# Patient Record
Sex: MALE | Race: BLACK OR AFRICAN AMERICAN | NOT HISPANIC OR LATINO | Employment: FULL TIME | URBAN - METROPOLITAN AREA
[De-identification: names, ages, dates, MRNs, and addresses within clinical notes are randomized per-mention and may not be internally consistent; named-entity substitution may affect disease eponyms.]

---

## 2017-02-08 ENCOUNTER — HOSPITAL ENCOUNTER (INPATIENT)
Facility: HOSPITAL | Age: 52
LOS: 4 days | Discharge: HOME | DRG: 871 | End: 2017-02-12
Attending: EMERGENCY MEDICINE | Admitting: EMERGENCY MEDICINE

## 2017-02-08 DIAGNOSIS — N17.9 ACUTE RENAL FAILURE, UNSPECIFIED ACUTE RENAL FAILURE TYPE: ICD-10-CM

## 2017-02-08 DIAGNOSIS — A41.9 SEPSIS, DUE TO UNSPECIFIED ORGANISM: ICD-10-CM

## 2017-02-08 DIAGNOSIS — D69.1 PLATELET DYSFUNCTION: ICD-10-CM

## 2017-02-08 DIAGNOSIS — R79.89 ELEVATED TROPONIN: ICD-10-CM

## 2017-02-08 DIAGNOSIS — R09.02 HYPOXIA: ICD-10-CM

## 2017-02-08 DIAGNOSIS — E16.2 HYPOGLYCEMIA: ICD-10-CM

## 2017-02-08 DIAGNOSIS — N18.2 CKD (CHRONIC KIDNEY DISEASE), STAGE II: ICD-10-CM

## 2017-02-08 DIAGNOSIS — J18.9 HCAP (HEALTHCARE-ASSOCIATED PNEUMONIA): ICD-10-CM

## 2017-02-08 DIAGNOSIS — G93.6 CEREBRAL EDEMA: ICD-10-CM

## 2017-02-08 DIAGNOSIS — J18.1 LUNG CONSOLIDATION: ICD-10-CM

## 2017-02-08 DIAGNOSIS — N17.0 ATN (ACUTE TUBULAR NECROSIS): ICD-10-CM

## 2017-02-08 DIAGNOSIS — I16.1 HYPERTENSIVE EMERGENCY: ICD-10-CM

## 2017-02-08 DIAGNOSIS — J96.01 ACUTE RESPIRATORY FAILURE WITH HYPOXEMIA: ICD-10-CM

## 2017-02-08 DIAGNOSIS — I62.9 INTRACRANIAL HEMORRHAGE: ICD-10-CM

## 2017-02-08 DIAGNOSIS — E86.1 HYPOVOLEMIA: ICD-10-CM

## 2017-02-08 DIAGNOSIS — I10 MALIGNANT HYPERTENSION: ICD-10-CM

## 2017-02-08 DIAGNOSIS — A41.9 SEVERE SEPSIS: ICD-10-CM

## 2017-02-08 DIAGNOSIS — J18.9 PNEUMONIA OF RIGHT MIDDLE LOBE DUE TO INFECTIOUS ORGANISM: Primary | ICD-10-CM

## 2017-02-08 DIAGNOSIS — R51.9 INTRACTABLE EPISODIC HEADACHE, UNSPECIFIED HEADACHE TYPE: ICD-10-CM

## 2017-02-08 DIAGNOSIS — N17.9 ACUTE KIDNEY INJURY: ICD-10-CM

## 2017-02-08 DIAGNOSIS — R91.8 PULMONARY INFILTRATES ON CXR: ICD-10-CM

## 2017-02-08 DIAGNOSIS — D50.9 MICROCYTIC ANEMIA: ICD-10-CM

## 2017-02-08 DIAGNOSIS — R79.89 ABNORMAL THYROID BLOOD TEST: ICD-10-CM

## 2017-02-08 DIAGNOSIS — I16.9 HYPERTENSIVE CRISIS: ICD-10-CM

## 2017-02-08 DIAGNOSIS — R65.20 SEVERE SEPSIS: ICD-10-CM

## 2017-02-08 LAB
ALBUMIN SERPL BCP-MCNC: 3.9 G/DL
ALP SERPL-CCNC: 84 U/L
ALT SERPL W/O P-5'-P-CCNC: 27 U/L
ANION GAP SERPL CALC-SCNC: 11 MMOL/L
APTT BLDCRRT: 25.5 SEC
AST SERPL-CCNC: 23 U/L
BACTERIA #/AREA URNS HPF: ABNORMAL /HPF
BASOPHILS # BLD AUTO: 0.02 K/UL
BASOPHILS NFR BLD: 0.2 %
BILIRUB SERPL-MCNC: 0.4 MG/DL
BILIRUB UR QL STRIP: NEGATIVE
BUN SERPL-MCNC: 29 MG/DL
CALCIUM SERPL-MCNC: 9.6 MG/DL
CHLORIDE SERPL-SCNC: 104 MMOL/L
CLARITY UR: CLEAR
CO2 SERPL-SCNC: 24 MMOL/L
COLOR UR: YELLOW
CREAT SERPL-MCNC: 1.9 MG/DL
DIFFERENTIAL METHOD: ABNORMAL
EOSINOPHIL # BLD AUTO: 0.1 K/UL
EOSINOPHIL NFR BLD: 0.6 %
ERYTHROCYTE [DISTWIDTH] IN BLOOD BY AUTOMATED COUNT: 13.6 %
EST. GFR  (AFRICAN AMERICAN): 46 ML/MIN/1.73 M^2
EST. GFR  (NON AFRICAN AMERICAN): 39 ML/MIN/1.73 M^2
FLUAV AG SPEC QL IA: NEGATIVE
FLUBV AG SPEC QL IA: NEGATIVE
GLUCOSE SERPL-MCNC: 147 MG/DL
GLUCOSE UR QL STRIP: NEGATIVE
HCT VFR BLD AUTO: 39.9 %
HGB BLD-MCNC: 13.2 G/DL
HGB UR QL STRIP: ABNORMAL
HYALINE CASTS #/AREA URNS LPF: 1 /LPF
INR PPP: 1
KETONES UR QL STRIP: NEGATIVE
LACTATE SERPL-SCNC: 1.2 MMOL/L
LEUKOCYTE ESTERASE UR QL STRIP: NEGATIVE
LYMPHOCYTES # BLD AUTO: 0.5 K/UL
LYMPHOCYTES NFR BLD: 5.3 %
MAGNESIUM SERPL-MCNC: 2.1 MG/DL
MCH RBC QN AUTO: 26.4 PG
MCHC RBC AUTO-ENTMCNC: 33.1 %
MCV RBC AUTO: 80 FL
MICROSCOPIC COMMENT: ABNORMAL
MONOCYTES # BLD AUTO: 0.7 K/UL
MONOCYTES NFR BLD: 7.7 %
NEUTROPHILS # BLD AUTO: 7.7 K/UL
NEUTROPHILS NFR BLD: 86.2 %
NITRITE UR QL STRIP: NEGATIVE
OTHER ELEMENTS URNS MICRO: ABNORMAL
PH UR STRIP: 5 [PH] (ref 5–8)
PHOSPHATE SERPL-MCNC: 2.5 MG/DL
PLATELET # BLD AUTO: 206 K/UL
PMV BLD AUTO: 10.7 FL
POTASSIUM SERPL-SCNC: 3.5 MMOL/L
PROT SERPL-MCNC: 8.5 G/DL
PROT UR QL STRIP: ABNORMAL
PROTHROMBIN TIME: 10.7 SEC
RBC # BLD AUTO: 5 M/UL
RBC #/AREA URNS HPF: 4 /HPF (ref 0–4)
SODIUM SERPL-SCNC: 139 MMOL/L
SP GR UR STRIP: 1.02 (ref 1–1.03)
SPECIMEN SOURCE: NORMAL
T4 FREE SERPL-MCNC: 0.94 NG/DL
TROPONIN I SERPL DL<=0.01 NG/ML-MCNC: 0.07 NG/ML
TSH SERPL DL<=0.005 MIU/L-ACNC: 0.36 UIU/ML
URN SPEC COLLECT METH UR: ABNORMAL
UROBILINOGEN UR STRIP-ACNC: NEGATIVE EU/DL
WBC # BLD AUTO: 8.88 K/UL
WBC #/AREA URNS HPF: 0 /HPF (ref 0–5)

## 2017-02-08 PROCEDURE — 80053 COMPREHEN METABOLIC PANEL: CPT

## 2017-02-08 PROCEDURE — 96375 TX/PRO/DX INJ NEW DRUG ADDON: CPT

## 2017-02-08 PROCEDURE — 87040 BLOOD CULTURE FOR BACTERIA: CPT

## 2017-02-08 PROCEDURE — 84484 ASSAY OF TROPONIN QUANT: CPT

## 2017-02-08 PROCEDURE — 85610 PROTHROMBIN TIME: CPT

## 2017-02-08 PROCEDURE — 96367 TX/PROPH/DG ADDL SEQ IV INF: CPT

## 2017-02-08 PROCEDURE — 84443 ASSAY THYROID STIM HORMONE: CPT

## 2017-02-08 PROCEDURE — 83735 ASSAY OF MAGNESIUM: CPT

## 2017-02-08 PROCEDURE — 96361 HYDRATE IV INFUSION ADD-ON: CPT

## 2017-02-08 PROCEDURE — 85730 THROMBOPLASTIN TIME PARTIAL: CPT

## 2017-02-08 PROCEDURE — 87400 INFLUENZA A/B EACH AG IA: CPT

## 2017-02-08 PROCEDURE — 12000002 HC ACUTE/MED SURGE SEMI-PRIVATE ROOM

## 2017-02-08 PROCEDURE — 96365 THER/PROPH/DIAG IV INF INIT: CPT

## 2017-02-08 PROCEDURE — 85025 COMPLETE CBC W/AUTO DIFF WBC: CPT

## 2017-02-08 PROCEDURE — 25000003 PHARM REV CODE 250: Performed by: EMERGENCY MEDICINE

## 2017-02-08 PROCEDURE — 81000 URINALYSIS NONAUTO W/SCOPE: CPT

## 2017-02-08 PROCEDURE — 84100 ASSAY OF PHOSPHORUS: CPT

## 2017-02-08 PROCEDURE — 83605 ASSAY OF LACTIC ACID: CPT

## 2017-02-08 PROCEDURE — 63600175 PHARM REV CODE 636 W HCPCS: Performed by: EMERGENCY MEDICINE

## 2017-02-08 PROCEDURE — 99291 CRITICAL CARE FIRST HOUR: CPT | Mod: 25

## 2017-02-08 PROCEDURE — 93005 ELECTROCARDIOGRAM TRACING: CPT

## 2017-02-08 PROCEDURE — 84439 ASSAY OF FREE THYROXINE: CPT

## 2017-02-08 PROCEDURE — 94761 N-INVAS EAR/PLS OXIMETRY MLT: CPT

## 2017-02-08 PROCEDURE — 87086 URINE CULTURE/COLONY COUNT: CPT

## 2017-02-08 RX ORDER — LISINOPRIL 10 MG/1
10 TABLET ORAL DAILY
Status: ON HOLD | COMMUNITY
End: 2017-02-12

## 2017-02-08 RX ORDER — ACETAMINOPHEN 500 MG
1000 TABLET ORAL
Status: COMPLETED | OUTPATIENT
Start: 2017-02-08 | End: 2017-02-08

## 2017-02-08 RX ORDER — HYDRALAZINE HYDROCHLORIDE 20 MG/ML
10 INJECTION INTRAMUSCULAR; INTRAVENOUS
Status: DISCONTINUED | OUTPATIENT
Start: 2017-02-08 | End: 2017-02-09

## 2017-02-08 RX ORDER — CEFEPIME HYDROCHLORIDE 1 G/50ML
1 INJECTION, SOLUTION INTRAVENOUS
Status: COMPLETED | OUTPATIENT
Start: 2017-02-08 | End: 2017-02-08

## 2017-02-08 RX ORDER — CEFEPIME HYDROCHLORIDE 1 G/50ML
1 INJECTION, SOLUTION INTRAVENOUS
Status: DISCONTINUED | OUTPATIENT
Start: 2017-02-08 | End: 2017-02-08

## 2017-02-08 RX ORDER — AMLODIPINE BESYLATE 5 MG/1
10 TABLET ORAL
Status: COMPLETED | OUTPATIENT
Start: 2017-02-08 | End: 2017-02-08

## 2017-02-08 RX ORDER — LISINOPRIL 5 MG/1
10 TABLET ORAL
Status: COMPLETED | OUTPATIENT
Start: 2017-02-08 | End: 2017-02-08

## 2017-02-08 RX ORDER — IBUPROFEN 400 MG/1
800 TABLET ORAL
Status: COMPLETED | OUTPATIENT
Start: 2017-02-08 | End: 2017-02-08

## 2017-02-08 RX ADMIN — ACETAMINOPHEN 1000 MG: 500 TABLET ORAL at 07:02

## 2017-02-08 RX ADMIN — IBUPROFEN 800 MG: 400 TABLET, FILM COATED ORAL at 08:02

## 2017-02-08 RX ADMIN — VANCOMYCIN HYDROCHLORIDE 1000 MG: 1 INJECTION, POWDER, LYOPHILIZED, FOR SOLUTION INTRAVENOUS at 10:02

## 2017-02-08 RX ADMIN — AMLODIPINE BESYLATE 10 MG: 5 TABLET ORAL at 07:02

## 2017-02-08 RX ADMIN — LISINOPRIL 10 MG: 5 TABLET ORAL at 07:02

## 2017-02-08 RX ADMIN — AZITHROMYCIN MONOHYDRATE 500 MG: 500 INJECTION, POWDER, LYOPHILIZED, FOR SOLUTION INTRAVENOUS at 09:02

## 2017-02-08 RX ADMIN — SODIUM CHLORIDE 2517 ML: 0.9 INJECTION, SOLUTION INTRAVENOUS at 07:02

## 2017-02-08 RX ADMIN — CEFEPIME HYDROCHLORIDE 1 G: 1 INJECTION, POWDER, FOR SOLUTION INTRAMUSCULAR; INTRAVENOUS at 08:02

## 2017-02-08 NOTE — IP AVS SNAPSHOT
[place] [address] [place] [address]  Phone: [phone]           Patient Discharge Instructions     Our goal is to set you up for success. This packet includes information on your condition, medications, and your home care. It will help you to care for yourself so you don't get sicker and need to go back to the hospital.     Please ask your nurse if you have any questions.        There are many details to remember when preparing to leave the hospital. Here is what you will need to do:    1. Take your medicine. If you are prescribed medications, review your Medication List in the following pages. You may have new medications to  at the pharmacy and others that you'll need to stop taking. Review the instructions for how and when to take your medications. Talk with your doctor or nurses if you are unsure of what to do.     2. Go to your follow-up appointments. Specific follow-up information is listed in the following pages. Your may be contacted by a transition nurse or clinical provider about future appointments. Be sure we have all of the phone numbers to reach you, if needed. Please contact your provider's office if you are unable to make an appointment.     3. Watch for warning signs. Your doctor or nurse will give you detailed warning signs to watch for and when to call for assistance. These instructions may also include educational information about your condition. If you experience any of warning signs to your health, call your doctor.               [facility]  Unless otherwise directed by your provider, please contact [facility], our nurse care line that is available for 24/7 assistance.     [phone] (toll-free)    Registered nurses in the [facility] provide clinical advisement, health education, appointment booking, and other advisory services.                    ** Verify the list of medication(s) below is accurate and up to date.  Carry this with you in case of emergency. If your medications have changed, please notify your healthcare provider.             Medication List      START taking these medications        Additional Info                      amoxicillin-clavulanate 875-125mg 875-125 mg per tablet   Commonly known as:  AUGMENTIN   Quantity:  20 tablet   Refills:  0   Dose:  1 tablet    Instructions:  Take 1 tablet by mouth 2 (two) times daily.     Begin Date    AM    Noon    PM    Bedtime       aspirin 81 MG EC tablet   Commonly known as:  ECOTRIN   Refills:  0   Dose:  81 mg    Instructions:  Take 1 tablet (81 mg total) by mouth once daily.     Begin Date    AM    Noon    PM    Bedtime       hydrALAZINE 100 MG tablet   Commonly known as:  APRESOLINE   Quantity:  90 tablet   Refills:  0   Dose:  100 mg    Last time this was given:  100 mg on 2/12/2017  5:43 AM   Instructions:  Take 1 tablet (100 mg total) by mouth every 8 (eight) hours.     Begin Date    AM    Noon    PM    Bedtime       pravastatin 40 MG tablet   Commonly known as:  PRAVACHOL   Quantity:  30 tablet   Refills:  0   Dose:  40 mg    Instructions:  Take 1 tablet (40 mg total) by mouth once daily.     Begin Date    AM    Noon    PM    Bedtime         CHANGE how you take these medications        Additional Info                      metformin 500 MG tablet   Commonly known as:  GLUCOPHAGE   Quantity:  60 tablet   Refills:  0   Dose:  500 mg   What changed:  when to take this    Instructions:  Take 1 tablet (500 mg total) by mouth 2 (two) times daily with meals.     Begin Date    AM    Noon    PM    Bedtime         CONTINUE taking these medications        Additional Info                      amlodipine 10 MG tablet   Commonly known as:  NORVASC   Quantity:  30 tablet   Refills:  0   Dose:  10 mg    Last time this was given:  10 mg on 2/12/2017  9:11 AM   Instructions:  Take 1 tablet (10 mg total) by mouth once daily.     Begin Date    AM    Noon    PM    Bedtime        lisinopril 10 MG tablet   Quantity:  30 tablet   Refills:  0   Dose:  10 mg    Last time this was given:  10 mg on 2/8/2017  7:26 PM   Instructions:  Take 1 tablet (10 mg total) by mouth once daily.     Begin Date    AM    Noon    PM    Bedtime            Where to Get Your Medications      These medications were sent to [facility] [address] [place], [place] [address] [address], [place] [address]     Phone:  [phone]     amlodipine 10 MG tablet    amoxicillin-clavulanate 875-125mg 875-125 mg per tablet    hydrALAZINE 100 MG tablet    lisinopril 10 MG tablet    metformin 500 MG tablet    pravastatin 40 MG tablet         Information about where to get these medications is not yet available     ! Ask your nurse or doctor about these medications     aspirin 81 MG EC tablet                  Please bring to all follow up appointments:    1. A copy of your discharge instructions.  2. All medicines you are currently taking in their original bottles.  3. Identification and insurance card.    Please arrive 15 minutes ahead of scheduled appointment time.    Please call 24 hours in advance if you must reschedule your appointment and/or time.        Follow-up Information     Follow up with [facility]. Schedule an appointment as soon as possible for a visit in 3 days.    Why:  Outpatient Services, PCP Follow-up Appointment    Contact information:    [address] [place] [address] [phone]          [facility]  In 3 days.        Discharge Instructions     Future Orders    Activity as tolerated     Diet general     Questions:    Total calories:      Fat restriction, if any:      Protein restriction, if any:      Na restriction, if any:      Fluid restriction:      Additional restrictions:          Primary Diagnosis     Your primary diagnosis was:  Healthcare-Associated Pneumonia      Admission Information     Date & Time Provider Department CSN    2/8/2017  6:31 PM  "Suzie Ron MD [facility] [id number]      Care Providers     Provider Role Specialty Primary office phone    Suzie Ron MD Attending Provider Hospitalist [phone]      Your Vitals Were     BP Pulse Temp Resp Height Weight    155/90 101 98.1 °F (36.7 °C) 18 5' 7" (1.702 m) 83.9 kg (185 lb)    SpO2 BMI             96% 28.98 kg/m2         Recent Lab Values        1/14/2016                          12:50 PM           A1C 6.5 (H)                       Pending Labs     Order Current Status    Blood culture x two cultures. Draw prior to antibiotics. Preliminary result    Blood culture x two cultures. Draw prior to antibiotics. Preliminary result      Allergies as of 2/12/2017     No Known Allergies      Advance Directives     An advance directive is a document which, in the event you are no longer able to make decisions for yourself, tells your healthcare team what kind of treatment you do or do not want to receive, or who you would like to make those decisions for you.  If you do not currently have an advance directive, [facility] encourages you to create one.  For more information call:  [phone]), [phone] ([phone]),  or log on to [email or web address].        Language Assistance Services     ATTENTION: Language assistance services are available, free of charge. Please call [phone].      ATENCIÓN: Si habla español, tiene a stone disposición servicios gratuitos de asistencia lingüística. Llame al [phone].     [facility] Ý: N?u b?n nói Ti?ng Vi?t, có các d?ch v? h? tr? ngôn ng? mi?n phí dành cho b?n. G?i s? [phone].        Stroke Education              Pneumonmia Discharge Instructions                Chronic Kindey Disease Education             Diabetes Discharge Instructions                                   [facility] Sign-Up     Activating your [facility] account is as easy as 1-2-3!     1) Visit [email or web address], select Sign Up Now, enter " this activation code and your date of birth, then select Next.  [email or web address]  Expires: 3/29/2017 10:31 AM      2) Create a username and password to use when you visit [facility] in the future and select a security question in case you lose your password and select Next.    3) Enter your e-mail address and click Sign Up!    Additional Information  If you have questions, please e-mail [email or web address] or call [phone] to talk to our [facility] staff. Remember, [facility] is NOT to be used for urgent needs. For medical emergencies, dial 911.          [facility] complies with applicable Federal civil rights laws and does not discriminate on the basis of race, color, national origin, age, disability, or sex.

## 2017-02-09 PROBLEM — A41.9 SEVERE SEPSIS: Status: ACTIVE | Noted: 2017-02-09

## 2017-02-09 PROBLEM — N17.9 ACUTE RENAL FAILURE: Status: ACTIVE | Noted: 2017-02-09

## 2017-02-09 PROBLEM — R79.89 ELEVATED TROPONIN: Status: ACTIVE | Noted: 2017-02-09

## 2017-02-09 PROBLEM — R65.20 SEVERE SEPSIS: Status: ACTIVE | Noted: 2017-02-09

## 2017-02-09 LAB
ALBUMIN SERPL BCP-MCNC: 3.3 G/DL
ALP SERPL-CCNC: 75 U/L
ALT SERPL W/O P-5'-P-CCNC: 46 U/L
ANION GAP SERPL CALC-SCNC: 9 MMOL/L
AST SERPL-CCNC: 58 U/L
BASOPHILS # BLD AUTO: 0.02 K/UL
BASOPHILS NFR BLD: 0.4 %
BILIRUB SERPL-MCNC: 0.4 MG/DL
BUN SERPL-MCNC: 22 MG/DL
CALCIUM SERPL-MCNC: 8.6 MG/DL
CHLORIDE SERPL-SCNC: 107 MMOL/L
CO2 SERPL-SCNC: 23 MMOL/L
CREAT SERPL-MCNC: 1.7 MG/DL
DIFFERENTIAL METHOD: ABNORMAL
EOSINOPHIL # BLD AUTO: 0.1 K/UL
EOSINOPHIL NFR BLD: 1.4 %
ERYTHROCYTE [DISTWIDTH] IN BLOOD BY AUTOMATED COUNT: 13.7 %
EST. GFR  (AFRICAN AMERICAN): 52 ML/MIN/1.73 M^2
EST. GFR  (NON AFRICAN AMERICAN): 45 ML/MIN/1.73 M^2
GLUCOSE SERPL-MCNC: 153 MG/DL
HCT VFR BLD AUTO: 37.1 %
HGB BLD-MCNC: 12.1 G/DL
LYMPHOCYTES # BLD AUTO: 0.5 K/UL
LYMPHOCYTES NFR BLD: 9.1 %
MCH RBC QN AUTO: 26.4 PG
MCHC RBC AUTO-ENTMCNC: 32.6 %
MCV RBC AUTO: 81 FL
MONOCYTES # BLD AUTO: 0.6 K/UL
MONOCYTES NFR BLD: 10.2 %
NEUTROPHILS # BLD AUTO: 4.4 K/UL
NEUTROPHILS NFR BLD: 78.9 %
PLATELET # BLD AUTO: 170 K/UL
PMV BLD AUTO: 10.8 FL
POCT GLUCOSE: 121 MG/DL (ref 70–110)
POCT GLUCOSE: 128 MG/DL (ref 70–110)
POCT GLUCOSE: 135 MG/DL (ref 70–110)
POCT GLUCOSE: 167 MG/DL (ref 70–110)
POCT GLUCOSE: 184 MG/DL (ref 70–110)
POTASSIUM SERPL-SCNC: 3.4 MMOL/L
PROT SERPL-MCNC: 7.2 G/DL
RBC # BLD AUTO: 4.58 M/UL
SODIUM SERPL-SCNC: 139 MMOL/L
TROPONIN I SERPL DL<=0.01 NG/ML-MCNC: 0.08 NG/ML
TROPONIN I SERPL DL<=0.01 NG/ML-MCNC: 0.1 NG/ML
WBC # BLD AUTO: 5.59 K/UL

## 2017-02-09 PROCEDURE — 12000002 HC ACUTE/MED SURGE SEMI-PRIVATE ROOM

## 2017-02-09 PROCEDURE — 94640 AIRWAY INHALATION TREATMENT: CPT

## 2017-02-09 PROCEDURE — 36415 COLL VENOUS BLD VENIPUNCTURE: CPT

## 2017-02-09 PROCEDURE — 94761 N-INVAS EAR/PLS OXIMETRY MLT: CPT

## 2017-02-09 PROCEDURE — 84484 ASSAY OF TROPONIN QUANT: CPT

## 2017-02-09 PROCEDURE — 25000003 PHARM REV CODE 250: Performed by: HOSPITALIST

## 2017-02-09 PROCEDURE — 85025 COMPLETE CBC W/AUTO DIFF WBC: CPT

## 2017-02-09 PROCEDURE — 80053 COMPREHEN METABOLIC PANEL: CPT

## 2017-02-09 PROCEDURE — 25000003 PHARM REV CODE 250: Performed by: EMERGENCY MEDICINE

## 2017-02-09 PROCEDURE — 25000242 PHARM REV CODE 250 ALT 637 W/ HCPCS: Performed by: HOSPITALIST

## 2017-02-09 PROCEDURE — 63600175 PHARM REV CODE 636 W HCPCS: Performed by: EMERGENCY MEDICINE

## 2017-02-09 RX ORDER — ACETAMINOPHEN 325 MG/1
650 TABLET ORAL EVERY 6 HOURS PRN
Status: DISCONTINUED | OUTPATIENT
Start: 2017-02-09 | End: 2017-02-12 | Stop reason: HOSPADM

## 2017-02-09 RX ORDER — ONDANSETRON 2 MG/ML
4 INJECTION INTRAMUSCULAR; INTRAVENOUS EVERY 12 HOURS PRN
Status: DISCONTINUED | OUTPATIENT
Start: 2017-02-09 | End: 2017-02-12 | Stop reason: HOSPADM

## 2017-02-09 RX ORDER — GUAIFENESIN/DEXTROMETHORPHAN 100-10MG/5
10 SYRUP ORAL EVERY 6 HOURS
Status: DISCONTINUED | OUTPATIENT
Start: 2017-02-09 | End: 2017-02-12 | Stop reason: HOSPADM

## 2017-02-09 RX ORDER — CEFEPIME HYDROCHLORIDE 1 G/50ML
1 INJECTION, SOLUTION INTRAVENOUS
Status: DISCONTINUED | OUTPATIENT
Start: 2017-02-09 | End: 2017-02-12 | Stop reason: HOSPADM

## 2017-02-09 RX ORDER — ENOXAPARIN SODIUM 100 MG/ML
40 INJECTION SUBCUTANEOUS EVERY 24 HOURS
Status: DISCONTINUED | OUTPATIENT
Start: 2017-02-09 | End: 2017-02-09

## 2017-02-09 RX ORDER — LABETALOL HYDROCHLORIDE 5 MG/ML
20 INJECTION, SOLUTION INTRAVENOUS ONCE
Status: DISCONTINUED | OUTPATIENT
Start: 2017-02-09 | End: 2017-02-12 | Stop reason: HOSPADM

## 2017-02-09 RX ORDER — HYDRALAZINE HYDROCHLORIDE 20 MG/ML
10 INJECTION INTRAMUSCULAR; INTRAVENOUS EVERY 8 HOURS PRN
Status: DISCONTINUED | OUTPATIENT
Start: 2017-02-09 | End: 2017-02-10

## 2017-02-09 RX ORDER — AMLODIPINE BESYLATE 5 MG/1
10 TABLET ORAL DAILY
Status: DISCONTINUED | OUTPATIENT
Start: 2017-02-09 | End: 2017-02-12 | Stop reason: HOSPADM

## 2017-02-09 RX ORDER — IPRATROPIUM BROMIDE AND ALBUTEROL SULFATE 2.5; .5 MG/3ML; MG/3ML
3 SOLUTION RESPIRATORY (INHALATION) EVERY 6 HOURS
Status: DISCONTINUED | OUTPATIENT
Start: 2017-02-09 | End: 2017-02-12 | Stop reason: HOSPADM

## 2017-02-09 RX ORDER — CLONIDINE HYDROCHLORIDE 0.1 MG/1
0.1 TABLET ORAL EVERY 4 HOURS PRN
Status: DISCONTINUED | OUTPATIENT
Start: 2017-02-09 | End: 2017-02-12 | Stop reason: HOSPADM

## 2017-02-09 RX ORDER — IPRATROPIUM BROMIDE AND ALBUTEROL SULFATE 2.5; .5 MG/3ML; MG/3ML
3 SOLUTION RESPIRATORY (INHALATION) EVERY 6 HOURS
Status: DISCONTINUED | OUTPATIENT
Start: 2017-02-09 | End: 2017-02-09

## 2017-02-09 RX ORDER — INSULIN ASPART 100 [IU]/ML
1-12 INJECTION, SOLUTION INTRAVENOUS; SUBCUTANEOUS
Status: DISCONTINUED | OUTPATIENT
Start: 2017-02-09 | End: 2017-02-12 | Stop reason: HOSPADM

## 2017-02-09 RX ORDER — ATORVASTATIN CALCIUM 10 MG/1
10 TABLET, FILM COATED ORAL DAILY
Status: DISCONTINUED | OUTPATIENT
Start: 2017-02-09 | End: 2017-02-12 | Stop reason: HOSPADM

## 2017-02-09 RX ORDER — BENZONATATE 100 MG/1
100 CAPSULE ORAL 3 TIMES DAILY PRN
Status: DISCONTINUED | OUTPATIENT
Start: 2017-02-09 | End: 2017-02-12 | Stop reason: HOSPADM

## 2017-02-09 RX ORDER — LISINOPRIL 5 MG/1
10 TABLET ORAL DAILY
Status: DISCONTINUED | OUTPATIENT
Start: 2017-02-09 | End: 2017-02-09

## 2017-02-09 RX ORDER — HYDRALAZINE HYDROCHLORIDE 25 MG/1
25 TABLET, FILM COATED ORAL EVERY 8 HOURS
Status: DISCONTINUED | OUTPATIENT
Start: 2017-02-09 | End: 2017-02-10

## 2017-02-09 RX ORDER — PANTOPRAZOLE SODIUM 40 MG/1
40 TABLET, DELAYED RELEASE ORAL DAILY
Status: DISCONTINUED | OUTPATIENT
Start: 2017-02-09 | End: 2017-02-12 | Stop reason: HOSPADM

## 2017-02-09 RX ORDER — GLUCAGON 1 MG
1 KIT INJECTION
Status: DISCONTINUED | OUTPATIENT
Start: 2017-02-09 | End: 2017-02-12 | Stop reason: HOSPADM

## 2017-02-09 RX ORDER — SODIUM CHLORIDE 9 MG/ML
INJECTION, SOLUTION INTRAVENOUS CONTINUOUS
Status: DISCONTINUED | OUTPATIENT
Start: 2017-02-09 | End: 2017-02-12 | Stop reason: HOSPADM

## 2017-02-09 RX ADMIN — ACETAMINOPHEN 650 MG: 325 TABLET ORAL at 04:02

## 2017-02-09 RX ADMIN — CEFEPIME 1 G: 1 INJECTION, POWDER, FOR SOLUTION INTRAMUSCULAR; INTRAVENOUS at 08:02

## 2017-02-09 RX ADMIN — BENZONATATE 100 MG: 100 CAPSULE ORAL at 02:02

## 2017-02-09 RX ADMIN — ATORVASTATIN CALCIUM 10 MG: 10 TABLET, FILM COATED ORAL at 07:02

## 2017-02-09 RX ADMIN — INSULIN ASPART 2 UNITS: 100 INJECTION, SOLUTION INTRAVENOUS; SUBCUTANEOUS at 07:02

## 2017-02-09 RX ADMIN — HYDRALAZINE HYDROCHLORIDE 25 MG: 25 TABLET, FILM COATED ORAL at 07:02

## 2017-02-09 RX ADMIN — AZITHROMYCIN MONOHYDRATE 500 MG: 500 INJECTION, POWDER, LYOPHILIZED, FOR SOLUTION INTRAVENOUS at 10:02

## 2017-02-09 RX ADMIN — GUAIFENESIN AND DEXTROMETHORPHAN 10 ML: 100; 10 SYRUP ORAL at 12:02

## 2017-02-09 RX ADMIN — HYDRALAZINE HYDROCHLORIDE 25 MG: 25 TABLET, FILM COATED ORAL at 02:02

## 2017-02-09 RX ADMIN — GUAIFENESIN AND DEXTROMETHORPHAN 10 ML: 100; 10 SYRUP ORAL at 02:02

## 2017-02-09 RX ADMIN — CEFEPIME 1 G: 1 INJECTION, POWDER, FOR SOLUTION INTRAMUSCULAR; INTRAVENOUS at 07:02

## 2017-02-09 RX ADMIN — SODIUM CHLORIDE: 0.9 INJECTION, SOLUTION INTRAVENOUS at 08:02

## 2017-02-09 RX ADMIN — ACETAMINOPHEN 650 MG: 325 TABLET ORAL at 08:02

## 2017-02-09 RX ADMIN — GUAIFENESIN AND DEXTROMETHORPHAN 10 ML: 100; 10 SYRUP ORAL at 11:02

## 2017-02-09 RX ADMIN — POTASSIUM BICARBONATE 50 MEQ: 25 TABLET, EFFERVESCENT ORAL at 07:02

## 2017-02-09 RX ADMIN — HYDRALAZINE HYDROCHLORIDE 10 MG: 20 INJECTION INTRAMUSCULAR; INTRAVENOUS at 09:02

## 2017-02-09 RX ADMIN — IPRATROPIUM BROMIDE AND ALBUTEROL SULFATE 3 ML: .5; 3 SOLUTION RESPIRATORY (INHALATION) at 07:02

## 2017-02-09 RX ADMIN — SODIUM CHLORIDE: 0.9 INJECTION, SOLUTION INTRAVENOUS at 01:02

## 2017-02-09 RX ADMIN — VANCOMYCIN HYDROCHLORIDE 1000 MG: 1 INJECTION, POWDER, LYOPHILIZED, FOR SOLUTION INTRAVENOUS at 10:02

## 2017-02-09 RX ADMIN — CLONIDINE HYDROCHLORIDE 0.1 MG: 0.1 TABLET ORAL at 12:02

## 2017-02-09 RX ADMIN — HYDRALAZINE HYDROCHLORIDE 25 MG: 25 TABLET, FILM COATED ORAL at 10:02

## 2017-02-09 RX ADMIN — ACETAMINOPHEN 650 MG: 325 TABLET ORAL at 09:02

## 2017-02-09 RX ADMIN — IPRATROPIUM BROMIDE AND ALBUTEROL SULFATE 3 ML: .5; 3 SOLUTION RESPIRATORY (INHALATION) at 12:02

## 2017-02-09 RX ADMIN — AMLODIPINE BESYLATE 10 MG: 5 TABLET ORAL at 07:02

## 2017-02-09 RX ADMIN — GUAIFENESIN AND DEXTROMETHORPHAN 10 ML: 100; 10 SYRUP ORAL at 05:02

## 2017-02-09 RX ADMIN — HYDRALAZINE HYDROCHLORIDE 10 MG: 20 INJECTION INTRAMUSCULAR; INTRAVENOUS at 05:02

## 2017-02-09 RX ADMIN — IPRATROPIUM BROMIDE AND ALBUTEROL SULFATE 3 ML: .5; 3 SOLUTION RESPIRATORY (INHALATION) at 08:02

## 2017-02-09 RX ADMIN — VANCOMYCIN HYDROCHLORIDE 1000 MG: 1 INJECTION, POWDER, LYOPHILIZED, FOR SOLUTION INTRAVENOUS at 09:02

## 2017-02-09 RX ADMIN — PANTOPRAZOLE SODIUM 40 MG: 40 TABLET, DELAYED RELEASE ORAL at 07:02

## 2017-02-09 RX ADMIN — IPRATROPIUM BROMIDE AND ALBUTEROL SULFATE 3 ML: .5; 3 SOLUTION RESPIRATORY (INHALATION) at 02:02

## 2017-02-09 RX ADMIN — CLONIDINE HYDROCHLORIDE 0.1 MG: 0.1 TABLET ORAL at 05:02

## 2017-02-09 NOTE — PLAN OF CARE
02/09/17 1612   Discharge Assessment   Assessment Type Discharge Planning Assessment   Confirmed/corrected address and phone number on facesheet? Yes   Assessment information obtained from? Patient   Type of Healthcare Directive Received (None)   Prior to hospitilization cognitive status: Alert/Oriented;No Deficits   Prior to hospitalization functional status: Independent   Current cognitive status: Alert/Oriented;No Deficits   Current Functional Status: Independent   Arrived From home or self-care   Lives With child(macarena), adult;grandchild(macarena)   Able to Return to Prior Arrangements yes   Is patient able to care for self after discharge? Yes   How many people do you have in your home that can help with your care after discharge? other (see comments)   Who are your caregiver(s) and their phone number(s)? Several family members also daughter Rima [phone]   Patient's perception of discharge disposition home or selfcare   Readmission Within The Last 30 Days no previous admission in last 30 days   Patient currently being followed by outpatient case management? No   Patient currently receives home health services? No   Does the patient currently use HME? No   Patient currently receives private duty nursing? No   Patient currently receives any other outside agency services? No   Equipment Currently Used at Home none   Do you have any problems affording any of your prescribed medications? TBD   Is the patient taking medications as prescribed? no   If no, which medications is patient not taking? Patient reports not taking any medications on a regular basis   Do you have any financial concerns preventing you from receiving the healthcare you need? Yes  (Patient uninsured)   Does the patient have transportation to healthcare appointments? Yes   Transportation Available car;family or friend will provide   On Dialysis? No   Does the patient receive services at the Coumadin Clinic? No   Are there any open cases? No  "  Discharge Plan A Home with family   Discharge Plan B Home with family   Patient/Family In Agreement With Plan yes     KARLY met with patient to complete discharge needs assessment. SW provided and reviewed with patient "[facility] Packet", "Discharge Planning Begins on Admission" brochure and "Help At Home" hand out. KARLY adult children also at bedside. SW discussed with patient the things he will be responsible for to maintain his healthcare at home. SW provided a list of community clinics to refer patient all parties present chose [facility]. Patient drives taxis for a living therefore he did not have a preference for doctor appointment times.       [facility] [address] [place], [place] [address] [place] [address]  Phone: [phone] Fax: [phone]      "

## 2017-02-09 NOTE — H&P
"[facility] Hospital Medicine  History & Physical    Patient Name: Mahesh Cespedes  MRN: [id number]  Admission Date: 2/8/2017  Attending Physician: Suzie Ron MD   Primary Care Provider: Primary Doctor No         Patient information was obtained from patient and ER records.     Subjective:     Principal Problem:HCAP (healthcare-associated pneumonia)    Chief Complaint:   Chief Complaint   Patient presents with    Cough     " He started coughing with wheezing last night." Denies chest pain +SOB        HPI: This 51 y.o. male with history of DM,HTN,CVA  presents to the ED c/o an acute-onset productive cough (yellow sputum) with associated wheezing that began 3 days ago,  and has not improved since onset. No prior attempted tx. No recent sick contacts. Pt states he did not receive his flu shot this season. He denies a PMHx of asthma, COPD, and bronchitis. Pt states that he does not smoke tobacco.he had fever with nausea and vomiting,and was feeling SOB,chest X ray is consistent with RML consolidation,duo to recent hospitalization in [facility],patient has been started on broad spectrum IV Abx,he has sever sepsis with fever 102,tachycardia and end organ damage with ARF,patient is on IVF and cultures are pending at this time,his lactic acid is WNL,he has good perfusion.    Past Medical History   Diagnosis Date    Hypertension        No past surgical history on file.    Review of patient's allergies indicates:  No Known Allergies    No current facility-administered medications on file prior to encounter.      Current Outpatient Prescriptions on File Prior to Encounter   Medication Sig    amlodipine (NORVASC) 10 MG tablet Take 1 tablet (10 mg total) by mouth once daily.    metformin (GLUCOPHAGE) 500 MG tablet Take 1 tablet (500 mg total) by mouth daily with breakfast.     Family History     None        Social History Main Topics    Smoking status: Never Smoker    Smokeless tobacco: Not on file    " "Alcohol use 0.0 oz/week     0 Standard drinks or equivalent per week      Comment: "Holidays", unable to specify an amount    Drug use: No    Sexual activity: Not Currently     Review of Systems   Constitutional: Positive for chills and fever.   HENT: Positive for congestion.    Eyes: Negative for discharge.   Respiratory: Positive for cough, shortness of breath and wheezing.    Cardiovascular: Negative for chest pain and leg swelling.   Gastrointestinal: Positive for nausea and vomiting.   Endocrine: Negative for polydipsia.   Genitourinary: Negative for dysuria.   Neurological: Negative for facial asymmetry.   Hematological: Negative for adenopathy.     Objective:     Vital Signs (Most Recent):  Temp: 99.1 °F (37.3 °C) (02/09/17 0449)  Pulse: 95 (02/09/17 0449)  Resp: 19 (02/09/17 0449)  BP: (!) 159/92 (02/09/17 0449)  SpO2: 95 % (02/09/17 0449) Vital Signs (24h Range):  Temp:  [98.9 °F (37.2 °C)-103.1 °F (39.5 °C)] 99.1 °F (37.3 °C)  Pulse:  [] 95  Resp:  [17-22] 19  SpO2:  [93 %-98 %] 95 %  BP: (137-221)/() 159/92     Weight: 83.9 kg (185 lb)  Body mass index is 28.98 kg/(m^2).    Physical Exam   Constitutional: He is oriented to person, place, and time. No distress.   HENT:   Head: Atraumatic.   Eyes: EOM are normal. Pupils are equal, round, and reactive to light.   Neck: Neck supple.   Cardiovascular: Normal rate and regular rhythm.    Pulmonary/Chest: He has wheezes. He has rales.   Abdominal: Soft. He exhibits no distension.   Musculoskeletal: He exhibits no deformity.   Neurological: He is oriented to person, place, and time. No cranial nerve deficit.   Skin: Skin is warm and dry. He is not diaphoretic.   Psychiatric: He has a normal mood and affect. His behavior is normal.        Significant Labs:   BMP:   Recent Labs  Lab 02/08/17  1921 02/09/17  0402   * 153*    139   K 3.5 3.4*    107   CO2 24 23   BUN 29* 22*   CREATININE 1.9* 1.7*   CALCIUM 9.6 8.6*   MG 2.1  --  "     CBC:   Recent Labs  Lab 02/08/17 1921 02/09/17  0402   WBC 8.88 5.59   HGB 13.2* 12.1*   HCT 39.9* 37.1*    170     Lactic Acid:   Recent Labs  Lab 02/08/17 1921   LACTATE 1.2       Significant Imaging: CXR: I have reviewed all pertinent results/findings within the past 24 hours and my personal findings are:  RML consolidation     Assessment/Plan:     * HCAP (healthcare-associated pneumonia)  Patient is on broad spectrum IV Abx,will follow cultures,still has productive cough with wheezing,stared on nebulzier.      Severe sepsis  With fever,tachycardia and ARF,HCAP meet criteria for sever sepsis,perfusion are intact,fever is improved at this time.      Acute renal failure  Continue with IVF,follow daily labs.      DM (diabetes mellitus), secondary uncontrolled  Continue with SSI.      Hypertensive emergency  Patient had systolic of over 220 at arrival,improved with Antihypertensive agents,D/C lisinopril duo ARF,started on Hydralazin and also has prn clonidine.        Elevated troponin  Elevated Troponin likely duo to ARF and severe sepsis,he denies chest pain,EKG show only sinus tachycardia.      VTE Risk Mitigation         Ordered     Medium Risk of VTE  Once      02/09/17 0015     Place sequential compression device  Until discontinued      02/09/17 0015        Suzie Ron MD  Department of Hospital Medicine   [facility]

## 2017-02-09 NOTE — PROGRESS NOTES
The pt was received on RA with a sat of 98%. No distress was noted at this time. His breath sounds are coarse with exp wheezes. He tolerated his treatment with ADRIANA.

## 2017-02-09 NOTE — ASSESSMENT & PLAN NOTE
Elevated Troponin likely duo to ARF and severe sepsis,he denies chest pain,EKG show only sinus tachycardia.

## 2017-02-09 NOTE — ED TRIAGE NOTES
Pt arrived to Ed due to cough that started yesterday. Sates cough is productive. Denies CP, but states having SOB.  States having chills. Denies generalized body aches.

## 2017-02-09 NOTE — SUBJECTIVE & OBJECTIVE
"Past Medical History   Diagnosis Date    Hypertension        No past surgical history on file.    Review of patient's allergies indicates:  No Known Allergies    No current facility-administered medications on file prior to encounter.      Current Outpatient Prescriptions on File Prior to Encounter   Medication Sig    amlodipine (NORVASC) 10 MG tablet Take 1 tablet (10 mg total) by mouth once daily.    metformin (GLUCOPHAGE) 500 MG tablet Take 1 tablet (500 mg total) by mouth daily with breakfast.     Family History     None        Social History Main Topics    Smoking status: Never Smoker    Smokeless tobacco: Not on file    Alcohol use 0.0 oz/week     0 Standard drinks or equivalent per week      Comment: "Holidays", unable to specify an amount    Drug use: No    Sexual activity: Not Currently     Review of Systems   Constitutional: Positive for chills and fever.   HENT: Positive for congestion.    Eyes: Negative for discharge.   Respiratory: Positive for cough, shortness of breath and wheezing.    Cardiovascular: Negative for chest pain and leg swelling.   Gastrointestinal: Positive for nausea and vomiting.   Endocrine: Negative for polydipsia.   Genitourinary: Negative for dysuria.   Neurological: Negative for facial asymmetry.   Hematological: Negative for adenopathy.     Objective:     Vital Signs (Most Recent):  Temp: 99.1 °F (37.3 °C) (02/09/17 0449)  Pulse: 95 (02/09/17 0449)  Resp: 19 (02/09/17 0449)  BP: (!) 159/92 (02/09/17 0449)  SpO2: 95 % (02/09/17 0449) Vital Signs (24h Range):  Temp:  [98.9 °F (37.2 °C)-103.1 °F (39.5 °C)] 99.1 °F (37.3 °C)  Pulse:  [] 95  Resp:  [17-22] 19  SpO2:  [93 %-98 %] 95 %  BP: (137-221)/() 159/92     Weight: 83.9 kg (185 lb)  Body mass index is 28.98 kg/(m^2).    Physical Exam   Constitutional: He is oriented to person, place, and time. No distress.   HENT:   Head: Atraumatic.   Eyes: EOM are normal. Pupils are equal, round, and reactive to light. "   Neck: Neck supple.   Cardiovascular: Normal rate and regular rhythm.    Pulmonary/Chest: He has wheezes. He has rales.   Abdominal: Soft. He exhibits no distension.   Musculoskeletal: He exhibits no deformity.   Neurological: He is oriented to person, place, and time. No cranial nerve deficit.   Skin: Skin is warm and dry. He is not diaphoretic.   Psychiatric: He has a normal mood and affect. His behavior is normal.        Significant Labs:   BMP:   Recent Labs  Lab 02/08/17 1921 02/09/17  0402   * 153*    139   K 3.5 3.4*    107   CO2 24 23   BUN 29* 22*   CREATININE 1.9* 1.7*   CALCIUM 9.6 8.6*   MG 2.1  --      CBC:   Recent Labs  Lab 02/08/17 1921 02/09/17  0402   WBC 8.88 5.59   HGB 13.2* 12.1*   HCT 39.9* 37.1*    170     Lactic Acid:   Recent Labs  Lab 02/08/17 1921   LACTATE 1.2       Significant Imaging: CXR: I have reviewed all pertinent results/findings within the past 24 hours and my personal findings are:  RML consolidation

## 2017-02-09 NOTE — ASSESSMENT & PLAN NOTE
Patient is on broad spectrum IV Abx,will follow cultures,still has productive cough with wheezing,stared on nebulzier.

## 2017-02-09 NOTE — ASSESSMENT & PLAN NOTE
With fever,tachycardia and ARF,HCAP meet criteria for sever sepsis,perfusion are intact,fever is improved at this time.

## 2017-02-09 NOTE — ED TRIAGE NOTES
Pt c/o cough and wheezing that started last night. Pt reports yellow sputum. Denies history of asthma. Upon arrival patient has a fever and also c/o weakness.

## 2017-02-09 NOTE — ASSESSMENT & PLAN NOTE
Patient had systolic of over 220 at arrival,improved with Antihypertensive agents,D/C lisinopril duo ARF,started on Hydralazin and also has prn clonidine.

## 2017-02-09 NOTE — PROGRESS NOTES
/112      Pt denies pain, but reports headache. PRN BP IV hydralazine and clonidine given together, and tylenol also given.  BP reported to Dr Barry. VS reported to Dr Barry, new order for labetalol x1 dose received. After recheck but before labetalol:  /64 HR 89. Labetalol not given at this time. Will continue to re assess and monitor for need for labetalol dose.

## 2017-02-09 NOTE — ED PROVIDER NOTES
"Encounter Date: 2/8/2017    SCRIBE #1 NOTE: I, YeimiDannielle Charity , am scribing for, and in the presence of,  Karyn Beard MD. I have scribed the following portions of the note - Other sections scribed: HPI/ROS/PE .       History     Chief Complaint   Patient presents with    Cough     " He started coughing with wheezing last night." Denies chest pain +SOB     Review of patient's allergies indicates:  No Known Allergies  HPI Comments: CC: Cough     HPI: This 51 y.o. male with HTN presents to the ED c/o an acute-onset productive cough (yellow sputum) with associated wheezing that began yesterday and has not improved since onset. No prior attempted tx. No recent sick contacts.  Pt states he did not receive his flu shot this season. He denies a PMHx of asthma, COPD, and bronchitis. Pt states that he does not smoke tobacco. He otherwise denies fever, chills, congestion,  abdominal pain, N/V/D, HA, SOB, CP, back pain, or any other associated symptoms.       The history is provided by the patient. No  was used.     Past Medical History   Diagnosis Date    Hypertension      No past medical history pertinent negatives.  No past surgical history on file.  History reviewed. No pertinent family history.  Social History   Substance Use Topics    Smoking status: Never Smoker    Smokeless tobacco: None    Alcohol use 0.0 oz/week     0 Standard drinks or equivalent per week      Comment: "Holidays", unable to specify an amount     Review of Systems   Constitutional: Negative for chills and fever.   HENT: Negative for ear pain and sore throat.    Eyes: Negative for pain and visual disturbance.   Respiratory: Positive for cough (productive with yellow sputum ) and wheezing. Negative for shortness of breath.    Cardiovascular: Negative for chest pain and palpitations.   Gastrointestinal: Negative for abdominal pain, diarrhea, nausea and vomiting.   Genitourinary: Negative for difficulty urinating and dysuria. "   Musculoskeletal: Negative for back pain and neck pain.   Skin: Negative for rash.   Neurological: Negative for headaches.       Physical Exam   Initial Vitals   BP Pulse Resp Temp SpO2   02/08/17 1805 02/08/17 1805 02/08/17 1805 02/08/17 1805 02/08/17 1805   221/133 132 22 103.1 °F (39.5 °C) 93 %     Physical Exam    Nursing note and vitals reviewed.  Constitutional: He appears well-developed and well-nourished. He is active.  Non-toxic appearance. He appears ill. No distress.   HENT:   Head: Normocephalic and atraumatic.   Eyes: EOM are normal.   Neck: Trachea normal. Neck supple.   Cardiovascular: Regular rhythm. Tachycardia present.    Pulmonary/Chest: No respiratory distress. He has wheezes.   Scattered expiratory wheezes bilaterally.    Abdominal: Soft. Normal appearance and bowel sounds are normal. He exhibits no distension. There is no tenderness.   Musculoskeletal: Normal range of motion. He exhibits no edema.   Neurological: He is alert.   Skin: Skin is warm, dry and intact.   Psychiatric: He has a normal mood and affect.         ED Course   Critical Care  Date/Time: 2/8/2017 10:51 PM  Performed by: MIRNA CAAL  Authorized by: MIRNA CAAL   Direct patient critical care time: 20 minutes  Additional history critical care time: 15 minutes  Ordering / reviewing critical care time: 5 minutes  Documentation critical care time: 7 minutes  Consulting other physicians critical care time: 5 minutes  Total critical care time (exclusive of procedural time) : 52 minutes  Critical care time was exclusive of separately billable procedures and treating other patients and teaching time.  Critical care was necessary to treat or prevent imminent or life-threatening deterioration of the following conditions: sepsis.  Critical care was time spent personally by me on the following activities: blood draw for specimens, development of treatment plan with patient or surrogate, discussions with consultants, evaluation of  patient's response to treatment, examination of patient, obtaining history from patient or surrogate, ordering and performing treatments and interventions, ordering and review of laboratory studies, ordering and review of radiographic studies, pulse oximetry, re-evaluation of patient's condition and review of old charts.        Labs Reviewed   CBC W/ AUTO DIFFERENTIAL - Abnormal; Notable for the following:        Result Value    Hemoglobin 13.2 (*)     Hematocrit 39.9 (*)     MCV 80 (*)     MCH 26.4 (*)     Lymph # 0.5 (*)     Gran% 86.2 (*)     Lymph% 5.3 (*)     All other components within normal limits   COMPREHENSIVE METABOLIC PANEL - Abnormal; Notable for the following:     Glucose 147 (*)     BUN, Bld 29 (*)     Creatinine 1.9 (*)     Total Protein 8.5 (*)     eGFR if  46 (*)     eGFR if non  39 (*)     All other components within normal limits   PHOSPHORUS - Abnormal; Notable for the following:     Phosphorus 2.5 (*)     All other components within normal limits   TROPONIN I - Abnormal; Notable for the following:     Troponin I 0.065 (*)     All other components within normal limits   TSH - Abnormal; Notable for the following:     TSH 0.360 (*)     All other components within normal limits   URINALYSIS - Abnormal; Notable for the following:     Protein, UA 2+ (*)     Occult Blood UA 2+ (*)     All other components within normal limits   URINALYSIS MICROSCOPIC - Abnormal; Notable for the following:     Other (U/A) Few (*)     All other components within normal limits   CULTURE, BLOOD    Narrative:     Aerobic and anaerobic   CULTURE, BLOOD    Narrative:     Aerobic and anaerobic   CULTURE, URINE   APTT   LACTIC ACID, PLASMA   MAGNESIUM   PROTIME-INR   INFLUENZA A AND B ANTIGEN   T4, FREE     EKG Readings: (Independently Interpreted)   Initial Reading: No STEMI. Rhythm: Sinus Tachycardia. Heart Rate: 128. Ectopy: No Ectopy.       X-Rays:   Independently Interpreted Readings:    Chest X-Ray: There is an infiltrate in the RML.     Medical Decision Making:   History:   Old Medical Records: I decided to obtain old medical records.  Initial Assessment:   This is an emergent evaluation a 51-year-old man who presented emergency department today secondary to cough, wheezing, fever.  Differential Diagnosis:   Differential diagnoses include pneumonia, influenza, bronchitis, amongst others.  Independently Interpreted Test(s):   I have ordered and independently interpreted X-rays - see prior notes.  I have ordered and independently interpreted EKG Reading(s) - see prior notes  Clinical Tests:   Lab Tests: Ordered and Reviewed  The following lab test(s) were unremarkable: CBC, CMP, Lactate, Urinalysis, Troponin and BNP  Radiological Study: Ordered and Reviewed  Medical Tests: Ordered and Reviewed  ED Management:  On physical examination, patient was in no acute distress.  He was tachycardic.  He had scattered expiratory wheezes bilaterally and decreased lung sounds in the right middle lobe.  Peripheral pulses were 2+.  Skin was well-perfused and warm.  Capillary refill was less than 2 seconds.  He was febrile.    Given patient's vital signs and lung findings, patient met criteria for sirs versus sepsis. Labs, EKG, chest x-ray were all obtained.  Patient was given a 30 ml/kg bolus. CMP was concerning for worsening of renal function with a BUN/creatinine of 29 and 1.9.  CBC was without leukocytosis however granulocyte percentage was 86.2.  Urinalysis was relatively unremarkable.  Influenza was negative.  Troponin was positive at 0.065 however EKG showed sinus tachycardia only.  Review of records shows patient was admitted to [facility] in 2016 secondary to malignant hypertension and basal ganglia hemorrhage. His troponin was highly elevated at that time. He denies CP today. I will trend his troponin on admission, however, and place on med-CQuotient. CXR is concerning for a RML infiltrate. I will start  broad spectrum antibiotics and continue IV hydration. I will order hydralazine PRN given hx of malignant hypertension and basal ganglia hemorrhage.     I have discussed his care with Dr. Garcia, who agrees to admit the patient at this time.  Other:   I have discussed this case with another health care provider.       <> Summary of the Discussion: Dr. Garcia as above            Scribe Attestation:   Scribe #1: I performed the above scribed service and the documentation accurately describes the services I performed. I attest to the accuracy of the note.    Attending Attestation:           Physician Attestation for Scribe:  Physician Attestation Statement for Scribe #1: I, Karyn Beard MD, reviewed documentation, as scribed by Gregg Nash  in my presence, and it is both accurate and complete.                 ED Course     Clinical Impression:   The primary encounter diagnosis was Pneumonia of right middle lobe due to infectious organism. Diagnoses of Sepsis, due to unspecified organism and Elevated troponin were also pertinent to this visit.          Karyn Beard MD  02/08/17 [id number]       Karyn Beard MD  02/09/17 0030

## 2017-02-10 PROBLEM — E11.29 DM (DIABETES MELLITUS) TYPE II CONTROLLED WITH RENAL MANIFESTATION: Status: ACTIVE | Noted: 2017-02-09

## 2017-02-10 PROBLEM — N18.2 CKD (CHRONIC KIDNEY DISEASE), STAGE II: Status: ACTIVE | Noted: 2017-02-10

## 2017-02-10 LAB
ALBUMIN SERPL BCP-MCNC: 3.4 G/DL
ALP SERPL-CCNC: 72 U/L
ALT SERPL W/O P-5'-P-CCNC: 46 U/L
ANION GAP SERPL CALC-SCNC: 12 MMOL/L
AST SERPL-CCNC: 50 U/L
BACTERIA UR CULT: NORMAL
BASOPHILS # BLD AUTO: 0.03 K/UL
BASOPHILS NFR BLD: 0.5 %
BILIRUB SERPL-MCNC: 0.5 MG/DL
BUN SERPL-MCNC: 16 MG/DL
CALCIUM SERPL-MCNC: 9 MG/DL
CHLORIDE SERPL-SCNC: 102 MMOL/L
CO2 SERPL-SCNC: 22 MMOL/L
CREAT SERPL-MCNC: 1.7 MG/DL
DIFFERENTIAL METHOD: ABNORMAL
EOSINOPHIL # BLD AUTO: 0 K/UL
EOSINOPHIL NFR BLD: 0.5 %
ERYTHROCYTE [DISTWIDTH] IN BLOOD BY AUTOMATED COUNT: 14.4 %
EST. GFR  (AFRICAN AMERICAN): 52 ML/MIN/1.73 M^2
EST. GFR  (NON AFRICAN AMERICAN): 45 ML/MIN/1.73 M^2
GLUCOSE SERPL-MCNC: 104 MG/DL
HCT VFR BLD AUTO: 40.6 %
HGB BLD-MCNC: 13.3 G/DL
LYMPHOCYTES # BLD AUTO: 0.8 K/UL
LYMPHOCYTES NFR BLD: 13.2 %
MCH RBC QN AUTO: 25.8 PG
MCHC RBC AUTO-ENTMCNC: 32.8 %
MCV RBC AUTO: 79 FL
MONOCYTES # BLD AUTO: 0.8 K/UL
MONOCYTES NFR BLD: 12.7 %
NEUTROPHILS # BLD AUTO: 4.3 K/UL
NEUTROPHILS NFR BLD: 72.9 %
PLATELET # BLD AUTO: 175 K/UL
PMV BLD AUTO: 11.1 FL
POCT GLUCOSE: 144 MG/DL (ref 70–110)
POCT GLUCOSE: 150 MG/DL (ref 70–110)
POCT GLUCOSE: 152 MG/DL (ref 70–110)
POCT GLUCOSE: 172 MG/DL (ref 70–110)
POTASSIUM SERPL-SCNC: 3.5 MMOL/L
PROT SERPL-MCNC: 7.6 G/DL
RBC # BLD AUTO: 5.16 M/UL
SODIUM SERPL-SCNC: 136 MMOL/L
VANCOMYCIN TROUGH SERPL-MCNC: 13.4 UG/ML
WBC # BLD AUTO: 5.89 K/UL

## 2017-02-10 PROCEDURE — 12000002 HC ACUTE/MED SURGE SEMI-PRIVATE ROOM

## 2017-02-10 PROCEDURE — 36415 COLL VENOUS BLD VENIPUNCTURE: CPT

## 2017-02-10 PROCEDURE — 80202 ASSAY OF VANCOMYCIN: CPT

## 2017-02-10 PROCEDURE — 63600175 PHARM REV CODE 636 W HCPCS: Performed by: EMERGENCY MEDICINE

## 2017-02-10 PROCEDURE — 25000003 PHARM REV CODE 250: Performed by: HOSPITALIST

## 2017-02-10 PROCEDURE — 25000003 PHARM REV CODE 250: Performed by: EMERGENCY MEDICINE

## 2017-02-10 PROCEDURE — 94761 N-INVAS EAR/PLS OXIMETRY MLT: CPT

## 2017-02-10 PROCEDURE — 94640 AIRWAY INHALATION TREATMENT: CPT

## 2017-02-10 PROCEDURE — 80053 COMPREHEN METABOLIC PANEL: CPT

## 2017-02-10 PROCEDURE — 85025 COMPLETE CBC W/AUTO DIFF WBC: CPT

## 2017-02-10 PROCEDURE — 25000242 PHARM REV CODE 250 ALT 637 W/ HCPCS: Performed by: HOSPITALIST

## 2017-02-10 RX ORDER — HYDRALAZINE HYDROCHLORIDE 25 MG/1
100 TABLET, FILM COATED ORAL EVERY 8 HOURS
Status: DISCONTINUED | OUTPATIENT
Start: 2017-02-10 | End: 2017-02-12 | Stop reason: HOSPADM

## 2017-02-10 RX ADMIN — ACETAMINOPHEN 650 MG: 325 TABLET ORAL at 11:02

## 2017-02-10 RX ADMIN — HYDRALAZINE HYDROCHLORIDE 100 MG: 25 TABLET ORAL at 01:02

## 2017-02-10 RX ADMIN — GUAIFENESIN AND DEXTROMETHORPHAN 10 ML: 100; 10 SYRUP ORAL at 11:02

## 2017-02-10 RX ADMIN — AMLODIPINE BESYLATE 10 MG: 5 TABLET ORAL at 08:02

## 2017-02-10 RX ADMIN — IPRATROPIUM BROMIDE AND ALBUTEROL SULFATE 3 ML: .5; 3 SOLUTION RESPIRATORY (INHALATION) at 02:02

## 2017-02-10 RX ADMIN — GUAIFENESIN AND DEXTROMETHORPHAN 10 ML: 100; 10 SYRUP ORAL at 12:02

## 2017-02-10 RX ADMIN — CEFEPIME 1 G: 1 INJECTION, POWDER, FOR SOLUTION INTRAMUSCULAR; INTRAVENOUS at 08:02

## 2017-02-10 RX ADMIN — HYDRALAZINE HYDROCHLORIDE 25 MG: 25 TABLET, FILM COATED ORAL at 05:02

## 2017-02-10 RX ADMIN — HYDRALAZINE HYDROCHLORIDE 100 MG: 25 TABLET ORAL at 09:02

## 2017-02-10 RX ADMIN — GUAIFENESIN AND DEXTROMETHORPHAN 10 ML: 100; 10 SYRUP ORAL at 05:02

## 2017-02-10 RX ADMIN — IPRATROPIUM BROMIDE AND ALBUTEROL SULFATE 3 ML: .5; 3 SOLUTION RESPIRATORY (INHALATION) at 07:02

## 2017-02-10 RX ADMIN — IPRATROPIUM BROMIDE AND ALBUTEROL SULFATE 3 ML: .5; 3 SOLUTION RESPIRATORY (INHALATION) at 12:02

## 2017-02-10 RX ADMIN — VANCOMYCIN HYDROCHLORIDE 1000 MG: 1 INJECTION, POWDER, LYOPHILIZED, FOR SOLUTION INTRAVENOUS at 12:02

## 2017-02-10 RX ADMIN — VANCOMYCIN HYDROCHLORIDE 1000 MG: 1 INJECTION, POWDER, LYOPHILIZED, FOR SOLUTION INTRAVENOUS at 09:02

## 2017-02-10 RX ADMIN — SODIUM CHLORIDE 1000 ML: 0.9 INJECTION, SOLUTION INTRAVENOUS at 10:02

## 2017-02-10 RX ADMIN — HYDRALAZINE HYDROCHLORIDE 100 MG: 25 TABLET ORAL at 08:02

## 2017-02-10 RX ADMIN — INSULIN ASPART 2 UNITS: 100 INJECTION, SOLUTION INTRAVENOUS; SUBCUTANEOUS at 12:02

## 2017-02-10 RX ADMIN — SODIUM CHLORIDE: 0.9 INJECTION, SOLUTION INTRAVENOUS at 01:02

## 2017-02-10 RX ADMIN — CLONIDINE HYDROCHLORIDE 0.1 MG: 0.1 TABLET ORAL at 05:02

## 2017-02-10 RX ADMIN — ATORVASTATIN CALCIUM 10 MG: 10 TABLET, FILM COATED ORAL at 08:02

## 2017-02-10 RX ADMIN — AZITHROMYCIN MONOHYDRATE 500 MG: 500 INJECTION, POWDER, LYOPHILIZED, FOR SOLUTION INTRAVENOUS at 10:02

## 2017-02-10 RX ADMIN — PANTOPRAZOLE SODIUM 40 MG: 40 TABLET, DELAYED RELEASE ORAL at 08:02

## 2017-02-10 NOTE — NURSING
Verified telemonitor box on patient MRN: [id number]  as well on telemonitor with Nick Underwood box [id number].

## 2017-02-10 NOTE — ASSESSMENT & PLAN NOTE
Patient is on broad spectrum IV Abx,will follow cultures,still has productive cough with wheezing,stared on nebulzier.wheezing and cough much improved today.

## 2017-02-10 NOTE — PLAN OF CARE
Problem: Fall Risk (Adult)  Intervention: Review Medications/Identify Contributors to Fall Risk    02/10/17 1550   Safety Interventions   Medication Review/Management medications reviewed       Intervention: Patient Rounds    02/10/17 1400   Safety Interventions   Patient Rounds bed in low position;bed wheels locked;call light in reach;clutter free environment maintained;ID band on;placement of personal items at bedside;toileting offered;visualized patient           Problem: Patient Care Overview  Goal: Plan of Care Review    02/10/17 0800   Coping/Psychosocial   Plan Of Care Reviewed With patient       Goal: Interdisciplinary Rounds/Family Conf    02/10/17 1550   Interdisciplinary Rounds/Family Conf   Participants patient

## 2017-02-10 NOTE — PLAN OF CARE
Problem: Fall Risk (Adult)  Goal: Identify Related Risk Factors and Signs and Symptoms  Related risk factors and signs and symptoms are identified upon initiation of Human Response Clinical Practice Guideline (CPG)   Outcome: Ongoing (interventions implemented as appropriate)  Patient remained fall free during shift. Hourly rounding conducted to ensure safety and assist with personal care needs. . Bed in lowest position; bedside table within reach. Bed in lowest position. Will continue to monitor.        Problem: Patient Care Overview  Goal: Plan of Care Review  Outcome: Ongoing (interventions implemented as appropriate)  Patient AAOX4.  Patient denies pain.  Patient spiked a temp during the night given prn tylenol and temp was within normal limits.  Patient continues to have higher than normal b/p readings.  Patient given scheduled b/p meds and tolerated well.  Hourly rounding conducted to ensure safety and assist with personal care needs.  IV site cdi infusing normal saline at 100cc/hr.  IV antibiotics administered and tolerated well.  Vanc trough due at 930 am.

## 2017-02-10 NOTE — PROGRESS NOTES
[facility] Hospital Medicine  Progress Note    Patient Name: Mahesh Cespedse  MRN: [id number]  Patient Class: IP- Inpatient   Admission Date: 2/8/2017  Length of Stay: 2 days  Attending Physician: Suzie Ron MD  Primary Care Provider: Primary Doctor No        Subjective:     Principal Problem:HCAP (healthcare-associated pneumonia)    HPI:  This 51 y.o. male with history of DM,HTN,CVA  presents to the ED c/o an acute-onset productive cough (yellow sputum) with associated wheezing that began 3 days ago,  and has not improved since onset. No prior attempted tx. No recent sick contacts. Pt states he did not receive his flu shot this season. He denies a PMHx of asthma, COPD, and bronchitis. Pt states that he does not smoke tobacco.he had fever with nausea and vomiting,and was feeling SOB,chest X ray is consistent with RML consolidation,[place]o [place] [facility]patient has been started on broad spectrum IV Abx,he has sever sepsis with fever 102,tachycardia and end organ damage with ARF,patient is on IVF and cultures are pending at this time,his lactic acid is WNL,he has good perfusion.    Hospital Course:  This 51 y.o. male with history of DM,HTN,CVA  presents to the ED c/o an acute-onset productive cough (yellow sputum) with associated wheezing that began few  days ago,  and has not improved since onset. No prior attempted tx. No recent sick contacts. Pt states he did not receive his flu shot this season. He denies a PMHx of asthma, COPD, and bronchitis. Pt states that he does not smoke tobacco.he had fever with nausea and vomiting,and was feeling SOB,chest X ray is consistent with RML consolidation,duo to [place] [facility] [place]patient has been started on broad spectrum IV Abx,he has severe sepsis with fever 102,tachycardia and end organ damage with ARF on CKD 2,,patient is on IVF and cultures are pending at this time,his lactic acid is WNL,he has good perfusion.he has  "still occasional fever,cough improved,he feel better today.    Past Medical History   Diagnosis Date    Hypertension        No past surgical history on file.    Review of patient's allergies indicates:  No Known Allergies    No current facility-administered medications on file prior to encounter.      Current Outpatient Prescriptions on File Prior to Encounter   Medication Sig    amlodipine (NORVASC) 10 MG tablet Take 1 tablet (10 mg total) by mouth once daily.    metformin (GLUCOPHAGE) 500 MG tablet Take 1 tablet (500 mg total) by mouth daily with breakfast.     Family History     None        Social History Main Topics    Smoking status: Never Smoker    Smokeless tobacco: Not on file    Alcohol use 0.0 oz/week     0 Standard drinks or equivalent per week      Comment: "Holidays", unable to specify an amount    Drug use: No    Sexual activity: Not Currently     Review of Systems   Constitutional: Positive for chills and fever.   HENT: Positive for congestion.    Eyes: Negative for discharge.   Respiratory: Positive for cough, shortness of breath and wheezing.    Cardiovascular: Negative for chest pain and leg swelling.   Gastrointestinal: Negative for nausea and vomiting.   Endocrine: Negative for polydipsia.   Genitourinary: Negative for dysuria.   Neurological: Negative for facial asymmetry.   Hematological: Negative for adenopathy.     Objective:     Vital Signs (Most Recent):  Temp: 99.5 °F (37.5 °C) (02/10/17 0410)  Pulse: 103 (02/10/17 0410)  Resp: 16 (02/10/17 0410)  BP: (!) 168/108 (pt given scheduled 0600 dose of hydralazine/ will recheck) (02/10/17 0410)  SpO2: 98 % (02/10/17 0410) Vital Signs (24h Range):  Temp:  [99.1 °F (37.3 °C)-102.7 °F (39.3 °C)] 99.5 °F (37.5 °C)  Pulse:  [] 103  Resp:  [15-24] 16  SpO2:  [92 %-98 %] 98 %  BP: (120-208)/() 168/108     Weight: 83.9 kg (185 lb)  Body mass index is 28.98 kg/(m^2).    Physical Exam   Constitutional: He is oriented to person, place, " and time. No distress.   HENT:   Head: Atraumatic.   Eyes: EOM are normal. Pupils are equal, round, and reactive to light.   Neck: Neck supple.   Cardiovascular: Normal rate and regular rhythm.    Pulmonary/Chest: He has wheezes. He has rales.   Abdominal: Soft. He exhibits no distension.   Musculoskeletal: He exhibits no deformity.   Neurological: He is oriented to person, place, and time. No cranial nerve deficit.   Skin: Skin is warm and dry. He is not diaphoretic.   Psychiatric: He has a normal mood and affect. His behavior is normal.        Significant Labs:   BMP:   Recent Labs  Lab 02/08/17  1921  02/10/17  0432   *  < > 104     < > 136   K 3.5  < > 3.5     < > 102   CO2 24  < > 22*   BUN 29*  < > 16   CREATININE 1.9*  < > 1.7*   CALCIUM 9.6  < > 9.0   MG 2.1  --   --    < > = values in this interval not displayed.  CBC:     Recent Labs  Lab 02/08/17 1921 02/09/17  0402 02/10/17  0432   WBC 8.88 5.59 5.89   HGB 13.2* 12.1* 13.3*   HCT 39.9* 37.1* 40.6    170 175     Lactic Acid:     Recent Labs  Lab 02/08/17 1921   LACTATE 1.2       Significant Imaging: CXR: I have reviewed all pertinent results/findings within the past 24 hours and my personal findings are:  RML consolidation     Assessment/Plan:      * HCAP (healthcare-associated pneumonia)  Patient is on broad spectrum IV Abx,will follow cultures,still has productive cough with wheezing,stared on nebulzier.wheezing and cough much improved today.      Severe sepsis  With fever,tachycardia and ARF,HCAP meet criteria for sever sepsis,perfusion are intact,fever is improved at this time.      Acute renal failure  Continue with IVF,follow daily labs.baseline CRT 1.5,may is new baseline for patient,since his blood pressure is for long time not well controlled.      DM (diabetes mellitus) type II controlled with renal manifestation  Continue with SSI.      Hypertensive emergency  Patient had systolic of over 220 at arrival,improved with  Antihypertensive agents,D/C lisinopril duo ARF,started on Hydralazin and also has prn clonidine.recieved one time IV labetalol yesterday,increased Hydralazine dosage.        Elevated troponin  Elevated Troponin likely duo to ARF and severe sepsis,he denies chest pain,EKG show only sinus tachycardia.      CKD (chronic kidney disease), stage II  continue monitor baseline CRT is 1.5.      VTE Risk Mitigation         Ordered     Medium Risk of VTE  Once      02/09/17 0015     Place sequential compression device  Until discontinued      02/09/17 0015          Suzie Ron MD  Department of Hospital Medicine   [facility]

## 2017-02-10 NOTE — SUBJECTIVE & OBJECTIVE
"Past Medical History   Diagnosis Date    Hypertension        No past surgical history on file.    Review of patient's allergies indicates:  No Known Allergies    No current facility-administered medications on file prior to encounter.      Current Outpatient Prescriptions on File Prior to Encounter   Medication Sig    amlodipine (NORVASC) 10 MG tablet Take 1 tablet (10 mg total) by mouth once daily.    metformin (GLUCOPHAGE) 500 MG tablet Take 1 tablet (500 mg total) by mouth daily with breakfast.     Family History     None        Social History Main Topics    Smoking status: Never Smoker    Smokeless tobacco: Not on file    Alcohol use 0.0 oz/week     0 Standard drinks or equivalent per week      Comment: "Holidays", unable to specify an amount    Drug use: No    Sexual activity: Not Currently     Review of Systems   Constitutional: Positive for chills and fever.   HENT: Positive for congestion.    Eyes: Negative for discharge.   Respiratory: Positive for cough, shortness of breath and wheezing.    Cardiovascular: Negative for chest pain and leg swelling.   Gastrointestinal: Negative for nausea and vomiting.   Endocrine: Negative for polydipsia.   Genitourinary: Negative for dysuria.   Neurological: Negative for facial asymmetry.   Hematological: Negative for adenopathy.     Objective:     Vital Signs (Most Recent):  Temp: 99.5 °F (37.5 °C) (02/10/17 0410)  Pulse: 103 (02/10/17 0410)  Resp: 16 (02/10/17 0410)  BP: (!) 168/108 (pt given scheduled 0600 dose of hydralazine/ will recheck) (02/10/17 0410)  SpO2: 98 % (02/10/17 0410) Vital Signs (24h Range):  Temp:  [99.1 °F (37.3 °C)-102.7 °F (39.3 °C)] 99.5 °F (37.5 °C)  Pulse:  [] 103  Resp:  [15-24] 16  SpO2:  [92 %-98 %] 98 %  BP: (120-208)/() 168/108     Weight: 83.9 kg (185 lb)  Body mass index is 28.98 kg/(m^2).    Physical Exam   Constitutional: He is oriented to person, place, and time. No distress.   HENT:   Head: Atraumatic.   Eyes: EOM " are normal. Pupils are equal, round, and reactive to light.   Neck: Neck supple.   Cardiovascular: Normal rate and regular rhythm.    Pulmonary/Chest: He has wheezes. He has rales.   Abdominal: Soft. He exhibits no distension.   Musculoskeletal: He exhibits no deformity.   Neurological: He is oriented to person, place, and time. No cranial nerve deficit.   Skin: Skin is warm and dry. He is not diaphoretic.   Psychiatric: He has a normal mood and affect. His behavior is normal.        Significant Labs:   BMP:   Recent Labs  Lab 02/08/17  1921  02/10/17  0432   *  < > 104     < > 136   K 3.5  < > 3.5     < > 102   CO2 24  < > 22*   BUN 29*  < > 16   CREATININE 1.9*  < > 1.7*   CALCIUM 9.6  < > 9.0   MG 2.1  --   --    < > = values in this interval not displayed.  CBC:     Recent Labs  Lab 02/08/17 1921 02/09/17  0402 02/10/17  0432   WBC 8.88 5.59 5.89   HGB 13.2* 12.1* 13.3*   HCT 39.9* 37.1* 40.6    170 175     Lactic Acid:     Recent Labs  Lab 02/08/17 1921   LACTATE 1.2       Significant Imaging: CXR: I have reviewed all pertinent results/findings within the past 24 hours and my personal findings are:  RML consolidation

## 2017-02-10 NOTE — ASSESSMENT & PLAN NOTE
Patient had systolic of over 220 at arrival,improved with Antihypertensive agents,D/C lisinopril duo ARF,started on Hydralazin and also has prn clonidine.recieved one time IV labetalol yesterday,increased Hydralazine dosage.

## 2017-02-10 NOTE — ASSESSMENT & PLAN NOTE
Continue with IVF,follow daily labs.baseline CRT 1.5,may is new baseline for patient,since his blood pressure is for long time not well controlled.

## 2017-02-10 NOTE — PROGRESS NOTES
SW contacted [facility] @ [phone] to schedule PCP follow-up, clinic was closed. SW provided contact information for [facility] so patient can call and follow-up when discharged.

## 2017-02-10 NOTE — PROGRESS NOTES
Patient had a 15 beat run of v tach; patient sleeping and asymptomatic Dr. Montague notified; no further orders given.

## 2017-02-11 LAB
ALBUMIN SERPL BCP-MCNC: 3 G/DL
ALP SERPL-CCNC: 54 U/L
ALT SERPL W/O P-5'-P-CCNC: 42 U/L
ANION GAP SERPL CALC-SCNC: 13 MMOL/L
AST SERPL-CCNC: 40 U/L
BASOPHILS # BLD AUTO: 0.04 K/UL
BASOPHILS NFR BLD: 1.2 %
BILIRUB SERPL-MCNC: 0.4 MG/DL
BUN SERPL-MCNC: 17 MG/DL
CALCIUM SERPL-MCNC: 8.5 MG/DL
CHLORIDE SERPL-SCNC: 103 MMOL/L
CO2 SERPL-SCNC: 22 MMOL/L
CREAT SERPL-MCNC: 1.8 MG/DL
DIFFERENTIAL METHOD: ABNORMAL
EOSINOPHIL # BLD AUTO: 0 K/UL
EOSINOPHIL NFR BLD: 0.6 %
ERYTHROCYTE [DISTWIDTH] IN BLOOD BY AUTOMATED COUNT: 14.2 %
EST. GFR  (AFRICAN AMERICAN): 49 ML/MIN/1.73 M^2
EST. GFR  (NON AFRICAN AMERICAN): 42 ML/MIN/1.73 M^2
GLUCOSE SERPL-MCNC: 105 MG/DL
HCT VFR BLD AUTO: 39.1 %
HGB BLD-MCNC: 12.8 G/DL
LYMPHOCYTES # BLD AUTO: 1 K/UL
LYMPHOCYTES NFR BLD: 31.3 %
MCH RBC QN AUTO: 25.8 PG
MCHC RBC AUTO-ENTMCNC: 32.7 %
MCV RBC AUTO: 79 FL
MONOCYTES # BLD AUTO: 0.7 K/UL
MONOCYTES NFR BLD: 20.4 %
NEUTROPHILS # BLD AUTO: 1.5 K/UL
NEUTROPHILS NFR BLD: 46.5 %
PLATELET # BLD AUTO: 174 K/UL
PMV BLD AUTO: 11.4 FL
POCT GLUCOSE: 110 MG/DL (ref 70–110)
POCT GLUCOSE: 114 MG/DL (ref 70–110)
POCT GLUCOSE: 127 MG/DL (ref 70–110)
POCT GLUCOSE: 158 MG/DL (ref 70–110)
POTASSIUM SERPL-SCNC: 3.4 MMOL/L
PROT SERPL-MCNC: 6.8 G/DL
RBC # BLD AUTO: 4.97 M/UL
SODIUM SERPL-SCNC: 138 MMOL/L
WBC # BLD AUTO: 3.29 K/UL

## 2017-02-11 PROCEDURE — 36415 COLL VENOUS BLD VENIPUNCTURE: CPT

## 2017-02-11 PROCEDURE — 80053 COMPREHEN METABOLIC PANEL: CPT

## 2017-02-11 PROCEDURE — 25000003 PHARM REV CODE 250: Performed by: HOSPITALIST

## 2017-02-11 PROCEDURE — 85025 COMPLETE CBC W/AUTO DIFF WBC: CPT

## 2017-02-11 PROCEDURE — 25000003 PHARM REV CODE 250: Performed by: EMERGENCY MEDICINE

## 2017-02-11 PROCEDURE — 63600175 PHARM REV CODE 636 W HCPCS: Performed by: EMERGENCY MEDICINE

## 2017-02-11 PROCEDURE — 12000002 HC ACUTE/MED SURGE SEMI-PRIVATE ROOM

## 2017-02-11 PROCEDURE — 94761 N-INVAS EAR/PLS OXIMETRY MLT: CPT

## 2017-02-11 PROCEDURE — 94640 AIRWAY INHALATION TREATMENT: CPT

## 2017-02-11 PROCEDURE — 25000242 PHARM REV CODE 250 ALT 637 W/ HCPCS: Performed by: HOSPITALIST

## 2017-02-11 RX ADMIN — GUAIFENESIN AND DEXTROMETHORPHAN 10 ML: 100; 10 SYRUP ORAL at 05:02

## 2017-02-11 RX ADMIN — IPRATROPIUM BROMIDE AND ALBUTEROL SULFATE 3 ML: .5; 3 SOLUTION RESPIRATORY (INHALATION) at 12:02

## 2017-02-11 RX ADMIN — AMLODIPINE BESYLATE 10 MG: 5 TABLET ORAL at 09:02

## 2017-02-11 RX ADMIN — INSULIN ASPART 2 UNITS: 100 INJECTION, SOLUTION INTRAVENOUS; SUBCUTANEOUS at 12:02

## 2017-02-11 RX ADMIN — CEFEPIME 1 G: 1 INJECTION, POWDER, FOR SOLUTION INTRAMUSCULAR; INTRAVENOUS at 08:02

## 2017-02-11 RX ADMIN — HYDRALAZINE HYDROCHLORIDE 100 MG: 25 TABLET ORAL at 01:02

## 2017-02-11 RX ADMIN — GUAIFENESIN AND DEXTROMETHORPHAN 10 ML: 100; 10 SYRUP ORAL at 12:02

## 2017-02-11 RX ADMIN — SODIUM CHLORIDE 1000 ML: 0.9 INJECTION, SOLUTION INTRAVENOUS at 05:02

## 2017-02-11 RX ADMIN — IPRATROPIUM BROMIDE AND ALBUTEROL SULFATE 3 ML: .5; 3 SOLUTION RESPIRATORY (INHALATION) at 07:02

## 2017-02-11 RX ADMIN — HYDRALAZINE HYDROCHLORIDE 100 MG: 25 TABLET ORAL at 09:02

## 2017-02-11 RX ADMIN — AZITHROMYCIN MONOHYDRATE 500 MG: 500 INJECTION, POWDER, LYOPHILIZED, FOR SOLUTION INTRAVENOUS at 09:02

## 2017-02-11 RX ADMIN — BENZONATATE 100 MG: 100 CAPSULE ORAL at 09:02

## 2017-02-11 RX ADMIN — SODIUM CHLORIDE: 0.9 INJECTION, SOLUTION INTRAVENOUS at 08:02

## 2017-02-11 RX ADMIN — HYDRALAZINE HYDROCHLORIDE 100 MG: 25 TABLET ORAL at 05:02

## 2017-02-11 RX ADMIN — SODIUM CHLORIDE: 0.9 INJECTION, SOLUTION INTRAVENOUS at 12:02

## 2017-02-11 NOTE — ASSESSMENT & PLAN NOTE
Patient had systolic of over 220 at arrival,improved with Antihypertensive agents,D/C lisinopril duo ARF,started on Hydralazin and also has prn clonidine.recieved one time IV labetalol ,increased Hydralazine dosage.better controlled.

## 2017-02-11 NOTE — SUBJECTIVE & OBJECTIVE
"Past Medical History   Diagnosis Date    Hypertension        No past surgical history on file.    Review of patient's allergies indicates:  No Known Allergies    No current facility-administered medications on file prior to encounter.      Current Outpatient Prescriptions on File Prior to Encounter   Medication Sig    amlodipine (NORVASC) 10 MG tablet Take 1 tablet (10 mg total) by mouth once daily.    metformin (GLUCOPHAGE) 500 MG tablet Take 1 tablet (500 mg total) by mouth daily with breakfast.     Family History     None        Social History Main Topics    Smoking status: Never Smoker    Smokeless tobacco: Not on file    Alcohol use 0.0 oz/week     0 Standard drinks or equivalent per week      Comment: "Holidays", unable to specify an amount    Drug use: No    Sexual activity: Not Currently     Review of Systems   Constitutional: Positive for fever. Negative for chills.   HENT: Negative for congestion.    Eyes: Negative for discharge.   Respiratory: Positive for cough and shortness of breath. Negative for wheezing.    Cardiovascular: Negative for chest pain and leg swelling.   Gastrointestinal: Negative for nausea and vomiting.   Endocrine: Negative for polydipsia.   Genitourinary: Negative for dysuria.   Neurological: Negative for facial asymmetry.   Hematological: Negative for adenopathy.     Objective:     Vital Signs (Most Recent):  Temp: 99.8 °F (37.7 °C) (02/10/17 2325)  Pulse: 94 (02/11/17 0033)  Resp: 18 (02/11/17 0033)  BP: 138/88 (02/10/17 2325)  SpO2: 98 % (02/11/17 0033) Vital Signs (24h Range):  Temp:  [98.6 °F (37 °C)-100.1 °F (37.8 °C)] 99.8 °F (37.7 °C)  Pulse:  [] 94  Resp:  [18] 18  SpO2:  [94 %-98 %] 98 %  BP: (132-167)/() 138/88     Weight: 83.9 kg (185 lb)  Body mass index is 28.98 kg/(m^2).    Physical Exam   Constitutional: He is oriented to person, place, and time. No distress.   HENT:   Head: Atraumatic.   Eyes: EOM are normal. Pupils are equal, round, and reactive " to light.   Neck: Neck supple.   Cardiovascular: Normal rate and regular rhythm.    Pulmonary/Chest: He has wheezes. He has rales.   Abdominal: Soft. He exhibits no distension.   Musculoskeletal: He exhibits no deformity.   Neurological: He is oriented to person, place, and time. No cranial nerve deficit.   Skin: Skin is warm and dry. He is not diaphoretic.   Psychiatric: He has a normal mood and affect. His behavior is normal.        Significant Labs:   BMP:     Recent Labs  Lab 02/11/17  0503         K 3.4*      CO2 22*   BUN 17   CREATININE 1.8*   CALCIUM 8.5*     CBC:     Recent Labs  Lab 02/10/17  0432 02/11/17  0503   WBC 5.89 3.29*   HGB 13.3* 12.8*   HCT 40.6 39.1*    174     Lactic Acid:   No results for input(s): LACTATE in the last 48 hours.    Significant Imaging: CXR: I have reviewed all pertinent results/findings within the past 24 hours and my personal findings are:  RML consolidation

## 2017-02-11 NOTE — ASSESSMENT & PLAN NOTE
Patient is on broad spectrum IV Abx,will follow cultures,still has productive cough with wheezing,stared on nebulzier.wheezing and cough much improved today.but still has mild grad fever.

## 2017-02-11 NOTE — ASSESSMENT & PLAN NOTE
Continue with IVF,follow daily labs.baseline CRT 1.5,may is new baseline for patient,since his blood pressure is for long time not well controlled.has good urine out put,increased IVF.

## 2017-02-11 NOTE — PROGRESS NOTES
[facility] Hospital Medicine  Progress Note    Patient Name: Mahesh Cespedes  MRN: [id number]  Patient Class: IP- Inpatient   Admission Date: 2/8/2017  Length of Stay: 3 days  Attending Physician: Suzie Ron MD  Primary Care Provider: Primary Doctor No        Subjective:     Principal Problem:HCAP (healthcare-associated pneumonia)    HPI:  This 51 y.o. male with history of DM,HTN,CVA  presents to the ED c/o an acute-onset productive cough (yellow sputum) with associated wheezing that began 3 days ago,  and has not improved since onset. No prior attempted tx. No recent sick contacts. Pt states he did not receive his flu shot this season. He denies a PMHx of asthma, COPD, and bronchitis. Pt states that he does not smoke tobacco.he had fever with nausea and vomiting,and was feeling SOB,chest X ray is consistent with RML consolidation,[place] [facility] [place] has been started on broad spectrum IV Abx,he has sever sepsis with fever 102,tachycardia and end organ damage with ARF,patient is on IVF and cultures are pending at this time,his lactic acid is WNL,he has good perfusion.    Hospital Course:  This 51 y.o. male with history of DM,HTN,CVA  presents to the ED c/o an acute-onset productive cough (yellow sputum) with associated wheezing that began few  days ago,  and has not improved since onset. No prior attempted tx. No recent sick contacts. Pt states he did not receive his flu shot this season. He denies a PMHx of asthma, COPD, and bronchitis. Pt states that he does not smoke tobacco.he had fever with nausea and vomiting,and was feeling SOB,chest X ray is consistent with RML consolidation,[place] [facility] [place] has been started on broad spectrum IV Abx,he has severe sepsis with fever 102,tachycardia and end organ damage with ARF on CKD 2,,patient is on IVF and cultures are pending at this time,his lactic acid is WNL,he has good perfusion.he has  "still occasional fever,cough improved,he feel better today.    Past Medical History   Diagnosis Date    Hypertension        No past surgical history on file.    Review of patient's allergies indicates:  No Known Allergies    No current facility-administered medications on file prior to encounter.      Current Outpatient Prescriptions on File Prior to Encounter   Medication Sig    amlodipine (NORVASC) 10 MG tablet Take 1 tablet (10 mg total) by mouth once daily.    metformin (GLUCOPHAGE) 500 MG tablet Take 1 tablet (500 mg total) by mouth daily with breakfast.     Family History     None        Social History Main Topics    Smoking status: Never Smoker    Smokeless tobacco: Not on file    Alcohol use 0.0 oz/week     0 Standard drinks or equivalent per week      Comment: "Holidays", unable to specify an amount    Drug use: No    Sexual activity: Not Currently     Review of Systems   Constitutional: Positive for fever. Negative for chills.   HENT: Negative for congestion.    Eyes: Negative for discharge.   Respiratory: Positive for cough and shortness of breath. Negative for wheezing.    Cardiovascular: Negative for chest pain and leg swelling.   Gastrointestinal: Negative for nausea and vomiting.   Endocrine: Negative for polydipsia.   Genitourinary: Negative for dysuria.   Neurological: Negative for facial asymmetry.   Hematological: Negative for adenopathy.     Objective:     Vital Signs (Most Recent):  Temp: 99.8 °F (37.7 °C) (02/10/17 2325)  Pulse: 94 (02/11/17 0033)  Resp: 18 (02/11/17 0033)  BP: 138/88 (02/10/17 2325)  SpO2: 98 % (02/11/17 0033) Vital Signs (24h Range):  Temp:  [98.6 °F (37 °C)-100.1 °F (37.8 °C)] 99.8 °F (37.7 °C)  Pulse:  [] 94  Resp:  [18] 18  SpO2:  [94 %-98 %] 98 %  BP: (132-167)/() 138/88     Weight: 83.9 kg (185 lb)  Body mass index is 28.98 kg/(m^2).    Physical Exam   Constitutional: He is oriented to person, place, and time. No distress.   HENT:   Head: Atraumatic. "   Eyes: EOM are normal. Pupils are equal, round, and reactive to light.   Neck: Neck supple.   Cardiovascular: Normal rate and regular rhythm.    Pulmonary/Chest: He has wheezes. He has rales.   Abdominal: Soft. He exhibits no distension.   Musculoskeletal: He exhibits no deformity.   Neurological: He is oriented to person, place, and time. No cranial nerve deficit.   Skin: Skin is warm and dry. He is not diaphoretic.   Psychiatric: He has a normal mood and affect. His behavior is normal.        Significant Labs:   BMP:     Recent Labs  Lab 02/11/17  0503         K 3.4*      CO2 22*   BUN 17   CREATININE 1.8*   CALCIUM 8.5*     CBC:     Recent Labs  Lab 02/10/17  0432 02/11/17  0503   WBC 5.89 3.29*   HGB 13.3* 12.8*   HCT 40.6 39.1*    174     Lactic Acid:   No results for input(s): LACTATE in the last 48 hours.    Significant Imaging: CXR: I have reviewed all pertinent results/findings within the past 24 hours and my personal findings are:  RML consolidation     Assessment/Plan:      * HCAP (healthcare-associated pneumonia)  Patient is on broad spectrum IV Abx,will follow cultures,still has productive cough with wheezing,stared on nebulzier.wheezing and cough much improved today.but still has mild grad fever.      Severe sepsis  With fever,tachycardia and ARF,HCAP meet criteria for sever sepsis,perfusion are intact,fever is improved at this time.      Acute renal failure  Continue with IVF,follow daily labs.baseline CRT 1.5,may is new baseline for patient,since his blood pressure is for long time not well controlled.has good urine out put,increased IVF.      DM (diabetes mellitus) type II controlled with renal manifestation  Continue with SSI.      Hypertensive emergency  Patient had systolic of over 220 at arrival,improved with Antihypertensive agents,D/C lisinopril duo ARF,started on Hydralazin and also has prn clonidine.recieved one time IV labetalol ,increased Hydralazine  dosage.better controlled.        Elevated troponin  Elevated Troponin likely duo to ARF and severe sepsis,he denies chest pain,EKG show only sinus tachycardia.denies chest pain.      CKD (chronic kidney disease), stage II  continue monitor baseline CRT is 1.5.      VTE Risk Mitigation         Ordered     Medium Risk of VTE  Once      02/09/17 0015     Place sequential compression device  Until discontinued      02/09/17 0015          Suzie Ron MD  Department of Hospital Medicine   [facility]

## 2017-02-11 NOTE — ASSESSMENT & PLAN NOTE
Elevated Troponin likely duo to ARF and severe sepsis,he denies chest pain,EKG show only sinus tachycardia.denies chest pain.

## 2017-02-11 NOTE — PLAN OF CARE
Problem: Patient Care Overview  Goal: Plan of Care Review  Outcome: Ongoing (interventions implemented as appropriate)  Pt oriented, respirations even and unlabored.  Denies pain.  Meds tolerated, IV hydration, & antibiotic therapy in progress.  Safety maintained.  Will continue to monitor.

## 2017-02-12 VITALS
BODY MASS INDEX: 29.03 KG/M2 | HEART RATE: 101 BPM | DIASTOLIC BLOOD PRESSURE: 90 MMHG | SYSTOLIC BLOOD PRESSURE: 155 MMHG | HEIGHT: 67 IN | RESPIRATION RATE: 18 BRPM | WEIGHT: 185 LBS | TEMPERATURE: 98 F | OXYGEN SATURATION: 96 %

## 2017-02-12 LAB
ALBUMIN SERPL BCP-MCNC: 3.2 G/DL
ALP SERPL-CCNC: 55 U/L
ALT SERPL W/O P-5'-P-CCNC: 36 U/L
ANION GAP SERPL CALC-SCNC: 11 MMOL/L
AST SERPL-CCNC: 32 U/L
BASOPHILS # BLD AUTO: 0.02 K/UL
BASOPHILS NFR BLD: 0.4 %
BILIRUB SERPL-MCNC: 0.4 MG/DL
BUN SERPL-MCNC: 13 MG/DL
CALCIUM SERPL-MCNC: 8.5 MG/DL
CHLORIDE SERPL-SCNC: 104 MMOL/L
CO2 SERPL-SCNC: 23 MMOL/L
CREAT SERPL-MCNC: 1.5 MG/DL
DIFFERENTIAL METHOD: ABNORMAL
EOSINOPHIL # BLD AUTO: 0.1 K/UL
EOSINOPHIL NFR BLD: 3.1 %
ERYTHROCYTE [DISTWIDTH] IN BLOOD BY AUTOMATED COUNT: 14.1 %
EST. GFR  (AFRICAN AMERICAN): >60 ML/MIN/1.73 M^2
EST. GFR  (NON AFRICAN AMERICAN): 52 ML/MIN/1.73 M^2
GLUCOSE SERPL-MCNC: 112 MG/DL
HCT VFR BLD AUTO: 40.5 %
HGB BLD-MCNC: 13 G/DL
LYMPHOCYTES # BLD AUTO: 1.1 K/UL
LYMPHOCYTES NFR BLD: 24.8 %
MCH RBC QN AUTO: 26 PG
MCHC RBC AUTO-ENTMCNC: 32.1 %
MCV RBC AUTO: 81 FL
MONOCYTES # BLD AUTO: 0.6 K/UL
MONOCYTES NFR BLD: 14 %
NEUTROPHILS # BLD AUTO: 2.6 K/UL
NEUTROPHILS NFR BLD: 57.7 %
PLATELET # BLD AUTO: 174 K/UL
PMV BLD AUTO: 10.7 FL
POCT GLUCOSE: 118 MG/DL (ref 70–110)
POTASSIUM SERPL-SCNC: 3.3 MMOL/L
PROT SERPL-MCNC: 6.9 G/DL
RBC # BLD AUTO: 5 M/UL
SODIUM SERPL-SCNC: 138 MMOL/L
WBC # BLD AUTO: 4.56 K/UL

## 2017-02-12 PROCEDURE — 25000003 PHARM REV CODE 250: Performed by: HOSPITALIST

## 2017-02-12 PROCEDURE — 36415 COLL VENOUS BLD VENIPUNCTURE: CPT

## 2017-02-12 PROCEDURE — 25000242 PHARM REV CODE 250 ALT 637 W/ HCPCS: Performed by: HOSPITALIST

## 2017-02-12 PROCEDURE — 80053 COMPREHEN METABOLIC PANEL: CPT

## 2017-02-12 PROCEDURE — 94761 N-INVAS EAR/PLS OXIMETRY MLT: CPT

## 2017-02-12 PROCEDURE — 94640 AIRWAY INHALATION TREATMENT: CPT

## 2017-02-12 PROCEDURE — 85025 COMPLETE CBC W/AUTO DIFF WBC: CPT

## 2017-02-12 PROCEDURE — 63600175 PHARM REV CODE 636 W HCPCS: Performed by: EMERGENCY MEDICINE

## 2017-02-12 PROCEDURE — 25000003 PHARM REV CODE 250: Performed by: EMERGENCY MEDICINE

## 2017-02-12 RX ORDER — AMOXICILLIN AND CLAVULANATE POTASSIUM 875; 125 MG/1; MG/1
1 TABLET, FILM COATED ORAL 2 TIMES DAILY
Qty: 20 TABLET | Refills: 0 | Status: SHIPPED | OUTPATIENT
Start: 2017-02-12 | End: 2017-02-22

## 2017-02-12 RX ORDER — LISINOPRIL 10 MG/1
10 TABLET ORAL DAILY
Qty: 30 TABLET | Refills: 0 | Status: SHIPPED | OUTPATIENT
Start: 2017-02-12 | End: 2019-05-20

## 2017-02-12 RX ORDER — HYDRALAZINE HYDROCHLORIDE 100 MG/1
100 TABLET, FILM COATED ORAL EVERY 8 HOURS
Qty: 90 TABLET | Refills: 0 | Status: SHIPPED | OUTPATIENT
Start: 2017-02-12 | End: 2019-05-20 | Stop reason: SDUPTHER

## 2017-02-12 RX ORDER — PRAVASTATIN SODIUM 40 MG/1
40 TABLET ORAL DAILY
Qty: 30 TABLET | Refills: 0 | Status: SHIPPED | OUTPATIENT
Start: 2017-02-12 | End: 2019-05-20

## 2017-02-12 RX ORDER — METFORMIN HYDROCHLORIDE 500 MG/1
500 TABLET ORAL 2 TIMES DAILY WITH MEALS
Qty: 60 TABLET | Refills: 0 | Status: SHIPPED | OUTPATIENT
Start: 2017-02-12 | End: 2019-05-20

## 2017-02-12 RX ORDER — ASPIRIN 81 MG/1
81 TABLET ORAL DAILY
Refills: 0
Start: 2017-02-12 | End: 2022-03-15 | Stop reason: ALTCHOICE

## 2017-02-12 RX ORDER — AMLODIPINE BESYLATE 10 MG/1
10 TABLET ORAL DAILY
Qty: 30 TABLET | Refills: 0 | Status: SHIPPED | OUTPATIENT
Start: 2017-02-12 | End: 2019-08-13 | Stop reason: SDUPTHER

## 2017-02-12 RX ADMIN — IPRATROPIUM BROMIDE AND ALBUTEROL SULFATE 3 ML: .5; 3 SOLUTION RESPIRATORY (INHALATION) at 08:02

## 2017-02-12 RX ADMIN — POTASSIUM BICARBONATE 50 MEQ: 25 TABLET, EFFERVESCENT ORAL at 09:02

## 2017-02-12 RX ADMIN — PANTOPRAZOLE SODIUM 40 MG: 40 TABLET, DELAYED RELEASE ORAL at 09:02

## 2017-02-12 RX ADMIN — CEFEPIME 1 G: 1 INJECTION, POWDER, FOR SOLUTION INTRAMUSCULAR; INTRAVENOUS at 08:02

## 2017-02-12 RX ADMIN — SODIUM CHLORIDE: 0.9 INJECTION, SOLUTION INTRAVENOUS at 05:02

## 2017-02-12 RX ADMIN — GUAIFENESIN AND DEXTROMETHORPHAN 10 ML: 100; 10 SYRUP ORAL at 12:02

## 2017-02-12 RX ADMIN — IPRATROPIUM BROMIDE AND ALBUTEROL SULFATE 3 ML: .5; 3 SOLUTION RESPIRATORY (INHALATION) at 12:02

## 2017-02-12 RX ADMIN — GUAIFENESIN AND DEXTROMETHORPHAN 10 ML: 100; 10 SYRUP ORAL at 05:02

## 2017-02-12 RX ADMIN — AMLODIPINE BESYLATE 10 MG: 5 TABLET ORAL at 09:02

## 2017-02-12 RX ADMIN — HYDRALAZINE HYDROCHLORIDE 100 MG: 25 TABLET ORAL at 05:02

## 2017-02-12 NOTE — PROGRESS NOTES
Monitored freq.throughout the shift. Pt.resting at present with no complaints and no acute distress noted. Telemetry cont.in use. IV fluids cont.in use. Cont.with iv antibiotics as ordered.

## 2017-02-12 NOTE — DISCHARGE SUMMARY
[facility] Hospital Medicine  Discharge Summary      Patient Name: Mahesh Cespedes  MRN: [id number]  Admission Date: 2/8/2017  Hospital Length of Stay: 4 days  Discharge Date and Time:  02/12/2017 8:04 AM  Attending Physician: Suzie Ron MD   Discharging Provider: Suzie Ron MD  Primary Care Provider: Primary Doctor No      HPI:   This 51 y.o. male with history of DM,HTN,CVA  presents to the ED c/o an acute-onset productive cough (yellow sputum) with associated wheezing that began 3 days ago,  and has not improved since onset. No prior attempted tx. No recent sick contacts. Pt states he did not receive his flu shot this season. He denies a PMHx of asthma, COPD, and bronchitis. Pt states that he does not smoke tobacco.he had fever with nausea and vomiting,and was feeling SOB,chest X ray is consistent with RML consolidation,[place]o [place] [facility] [place]patient has been started on broad spectrum IV Abx,he has sever sepsis with fever 102,tachycardia and end organ damage with ARF,patient is on IVF and cultures are pending at this time,his lactic acid is WNL,he has good perfusion.    * No surgery found *      Indwelling Lines/Drains at time of discharge:   Lines/Drains/Airways          No matching active lines, drains, or airways        Hospital Course:   This 51 y.o. male with history of DM,HTN,CVA  presents to the ED c/o an acute-onset productive cough (yellow sputum) with associated wheezing that began few  days ago,  and has not improved since onset. No prior attempted tx. No recent sick contacts. Pt states he did not receive his flu shot this season. He denies a PMHx of asthma, COPD, and bronchitis. Pt states that he does not smoke tobacco.he had fever with nausea and vomiting,and was feeling SOB,chest X ray is consistent with RML consolidation,duo to recent [place] [facility] [place],patient has been started on broad spectrum IV Abx for HCAP.,he has severe sepsis with  fever 102,tachycardia and end organ damage with ARF on CKD 2,,patient was on IVF and blood culture was negative,,his lactic acid was WNL,he had good perfusion.his hypertensive emergency with PO and IV Antihypertensive agents has been improved,ARFon CKD 2 with IVF resolved,CRT back to baseline.his severe sepsis,fever,cough resolved,new blood pressure medication Hydralazine has been added.po Abx for pneumonia has been prescribed,social service arrange follow up with PCP in community care in next few days.     Consults:   Consults         Status Ordering Provider     Inpatient consult to Social Work  Once     Provider:  (Not yet assigned)    Ordered ANGELA SALAZAR          Significant Diagnostic Studies: Labs:   BMP:   Recent Labs  Lab 02/11/17  0503 02/12/17  0418    112*    138   K 3.4* 3.3*    104   CO2 22* 23   BUN 17 13   CREATININE 1.8* 1.5*   CALCIUM 8.5* 8.5*    and CBC   Recent Labs  Lab 02/11/17  0503 02/12/17  0418   WBC 3.29* 4.56   HGB 12.8* 13.0*   HCT 39.1* 40.5    174     Microbiology:   Blood Culture   Lab Results   Component Value Date    LABBLOO No growth to date 02/08/2017    LABBLOO No Growth to date 02/08/2017    LABBLOO No Growth to date 02/08/2017    LABBLOO No Growth to date 02/08/2017    LABBLOO No growth to date 02/08/2017    LABBLOO No Growth to date 02/08/2017    LABBLOO No Growth to date 02/08/2017    LABBLOO No Growth to date 02/08/2017     Radiology: X-Ray: CXR: X-Ray Chest 1 View (CXR): No results found for this visit on 02/08/17.    Pending Diagnostic Studies:     Procedure Component Value Units Date/Time    X-Ray Chest AP Portable [[id number]] Resulted:  02/08/17 1909    Order Status:  Sent Lab Status:  In process Updated:  02/08/17 1911        Final Active Diagnoses:    Diagnosis Date Noted POA    PRINCIPAL PROBLEM:  HCAP (healthcare-associated pneumonia) [J18.9] 02/08/2017 Yes    Severe sepsis [A41.9, R65.20] 02/09/2017 Yes    Acute renal failure  [N17.9] 02/09/2017 Yes    DM (diabetes mellitus) type II controlled with renal manifestation [E11.29] 02/09/2017 Yes    Hypertensive emergency [I16.1]  Yes    CKD (chronic kidney disease), stage II [N18.2] 02/10/2017 Yes    Elevated troponin [R79.89] 02/09/2017 Yes      Problems Resolved During this Admission:    Diagnosis Date Noted Date Resolved POA      No new Assessment & Plan notes have been filed under this hospital service since the last note was generated.  Service: Hospital Medicine      Discharged Condition: stable    Disposition: Home or Self Care    Follow Up:  Follow-up Information     Follow up with [facility]. Schedule an appointment as soon as possible for a visit in 3 days.    Why:  Outpatient Services, PCP Follow-up Appointment    Contact information:    [address] [place] [id number] [phone]          Follow up with commiity PCP  In 3 days.        Patient Instructions:     Diet general     Activity as tolerated       Medications:  Reconciled Home Medications:   Current Discharge Medication List      START taking these medications    Details   amoxicillin-clavulanate 875-125mg (AUGMENTIN) 875-125 mg per tablet Take 1 tablet by mouth 2 (two) times daily.  Qty: 20 tablet, Refills: 0      hydrALAZINE (APRESOLINE) 100 MG tablet Take 1 tablet (100 mg total) by mouth every 8 (eight) hours.  Qty: 90 tablet, Refills: 0         CONTINUE these medications which have CHANGED    Details   amlodipine (NORVASC) 10 MG tablet Take 1 tablet (10 mg total) by mouth once daily.  Qty: 30 tablet, Refills: 0      lisinopril 10 MG tablet Take 1 tablet (10 mg total) by mouth once daily.  Qty: 30 tablet, Refills: 0      metformin (GLUCOPHAGE) 500 MG tablet Take 1 tablet (500 mg total) by mouth 2 (two) times daily with meals.  Qty: 60 tablet, Refills: 0           Time spent on the discharge of patient: 30  minutes    Suzie Ron MD  Department of Hospital Medicine  [facility] [place]

## 2017-02-12 NOTE — PLAN OF CARE
02/12/17 0828   Final Note   Assessment Type Final Discharge Note   Discharge Disposition Home   Discharge planning education complete? Yes   What phone number can be called within the next 1-3 days to see how you are doing after discharge? ([phone] )   Hospital Follow Up  Appt(s) scheduled? Yes   Discharge plans and expectations educations in teach back method with documentation complete? Yes   Offered [facility] -- Bedside Delivery? n/a   Discharge/Hospital Encounter Summary to ([facility]) PCP n/a   Referral to Outpatient Case Management complete? n/a   Referral to / orders for Home Health Complete? n/a   30 day supply of medicines given at discharge, if documented non-compliance / non-adherence? n/a   Any social issues identified prior to discharge? No   Did you assess the readiness or willingness of the family or caregiver to support self management of care? Yes     Provided patient with PCP resource information for [facility]; instructed patient about process for scheduling; patient will call on Monday

## 2017-02-12 NOTE — NURSING
Coy Aragon, medical record number [id number] has been verified by GRANT Eugene and YO Cabrera to be wearing box number [id number]. Rhythm and correct information is visible on the monitor

## 2017-02-12 NOTE — PLAN OF CARE
Problem: Patient Care Overview  Goal: Plan of Care Review  Outcome: Ongoing (interventions implemented as appropriate)    02/12/17 8051   Coping/Psychosocial   Plan Of Care Reviewed With patient   Awake, alert and oriented X 4, urinal at bedside, patient remains free from falls, injury and trauma, medications given, IV fluids infusing, aseptic techniques maintained, patient still has a product cough, respirator treatments maintained, no complaints of pain, telemetry monitoring, no acute distress noted at this time, will continue to monitor

## 2017-02-12 NOTE — PROGRESS NOTES
Coy Aragon, medical record number [id number] has been verified by GRANT Eugene and YO Cabrera to be wearing box number [id number]. Rhythm and correct information is visible on the monitor

## 2017-02-12 NOTE — PROGRESS NOTES
visited earlier with new orders noted. Pt.for discharge. Iv fluids & saline lock d/c. Telemetry monitor d/c. Follow up arrangements per case management. Pt. ready for discharge. Pt.waitnng for son prior to giving d/c instr.-pt.prefers son to be present.

## 2017-02-12 NOTE — NURSING
Coy Aragon, medical record number [id number] has been verified by GRANT Eugene and YO Cabrera to be wearing box number [id number]. Rhythm and correct information is visible on the monitor

## 2017-02-13 LAB
BACTERIA BLD CULT: NORMAL
BACTERIA BLD CULT: NORMAL

## 2019-05-19 ENCOUNTER — HOSPITAL ENCOUNTER (INPATIENT)
Facility: HOSPITAL | Age: 54
LOS: 4 days | Discharge: HOME | DRG: 305 | End: 2019-05-23
Attending: EMERGENCY MEDICINE | Admitting: EMERGENCY MEDICINE
Payer: MEDICAID

## 2019-05-19 DIAGNOSIS — I16.1 HYPERTENSIVE EMERGENCY: Primary | ICD-10-CM

## 2019-05-19 DIAGNOSIS — R04.0 EPISTAXIS: ICD-10-CM

## 2019-05-19 DIAGNOSIS — R79.89 ELEVATED TROPONIN: ICD-10-CM

## 2019-05-19 DIAGNOSIS — I10 HYPERTENSION: ICD-10-CM

## 2019-05-19 DIAGNOSIS — N17.9 AKI (ACUTE KIDNEY INJURY): ICD-10-CM

## 2019-05-19 PROCEDURE — 99285 EMERGENCY DEPT VISIT HI MDM: CPT | Mod: 25

## 2019-05-19 PROCEDURE — 96360 HYDRATION IV INFUSION INIT: CPT

## 2019-05-19 PROCEDURE — 12000002 HC ACUTE/MED SURGE SEMI-PRIVATE ROOM

## 2019-05-20 PROBLEM — N17.9 ACUTE RENAL FAILURE SUPERIMPOSED ON STAGE 3 CHRONIC KIDNEY DISEASE: Status: ACTIVE | Noted: 2019-05-20

## 2019-05-20 PROBLEM — N18.30 ACUTE RENAL FAILURE SUPERIMPOSED ON STAGE 3 CHRONIC KIDNEY DISEASE: Status: ACTIVE | Noted: 2019-05-20

## 2019-05-20 PROBLEM — N17.9 ACUTE RENAL FAILURE: Status: RESOLVED | Noted: 2017-02-09 | Resolved: 2019-05-20

## 2019-05-20 PROBLEM — E11.29 TYPE 2 DIABETES MELLITUS, CONTROLLED, WITH RENAL COMPLICATIONS: Chronic | Status: ACTIVE | Noted: 2017-02-09

## 2019-05-20 PROBLEM — A41.9 SEVERE SEPSIS: Status: RESOLVED | Noted: 2017-02-09 | Resolved: 2019-05-20

## 2019-05-20 PROBLEM — Z86.73 HISTORY OF CVA (CEREBROVASCULAR ACCIDENT): Chronic | Status: ACTIVE | Noted: 2019-05-20

## 2019-05-20 PROBLEM — D63.8 ANEMIA OF CHRONIC DISEASE: Chronic | Status: ACTIVE | Noted: 2019-05-20

## 2019-05-20 PROBLEM — D64.9 SYMPTOMATIC ANEMIA: Status: RESOLVED | Noted: 2019-05-20 | Resolved: 2019-05-20

## 2019-05-20 PROBLEM — R65.20 SEVERE SEPSIS: Status: RESOLVED | Noted: 2017-02-09 | Resolved: 2019-05-20

## 2019-05-20 PROBLEM — N18.30 CKD (CHRONIC KIDNEY DISEASE) STAGE 3, GFR 30-59 ML/MIN: Chronic | Status: ACTIVE | Noted: 2017-02-10

## 2019-05-20 PROBLEM — J18.9 HCAP (HEALTHCARE-ASSOCIATED PNEUMONIA): Status: RESOLVED | Noted: 2017-02-08 | Resolved: 2019-05-20

## 2019-05-20 PROBLEM — Z91.148 NONCOMPLIANCE WITH MEDICATION REGIMEN: Chronic | Status: ACTIVE | Noted: 2019-05-20

## 2019-05-20 PROBLEM — I10 ESSENTIAL HYPERTENSION: Chronic | Status: ACTIVE | Noted: 2019-05-20

## 2019-05-20 PROBLEM — D64.9 SYMPTOMATIC ANEMIA: Status: ACTIVE | Noted: 2019-05-20

## 2019-05-20 LAB
ALBUMIN SERPL BCP-MCNC: 3.1 G/DL (ref 3.5–5.2)
ALP SERPL-CCNC: 74 U/L (ref 55–135)
ALT SERPL W/O P-5'-P-CCNC: 33 U/L (ref 10–44)
ANION GAP SERPL CALC-SCNC: 11 MMOL/L (ref 8–16)
AORTIC ROOT ANNULUS: 2.99 CM
AORTIC VALVE CUSP SEPERATION: 2 CM
APTT BLDCRRT: 30.5 SEC (ref 21–32)
AST SERPL-CCNC: 22 U/L (ref 10–40)
AV INDEX (PROSTH): 0.52
AV MEAN GRADIENT: 6.88 MMHG
AV PEAK GRADIENT: 15.21 MMHG
AV VALVE AREA: 1.72 CM2
AV VELOCITY RATIO: 0.51
BACTERIA #/AREA URNS HPF: NORMAL /HPF
BASOPHILS # BLD AUTO: 0.05 K/UL (ref 0–0.2)
BASOPHILS NFR BLD: 0.6 % (ref 0–1.9)
BILIRUB SERPL-MCNC: 0.1 MG/DL (ref 0.1–1)
BILIRUB UR QL STRIP: NEGATIVE
BNP SERPL-MCNC: 244 PG/ML (ref 0–99)
BSA FOR ECHO PROCEDURE: 1.96 M2
BUN SERPL-MCNC: 37 MG/DL (ref 6–20)
CALCIUM SERPL-MCNC: 9.1 MG/DL (ref 8.7–10.5)
CHLORIDE SERPL-SCNC: 102 MMOL/L (ref 95–110)
CLARITY UR: CLEAR
CO2 SERPL-SCNC: 23 MMOL/L (ref 23–29)
COLOR UR: COLORLESS
CREAT SERPL-MCNC: 2.7 MG/DL (ref 0.5–1.4)
CV ECHO LV RWT: 0.52 CM
DIFFERENTIAL METHOD: ABNORMAL
DOP CALC AO PEAK VEL: 1.95 M/S
DOP CALC AO VTI: 30.62 CM
DOP CALC LVOT AREA: 3.3 CM2
DOP CALC LVOT DIAMETER: 2.05 CM
DOP CALC LVOT PEAK VEL: 1 M/S
DOP CALC LVOT STROKE VOLUME: 52.68 CM3
DOP CALCLVOT PEAK VEL VTI: 15.97 CM
E WAVE DECELERATION TIME: 131.89 MSEC
E/A RATIO: 2.57
ECHO LV POSTERIOR WALL: 1.4 CM (ref 0.6–1.1)
EOSINOPHIL # BLD AUTO: 0.3 K/UL (ref 0–0.5)
EOSINOPHIL NFR BLD: 3.1 % (ref 0–8)
ERYTHROCYTE [DISTWIDTH] IN BLOOD BY AUTOMATED COUNT: 13.9 % (ref 11.5–14.5)
EST. GFR  (AFRICAN AMERICAN): 29 ML/MIN/1.73 M^2
EST. GFR  (NON AFRICAN AMERICAN): 25 ML/MIN/1.73 M^2
ESTIMATED AVG GLUCOSE: 146 MG/DL (ref 68–131)
FRACTIONAL SHORTENING: 36 % (ref 28–44)
GLUCOSE SERPL-MCNC: 324 MG/DL (ref 70–110)
GLUCOSE UR QL STRIP: ABNORMAL
HBA1C MFR BLD HPLC: 6.7 % (ref 4–5.6)
HCT VFR BLD AUTO: 37.8 % (ref 40–54)
HGB BLD-MCNC: 12 G/DL (ref 14–18)
HGB UR QL STRIP: NEGATIVE
HYALINE CASTS #/AREA URNS LPF: 0 /LPF
INR PPP: 1 (ref 0.8–1.2)
INTERVENTRICULAR SEPTUM: 1.7 CM (ref 0.6–1.1)
IVRT: 0.08 MSEC
KETONES UR QL STRIP: NEGATIVE
LA MAJOR: 5.9 CM
LA MINOR: 5.62 CM
LA WIDTH: 4.59 CM
LEFT ATRIUM SIZE: 4.95 CM
LEFT ATRIUM VOLUME INDEX: 56.6 ML/M2
LEFT ATRIUM VOLUME: 111.17 CM3
LEFT INTERNAL DIMENSION IN SYSTOLE: 3.43 CM (ref 2.1–4)
LEFT VENTRICLE DIASTOLIC VOLUME INDEX: 71.2 ML/M2
LEFT VENTRICLE DIASTOLIC VOLUME: 139.83 ML
LEFT VENTRICLE MASS INDEX: 192.7 G/M2
LEFT VENTRICLE SYSTOLIC VOLUME INDEX: 24.7 ML/M2
LEFT VENTRICLE SYSTOLIC VOLUME: 48.55 ML
LEFT VENTRICULAR INTERNAL DIMENSION IN DIASTOLE: 5.38 CM (ref 3.5–6)
LEFT VENTRICULAR MASS: 378.39 G
LEUKOCYTE ESTERASE UR QL STRIP: NEGATIVE
LYMPHOCYTES # BLD AUTO: 1.2 K/UL (ref 1–4.8)
LYMPHOCYTES NFR BLD: 14.9 % (ref 18–48)
MCH RBC QN AUTO: 25.4 PG (ref 27–31)
MCHC RBC AUTO-ENTMCNC: 31.7 G/DL (ref 32–36)
MCV RBC AUTO: 80 FL (ref 82–98)
MICROSCOPIC COMMENT: NORMAL
MONOCYTES # BLD AUTO: 0.5 K/UL (ref 0.3–1)
MONOCYTES NFR BLD: 6 % (ref 4–15)
MV PEAK A VEL: 0.54 M/S
MV PEAK E VEL: 1.39 M/S
NEUTROPHILS # BLD AUTO: 6 K/UL (ref 1.8–7.7)
NEUTROPHILS NFR BLD: 75.4 % (ref 38–73)
NITRITE UR QL STRIP: NEGATIVE
PH UR STRIP: 7 [PH] (ref 5–8)
PISA TR MAX VEL: 3.52 M/S
PLATELET # BLD AUTO: 333 K/UL (ref 150–350)
PMV BLD AUTO: 10.1 FL (ref 9.2–12.9)
POCT GLUCOSE: 121 MG/DL (ref 70–110)
POCT GLUCOSE: 124 MG/DL (ref 70–110)
POCT GLUCOSE: 159 MG/DL (ref 70–110)
POTASSIUM SERPL-SCNC: 3.8 MMOL/L (ref 3.5–5.1)
PROT SERPL-MCNC: 8.2 G/DL (ref 6–8.4)
PROT UR QL STRIP: ABNORMAL
PROTHROMBIN TIME: 10.2 SEC (ref 9–12.5)
PULM VEIN S/D RATIO: 0.61
PV PEAK D VEL: 0.87 M/S
PV PEAK S VEL: 0.53 M/S
PV PEAK VELOCITY: 0.85 CM/S
RA MAJOR: 4.61 CM
RA PRESSURE: 3 MMHG
RA WIDTH: 3.6 CM
RBC # BLD AUTO: 4.73 M/UL (ref 4.6–6.2)
RBC #/AREA URNS HPF: 1 /HPF (ref 0–4)
RIGHT VENTRICULAR END-DIASTOLIC DIMENSION: 3.6 CM
RV TISSUE DOPPLER FREE WALL SYSTOLIC VELOCITY 1 (APICAL 4 CHAMBER VIEW): 13.29 M/S
SINUS: 2.5 CM
SODIUM SERPL-SCNC: 136 MMOL/L (ref 136–145)
SP GR UR STRIP: 1.01 (ref 1–1.03)
STJ: 2.36 CM
TR MAX PG: 49.56 MMHG
TRICUSPID ANNULAR PLANE SYSTOLIC EXCURSION: 1.69 CM
TROPONIN I SERPL DL<=0.01 NG/ML-MCNC: 0.06 NG/ML (ref 0–0.03)
TROPONIN I SERPL DL<=0.01 NG/ML-MCNC: 0.16 NG/ML (ref 0–0.03)
TROPONIN I SERPL DL<=0.01 NG/ML-MCNC: 0.16 NG/ML (ref 0–0.03)
TV REST PULMONARY ARTERY PRESSURE: 53 MMHG
URN SPEC COLLECT METH UR: ABNORMAL
UROBILINOGEN UR STRIP-ACNC: NEGATIVE EU/DL
WBC # BLD AUTO: 7.98 K/UL (ref 3.9–12.7)
WBC #/AREA URNS HPF: 0 /HPF (ref 0–5)
YEAST URNS QL MICRO: NORMAL

## 2019-05-20 PROCEDURE — 93010 ELECTROCARDIOGRAM REPORT: CPT | Mod: ,,, | Performed by: INTERNAL MEDICINE

## 2019-05-20 PROCEDURE — 84484 ASSAY OF TROPONIN QUANT: CPT | Mod: 91

## 2019-05-20 PROCEDURE — 85025 COMPLETE CBC W/AUTO DIFF WBC: CPT

## 2019-05-20 PROCEDURE — 36415 COLL VENOUS BLD VENIPUNCTURE: CPT

## 2019-05-20 PROCEDURE — 84484 ASSAY OF TROPONIN QUANT: CPT

## 2019-05-20 PROCEDURE — 85610 PROTHROMBIN TIME: CPT

## 2019-05-20 PROCEDURE — 21400001 HC TELEMETRY ROOM

## 2019-05-20 PROCEDURE — 83036 HEMOGLOBIN GLYCOSYLATED A1C: CPT

## 2019-05-20 PROCEDURE — 25000003 PHARM REV CODE 250: Performed by: INTERNAL MEDICINE

## 2019-05-20 PROCEDURE — 93005 ELECTROCARDIOGRAM TRACING: CPT

## 2019-05-20 PROCEDURE — 63600175 PHARM REV CODE 636 W HCPCS: Performed by: INTERNAL MEDICINE

## 2019-05-20 PROCEDURE — 85730 THROMBOPLASTIN TIME PARTIAL: CPT

## 2019-05-20 PROCEDURE — 80053 COMPREHEN METABOLIC PANEL: CPT

## 2019-05-20 PROCEDURE — 93010 EKG 12-LEAD: ICD-10-PCS | Mod: ,,, | Performed by: INTERNAL MEDICINE

## 2019-05-20 PROCEDURE — 81000 URINALYSIS NONAUTO W/SCOPE: CPT

## 2019-05-20 PROCEDURE — 25000003 PHARM REV CODE 250: Performed by: EMERGENCY MEDICINE

## 2019-05-20 PROCEDURE — 83880 ASSAY OF NATRIURETIC PEPTIDE: CPT

## 2019-05-20 RX ORDER — CARVEDILOL 6.25 MG/1
6.25 TABLET ORAL 2 TIMES DAILY WITH MEALS
COMMUNITY
End: 2019-08-13

## 2019-05-20 RX ORDER — CLONIDINE HYDROCHLORIDE 0.1 MG/1
0.1 TABLET ORAL EVERY 6 HOURS PRN
Status: DISCONTINUED | OUTPATIENT
Start: 2019-05-20 | End: 2019-05-23 | Stop reason: HOSPADM

## 2019-05-20 RX ORDER — HYDRALAZINE HYDROCHLORIDE 25 MG/1
25 TABLET, FILM COATED ORAL 3 TIMES DAILY
Status: DISCONTINUED | OUTPATIENT
Start: 2019-05-20 | End: 2019-05-21

## 2019-05-20 RX ORDER — AMOXICILLIN 250 MG
1 CAPSULE ORAL 2 TIMES DAILY PRN
Status: DISCONTINUED | OUTPATIENT
Start: 2019-05-20 | End: 2019-05-23 | Stop reason: HOSPADM

## 2019-05-20 RX ORDER — GLUCAGON 1 MG
1 KIT INJECTION
Status: DISCONTINUED | OUTPATIENT
Start: 2019-05-20 | End: 2019-05-23 | Stop reason: HOSPADM

## 2019-05-20 RX ORDER — CLONIDINE HYDROCHLORIDE 0.1 MG/1
0.1 TABLET ORAL
Status: DISCONTINUED | OUTPATIENT
Start: 2019-05-20 | End: 2019-05-20

## 2019-05-20 RX ORDER — ONDANSETRON 2 MG/ML
8 INJECTION INTRAMUSCULAR; INTRAVENOUS EVERY 8 HOURS PRN
Status: DISCONTINUED | OUTPATIENT
Start: 2019-05-20 | End: 2019-05-23 | Stop reason: HOSPADM

## 2019-05-20 RX ORDER — FUROSEMIDE 20 MG/1
20 TABLET ORAL ONCE
COMMUNITY
End: 2021-12-29

## 2019-05-20 RX ORDER — AMLODIPINE BESYLATE 5 MG/1
10 TABLET ORAL
Status: COMPLETED | OUTPATIENT
Start: 2019-05-20 | End: 2019-05-20

## 2019-05-20 RX ORDER — INSULIN ASPART 100 [IU]/ML
0-5 INJECTION, SOLUTION INTRAVENOUS; SUBCUTANEOUS
Status: DISCONTINUED | OUTPATIENT
Start: 2019-05-20 | End: 2019-05-23 | Stop reason: HOSPADM

## 2019-05-20 RX ORDER — RAMELTEON 8 MG/1
8 TABLET ORAL NIGHTLY PRN
Status: DISCONTINUED | OUTPATIENT
Start: 2019-05-20 | End: 2019-05-23 | Stop reason: HOSPADM

## 2019-05-20 RX ORDER — ASPIRIN 325 MG
325 TABLET ORAL
Status: COMPLETED | OUTPATIENT
Start: 2019-05-20 | End: 2019-05-20

## 2019-05-20 RX ORDER — SODIUM CHLORIDE 9 MG/ML
INJECTION, SOLUTION INTRAVENOUS CONTINUOUS
Status: DISCONTINUED | OUTPATIENT
Start: 2019-05-20 | End: 2019-05-20

## 2019-05-20 RX ORDER — HYDRALAZINE HYDROCHLORIDE 25 MG/1
25 TABLET, FILM COATED ORAL
Status: COMPLETED | OUTPATIENT
Start: 2019-05-20 | End: 2019-05-20

## 2019-05-20 RX ORDER — AMLODIPINE BESYLATE 5 MG/1
10 TABLET ORAL DAILY
Status: DISCONTINUED | OUTPATIENT
Start: 2019-05-20 | End: 2019-05-23 | Stop reason: HOSPADM

## 2019-05-20 RX ORDER — SODIUM CHLORIDE 0.9 % (FLUSH) 0.9 %
10 SYRINGE (ML) INJECTION
Status: DISCONTINUED | OUTPATIENT
Start: 2019-05-20 | End: 2019-05-20

## 2019-05-20 RX ORDER — CARVEDILOL 6.25 MG/1
6.25 TABLET ORAL 2 TIMES DAILY WITH MEALS
Status: DISCONTINUED | OUTPATIENT
Start: 2019-05-20 | End: 2019-05-23 | Stop reason: HOSPADM

## 2019-05-20 RX ORDER — CARVEDILOL 6.25 MG/1
6.25 TABLET ORAL
Status: COMPLETED | OUTPATIENT
Start: 2019-05-20 | End: 2019-05-20

## 2019-05-20 RX ORDER — IBUPROFEN 200 MG
24 TABLET ORAL
Status: DISCONTINUED | OUTPATIENT
Start: 2019-05-20 | End: 2019-05-23 | Stop reason: HOSPADM

## 2019-05-20 RX ORDER — IBUPROFEN 200 MG
16 TABLET ORAL
Status: DISCONTINUED | OUTPATIENT
Start: 2019-05-20 | End: 2019-05-23 | Stop reason: HOSPADM

## 2019-05-20 RX ORDER — METFORMIN HYDROCHLORIDE 500 MG/1
500 TABLET ORAL 2 TIMES DAILY WITH MEALS
Status: ON HOLD | COMMUNITY
End: 2019-05-23 | Stop reason: HOSPADM

## 2019-05-20 RX ORDER — ACETAMINOPHEN 500 MG
500 TABLET ORAL EVERY 6 HOURS PRN
Status: DISCONTINUED | OUTPATIENT
Start: 2019-05-20 | End: 2019-05-23 | Stop reason: HOSPADM

## 2019-05-20 RX ORDER — HEPARIN SODIUM 5000 [USP'U]/ML
5000 INJECTION, SOLUTION INTRAVENOUS; SUBCUTANEOUS EVERY 12 HOURS
Status: DISCONTINUED | OUTPATIENT
Start: 2019-05-20 | End: 2019-05-23 | Stop reason: HOSPADM

## 2019-05-20 RX ORDER — ASPIRIN 81 MG/1
81 TABLET ORAL DAILY
Status: DISCONTINUED | OUTPATIENT
Start: 2019-05-21 | End: 2019-05-23 | Stop reason: HOSPADM

## 2019-05-20 RX ORDER — ONDANSETRON 2 MG/ML
4 INJECTION INTRAMUSCULAR; INTRAVENOUS EVERY 8 HOURS PRN
Status: DISCONTINUED | OUTPATIENT
Start: 2019-05-20 | End: 2019-05-20

## 2019-05-20 RX ORDER — ACETAMINOPHEN 325 MG/1
650 TABLET ORAL EVERY 8 HOURS PRN
Status: DISCONTINUED | OUTPATIENT
Start: 2019-05-20 | End: 2019-05-20

## 2019-05-20 RX ORDER — HYDRALAZINE HYDROCHLORIDE 25 MG/1
25 TABLET, FILM COATED ORAL 3 TIMES DAILY
Status: ON HOLD | COMMUNITY
End: 2019-05-23 | Stop reason: HOSPADM

## 2019-05-20 RX ORDER — FUROSEMIDE 20 MG/1
20 TABLET ORAL ONCE
Status: DISCONTINUED | OUTPATIENT
Start: 2019-05-20 | End: 2019-05-20

## 2019-05-20 RX ADMIN — HYDRALAZINE HYDROCHLORIDE 25 MG: 25 TABLET ORAL at 08:05

## 2019-05-20 RX ADMIN — HYDRALAZINE HYDROCHLORIDE 25 MG: 25 TABLET ORAL at 12:05

## 2019-05-20 RX ADMIN — CARVEDILOL 6.25 MG: 6.25 TABLET, FILM COATED ORAL at 06:05

## 2019-05-20 RX ADMIN — HEPARIN SODIUM 5000 UNITS: 5000 INJECTION, SOLUTION INTRAVENOUS; SUBCUTANEOUS at 08:05

## 2019-05-20 RX ADMIN — CARVEDILOL 6.25 MG: 6.25 TABLET, FILM COATED ORAL at 12:05

## 2019-05-20 RX ADMIN — HYDRALAZINE HYDROCHLORIDE 25 MG: 25 TABLET ORAL at 09:05

## 2019-05-20 RX ADMIN — HYDRALAZINE HYDROCHLORIDE 25 MG: 25 TABLET ORAL at 04:05

## 2019-05-20 RX ADMIN — CARVEDILOL 6.25 MG: 6.25 TABLET, FILM COATED ORAL at 09:05

## 2019-05-20 RX ADMIN — AMLODIPINE BESYLATE 10 MG: 5 TABLET ORAL at 12:05

## 2019-05-20 RX ADMIN — HEPARIN SODIUM 5000 UNITS: 5000 INJECTION, SOLUTION INTRAVENOUS; SUBCUTANEOUS at 09:05

## 2019-05-20 RX ADMIN — AMLODIPINE BESYLATE 10 MG: 5 TABLET ORAL at 09:05

## 2019-05-20 RX ADMIN — SODIUM CHLORIDE: 0.9 INJECTION, SOLUTION INTRAVENOUS at 04:05

## 2019-05-20 RX ADMIN — ASPIRIN 325 MG ORAL TABLET 325 MG: 325 PILL ORAL at 03:05

## 2019-05-20 RX ADMIN — SODIUM CHLORIDE 500 ML: 0.9 INJECTION, SOLUTION INTRAVENOUS at 03:05

## 2019-05-20 NOTE — ASSESSMENT & PLAN NOTE
There is a two year period during which we do not have lab work to which to compare his renal function.  Creatinine today is 2.7 compared to 1.5 two years ago.  It is not totally clear whether his worsening renal function is acute or chronic.  Patient's urinalysis is significant for a specific gravity of 1.010, 2+ protein, and 3+ glucose.  Urine output has been fair.  Will obtain additional urine studies; provide aggressive IV fluid hydration; monitor the urine output; recheck the renal function in the morning; and avoid nephrotoxins.
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
## 2019-05-20 NOTE — NURSING
Received report from BEOT Yun. Patient lying in bed, Alert and Oriented x 4. NAD noted. Safety precautions maintained, will monitor.

## 2019-05-20 NOTE — ASSESSMENT & PLAN NOTE
Well controlled on home regimen of metformin; will provide basal prandial insulin therapy along with insulin sliding scale.  The patient's CKD stage 3, the patient should not be on metformin.

## 2019-05-20 NOTE — ASSESSMENT & PLAN NOTE
Patient was noted to have a blood pressure of 250/150 at home.  He has been noncompliant with his medication regimen.  His presenting blood pressure was 251/155 ().  He does have evidence of end-organ dysfunction including worsening renal failure and elevated troponin.  He was restarted on his home antihypertensive regimen with good response.  We will target a MAP of 140 initially.  Will recheck his renal function and troponin level.  He has been counseled on medication adherence.

## 2019-05-20 NOTE — ED TRIAGE NOTES
Pt reports to ED via personal transportation with family with c/o RIGHT sided nose bleed starting about 30mins PTA; pt reports hx of HTN, checking BP at home tonight, & it was 250s/150s; pt reports usual compliance with his medications but that he did not take his HTN medicine today; pt reports last taking it yesterday; pt denies headache or any other complaints at this time; pt denies hx of nose bleeds; pt's nose no longer actively bleeding at this time; pt AAOx4

## 2019-05-20 NOTE — CARE UPDATE
I agree with my colleague's assessment and plan at time of admission. Mr Cespedes presented with hypertensive emergency. Has elevated troponin and ERIC. States never had chest pain or any other symptoms other than some epistaxis that resolved spontaneously. Is on hydralazine and amlodipine at home but not sure of doses. States compliance with BP meds but does eat outside food often which is typically high in sodium. Son states BP was as high as 255/100s mmHg PTA. BP currently 180s/100s mmHg which is at goal for the first 24-48 hours. Is currently on hydralazine, amlodipine and carvedilol. Will increase doses when outside 48 hour window. Echo results pending. Patient is clinically stable and has no concerning physical signs other than an S3 gallop. Will f/u Echo results

## 2019-05-20 NOTE — NURSING
Patient arrived to [facility] from ED via wheelchair. Settled into bed, oriented to room and equipment and applied telemetry. Patient resting comfortably with no acute distress noted for remainder of shift. Gave report to BETO Alvarado.

## 2019-05-20 NOTE — PLAN OF CARE
05/20/19 1620   Discharge Assessment   Assessment Type Discharge Planning Assessment   Confirmed/corrected address and phone number on facesheet? Yes   Assessment information obtained from? Patient   Prior to hospitilization cognitive status: Alert/Oriented   Prior to hospitalization functional status: Independent   Current cognitive status: Alert/Oriented   Current Functional Status: Independent   Facility Arrived From: home   Lives With child(macarena), adult   Able to Return to Prior Arrangements yes   Is patient able to care for self after discharge? Yes   Who are your caregiver(s) and their phone number(s)? david Waterman    Patient's perception of discharge disposition home or selfcare   Readmission Within the Last 30 Days no previous admission in last 30 days   Patient currently being followed by outpatient case management? No   Patient currently receives any other outside agency services? No   Equipment Currently Used at Home none   Do you have any problems affording any of your prescribed medications? No   Is the patient taking medications as prescribed? yes   Does the patient have transportation home? Yes   Transportation Anticipated car, drives self;family or friend will provide   Dialysis Name and Scheduled days N/A   Does the patient receive services at the [facility]? No   Discharge Plan A Home with family   Discharge Plan B Home with family   DME Needed Upon Discharge  none   Patient/Family in Agreement with Plan yes     KARLY met with pt and pt's family at bedside. SW explained her role in Care Management. Pt voiced understanding. SW inquired about HELP AT HOME. Pt stated that he will have Gue[employer]a at home to help for support. SW voiced understanding. SW inquired about responsibilities when it comes to  MANAGING HER/HIS HEALTH at home and what it entails. Pt inquired about details. SW informed pt of RESPONSIBILITIES of:    1. Follow up appointments  2. Getting Prescriptions filled  3. Taking medications as  "prescribed.     Pt voiced understanding.  SW explained "My Health Packet" blue folder and the pink and green tabs that are on the folder as well. Discharge Brochure given to pt with Care Team information. Pt voiced understanding.     Pt's pharmacy:   [id number] - [place] - [address]  [address] [place] [address]  Phone: [phone] Fax: [phone]    [facility] #[id number] - [place] - [address]  [address] [place] [address]  Phone: [phone] Fax: [phone]        Pt's preference for appointments:      "
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
## 2019-05-20 NOTE — HPI
Mr. Mahesh Cespedes is a 54 y.o. male with essential hypertension, type 2 diabetes mellitus (HbA1c 6.5% Jan 2016), CKD stage 3, anemia of chronic disease, noncompliance with medication regimen, history of intracranial hemorrhage, and history of CVA who presents to [facility] ED with complaints of hypertension today.  Thirty minutes prior to arrival to the ED he noted some epistaxis which had resolved by the time he arrived here.  His family measured his blood pressure at home and found that it was 250/150 and prompted him to seek ED evaluation.  He has not been taking his medications for the past two days but reports that he has only intermittently compliant with his antihypertensive regimen due to side effects.  He is a  and medications make him sleepy.  He denies any headaches, nausea, vomiting, fevers, chills, chest pain, shortness of breath, palpitations, diaphoresis, hemoptysis, nor any lower extremity pain or swelling. He had otherwise been in his usual state of health.

## 2019-05-21 LAB
ALBUMIN SERPL BCP-MCNC: 3.2 G/DL (ref 3.5–5.2)
ALP SERPL-CCNC: 67 U/L (ref 55–135)
ALT SERPL W/O P-5'-P-CCNC: 30 U/L (ref 10–44)
ANION GAP SERPL CALC-SCNC: 11 MMOL/L (ref 8–16)
AST SERPL-CCNC: 19 U/L (ref 10–40)
BILIRUB SERPL-MCNC: 0.3 MG/DL (ref 0.1–1)
BUN SERPL-MCNC: 26 MG/DL (ref 6–20)
CALCIUM SERPL-MCNC: 9.6 MG/DL (ref 8.7–10.5)
CHLORIDE SERPL-SCNC: 104 MMOL/L (ref 95–110)
CO2 SERPL-SCNC: 24 MMOL/L (ref 23–29)
CREAT SERPL-MCNC: 2.4 MG/DL (ref 0.5–1.4)
EST. GFR  (AFRICAN AMERICAN): 34 ML/MIN/1.73 M^2
EST. GFR  (NON AFRICAN AMERICAN): 29 ML/MIN/1.73 M^2
GLUCOSE SERPL-MCNC: 144 MG/DL (ref 70–110)
MAGNESIUM SERPL-MCNC: 2 MG/DL (ref 1.6–2.6)
PHOSPHATE SERPL-MCNC: 3.3 MG/DL (ref 2.7–4.5)
POCT GLUCOSE: 114 MG/DL (ref 70–110)
POCT GLUCOSE: 127 MG/DL (ref 70–110)
POCT GLUCOSE: 128 MG/DL (ref 70–110)
POCT GLUCOSE: 136 MG/DL (ref 70–110)
POTASSIUM SERPL-SCNC: 3.8 MMOL/L (ref 3.5–5.1)
PROT SERPL-MCNC: 8.4 G/DL (ref 6–8.4)
SODIUM SERPL-SCNC: 139 MMOL/L (ref 136–145)
TROPONIN I SERPL DL<=0.01 NG/ML-MCNC: 0.11 NG/ML (ref 0–0.03)

## 2019-05-21 PROCEDURE — 80053 COMPREHEN METABOLIC PANEL: CPT

## 2019-05-21 PROCEDURE — 84100 ASSAY OF PHOSPHORUS: CPT

## 2019-05-21 PROCEDURE — 63600175 PHARM REV CODE 636 W HCPCS: Performed by: INTERNAL MEDICINE

## 2019-05-21 PROCEDURE — 25000003 PHARM REV CODE 250: Performed by: INTERNAL MEDICINE

## 2019-05-21 PROCEDURE — 21400001 HC TELEMETRY ROOM

## 2019-05-21 PROCEDURE — 83735 ASSAY OF MAGNESIUM: CPT

## 2019-05-21 PROCEDURE — 36415 COLL VENOUS BLD VENIPUNCTURE: CPT

## 2019-05-21 PROCEDURE — 84484 ASSAY OF TROPONIN QUANT: CPT

## 2019-05-21 RX ORDER — GLIPIZIDE 2.5 MG/1
2.5 TABLET, EXTENDED RELEASE ORAL
Status: DISCONTINUED | OUTPATIENT
Start: 2019-05-21 | End: 2019-05-23 | Stop reason: HOSPADM

## 2019-05-21 RX ORDER — HYDRALAZINE HYDROCHLORIDE 25 MG/1
50 TABLET, FILM COATED ORAL 3 TIMES DAILY
Status: DISCONTINUED | OUTPATIENT
Start: 2019-05-21 | End: 2019-05-22

## 2019-05-21 RX ADMIN — AMLODIPINE BESYLATE 10 MG: 5 TABLET ORAL at 08:05

## 2019-05-21 RX ADMIN — ASPIRIN 81 MG: 81 TABLET, COATED ORAL at 08:05

## 2019-05-21 RX ADMIN — HYDRALAZINE HYDROCHLORIDE 50 MG: 25 TABLET ORAL at 08:05

## 2019-05-21 RX ADMIN — CARVEDILOL 6.25 MG: 6.25 TABLET, FILM COATED ORAL at 08:05

## 2019-05-21 RX ADMIN — CARVEDILOL 6.25 MG: 6.25 TABLET, FILM COATED ORAL at 04:05

## 2019-05-21 RX ADMIN — HYDRALAZINE HYDROCHLORIDE 50 MG: 25 TABLET ORAL at 02:05

## 2019-05-21 RX ADMIN — HYDRALAZINE HYDROCHLORIDE 25 MG: 25 TABLET ORAL at 08:05

## 2019-05-21 RX ADMIN — HEPARIN SODIUM 5000 UNITS: 5000 INJECTION, SOLUTION INTRAVENOUS; SUBCUTANEOUS at 08:05

## 2019-05-21 NOTE — SUBJECTIVE & OBJECTIVE
Interval History: No new issues.     Review of Systems   Constitutional: Negative for activity change.   HENT: Negative for congestion.    Respiratory: Negative for chest tightness and shortness of breath.    Cardiovascular: Negative for chest pain.   Gastrointestinal: Negative for abdominal pain.   Genitourinary: Negative for difficulty urinating.   Neurological: Negative for dizziness.     Objective:     Vital Signs (Most Recent):  Temp: 98.2 °F (36.8 °C) (05/21/19 0745)  Pulse: 71 (05/21/19 0745)  Resp: 19 (05/21/19 0745)  BP: (!) 183/109 (05/21/19 0745)  SpO2: 97 % (05/21/19 0745) Vital Signs (24h Range):  Temp:  [98 °F (36.7 °C)-98.6 °F (37 °C)] 98.2 °F (36.8 °C)  Pulse:  [71-85] 71  Resp:  [18-19] 19  SpO2:  [95 %-99 %] 97 %  BP: (165-183)/(100-110) 183/109     Weight: 87.8 kg (193 lb 9 oz)  Body mass index is 30.32 kg/m².    Intake/Output Summary (Last 24 hours) at 5/21/2019 1041  Last data filed at 5/21/2019 0800  Gross per 24 hour   Intake 590 ml   Output 900 ml   Net -310 ml      Physical Exam   Constitutional: He is oriented to person, place, and time. He appears well-developed and well-nourished. No distress.   HENT:   Head: Normocephalic and atraumatic.   Neurological: He is alert and oriented to person, place, and time.   Skin: Skin is warm and dry.   Psychiatric: He has a normal mood and affect. His behavior is normal.   Nursing note and vitals reviewed.      Significant Labs:   BMP:   Recent Labs   Lab 05/21/19  0526   *      K 3.8      CO2 24   BUN 26*   CREATININE 2.4*   CALCIUM 9.6   MG 2.0     CBC:   Recent Labs   Lab 05/20/19  0112   WBC 7.98   HGB 12.0*   HCT 37.8*          Significant Imaging:

## 2019-05-21 NOTE — PLAN OF CARE
Problem: Fall Injury Risk  Goal: Absence of Fall and Fall-Related Injury    Intervention: Identify and Manage Contributors to Fall Injury Risk     05/20/19 1927   Manage Acute Allergic Reaction   Medication Review/Management medications reviewed   Identify and Manage Contributors to Fall Injury Risk   Self-Care Promotion BADL personal objects within reach;BADL personal routines maintained     Intervention: Promote Injury-Free Environment     05/20/19 1927   Optimize [place] and Functional Mobility   Environmental Safety Modification assistive device/personal items within reach   Optimize Balance and Safe Activity   Safety Promotion/Fall Prevention bed alarm set;assistive device/personal item within reach;medications reviewed;side rails raised x 2;instructed to call staff for mobility

## 2019-05-21 NOTE — NURSING
Received report from BETO Alvarado. Alert and oriented. No pain. No sign of distress. IV line intact over left hand - saline locked. SCD intact and working. Call bell given on her reach, instructed to call for assistance all the time. Bed alarm on. Bed on lowest position. Safety maintained.

## 2019-05-21 NOTE — PLAN OF CARE
Problem: Fall Injury Risk  Goal: Absence of Fall and Fall-Related Injury  Outcome: Ongoing (interventions implemented as appropriate)  Intervention: Identify and Manage Contributors to Fall Injury Risk     05/21/19 0129   Manage Acute Allergic Reaction   Medication Review/Management medications reviewed   Identify and Manage Contributors to Fall Injury Risk   Self-Care Promotion independence encouraged;BADL personal objects within reach;BADL personal routines maintained     Intervention: Promote Injury-Free Environment     05/21/19 0129   Optimize [place] and Functional Mobility   Environmental Safety Modification assistive device/personal items within reach;clutter free environment maintained;lighting adjusted;mobility aid in reach;mattress on floor;room near unit station;room organization consistent   Optimize Balance and Safe Activity   Safety Promotion/Fall Prevention assistive device/personal item within reach;bed alarm set;upper side rails raised x 2, lower siderails raised x 1 (Peds only);instructed to call staff for mobility;nonskid shoes/socks when out of bed;commode/urinal/bedpan at bedside

## 2019-05-21 NOTE — PROGRESS NOTES
[facility] Hospital Medicine  Progress Note    Patient Name: Mahesh Cespedes  MRN: [id number]  Patient Class: IP- Inpatient   Admission Date: 5/19/2019  Length of Stay: 1 days  Attending Physician: Tha Choi MD  Primary Care Provider: Primary Doctor No        Subjective:     Principal Problem:Hypertensive emergency    HPI:  Mr. Mahesh Cespedes is a 54 y.o. male with essential hypertension, type 2 diabetes mellitus (HbA1c 6.5% Jan 2016), CKD stage 3, anemia of chronic disease, noncompliance with medication regimen, history of intracranial hemorrhage, and history of CVA who presents to [facility] ED with complaints of hypertension today.  Thirty minutes prior to arrival to the ED he noted some epistaxis which had resolved by the time he arrived here.  His family measured his blood pressure at home and found that it was 250/150 and prompted him to seek ED evaluation.  He has not been taking his medications for the past two days but reports that he has only intermittently compliant with his antihypertensive regimen due to side effects.  He is a  and medications make him sleepy.  He denies any headaches, nausea, vomiting, fevers, chills, chest pain, shortness of breath, palpitations, diaphoresis, hemoptysis, nor any lower extremity pain or swelling. He had otherwise been in his usual state of health.    Hospital Course:  Mr. Mahesh Cespedes is a 54 y.o. male with essential hypertension, type 2 diabetes mellitus (HbA1c 6.5% Jan 2016), CKD stage 3, anemia of chronic disease, noncompliance with medication regimen, history of intracranial hemorrhage, and history of CVA who presents to [facility] ED with complaints of hypertension today.  Thirty minutes prior to arrival to the ED he noted some epistaxis which had resolved by the time he arrived here.  His family measured his blood pressure at home and found that it was 250/150 and prompted him to seek ED evaluation.  He has not been taking his  medications for the past two days. Patient was started on BP meds.     Interval History: No new issues.     Review of Systems   Constitutional: Negative for activity change.   HENT: Negative for congestion.    Respiratory: Negative for chest tightness and shortness of breath.    Cardiovascular: Negative for chest pain.   Gastrointestinal: Negative for abdominal pain.   Genitourinary: Negative for difficulty urinating.   Neurological: Negative for dizziness.     Objective:     Vital Signs (Most Recent):  Temp: 98.2 °F (36.8 °C) (05/21/19 0745)  Pulse: 71 (05/21/19 0745)  Resp: 19 (05/21/19 0745)  BP: (!) 183/109 (05/21/19 0745)  SpO2: 97 % (05/21/19 0745) Vital Signs (24h Range):  Temp:  [98 °F (36.7 °C)-98.6 °F (37 °C)] 98.2 °F (36.8 °C)  Pulse:  [71-85] 71  Resp:  [18-19] 19  SpO2:  [95 %-99 %] 97 %  BP: (165-183)/(100-110) 183/109     Weight: 87.8 kg (193 lb 9 oz)  Body mass index is 30.32 kg/m².    Intake/Output Summary (Last 24 hours) at 5/21/2019 1041  Last data filed at 5/21/2019 0800  Gross per 24 hour   Intake 590 ml   Output 900 ml   Net -310 ml      Physical Exam   Constitutional: He is oriented to person, place, and time. He appears well-developed and well-nourished. No distress.   HENT:   Head: Normocephalic and atraumatic.   Neurological: He is alert and oriented to person, place, and time.   Skin: Skin is warm and dry.   Psychiatric: He has a normal mood and affect. His behavior is normal.   Nursing note and vitals reviewed.      Significant Labs:   BMP:   Recent Labs   Lab 05/21/19  0526   *      K 3.8      CO2 24   BUN 26*   CREATININE 2.4*   CALCIUM 9.6   MG 2.0     CBC:   Recent Labs   Lab 05/20/19  0112   WBC 7.98   HGB 12.0*   HCT 37.8*          Significant Imaging:     Assessment/Plan:      * Hypertensive emergency  Patient was noted to have a blood pressure of 250/150 at home.  He has been noncompliant with his medication regimen.  His presenting blood pressure was  251/155 ().  He does have evidence of end-organ dysfunction including worsening renal failure and elevated troponin.  He was restarted on his home antihypertensive regimen with good response.  We will target a MAP of 140 initially.  Will recheck his renal function and troponin level.  He has been counseled on medication adherence.      Increasing hydralazine to 50 TID today. May require 75 tid on discharge.      Noncompliance with medication regimen  Patient has been counseled and encouraged to adhere to his medication regimen.    History of CVA (cerebrovascular accident)  Stable; will continue his home regimen of aspirin.    Anemia of chronic disease  The patient's H/H is stable and consistent with previous laboratory measurements, and the patient exhibits no signs or symptoms of acute bleeding; there is no indication for transfusion.  Will continue to monitor.    Essential hypertension  As addressed above.    Acute on CKD stage 3  There is a two year period during which we do not have lab work to which to compare his renal function.  Creatinine today is 2.7 compared to 1.5 two years ago.  It is not totally clear whether his worsening renal function is acute or chronic.  Patient's urinalysis is significant for a specific gravity of 1.010, 2+ protein, and 3+ glucose.  Urine output has been fair.  Will obtain additional urine studies; provide aggressive IV fluid hydration; monitor the urine output; recheck the renal function in the morning; and avoid nephrotoxins.    May be patients new baseline     CKD stage 3  As addressed above.    Not improved with fluids.     Elevated troponin  As addressed above.  Likely from strain. Will repeat now. May be elevated as well from CKD III.      Type 2 diabetes mellitus, controlled, with renal complications  Well controlled on home regimen of metformin; will provide basal prandial insulin therapy along with insulin sliding scale.  The patient's CKD stage 3, the patient  should not be on metformin.    History of intracranial hemorrhage  Stable; there are no acute issues.      VTE Risk Mitigation (From admission, onward)        Ordered     heparin (porcine) injection 5,000 Units  Every 12 hours      05/20/19 0429     IP VTE HIGH RISK PATIENT  Once      05/20/19 0429        Increase hydralazine to 50 TID. Possibly home tomorrow.  Will add low dose Glucotrol. Metformin contra-indicated.       Tha Kendall MD  Department of Hospital Medicine   [facility]

## 2019-05-21 NOTE — NURSING
Reported off to oncoming nurse, patient resting in bed, [id number]. Patient can make needs known to staff, minimal assist required for adls and transfers, no acute distress noted, safety precautions maintained.    Chart check completed.

## 2019-05-21 NOTE — PLAN OF CARE
Problem: Diabetes Comorbidity  Goal: Blood Glucose Level Within Desired Range  Outcome: Ongoing (interventions implemented as appropriate)  Intervention: Maintain Glycemic Control     05/21/19 1530   Monitor and Manage Ketoacidosis   Glycemic Management blood glucose monitoring;insulin dose matched to carbohydrate intake

## 2019-05-21 NOTE — ASSESSMENT & PLAN NOTE
Patient was noted to have a blood pressure of 250/150 at home.  He has been noncompliant with his medication regimen.  His presenting blood pressure was 251/155 ().  He does have evidence of end-organ dysfunction including worsening renal failure and elevated troponin.  He was restarted on his home antihypertensive regimen with good response.  We will target a MAP of 140 initially.  Will recheck his renal function and troponin level.  He has been counseled on medication adherence.      Increasing hydralazine to 50 TID today. May require 75 tid on discharge.

## 2019-05-21 NOTE — HOSPITAL COURSE
Mr. Mahesh Cespedes is a 54 y.o. male with essential hypertension, type 2 diabetes mellitus (HbA1c 6.5% Jan 2016), CKD stage 3, anemia of chronic disease, noncompliance with medication regimen, history of intracranial hemorrhage, and history of CVA who presents to [facility] ED with complaints of hypertension today.  Thirty minutes prior to arrival to the ED he noted some epistaxis which had resolved by the time he arrived here.  His family measured his blood pressure at home and found that it was 250/150 and prompted him to seek ED evaluation.  He has not been taking his medications for the past two days. Patient was started on BP meds. With improvement.  The patient clinically improved. The patient's Metformin was stopped due to CKD III and started on Glipizide. His BP was well controlled and the patient will be discharged to home today. Activity as tolerated. Diet- low NA, ADA 2000 Jelani diet. Follow up with PCP in one week

## 2019-05-21 NOTE — ASSESSMENT & PLAN NOTE
There is a two year period during which we do not have lab work to which to compare his renal function.  Creatinine today is 2.7 compared to 1.5 two years ago.  It is not totally clear whether his worsening renal function is acute or chronic.  Patient's urinalysis is significant for a specific gravity of 1.010, 2+ protein, and 3+ glucose.  Urine output has been fair.  Will obtain additional urine studies; provide aggressive IV fluid hydration; monitor the urine output; recheck the renal function in the morning; and avoid nephrotoxins.    May be patients new baseline

## 2019-05-22 LAB
ALBUMIN SERPL BCP-MCNC: 3 G/DL (ref 3.5–5.2)
ALP SERPL-CCNC: 65 U/L (ref 55–135)
ALT SERPL W/O P-5'-P-CCNC: 26 U/L (ref 10–44)
ANION GAP SERPL CALC-SCNC: 9 MMOL/L (ref 8–16)
AST SERPL-CCNC: 15 U/L (ref 10–40)
BILIRUB SERPL-MCNC: 0.3 MG/DL (ref 0.1–1)
BUN SERPL-MCNC: 29 MG/DL (ref 6–20)
CALCIUM SERPL-MCNC: 9.3 MG/DL (ref 8.7–10.5)
CHLORIDE SERPL-SCNC: 104 MMOL/L (ref 95–110)
CO2 SERPL-SCNC: 24 MMOL/L (ref 23–29)
CREAT SERPL-MCNC: 2.5 MG/DL (ref 0.5–1.4)
EST. GFR  (AFRICAN AMERICAN): 32 ML/MIN/1.73 M^2
EST. GFR  (NON AFRICAN AMERICAN): 28 ML/MIN/1.73 M^2
GLUCOSE SERPL-MCNC: 112 MG/DL (ref 70–110)
POCT GLUCOSE: 106 MG/DL (ref 70–110)
POCT GLUCOSE: 113 MG/DL (ref 70–110)
POCT GLUCOSE: 244 MG/DL (ref 70–110)
POCT GLUCOSE: 71 MG/DL (ref 70–110)
POTASSIUM SERPL-SCNC: 3.7 MMOL/L (ref 3.5–5.1)
PROT SERPL-MCNC: 7.7 G/DL (ref 6–8.4)
SODIUM SERPL-SCNC: 137 MMOL/L (ref 136–145)

## 2019-05-22 PROCEDURE — 25000003 PHARM REV CODE 250: Performed by: INTERNAL MEDICINE

## 2019-05-22 PROCEDURE — 21400001 HC TELEMETRY ROOM

## 2019-05-22 PROCEDURE — 63600175 PHARM REV CODE 636 W HCPCS: Performed by: INTERNAL MEDICINE

## 2019-05-22 PROCEDURE — 80053 COMPREHEN METABOLIC PANEL: CPT

## 2019-05-22 PROCEDURE — 36415 COLL VENOUS BLD VENIPUNCTURE: CPT

## 2019-05-22 RX ORDER — HYDRALAZINE HYDROCHLORIDE 25 MG/1
75 TABLET, FILM COATED ORAL 3 TIMES DAILY
Status: DISCONTINUED | OUTPATIENT
Start: 2019-05-22 | End: 2019-05-23 | Stop reason: HOSPADM

## 2019-05-22 RX ADMIN — HYDRALAZINE HYDROCHLORIDE 75 MG: 25 TABLET ORAL at 03:05

## 2019-05-22 RX ADMIN — HYDRALAZINE HYDROCHLORIDE 75 MG: 25 TABLET ORAL at 08:05

## 2019-05-22 RX ADMIN — HEPARIN SODIUM 5000 UNITS: 5000 INJECTION, SOLUTION INTRAVENOUS; SUBCUTANEOUS at 08:05

## 2019-05-22 RX ADMIN — HEPARIN SODIUM 5000 UNITS: 5000 INJECTION, SOLUTION INTRAVENOUS; SUBCUTANEOUS at 09:05

## 2019-05-22 RX ADMIN — GLIPIZIDE 2.5 MG: 2.5 TABLET, FILM COATED, EXTENDED RELEASE ORAL at 08:05

## 2019-05-22 RX ADMIN — AMLODIPINE BESYLATE 10 MG: 5 TABLET ORAL at 08:05

## 2019-05-22 RX ADMIN — CARVEDILOL 6.25 MG: 6.25 TABLET, FILM COATED ORAL at 08:05

## 2019-05-22 RX ADMIN — CARVEDILOL 6.25 MG: 6.25 TABLET, FILM COATED ORAL at 04:05

## 2019-05-22 RX ADMIN — HYDRALAZINE HYDROCHLORIDE 75 MG: 25 TABLET ORAL at 09:05

## 2019-05-22 RX ADMIN — ASPIRIN 81 MG: 81 TABLET, COATED ORAL at 08:05

## 2019-05-22 NOTE — PLAN OF CARE
Problem: Fall Injury Risk  Goal: Absence of Fall and Fall-Related Injury    Intervention: Identify and Manage Contributors to Fall Injury Risk     05/21/19 1906   Manage Acute Allergic Reaction   Medication Review/Management medications reviewed   Identify and Manage Contributors to Fall Injury Risk   Self-Care Promotion independence encouraged     Intervention: Promote Injury-Free Environment     05/21/19 1906 05/22/19 0600   Optimize [place] and Functional Mobility   Environmental Safety Modification assistive device/personal items within reach  --    Optimize Balance and Safe Activity   Safety Promotion/Fall Prevention  --  assistive device/personal item within reach;bed alarm set;diversional activities provided;lighting adjusted;medications reviewed;nonskid shoes/socks when out of bed;room near unit station;side rails raised x 3;instructed to call staff for mobility

## 2019-05-22 NOTE — NURSING
Bedside rounding report received from lynda oleary lpn on patients progress and updated handoff report sheet received too. Assessment completed plan of care updated on board . Call light in reach alarm on rails up x 3 urinal in reach . No acute distress . Tele box is on .

## 2019-05-22 NOTE — NURSING
1530- patient taking a nap in bed eyes closed . No acute changes continue to monitor. Call light in reach . Side rail up x 3 bed alarm on . Tele box on no changes on telemetry .     1713- eating supper meal . Denies pain when asked . Call light in reach head of bed up side rail up x 3 bed alarm on .

## 2019-05-22 NOTE — SUBJECTIVE & OBJECTIVE
Interval History: No new issues.      Review of Systems   Constitutional: Negative for activity change.   HENT: Negative for congestion.    Respiratory: Negative for chest tightness and shortness of breath.    Cardiovascular: Negative for chest pain.   Gastrointestinal: Negative for abdominal pain.   Genitourinary: Negative for difficulty urinating.   Neurological: Negative for dizziness.     Objective:     Vital Signs (Most Recent):  Temp: 99 °F (37.2 °C) (05/22/19 0438)  Pulse: 87 (05/22/19 0438)  Resp: 18 (05/22/19 0438)  BP: (!) 163/96 (05/22/19 0438)  SpO2: 97 % (05/22/19 0438) Vital Signs (24h Range):  Temp:  [98.1 °F (36.7 °C)-99 °F (37.2 °C)] 99 °F (37.2 °C)  Pulse:  [63-87] 87  Resp:  [18-19] 18  SpO2:  [95 %-97 %] 97 %  BP: (148-183)/() 163/96     Weight: 85 kg (187 lb 6.3 oz)  Body mass index is 29.35 kg/m².    Intake/Output Summary (Last 24 hours) at 5/22/2019 0732  Last data filed at 5/21/2019 1800  Gross per 24 hour   Intake 600 ml   Output 750 ml   Net -150 ml      Physical Exam   Constitutional: He is oriented to person, place, and time. He appears well-developed and well-nourished. No distress.   HENT:   Head: Normocephalic and atraumatic.   Neurological: He is alert and oriented to person, place, and time.   Skin: Skin is warm and dry.   Psychiatric: He has a normal mood and affect. His behavior is normal.   Nursing note and vitals reviewed.      Significant Labs:   BMP:   Recent Labs   Lab 05/21/19  0526 05/22/19  0531   * 112*    137   K 3.8 3.7    104   CO2 24 24   BUN 26* 29*   CREATININE 2.4* 2.5*   CALCIUM 9.6 9.3   MG 2.0  --      CBC: No results for input(s): WBC, HGB, HCT, PLT in the last 48 hours.    Significant Imaging:

## 2019-05-22 NOTE — PROGRESS NOTES
[facility] Hospital Medicine  Progress Note    Patient Name: Mahesh Cespedes  MRN: [id number]  Patient Class: IP- Inpatient   Admission Date: 5/19/2019  Length of Stay: 2 days  Attending Physician: Tha Choi MD  Primary Care Provider: Primary Doctor No        Subjective:     Principal Problem:Hypertensive emergency    HPI:  Mr. Mahesh Cespedes is a 54 y.o. male with essential hypertension, type 2 diabetes mellitus (HbA1c 6.5% Jan 2016), CKD stage 3, anemia of chronic disease, noncompliance with medication regimen, history of intracranial hemorrhage, and history of CVA who presents to [facility] ED with complaints of hypertension today.  Thirty minutes prior to arrival to the ED he noted some epistaxis which had resolved by the time he arrived here.  His family measured his blood pressure at home and found that it was 250/150 and prompted him to seek ED evaluation.  He has not been taking his medications for the past two days but reports that he has only intermittently compliant with his antihypertensive regimen due to side effects.  He is a  and medications make him sleepy.  He denies any headaches, nausea, vomiting, fevers, chills, chest pain, shortness of breath, palpitations, diaphoresis, hemoptysis, nor any lower extremity pain or swelling. He had otherwise been in his usual state of health.    Hospital Course:  Mr. Mahesh Cespedes is a 54 y.o. male with essential hypertension, type 2 diabetes mellitus (HbA1c 6.5% Jan 2016), CKD stage 3, anemia of chronic disease, noncompliance with medication regimen, history of intracranial hemorrhage, and history of CVA who presents to [facility] ED with complaints of hypertension today.  Thirty minutes prior to arrival to the ED he noted some epistaxis which had resolved by the time he arrived here.  His family measured his blood pressure at home and found that it was 250/150 and prompted him to seek ED evaluation.  He has not been taking his  medications for the past two days. Patient was started on BP meds. With improvement.    Interval History: No new issues.      Review of Systems   Constitutional: Negative for activity change.   HENT: Negative for congestion.    Respiratory: Negative for chest tightness and shortness of breath.    Cardiovascular: Negative for chest pain.   Gastrointestinal: Negative for abdominal pain.   Genitourinary: Negative for difficulty urinating.   Neurological: Negative for dizziness.     Objective:     Vital Signs (Most Recent):  Temp: 99 °F (37.2 °C) (05/22/19 0438)  Pulse: 87 (05/22/19 0438)  Resp: 18 (05/22/19 0438)  BP: (!) 163/96 (05/22/19 0438)  SpO2: 97 % (05/22/19 0438) Vital Signs (24h Range):  Temp:  [98.1 °F (36.7 °C)-99 °F (37.2 °C)] 99 °F (37.2 °C)  Pulse:  [63-87] 87  Resp:  [18-19] 18  SpO2:  [95 %-97 %] 97 %  BP: (148-183)/() 163/96     Weight: 85 kg (187 lb 6.3 oz)  Body mass index is 29.35 kg/m².    Intake/Output Summary (Last 24 hours) at 5/22/2019 0732  Last data filed at 5/21/2019 1800  Gross per 24 hour   Intake 600 ml   Output 750 ml   Net -150 ml      Physical Exam   Constitutional: He is oriented to person, place, and time. He appears well-developed and well-nourished. No distress.   HENT:   Head: Normocephalic and atraumatic.   Neurological: He is alert and oriented to person, place, and time.   Skin: Skin is warm and dry.   Psychiatric: He has a normal mood and affect. His behavior is normal.   Nursing note and vitals reviewed.      Significant Labs:   BMP:   Recent Labs   Lab 05/21/19  0526 05/22/19  0531   * 112*    137   K 3.8 3.7    104   CO2 24 24   BUN 26* 29*   CREATININE 2.4* 2.5*   CALCIUM 9.6 9.3   MG 2.0  --      CBC: No results for input(s): WBC, HGB, HCT, PLT in the last 48 hours.    Significant Imaging:    Assessment/Plan:      * Hypertensive emergency  Patient was noted to have a blood pressure of 250/150 at home.  He has been noncompliant with his medication  regimen.  His presenting blood pressure was 251/155 ().  He does have evidence of end-organ dysfunction including worsening renal failure and elevated troponin.  He was restarted on his home antihypertensive regimen with good response.  We will target a MAP of 140 initially.  Will recheck his renal function and troponin level.  He has been counseled on medication adherence.      Increasing hydralazine to 50 TID today. May require 75 tid on discharge.     Now increasing to 75 tid.  Home likely in am.        Noncompliance with medication regimen  Patient has been counseled and encouraged to adhere to his medication regimen.    History of CVA (cerebrovascular accident)  Stable; will continue his home regimen of aspirin.    Anemia of chronic disease  The patient's H/H is stable and consistent with previous laboratory measurements, and the patient exhibits no signs or symptoms of acute bleeding; there is no indication for transfusion.  Will continue to monitor.    Essential hypertension  As addressed above.    Acute on CKD stage 3  There is a two year period during which we do not have lab work to which to compare his renal function.  Creatinine today is 2.7 compared to 1.5 two years ago.  It is not totally clear whether his worsening renal function is acute or chronic.  Patient's urinalysis is significant for a specific gravity of 1.010, 2+ protein, and 3+ glucose.  Urine output has been fair.  Will obtain additional urine studies; provide aggressive IV fluid hydration; monitor the urine output; recheck the renal function in the morning; and avoid nephrotoxins.    May be patients new baseline     CKD stage 3  As addressed above.    Not improved with fluids.     Elevated troponin  As addressed above.    Type 2 diabetes mellitus, controlled, with renal complications  Well controlled on home regimen of metformin; will provide basal prandial insulin therapy along with insulin sliding scale.  The patient's CKD stage  3, the patient should not be on metformin.  A1c is 6.7 low dose glucotrol started     History of intracranial hemorrhage  Stable; there are no acute issues.      VTE Risk Mitigation (From admission, onward)        Ordered     heparin (porcine) injection 5,000 Units  Every 12 hours      05/20/19 0429     IP VTE HIGH RISK PATIENT  Once      05/20/19 0429        Increasing hydralazine to 75 tid. Home likely on 5/23.   Monitor renal function.       Tha Kendall MD  Department of Hospital Medicine   [facility]

## 2019-05-22 NOTE — ASSESSMENT & PLAN NOTE
Patient was noted to have a blood pressure of 250/150 at home.  He has been noncompliant with his medication regimen.  His presenting blood pressure was 251/155 ().  He does have evidence of end-organ dysfunction including worsening renal failure and elevated troponin.  He was restarted on his home antihypertensive regimen with good response.  We will target a MAP of 140 initially.  Will recheck his renal function and troponin level.  He has been counseled on medication adherence.      Increasing hydralazine to 50 TID today. May require 75 tid on discharge.     Now increasing to 75 tid.  Home likely in am.

## 2019-05-22 NOTE — NURSING
0915- patient ate all of breakfast meal tolerated well. Denies pain or dizziness. Vitals monitored and heart rate. Call light in reach . Patient complaint with diet . No telemetry changes.       1115- no acute distress or changers on telemetry continue to monitor.     1253- no acute changes continue to monitor.

## 2019-05-23 VITALS
TEMPERATURE: 98 F | HEART RATE: 79 BPM | BODY MASS INDEX: 30.27 KG/M2 | SYSTOLIC BLOOD PRESSURE: 150 MMHG | HEIGHT: 67 IN | OXYGEN SATURATION: 98 % | DIASTOLIC BLOOD PRESSURE: 87 MMHG | RESPIRATION RATE: 18 BRPM | WEIGHT: 192.88 LBS

## 2019-05-23 PROBLEM — N18.30 ACUTE RENAL FAILURE SUPERIMPOSED ON STAGE 3 CHRONIC KIDNEY DISEASE: Status: RESOLVED | Noted: 2019-05-20 | Resolved: 2019-05-23

## 2019-05-23 PROBLEM — N17.9 ACUTE RENAL FAILURE SUPERIMPOSED ON STAGE 3 CHRONIC KIDNEY DISEASE: Status: RESOLVED | Noted: 2019-05-20 | Resolved: 2019-05-23

## 2019-05-23 PROBLEM — R79.89 ELEVATED TROPONIN: Status: RESOLVED | Noted: 2017-02-09 | Resolved: 2019-05-23

## 2019-05-23 LAB
ALBUMIN SERPL BCP-MCNC: 3.2 G/DL (ref 3.5–5.2)
ALP SERPL-CCNC: 68 U/L (ref 55–135)
ALT SERPL W/O P-5'-P-CCNC: 33 U/L (ref 10–44)
ANION GAP SERPL CALC-SCNC: 8 MMOL/L (ref 8–16)
AST SERPL-CCNC: 20 U/L (ref 10–40)
BILIRUB SERPL-MCNC: 0.3 MG/DL (ref 0.1–1)
BUN SERPL-MCNC: 28 MG/DL (ref 6–20)
CALCIUM SERPL-MCNC: 9.7 MG/DL (ref 8.7–10.5)
CHLORIDE SERPL-SCNC: 103 MMOL/L (ref 95–110)
CO2 SERPL-SCNC: 26 MMOL/L (ref 23–29)
CREAT SERPL-MCNC: 2.4 MG/DL (ref 0.5–1.4)
EST. GFR  (AFRICAN AMERICAN): 34 ML/MIN/1.73 M^2
EST. GFR  (NON AFRICAN AMERICAN): 29 ML/MIN/1.73 M^2
GLUCOSE SERPL-MCNC: 108 MG/DL (ref 70–110)
POCT GLUCOSE: 114 MG/DL (ref 70–110)
POTASSIUM SERPL-SCNC: 3.6 MMOL/L (ref 3.5–5.1)
PROT SERPL-MCNC: 8.1 G/DL (ref 6–8.4)
SODIUM SERPL-SCNC: 137 MMOL/L (ref 136–145)

## 2019-05-23 PROCEDURE — 80053 COMPREHEN METABOLIC PANEL: CPT

## 2019-05-23 PROCEDURE — 25000003 PHARM REV CODE 250: Performed by: INTERNAL MEDICINE

## 2019-05-23 PROCEDURE — 63600175 PHARM REV CODE 636 W HCPCS: Performed by: INTERNAL MEDICINE

## 2019-05-23 PROCEDURE — 36415 COLL VENOUS BLD VENIPUNCTURE: CPT

## 2019-05-23 RX ORDER — GLIPIZIDE 2.5 MG/1
2.5 TABLET, EXTENDED RELEASE ORAL
Qty: 30 TABLET | Refills: 5 | Status: SHIPPED | OUTPATIENT
Start: 2019-05-23 | End: 2022-03-15 | Stop reason: ALTCHOICE

## 2019-05-23 RX ORDER — HYDRALAZINE HYDROCHLORIDE 25 MG/1
100 TABLET, FILM COATED ORAL 3 TIMES DAILY
Qty: 90 TABLET | Refills: 5 | Status: SHIPPED | OUTPATIENT
Start: 2019-05-23 | End: 2019-08-13 | Stop reason: SDUPTHER

## 2019-05-23 RX ADMIN — CARVEDILOL 6.25 MG: 6.25 TABLET, FILM COATED ORAL at 10:05

## 2019-05-23 RX ADMIN — HYDRALAZINE HYDROCHLORIDE 75 MG: 25 TABLET ORAL at 10:05

## 2019-05-23 RX ADMIN — ASPIRIN 81 MG: 81 TABLET, COATED ORAL at 10:05

## 2019-05-23 RX ADMIN — GLIPIZIDE 2.5 MG: 2.5 TABLET, FILM COATED, EXTENDED RELEASE ORAL at 10:05

## 2019-05-23 RX ADMIN — AMLODIPINE BESYLATE 10 MG: 5 TABLET ORAL at 10:05

## 2019-05-23 RX ADMIN — HEPARIN SODIUM 5000 UNITS: 5000 INJECTION, SOLUTION INTRAVENOUS; SUBCUTANEOUS at 10:05

## 2019-05-23 NOTE — ASSESSMENT & PLAN NOTE
Well controlled on home regimen of metformin; will provide basal prandial insulin therapy along with insulin sliding scale.  The patient's CKD stage 3, the patient should not be on metformin.    D/C metformin. Started on Glipizide.   A1c was 6.7.  Will send home on Glipizide 5

## 2019-05-23 NOTE — NURSING
1015- no acute distress patient ate all of breakfast meal. No change on telemetry . Denies pain . Call light in reach head of bed up rails up x 3 bed alarm on .       1102- telemetry off and iv out no signs of inflammation . Denies pain . Patient calling his brother to let him know he is being discharged . avs booklet reviewed with patient and prescriptions given for hudrALAZINE 25 mg three times a day and glucotrol 2.5 mg daily with breakfast meal. No questions or concerns regarding these medications and purspose of each one . Instructed the patient to take these medications as ordered do not stop taking unless you follow up with the advice of your primary doctor first . Keep follow up appointments . Patient voiced he will go see his doctor .

## 2019-05-23 NOTE — PLAN OF CARE
"   05/23/19 1018   Post-Acute Status   Post-Acute Authorization Other   Other Status No Post-Acute Service Needs   EDUCATION:  Mr Cespedes provided with educational information on Hypertension.  Information reviewed and placed in :My Healthcare Packet" to be brought home for him to use as resource after discharge.  Information included:  signs and symptoms to look for and call the doctor if experiencing, and symptoms that may indicate a medical emergency: CALL 911.      All questions answered.  Teach back method used.    Patient stated, "SOB and CP call 911".    1023:  NurseDorothy notified that all CM needs are met.            "

## 2019-05-23 NOTE — DISCHARGE SUMMARY
[facility] Hospital Medicine  Discharge Summary      Patient Name: Mahesh Cespedes  MRN: [id number]  Admission Date: 5/19/2019  Hospital Length of Stay: 3 days  Discharge Date and Time:  05/23/2019 9:26 AM  Attending Physician: Tha Choi MD   Discharging Provider: Tha Choi MD  Primary Care Provider: Primary Doctor No      HPI:   Mr. Mahesh Cespedes is a 54 y.o. male with essential hypertension, type 2 diabetes mellitus (HbA1c 6.5% Jan 2016), CKD stage 3, anemia of chronic disease, noncompliance with medication regimen, history of intracranial hemorrhage, and history of CVA who presents to [facility] ED with complaints of hypertension today.  Thirty minutes prior to arrival to the ED he noted some epistaxis which had resolved by the time he arrived here.  His family measured his blood pressure at home and found that it was 250/150 and prompted him to seek ED evaluation.  He has not been taking his medications for the past two days but reports that he has only intermittently compliant with his antihypertensive regimen due to side effects.  He is a  and medications make him sleepy.  He denies any headaches, nausea, vomiting, fevers, chills, chest pain, shortness of breath, palpitations, diaphoresis, hemoptysis, nor any lower extremity pain or swelling. He had otherwise been in his usual state of health.    * No surgery found *      Hospital Course:   Mr. Mahesh Cespedes is a 54 y.o. male with essential hypertension, type 2 diabetes mellitus (HbA1c 6.5% Jan 2016), CKD stage 3, anemia of chronic disease, noncompliance with medication regimen, history of intracranial hemorrhage, and history of CVA who presents to [facility] ED with complaints of hypertension today.  Thirty minutes prior to arrival to the ED he noted some epistaxis which had resolved by the time he arrived here.  His family measured his blood pressure at home and found that it was 250/150 and prompted him to seek ED  evaluation.  He has not been taking his medications for the past two days. Patient was started on BP meds. With improvement.  The patient clinically improved. The patient's Metformin was stopped due to CKD III and started on Glipizide. His BP was well controlled and the patient will be discharged to home today. Activity as tolerated. Diet- low NA, ADA 2000 Jelani diet. Follow up with PCP in one week        Consults:     No new Assessment & Plan notes have been filed under this hospital service since the last note was generated.  Service: Hospital Medicine    Final Active Diagnoses:    Diagnosis Date Noted POA    Essential hypertension [I10] 05/20/2019 Yes     Chronic    Anemia of chronic disease [D63.8] 05/20/2019 Yes     Chronic    History of CVA (cerebrovascular accident) [Z86.73] 05/20/2019 Not Applicable     Chronic    Noncompliance with medication regimen [Z91.14] 05/20/2019 Not Applicable     Chronic    CKD stage 3 [N18.3] 02/10/2017 Yes     Chronic    Type 2 diabetes mellitus, controlled, with renal complications [E11.29] 02/09/2017 Yes     Chronic    History of intracranial hemorrhage [Z86.79] 01/14/2016 Not Applicable     Chronic      Problems Resolved During this Admission:    Diagnosis Date Noted Date Resolved POA    PRINCIPAL PROBLEM:  Hypertensive emergency [I16.1]  05/23/2019 Yes    Symptomatic anemia [D64.9] 05/20/2019 05/20/2019 Yes    Acute on CKD stage 3 [N17.9, N18.3] 05/20/2019 05/23/2019 Yes    Elevated troponin [R74.8] 02/09/2017 05/23/2019 Yes       Discharged Condition: good    Disposition: Home or Self Care    Follow Up:  Follow-up Information     [facility]. Schedule an appointment as soon as possible for a visit in 1 week.    Why:  for Hospital Follow up  Contact information:  [address] [place] [address]  [phone]             Primary Doctor No In 1 week.               Patient Instructions:   No discharge procedures on file.    Significant Diagnostic  Studies:     Pending Diagnostic Studies:     None         Medications:  Reconciled Home Medications:      Medication List      START taking these medications    glipiZIDE 2.5 MG Tr24  Commonly known as:  GLUCOTROL  Take 1 tablet (2.5 mg total) by mouth daily with breakfast.        CHANGE how you take these medications    hydrALAZINE 25 MG tablet  Commonly known as:  APRESOLINE  Take 4 tablets (100 mg total) by mouth 3 (three) times daily.  What changed:  how much to take        CONTINUE taking these medications    amLODIPine 10 MG tablet  Commonly known as:  NORVASC  Take 1 tablet (10 mg total) by mouth once daily.     aspirin 81 MG EC tablet  Commonly known as:  ECOTRIN  Take 1 tablet (81 mg total) by mouth once daily.     carvedilol 6.25 MG tablet  Commonly known as:  COREG  Take 6.25 mg by mouth 2 (two) times daily with meals.     furosemide 20 MG tablet  Commonly known as:  LASIX  Take 20 mg by mouth once.        STOP taking these medications    metFORMIN 500 MG tablet  Commonly known as:  GLUCOPHAGE            Indwelling Lines/Drains at time of discharge:   Lines/Drains/Airways          None          Time spent on the discharge of patient:  < 30  minutes  Patient was seen and examined on the date of discharge and determined to be suitable for discharge.         Tha Kendall MD  Department of Hospital Medicine  [facility]

## 2019-05-23 NOTE — PLAN OF CARE
Problem: Diabetes Comorbidity  Goal: Blood Glucose Level Within Desired Range    Intervention: Maintain Glycemic Control     05/23/19 0508   Monitor and Manage Ketoacidosis   Glycemic Management blood glucose monitoring         Problem: Hypertension Acute  Goal: Blood Pressure Within Desired Range    Intervention: Normalize Blood Pressure     05/23/19 0508   Manage Acute Allergic Reaction   Medication Review/Management medications reviewed   Prevent or Manage Pain   Sensory Stimulation Regulation care clustered;lighting decreased;music/television provided for relaxation;quiet environment promoted

## 2019-05-23 NOTE — PLAN OF CARE
05/23/19 1021   Final Note   Assessment Type Final Discharge Note   Anticipated Discharge Disposition Home   What phone number can be called within the next 1-3 days to see how you are doing after discharge?   ([phone])   Hospital Follow Up  Appt(s) scheduled? No  (Patient will self schedule at [facility])   Discharge plans and expectations educations in teach back method with documentation complete? Yes   Right Care Referral Info   Post Acute Recommendation No Care

## 2019-05-23 NOTE — NURSING
Bedside rounding report given to lynda oleary lpn on patients progress and updated handoff report sheet given to her no acute events blood pressure stable for patient . No changes on telemetry . Call light in reach . Rails up x 3.

## 2019-05-23 NOTE — NURSING
0730- bedside rounding report received from lynda oleary lpn on patients progress and updated handoff report sheet received too. Assessment completed and board updated and reviewed with patient . Denies any problems . Call light and urinal in reach . Rails up x 3.       0833- ate all of his breakfast meal . No acute changes. Vitals within patients ranges.

## 2019-05-23 NOTE — SUBJECTIVE & OBJECTIVE
Interval History: No new issues. Would like to go home     Review of Systems   Constitutional: Negative for activity change.   HENT: Negative for congestion.    Respiratory: Negative for chest tightness and shortness of breath.    Cardiovascular: Negative for chest pain.   Gastrointestinal: Negative for abdominal pain.   Genitourinary: Negative for difficulty urinating.   Neurological: Negative for dizziness.     Objective:     Vital Signs (Most Recent):  Temp: 98.6 °F (37 °C) (05/23/19 0738)  Pulse: 80 (05/23/19 0738)  Resp: 16 (05/23/19 0738)  BP: (!) 163/93 (05/23/19 0738)  SpO2: 96 % (05/23/19 0738) Vital Signs (24h Range):  Temp:  [98.3 °F (36.8 °C)-99.2 °F (37.3 °C)] 98.6 °F (37 °C)  Pulse:  [80-93] 80  Resp:  [16-18] 16  SpO2:  [96 %-99 %] 96 %  BP: (140-170)/() 163/93     Weight: 87.5 kg (192 lb 14.4 oz)  Body mass index is 30.21 kg/m².    Intake/Output Summary (Last 24 hours) at 5/23/2019 0922  Last data filed at 5/22/2019 1700  Gross per 24 hour   Intake 1320 ml   Output --   Net 1320 ml      Physical Exam   Constitutional: He is oriented to person, place, and time. He appears well-developed and well-nourished. No distress.   HENT:   Head: Normocephalic and atraumatic.   Neurological: He is alert and oriented to person, place, and time.   Skin: Skin is warm and dry.   Psychiatric: He has a normal mood and affect. His behavior is normal.   Nursing note and vitals reviewed.      Significant Labs:   BMP:   Recent Labs   Lab 05/23/19  0650         K 3.6      CO2 26   BUN 28*   CREATININE 2.4*   CALCIUM 9.7     CBC: No results for input(s): WBC, HGB, HCT, PLT in the last 48 hours.    Significant Imaging:

## 2019-05-23 NOTE — PROGRESS NOTES
[facility] Hospital Medicine  Progress Note    Patient Name: Mahesh Cespedes  MRN: [id number]  Patient Class: IP- Inpatient   Admission Date: 5/19/2019  Length of Stay: 3 days  Attending Physician: Tha Choi MD  Primary Care Provider: Primary Doctor No        Subjective:     Principal Problem:Hypertensive emergency    HPI:  Mr. Mahesh Cespedes is a 54 y.o. male with essential hypertension, type 2 diabetes mellitus (HbA1c 6.5% Jan 2016), CKD stage 3, anemia of chronic disease, noncompliance with medication regimen, history of intracranial hemorrhage, and history of CVA who presents to [facility] ED with complaints of hypertension today.  Thirty minutes prior to arrival to the ED he noted some epistaxis which had resolved by the time he arrived here.  His family measured his blood pressure at home and found that it was 250/150 and prompted him to seek ED evaluation.  He has not been taking his medications for the past two days but reports that he has only intermittently compliant with his antihypertensive regimen due to side effects.  He is a  and medications make him sleepy.  He denies any headaches, nausea, vomiting, fevers, chills, chest pain, shortness of breath, palpitations, diaphoresis, hemoptysis, nor any lower extremity pain or swelling. He had otherwise been in his usual state of health.    Hospital Course:  Mr. Mahesh Cespedes is a 54 y.o. male with essential hypertension, type 2 diabetes mellitus (HbA1c 6.5% Jan 2016), CKD stage 3, anemia of chronic disease, noncompliance with medication regimen, history of intracranial hemorrhage, and history of CVA who presents to [facility] ED with complaints of hypertension today.  Thirty minutes prior to arrival to the ED he noted some epistaxis which had resolved by the time he arrived here.  His family measured his blood pressure at home and found that it was 250/150 and prompted him to seek ED evaluation.  He has not been taking his  medications for the past two days. Patient was started on BP meds. With improvement.  The patient clinically improved. The patient's Metformin was stopped due to CKD III and started on Glipizide. His BP was well controlled and the patient will be discharged to home today. Activity as tolerated. Diet- low NA, ADA 2000 Jelani diet. Follow up with PCP in one week       Interval History: No new issues. Would like to go home     Review of Systems   Constitutional: Negative for activity change.   HENT: Negative for congestion.    Respiratory: Negative for chest tightness and shortness of breath.    Cardiovascular: Negative for chest pain.   Gastrointestinal: Negative for abdominal pain.   Genitourinary: Negative for difficulty urinating.   Neurological: Negative for dizziness.     Objective:     Vital Signs (Most Recent):  Temp: 98.6 °F (37 °C) (05/23/19 0738)  Pulse: 80 (05/23/19 0738)  Resp: 16 (05/23/19 0738)  BP: (!) 163/93 (05/23/19 0738)  SpO2: 96 % (05/23/19 0738) Vital Signs (24h Range):  Temp:  [98.3 °F (36.8 °C)-99.2 °F (37.3 °C)] 98.6 °F (37 °C)  Pulse:  [80-93] 80  Resp:  [16-18] 16  SpO2:  [96 %-99 %] 96 %  BP: (140-170)/() 163/93     Weight: 87.5 kg (192 lb 14.4 oz)  Body mass index is 30.21 kg/m².    Intake/Output Summary (Last 24 hours) at 5/23/2019 0922  Last data filed at 5/22/2019 1700  Gross per 24 hour   Intake 1320 ml   Output --   Net 1320 ml      Physical Exam   Constitutional: He is oriented to person, place, and time. He appears well-developed and well-nourished. No distress.   HENT:   Head: Normocephalic and atraumatic.   Neurological: He is alert and oriented to person, place, and time.   Skin: Skin is warm and dry.   Psychiatric: He has a normal mood and affect. His behavior is normal.   Nursing note and vitals reviewed.      Significant Labs:   BMP:   Recent Labs   Lab 05/23/19  0650         K 3.6      CO2 26   BUN 28*   CREATININE 2.4*   CALCIUM 9.7     CBC: No results  for input(s): WBC, HGB, HCT, PLT in the last 48 hours.    Significant Imaging:     Assessment/Plan:      * Hypertensive emergency  Patient was noted to have a blood pressure of 250/150 at home.  He has been noncompliant with his medication regimen.  His presenting blood pressure was 251/155 ().  He does have evidence of end-organ dysfunction including worsening renal failure and elevated troponin.  He was restarted on his home antihypertensive regimen with good response.  We will target a MAP of 140 initially.  Will recheck his renal function and troponin level.  He has been counseled on medication adherence.      Increasing hydralazine to 50 TID today. May require 75 tid on discharge.     Now increasing to 75 tid.  Home likely in am.     Resolved. Will d/c to home          Noncompliance with medication regimen  Patient has been counseled and encouraged to adhere to his medication regimen.    History of CVA (cerebrovascular accident)  Stable; will continue his home regimen of aspirin.    Anemia of chronic disease  The patient's H/H is stable and consistent with previous laboratory measurements, and the patient exhibits no signs or symptoms of acute bleeding; there is no indication for transfusion.  Will continue to monitor.    Essential hypertension  As addressed above.    Acute on CKD stage 3  There is a two year period during which we do not have lab work to which to compare his renal function.  Creatinine today is 2.7 compared to 1.5 two years ago.  It is not totally clear whether his worsening renal function is acute or chronic.  Patient's urinalysis is significant for a specific gravity of 1.010, 2+ protein, and 3+ glucose.  Urine output has been fair.  Will obtain additional urine studies; provide aggressive IV fluid hydration; monitor the urine output; recheck the renal function in the morning; and avoid nephrotoxins.    May be patients new baseline     CKD stage 3  As addressed above.    Not improved  with fluids.     Elevated troponin  As addressed above.    Type 2 diabetes mellitus, controlled, with renal complications  Well controlled on home regimen of metformin; will provide basal prandial insulin therapy along with insulin sliding scale.  The patient's CKD stage 3, the patient should not be on metformin.    D/C metformin. Started on Glipizide.   A1c was 6.7.  Will send home on Glipizide 5       History of intracranial hemorrhage  Stable; there are no acute issues.      VTE Risk Mitigation (From admission, onward)        Ordered     heparin (porcine) injection 5,000 Units  Every 12 hours      05/20/19 0429     IP VTE HIGH RISK PATIENT  Once      05/20/19 0429        Will d/c to home         Tha Kendall MD  Department of Hospital Medicine   [facility]

## 2019-05-23 NOTE — PROGRESS NOTES
[facility] CASE MANAGEMENT                  WRITTEN DISCHARGE INFORMATION      APPOINTMENTS AND RESOURCES TO HELP YOU MANAGE YOUR CARE AT HOME BASED ON YOUR PREFERENCES:  (If an appointment is not scheduled for you when you leave the hospital, call your doctor to schedule a follow up visit within a week)    Follow-up Information     [facility]. Schedule an appointment as soon as possible for a visit in 1 week.    Why:  for Hospital Follow up  Contact information:  [address]  [place] [address]  [phone]                 Healthy Living Instructions to HELP MANAGE YOUR CARE AT HOME:  Things You are responsible for:  1.    Getting your prescriptions filled   2.    Taking your medications as directed, DO NOT MISS ANY DOSES!  3.    Following the diet and exercise recommended by your doctor  4.    Going to your follow-up doctor appointment. This is important because it allows the doctor to monitor your progress and determine if any changes need to made to your treatment plan.  5. If you have any questions about MANAGING YOUR CARE AT HOME Call the Nurse Care Line for 24/7 Assistance [phone]       Please answer any calls you may receive from [facility]. We want to continue to support you as you manage your healthcare needs. [facility] is happy to have the opportunity to serve you.      Thank you for choosing [facility] for your healthcare needs!  Your [facility] Case Management Team,

## 2019-05-23 NOTE — NURSING
"1202- patients daughter rebeca vee at bedside I reviewed avs booklet with her as well. And I reviewed both prescriptions hydrALAZINE 25 mg three times a day and glucotrol 2.5 mg daily with breakfast meal . Explained purpose of each one . Instructed the daughter the patient needs to cut back on his salt intake at home and take his blood pressures at home daily at least once or twice a day or discuss with whoever is going to follow his care at [facility] . I asked the patient and daughter if the patient has a blood pressure cuff the check blood pressure at home ? Both stated ,"yes." at the same time. I also provided a list of each medication the patient is taking for his bloodpressure . The patient speaks creole Frisian so I was not certain how much english he understands. I asked what pharmacy the patient uses here. Daughter states,[facility] on [address] . All discharge instructions are now completed paperwork placed in large blue folder. Vitals stable and within patients ranges . No acute events. Patient discharged.   "

## 2019-05-23 NOTE — ASSESSMENT & PLAN NOTE
Patient was noted to have a blood pressure of 250/150 at home.  He has been noncompliant with his medication regimen.  His presenting blood pressure was 251/155 ().  He does have evidence of end-organ dysfunction including worsening renal failure and elevated troponin.  He was restarted on his home antihypertensive regimen with good response.  We will target a MAP of 140 initially.  Will recheck his renal function and troponin level.  He has been counseled on medication adherence.      Increasing hydralazine to 50 TID today. May require 75 tid on discharge.     Now increasing to 75 tid.  Home likely in am.     Resolved. Will d/c to home

## 2019-05-27 ENCOUNTER — PATIENT OUTREACH (OUTPATIENT)
Dept: ADMINISTRATIVE | Facility: CLINIC | Age: 54
End: 2019-05-27

## 2019-05-27 DIAGNOSIS — E11.22 CONTROLLED TYPE 2 DIABETES MELLITUS WITH CHRONIC KIDNEY DISEASE, WITHOUT LONG-TERM CURRENT USE OF INSULIN, UNSPECIFIED CKD STAGE: Chronic | ICD-10-CM

## 2019-05-27 DIAGNOSIS — N18.30 CKD STAGE 3 DUE TO TYPE 2 DIABETES MELLITUS: ICD-10-CM

## 2019-05-27 DIAGNOSIS — E11.22 CKD STAGE 3 DUE TO TYPE 2 DIABETES MELLITUS: ICD-10-CM

## 2019-05-27 DIAGNOSIS — I10 ESSENTIAL HYPERTENSION: Primary | Chronic | ICD-10-CM

## 2019-05-27 NOTE — PATIENT INSTRUCTIONS
Discharge Instructions for High Blood Pressure (Hypertension)    You have been diagnosed with high blood pressure (also called hypertension). This means the force of blood against your artery walls is too strong. It also means your heart is working hard to move blood. High blood pressure usually has no symptoms, but over time, it can damage your heart, blood vessels, eyes, kidneys, and other organs. With help from your doctor, you can manage your blood pressure and protect your health.    Taking Medications    Learn to take your own blood pressure. Keep a record of your results. Ask your doctor which readings mean that you need medical attention.  Take your blood pressure medication exactly as directed. Dont skip doses. Missing doses can cause your blood pressure to get out of control.  Avoid medications that contain heart stimulants, including over-the-counter drugs. Check for warnings about high blood pressure on the label.  Check with your doctor before taking a decongestant. Some decongestants can worsen high blood pressure.    Lifestyle Changes    Maintain a healthy weight. Get help to lose any extra pounds.  Cut back on salt.  Limit canned, dried, packaged, and fast foods.  Dont add salt to your food at the table.  Season foods with herbs instead of salt when you cook.  Follow the DASH (Dietary Approaches to Stop Hypertension) eating plan. This plan recommends vegetables, fruits, whole gains, and other heart healthy foods.  Begin an exercise program. Ask your doctor how to get started. You can benefit from simple activities like walking or gardening.  Break the smoking habit. Enroll in a stop-smoking program to improve your chances of success. Ask your health care provider about programs and medications to help you stop smoking.  Limit drinks that contain caffeine (coffee, black or green tea, cola) to 2 per day.  Never take stimulants such as amphetamines or cocaine; these drugs can be deadly for someone  with high blood pressure.  Control your stress. Learn stress-management techniques.  Limit alcohol to no more than 1 drink a day for women and 2 drinks a day for men.    Follow-Up  Make a follow-up appointment as directed by our staff.    When to Call Your Doctor  Call your doctor immediately if you have any of the following:  Chest pain or shortness of breath (call 911)  Moderate to severe headache  Weakness in the muscles of your face, arms, or legs  Trouble speaking  Extreme drowsiness  Confusion  Fainting or dizziness  Pulsating or rushing sound in your ears  Unexplained nosebleed  Weakness, tingling, or numbness of your face, arms, or legs  Change in vision  Blood pressure measured at home that is greater than 180/110    © 7082-0442 [facility], [address] [place] [address], [place] [address]. All rights reserved. This information is not intended as a substitute for professional medical care. Always follow your healthcare professional's instructions.

## 2019-05-29 ENCOUNTER — TELEPHONE (OUTPATIENT)
Dept: PHARMACY | Facility: CLINIC | Age: 54
End: 2019-05-29

## 2019-05-31 ENCOUNTER — SSC ENCOUNTER (OUTPATIENT)
Dept: ADMINISTRATIVE | Facility: OTHER | Age: 54
End: 2019-05-31

## 2019-05-31 NOTE — PROGRESS NOTES
Please note the following patient's information was forwarded to the Medicaid ([place] Medicaid,  [employer], [employer], [facility], [facility] Community [facility]) Case Management office on 05/31/2019.     Please see the media section for scanned image of documents sent to Medicaid.    Please contact Outpatient Complex Care Management at ext [phone] with any questions.    Thank you,    Filomena Mayen, [facility] [place] [place]  Outpatient Case Mgmnt  [phone]

## 2019-08-13 ENCOUNTER — HOSPITAL ENCOUNTER (EMERGENCY)
Facility: HOSPITAL | Age: 54
Discharge: HOME | End: 2019-08-13
Attending: EMERGENCY MEDICINE
Payer: MEDICAID

## 2019-08-13 VITALS
HEIGHT: 67 IN | TEMPERATURE: 98 F | BODY MASS INDEX: 29.82 KG/M2 | HEART RATE: 67 BPM | OXYGEN SATURATION: 99 % | WEIGHT: 190 LBS | DIASTOLIC BLOOD PRESSURE: 75 MMHG | RESPIRATION RATE: 16 BRPM | SYSTOLIC BLOOD PRESSURE: 140 MMHG

## 2019-08-13 DIAGNOSIS — I10 HYPERTENSION, UNSPECIFIED TYPE: ICD-10-CM

## 2019-08-13 DIAGNOSIS — R04.0 EPISTAXIS: Primary | ICD-10-CM

## 2019-08-13 PROCEDURE — 99284 EMERGENCY DEPT VISIT MOD MDM: CPT

## 2019-08-13 PROCEDURE — 25000003 PHARM REV CODE 250: Performed by: EMERGENCY MEDICINE

## 2019-08-13 RX ORDER — CARVEDILOL 6.25 MG/1
6.25 TABLET ORAL 2 TIMES DAILY WITH MEALS
Qty: 60 TABLET | Refills: 0 | Status: SHIPPED | OUTPATIENT
Start: 2019-08-13 | End: 2021-12-29

## 2019-08-13 RX ORDER — CARVEDILOL 6.25 MG/1
6.25 TABLET ORAL 2 TIMES DAILY WITH MEALS
Qty: 60 TABLET | Refills: 0 | Status: SHIPPED | OUTPATIENT
Start: 2019-08-13 | End: 2019-08-13 | Stop reason: SDUPTHER

## 2019-08-13 RX ORDER — CARVEDILOL 6.25 MG/1
6.25 TABLET ORAL 2 TIMES DAILY
Status: DISCONTINUED | OUTPATIENT
Start: 2019-08-13 | End: 2019-08-13 | Stop reason: HOSPADM

## 2019-08-13 RX ORDER — HYDRALAZINE HYDROCHLORIDE 100 MG/1
100 TABLET, FILM COATED ORAL 3 TIMES DAILY
Qty: 30 TABLET | Refills: 0 | Status: ON HOLD | OUTPATIENT
Start: 2019-08-13 | End: 2024-02-22 | Stop reason: HOSPADM

## 2019-08-13 RX ORDER — AMLODIPINE BESYLATE 10 MG/1
10 TABLET ORAL DAILY
Qty: 30 TABLET | Refills: 0 | Status: SHIPPED | OUTPATIENT
Start: 2019-08-13 | End: 2021-12-29

## 2019-08-13 RX ORDER — AMLODIPINE BESYLATE 5 MG/1
10 TABLET ORAL
Status: COMPLETED | OUTPATIENT
Start: 2019-08-13 | End: 2019-08-13

## 2019-08-13 RX ORDER — HYDRALAZINE HYDROCHLORIDE 25 MG/1
100 TABLET, FILM COATED ORAL
Status: COMPLETED | OUTPATIENT
Start: 2019-08-13 | End: 2019-08-13

## 2019-08-13 RX ADMIN — CARVEDILOL 6.25 MG: 6.25 TABLET, FILM COATED ORAL at 07:08

## 2019-08-13 RX ADMIN — HYDRALAZINE HYDROCHLORIDE 100 MG: 25 TABLET ORAL at 06:08

## 2019-08-13 RX ADMIN — AMLODIPINE BESYLATE 10 MG: 5 TABLET ORAL at 06:08
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
## 2021-11-22 ENCOUNTER — HOSPITAL ENCOUNTER (OUTPATIENT)
Dept: RADIOLOGY | Facility: HOSPITAL | Age: 56
Discharge: HOME | End: 2021-11-22
Attending: NURSE PRACTITIONER
Payer: MEDICAID

## 2021-11-22 DIAGNOSIS — R76.11 POSITIVE PPD: ICD-10-CM

## 2021-11-22 PROCEDURE — 71046 XR CHEST PA AND LATERAL: ICD-10-PCS | Mod: 26,,, | Performed by: RADIOLOGY

## 2021-11-22 PROCEDURE — 71046 X-RAY EXAM CHEST 2 VIEWS: CPT | Mod: 26,,, | Performed by: RADIOLOGY

## 2021-11-22 PROCEDURE — 71046 X-RAY EXAM CHEST 2 VIEWS: CPT | Mod: TC,FY

## 2022-01-10 ENCOUNTER — TELEPHONE (OUTPATIENT)
Dept: TRANSPLANT | Facility: CLINIC | Age: 57
End: 2022-01-10
Payer: MEDICAID

## 2022-01-12 DIAGNOSIS — Z76.82 ORGAN TRANSPLANT CANDIDATE: Primary | ICD-10-CM

## 2022-02-10 ENCOUNTER — TELEPHONE (OUTPATIENT)
Dept: TRANSPLANT | Facility: CLINIC | Age: 57
End: 2022-02-10
Payer: MEDICAID

## 2022-02-21 ENCOUNTER — TELEPHONE (OUTPATIENT)
Dept: TRANSPLANT | Facility: CLINIC | Age: 57
End: 2022-02-21
Payer: MEDICAID

## 2022-02-21 NOTE — TELEPHONE ENCOUNTER
Attempted to reach patient's daughter. No answer, left voicemail for 'Mahesh'. Spoke with patient's daughter on alternate number, she stated he does not need a , and that English is his first language. She states he did not understand because he was at dialysis. Explained to patient's daughter that the appointment will last an entire day, that a caregiver will be required to attend with him, and the appointments are on Tues/Wed/Thurs.    ----- Message from Yolis Zhong sent at 2/21/2022  3:40 PM CST -----  Regarding: Return call  Contact: Rima  Patient's daughter (Rima) returning call. Daughter states that patient didn't understand what was told to him.    Call: [phone] (Mobile)

## 2022-03-10 NOTE — PROGRESS NOTES
Spoke to patient to complete his history before his upcoming RR appointment his daughter will come with him to his appointment he is aware that he have to bring a small breakfast/snack to eat after blood is drawn he will not need a

## 2022-03-14 NOTE — PROGRESS NOTES
Spoke to patient daughter to complete his history due to the fact that the patient need a  his daughter and son will come with him to his appointment he is aware that he have to bring a small breakfast/snack to eat after blood is drawn.

## 2022-03-15 ENCOUNTER — HOSPITAL ENCOUNTER (OUTPATIENT)
Dept: RADIOLOGY | Facility: HOSPITAL | Age: 57
Discharge: HOME | End: 2022-03-15
Attending: NURSE PRACTITIONER
Payer: MEDICAID

## 2022-03-15 ENCOUNTER — TELEPHONE (OUTPATIENT)
Dept: TRANSPLANT | Facility: CLINIC | Age: 57
End: 2022-03-15
Payer: MEDICAID

## 2022-03-15 ENCOUNTER — OFFICE VISIT (OUTPATIENT)
Dept: TRANSPLANT | Facility: CLINIC | Age: 57
End: 2022-03-15
Payer: MEDICAID

## 2022-03-15 ENCOUNTER — HOSPITAL ENCOUNTER (OUTPATIENT)
Dept: RADIOLOGY | Facility: HOSPITAL | Age: 57
Discharge: HOME | End: 2022-03-15
Attending: NURSE PRACTITIONER
Payer: MEDICARE

## 2022-03-15 VITALS
OXYGEN SATURATION: 99 % | HEART RATE: 97 BPM | HEIGHT: 68 IN | BODY MASS INDEX: 26.56 KG/M2 | RESPIRATION RATE: 16 BRPM | TEMPERATURE: 98 F | DIASTOLIC BLOOD PRESSURE: 86 MMHG | SYSTOLIC BLOOD PRESSURE: 145 MMHG | WEIGHT: 175.25 LBS

## 2022-03-15 DIAGNOSIS — Z76.82 ORGAN TRANSPLANT CANDIDATE: ICD-10-CM

## 2022-03-15 DIAGNOSIS — N18.6 CONTROLLED TYPE 2 DIABETES MELLITUS WITH CHRONIC KIDNEY DISEASE ON CHRONIC DIALYSIS, WITHOUT LONG-TERM CURRENT USE OF INSULIN: ICD-10-CM

## 2022-03-15 DIAGNOSIS — Z99.2 ESRD ON DIALYSIS: ICD-10-CM

## 2022-03-15 DIAGNOSIS — Z86.79 HISTORY OF INTRACRANIAL HEMORRHAGE: Chronic | ICD-10-CM

## 2022-03-15 DIAGNOSIS — E11.22 CONTROLLED TYPE 2 DIABETES MELLITUS WITH CHRONIC KIDNEY DISEASE ON CHRONIC DIALYSIS, WITHOUT LONG-TERM CURRENT USE OF INSULIN: ICD-10-CM

## 2022-03-15 DIAGNOSIS — Z01.818 PRE-TRANSPLANT EVALUATION FOR KIDNEY TRANSPLANT: Primary | ICD-10-CM

## 2022-03-15 DIAGNOSIS — Z86.73 HISTORY OF CVA (CEREBROVASCULAR ACCIDENT): Chronic | ICD-10-CM

## 2022-03-15 DIAGNOSIS — N18.6 ESRD ON DIALYSIS: ICD-10-CM

## 2022-03-15 DIAGNOSIS — I10 ESSENTIAL HYPERTENSION: Chronic | ICD-10-CM

## 2022-03-15 DIAGNOSIS — D63.8 ANEMIA OF CHRONIC DISEASE: Chronic | ICD-10-CM

## 2022-03-15 DIAGNOSIS — Z99.2 CONTROLLED TYPE 2 DIABETES MELLITUS WITH CHRONIC KIDNEY DISEASE ON CHRONIC DIALYSIS, WITHOUT LONG-TERM CURRENT USE OF INSULIN: ICD-10-CM

## 2022-03-15 PROCEDURE — 71046 X-RAY EXAM CHEST 2 VIEWS: CPT | Mod: 26,TXP,, | Performed by: RADIOLOGY

## 2022-03-15 PROCEDURE — 3008F PR BODY MASS INDEX (BMI) DOCUMENTED: ICD-10-PCS | Mod: CPTII,TXP,, | Performed by: NURSE PRACTITIONER

## 2022-03-15 PROCEDURE — 1160F RVW MEDS BY RX/DR IN RCRD: CPT | Mod: CPTII,TXP,, | Performed by: NURSE PRACTITIONER

## 2022-03-15 PROCEDURE — 72170 X-RAY EXAM OF PELVIS: CPT | Mod: TC,TXP

## 2022-03-15 PROCEDURE — 3079F DIAST BP 80-89 MM HG: CPT | Mod: CPTII,TXP,, | Performed by: NURSE PRACTITIONER

## 2022-03-15 PROCEDURE — 3079F PR MOST RECENT DIASTOLIC BLOOD PRESSURE 80-89 MM HG: ICD-10-PCS | Mod: CPTII,TXP,, | Performed by: NURSE PRACTITIONER

## 2022-03-15 PROCEDURE — 71046 X-RAY EXAM CHEST 2 VIEWS: CPT | Mod: TC,TXP

## 2022-03-15 PROCEDURE — 99244 PR OFFICE CONSULTATION,LEVEL IV: ICD-10-PCS | Mod: S$PBB,TXP,, | Performed by: SURGERY

## 2022-03-15 PROCEDURE — 99999 PR PBB SHADOW E&M-EST. PATIENT-LVL IV: CPT | Mod: PBBFAC,TXP,, | Performed by: NURSE PRACTITIONER

## 2022-03-15 PROCEDURE — 3044F PR MOST RECENT HEMOGLOBIN A1C LEVEL <7.0%: ICD-10-PCS | Mod: CPTII,TXP,, | Performed by: NURSE PRACTITIONER

## 2022-03-15 PROCEDURE — 1159F PR MEDICATION LIST DOCUMENTED IN MEDICAL RECORD: ICD-10-PCS | Mod: CPTII,TXP,, | Performed by: NURSE PRACTITIONER

## 2022-03-15 PROCEDURE — 76770 US EXAM ABDO BACK WALL COMP: CPT | Mod: TC,TXP

## 2022-03-15 PROCEDURE — 72170 X-RAY EXAM OF PELVIS: CPT | Mod: 26,TXP,, | Performed by: RADIOLOGY

## 2022-03-15 PROCEDURE — 93978 US DOPP ILIACS BILATERAL: ICD-10-PCS | Mod: 26,TXP,, | Performed by: RADIOLOGY

## 2022-03-15 PROCEDURE — 99214 OFFICE O/P EST MOD 30 MIN: CPT | Mod: PBBFAC,25,TXP | Performed by: NURSE PRACTITIONER

## 2022-03-15 PROCEDURE — 3077F SYST BP >= 140 MM HG: CPT | Mod: CPTII,TXP,, | Performed by: NURSE PRACTITIONER

## 2022-03-15 PROCEDURE — 3044F HG A1C LEVEL LT 7.0%: CPT | Mod: CPTII,TXP,, | Performed by: NURSE PRACTITIONER

## 2022-03-15 PROCEDURE — 72170 XR PELVIS ROUTINE AP: ICD-10-PCS | Mod: 26,TXP,, | Performed by: RADIOLOGY

## 2022-03-15 PROCEDURE — 99205 PR OFFICE/OUTPT VISIT, NEW, LEVL V, 60-74 MIN: ICD-10-PCS | Mod: S$PBB,TXP,, | Performed by: NURSE PRACTITIONER

## 2022-03-15 PROCEDURE — 99205 OFFICE O/P NEW HI 60 MIN: CPT | Mod: S$PBB,TXP,, | Performed by: NURSE PRACTITIONER

## 2022-03-15 PROCEDURE — 1159F MED LIST DOCD IN RCRD: CPT | Mod: CPTII,TXP,, | Performed by: NURSE PRACTITIONER

## 2022-03-15 PROCEDURE — 1160F PR REVIEW ALL MEDS BY PRESCRIBER/CLIN PHARMACIST DOCUMENTED: ICD-10-PCS | Mod: CPTII,TXP,, | Performed by: NURSE PRACTITIONER

## 2022-03-15 PROCEDURE — 93978 VASCULAR STUDY: CPT | Mod: 26,TXP,, | Performed by: RADIOLOGY

## 2022-03-15 PROCEDURE — 76770 US RETROPERITONEAL COMPLETE: ICD-10-PCS | Mod: 26,TXP,, | Performed by: RADIOLOGY

## 2022-03-15 PROCEDURE — 3077F PR MOST RECENT SYSTOLIC BLOOD PRESSURE >= 140 MM HG: ICD-10-PCS | Mod: CPTII,TXP,, | Performed by: NURSE PRACTITIONER

## 2022-03-15 PROCEDURE — 3008F BODY MASS INDEX DOCD: CPT | Mod: CPTII,TXP,, | Performed by: NURSE PRACTITIONER

## 2022-03-15 PROCEDURE — 93978 VASCULAR STUDY: CPT | Mod: TC,TXP

## 2022-03-15 PROCEDURE — 76770 US EXAM ABDO BACK WALL COMP: CPT | Mod: 26,TXP,, | Performed by: RADIOLOGY

## 2022-03-15 PROCEDURE — 99244 OFF/OP CNSLTJ NEW/EST MOD 40: CPT | Mod: S$PBB,TXP,, | Performed by: SURGERY

## 2022-03-15 PROCEDURE — 71046 XR CHEST PA AND LATERAL: ICD-10-PCS | Mod: 26,TXP,, | Performed by: RADIOLOGY

## 2022-03-15 PROCEDURE — 99999 PR PBB SHADOW E&M-EST. PATIENT-LVL IV: ICD-10-PCS | Mod: PBBFAC,TXP,, | Performed by: NURSE PRACTITIONER

## 2022-03-15 RX ORDER — FUROSEMIDE 20 MG/1
20 TABLET ORAL 2 TIMES DAILY
Status: ON HOLD | COMMUNITY
Start: 2022-03-14 | End: 2024-02-22 | Stop reason: HOSPADM

## 2022-03-15 RX ORDER — AMANTADINE HYDROCHLORIDE 100 MG/1
1 CAPSULE, GELATIN COATED ORAL EVERY OTHER DAY
COMMUNITY
Start: 2022-03-09

## 2022-03-15 RX ORDER — PANTOPRAZOLE SODIUM 40 MG/1
40 TABLET, DELAYED RELEASE ORAL DAILY
COMMUNITY
Start: 2022-03-09

## 2022-03-15 RX ORDER — METOPROLOL SUCCINATE 100 MG/1
100 TABLET, EXTENDED RELEASE ORAL DAILY
Status: ON HOLD | COMMUNITY
Start: 2022-01-13 | End: 2024-02-22

## 2022-03-15 RX ORDER — FERROUS SULFATE 325(65) MG
325 TABLET ORAL DAILY
Status: ON HOLD | COMMUNITY
Start: 2022-03-09 | End: 2024-02-22 | Stop reason: HOSPADM

## 2022-03-15 RX ORDER — ALLOPURINOL 100 MG/1
100 TABLET ORAL DAILY
COMMUNITY
Start: 2022-03-09

## 2022-03-15 RX ORDER — ASCORBIC ACID, THIAMINE MONONITRATE,RIBOFLAVIN, NIACINAMIDE, PYRIDOXINE HYDROCHLORIDE, FOLIC ACID, CYANOCOBALAMIN, BIOTIN, CALCIUM PANTOTHENATE, 100; 1.5; 1.7; 20; 10; 1; 6000; 150000; 5 MG/1; MG/1; MG/1; MG/1; MG/1; MG/1; UG/1; UG/1; MG/1
1 CAPSULE, LIQUID FILLED ORAL DAILY
COMMUNITY
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
## 2022-03-15 NOTE — PROGRESS NOTES
PHARM.D. PRE-TRANSPLANT NOTE:    This patient's medication therapy was evaluated as part of his pre-transplant evaluation.      The following general pharmacologic concerns were noted: none    The following concerns for post-operative pain management were noted: none    The following pharmacologic concerns related to HCV therapy were noted: none      This patient's medication profile was reviewed for considerations for DAA Hepatitis C therapy:    [x]  No current inducers of CYP 3A4 or PGP  [x]  No amiodarone on this patient's EMR profile in the last 24 months  [x]  No past or current atrial fibrillation on this patient's EMR profile       Current Outpatient Medications   Medication Sig Dispense Refill    allopurinoL (ZYLOPRIM) 100 MG tablet Take 100 mg by mouth once daily.      amantadine HCL (SYMMETREL) 100 mg capsule Take 1 capsule by mouth every other day.      ferrous sulfate (FEOSOL) 325 mg (65 mg iron) Tab tablet Take 325 mg by mouth once daily at 6am.      furosemide (LASIX) 20 MG tablet Take 20 mg by mouth 2 (two) times daily.      hydrALAZINE (APRESOLINE) 100 MG tablet Take 1 tablet (100 mg total) by mouth 3 (three) times daily. 30 tablet 0    metoprolol succinate (TOPROL-XL) 100 MG 24 hr tablet Take 100 mg by mouth once daily.      NIFEdipine (PROCARDIA-XL) 60 MG (OSM) 24 hr tablet Take 1 tablet (60 mg total) by mouth once daily. 30 tablet 11    pantoprazole (PROTONIX) 40 MG tablet Take 40 mg by mouth once daily.      vitamin renal formula, B-complex-vitamin c-folic acid, (RENAL CAPS) 1 mg Cap Take 1 capsule by mouth once daily at 6am.       No current facility-administered medications for this visit.           I am available for consultation and can be contacted, as needed by the other members of the Transplant team.

## 2022-03-15 NOTE — PROGRESS NOTES
INITIAL PATIENT EDUCATION NOTE    Mr. Mahesh Cespedes was seen in pre-kidney transplant clinic for evaluation for kidney, kidney/pancreas or pancreas only transplant.  The patient attended a an individual video education session that discussed/reviewed the following aspects of transplantation: evaluation and selection committee process, UNOS waitlist management/multiple listings, types of organs offered (KDPI < 85%, KDPI > 85%, PHS risk, DCD, HCV+, HIV+ for HIV+ recipients and enbloc/dual), financial aspects, surgical procedures, dietary instruction pre- and post-transplant, health maintenance pre- and post-transplant, post-transplant hospitalization and outpatient follow-up, potential to participate in a research protocol, and medication management and side effects.  A question and answer session was provided after the presentation.    The patient was seen by all members of the multi-disciplinary team to include: Nephrologist/PA, Surgeon, , Transplant Coordinator, , Pharmacist and Dietician (if applicable).    The patient reviewed and signed all consents for evaluation which were witnessed and sent to scanning into the EPIC chart.    The patient was given an education book and plan for further evaluation based on his individual assessment.      Reviewed program requirement for complete COVID vaccination with documentation prior to listing.  COVID education information reviewed with patient.     The patient was informed that the transplant team would manage immediate post op pain. If the patient requires long term pain management, they will need to have that pain management addressed by their PCP or previous provider who wrote for long term pain medicines.    The patient was encouraged to call with any questions or concerns.

## 2022-03-15 NOTE — PROGRESS NOTES
Transplant Surgery  Kidney Transplant Recipient Evaluation    Referring Physician: Ira Mayen  Current Nephrologist: Ira Mayen    Subjective:     Reason for Visit: evaluate transplant candidacy    History of Present Illness: Mahesh Cespedes is a 57 y.o. year old male undergoing transplant evaluation.    Dialysis History: Mahesh is on hemodialysis.      Transplant History: N/A    Etiology of Renal Disease: Diabetes Mellitus - Type II (based on medical records from referral).    External provider notes reviewed: No    Review of Systems   Constitutional: Positive for fatigue.   HENT: Negative for drooling, postnasal drip and sore throat.    Eyes: Negative for discharge and itching.   Respiratory: Negative for choking and stridor.    Gastrointestinal: Negative for rectal pain.   Endocrine: Negative for polydipsia.   Genitourinary: Negative for enuresis and genital sores.   Musculoskeletal: Negative for back pain, neck pain and neck stiffness.   Allergic/Immunologic: Negative for immunocompromised state.   Neurological: Negative for facial asymmetry and numbness.   Hematological: Negative for adenopathy.   Psychiatric/Behavioral: Negative for behavioral problems, self-injury and suicidal ideas.     Objective:     Physical Exam:  Constitutional:   Vitals reviewed: yes   Well-nourished and well-groomed: yes  Eyes:   Sclerae icteric: no   Extraocular movements intact: yes  GI:    Bowel sounds normal: yes   Tenderness: no    If yes, quadrant/location: not applicable   Palpable masses: no    If yes, quadrant/location: not applicable   Hepatosplenomegaly: no   Ascites: no   Hernia: yes. Location: umbilicus    If yes, type/location: umbilical hernia, reducible, nontender   Surgical scars: no    If yes, type/location: none  not applicable  Resp:   Effort normal: yes   Breath sounds normal: yes    CV:   Regular rate and rhythm: yes   Heart sounds normal: yes   Femoral pulses normal: yes   Extremities edematous:  no  Skin:   Rashes or lesions present: no    If yes, describe:not applicable   Jaundice:: no    Musculoskeletal:   Gait normal: yes   Strength normal: yes  Psych:   Oriented to person, place, and time: yes   Affect and mood normal: yes    Additional comments: not applicable    Diagnostics:  The following labs have been reviewed: CBC  CMP      Counseling: We provided Mahesh Cespedes with a group education session today.  We discussed kidney transplantation at length with him, including risks, potential complications, and alternatives in the management of his renal failure.  The discussion included complications related to anesthesia, bleeding, infection, primary nonfunction, and ATN.  I discussed the typical postoperative course, length of hospitalization, the need for long-term immunosuppression, and the need for long-term routine follow-up.  I discussed living-donor and -donor transplantation and the relative advantages and disadvantages of each.  I also discussed average waiting times for both living donation and  donation.  I discussed national and center-specific survival rates.  I also mentioned the potential benefit of multicenter listing to candidates listed with centers within more than one organ procurement organization.  All questions were answered.    Patient advised that it is recommended that all transplant candidates, and their close contacts and household members receive Covid vaccination.    Final determination of transplant candidacy will be made once evaluation is complete and reviewed by the Kidney & Kidney/Pancreas Selection Committee.    Coronavirus disease (COVID-19) caused by severe acute respiratory virus coronavirus 2 (SARS-C0V 2) is associated with increased mortality in solid organ transplant recipients (SOT) compared to non-transplant patients. Vaccine responses to vaccination are depressed against SARS-CoV2 compared to normal individuals but improve with third vaccination  doses. Vaccination prior to SOT provides both the best opportunity for transplant candidates to develop protective immunity and to reduce the risk of serious COVID19 infections post transplantation. Organ transplant candidates at [facility] will be required to receive SARS-CoV-2 vaccination prior to being listed with a an active status, whenever possible. Exceptions will be made for disability related reasons or for sincerely held Christian beliefs.          Transplant Surgery - Candidacy   Assessment/Plan:   Mahesh Cespedes has end stage renal disease (ESRD) on dialysis. I see no surgical contraindication to placing a kidney transplant. Based on available information, Mahesh Cespedes is a suitable kidney transplant candidate.     Additional testing to be completed according to the Written Order Guidelines for Adult Pre-kidney and Pancreas Transplant Evaluation (KI-02).  Interpretation of tests and discussion of patient management involves all members of the multidisciplinary transplant team.    Brian Pop MD
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
## 2022-03-15 NOTE — LETTER
March 17, 2022        Ira Mayen  [address]  [address]  [facility]  [facility] [address]  Phone: [phone]  Fax: [phone]             [facility]- Transplant 1st Fl  [address]  [facility] [address]  Phone: [phone]   Patient: Mahesh Cespedes   MR Number: [id number]   YOB: 1965   Date of Visit: 3/15/2022       Dear Dr. Ira Mayen    Thank you for referring Mahesh Cespedes to me for evaluation. Attached you will find relevant portions of my assessment and plan of care.    If you have questions, please do not hesitate to call me. I look forward to following Mahesh Cespedes along with you.    Sincerely,    Rosalind Durant NP    Enclosure    If you would like to receive this communication electronically, please contact [email or web address] or [phone] to request [employer] access.    [employer] is a tool which provides read-only access to select patient information with whom you have a relationship. Its easy to use and provides real time access to review your patients record including encounter summaries, notes, results, and demographic information.    If you feel you have received this communication in error or would no longer like to receive these types of communications, please e-mail [email or web address]

## 2022-03-15 NOTE — TELEPHONE ENCOUNTER
Reviewed pt transplant labs.  Notified dialysis unit dietitian of the following abnormal labs via fax and requested their most recent nutrition note on this pt.  Once this note is received it will be scanned into pt's chart.    Chol 241  K 3.2  Glu 111  Phos 5.0

## 2022-03-18 ENCOUNTER — DOCUMENTATION ONLY (OUTPATIENT)
Dept: TRANSPLANT | Facility: CLINIC | Age: 57
End: 2022-03-18
Payer: MEDICAID

## 2022-03-22 ENCOUNTER — TELEPHONE (OUTPATIENT)
Dept: TRANSPLANT | Facility: CLINIC | Age: 57
End: 2022-03-22
Payer: MEDICAID

## 2022-03-22 DIAGNOSIS — Z76.82 ORGAN TRANSPLANT CANDIDATE: Primary | ICD-10-CM

## 2022-04-19 ENCOUNTER — OFFICE VISIT (OUTPATIENT)
Dept: UROLOGY | Facility: CLINIC | Age: 57
End: 2022-04-19
Payer: MEDICARE

## 2022-04-19 VITALS — HEIGHT: 68 IN | WEIGHT: 176.38 LBS | BODY MASS INDEX: 26.73 KG/M2

## 2022-04-19 DIAGNOSIS — R35.1 NOCTURIA: ICD-10-CM

## 2022-04-19 DIAGNOSIS — R97.20 ELEVATED PSA: Primary | ICD-10-CM

## 2022-04-19 PROCEDURE — 1160F RVW MEDS BY RX/DR IN RCRD: CPT | Mod: CPTII,TXP,, | Performed by: NURSE PRACTITIONER

## 2022-04-19 PROCEDURE — 99213 OFFICE O/P EST LOW 20 MIN: CPT | Mod: PBBFAC,TXP | Performed by: NURSE PRACTITIONER

## 2022-04-19 PROCEDURE — 1159F PR MEDICATION LIST DOCUMENTED IN MEDICAL RECORD: ICD-10-PCS | Mod: CPTII,TXP,, | Performed by: NURSE PRACTITIONER

## 2022-04-19 PROCEDURE — 3044F HG A1C LEVEL LT 7.0%: CPT | Mod: CPTII,TXP,, | Performed by: NURSE PRACTITIONER

## 2022-04-19 PROCEDURE — 1159F MED LIST DOCD IN RCRD: CPT | Mod: CPTII,TXP,, | Performed by: NURSE PRACTITIONER

## 2022-04-19 PROCEDURE — 3008F BODY MASS INDEX DOCD: CPT | Mod: CPTII,TXP,, | Performed by: NURSE PRACTITIONER

## 2022-04-19 PROCEDURE — 99999 PR PBB SHADOW E&M-EST. PATIENT-LVL III: ICD-10-PCS | Mod: PBBFAC,TXP,, | Performed by: NURSE PRACTITIONER

## 2022-04-19 PROCEDURE — 99203 OFFICE O/P NEW LOW 30 MIN: CPT | Mod: S$PBB,TXP,, | Performed by: NURSE PRACTITIONER

## 2022-04-19 PROCEDURE — 99999 PR PBB SHADOW E&M-EST. PATIENT-LVL III: CPT | Mod: PBBFAC,TXP,, | Performed by: NURSE PRACTITIONER

## 2022-04-19 PROCEDURE — 3008F PR BODY MASS INDEX (BMI) DOCUMENTED: ICD-10-PCS | Mod: CPTII,TXP,, | Performed by: NURSE PRACTITIONER

## 2022-04-19 PROCEDURE — 1160F PR REVIEW ALL MEDS BY PRESCRIBER/CLIN PHARMACIST DOCUMENTED: ICD-10-PCS | Mod: CPTII,TXP,, | Performed by: NURSE PRACTITIONER

## 2022-04-19 PROCEDURE — 3044F PR MOST RECENT HEMOGLOBIN A1C LEVEL <7.0%: ICD-10-PCS | Mod: CPTII,TXP,, | Performed by: NURSE PRACTITIONER

## 2022-04-19 PROCEDURE — 99203 PR OFFICE/OUTPT VISIT, NEW, LEVL III, 30-44 MIN: ICD-10-PCS | Mod: S$PBB,TXP,, | Performed by: NURSE PRACTITIONER
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
## 2022-04-21 ENCOUNTER — TELEPHONE (OUTPATIENT)
Dept: UROLOGY | Facility: CLINIC | Age: 57
End: 2022-04-21
Payer: MEDICAID

## 2022-04-21 RX ORDER — AMOXICILLIN AND CLAVULANATE POTASSIUM 875; 125 MG/1; MG/1
1 TABLET, FILM COATED ORAL EVERY 12 HOURS
Qty: 14 TABLET | Refills: 0 | Status: SHIPPED | OUTPATIENT
Start: 2022-04-21 | End: 2022-04-28

## 2022-04-21 NOTE — TELEPHONE ENCOUNTER
Please inform patient a very small amount of bacteria was found in his urine. Will treat with antibiotics (Augmentin)     Also please have patient f/u with me in 6 weeks instead of 4 weeks as initially discussed. He will need to have his lab appointment for PSA rescheduled as well. Thanks

## 2022-04-21 NOTE — TELEPHONE ENCOUNTER
I spoke with the patient to let him know that a small amount of bacteria was found in his urine and that [facility] had sent an antibiotic over to the pharmacy. I also rescheduled his lab appointment for his PSA.

## 2022-04-25 ENCOUNTER — TELEPHONE (OUTPATIENT)
Dept: INFECTIOUS DISEASES | Facility: CLINIC | Age: 57
End: 2022-04-25
Payer: MEDICAID

## 2022-04-25 NOTE — TELEPHONE ENCOUNTER
----- Message from Rosemarie Nam sent at 4/25/2022 12:38 PM CDT -----  Regarding: Appointment  Contact: [phone]  Calling to reschedule appointment to the following week. Please call and schedule.

## 2022-05-10 ENCOUNTER — OFFICE VISIT (OUTPATIENT)
Dept: INFECTIOUS DISEASES | Facility: CLINIC | Age: 57
End: 2022-05-10
Payer: MEDICARE

## 2022-05-10 VITALS
HEIGHT: 68 IN | HEART RATE: 81 BPM | WEIGHT: 182.13 LBS | TEMPERATURE: 98 F | SYSTOLIC BLOOD PRESSURE: 175 MMHG | BODY MASS INDEX: 27.6 KG/M2 | DIASTOLIC BLOOD PRESSURE: 89 MMHG

## 2022-05-10 DIAGNOSIS — Z22.7 LATENT TUBERCULOSIS: ICD-10-CM

## 2022-05-10 DIAGNOSIS — Z76.82 KIDNEY TRANSPLANT CANDIDATE: Primary | ICD-10-CM

## 2022-05-10 PROCEDURE — 99213 OFFICE O/P EST LOW 20 MIN: CPT | Mod: PBBFAC,TXP | Performed by: PHYSICIAN ASSISTANT

## 2022-05-10 PROCEDURE — 99205 OFFICE O/P NEW HI 60 MIN: CPT | Mod: S$PBB,TXP,, | Performed by: PHYSICIAN ASSISTANT

## 2022-05-10 PROCEDURE — 99999 PR PBB SHADOW E&M-EST. PATIENT-LVL III: CPT | Mod: PBBFAC,TXP,, | Performed by: PHYSICIAN ASSISTANT

## 2022-05-10 PROCEDURE — 99999 PR PBB SHADOW E&M-EST. PATIENT-LVL III: ICD-10-PCS | Mod: PBBFAC,TXP,, | Performed by: PHYSICIAN ASSISTANT

## 2022-05-10 PROCEDURE — 99205 PR OFFICE/OUTPT VISIT, NEW, LEVL V, 60-74 MIN: ICD-10-PCS | Mod: S$PBB,TXP,, | Performed by: PHYSICIAN ASSISTANT

## 2022-05-10 NOTE — PROGRESS NOTES
Pre Transplant Infectious Diseases Consult  Kidney Transplant Recipient Evaluation    Requesting Physician:     Reason for Visit:  Pre Transplant Evaluation    Organ:  Kidney    Etiology of Kidney Disease:  HTN. On HD LUE AVF x 3 weeks ago. Has right trialysis line at this time. Still makes urine. HD M W F.     History of Prior Transplant:  No    Currently taking immunosuppressants/steroids:  No    History of Splenectomy:  No    Infectious History:  Current/recent infections or currently taking antibiotics?  No  History of recurrent infections (sinuses, throat, bladder/kidneys, intestines, skin, dental, lung, catheter (HD/PD) related, or peritonitis/SBP)?  No  Any major hospitalizations due to infection?  No  If diabetic, history of diabetic foot infection/osteomyelitis?  No  History of shingles?  No  History of STDs (syphilis, viral hepatitis, HIV)?  No  Exposure to TB or ever had a positive TB skin test?  No     1/2022 at [facility] skin test that ws negative on [address] [employer]. Results negative  Denies incarceration or volunteer/occupation at incarceration center. Denies homeless shelter. Denies nursing homes, hospitals or clinics.        Denies hemoptysis. Denies fever chills or night sweats. No unintentional weight loss.     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  Born in [facility], lives in [place]   Any foreign travel?  Yes - [place]   Any associated illness?  No    Social/Environmental:  Occupational:  None  Animal exposures (dogs, cats, farm animals, bird cages, fish tanks):  No  Hobbies (gardening, hike, fish/hunting, etc): none  Consumption of raw/undercooked meat or seafood?  No  Any injectable or smoked recreational drug use?  No      Immunization History   Administered Date(s) Administered    [facility] Test 01/15/2016       Immunization History:  Childhood vaccines:    Last Flu shot:   Tetanus/TDAP:  Hepatitis A:  Hepatitis  B:  Prevnar-13:  Pneumovax-23:  Shingles (Zostavax/Shingrix):  Meningococcal:  Other:     Serologies:  CMV IgG Interpretation   Date Value Ref Range Status   03/15/2022 Reactive (A) Non-Reactive Final     Hepatitis B Surface Ag   Date Value Ref Range Status   01/19/2016 Negative  Final     HIV 1/2 Ag/Ab   Date Value Ref Range Status   03/15/2022 Negative Negative Final     TB Gold Plus   Date Value Ref Range Status   03/15/2022 Positive (A) Negative Final     Comment:     M. tuberculosis infection likely.     RPR   Date Value Ref Range Status   03/15/2022 Non-reactive Non-reactive Final     Strongyloides Ab IgG   Date Value Ref Range Status   03/15/2022 Negative Negative Final     Comment:     No detectable levels of IgG antibodies to Strongyloides.  Repeat testing in 1-2 weeks if clinically indicated.    Test Performed by:  [facility]  [address] [place] [address]  : Pawel Patel M.D. Ph.D.; CLIA# [id number]       Varicella Interpretation   Date Value Ref Range Status   03/15/2022 Positive (A) Negative Final     Comment:     <or=0.8    Negative        No detectable IgG antibody to Varicella zoster  by the JANIS test. Such individuals are presumed to be   uninfected with Varicella zoster and to be susceptible to   primary infection.    0.9-1.0    Equivocal    >or=1.1    Positive        Indicates presence of detectable IgG antibody to Varicella   zoster by the JANIS test. Indicative of previous or current   infection.          ROS  Physical Exam         Counseling:   I discussed with the patient the risk for increased susceptibility to infections following transplantation including increased risk for infection right after transplant and if rejection should occur.  The patient has been counseled on the importance of vaccinations including but not limited to a yearly flu vaccine. Patient was also instructed to encourage that family/caretakers  receive their flu vaccine yearly. The patient was encouraged to contact us about any problems that may develop after immunizations and possible side effects were reviewed.     Specific guidance has been provided to the patient regarding the patient's occupation, hobbies and activities to avoid future infectious complications. These include but are not limited to: avoiding raw/undercooked meats and seafood, avoiding unpasteurized milk/cheeses, proper (hand) hygiene, contact with animals and appropriate vaccination of animals, use of mosquito/tick precautions, avoiding walking barefoot, avoiding sick contacts, and seeking medical advice prior to foreign travel (specifically developing countries).     Transplant Candidacy: Based on available information, there are no identified significant barriers to transplantation from an infectious disease standpoint pending acceptable serologies and subject to recommendations below.     Final determination of transplant candidacy will be made once evaluation is complete and reviewed by the Transplant Selection Committee.      ID recommendations:      HIV, Strongyloides, and RPR pending. If positive, please refer to ID clinic.    Vaccines recommended  1. Influenza yearly  2. tdap   3. heplisav B vaccine series due day 0, 1 month  4. prevnar vaccine  5. Pneumovax vaccine due 8 weeks after prevnar  6. shingrix series due day 0, 2-6 months     Rifampin today for latent TB  F/u with me in 4 months  Monthly cmp while on rifampin

## 2022-05-11 RX ORDER — RIFAMPIN 300 MG/1
300 CAPSULE ORAL EVERY 12 HOURS
Qty: 60 CAPSULE | Refills: 4 | Status: SHIPPED | OUTPATIENT
Start: 2022-05-11 | End: 2022-09-11

## 2022-05-17 ENCOUNTER — LAB VISIT (OUTPATIENT)
Dept: LAB | Facility: HOSPITAL | Age: 57
End: 2022-05-17
Attending: PHYSICIAN ASSISTANT
Payer: MEDICARE

## 2022-05-17 DIAGNOSIS — Z22.7 LATENT TUBERCULOSIS: ICD-10-CM

## 2022-05-17 DIAGNOSIS — Z76.82 KIDNEY TRANSPLANT CANDIDATE: ICD-10-CM

## 2022-05-17 DIAGNOSIS — Z76.82 ORGAN TRANSPLANT CANDIDATE: ICD-10-CM

## 2022-05-17 LAB
ABO + RH BLD: NORMAL
ALBUMIN SERPL BCP-MCNC: 3.8 G/DL (ref 3.5–5.2)
ALP SERPL-CCNC: 113 U/L (ref 55–135)
ALT SERPL W/O P-5'-P-CCNC: 15 U/L (ref 10–44)
ANION GAP SERPL CALC-SCNC: 14 MMOL/L (ref 8–16)
AST SERPL-CCNC: 18 U/L (ref 10–40)
BILIRUB SERPL-MCNC: 0.3 MG/DL (ref 0.1–1)
BUN SERPL-MCNC: 47 MG/DL (ref 6–20)
CALCIUM SERPL-MCNC: 9 MG/DL (ref 8.7–10.5)
CHLORIDE SERPL-SCNC: 96 MMOL/L (ref 95–110)
CO2 SERPL-SCNC: 23 MMOL/L (ref 23–29)
CREAT SERPL-MCNC: 6.5 MG/DL (ref 0.5–1.4)
EST. GFR  (AFRICAN AMERICAN): 10 ML/MIN/1.73 M^2
EST. GFR  (NON AFRICAN AMERICAN): 9 ML/MIN/1.73 M^2
GLUCOSE SERPL-MCNC: 189 MG/DL (ref 70–110)
POTASSIUM SERPL-SCNC: 4.5 MMOL/L (ref 3.5–5.1)
PROT SERPL-MCNC: 8 G/DL (ref 6–8.4)
SODIUM SERPL-SCNC: 133 MMOL/L (ref 136–145)

## 2022-05-17 PROCEDURE — 86803 HEPATITIS C AB TEST: CPT | Mod: TXP | Performed by: PHYSICIAN ASSISTANT

## 2022-05-17 PROCEDURE — 36415 COLL VENOUS BLD VENIPUNCTURE: CPT | Mod: TXP | Performed by: PHYSICIAN ASSISTANT

## 2022-05-17 PROCEDURE — 87340 HEPATITIS B SURFACE AG IA: CPT | Mod: TXP | Performed by: PHYSICIAN ASSISTANT

## 2022-05-17 PROCEDURE — 80053 COMPREHEN METABOLIC PANEL: CPT | Mod: TXP | Performed by: PHYSICIAN ASSISTANT

## 2022-05-17 PROCEDURE — 86704 HEP B CORE ANTIBODY TOTAL: CPT | Mod: TXP | Performed by: PHYSICIAN ASSISTANT

## 2022-05-17 PROCEDURE — 86790 VIRUS ANTIBODY NOS: CPT | Mod: TXP | Performed by: PHYSICIAN ASSISTANT

## 2022-05-17 PROCEDURE — 86901 BLOOD TYPING SEROLOGIC RH(D): CPT | Mod: TXP | Performed by: NURSE PRACTITIONER

## 2022-05-18 ENCOUNTER — TELEPHONE (OUTPATIENT)
Dept: INFECTIOUS DISEASES | Facility: CLINIC | Age: 57
End: 2022-05-18
Payer: MEDICARE

## 2022-05-18 NOTE — TELEPHONE ENCOUNTER
5/18/22     Recall placed for appt reminder in September    ----- Message from Enrrique Hernandez PA-C sent at 5/18/2022  3:59 PM CDT -----  Hi, can you set a reminder for pt to see me in 4 months of end of therapy please? Sometime in September would be great. Thank you!

## 2022-05-23 LAB
HAV IGG SER QL IA: POSITIVE
HBV CORE AB SERPL QL IA: POSITIVE
HBV SURFACE AG SERPL QL IA: NEGATIVE
HCV AB SERPL QL IA: NEGATIVE

## 2022-06-17 DIAGNOSIS — Z76.82 KIDNEY TRANSPLANT CANDIDATE: Primary | ICD-10-CM

## 2022-06-20 ENCOUNTER — TELEPHONE (OUTPATIENT)
Dept: INFECTIOUS DISEASES | Facility: CLINIC | Age: 57
End: 2022-06-20
Payer: MEDICARE

## 2022-06-20 NOTE — TELEPHONE ENCOUNTER
----- Message from Enrrique Hernandez PA-C sent at 6/17/2022  4:17 PM CDT -----  Ordered a referral to hepatology. Can you help schedule this please?  Thank you!

## 2022-06-23 DIAGNOSIS — R97.20 ELEVATED PSA: Primary | ICD-10-CM

## 2022-06-27 ENCOUNTER — SOCIAL WORK (OUTPATIENT)
Dept: TRANSPLANT | Facility: CLINIC | Age: 57
End: 2022-06-27
Payer: MEDICARE

## 2022-06-27 NOTE — PROGRESS NOTES
"DIALYSIS COMPLIANCE:  6/23/22:  Received dialysis compliance check form this date.  Report states patient is "Patient's last dialysis treatment was 6/6/22.  He has been non-adherent with all efforts to facilitate his return to [dialysis] treatment.  He continues to refuse to come to treatment.  7 No shows, Last treatment 6/6/22.  Patient has been strongly urged to access the nearest ER.  I [DUSW] have spoken with both adult children regarding patient's non-adherence."    6/27/22  SW contacted dialysis unit and spoke with Kareen PALM RN who stated pt has not returned to dialysis.  She stated they have called the family multiple times and have contacted local EDs to determine if patient is in hospital.  They have no current information on his status.  Kareen states pt and family are aware of consequences of nonadherence to dialysis.  Additionally, pt has cancelled or no showed for all of his [facility] June appointments.  RN Coordinator notified via Epic Inbox messenging.  "

## 2022-06-28 ENCOUNTER — TELEPHONE (OUTPATIENT)
Dept: TRANSPLANT | Facility: CLINIC | Age: 57
End: 2022-06-28
Payer: MEDICARE

## 2022-06-28 NOTE — PROGRESS NOTES
"Dialysis compliance found under "Media Tab"> personal history or pt questionnaire > dialysis compliance    "

## 2022-07-01 ENCOUNTER — COMMITTEE REVIEW (OUTPATIENT)
Dept: TRANSPLANT | Facility: CLINIC | Age: 57
End: 2022-07-01
Payer: MEDICARE

## 2022-07-01 NOTE — LETTER
July 1, 2022    Mahesh Coy  [address]  [place] [address]    Dear Mahesh Cespedes:  MRN: [id number]    It is the duty of the [facility] Selection Committee to determine which patients are candidates for a transplant. For this reason, our committee has the difficult task of evaluating patients to determine which ones have the greatest chance of having a successful transplant. We are aware of the magnitude of this responsibility, and we approach it with reverence and humility.    It is with regret I inform you that you are not approved as a transplant candidate due to non compliance with dialysis, medical appointments (no showed/canceled Urology appointments for elevated PSA), and medical recommendations.  Based on this review, we have determined that at this time, you are not a candidate for a transplant at [facility].  If re-referred you will be required to prove one year of compliance with all medical recommendations.     The selection committee carefully considers each patient's transplant candidacy and determines whether it is safe to proceed with transplantation on a case-by-case basis using established selection criteria.  At present, the risk of proceeding with an elective transplant surgery has become too high.                                                                               Although the selection committee believes you are not a suitable transplant candidate, you have the option to be evaluated at other transplant centers who may have different selection criteria.  You may request your [facility] records be sent to any center of your choice by contacting our Medical Records Department at [phone].                                                                               Attached is a letter from the United Network for Organ Sharing (UNOS).  It describes the services and information offered to patients by UNOS and the Organ Procurement and Transplant Network.    The  [facility] Selection Committee sincerely wishes you the best and remains available to answer any questions.  Please do not hesitate to contact our pre-transplant office if we can assist you in any other way.                                                                               Sincerely,      Vy Sarkar MD  Medical Director, Kidney & Kidney/Pancreas Transplantation    Tj/Enclosed    Cc: [facility] [place]    Encl: UNOS Letter             The Organ Procurement and Transplantation Network   Toll-free patient services line: Your resource for organ transplant information     If you have a question regarding your own medical care, you always should call your transplant hospital first. However, for general organ transplant-related information, you can call the Organ Procurement and Transplantation Network (OPTN) toll-free patient services line at 1-962.267.5224.     Anyone, including potential transplant candidates, candidates, recipients, family members, friends, living donors, and donor family members, can call this number to:     · Talk about organ donation, living donation, the transplant process, the donation process, and transplant policies.   · Get a free patient information kit with helpful booklets, waiting list and transplant information, and a list of all transplant hospitals.   · Ask questions about the OPTN website (https://optn.transplant.hrsa.gov/), the United Network for Organ Sharings (UNOS) website (https://unos.org/), or the UNOS website for living donors and transplant recipients. (https://www.transplantliving.org/).   · Learn how the OPTN can help you.   · Talk about any concerns that you may have with a transplant hospital.     The nations transplant system, the OPTN, is managed under federal contract by the United Network for Organ Sharing (UNOS), which is a non-profit charitable organization. The OPTN helps create and define organ sharing policies that make the  best use of donated organs. This process continuously evaluating new advances and discoveries so policies can be adapted to best serve patients waiting for transplants. To do so, the OPTN works closely with transplant professionals, transplant patients, transplant candidates, donor families, living donors, and the public. All transplant programs and organ procurement organizations throughout the country are OPTN members and are obligated to follow the policies the OPTN creates for allocating organs.     The OPTN also is responsible for:   · Providing educational material for patients, the public, and professionals.   · Raising awareness of the need for donated organs and tissue.   · Coordinating organ procurement, matching, and placement.   · Collecting information about every organ transplant and donation that occurs in the [place].     Remember, you should contact your transplant hospital directly if you have questions or concerns about your own medical care including medical records, work-up progress, and test results.     We are not your transplant hospital, and our staff will not be able to answer questions about your case, so please keep your transplant hospitals phone number handy.   However, while you research your transplant needs and learn as much as you can about transplantation and donation, we welcome your call to our toll-free patient services line at [phone].

## 2022-07-01 NOTE — COMMITTEE REVIEW
Native Organ Dx: Diabetes Mellitus - Type II      Not approved for LRD/CAD transplant due to non compliance with dialysis, medical appointments (no showed Urology for elevated PSA), and medical recommendations.    Must show 1 year of compliance with all medical recommendations to be re-referred.    Note written by KENDY Reeder, RN    ===============================================    I was present at the meeting and attest to the decision of the committee.

## 2022-07-02 ENCOUNTER — TELEPHONE (OUTPATIENT)
Dept: HEPATOLOGY | Facility: CLINIC | Age: 57
End: 2022-07-02
Payer: MEDICARE

## 2022-09-12 ENCOUNTER — TELEPHONE (OUTPATIENT)
Dept: INFECTIOUS DISEASES | Facility: CLINIC | Age: 57
End: 2022-09-12
Payer: MEDICARE

## 2022-09-12 NOTE — TELEPHONE ENCOUNTER
----- Message from Enrrique Hernandez PA-C sent at 9/12/2022 10:02 AM CDT -----  Regarding: FW: end of care rifampin  Hii if I have any openings next week can you scheudle this pateint to see me for end of care/follow up?  Thank you!    ----- Message -----  From: Enrrique Hernandez PA-C  Sent: 9/12/2022  12:00 AM CDT  To: Enrrique Hernandez PA-C  Subject: end of care rifampin

## 2022-11-21 ENCOUNTER — OFFICE VISIT (OUTPATIENT)
Dept: SURGERY | Facility: CLINIC | Age: 57
End: 2022-11-21
Payer: MEDICARE

## 2022-11-21 ENCOUNTER — TELEPHONE (OUTPATIENT)
Dept: SURGERY | Facility: CLINIC | Age: 57
End: 2022-11-21
Payer: MEDICARE

## 2022-11-21 ENCOUNTER — HOSPITAL ENCOUNTER (OUTPATIENT)
Dept: PREADMISSION TESTING | Facility: HOSPITAL | Age: 57
Discharge: HOME | End: 2022-11-21
Attending: SURGERY
Payer: MEDICARE

## 2022-11-21 VITALS
SYSTOLIC BLOOD PRESSURE: 199 MMHG | HEART RATE: 92 BPM | RESPIRATION RATE: 18 BRPM | BODY MASS INDEX: 29.32 KG/M2 | DIASTOLIC BLOOD PRESSURE: 103 MMHG | HEIGHT: 66 IN | OXYGEN SATURATION: 98 % | WEIGHT: 182.44 LBS | TEMPERATURE: 96 F

## 2022-11-21 VITALS
DIASTOLIC BLOOD PRESSURE: 108 MMHG | HEIGHT: 66 IN | SYSTOLIC BLOOD PRESSURE: 189 MMHG | HEART RATE: 98 BPM | BODY MASS INDEX: 29.32 KG/M2 | WEIGHT: 182.44 LBS | OXYGEN SATURATION: 96 %

## 2022-11-21 DIAGNOSIS — N18.6 ESRD ON DIALYSIS: ICD-10-CM

## 2022-11-21 DIAGNOSIS — Z99.2 ESRD ON DIALYSIS: Primary | ICD-10-CM

## 2022-11-21 DIAGNOSIS — Z99.2 ESRD ON DIALYSIS: ICD-10-CM

## 2022-11-21 DIAGNOSIS — Z01.818 PREOPERATIVE TESTING: Primary | ICD-10-CM

## 2022-11-21 DIAGNOSIS — N18.9 CKD (CHRONIC KIDNEY DISEASE): ICD-10-CM

## 2022-11-21 DIAGNOSIS — N18.6 ESRD ON DIALYSIS: Primary | ICD-10-CM

## 2022-11-21 LAB
ANION GAP SERPL CALC-SCNC: 13 MMOL/L (ref 8–16)
BASOPHILS # BLD AUTO: 0.04 K/UL (ref 0–0.2)
BASOPHILS NFR BLD: 0.6 % (ref 0–1.9)
BUN SERPL-MCNC: 48 MG/DL (ref 6–20)
CALCIUM SERPL-MCNC: 8.9 MG/DL (ref 8.7–10.5)
CHLORIDE SERPL-SCNC: 109 MMOL/L (ref 95–110)
CO2 SERPL-SCNC: 19 MMOL/L (ref 23–29)
CREAT SERPL-MCNC: 4.8 MG/DL (ref 0.5–1.4)
DIFFERENTIAL METHOD: ABNORMAL
EOSINOPHIL # BLD AUTO: 0.2 K/UL (ref 0–0.5)
EOSINOPHIL NFR BLD: 3.5 % (ref 0–8)
ERYTHROCYTE [DISTWIDTH] IN BLOOD BY AUTOMATED COUNT: 14.2 % (ref 11.5–14.5)
EST. GFR  (NO RACE VARIABLE): 13 ML/MIN/1.73 M^2
GLUCOSE SERPL-MCNC: 86 MG/DL (ref 70–110)
HCT VFR BLD AUTO: 36.6 % (ref 40–54)
HGB BLD-MCNC: 11.5 G/DL (ref 14–18)
IMM GRANULOCYTES # BLD AUTO: 0.01 K/UL (ref 0–0.04)
IMM GRANULOCYTES NFR BLD AUTO: 0.2 % (ref 0–0.5)
LYMPHOCYTES # BLD AUTO: 1.3 K/UL (ref 1–4.8)
LYMPHOCYTES NFR BLD: 19.1 % (ref 18–48)
MCH RBC QN AUTO: 27.4 PG (ref 27–31)
MCHC RBC AUTO-ENTMCNC: 31.4 G/DL (ref 32–36)
MCV RBC AUTO: 87 FL (ref 82–98)
MONOCYTES # BLD AUTO: 0.6 K/UL (ref 0.3–1)
MONOCYTES NFR BLD: 8.8 % (ref 4–15)
NEUTROPHILS # BLD AUTO: 4.5 K/UL (ref 1.8–7.7)
NEUTROPHILS NFR BLD: 67.8 % (ref 38–73)
NRBC BLD-RTO: 0 /100 WBC
PLATELET # BLD AUTO: 159 K/UL (ref 150–450)
PMV BLD AUTO: 11.6 FL (ref 9.2–12.9)
POTASSIUM SERPL-SCNC: 3.8 MMOL/L (ref 3.5–5.1)
RBC # BLD AUTO: 4.19 M/UL (ref 4.6–6.2)
SODIUM SERPL-SCNC: 141 MMOL/L (ref 136–145)
WBC # BLD AUTO: 6.58 K/UL (ref 3.9–12.7)

## 2022-11-21 PROCEDURE — 99212 PR OFFICE/OUTPT VISIT, EST, LEVL II, 10-19 MIN: ICD-10-PCS | Mod: S$PBB,,, | Performed by: SURGERY

## 2022-11-21 PROCEDURE — 99999 PR PBB SHADOW E&M-EST. PATIENT-LVL IV: CPT | Mod: PBBFAC,,, | Performed by: SURGERY

## 2022-11-21 PROCEDURE — 80048 BASIC METABOLIC PNL TOTAL CA: CPT | Performed by: SURGERY

## 2022-11-21 PROCEDURE — 85025 COMPLETE CBC W/AUTO DIFF WBC: CPT | Performed by: SURGERY

## 2022-11-21 PROCEDURE — 93010 ELECTROCARDIOGRAM REPORT: CPT | Mod: ,,, | Performed by: INTERNAL MEDICINE

## 2022-11-21 PROCEDURE — 99999 PR PBB SHADOW E&M-EST. PATIENT-LVL IV: ICD-10-PCS | Mod: PBBFAC,,, | Performed by: SURGERY

## 2022-11-21 PROCEDURE — 93005 ELECTROCARDIOGRAM TRACING: CPT

## 2022-11-21 PROCEDURE — 36415 COLL VENOUS BLD VENIPUNCTURE: CPT | Performed by: SURGERY

## 2022-11-21 PROCEDURE — 99212 OFFICE O/P EST SF 10 MIN: CPT | Mod: S$PBB,,, | Performed by: SURGERY

## 2022-11-21 PROCEDURE — 99214 OFFICE O/P EST MOD 30 MIN: CPT | Mod: PBBFAC | Performed by: SURGERY

## 2022-11-21 PROCEDURE — 93010 EKG 12-LEAD: ICD-10-PCS | Mod: ,,, | Performed by: INTERNAL MEDICINE

## 2022-11-21 RX ORDER — SODIUM CHLORIDE 9 MG/ML
INJECTION, SOLUTION INTRAVENOUS CONTINUOUS
Status: CANCELLED | OUTPATIENT
Start: 2022-11-21

## 2022-11-21 NOTE — DISCHARGE INSTRUCTIONS
Before 7 AM, enter through the Emergency Entrance..   After 7 AM enter through the Main Entrance.      Your procedure  is scheduled for __11/23/2022________.    Call [phone] between 2pm and 5pm on __11/22/2022_____to find out your arrival time for the day of surgery.    You may use the main entrance to the hospital on the [facility] side, or the entrance that is next to the [facility].    You may have one visitor.  No children allowed.     You will be going to the Same Day Surgery Unit on the 2nd floor of the hospital.    Important instructions:  Do not eat anything after midnight.  You may have plain water, non carbonated.  You may also have Gatorade or Powerade after midnight.    Stop all fluids 2 hours before your surgery.    It is okay to brush your teeth.  Do not have gum, candy or mints.    SEE MEDICATION SHEET.   TAKE MEDICATIONS AS DIRECTED WITH SIPS OF WATER.      STOP taking Aspirin, Ibuprofen,  Advil, Motrin, Mobic(meloxicam), Aleve (naproxen), Fish oil, and Vitamin E for at least 7 days before your surgery.     You may take Tylenol if needed which is not a blood thinner.    Please shower the night before and the morning of your surgery.      Use Chlorhexidine soap as instructed by your pre op nurse.   Please place clean linens on your bed the night before surgery. Please wear fresh clean clothing after each shower.    No shaving of procedural area at least 4-5 days before surgery due to increased risk of skin irritation and/or possible infection.    Contact lenses and removable denture work may not be worn during your procedure.    You may wear deodorant only. If you are having breast surgery, do not wear deodorant on the operative side.    Do not wear powder, body lotion, perfume/cologne or make-up.    Do not wear any jewelry or have any metal on your body.    You will be asked to remove any dentures or partials for the procedure.    If you are going home on the same day of surgery, you  must arrange for a family member or a friend to drive you home.  Public transportation is prohibited.  You will not be able to drive home if you were given anesthesia or sedation.    Patients who want to have their Post-op prescriptions filled from our in-house [facility], bring a Credit/Debit Card or cash with you. A co-pay may be required.  The pharmacy closes at 5:30 pm.    Wear loose fitting clothes allowing for bandages.    Please leave money and valuables home.      You may bring your cell phone.    Call the doctor if fever or illness should occur before your surgery.    Call [phone] to contact us here if needed.                            CLOTHES ON DAY OF SURGERY    SHOULDER surgery:  you must have a very oversized shirt.  Very, Very large.  You will probably have a large sling on with your arm strapped to your chest.  You will not be able to put the arm of the operated shoulder into a sleeve.  You can put the arm of the un-operated shoulder into the sleeve, but the shirt will need to be draped over the operated shoulder.       ARM or HAND surgery:  make sure that your sleeves are large and loose enough to pass over large dressings or cast.      BREAST or UNDERARM surgery:  wear a loose, button down shirt so that you can dress without raising your arms over your head.    ABDOMINAL surgery:  wear loose, comfortable clothing.  Nothing tight around the abdomen.  NO JEANS    PENIS or SCROTAL surgery:  loose comfortable clothing.  Large sweat pants, pajama pants or a robe.  ABSOLUTELY NO JEANS      LEG or FOOT surgery:  wear large loose pants that are able to pass over any large dressings or casts.  You could also wear loose shorts or a skirt.
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
## 2023-04-10 ENCOUNTER — TELEPHONE (OUTPATIENT)
Dept: SURGERY | Facility: CLINIC | Age: 58
End: 2023-04-10
Payer: MEDICARE

## 2023-04-10 NOTE — TELEPHONE ENCOUNTER
Spoke with  regarding referral, appt scheduled with  for 4/18 at 9am. Pt.confirmed date and time.

## 2024-01-06 ENCOUNTER — HOSPITAL ENCOUNTER (INPATIENT)
Facility: HOSPITAL | Age: 59
LOS: 16 days | Discharge: HOME | DRG: 640 | End: 2024-01-22
Attending: EMERGENCY MEDICINE | Admitting: STUDENT IN AN ORGANIZED HEALTH CARE EDUCATION/TRAINING PROGRAM
Payer: MEDICARE

## 2024-01-06 DIAGNOSIS — J96.90 RESPIRATORY FAILURE: ICD-10-CM

## 2024-01-06 DIAGNOSIS — J96.01 ACUTE HYPOXEMIC RESPIRATORY FAILURE: ICD-10-CM

## 2024-01-06 DIAGNOSIS — D63.8 ANEMIA OF CHRONIC DISEASE: Chronic | ICD-10-CM

## 2024-01-06 DIAGNOSIS — R14.0 ABDOMINAL DISTENSION: ICD-10-CM

## 2024-01-06 DIAGNOSIS — L89.96 DECUBITUS ULCER WITH SUSPECTED DEEP TISSUE INJURY, UNSPECIFIED ULCER STAGE: ICD-10-CM

## 2024-01-06 DIAGNOSIS — R07.9 CHEST PAIN: ICD-10-CM

## 2024-01-06 DIAGNOSIS — N18.6 ESRD ON DIALYSIS: ICD-10-CM

## 2024-01-06 DIAGNOSIS — J81.0 ACUTE PULMONARY EDEMA: ICD-10-CM

## 2024-01-06 DIAGNOSIS — R06.02 SHORTNESS OF BREATH: ICD-10-CM

## 2024-01-06 DIAGNOSIS — E87.5 HYPERKALEMIA: Primary | ICD-10-CM

## 2024-01-06 DIAGNOSIS — Z99.2 ESRD ON DIALYSIS: ICD-10-CM

## 2024-01-06 PROBLEM — I48.91 A-FIB: Status: ACTIVE | Noted: 2024-01-06

## 2024-01-06 LAB
ALBUMIN SERPL BCP-MCNC: 3.2 G/DL (ref 3.5–5.2)
ALP SERPL-CCNC: 69 U/L (ref 55–135)
ALT SERPL W/O P-5'-P-CCNC: 17 U/L (ref 10–44)
ANION GAP SERPL CALC-SCNC: 22 MMOL/L (ref 8–16)
AST SERPL-CCNC: 17 U/L (ref 10–40)
BASOPHILS # BLD AUTO: 0 K/UL (ref 0–0.2)
BASOPHILS NFR BLD: 0 % (ref 0–1.9)
BILIRUB SERPL-MCNC: 0.6 MG/DL (ref 0.1–1)
BNP SERPL-MCNC: 1295 PG/ML (ref 0–99)
BUN SERPL-MCNC: 159 MG/DL (ref 6–20)
CALCIUM SERPL-MCNC: 7.9 MG/DL (ref 8.7–10.5)
CHLORIDE SERPL-SCNC: 102 MMOL/L (ref 95–110)
CO2 SERPL-SCNC: 12 MMOL/L (ref 23–29)
CREAT SERPL-MCNC: 19.8 MG/DL (ref 0.5–1.4)
CTP QC/QA: YES
CTP QC/QA: YES
DIFFERENTIAL METHOD BLD: ABNORMAL
EOSINOPHIL # BLD AUTO: 0 K/UL (ref 0–0.5)
EOSINOPHIL NFR BLD: 0 % (ref 0–8)
ERYTHROCYTE [DISTWIDTH] IN BLOOD BY AUTOMATED COUNT: 15.9 % (ref 11.5–14.5)
EST. GFR  (NO RACE VARIABLE): 2 ML/MIN/1.73 M^2
GLUCOSE SERPL-MCNC: 132 MG/DL (ref 70–110)
HCT VFR BLD AUTO: 23.1 % (ref 40–54)
HGB BLD-MCNC: 7.8 G/DL (ref 14–18)
IMM GRANULOCYTES # BLD AUTO: 0.07 K/UL (ref 0–0.04)
IMM GRANULOCYTES NFR BLD AUTO: 0.6 % (ref 0–0.5)
LYMPHOCYTES # BLD AUTO: 0.3 K/UL (ref 1–4.8)
LYMPHOCYTES NFR BLD: 2.3 % (ref 18–48)
MCH RBC QN AUTO: 26.9 PG (ref 27–31)
MCHC RBC AUTO-ENTMCNC: 33.8 G/DL (ref 32–36)
MCV RBC AUTO: 80 FL (ref 82–98)
MONOCYTES # BLD AUTO: 0.7 K/UL (ref 0.3–1)
MONOCYTES NFR BLD: 5.8 % (ref 4–15)
NEUTROPHILS # BLD AUTO: 10.6 K/UL (ref 1.8–7.7)
NEUTROPHILS NFR BLD: 91.3 % (ref 38–73)
NRBC BLD-RTO: 0 /100 WBC
PLATELET # BLD AUTO: 177 K/UL (ref 150–450)
PMV BLD AUTO: 10.7 FL (ref 9.2–12.9)
POC MOLECULAR INFLUENZA A AGN: NEGATIVE
POC MOLECULAR INFLUENZA B AGN: NEGATIVE
POCT GLUCOSE: 137 MG/DL (ref 70–110)
POCT GLUCOSE: 224 MG/DL (ref 70–110)
POTASSIUM SERPL-SCNC: 6.2 MMOL/L (ref 3.5–5.1)
PROT SERPL-MCNC: 9 G/DL (ref 6–8.4)
RBC # BLD AUTO: 2.9 M/UL (ref 4.6–6.2)
SARS-COV-2 RDRP RESP QL NAA+PROBE: NEGATIVE
SODIUM SERPL-SCNC: 136 MMOL/L (ref 136–145)
TROPONIN I SERPL DL<=0.01 NG/ML-MCNC: 0.03 NG/ML (ref 0–0.03)
WBC # BLD AUTO: 11.63 K/UL (ref 3.9–12.7)

## 2024-01-06 PROCEDURE — 87635 SARS-COV-2 COVID-19 AMP PRB: CPT | Performed by: EMERGENCY MEDICINE

## 2024-01-06 PROCEDURE — 63600175 PHARM REV CODE 636 W HCPCS: Performed by: EMERGENCY MEDICINE

## 2024-01-06 PROCEDURE — 94660 CPAP INITIATION&MGMT: CPT

## 2024-01-06 PROCEDURE — 96375 TX/PRO/DX INJ NEW DRUG ADDON: CPT

## 2024-01-06 PROCEDURE — 93010 ELECTROCARDIOGRAM REPORT: CPT | Mod: ,,, | Performed by: INTERNAL MEDICINE

## 2024-01-06 PROCEDURE — 99900035 HC TECH TIME PER 15 MIN (STAT)

## 2024-01-06 PROCEDURE — 5A09357 ASSISTANCE WITH RESPIRATORY VENTILATION, LESS THAN 24 CONSECUTIVE HOURS, CONTINUOUS POSITIVE AIRWAY PRESSURE: ICD-10-PCS | Performed by: STUDENT IN AN ORGANIZED HEALTH CARE EDUCATION/TRAINING PROGRAM

## 2024-01-06 PROCEDURE — 27000221 HC OXYGEN, UP TO 24 HOURS

## 2024-01-06 PROCEDURE — 99291 CRITICAL CARE FIRST HOUR: CPT

## 2024-01-06 PROCEDURE — 87502 INFLUENZA DNA AMP PROBE: CPT

## 2024-01-06 PROCEDURE — 25000003 PHARM REV CODE 250: Performed by: INTERNAL MEDICINE

## 2024-01-06 PROCEDURE — 96374 THER/PROPH/DIAG INJ IV PUSH: CPT

## 2024-01-06 PROCEDURE — 82962 GLUCOSE BLOOD TEST: CPT

## 2024-01-06 PROCEDURE — 20000000 HC ICU ROOM

## 2024-01-06 PROCEDURE — 84484 ASSAY OF TROPONIN QUANT: CPT | Performed by: EMERGENCY MEDICINE

## 2024-01-06 PROCEDURE — 94761 N-INVAS EAR/PLS OXIMETRY MLT: CPT

## 2024-01-06 PROCEDURE — 94640 AIRWAY INHALATION TREATMENT: CPT

## 2024-01-06 PROCEDURE — 25000003 PHARM REV CODE 250: Mod: JZ,JG | Performed by: EMERGENCY MEDICINE

## 2024-01-06 PROCEDURE — 85025 COMPLETE CBC W/AUTO DIFF WBC: CPT | Performed by: EMERGENCY MEDICINE

## 2024-01-06 PROCEDURE — 25000242 PHARM REV CODE 250 ALT 637 W/ HCPCS: Performed by: EMERGENCY MEDICINE

## 2024-01-06 PROCEDURE — 80053 COMPREHEN METABOLIC PANEL: CPT | Performed by: EMERGENCY MEDICINE

## 2024-01-06 PROCEDURE — 83880 ASSAY OF NATRIURETIC PEPTIDE: CPT | Performed by: EMERGENCY MEDICINE

## 2024-01-06 PROCEDURE — 27000190 HC CPAP FULL FACE MASK W/VALVE

## 2024-01-06 PROCEDURE — 93005 ELECTROCARDIOGRAM TRACING: CPT

## 2024-01-06 RX ORDER — ALLOPURINOL 100 MG/1
100 TABLET ORAL DAILY
Status: DISCONTINUED | OUTPATIENT
Start: 2024-01-07 | End: 2024-01-22 | Stop reason: HOSPADM

## 2024-01-06 RX ORDER — SODIUM CHLORIDE 9 MG/ML
INJECTION, SOLUTION INTRAVENOUS
Status: CANCELLED | OUTPATIENT
Start: 2024-01-06

## 2024-01-06 RX ORDER — AMIODARONE HYDROCHLORIDE 200 MG/1
200 TABLET ORAL DAILY
COMMUNITY
Start: 2023-10-21

## 2024-01-06 RX ORDER — ATORVASTATIN CALCIUM 80 MG/1
80 TABLET, FILM COATED ORAL DAILY
COMMUNITY

## 2024-01-06 RX ORDER — ALBUTEROL SULFATE 2.5 MG/.5ML
10 SOLUTION RESPIRATORY (INHALATION)
Status: COMPLETED | OUTPATIENT
Start: 2024-01-06 | End: 2024-01-06

## 2024-01-06 RX ORDER — TAMSULOSIN HYDROCHLORIDE 0.4 MG/1
0.4 CAPSULE ORAL DAILY
COMMUNITY
Start: 2023-08-24 | End: 2024-08-23

## 2024-01-06 RX ORDER — SODIUM CHLORIDE 9 MG/ML
INJECTION, SOLUTION INTRAVENOUS ONCE
Status: CANCELLED | OUTPATIENT
Start: 2024-01-06 | End: 2024-01-06

## 2024-01-06 RX ORDER — BENZTROPINE MESYLATE 0.5 MG/1
0.5 TABLET ORAL EVERY 12 HOURS
Status: ON HOLD | COMMUNITY
Start: 2023-10-21 | End: 2024-02-22 | Stop reason: HOSPADM

## 2024-01-06 RX ORDER — MUPIROCIN 20 MG/G
OINTMENT TOPICAL 2 TIMES DAILY
Status: DISPENSED | OUTPATIENT
Start: 2024-01-06 | End: 2024-01-11

## 2024-01-06 RX ORDER — APIXABAN 5 MG/1
5 TABLET, FILM COATED ORAL EVERY 12 HOURS
COMMUNITY
Start: 2023-10-21 | End: 2024-05-20 | Stop reason: DRUGHIGH

## 2024-01-06 RX ORDER — FUROSEMIDE 10 MG/ML
80 INJECTION INTRAMUSCULAR; INTRAVENOUS
Status: COMPLETED | OUTPATIENT
Start: 2024-01-06 | End: 2024-01-06

## 2024-01-06 RX ORDER — CALCIUM GLUCONATE 20 MG/ML
1 INJECTION, SOLUTION INTRAVENOUS
Status: COMPLETED | OUTPATIENT
Start: 2024-01-06 | End: 2024-01-06

## 2024-01-06 RX ORDER — TAMSULOSIN HYDROCHLORIDE 0.4 MG/1
0.4 CAPSULE ORAL DAILY
Status: DISCONTINUED | OUTPATIENT
Start: 2024-01-07 | End: 2024-01-22 | Stop reason: HOSPADM

## 2024-01-06 RX ORDER — PANTOPRAZOLE SODIUM 40 MG/1
40 TABLET, DELAYED RELEASE ORAL DAILY
Status: DISCONTINUED | OUTPATIENT
Start: 2024-01-07 | End: 2024-01-22 | Stop reason: HOSPADM

## 2024-01-06 RX ORDER — ATORVASTATIN CALCIUM 40 MG/1
80 TABLET, FILM COATED ORAL DAILY
Status: DISCONTINUED | OUTPATIENT
Start: 2024-01-07 | End: 2024-01-22 | Stop reason: HOSPADM

## 2024-01-06 RX ORDER — METOPROLOL SUCCINATE 25 MG/1
25 TABLET, EXTENDED RELEASE ORAL DAILY
Status: DISCONTINUED | OUTPATIENT
Start: 2024-01-07 | End: 2024-01-22 | Stop reason: HOSPADM

## 2024-01-06 RX ORDER — CALCIUM GLUCONATE 20 MG/ML
1 INJECTION, SOLUTION INTRAVENOUS EVERY 10 MIN PRN
Status: DISCONTINUED | OUTPATIENT
Start: 2024-01-06 | End: 2024-01-22 | Stop reason: HOSPADM

## 2024-01-06 RX ORDER — INDOMETHACIN 25 MG/1
50 CAPSULE ORAL
Status: COMPLETED | OUTPATIENT
Start: 2024-01-06 | End: 2024-01-06

## 2024-01-06 RX ORDER — AMLODIPINE BESYLATE 10 MG/1
10 TABLET ORAL DAILY
COMMUNITY
Start: 2023-08-24 | End: 2024-08-23

## 2024-01-06 RX ORDER — AMIODARONE HYDROCHLORIDE 200 MG/1
200 TABLET ORAL DAILY
Status: DISCONTINUED | OUTPATIENT
Start: 2024-01-07 | End: 2024-01-22 | Stop reason: HOSPADM

## 2024-01-06 RX ADMIN — ALBUTEROL SULFATE 10 MG: 2.5 SOLUTION RESPIRATORY (INHALATION) at 08:01

## 2024-01-06 RX ADMIN — DEXTROSE MONOHYDRATE 500 ML: 100 INJECTION, SOLUTION INTRAVENOUS at 08:01

## 2024-01-06 RX ADMIN — INSULIN HUMAN 9.07 UNITS: 100 INJECTION, SOLUTION PARENTERAL at 08:01

## 2024-01-06 RX ADMIN — SODIUM BICARBONATE 50 MEQ: 84 INJECTION, SOLUTION INTRAVENOUS at 08:01

## 2024-01-06 RX ADMIN — FUROSEMIDE 80 MG: 10 INJECTION, SOLUTION INTRAVENOUS at 07:01

## 2024-01-06 RX ADMIN — CALCIUM GLUCONATE 1 G: 20 INJECTION, SOLUTION INTRAVENOUS at 08:01

## 2024-01-06 RX ADMIN — MUPIROCIN: 20 OINTMENT TOPICAL at 11:01

## 2024-01-06 RX ADMIN — SODIUM ZIRCONIUM CYCLOSILICATE 10 G: 10 POWDER, FOR SUSPENSION ORAL at 08:01

## 2024-01-06 NOTE — Clinical Note
Left: Abdomen.   Scrubbed with Chlorhexidine/Alcohol.    Hair: N/A.  Skin prep dry before draping.  Prepped by: Rachael Valencia NP 1/9/2024 3:55 AM.

## 2024-01-07 PROBLEM — G93.41 ENCEPHALOPATHY, METABOLIC: Status: ACTIVE | Noted: 2024-01-07

## 2024-01-07 LAB
ALBUMIN SERPL BCP-MCNC: 2.8 G/DL (ref 3.5–5.2)
ALP SERPL-CCNC: 63 U/L (ref 55–135)
ALT SERPL W/O P-5'-P-CCNC: 11 U/L (ref 10–44)
ANION GAP SERPL CALC-SCNC: 17 MMOL/L (ref 8–16)
AST SERPL-CCNC: 10 U/L (ref 10–40)
BASOPHILS # BLD AUTO: 0.01 K/UL (ref 0–0.2)
BASOPHILS NFR BLD: 0.1 % (ref 0–1.9)
BILIRUB SERPL-MCNC: 0.7 MG/DL (ref 0.1–1)
BUN SERPL-MCNC: 98 MG/DL (ref 6–20)
CALCIUM SERPL-MCNC: 8.2 MG/DL (ref 8.7–10.5)
CHLORIDE SERPL-SCNC: 98 MMOL/L (ref 95–110)
CO2 SERPL-SCNC: 23 MMOL/L (ref 23–29)
CREAT SERPL-MCNC: 14.7 MG/DL (ref 0.5–1.4)
DIFFERENTIAL METHOD BLD: ABNORMAL
EOSINOPHIL # BLD AUTO: 0 K/UL (ref 0–0.5)
EOSINOPHIL NFR BLD: 0 % (ref 0–8)
ERYTHROCYTE [DISTWIDTH] IN BLOOD BY AUTOMATED COUNT: 15.5 % (ref 11.5–14.5)
EST. GFR  (NO RACE VARIABLE): 3 ML/MIN/1.73 M^2
GLUCOSE SERPL-MCNC: 80 MG/DL (ref 70–110)
HCT VFR BLD AUTO: 22.9 % (ref 40–54)
HGB BLD-MCNC: 7.8 G/DL (ref 14–18)
IMM GRANULOCYTES # BLD AUTO: 0.11 K/UL (ref 0–0.04)
IMM GRANULOCYTES NFR BLD AUTO: 0.9 % (ref 0–0.5)
LYMPHOCYTES # BLD AUTO: 0.3 K/UL (ref 1–4.8)
LYMPHOCYTES NFR BLD: 2.6 % (ref 18–48)
MAGNESIUM SERPL-MCNC: 1.9 MG/DL (ref 1.6–2.6)
MCH RBC QN AUTO: 26.5 PG (ref 27–31)
MCHC RBC AUTO-ENTMCNC: 34.1 G/DL (ref 32–36)
MCV RBC AUTO: 78 FL (ref 82–98)
MONOCYTES # BLD AUTO: 0.5 K/UL (ref 0.3–1)
MONOCYTES NFR BLD: 3.8 % (ref 4–15)
NEUTROPHILS # BLD AUTO: 12 K/UL (ref 1.8–7.7)
NEUTROPHILS NFR BLD: 92.6 % (ref 38–73)
NRBC BLD-RTO: 0 /100 WBC
PHOSPHATE SERPL-MCNC: 6.7 MG/DL (ref 2.7–4.5)
PLATELET # BLD AUTO: 171 K/UL (ref 150–450)
PMV BLD AUTO: 9.7 FL (ref 9.2–12.9)
POCT GLUCOSE: 102 MG/DL (ref 70–110)
POCT GLUCOSE: 76 MG/DL (ref 70–110)
POTASSIUM SERPL-SCNC: 4.2 MMOL/L (ref 3.5–5.1)
PROT SERPL-MCNC: 8 G/DL (ref 6–8.4)
RBC # BLD AUTO: 2.94 M/UL (ref 4.6–6.2)
SODIUM SERPL-SCNC: 138 MMOL/L (ref 136–145)
WBC # BLD AUTO: 12.91 K/UL (ref 3.9–12.7)

## 2024-01-07 PROCEDURE — 5A1D70Z PERFORMANCE OF URINARY FILTRATION, INTERMITTENT, LESS THAN 6 HOURS PER DAY: ICD-10-PCS | Performed by: INTERNAL MEDICINE

## 2024-01-07 PROCEDURE — 84100 ASSAY OF PHOSPHORUS: CPT | Performed by: STUDENT IN AN ORGANIZED HEALTH CARE EDUCATION/TRAINING PROGRAM

## 2024-01-07 PROCEDURE — 94761 N-INVAS EAR/PLS OXIMETRY MLT: CPT

## 2024-01-07 PROCEDURE — 83735 ASSAY OF MAGNESIUM: CPT | Performed by: STUDENT IN AN ORGANIZED HEALTH CARE EDUCATION/TRAINING PROGRAM

## 2024-01-07 PROCEDURE — 94660 CPAP INITIATION&MGMT: CPT

## 2024-01-07 PROCEDURE — 20000000 HC ICU ROOM

## 2024-01-07 PROCEDURE — 99900035 HC TECH TIME PER 15 MIN (STAT)

## 2024-01-07 PROCEDURE — 25000003 PHARM REV CODE 250: Performed by: INTERNAL MEDICINE

## 2024-01-07 PROCEDURE — 90935 HEMODIALYSIS ONE EVALUATION: CPT

## 2024-01-07 PROCEDURE — 85025 COMPLETE CBC W/AUTO DIFF WBC: CPT | Performed by: STUDENT IN AN ORGANIZED HEALTH CARE EDUCATION/TRAINING PROGRAM

## 2024-01-07 PROCEDURE — 25000003 PHARM REV CODE 250: Performed by: STUDENT IN AN ORGANIZED HEALTH CARE EDUCATION/TRAINING PROGRAM

## 2024-01-07 PROCEDURE — 80100014 HC HEMODIALYSIS 1:1

## 2024-01-07 PROCEDURE — 80053 COMPREHEN METABOLIC PANEL: CPT | Performed by: STUDENT IN AN ORGANIZED HEALTH CARE EDUCATION/TRAINING PROGRAM

## 2024-01-07 PROCEDURE — 27000221 HC OXYGEN, UP TO 24 HOURS

## 2024-01-07 PROCEDURE — 36415 COLL VENOUS BLD VENIPUNCTURE: CPT | Performed by: STUDENT IN AN ORGANIZED HEALTH CARE EDUCATION/TRAINING PROGRAM

## 2024-01-07 RX ADMIN — METOPROLOL SUCCINATE 25 MG: 25 TABLET, EXTENDED RELEASE ORAL at 09:01

## 2024-01-07 RX ADMIN — ALLOPURINOL 100 MG: 100 TABLET ORAL at 09:01

## 2024-01-07 RX ADMIN — APIXABAN 5 MG: 5 TABLET, FILM COATED ORAL at 09:01

## 2024-01-07 RX ADMIN — ATORVASTATIN CALCIUM 80 MG: 40 TABLET, FILM COATED ORAL at 09:01

## 2024-01-07 RX ADMIN — PANTOPRAZOLE SODIUM 40 MG: 40 TABLET, DELAYED RELEASE ORAL at 09:01

## 2024-01-07 RX ADMIN — MUPIROCIN: 20 OINTMENT TOPICAL at 09:01

## 2024-01-07 RX ADMIN — DEXTROSE MONOHYDRATE 125 ML: 100 INJECTION, SOLUTION INTRAVENOUS at 10:01

## 2024-01-07 RX ADMIN — AMIODARONE HYDROCHLORIDE 200 MG: 200 TABLET ORAL at 09:01

## 2024-01-07 RX ADMIN — TAMSULOSIN HYDROCHLORIDE 0.4 MG: 0.4 CAPSULE ORAL at 09:01

## 2024-01-07 RX ADMIN — SODIUM ZIRCONIUM CYCLOSILICATE 10 G: 10 POWDER, FOR SUSPENSION ORAL at 12:01

## 2024-01-07 NOTE — SUBJECTIVE & OBJECTIVE
Interval History: Pt seen off bipap. Able to tell me his name and states feeling fine. No pain reported. Son and daughter at bedside state pt has not had dialysis since before [place]    Review of Systems  Objective:     Vital Signs (Most Recent):  Temp: 98.4 °F (36.9 °C) (01/07/24 1200)  Pulse: 73 (01/07/24 1200)  Resp: 17 (01/07/24 1200)  BP: (!) 122/56 (01/07/24 1200)  SpO2: 97 % (01/07/24 1200) Vital Signs (24h Range):  Temp:  [96.3 °F (35.7 °C)-98.5 °F (36.9 °C)] 98.4 °F (36.9 °C)  Pulse:  [59-94] 73  Resp:  [12-35] 17  SpO2:  [82 %-100 %] 97 %  BP: (100-168)/() 122/56     Weight: 93.3 kg (205 lb 11 oz)  Body mass index is 32.22 kg/m².    Intake/Output Summary (Last 24 hours) at 1/7/2024 1301  Last data filed at 1/7/2024 0700  Gross per 24 hour   Intake 893.5 ml   Output 4000 ml   Net -3106.5 ml         Physical Exam  Vitals reviewed.   Constitutional:       General: He is not in acute distress.     Appearance: He is ill-appearing (chronic).   HENT:      Head: Normocephalic and atraumatic.      Mouth/Throat:      Mouth: Mucous membranes are dry.      Pharynx: Oropharynx is clear.   Eyes:      General: No scleral icterus.     Extraocular Movements: Extraocular movements intact.      Pupils: Pupils are equal, round, and reactive to light.   Cardiovascular:      Rate and Rhythm: Normal rate. Rhythm irregular.   Pulmonary:      Effort: Pulmonary effort is normal. No respiratory distress (on NC).   Abdominal:      General: There is distension.      Tenderness: There is no abdominal tenderness. There is no guarding or rebound.   Musculoskeletal:         General: Swelling present. No tenderness.   Skin:     General: Skin is warm and dry.   Neurological:      General: No focal deficit present.      Mental Status: He is alert.             Significant Labs: All pertinent labs within the past 24 hours have been reviewed.    Significant Imaging: I have reviewed all pertinent imaging results/findings within the past  24 hours.
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
## 2024-01-07 NOTE — ED TRIAGE NOTES
Pt to the ED via EMS from home with complaints of shortness of breath x2 days, worsening today. Per EMS report, pt was 80% SpO2 on room air, placed on cpap and came up to 100%. Pt placed on bipap on arrival to the ED, pt appears short of breath and has generalized edema, mostly in bilateral upper and lower extremities and his abdomen. Pt reports being a dialysis pt but hasn't received dialysis in two weeks due to moving here from [place]. Pt denies chest pain, abdominal pain, fever/chills.
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
## 2024-01-07 NOTE — PLAN OF CARE
Pt free from fall/injury, remains on bedrest, turned Q2. HD at bedside at 0315. No s/s of infection noted, cleaned and applied new sterile dressing to HD cath upon arrival to floor. Condom cath in place, son states that pt does still urinate. Labs rescheduled until after HD is completed

## 2024-01-07 NOTE — NURSING
[facility]  Nurses Note -- 4 Eyes      1/7/2024       Skin assessed on: Q Shift      [x] No Pressure Injuries Present    [x]Prevention Measures Documented    [] Yes LDA  for Pressure Injury Previously documented     [] Yes New Pressure Injury Discovered   [] LDA for New Pressure Injury Added      Attending RN:  Yvette Elena RN     Second RN:  BETO Judge

## 2024-01-07 NOTE — ED NOTES
Introduced self to the patient and daughter. Pt is on BIPAP, pt has generalized pitting edema, dialysis access port on left arm and right subclavian. Pt's daughter states he missed out on dialysis for 2 weeks. Pt has a 22G Iv cannula on right posterior forearm. Vitals stable. Patient has A Fib , daughter states pt has a history of A fib but not sure if he is on blood thinners. Pt states feeling better at this time     Daughter at bedside
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
## 2024-01-07 NOTE — ADMISSIONCARE
AdmissionCare    Guideline: Respiratory Failure, Inpatient    Based on the indications selected for the patient, the bed status of Admit to Inpatient was determined to be MET    The following indications were selected as present at the time of evaluation of the patient:      - New (acute) need for mechanical invasive or noninvasive (eg, bilevel positive airway pressure (BPAP), volume-assured pressure support (VAPS), or volume control) ventilation    AdmissionCare documentation entered by: Nuria Leos    [facility], 27th edition, Copyright © 2023 [facility] [employer], [facility] All Rights Reserved.  [id number]

## 2024-01-07 NOTE — EICU
EICU BRIEF ADMIT NOTE:    HISTORY:  Respiratory failure, ESRD Please refer to H/P and ER notes for detail    CAMERA ASSESSMENT: Two way audiovisual assessment was done: Yes    Telemetry was reviewed. Medical records including notes, labs and imaging were reviewed.Yes    DISCUSSED with bedside nurse.Yes    ASSESSMENT AND PLAN:    # ESRD with electrolyte derangement: Due to missed HD sessions for 2 weeks. Plan Urgent HD, [facility]  # Acute respiratory failure: BiPAP and oxygen  # Atrial Fibrillation: Apixaban and Metoprolol      BEST PRACTICES REVIEW:    INTUBATED: NO  GLYCEMIN CONTROL:  Diabetes: Yes ; Started on SSI  STRESS ULCER PROPHYLAXIS:  PPI   DVT PROPHYLAXIS:  Already anticoagulated    Thank You for allowing EICU to participate in the care of the patient. Please call as needed      Albert Rolle MD  EICU  Critical Care Medicine
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
## 2024-01-08 LAB
ALBUMIN SERPL BCP-MCNC: 2.6 G/DL (ref 3.5–5.2)
ALP SERPL-CCNC: 64 U/L (ref 55–135)
ALT SERPL W/O P-5'-P-CCNC: 13 U/L (ref 10–44)
ANION GAP SERPL CALC-SCNC: 20 MMOL/L (ref 8–16)
AST SERPL-CCNC: 10 U/L (ref 10–40)
BASOPHILS # BLD AUTO: 0.01 K/UL (ref 0–0.2)
BASOPHILS NFR BLD: 0.1 % (ref 0–1.9)
BILIRUB SERPL-MCNC: 0.7 MG/DL (ref 0.1–1)
BUN SERPL-MCNC: 116 MG/DL (ref 6–20)
CALCIUM SERPL-MCNC: 8.1 MG/DL (ref 8.7–10.5)
CHLORIDE SERPL-SCNC: 98 MMOL/L (ref 95–110)
CO2 SERPL-SCNC: 19 MMOL/L (ref 23–29)
CREAT SERPL-MCNC: 15.6 MG/DL (ref 0.5–1.4)
DIFFERENTIAL METHOD BLD: ABNORMAL
EOSINOPHIL # BLD AUTO: 0.1 K/UL (ref 0–0.5)
EOSINOPHIL NFR BLD: 0.6 % (ref 0–8)
ERYTHROCYTE [DISTWIDTH] IN BLOOD BY AUTOMATED COUNT: 15.6 % (ref 11.5–14.5)
EST. GFR  (NO RACE VARIABLE): 3 ML/MIN/1.73 M^2
GLUCOSE SERPL-MCNC: 79 MG/DL (ref 70–110)
HBV SURFACE AG SERPL QL IA: NORMAL
HCT VFR BLD AUTO: 23.4 % (ref 40–54)
HGB BLD-MCNC: 8 G/DL (ref 14–18)
IMM GRANULOCYTES # BLD AUTO: 0.13 K/UL (ref 0–0.04)
IMM GRANULOCYTES NFR BLD AUTO: 1.3 % (ref 0–0.5)
LYMPHOCYTES # BLD AUTO: 1.1 K/UL (ref 1–4.8)
LYMPHOCYTES NFR BLD: 10.7 % (ref 18–48)
MAGNESIUM SERPL-MCNC: 1.9 MG/DL (ref 1.6–2.6)
MCH RBC QN AUTO: 27 PG (ref 27–31)
MCHC RBC AUTO-ENTMCNC: 34.2 G/DL (ref 32–36)
MCV RBC AUTO: 79 FL (ref 82–98)
MONOCYTES # BLD AUTO: 0.9 K/UL (ref 0.3–1)
MONOCYTES NFR BLD: 9 % (ref 4–15)
NEUTROPHILS # BLD AUTO: 7.9 K/UL (ref 1.8–7.7)
NEUTROPHILS NFR BLD: 78.3 % (ref 38–73)
NRBC BLD-RTO: 0 /100 WBC
PHOSPHATE SERPL-MCNC: 8.5 MG/DL (ref 2.7–4.5)
PLATELET # BLD AUTO: 177 K/UL (ref 150–450)
PMV BLD AUTO: 10.3 FL (ref 9.2–12.9)
POCT GLUCOSE: 113 MG/DL (ref 70–110)
POCT GLUCOSE: 159 MG/DL (ref 70–110)
POCT GLUCOSE: 80 MG/DL (ref 70–110)
POCT GLUCOSE: 82 MG/DL (ref 70–110)
POTASSIUM SERPL-SCNC: 4.8 MMOL/L (ref 3.5–5.1)
PROT SERPL-MCNC: 8 G/DL (ref 6–8.4)
RBC # BLD AUTO: 2.96 M/UL (ref 4.6–6.2)
SODIUM SERPL-SCNC: 137 MMOL/L (ref 136–145)
WBC # BLD AUTO: 10.03 K/UL (ref 3.9–12.7)

## 2024-01-08 PROCEDURE — 84100 ASSAY OF PHOSPHORUS: CPT | Performed by: STUDENT IN AN ORGANIZED HEALTH CARE EDUCATION/TRAINING PROGRAM

## 2024-01-08 PROCEDURE — 85025 COMPLETE CBC W/AUTO DIFF WBC: CPT | Performed by: STUDENT IN AN ORGANIZED HEALTH CARE EDUCATION/TRAINING PROGRAM

## 2024-01-08 PROCEDURE — 25000003 PHARM REV CODE 250: Performed by: STUDENT IN AN ORGANIZED HEALTH CARE EDUCATION/TRAINING PROGRAM

## 2024-01-08 PROCEDURE — 36415 COLL VENOUS BLD VENIPUNCTURE: CPT | Performed by: STUDENT IN AN ORGANIZED HEALTH CARE EDUCATION/TRAINING PROGRAM

## 2024-01-08 PROCEDURE — 87340 HEPATITIS B SURFACE AG IA: CPT | Performed by: INTERNAL MEDICINE

## 2024-01-08 PROCEDURE — 99900035 HC TECH TIME PER 15 MIN (STAT)

## 2024-01-08 PROCEDURE — 80053 COMPREHEN METABOLIC PANEL: CPT | Performed by: STUDENT IN AN ORGANIZED HEALTH CARE EDUCATION/TRAINING PROGRAM

## 2024-01-08 PROCEDURE — 11000001 HC ACUTE MED/SURG PRIVATE ROOM

## 2024-01-08 PROCEDURE — 86706 HEP B SURFACE ANTIBODY: CPT | Performed by: INTERNAL MEDICINE

## 2024-01-08 PROCEDURE — 83735 ASSAY OF MAGNESIUM: CPT | Performed by: STUDENT IN AN ORGANIZED HEALTH CARE EDUCATION/TRAINING PROGRAM

## 2024-01-08 PROCEDURE — 80100014 HC HEMODIALYSIS 1:1

## 2024-01-08 RX ADMIN — TAMSULOSIN HYDROCHLORIDE 0.4 MG: 0.4 CAPSULE ORAL at 08:01

## 2024-01-08 RX ADMIN — PANTOPRAZOLE SODIUM 40 MG: 40 TABLET, DELAYED RELEASE ORAL at 08:01

## 2024-01-08 RX ADMIN — MUPIROCIN: 20 OINTMENT TOPICAL at 08:01

## 2024-01-08 RX ADMIN — APIXABAN 5 MG: 5 TABLET, FILM COATED ORAL at 08:01

## 2024-01-08 RX ADMIN — MUPIROCIN: 20 OINTMENT TOPICAL at 09:01

## 2024-01-08 RX ADMIN — AMIODARONE HYDROCHLORIDE 200 MG: 200 TABLET ORAL at 08:01

## 2024-01-08 RX ADMIN — ATORVASTATIN CALCIUM 80 MG: 40 TABLET, FILM COATED ORAL at 08:01

## 2024-01-08 RX ADMIN — APIXABAN 5 MG: 5 TABLET, FILM COATED ORAL at 09:01

## 2024-01-08 RX ADMIN — ALLOPURINOL 100 MG: 100 TABLET ORAL at 08:01

## 2024-01-08 RX ADMIN — METOPROLOL SUCCINATE 25 MG: 25 TABLET, EXTENDED RELEASE ORAL at 08:01

## 2024-01-08 NOTE — NURSING
[facility]  ICU Shift Summary  Date: 1/8/2024      Prehospitalization: Home  Admit Date / LOS : 1/6/2024/ 2 days    Diagnosis: Acute hypoxemic respiratory failure    Consults:        Active: Nephro and Pulm CC       Needed: N/A     Code Status: Full Code   Advanced Directive: <no information>    LDA:  Lines/Drains/Airways       Central Venous Catheter Line  Duration                  Hemodialysis Catheter  right subclavian -- days              Drain  Duration             Male External Urinary Catheter 01/06/24 2212 Large 1 day              Peripheral Intravenous Line  Duration                  Hemodialysis AV Fistula Left upper arm -- days         Peripheral IV - Single Lumen 01/06/24 2138 20 G Distal;Posterior;Right Forearm 1 day                  Central Lines/Site/Justification:Patient Does Not Have Central Line  Urinary Cath/Order/Justification:Patient Does Not Have Urinary Catheter    Vasopressors/Infusions:        GOALS: Volume/ Hemodynamic: N/A                     RASS: 0  alert and calm    Pain Management: none       Pain Controlled: yes     Rhythm: A-Fib    Respiratory Device: Nasal Cannula                      Most Recent SBT/ SAT: N/A       MOVE Screen: FAIL  ICU Liberation: yes    VTE Prophylaxis: Pharm and Mechanical  Mobility: Bedrest  Stress Ulcer Prophylaxis: No    Isolation: No active isolations    Dietary:   Current Diet Order   Procedures    Diet renal      Tolerance: yes  Advancement: @ goal    I & O (24h):    Intake/Output Summary (Last 24 hours) at 1/8/2024 1731  Last data filed at 1/8/2024 1415  Gross per 24 hour   Intake 700 ml   Output 5565 ml   Net -4865 ml        Restraints: No    Significant Dates:  Post Op Date: N/A  Rescue Date: N/A  Imaging/ Diagnostics: N/A    Noteworthy Labs:  none    COVID Test: (--)  CBC/Anemia Labs: Coags:    Recent Labs   Lab 01/07/24  0433 01/08/24  0450   WBC 12.91* 10.03   HGB 7.8* 8.0*   HCT 22.9* 23.4*    177   MCV 78* 79*   RDW 15.5*  "15.6*    No results for input(s): "PT", "INR", "APTT" in the last 168 hours.     Chemistries:   Recent Labs   Lab 01/07/24  1230 01/08/24  0450    137   K 4.2 4.8   CL 98 98   CO2 23 19*   BUN 98* 116*   CREATININE 14.7* 15.6*   CALCIUM 8.2* 8.1*   PROT 8.0 8.0   BILITOT 0.7 0.7   ALKPHOS 63 64   ALT 11 13   AST 10 10   MG 1.9 1.9   PHOS 6.7* 8.5*        Cardiac Enzymes: Ejection Fractions:    Recent Labs     01/06/24  1839   TROPONINI 0.025    No results found for: "EF"     POCT Glucose: HbA1c:    Recent Labs   Lab 01/07/24  2354 01/08/24  1126 01/08/24  1725   POCTGLUCOSE 102 82 159*    Hemoglobin A1C   Date Value Ref Range Status   03/15/2022 5.5 4.0 - 5.6 % Final     Comment:     ADA Screening Guidelines:  5.7-6.4%  Consistent with prediabetes  >or=6.5%  Consistent with diabetes    High levels of fetal hemoglobin interfere with the HbA1C  assay. Heterozygous hemoglobin variants (HbS, HgC, etc)do  not significantly interfere with this assay.   However, presence of multiple variants may affect accuracy.             ICU LOS 1d 19h  Level of Care: OK to Transfer    Chart Check: 12 HR Done  Shift Summary/Plan for the   "

## 2024-01-08 NOTE — PROGRESS NOTES
Date of Admission:1/6/2024    SUBJECTIVE:breathing ok    Current Facility-Administered Medications   Medication    allopurinoL tablet 100 mg    amiodarone tablet 200 mg    apixaban tablet 5 mg    atorvastatin tablet 80 mg    calcium gluconate 1 g in NS IVPB (premixed)    dextrose 10% bolus 125 mL 125 mL    dextrose 10% bolus 250 mL 250 mL    metoprolol succinate (TOPROL-XL) 24 hr tablet 25 mg    mupirocin 2 % ointment    pantoprazole EC tablet 40 mg    tamsulosin 24 hr capsule 0.4 mg       Wt Readings from Last 3 Encounters:   01/06/24 93.3 kg (205 lb 11 oz)   11/21/22 82.7 kg (182 lb 6.9 oz)   11/21/22 82.7 kg (182 lb 6.9 oz)     Temp Readings from Last 3 Encounters:   01/08/24 97 °F (36.1 °C)   11/21/22 96.4 °F (35.8 °C) (Oral)   09/13/22 97.7 °F (36.5 °C) (Oral)     BP Readings from Last 3 Encounters:   01/08/24 107/69   11/21/22 (!) 199/103   11/21/22 (!) 189/108     Pulse Readings from Last 3 Encounters:   01/08/24 88   11/21/22 92   11/21/22 98       Intake/Output Summary (Last 24 hours) at 1/8/2024 1547  Last data filed at 1/8/2024 1415  Gross per 24 hour   Intake 700 ml   Output 5565 ml   Net -4865 ml       PE:  GEN:wd male in nad  HEENT:ncat,eomi,mmm  CVS:s1s2 regular  PULM:rales  ABD:bs,soft  EXT:2+leg edema  NEURO:awake  Pc of chest    Recent Labs   Lab 01/08/24  0450   GLU 79      K 4.8   CL 98   CO2 19*   *   CREATININE 15.6*   CALCIUM 8.1*   MG 1.9       Lab Results   Component Value Date    .1 (H) 03/15/2022    CALCIUM 8.1 (L) 01/08/2024    PHOS 8.5 (H) 01/08/2024       Recent Labs   Lab 01/08/24  0450   WBC 10.03   RBC 2.96*   HGB 8.0*   HCT 23.4*      MCV 79*   MCH 27.0   MCHC 34.2           A/P:  1.esrd. hd today. Cont tx MWF.  2.htn. uf with hd again.  3.anemai 2nd to esrd. Epo.  4.2nd hyperpara. Cont binders.  Ana Rosa phos.  5.acute resp failure. Better breathing, uf more again today.  6.dm2. Following sugars.  7.hyperkalemia. resolved. Cont 2k bath.

## 2024-01-08 NOTE — HOSPITAL COURSE
58M with esrd who recently came back from [place] for [place] and decided to stay, but did not set up dialysis here. Presented due to encephalopathy and respiratory distress. Pt initially requiring bipap now weaned to NC with dialysis. Nephrology following for HD. Very volume overloaded. Underwent therapeutic paracentesis 1/9 with 5L removed. May need another prior to discharge. CM working on HD outpt set up. PT/OT consulted. S/p HD yesterday with 3500 ml fluids removed yesterday, spiking fever. Blood Cx x 2 ordered and CXR Lungs are little clearer than they were on the previous exam but there is still loss of volume at the lung bases particularly on the right. Very alert, verbal, oriented x 3.    Stop O2, stop Bipap nightly, remove condom cath. Appearing depressed, would like to initiate antidepressant. Temp increase to 99.7 and mild WBC# increase, could be skin/wound infection. Will start 7 days oral Augmentin, end 1/26. Fever curve improved.     Medically ready to go. Awaiting for HD chair. Patient getting restless, wanting to leave. Unfortunately, he has been turned down by HD centers thus far due to history of non-compliance in the past. Son and daughter came up yesterday 1/21, to discuss options, understanding that we may have to discharge without a chair secured.

## 2024-01-08 NOTE — PLAN OF CARE
Pt free from fall/injury, remains on bedrest, turned Q2. Accuchecks ordered Q6. HD to be done again today. VSS, afebrile, no s/s of infection noted

## 2024-01-08 NOTE — NURSING
[facility] [place]  Nurses Note -- 4 Eyes      1/8/2024       Skin assessed on: Q Shift      [x] No Pressure Injuries Present    [x]Prevention Measures Documented    [] Yes LDA  for Pressure Injury Previously documented     [] Yes New Pressure Injury Discovered   [] LDA for New Pressure Injury Added      Attending RN:  Dorcas OROZCO RN     Second RN:  BETO Crawford

## 2024-01-08 NOTE — PLAN OF CARE
Patient remain in Icu as boarder alert, conscious but confused, o2 via nasal cannula @ 2l/min, HD done today Accucheck QX6 hrly . Free of fall ,injury and skin breakdown in a shift. POC reviewed with patient and daughter expressed understanding.  Problem: Device-Related Complication Risk (Hemodialysis)  Goal: Safe, Effective Therapy Delivery  Outcome: Ongoing, Progressing     Problem: Hemodynamic Instability (Hemodialysis)  Goal: Effective Tissue Perfusion  Outcome: Ongoing, Progressing     Problem: Infection (Hemodialysis)  Goal: Absence of Infection Signs and Symptoms  Outcome: Ongoing, Progressing     Problem: Adult Inpatient Plan of Care  Goal: Plan of Care Review  Outcome: Ongoing, Progressing  Goal: Patient-Specific Goal (Individualized)  Outcome: Ongoing, Progressing  Goal: Absence of Hospital-Acquired Illness or Injury  Outcome: Ongoing, Progressing  Goal: Optimal Comfort and Wellbeing  Outcome: Ongoing, Progressing  Goal: Readiness for Transition of Care  Outcome: Ongoing, Progressing     Problem: Diabetes Comorbidity  Goal: Blood Glucose Level Within Targeted Range  Outcome: Ongoing, Progressing     Problem: Infection  Goal: Absence of Infection Signs and Symptoms  Outcome: Ongoing, Progressing     Problem: Fall Injury Risk  Goal: Absence of Fall and Fall-Related Injury  Outcome: Ongoing, Progressing     Problem: Skin Injury Risk Increased  Goal: Skin Health and Integrity  Outcome: Ongoing, Progressing

## 2024-01-08 NOTE — PLAN OF CARE
01/08/24 1120   Post-Acute Status   Post-Acute Authorization Dialysis   Diaylsis Status Referrals Sent   Coverage Payor: MEDICARE - MEDICARE PART A & B   Patient choice form signed by patient/caregiver List from System Post-Acute Care  (Dialysis F[facility])   Discharge Delays None known at this time   Discharge Plan   Discharge Plan A Home with family   Discharge Plan B Home     [facility] ([phone])

## 2024-01-08 NOTE — PLAN OF CARE
This patient has been screened for Case Management needs.  Based on (documentation in medical record/communication with attending physician), patient will need new dialysis placement.  Patient moved from [place], [place] around December and has not re-establish service wth a dialysis provider in this area.  CM to assist with arranging OP dialysis.    Case Management/Social Work remains available if a need arises, please enter consult for assistance.  For urgent needs contact Case Management Department/on-call at:  [phone].

## 2024-01-08 NOTE — EICU
Intervention Initiated From:  Bedside    Deysi intervened regarding:  Other    Nurse Notified:  Yes    Doctor Notified:  Yes    Comments: Bedside RN requests order for Accucheck Q6 hours, patient is diabetic, not eating and lethargic with GLU of 76.  TAMMY Thacker M.D. notified.

## 2024-01-08 NOTE — SUBJECTIVE & OBJECTIVE
Interval History:  Patient states plan is to stay in [place].  Patient denies shortness or breath at this time, but still feels like there is a lot of swelling.    Review of Systems  Objective:     Vital Signs (Most Recent):  Temp: 97.1 °F (36.2 °C) (01/08/24 0701)  Pulse: 64 (01/08/24 1000)  Resp: 11 (01/08/24 1000)  BP: 125/72 (01/08/24 1000)  SpO2: 97 % (01/08/24 1000) Vital Signs (24h Range):  Temp:  [97.1 °F (36.2 °C)-98.4 °F (36.9 °C)] 97.1 °F (36.2 °C)  Pulse:  [63-83] 64  Resp:  [7-45] 11  SpO2:  [92 %-100 %] 97 %  BP: (102-169)/(53-95) 125/72     Weight: 93.3 kg (205 lb 11 oz)  Body mass index is 32.22 kg/m².    Intake/Output Summary (Last 24 hours) at 1/8/2024 1025  Last data filed at 1/8/2024 0900  Gross per 24 hour   Intake 200 ml   Output 65 ml   Net 135 ml         Physical Exam  Vitals reviewed.   Constitutional:       General: He is not in acute distress.     Appearance: He is ill-appearing.   HENT:      Head: Normocephalic and atraumatic.      Mouth/Throat:      Mouth: Mucous membranes are dry.      Pharynx: Oropharynx is clear.   Cardiovascular:      Rate and Rhythm: Normal rate. Rhythm irregular.   Pulmonary:      Effort: Pulmonary effort is normal. No respiratory distress (on NA).   Abdominal:      Palpations: Abdomen is soft.      Tenderness: There is no abdominal tenderness.   Musculoskeletal:         General: Swelling (hardened chronic swelling of le) present. No tenderness.   Skin:     General: Skin is warm and dry.   Neurological:      General: No focal deficit present.      Mental Status: He is alert and oriented to person, place, and time.             Significant Labs: All pertinent labs within the past 24 hours have been reviewed.    Significant Imaging: I have reviewed all pertinent imaging results/findings within the past 24 hours.

## 2024-01-08 NOTE — PLAN OF CARE
SW attempted contact with patient's daughter, Rima, at [phone].  This number was not in service.  SW called [phone] and spoke with patient's son, Guevara, to discuss arranging OP dialysis.  Patient's son provided SW with a contact number ([phone]) for his sister, Rima. SW spoke with Xenia and advise her of the  dialysis facility closest to the home address. ([facility] [place] [place] [facility] [place], [place]- 1.8 miles from patient's home address.  Patient's daughter stated that she had spoken with Dr. Ellis who stated that she was not certain if she would accept the patient.  SW advised patient's daughter that patient's information could be submitted to [facility] and if patient is not accepted by Dr. Ellis, we might try another provider or location.  Daughter verbalized understanding. (10:53 a.m.).

## 2024-01-08 NOTE — NURSING
Notified eICU of need for accuchecks, pt ESRD on dialysis, confused, not eating. Spot check is 76 now. Awaiting orders, requested Q6

## 2024-01-08 NOTE — PLAN OF CARE
Case Management Assessment     PCP: Cruz Hernandez  Pharmacy: [facility] and [facility]                     [facility]    Patient Arrived From: home   Existing Help at Home: daughter    Barriers to Discharge: none    Discharge Plan:    A. Home with family   B. Home with family      SW completed brief assessment and discussed discharge planning with patient's daughter Rima on the phone. Patient lives with his adult children they are his support system. Patient recently move here from [place]. Daughter stated that he was on dialysis while he was in [place] but they have not get a place for him her. Patient's daughter will provide transportation for her to get home when discharge from the hospital.      01/07/24 3845   Discharge Assessment   Assessment Type Discharge Planning Assessment   Confirmed/corrected address, phone number and insurance Yes   Confirmed Demographics Correct on Facesheet   Source of Information family   Communicated JAIME with patient/caregiver Date not available/Unable to determine   Reason For Admission Acute hypoxemic respiratory failure   People in Home child(macarena), adult   Do you expect to return to your current living situation? Yes   Do you have help at home or someone to help you manage your care at home? Yes   Who are your caregiver(s) and their phone number(s)? Rima Cespedes (Daughter) [phone] (Mobile)   Prior to hospitilization cognitive status: Alert/Oriented   Current cognitive status: Alert/Oriented   Walking or Climbing Stairs Difficulty yes   Mobility Management Rolling walker   Dressing/Bathing Difficulty no   Equipment Currently Used at Home none   Readmission within 30 days? No   Patient currently being followed by outpatient case management? No   Do you currently have service(s) that help you manage your care at home? No   Do you take prescription medications? Yes   Do you have prescription coverage? Yes   Coverage Medicare   Do you have any  problems affording any of your prescribed medications? No   Is the patient taking medications as prescribed? yes   Who is going to help you get home at discharge? Rima Cespedes (Daughter) [phone] (Mobile)   How do you get to doctors appointments? family or friend will provide   Are you on dialysis? No   Do you take coumadin? No   Discharge Plan A Home with family   Discharge Plan B Home with family   DME Needed Upon Discharge  none   Discharge Plan discussed with: Adult children   Transition of Care Barriers None   OTHER   Name(s) of People in Home Rima Cespedes (Daughter) [phone] (Mobile)

## 2024-01-08 NOTE — NURSING
[facility] [place]  Nurses Note -- 4 Eyes      1/7/2024       Skin assessed on: Q Shift      [x] No Pressure Injuries Present    [x]Prevention Measures Documented    [] Yes LDA  for Pressure Injury Previously documented     [] Yes New Pressure Injury Discovered   [] LDA for New Pressure Injury Added      Attending RN:  Yvette Elena RN     Second RN:  BETO Cabrera

## 2024-01-08 NOTE — EICU
EICU Physician Brief Note - Overnight Events    Patient G 74.  Ordered hypoglycemia protocol with BG checks q6h.  If persistently hypoglycemic, consider maintenance fluids with D5.  Eicu is available should acute issues arise.
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
## 2024-01-09 PROBLEM — R14.0 ABDOMINAL DISTENSION: Status: ACTIVE | Noted: 2024-01-09

## 2024-01-09 PROBLEM — R06.02 SHORTNESS OF BREATH: Status: ACTIVE | Noted: 2024-01-09

## 2024-01-09 LAB
ALBUMIN SERPL BCP-MCNC: 2.4 G/DL (ref 3.5–5.2)
ALP SERPL-CCNC: 71 U/L (ref 55–135)
ALT SERPL W/O P-5'-P-CCNC: 11 U/L (ref 10–44)
ANION GAP SERPL CALC-SCNC: 16 MMOL/L (ref 8–16)
AST SERPL-CCNC: 11 U/L (ref 10–40)
BASOPHILS # BLD AUTO: 0.01 K/UL (ref 0–0.2)
BASOPHILS NFR BLD: 0.1 % (ref 0–1.9)
BILIRUB SERPL-MCNC: 0.5 MG/DL (ref 0.1–1)
BUN SERPL-MCNC: 69 MG/DL (ref 6–20)
CALCIUM SERPL-MCNC: 8 MG/DL (ref 8.7–10.5)
CHLORIDE SERPL-SCNC: 98 MMOL/L (ref 95–110)
CO2 SERPL-SCNC: 20 MMOL/L (ref 23–29)
CREAT SERPL-MCNC: 10.3 MG/DL (ref 0.5–1.4)
DIFFERENTIAL METHOD BLD: ABNORMAL
EOSINOPHIL # BLD AUTO: 0.1 K/UL (ref 0–0.5)
EOSINOPHIL NFR BLD: 1 % (ref 0–8)
ERYTHROCYTE [DISTWIDTH] IN BLOOD BY AUTOMATED COUNT: 16 % (ref 11.5–14.5)
EST. GFR  (NO RACE VARIABLE): 5 ML/MIN/1.73 M^2
GLUCOSE SERPL-MCNC: 90 MG/DL (ref 70–110)
HCT VFR BLD AUTO: 26.4 % (ref 40–54)
HGB BLD-MCNC: 8.7 G/DL (ref 14–18)
IMM GRANULOCYTES # BLD AUTO: 0.05 K/UL (ref 0–0.04)
IMM GRANULOCYTES NFR BLD AUTO: 0.5 % (ref 0–0.5)
LYMPHOCYTES # BLD AUTO: 0.5 K/UL (ref 1–4.8)
LYMPHOCYTES NFR BLD: 5.3 % (ref 18–48)
MAGNESIUM SERPL-MCNC: 1.8 MG/DL (ref 1.6–2.6)
MCH RBC QN AUTO: 26.9 PG (ref 27–31)
MCHC RBC AUTO-ENTMCNC: 33 G/DL (ref 32–36)
MCV RBC AUTO: 82 FL (ref 82–98)
MONOCYTES # BLD AUTO: 1 K/UL (ref 0.3–1)
MONOCYTES NFR BLD: 9.5 % (ref 4–15)
NEUTROPHILS # BLD AUTO: 8.6 K/UL (ref 1.8–7.7)
NEUTROPHILS NFR BLD: 83.6 % (ref 38–73)
NRBC BLD-RTO: 0 /100 WBC
PHOSPHATE SERPL-MCNC: 5.7 MG/DL (ref 2.7–4.5)
PLATELET # BLD AUTO: 190 K/UL (ref 150–450)
PMV BLD AUTO: 10.1 FL (ref 9.2–12.9)
POCT GLUCOSE: 155 MG/DL (ref 70–110)
POCT GLUCOSE: 92 MG/DL (ref 70–110)
POTASSIUM SERPL-SCNC: 4.3 MMOL/L (ref 3.5–5.1)
PROT SERPL-MCNC: 7.8 G/DL (ref 6–8.4)
RBC # BLD AUTO: 3.24 M/UL (ref 4.6–6.2)
SODIUM SERPL-SCNC: 134 MMOL/L (ref 136–145)
WBC # BLD AUTO: 10.25 K/UL (ref 3.9–12.7)

## 2024-01-09 PROCEDURE — 11000001 HC ACUTE MED/SURG PRIVATE ROOM

## 2024-01-09 PROCEDURE — 36415 COLL VENOUS BLD VENIPUNCTURE: CPT | Performed by: STUDENT IN AN ORGANIZED HEALTH CARE EDUCATION/TRAINING PROGRAM

## 2024-01-09 PROCEDURE — 27000221 HC OXYGEN, UP TO 24 HOURS

## 2024-01-09 PROCEDURE — 80053 COMPREHEN METABOLIC PANEL: CPT | Performed by: STUDENT IN AN ORGANIZED HEALTH CARE EDUCATION/TRAINING PROGRAM

## 2024-01-09 PROCEDURE — 25000003 PHARM REV CODE 250: Performed by: NURSE PRACTITIONER

## 2024-01-09 PROCEDURE — 85025 COMPLETE CBC W/AUTO DIFF WBC: CPT | Performed by: STUDENT IN AN ORGANIZED HEALTH CARE EDUCATION/TRAINING PROGRAM

## 2024-01-09 PROCEDURE — 99223 1ST HOSP IP/OBS HIGH 75: CPT | Mod: ,,, | Performed by: RADIOLOGY

## 2024-01-09 PROCEDURE — 84100 ASSAY OF PHOSPHORUS: CPT | Performed by: STUDENT IN AN ORGANIZED HEALTH CARE EDUCATION/TRAINING PROGRAM

## 2024-01-09 PROCEDURE — 99223 1ST HOSP IP/OBS HIGH 75: CPT | Mod: 25,,, | Performed by: NURSE PRACTITIONER

## 2024-01-09 PROCEDURE — 99900035 HC TECH TIME PER 15 MIN (STAT)

## 2024-01-09 PROCEDURE — 0W9G3ZZ DRAINAGE OF PERITONEAL CAVITY, PERCUTANEOUS APPROACH: ICD-10-PCS | Performed by: RADIOLOGY

## 2024-01-09 PROCEDURE — 83735 ASSAY OF MAGNESIUM: CPT | Performed by: STUDENT IN AN ORGANIZED HEALTH CARE EDUCATION/TRAINING PROGRAM

## 2024-01-09 PROCEDURE — 25000003 PHARM REV CODE 250: Performed by: STUDENT IN AN ORGANIZED HEALTH CARE EDUCATION/TRAINING PROGRAM

## 2024-01-09 RX ORDER — ACETAMINOPHEN 325 MG/1
650 TABLET ORAL EVERY 6 HOURS PRN
Status: DISCONTINUED | OUTPATIENT
Start: 2024-01-09 | End: 2024-01-11

## 2024-01-09 RX ORDER — LIDOCAINE HYDROCHLORIDE 10 MG/ML
INJECTION INFILTRATION; PERINEURAL
Status: COMPLETED | OUTPATIENT
Start: 2024-01-09 | End: 2024-01-09

## 2024-01-09 RX ADMIN — LIDOCAINE HYDROCHLORIDE 10 ML: 10 INJECTION, SOLUTION INFILTRATION; PERINEURAL at 03:01

## 2024-01-09 RX ADMIN — TAMSULOSIN HYDROCHLORIDE 0.4 MG: 0.4 CAPSULE ORAL at 08:01

## 2024-01-09 RX ADMIN — ATORVASTATIN CALCIUM 80 MG: 40 TABLET, FILM COATED ORAL at 08:01

## 2024-01-09 RX ADMIN — AMIODARONE HYDROCHLORIDE 200 MG: 200 TABLET ORAL at 08:01

## 2024-01-09 RX ADMIN — MUPIROCIN: 20 OINTMENT TOPICAL at 08:01

## 2024-01-09 RX ADMIN — ACETAMINOPHEN 650 MG: 325 TABLET ORAL at 09:01

## 2024-01-09 RX ADMIN — METOPROLOL SUCCINATE 25 MG: 25 TABLET, EXTENDED RELEASE ORAL at 08:01

## 2024-01-09 RX ADMIN — ALLOPURINOL 100 MG: 100 TABLET ORAL at 08:01

## 2024-01-09 RX ADMIN — APIXABAN 5 MG: 5 TABLET, FILM COATED ORAL at 08:01

## 2024-01-09 RX ADMIN — PANTOPRAZOLE SODIUM 40 MG: 40 TABLET, DELAYED RELEASE ORAL at 08:01

## 2024-01-09 NOTE — NURSING TRANSFER
Nursing Transfer Note      1/8/2024   8:16 PM    Nurse giving handoff: Yvette  Nurse receiving handoff:    Reason patient is being transferred: Step down    Transfer From: 270    Transfer via bed    Transfer with O2    Transported by BETO Crawford/BETO Sellers    Order for Tele Monitor? Yes      Patient belongings transferred with patient: Yes    Chart send with patient: Yes    Notified: son

## 2024-01-09 NOTE — NURSING
Report received and care assumed. Discussed plan of care and safety with patient . Reviewed call system. [facility] [place]  [facility] -- 4 Eyes      1/9/2024       Skin assessed on: Q Shift      [x] No Pressure Injuries Present    []Prevention Measures Documented    [] Yes LDA  for Pressure Injury Previously documented     [] Yes New Pressure Injury Discovered   [] LDA for New Pressure Injury Added      Attending RN:  Elizabeth Rutherford RN     Second RN:  Henrik Dougherty RN
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
## 2024-01-09 NOTE — PROCEDURES
Radiology Post-Procedure Note    Pre Op Diagnosis: Ascites  Post Op Diagnosis: Same    Procedure: Ultrasound Guided Paracentesis    Procedure performed by: Rachael Valencia NP    Written Informed Consent Obtained: Yes  Specimen Removed: YES serous  Estimated Blood Loss: Minimal    Findings:   Successful LLQ paracentesis.  Albumin administered PRN per protocol.    Patient tolerated procedure well.    Rachael Valencia NP  Interventional Radiology

## 2024-01-09 NOTE — PLAN OF CARE
Plan for patient discharge with out patient HD set up. Per [facility], two [facility] locations have denied pt. [facility] awaiting clinical clearance.     Additional referral to [facility] to be placed.     12:52pm [facility] referral placed and clinicals submitted in admissions portal. CM to continue to follow up.    01/09/24 1243   Post-Acute Status   Post-Acute Authorization Dialysis   Diaylsis Status Referrals Sent   Discharge Delays (!) Post-Acute Set-up   Discharge Plan   Discharge Plan A Home with family   Discharge Plan B Home

## 2024-01-09 NOTE — NURSING
[facility] [place]es [place] -- 4 Eyes      1/8/24         Skin assessed on: Transfer      [x] No Pressure Injuries Present    [x]Prevention Measures Documented    [] Yes LDA  for Pressure Injury Previously documented     [] Yes New Pressure Injury Discovered   [] LDA for New Pressure Injury Added      Attending RN:  Henrik House RN     Second RN:  Lorin Zepeda Pct
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
## 2024-01-09 NOTE — NURSING
Patient arrived to floor via bed. Patient transferred to bed. Patient oriented to floor and room at this time. Basic setup placed at bedside. Vital signs are stable.

## 2024-01-09 NOTE — SUBJECTIVE & OBJECTIVE
Interval History:  no new issues, having abdominal pain. Very distended.     Review of Systems  Objective:     Vital Signs (Most Recent):  Temp: 97.8 °F (36.6 °C) (01/09/24 0724)  Pulse: 75 (01/09/24 0724)  Resp: 16 (01/09/24 0724)  BP: (!) 158/99 (01/09/24 0724)  SpO2: (!) 92 % (01/09/24 0724) Vital Signs (24h Range):  Temp:  [97 °F (36.1 °C)-98 °F (36.7 °C)] 97.8 °F (36.6 °C)  Pulse:  [65-88] 75  Resp:  [10-19] 16  SpO2:  [92 %-100 %] 92 %  BP: ()/(60-99) 158/99     Weight: 93.3 kg (205 lb 11 oz)  Body mass index is 32.22 kg/m².    Intake/Output Summary (Last 24 hours) at 1/9/2024 1045  Last data filed at 1/9/2024 0830  Gross per 24 hour   Intake 720 ml   Output 5520 ml   Net -4800 ml           Physical Exam  Vitals and nursing note reviewed.   Constitutional:       General: He is not in acute distress.     Appearance: He is ill-appearing.   Cardiovascular:      Rate and Rhythm: Normal rate.      Pulses: Normal pulses.   Pulmonary:      Effort: Pulmonary effort is normal. No respiratory distress.   Abdominal:      General: There is distension.      Tenderness: There is abdominal tenderness.   Musculoskeletal:         General: Swelling present.      Right lower leg: Edema present.      Left lower leg: Edema present.   Neurological:      Mental Status: He is alert and oriented to person, place, and time.   Psychiatric:         Mood and Affect: Mood normal.         Thought Content: Thought content normal.             Significant Labs: All pertinent labs within the past 24 hours have been reviewed.    Significant Imaging: I have reviewed all pertinent imaging results/findings within the past 24 hours.

## 2024-01-09 NOTE — PT/OT/SLP PROGRESS
Physical Therapy      Patient Name:  Mahesh Cespedes   MRN:  [id number]    Patient not seen today secondary to Therapist assessment (IR consult for paracentesis.  PT to hold until /p procedure; nurse reports pt with difficulty moving.). Will follow-up tomorrow.

## 2024-01-09 NOTE — NURSING
Orders received after after notifying of gen weakness. Patient unable to sit on edge of bed. Max assisted with turning. Complains of abdomen pain. Abdomen distension noted

## 2024-01-09 NOTE — PROGRESS NOTES
Date of Admission:1/6/2024    SUBJECTIVE:not feeling well    Current Facility-Administered Medications   Medication    acetaminophen tablet 650 mg    allopurinoL tablet 100 mg    amiodarone tablet 200 mg    apixaban tablet 5 mg    atorvastatin tablet 80 mg    calcium gluconate 1 g in NS IVPB (premixed)    dextrose 10% bolus 125 mL 125 mL    dextrose 10% bolus 250 mL 250 mL    metoprolol succinate (TOPROL-XL) 24 hr tablet 25 mg    mupirocin 2 % ointment    pantoprazole EC tablet 40 mg    tamsulosin 24 hr capsule 0.4 mg       Wt Readings from Last 3 Encounters:   01/06/24 93.3 kg (205 lb 11 oz)   11/21/22 82.7 kg (182 lb 6.9 oz)   11/21/22 82.7 kg (182 lb 6.9 oz)     Temp Readings from Last 3 Encounters:   01/09/24 97.2 °F (36.2 °C) (Oral)   11/21/22 96.4 °F (35.8 °C) (Oral)   09/13/22 97.7 °F (36.5 °C) (Oral)     BP Readings from Last 3 Encounters:   01/09/24 106/64   11/21/22 (!) 199/103   11/21/22 (!) 189/108     Pulse Readings from Last 3 Encounters:   01/09/24 66   11/21/22 92   11/21/22 98       Intake/Output Summary (Last 24 hours) at 1/9/2024 1639  Last data filed at 1/9/2024 1619  Gross per 24 hour   Intake 240 ml   Output 5020 ml   Net -4780 ml         PE:  GEN:wd male in nad  HEENT:ncat,eomi,mmm  CVS:s1s2 regular  PULM:rales  ABD:bs,fullness  EXT:2+leg edema  NEURO:awake,alert  Pc of chest    Recent Labs   Lab 01/09/24  0347   GLU 90   *   K 4.3   CL 98   CO2 20*   BUN 69*   CREATININE 10.3*   CALCIUM 8.0*   MG 1.8         Lab Results   Component Value Date    .1 (H) 03/15/2022    CALCIUM 8.0 (L) 01/09/2024    PHOS 5.7 (H) 01/09/2024       Recent Labs   Lab 01/09/24  0347   WBC 10.25   RBC 3.24*   HGB 8.7*   HCT 26.4*      MCV 82   MCH 26.9*   MCHC 33.0             A/P:  1.esrd. hd tomorrow.   2.htn .try more uf in am.  3.anemai 2nd to esrd. Epo tomorrow.,  4.2nd hyperpara. Cont binders  Ana Rosa phos better.  5.acute resp failure. Better. Will need to follow diet.  6.dm2. Following  sugars.  7.hyperkalemia. resolved. Cont 2k bath.

## 2024-01-10 LAB
ALBUMIN SERPL BCP-MCNC: 2.3 G/DL (ref 3.5–5.2)
ALP SERPL-CCNC: 91 U/L (ref 55–135)
ALT SERPL W/O P-5'-P-CCNC: 46 U/L (ref 10–44)
ANION GAP SERPL CALC-SCNC: 13 MMOL/L (ref 8–16)
AST SERPL-CCNC: 49 U/L (ref 10–40)
BILIRUB SERPL-MCNC: 0.4 MG/DL (ref 0.1–1)
BUN SERPL-MCNC: 89 MG/DL (ref 6–20)
CALCIUM SERPL-MCNC: 7.8 MG/DL (ref 8.7–10.5)
CHLORIDE SERPL-SCNC: 100 MMOL/L (ref 95–110)
CO2 SERPL-SCNC: 20 MMOL/L (ref 23–29)
CREAT SERPL-MCNC: 11.9 MG/DL (ref 0.5–1.4)
EST. GFR  (NO RACE VARIABLE): 4 ML/MIN/1.73 M^2
GLUCOSE SERPL-MCNC: 126 MG/DL (ref 70–110)
HBV SURFACE AB SER QL IA: NEGATIVE
HBV SURFACE AB SERPL IA-ACNC: <3 MIU/ML
MAGNESIUM SERPL-MCNC: 1.7 MG/DL (ref 1.6–2.6)
PHOSPHATE SERPL-MCNC: 5.9 MG/DL (ref 2.7–4.5)
POCT GLUCOSE: 137 MG/DL (ref 70–110)
POTASSIUM SERPL-SCNC: 4.3 MMOL/L (ref 3.5–5.1)
PROT SERPL-MCNC: 7.2 G/DL (ref 6–8.4)
SODIUM SERPL-SCNC: 133 MMOL/L (ref 136–145)

## 2024-01-10 PROCEDURE — 80100016 HC MAINTENANCE HEMODIALYSIS

## 2024-01-10 PROCEDURE — 83735 ASSAY OF MAGNESIUM: CPT | Performed by: STUDENT IN AN ORGANIZED HEALTH CARE EDUCATION/TRAINING PROGRAM

## 2024-01-10 PROCEDURE — 36415 COLL VENOUS BLD VENIPUNCTURE: CPT | Performed by: STUDENT IN AN ORGANIZED HEALTH CARE EDUCATION/TRAINING PROGRAM

## 2024-01-10 PROCEDURE — 25000003 PHARM REV CODE 250: Performed by: STUDENT IN AN ORGANIZED HEALTH CARE EDUCATION/TRAINING PROGRAM

## 2024-01-10 PROCEDURE — 80053 COMPREHEN METABOLIC PANEL: CPT | Performed by: STUDENT IN AN ORGANIZED HEALTH CARE EDUCATION/TRAINING PROGRAM

## 2024-01-10 PROCEDURE — 11000001 HC ACUTE MED/SURG PRIVATE ROOM

## 2024-01-10 PROCEDURE — 63600175 PHARM REV CODE 636 W HCPCS: Mod: JZ,JG | Performed by: STUDENT IN AN ORGANIZED HEALTH CARE EDUCATION/TRAINING PROGRAM

## 2024-01-10 PROCEDURE — 99900035 HC TECH TIME PER 15 MIN (STAT)

## 2024-01-10 PROCEDURE — 84100 ASSAY OF PHOSPHORUS: CPT | Performed by: STUDENT IN AN ORGANIZED HEALTH CARE EDUCATION/TRAINING PROGRAM

## 2024-01-10 RX ADMIN — MUPIROCIN 1 G: 20 OINTMENT TOPICAL at 10:01

## 2024-01-10 RX ADMIN — AMIODARONE HYDROCHLORIDE 200 MG: 200 TABLET ORAL at 08:01

## 2024-01-10 RX ADMIN — METOPROLOL SUCCINATE 25 MG: 25 TABLET, EXTENDED RELEASE ORAL at 08:01

## 2024-01-10 RX ADMIN — EPOETIN ALFA-EPBX 10000 UNITS: 10000 INJECTION, SOLUTION INTRAVENOUS; SUBCUTANEOUS at 09:01

## 2024-01-10 RX ADMIN — TAMSULOSIN HYDROCHLORIDE 0.4 MG: 0.4 CAPSULE ORAL at 08:01

## 2024-01-10 RX ADMIN — ATORVASTATIN CALCIUM 80 MG: 40 TABLET, FILM COATED ORAL at 08:01

## 2024-01-10 RX ADMIN — APIXABAN 5 MG: 5 TABLET, FILM COATED ORAL at 10:01

## 2024-01-10 RX ADMIN — APIXABAN 5 MG: 5 TABLET, FILM COATED ORAL at 08:01

## 2024-01-10 RX ADMIN — ALLOPURINOL 100 MG: 100 TABLET ORAL at 08:01

## 2024-01-10 RX ADMIN — PANTOPRAZOLE SODIUM 40 MG: 40 TABLET, DELAYED RELEASE ORAL at 08:01

## 2024-01-10 NOTE — PLAN OF CARE
Problem: Electrolyte Imbalance (Chronic Kidney Disease)  Goal: Electrolyte Balance  Outcome: Ongoing, Not Progressing  Intervention: Monitor and Manage Electrolyte Imbalance  Flowsheets (Taken 1/9/2024 1952)  Fluid/Electrolyte Management: fluids restricted     Problem: Fluid Volume Excess (Chronic Kidney Disease)  Goal: Fluid Balance  Intervention: Monitor and Manage Hypervolemia  Flowsheets (Taken 1/9/2024 1952)  Skin Protection:   adhesive use limited   incontinence pads utilized   tubing/devices free from skin contact  Fluid/Electrolyte Management: fluids restricted     Problem: Functional Decline (Chronic Kidney Disease)  Goal: Optimal Functional Ability  Intervention: Optimize Functional Ability  Flowsheets (Taken 1/9/2024 1952)  Environment Familiarity/Consistency: daily routine followed  Self-Care Promotion: independence encouraged  Activity Management: Rolling - L1

## 2024-01-10 NOTE — SUBJECTIVE & OBJECTIVE
Interval History: no acute issues, feeling better. Abdomen feeling better, ate all of his breakfast.    Review of Systems  Objective:     Vital Signs (Most Recent):  Temp: 98.5 °F (36.9 °C) (01/10/24 0740)  Pulse: 83 (01/10/24 0740)  Resp: 19 (01/10/24 0740)  BP: (!) 137/90 (01/10/24 0740)  SpO2: 97 % (01/10/24 0740) Vital Signs (24h Range):  Temp:  [97.2 °F (36.2 °C)-98.5 °F (36.9 °C)] 98.5 °F (36.9 °C)  Pulse:  [62-83] 83  Resp:  [16-20] 19  SpO2:  [95 %-100 %] 97 %  BP: (102-139)/(56-90) 137/90     Weight: 93.3 kg (205 lb 11 oz)  Body mass index is 32.22 kg/m².    Intake/Output Summary (Last 24 hours) at 1/10/2024 1213  Last data filed at 1/10/2024 0811  Gross per 24 hour   Intake 600 ml   Output 5000 ml   Net -4400 ml           Physical Exam  Vitals and nursing note reviewed.   Constitutional:       General: He is not in acute distress.     Appearance: He is ill-appearing.   Cardiovascular:      Rate and Rhythm: Normal rate.      Pulses: Normal pulses.   Pulmonary:      Effort: Pulmonary effort is normal. No respiratory distress.   Abdominal:      General: There is distension.      Tenderness: There is abdominal tenderness.   Musculoskeletal:         General: Swelling present.      Right lower leg: Edema present.      Left lower leg: Edema present.      Comments: Lower extremity edema is improving   Neurological:      Mental Status: He is alert and oriented to person, place, and time.   Psychiatric:         Mood and Affect: Mood normal.         Thought Content: Thought content normal.             Significant Labs: All pertinent labs within the past 24 hours have been reviewed.    Significant Imaging: I have reviewed all pertinent imaging results/findings within the past 24 hours.

## 2024-01-10 NOTE — PT/OT/SLP PROGRESS
Physical Therapy      Patient Name:  Mahesh Cespedes   MRN:  [id number]    Patient not seen today secondary to Therapist assessment (Pt confused and perseverating; s/p HD). Will follow-up tomorrow.

## 2024-01-10 NOTE — NURSING
Upon arrival to floor from dialysis: patient oriented to room, call bell in reach and bed in lowest position. Denies pain or discomfort at this time. No apparent distress noted.

## 2024-01-10 NOTE — NURSING
Report received from dialysis nurse. Patient was on 4 hours of HD with removal 3500 cc of fluids

## 2024-01-10 NOTE — PT/OT/SLP PROGRESS
Physical Therapy      Patient Name:  Mahesh Cespedes   MRN:  [id number]    Patient not seen today secondary to Dialysis. Will follow-up later today as available.
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
## 2024-01-10 NOTE — NURSING
Return to unit via bed. Asleep easily aroused to tactile stimulus Dressing to left flank clean and dry. Abdomen not as firm states feels better to abdomen. Blister present to right ac near old PIV site

## 2024-01-10 NOTE — PROGRESS NOTES
01/10/24 1300        Hemodialysis Catheter  right subclavian   Placement Date: (c)    Present Prior to Hospital Arrival?: Yes  Hand Hygiene: Performed  Barrier Precautions: Performed  Skin Antisepsis: ChloraPrep  Hemodialysis Catheter Type: Tunneled catheter  Location: right subclavian   Line Necessity Review CRRT/HD   Site Assessment No drainage;No redness;No swelling;No warmth   Line Securement Device Secured with sutureless device   Dressing Type CHG impregnated dressing/sponge   Dressing Status Clean;Dry;Intact   Dressing Intervention Integrity maintained   Date on Dressing 01/08/24   Dressing Due to be Changed 01/15/24   Venous Patency/Care flushed w/o difficulty;blood return present;intermittent infusion cap changed;normal saline locked   Arterial Patency/Care flushed w/o difficulty;blood return present;intermittent infusion cap changed;normal saline locked   Waveform Not being transduced   Post-Hemodialysis Assessment   Blood Volume Processed (Liters) 105.5 L   Dialyzer Clearance Lightly streaked   Duration of Treatment 240 minutes   Additional Fluid Intake (mL) 500 mL   Total UF (mL) 4000 mL   Net Fluid Removal 3500   Patient Response to Treatment Tx completed, tolerated well, lines flushed and then packed with saline to filling volume with new end caps applied   Post-Treatment Weight 78.5 kg (173 lb 1 oz)   Treatment Weight Change -4.5   Post-Hemodialysis Comments no distress noted

## 2024-01-10 NOTE — PLAN OF CARE
KARLY followed up with [facility] in regard to dialysis chair. KARLY was informed that was denied by [facility], and [facility]. KARLY called [facility] to follow up on referral. KARLY was informed that facilities were still reviewing medical and financial records.        01/10/24 1139   Post-Acute Status   Post-Acute Authorization Dialysis   Diaylsis Status Referrals Sent   Discharge Delays (!) Post-Acute Set-up   Discharge Plan   Discharge Plan A Home with family   Discharge Plan B Home with family

## 2024-01-10 NOTE — PLAN OF CARE
Addendum:  KARLY attempted faxing Admission Packet to [facility] at [phone] 2x with each fax failing.  Packet  [facility] Admissions on 1/8/2024 by Chiquita HAHN, via Portal.    1/10/2024:  Labs faxed to .  [facility]

## 2024-01-10 NOTE — PLAN OF CARE
Problem: Adult Inpatient Plan of Care  Goal: Plan of Care Review  Outcome: Ongoing, Progressing  Flowsheets (Taken 1/10/2024 0310)  Plan of Care Reviewed With: patient    Patient remains free from injury and falls this shift. Patient assisted with turning in bed. Skin intact. Patient resting comfortably. Plan of care continued.

## 2024-01-11 PROBLEM — R50.9 FEVER: Status: ACTIVE | Noted: 2024-01-11

## 2024-01-11 LAB
ALBUMIN SERPL BCP-MCNC: 2.3 G/DL (ref 3.5–5.2)
ALLENS TEST: ABNORMAL
ALP SERPL-CCNC: 94 U/L (ref 55–135)
ALT SERPL W/O P-5'-P-CCNC: 30 U/L (ref 10–44)
ANION GAP SERPL CALC-SCNC: 14 MMOL/L (ref 8–16)
AST SERPL-CCNC: 21 U/L (ref 10–40)
BILIRUB SERPL-MCNC: 0.5 MG/DL (ref 0.1–1)
BUN SERPL-MCNC: 50 MG/DL (ref 6–20)
CALCIUM SERPL-MCNC: 8.1 MG/DL (ref 8.7–10.5)
CHLORIDE SERPL-SCNC: 96 MMOL/L (ref 95–110)
CO2 SERPL-SCNC: 24 MMOL/L (ref 23–29)
CREAT SERPL-MCNC: 7.5 MG/DL (ref 0.5–1.4)
DELSYS: ABNORMAL
EST. GFR  (NO RACE VARIABLE): 8 ML/MIN/1.73 M^2
FLOW: 3
GLUCOSE SERPL-MCNC: 99 MG/DL (ref 70–110)
HCO3 UR-SCNC: 25.5 MMOL/L (ref 24–28)
MAGNESIUM SERPL-MCNC: 1.6 MG/DL (ref 1.6–2.6)
MODE: ABNORMAL
PCO2 BLDA: 37.4 MMHG (ref 35–45)
PH SMN: 7.44 [PH] (ref 7.35–7.45)
PHOSPHATE SERPL-MCNC: 3.4 MG/DL (ref 2.7–4.5)
PO2 BLDA: 54 MMHG (ref 80–100)
POC BE: 1 MMOL/L
POC SATURATED O2: 89 % (ref 95–100)
POC TCO2: 27 MMOL/L (ref 23–27)
POCT GLUCOSE: 108 MG/DL (ref 70–110)
POCT GLUCOSE: 139 MG/DL (ref 70–110)
POCT GLUCOSE: 219 MG/DL (ref 70–110)
POCT GLUCOSE: 96 MG/DL (ref 70–110)
POTASSIUM SERPL-SCNC: 3.7 MMOL/L (ref 3.5–5.1)
PROT SERPL-MCNC: 7.7 G/DL (ref 6–8.4)
SAMPLE: ABNORMAL
SITE: ABNORMAL
SODIUM SERPL-SCNC: 134 MMOL/L (ref 136–145)
SP02: 94

## 2024-01-11 PROCEDURE — 94660 CPAP INITIATION&MGMT: CPT

## 2024-01-11 PROCEDURE — 94761 N-INVAS EAR/PLS OXIMETRY MLT: CPT | Mod: XB

## 2024-01-11 PROCEDURE — 87040 BLOOD CULTURE FOR BACTERIA: CPT | Mod: 59 | Performed by: FAMILY MEDICINE

## 2024-01-11 PROCEDURE — 82803 BLOOD GASES ANY COMBINATION: CPT

## 2024-01-11 PROCEDURE — 36415 COLL VENOUS BLD VENIPUNCTURE: CPT | Performed by: STUDENT IN AN ORGANIZED HEALTH CARE EDUCATION/TRAINING PROGRAM

## 2024-01-11 PROCEDURE — 11000001 HC ACUTE MED/SURG PRIVATE ROOM

## 2024-01-11 PROCEDURE — 25000003 PHARM REV CODE 250: Performed by: STUDENT IN AN ORGANIZED HEALTH CARE EDUCATION/TRAINING PROGRAM

## 2024-01-11 PROCEDURE — 25000003 PHARM REV CODE 250: Performed by: FAMILY MEDICINE

## 2024-01-11 PROCEDURE — 99900035 HC TECH TIME PER 15 MIN (STAT)

## 2024-01-11 PROCEDURE — 84100 ASSAY OF PHOSPHORUS: CPT | Performed by: STUDENT IN AN ORGANIZED HEALTH CARE EDUCATION/TRAINING PROGRAM

## 2024-01-11 PROCEDURE — 80053 COMPREHEN METABOLIC PANEL: CPT | Performed by: STUDENT IN AN ORGANIZED HEALTH CARE EDUCATION/TRAINING PROGRAM

## 2024-01-11 PROCEDURE — 93005 ELECTROCARDIOGRAM TRACING: CPT

## 2024-01-11 PROCEDURE — 83735 ASSAY OF MAGNESIUM: CPT | Performed by: STUDENT IN AN ORGANIZED HEALTH CARE EDUCATION/TRAINING PROGRAM

## 2024-01-11 PROCEDURE — 27000221 HC OXYGEN, UP TO 24 HOURS

## 2024-01-11 PROCEDURE — 36600 WITHDRAWAL OF ARTERIAL BLOOD: CPT

## 2024-01-11 PROCEDURE — 93010 ELECTROCARDIOGRAM REPORT: CPT | Mod: ,,, | Performed by: INTERNAL MEDICINE

## 2024-01-11 PROCEDURE — 97165 OT EVAL LOW COMPLEX 30 MIN: CPT

## 2024-01-11 PROCEDURE — 97161 PT EVAL LOW COMPLEX 20 MIN: CPT

## 2024-01-11 RX ORDER — METOPROLOL TARTRATE 1 MG/ML
INJECTION, SOLUTION INTRAVENOUS
Status: DISPENSED
Start: 2024-01-11 | End: 2024-01-11

## 2024-01-11 RX ORDER — METOPROLOL TARTRATE 1 MG/ML
5 INJECTION, SOLUTION INTRAVENOUS ONCE
Status: COMPLETED | OUTPATIENT
Start: 2024-01-11 | End: 2024-01-11

## 2024-01-11 RX ORDER — ACETAMINOPHEN 325 MG/1
650 TABLET ORAL EVERY 6 HOURS PRN
Status: DISCONTINUED | OUTPATIENT
Start: 2024-01-11 | End: 2024-01-22 | Stop reason: HOSPADM

## 2024-01-11 RX ADMIN — PANTOPRAZOLE SODIUM 40 MG: 40 TABLET, DELAYED RELEASE ORAL at 08:01

## 2024-01-11 RX ADMIN — ACETAMINOPHEN 650 MG: 325 TABLET ORAL at 05:01

## 2024-01-11 RX ADMIN — METOPROLOL SUCCINATE 25 MG: 25 TABLET, EXTENDED RELEASE ORAL at 08:01

## 2024-01-11 RX ADMIN — ALLOPURINOL 100 MG: 100 TABLET ORAL at 08:01

## 2024-01-11 RX ADMIN — METOROPROLOL TARTRATE 5 MG: 5 INJECTION, SOLUTION INTRAVENOUS at 05:01

## 2024-01-11 RX ADMIN — MUPIROCIN: 20 OINTMENT TOPICAL at 08:01

## 2024-01-11 RX ADMIN — ACETAMINOPHEN 650 MG: 325 TABLET ORAL at 08:01

## 2024-01-11 RX ADMIN — TAMSULOSIN HYDROCHLORIDE 0.4 MG: 0.4 CAPSULE ORAL at 08:01

## 2024-01-11 RX ADMIN — APIXABAN 5 MG: 5 TABLET, FILM COATED ORAL at 08:01

## 2024-01-11 RX ADMIN — AMIODARONE HYDROCHLORIDE 200 MG: 200 TABLET ORAL at 08:01

## 2024-01-11 RX ADMIN — ATORVASTATIN CALCIUM 80 MG: 40 TABLET, FILM COATED ORAL at 08:01

## 2024-01-11 RX ADMIN — APIXABAN 5 MG: 5 TABLET, FILM COATED ORAL at 09:01
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
## 2024-01-11 NOTE — AI DETERIORATION ALERT
Artificial Intelligence Notification  [facility]  [address] [place] [address]  Phone: [phone]    This documentation was triggered by an Artificial Intelligence Notification:    Admit Date: 2024   LOS: 5  Code Status: Full Code  : 1965  Age: 58 y.o.  Weight:   Wt Readings from Last 1 Encounters:   24 93.3 kg (205 lb 11 oz)        Sex: male  Bed: W404/W404 A  MRN: [id number]  Attending Physician: Nolberto Zhong MD     Date of Alert: 2024  Time AI Alert Received: 5:46 AM              Vitals:    24 0530   BP: (!) 160/86   Pulse: (!) 125   Resp: 20   Temp: 98.4 °F (36.9 °C)     SpO2: (!) 81 %      Artificial Intelligence alert discussed with Provider:     Name: Gregorio   Date/Time of Provider Notification: 5:46 AM        Patient Condition:     Alert generated due to hypoxic and tachycardia. Patient evaluated at bedside, he appears comfortable and has no complaints. Patient was placed on 3L O2 with improvement of his oxygenation. EKG was ordered and showed afib w/ RVR. Will administer a dose of Lopressor IV. CXR, ABG were ordered. Presentation could be due to volume overload versus developing pneumonia. Nephrology following for dialysis. Care ongoing.

## 2024-01-11 NOTE — SUBJECTIVE & OBJECTIVE
Interval History:   Seen and examined, alert, verbal with notion of generalized weakness, very sleepy, spiking fever, unknown source of infection. Denies worsening SOB, no CP    Review of Systems   Constitutional:  Positive for activity change, appetite change and fever.   Respiratory:  Positive for cough and shortness of breath. Negative for chest tightness and wheezing.    Cardiovascular:  Negative for chest pain, palpitations and leg swelling.   Gastrointestinal:  Positive for abdominal distention. Negative for abdominal pain, nausea and vomiting.   Musculoskeletal:  Positive for back pain and gait problem.   Neurological:  Positive for weakness. Negative for dizziness, speech difficulty and headaches.   Psychiatric/Behavioral:  Negative for agitation and hallucinations.      Objective:     Vital Signs (Most Recent):  Temp: (!) 101.9 °F (38.8 °C) (01/11/24 1104)  Pulse: 96 (01/11/24 1104)  Resp: 20 (01/11/24 1104)  BP: (!) 96/52 (01/11/24 1104)  SpO2: 98 % (01/11/24 1104) Vital Signs (24h Range):  Temp:  [98 °F (36.7 °C)-101.9 °F (38.8 °C)] 101.9 °F (38.8 °C)  Pulse:  [] 96  Resp:  [18-20] 20  SpO2:  [81 %-98 %] 98 %  BP: ()/(52-86) 96/52     Weight: 93.3 kg (205 lb 11 oz)  Body mass index is 32.22 kg/m².    Intake/Output Summary (Last 24 hours) at 1/11/2024 1145  Last data filed at 1/11/2024 0936  Gross per 24 hour   Intake 1130.26 ml   Output 4450 ml   Net -3319.74 ml         Physical Exam  Vitals and nursing note reviewed.   Constitutional:       General: He is not in acute distress.     Appearance: He is ill-appearing.   HENT:      Head: Normocephalic and atraumatic.      Mouth/Throat:      Mouth: Mucous membranes are moist.      Pharynx: No oropharyngeal exudate.   Eyes:      Extraocular Movements: Extraocular movements intact.      Pupils: Pupils are equal, round, and reactive to light.   Cardiovascular:      Rate and Rhythm: Normal rate.      Heart sounds: No murmur heard.     No friction rub.  "No gallop.   Pulmonary:      Effort: Pulmonary effort is normal. No respiratory distress.      Breath sounds: No wheezing or rales.   Chest:      Chest wall: No tenderness.   Abdominal:      General: Bowel sounds are normal.      Palpations: Abdomen is soft.      Tenderness: There is no abdominal tenderness. There is no guarding or rebound.   Musculoskeletal:         General: No swelling or tenderness.      Cervical back: No rigidity or tenderness.      Right lower leg: No edema.      Left lower leg: No edema.   Skin:     Findings: No erythema or rash.   Neurological:      Mental Status: He is alert.      Motor: Weakness present.      Coordination: Coordination normal.      Gait: Gait abnormal.   Psychiatric:         Thought Content: Thought content normal.             Significant Labs: All pertinent labs within the past 24 hours have been reviewed.  A1C: No results for input(s): "HGBA1C" in the last 4320 hours.  BMP:   Recent Labs   Lab 01/11/24  0500   GLU 99   *   K 3.7   CL 96   CO2 24   BUN 50*   CREATININE 7.5*   CALCIUM 8.1*   MG 1.6     CBC: No results for input(s): "WBC", "HGB", "HCT", "PLT" in the last 48 hours.  CMP:   Recent Labs   Lab 01/10/24  0329 01/11/24  0500   * 134*   K 4.3 3.7    96   CO2 20* 24   * 99   BUN 89* 50*   CREATININE 11.9* 7.5*   CALCIUM 7.8* 8.1*   PROT 7.2 7.7   ALBUMIN 2.3* 2.3*   BILITOT 0.4 0.5   ALKPHOS 91 94   AST 49* 21   ALT 46* 30   ANIONGAP 13 14     Cardiac Markers: No results for input(s): "CKMB", "MYOGLOBIN", "BNP", "TROPISTAT" in the last 48 hours.  Lactic Acid: No results for input(s): "LACTATE" in the last 48 hours.  Lipase: No results for input(s): "LIPASE" in the last 48 hours.  Lipid Panel: No results for input(s): "CHOL", "HDL", "LDLCALC", "TRIG", "CHOLHDL" in the last 48 hours.  POCT Glucose:   Recent Labs   Lab 01/10/24  0402 01/11/24  0002 01/11/24  0553   POCTGLUCOSE 137* 108 96     Respiratory Culture: No results for input(s): " ""GSRESP", "RESPIRATORYC" in the last 48 hours.  Troponin: No results for input(s): "TROPONINI", "TROPONINIHS" in the last 48 hours.  Recent Lab Results         01/11/24  0557   01/11/24  0553   01/11/24  0500   01/11/24  0002        Albumin     2.3         ALP     94         Allens Test N/A             ALT     30         Anion Gap     14         AST     21         BILIRUBIN TOTAL     0.5  Comment: For infants and newborns, interpretation of results should be based  on gestational age, weight and in agreement with clinical  observations.    Premature Infant recommended reference ranges:  Up to 24 hours.............<8.0 mg/dL  Up to 48 hours............<12.0 mg/dL  3-5 days..................<15.0 mg/dL  6-29 days.................<15.0 mg/dL           Site RB             BUN     50         Calcium     8.1         Chloride     96         CO2     24         Creatinine     7.5         DelSys Nasal Can             eGFR     8         Flow 3             Glucose     99         Magnesium      1.6         Mode SPONT             Phosphorus Level     3.4         POC BE 1             POC HCO3 25.5             POC PCO2 37.4             POC PH 7.442             POC PO2 54             POC SATURATED O2 89             POC TCO2 27             POCT Glucose   96     108       Potassium     3.7         PROTEIN TOTAL     7.7         Sample ARTERIAL             Sodium     134         Sp02 94                     Significant Imaging: I have reviewed all pertinent imaging results/findings within the past 24 hours.  "
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
## 2024-01-11 NOTE — PLAN OF CARE
Problem: Physical Therapy  Goal: Physical Therapy Goal  Description: Goals to be met by: 24     Patient will increase functional independence with mobility by performin. Pt to be min A with bed mobility.  2. Pt to transfer with min A.  3. Pt to ambulate 50' w/RW CGA.  4. Pt to be able to tolerate 1x10 reps BLE exs.    Outcome: Ongoing, Progressing   Initial eval completed, see in chart for details.

## 2024-01-11 NOTE — NURSING
[facility]  Nurses Note -- 4 Eyes      1/11/2024       Skin assessed on: Q Shift      [x] No Pressure Injuries Present    [x]Prevention Measures Documented    [] Yes LDA  for Pressure Injury Previously documented     [] Yes New Pressure Injury Discovered   [] LDA for New Pressure Injury Added      Attending RN:  Sindy Kowalski, RN     Second RN:  Ronit Degroot LPN

## 2024-01-11 NOTE — PLAN OF CARE
Problem: Occupational Therapy  Goal: Occupational Therapy Goal  Description: Goals to be met by: 1/25/2024     Patient will increase functional independence with ADLs by performing:    Feeding with Modified [place].  UE Dressing with Stand-by Assistance and Contact Guard Assistance.  LE Dressing with Stand-by Assistance and Contact Guard Assistance.  Grooming while seated with Modified [place] and Set-up Assistance.  Toileting from bedside commode with Stand-by Assistance and Assistive Devices as needed for hygiene and clothing management.   Sitting at edge of bed x30 minutes with Supervision.  Rolling to Bilateral with Modified [place] and use of bedrail as needed.   Supine to sit with Stand-by Assistance.  Step transfer with Contact Guard Assistance  Toilet transfer to bedside commode with Contact Guard Assistance.  Upper extremity exercise program x15 reps per handout, with assistance as needed.    Outcome: Ongoing, Progressing    He patient was lethargic and required VC to remain alert during OT eval. The patient required mod/max assist for functional mobility. The patient will benefit from Mod Intensity OT upon D/C.

## 2024-01-11 NOTE — PLAN OF CARE
Per therapy recommendations for moderate intensity therapy. Awaiting out patient HD set up. Per JOVANI Porras, patient has been medically denied for out pt HD by [facility] and [facility].     Additional referrals faxed to [facility] (phone: [phone]) and [facility] (phone: [phone]) for review.     11:59am Received call from Irasema with [facility], ([phone]) stated pending review. Requested clinicals from pt's previous [facility] center in [place]. Will also need to confirm pt transportation to and from clinic. Stated she will be out the office on tomorrow and can follow up with [facility].     1:50pm Spoke with patient's daughter, Rima regarding discharge planning. Reviewed recommendation for moderate intensity therapy. Explained SNF vs home health. Pt's dtr agreeable for SNF placement once HD is arranged. Pt's dtr stated plan is for pt to live with her and pt's son following SNF.  Pt's dtr stated she does not have transportation to assist with pt to get to HD clinic. CM to continue to follow.    01/11/24 1040   Discharge Reassessment   Assessment Type Discharge Planning Reassessment   Did the patient's condition or plan change since previous assessment? No   Communicated JAIME with patient/caregiver Date not available/Unable to determine   Discharge Plan A Home with family   DME Needed Upon Discharge  none   Transition of Care Barriers DIalysis placement issues   Post-Acute Status   Post-Acute Authorization Dialysis   Diaylsis Status Referrals Sent   Discharge Delays (!) Post-Acute Set-up

## 2024-01-11 NOTE — NURSING
[facility] MEWS TRIGGER FOLLOW UP       MEWS Monitoring, Score is: 3  Indication for review: HR    Bedside Nurse, Ronit, contacted. Stat EKG ordered showing a-fib/RVR. Dr. Perkins at bedside. Patient placed on 3LNC, 5 mg IV push metoprolol. Patient still in a-fib, now rate controlled between . CXR in process.
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
## 2024-01-12 LAB
ALBUMIN SERPL BCP-MCNC: 1.9 G/DL (ref 3.5–5.2)
ALP SERPL-CCNC: 129 U/L (ref 55–135)
ALT SERPL W/O P-5'-P-CCNC: 29 U/L (ref 10–44)
ANION GAP SERPL CALC-SCNC: 11 MMOL/L (ref 8–16)
AST SERPL-CCNC: 28 U/L (ref 10–40)
BILIRUB SERPL-MCNC: 0.5 MG/DL (ref 0.1–1)
BUN SERPL-MCNC: 64 MG/DL (ref 6–20)
CALCIUM SERPL-MCNC: 8.1 MG/DL (ref 8.7–10.5)
CHLORIDE SERPL-SCNC: 98 MMOL/L (ref 95–110)
CO2 SERPL-SCNC: 26 MMOL/L (ref 23–29)
CREAT SERPL-MCNC: 9.2 MG/DL (ref 0.5–1.4)
EST. GFR  (NO RACE VARIABLE): 6 ML/MIN/1.73 M^2
GLUCOSE SERPL-MCNC: 89 MG/DL (ref 70–110)
MAGNESIUM SERPL-MCNC: 1.6 MG/DL (ref 1.6–2.6)
PHOSPHATE SERPL-MCNC: 4 MG/DL (ref 2.7–4.5)
POCT GLUCOSE: 143 MG/DL (ref 70–110)
POCT GLUCOSE: 75 MG/DL (ref 70–110)
POCT GLUCOSE: 85 MG/DL (ref 70–110)
POCT GLUCOSE: 99 MG/DL (ref 70–110)
POTASSIUM SERPL-SCNC: 4 MMOL/L (ref 3.5–5.1)
PROT SERPL-MCNC: 6.5 G/DL (ref 6–8.4)
SODIUM SERPL-SCNC: 135 MMOL/L (ref 136–145)

## 2024-01-12 PROCEDURE — 36415 COLL VENOUS BLD VENIPUNCTURE: CPT | Performed by: STUDENT IN AN ORGANIZED HEALTH CARE EDUCATION/TRAINING PROGRAM

## 2024-01-12 PROCEDURE — 80100016 HC MAINTENANCE HEMODIALYSIS

## 2024-01-12 PROCEDURE — 63600175 PHARM REV CODE 636 W HCPCS: Mod: JZ,JG | Performed by: STUDENT IN AN ORGANIZED HEALTH CARE EDUCATION/TRAINING PROGRAM

## 2024-01-12 PROCEDURE — 99900035 HC TECH TIME PER 15 MIN (STAT)

## 2024-01-12 PROCEDURE — 27000221 HC OXYGEN, UP TO 24 HOURS

## 2024-01-12 PROCEDURE — 94761 N-INVAS EAR/PLS OXIMETRY MLT: CPT

## 2024-01-12 PROCEDURE — 84100 ASSAY OF PHOSPHORUS: CPT | Performed by: STUDENT IN AN ORGANIZED HEALTH CARE EDUCATION/TRAINING PROGRAM

## 2024-01-12 PROCEDURE — 11000001 HC ACUTE MED/SURG PRIVATE ROOM

## 2024-01-12 PROCEDURE — 25000003 PHARM REV CODE 250: Performed by: STUDENT IN AN ORGANIZED HEALTH CARE EDUCATION/TRAINING PROGRAM

## 2024-01-12 PROCEDURE — 83735 ASSAY OF MAGNESIUM: CPT | Performed by: STUDENT IN AN ORGANIZED HEALTH CARE EDUCATION/TRAINING PROGRAM

## 2024-01-12 PROCEDURE — 80053 COMPREHEN METABOLIC PANEL: CPT | Performed by: STUDENT IN AN ORGANIZED HEALTH CARE EDUCATION/TRAINING PROGRAM

## 2024-01-12 PROCEDURE — 25000003 PHARM REV CODE 250: Performed by: FAMILY MEDICINE

## 2024-01-12 RX ADMIN — ACETAMINOPHEN 650 MG: 325 TABLET ORAL at 05:01

## 2024-01-12 RX ADMIN — ALLOPURINOL 100 MG: 100 TABLET ORAL at 02:01

## 2024-01-12 RX ADMIN — AMIODARONE HYDROCHLORIDE 200 MG: 200 TABLET ORAL at 02:01

## 2024-01-12 RX ADMIN — EPOETIN ALFA-EPBX 10000 UNITS: 10000 INJECTION, SOLUTION INTRAVENOUS; SUBCUTANEOUS at 12:01

## 2024-01-12 RX ADMIN — APIXABAN 5 MG: 5 TABLET, FILM COATED ORAL at 08:01

## 2024-01-12 RX ADMIN — METOPROLOL SUCCINATE 25 MG: 25 TABLET, EXTENDED RELEASE ORAL at 02:01

## 2024-01-12 RX ADMIN — TAMSULOSIN HYDROCHLORIDE 0.4 MG: 0.4 CAPSULE ORAL at 02:01

## 2024-01-12 RX ADMIN — ATORVASTATIN CALCIUM 80 MG: 40 TABLET, FILM COATED ORAL at 02:01

## 2024-01-12 RX ADMIN — PANTOPRAZOLE SODIUM 40 MG: 40 TABLET, DELAYED RELEASE ORAL at 02:01

## 2024-01-12 NOTE — PLAN OF CARE
Received call from [facility], requested additional flowsheets, chest xray, EKG, photo ID and insurance card. Per JOVANI Porras, requested clinicals faxed. Clinicals pending review.    01/12/24 1318   Post-Acute Status   Post-Acute Authorization Dialysis   Diaylsis Status Referrals Sent   Discharge Delays (!) Post-Acute Set-up   Discharge Plan   Discharge Plan A Skilled Nursing Facility   Discharge Plan B Home with family

## 2024-01-12 NOTE — PLAN OF CARE
Problem: Device-Related Complication Risk (Hemodialysis)  Goal: Safe, Effective Therapy Delivery  Outcome: Ongoing, Progressing     Problem: Hemodynamic Instability (Hemodialysis)  Goal: Effective Tissue Perfusion  Outcome: Ongoing, Progressing     Problem: Infection (Hemodialysis)  Goal: Absence of Infection Signs and Symptoms  Outcome: Ongoing, Progressing     Problem: Adult Inpatient Plan of Care  Goal: Plan of Care Review  Outcome: Ongoing, Progressing  Goal: Patient-Specific Goal (Individualized)  Outcome: Ongoing, Progressing  Goal: Absence of Hospital-Acquired Illness or Injury  Outcome: Ongoing, Progressing  Goal: Optimal Comfort and Wellbeing  Outcome: Ongoing, Progressing  Goal: Readiness for Transition of Care  Outcome: Ongoing, Progressing     Problem: Diabetes Comorbidity  Goal: Blood Glucose Level Within Targeted Range  Outcome: Ongoing, Progressing     Problem: Infection  Goal: Absence of Infection Signs and Symptoms  Outcome: Ongoing, Progressing     Problem: Fall Injury Risk  Goal: Absence of Fall and Fall-Related Injury  Outcome: Ongoing, Progressing     Problem: Skin Injury Risk Increased  Goal: Skin Health and Integrity  Outcome: Ongoing, Progressing     Problem: Electrolyte Imbalance (Chronic Kidney Disease)  Goal: Electrolyte Balance  Outcome: Ongoing, Progressing     Problem: Fluid Volume Excess (Chronic Kidney Disease)  Goal: Fluid Balance  Outcome: Ongoing, Progressing     Problem: Functional Decline (Chronic Kidney Disease)  Goal: Optimal Functional Ability  Outcome: Ongoing, Progressing     Problem: Impaired Wound Healing  Goal: Optimal Wound Healing  Outcome: Ongoing, Progressing
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
## 2024-01-12 NOTE — NURSING
[facility] [place]- Rapid Response Follow-up Note     Followed up with patient for proactive rounding, saw patient at bedside.  Patient was in a-fib RVR this morning, remains in a-fib, currently rate controlled and resting comfortably.       Please call Rapid Response RN with any questions or concerns at  [phone]

## 2024-01-12 NOTE — PT/OT/SLP PROGRESS
Occupational Therapy      Patient Name:  Mahesh Cespedes   MRN:  [id number]    Patient not seen today secondary to Dialysis. Will follow-up as able.    1/12/2024

## 2024-01-12 NOTE — SUBJECTIVE & OBJECTIVE
Interval History:   S/p HD minimal alert, very sleepy, no new acute event overnight.    Review of Systems   Unable to perform ROS: Acuity of condition   Constitutional:  Negative for chills and fever.   Neurological:  Positive for weakness. Negative for speech difficulty.   Psychiatric/Behavioral:  Negative for agitation.    All other systems reviewed and are negative.    Objective:     Vital Signs (Most Recent):  Temp: 98 °F (36.7 °C) (01/12/24 1321)  Pulse: (!) 112 (01/12/24 1321)  Resp: 20 (01/12/24 1321)  BP: (!) 150/70 (01/12/24 1321)  SpO2: (!) 94 % (01/12/24 1500) Vital Signs (24h Range):  Temp:  [97.9 °F (36.6 °C)-102.2 °F (39 °C)] 98 °F (36.7 °C)  Pulse:  [] 112  Resp:  [16-20] 20  SpO2:  [92 %-100 %] 94 %  BP: (103-164)/(53-90) 150/70     Weight: 93.3 kg (205 lb 11 oz)  Body mass index is 32.22 kg/m².    Intake/Output Summary (Last 24 hours) at 1/12/2024 1656  Last data filed at 1/12/2024 1430  Gross per 24 hour   Intake 340 ml   Output --   Net 340 ml         Physical Exam  Vitals and nursing note reviewed.   Constitutional:       General: He is not in acute distress.     Appearance: He is not toxic-appearing.   HENT:      Head: Normocephalic and atraumatic.      Mouth/Throat:      Mouth: Mucous membranes are moist.      Pharynx: No oropharyngeal exudate.   Eyes:      Extraocular Movements: Extraocular movements intact.      Pupils: Pupils are equal, round, and reactive to light.   Cardiovascular:      Rate and Rhythm: Normal rate. Rhythm irregular.      Heart sounds: No murmur heard.     No friction rub. No gallop.   Pulmonary:      Effort: Pulmonary effort is normal. No respiratory distress.      Breath sounds: No wheezing or rales.   Chest:      Chest wall: No tenderness.   Abdominal:      General: Bowel sounds are normal. There is no distension.      Palpations: Abdomen is soft.      Tenderness: There is no abdominal tenderness. There is no guarding or rebound.   Musculoskeletal:          "General: Swelling present.      Cervical back: No rigidity.      Right lower leg: Edema present.      Left lower leg: Edema present.   Skin:     Findings: Erythema and rash present.   Neurological:      Mental Status: He is alert.      Motor: Weakness present.      Coordination: Coordination abnormal.      Gait: Gait abnormal.             Significant Labs: All pertinent labs within the past 24 hours have been reviewed.  BMP:   Recent Labs   Lab 01/12/24  0326   GLU 89   *   K 4.0   CL 98   CO2 26   BUN 64*   CREATININE 9.2*   CALCIUM 8.1*   MG 1.6     CBC: No results for input(s): "WBC", "HGB", "HCT", "PLT" in the last 48 hours.  CMP:   Recent Labs   Lab 01/11/24  0500 01/12/24  0326   * 135*   K 3.7 4.0   CL 96 98   CO2 24 26   GLU 99 89   BUN 50* 64*   CREATININE 7.5* 9.2*   CALCIUM 8.1* 8.1*   PROT 7.7 6.5   ALBUMIN 2.3* 1.9*   BILITOT 0.5 0.5   ALKPHOS 94 129   AST 21 28   ALT 30 29   ANIONGAP 14 11     Cardiac Markers: No results for input(s): "CKMB", "MYOGLOBIN", "BNP", "TROPISTAT" in the last 48 hours.  Lipid Panel: No results for input(s): "CHOL", "HDL", "LDLCALC", "TRIG", "CHOLHDL" in the last 48 hours.  Magnesium:   Recent Labs   Lab 01/11/24  0500 01/12/24  0326   MG 1.6 1.6     Recent Lab Results         01/12/24  1322   01/12/24  0750   01/12/24  0326   01/11/24  1941        Albumin     1.9         ALP     129         ALT     29         Anion Gap     11         AST     28         BILIRUBIN TOTAL     0.5  Comment: For infants and newborns, interpretation of results should be based  on gestational age, weight and in agreement with clinical  observations.    Premature Infant recommended reference ranges:  Up to 24 hours.............<8.0 mg/dL  Up to 48 hours............<12.0 mg/dL  3-5 days..................<15.0 mg/dL  6-29 days.................<15.0 mg/dL           BUN     64         Calcium     8.1         Chloride     98         CO2     26         Creatinine     9.2         eGFR     6      "    Glucose     89         Magnesium      1.6         Phosphorus Level     4.0         POCT Glucose 75   85     219       Potassium     4.0         PROTEIN TOTAL     6.5         Sodium     135                 Significant Imaging: I have reviewed all pertinent imaging results/findings within the past 24 hours.

## 2024-01-12 NOTE — PLAN OF CARE
Problem: Device-Related Complication Risk (Hemodialysis)  Goal: Safe, Effective Therapy Delivery  Outcome: Ongoing, Progressing     Problem: Infection (Hemodialysis)  Goal: Absence of Infection Signs and Symptoms  Outcome: Ongoing, Progressing     Problem: Adult Inpatient Plan of Care  Goal: Plan of Care Review  Outcome: Ongoing, Progressing  Flowsheets (Taken 1/12/2024 0636)  Plan of Care Reviewed With: patient  Goal: Optimal Comfort and Wellbeing  Outcome: Ongoing, Progressing  Intervention: Monitor Pain and Promote Comfort  Flowsheets (Taken 1/12/2024 0636)  Pain Management Interventions:   pillow support provided   relaxation techniques promoted   position adjusted   quiet environment facilitated     Problem: Diabetes Comorbidity  Goal: Blood Glucose Level Within Targeted Range  Outcome: Ongoing, Progressing  Intervention: Monitor and Manage Glycemia  Flowsheets (Taken 1/12/2024 0636)  Glycemic Management: blood glucose monitored     Problem: Fall Injury Risk  Goal: Absence of Fall and Fall-Related Injury  Outcome: Ongoing, Progressing     Problem: Skin Injury Risk Increased  Goal: Skin Health and Integrity  Outcome: Ongoing, Progressing     Problem: Impaired Wound Healing  Goal: Optimal Wound Healing  Outcome: Ongoing, Progressing

## 2024-01-12 NOTE — PT/OT/SLP PROGRESS
Physical Therapy      Patient Name:  Mahesh Cespedes   MRN:  [id number]  1005:  Patient not seen today secondary to Dialysis. Will follow-up as able.
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
## 2024-01-13 LAB
ALBUMIN SERPL BCP-MCNC: 1.8 G/DL (ref 3.5–5.2)
ALP SERPL-CCNC: 105 U/L (ref 55–135)
ALT SERPL W/O P-5'-P-CCNC: 24 U/L (ref 10–44)
ANION GAP SERPL CALC-SCNC: 12 MMOL/L (ref 8–16)
AST SERPL-CCNC: 28 U/L (ref 10–40)
BILIRUB SERPL-MCNC: 0.5 MG/DL (ref 0.1–1)
BUN SERPL-MCNC: 47 MG/DL (ref 6–20)
CALCIUM SERPL-MCNC: 8.5 MG/DL (ref 8.7–10.5)
CHLORIDE SERPL-SCNC: 102 MMOL/L (ref 95–110)
CO2 SERPL-SCNC: 23 MMOL/L (ref 23–29)
CREAT SERPL-MCNC: 6.8 MG/DL (ref 0.5–1.4)
EST. GFR  (NO RACE VARIABLE): 9 ML/MIN/1.73 M^2
GLUCOSE SERPL-MCNC: 104 MG/DL (ref 70–110)
MAGNESIUM SERPL-MCNC: 1.8 MG/DL (ref 1.6–2.6)
PHOSPHATE SERPL-MCNC: 4.8 MG/DL (ref 2.7–4.5)
POCT GLUCOSE: 110 MG/DL (ref 70–110)
POCT GLUCOSE: 127 MG/DL (ref 70–110)
POCT GLUCOSE: 140 MG/DL (ref 70–110)
POCT GLUCOSE: 145 MG/DL (ref 70–110)
POTASSIUM SERPL-SCNC: 4.4 MMOL/L (ref 3.5–5.1)
PROT SERPL-MCNC: 6.6 G/DL (ref 6–8.4)
SODIUM SERPL-SCNC: 137 MMOL/L (ref 136–145)

## 2024-01-13 PROCEDURE — 25000003 PHARM REV CODE 250: Performed by: STUDENT IN AN ORGANIZED HEALTH CARE EDUCATION/TRAINING PROGRAM

## 2024-01-13 PROCEDURE — 99900035 HC TECH TIME PER 15 MIN (STAT)

## 2024-01-13 PROCEDURE — 80053 COMPREHEN METABOLIC PANEL: CPT | Performed by: STUDENT IN AN ORGANIZED HEALTH CARE EDUCATION/TRAINING PROGRAM

## 2024-01-13 PROCEDURE — 36415 COLL VENOUS BLD VENIPUNCTURE: CPT | Performed by: STUDENT IN AN ORGANIZED HEALTH CARE EDUCATION/TRAINING PROGRAM

## 2024-01-13 PROCEDURE — 30200315 PPD INTRADERMAL TEST REV CODE 302: Performed by: FAMILY MEDICINE

## 2024-01-13 PROCEDURE — 11000001 HC ACUTE MED/SURG PRIVATE ROOM

## 2024-01-13 PROCEDURE — 94660 CPAP INITIATION&MGMT: CPT

## 2024-01-13 PROCEDURE — 94761 N-INVAS EAR/PLS OXIMETRY MLT: CPT

## 2024-01-13 PROCEDURE — 84100 ASSAY OF PHOSPHORUS: CPT | Performed by: STUDENT IN AN ORGANIZED HEALTH CARE EDUCATION/TRAINING PROGRAM

## 2024-01-13 PROCEDURE — 27000221 HC OXYGEN, UP TO 24 HOURS

## 2024-01-13 PROCEDURE — 86580 TB INTRADERMAL TEST: CPT | Performed by: FAMILY MEDICINE

## 2024-01-13 PROCEDURE — 83735 ASSAY OF MAGNESIUM: CPT | Performed by: STUDENT IN AN ORGANIZED HEALTH CARE EDUCATION/TRAINING PROGRAM

## 2024-01-13 RX ADMIN — AMIODARONE HYDROCHLORIDE 200 MG: 200 TABLET ORAL at 09:01

## 2024-01-13 RX ADMIN — METOPROLOL SUCCINATE 25 MG: 25 TABLET, EXTENDED RELEASE ORAL at 09:01

## 2024-01-13 RX ADMIN — PANTOPRAZOLE SODIUM 40 MG: 40 TABLET, DELAYED RELEASE ORAL at 09:01

## 2024-01-13 RX ADMIN — ATORVASTATIN CALCIUM 80 MG: 40 TABLET, FILM COATED ORAL at 09:01

## 2024-01-13 RX ADMIN — TAMSULOSIN HYDROCHLORIDE 0.4 MG: 0.4 CAPSULE ORAL at 09:01

## 2024-01-13 RX ADMIN — TUBERCULIN PURIFIED PROTEIN DERIVATIVE 5 UNITS: 5 INJECTION, SOLUTION INTRADERMAL at 10:01

## 2024-01-13 RX ADMIN — ALLOPURINOL 100 MG: 100 TABLET ORAL at 09:01

## 2024-01-13 RX ADMIN — APIXABAN 5 MG: 5 TABLET, FILM COATED ORAL at 09:01

## 2024-01-13 NOTE — PLAN OF CARE
Problem: Device-Related Complication Risk (Hemodialysis)  Goal: Safe, Effective Therapy Delivery  Outcome: Ongoing, Not Progressing     Problem: Hemodynamic Instability (Hemodialysis)  Goal: Effective Tissue Perfusion  Outcome: Ongoing, Not Progressing     Problem: Infection (Hemodialysis)  Goal: Absence of Infection Signs and Symptoms  Outcome: Ongoing, Not Progressing     Problem: Adult Inpatient Plan of Care  Goal: Plan of Care Review  Outcome: Ongoing, Not Progressing  Goal: Patient-Specific Goal (Individualized)  Outcome: Ongoing, Not Progressing  Goal: Absence of Hospital-Acquired Illness or Injury  Outcome: Ongoing, Not Progressing  Goal: Optimal Comfort and Wellbeing  Outcome: Ongoing, Not Progressing  Goal: Readiness for Transition of Care  Outcome: Ongoing, Not Progressing     Problem: Diabetes Comorbidity  Goal: Blood Glucose Level Within Targeted Range  Outcome: Ongoing, Not Progressing     Problem: Infection  Goal: Absence of Infection Signs and Symptoms  Outcome: Ongoing, Not Progressing     Problem: Fall Injury Risk  Goal: Absence of Fall and Fall-Related Injury  Outcome: Ongoing, Not Progressing     Problem: Skin Injury Risk Increased  Goal: Skin Health and Integrity  Outcome: Ongoing, Not Progressing     Problem: Electrolyte Imbalance (Chronic Kidney Disease)  Goal: Electrolyte Balance  Outcome: Ongoing, Not Progressing     Problem: Fluid Volume Excess (Chronic Kidney Disease)  Goal: Fluid Balance  Outcome: Ongoing, Not Progressing     Problem: Functional Decline (Chronic Kidney Disease)  Goal: Optimal Functional Ability  Outcome: Ongoing, Not Progressing     Problem: Impaired Wound Healing  Goal: Optimal Wound Healing  Outcome: Ongoing, Not Progressing
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
## 2024-01-13 NOTE — PLAN OF CARE
Problem: Device-Related Complication Risk (Hemodialysis)  Goal: Safe, Effective Therapy Delivery  Outcome: Ongoing, Progressing     Problem: Infection (Hemodialysis)  Goal: Absence of Infection Signs and Symptoms  Outcome: Ongoing, Progressing     Problem: Adult Inpatient Plan of Care  Goal: Plan of Care Review  Outcome: Ongoing, Progressing  Flowsheets (Taken 1/13/2024 0644)  Plan of Care Reviewed With: patient  Goal: Optimal Comfort and Wellbeing  Outcome: Ongoing, Progressing  Intervention: Monitor Pain and Promote Comfort  Flowsheets (Taken 1/13/2024 0644)  Pain Management Interventions:   pillow support provided   position adjusted   quiet environment facilitated     Problem: Diabetes Comorbidity  Goal: Blood Glucose Level Within Targeted Range  Outcome: Ongoing, Progressing  Intervention: Monitor and Manage Glycemia  Flowsheets (Taken 1/13/2024 0644)  Glycemic Management: blood glucose monitored     Problem: Fall Injury Risk  Goal: Absence of Fall and Fall-Related Injury  Outcome: Ongoing, Progressing     Problem: Electrolyte Imbalance (Chronic Kidney Disease)  Goal: Electrolyte Balance  Outcome: Ongoing, Progressing     Problem: Functional Decline (Chronic Kidney Disease)  Goal: Optimal Functional Ability  Outcome: Ongoing, Progressing

## 2024-01-14 LAB
ALBUMIN SERPL BCP-MCNC: 1.9 G/DL (ref 3.5–5.2)
ALP SERPL-CCNC: 140 U/L (ref 55–135)
ALT SERPL W/O P-5'-P-CCNC: 39 U/L (ref 10–44)
ANION GAP SERPL CALC-SCNC: 10 MMOL/L (ref 8–16)
AST SERPL-CCNC: 69 U/L (ref 10–40)
BILIRUB SERPL-MCNC: 0.5 MG/DL (ref 0.1–1)
BUN SERPL-MCNC: 73 MG/DL (ref 6–20)
CALCIUM SERPL-MCNC: 8.6 MG/DL (ref 8.7–10.5)
CHLORIDE SERPL-SCNC: 103 MMOL/L (ref 95–110)
CO2 SERPL-SCNC: 24 MMOL/L (ref 23–29)
CREAT SERPL-MCNC: 8.6 MG/DL (ref 0.5–1.4)
EST. GFR  (NO RACE VARIABLE): 7 ML/MIN/1.73 M^2
GLUCOSE SERPL-MCNC: 127 MG/DL (ref 70–110)
MAGNESIUM SERPL-MCNC: 2.3 MG/DL (ref 1.6–2.6)
PHOSPHATE SERPL-MCNC: 5.6 MG/DL (ref 2.7–4.5)
POCT GLUCOSE: 117 MG/DL (ref 70–110)
POCT GLUCOSE: 126 MG/DL (ref 70–110)
POCT GLUCOSE: 132 MG/DL (ref 70–110)
POCT GLUCOSE: 91 MG/DL (ref 70–110)
POTASSIUM SERPL-SCNC: 4.5 MMOL/L (ref 3.5–5.1)
PROT SERPL-MCNC: 6.9 G/DL (ref 6–8.4)
SODIUM SERPL-SCNC: 137 MMOL/L (ref 136–145)

## 2024-01-14 PROCEDURE — 83735 ASSAY OF MAGNESIUM: CPT | Performed by: STUDENT IN AN ORGANIZED HEALTH CARE EDUCATION/TRAINING PROGRAM

## 2024-01-14 PROCEDURE — 80053 COMPREHEN METABOLIC PANEL: CPT | Performed by: STUDENT IN AN ORGANIZED HEALTH CARE EDUCATION/TRAINING PROGRAM

## 2024-01-14 PROCEDURE — 94660 CPAP INITIATION&MGMT: CPT

## 2024-01-14 PROCEDURE — 11000001 HC ACUTE MED/SURG PRIVATE ROOM

## 2024-01-14 PROCEDURE — 25000003 PHARM REV CODE 250: Performed by: STUDENT IN AN ORGANIZED HEALTH CARE EDUCATION/TRAINING PROGRAM

## 2024-01-14 PROCEDURE — 84100 ASSAY OF PHOSPHORUS: CPT | Performed by: STUDENT IN AN ORGANIZED HEALTH CARE EDUCATION/TRAINING PROGRAM

## 2024-01-14 PROCEDURE — 36415 COLL VENOUS BLD VENIPUNCTURE: CPT | Performed by: STUDENT IN AN ORGANIZED HEALTH CARE EDUCATION/TRAINING PROGRAM

## 2024-01-14 PROCEDURE — 99900035 HC TECH TIME PER 15 MIN (STAT)

## 2024-01-14 RX ADMIN — TAMSULOSIN HYDROCHLORIDE 0.4 MG: 0.4 CAPSULE ORAL at 09:01

## 2024-01-14 RX ADMIN — PANTOPRAZOLE SODIUM 40 MG: 40 TABLET, DELAYED RELEASE ORAL at 09:01

## 2024-01-14 RX ADMIN — APIXABAN 5 MG: 5 TABLET, FILM COATED ORAL at 09:01

## 2024-01-14 RX ADMIN — AMIODARONE HYDROCHLORIDE 200 MG: 200 TABLET ORAL at 09:01

## 2024-01-14 RX ADMIN — ALLOPURINOL 100 MG: 100 TABLET ORAL at 09:01

## 2024-01-14 RX ADMIN — ATORVASTATIN CALCIUM 80 MG: 40 TABLET, FILM COATED ORAL at 09:01

## 2024-01-14 RX ADMIN — METOPROLOL SUCCINATE 25 MG: 25 TABLET, EXTENDED RELEASE ORAL at 09:01

## 2024-01-14 RX ADMIN — APIXABAN 5 MG: 5 TABLET, FILM COATED ORAL at 08:01

## 2024-01-14 NOTE — NURSING
Pt refused to take apixaban pill. Explained the importance of apixaban still refused. Showers PA-C informed.

## 2024-01-14 NOTE — PLAN OF CARE
Pt lying on bed comfortably. Alert and oriented. With oxygen support at 2L NC, not in distress. Turned pt every two hours to promote circulation. Bed bath completed. Assessed and applied new mepilex on sacrum part. HOB elevated. With telesitter on bedside. On tele monitoring. Bed alarm set. Pts need attended. Bed in low position. Call light within reach. Will continue to monitor.      Problem: Device-Related Complication Risk (Hemodialysis)  Goal: Safe, Effective Therapy Delivery  Outcome: Ongoing, Progressing     Problem: Infection (Hemodialysis)  Goal: Absence of Infection Signs and Symptoms  Outcome: Ongoing, Progressing     Problem: Adult Inpatient Plan of Care  Goal: Plan of Care Review  Outcome: Ongoing, Progressing  Flowsheets (Taken 1/14/2024 0502)  Plan of Care Reviewed With: patient  Goal: Optimal Comfort and Wellbeing  Outcome: Ongoing, Progressing  Intervention: Monitor Pain and Promote Comfort  Flowsheets (Taken 1/14/2024 0502)  Pain Management Interventions:   pillow support provided   position adjusted   quiet environment facilitated     Problem: Diabetes Comorbidity  Goal: Blood Glucose Level Within Targeted Range  Outcome: Ongoing, Progressing  Intervention: Monitor and Manage Glycemia  Flowsheets (Taken 1/14/2024 0502)  Glycemic Management: blood glucose monitored     Problem: Fall Injury Risk  Goal: Absence of Fall and Fall-Related Injury  Outcome: Ongoing, Progressing     Problem: Fluid Volume Excess (Chronic Kidney Disease)  Goal: Fluid Balance  Outcome: Ongoing, Progressing     Problem: Functional Decline (Chronic Kidney Disease)  Goal: Optimal Functional Ability  Outcome: Ongoing, Progressing
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
## 2024-01-14 NOTE — NURSING
Report received and care assumed. Discussed plan of care and safety with patient . Reviewed call system. [facility] [place]  [facility] -- 4 Eyes      1/14/2024       Skin assessed on: Q Shift      [x] No Pressure Injuries Present    [x]Prevention Measures Documented    [] Yes LDA  for Pressure Injury Previously documented     [] Yes New Pressure Injury Discovered   [] LDA for New Pressure Injury Added      Attending RN:  Elizabeth Rutherford RN     Second RN: Ana Tariq RN

## 2024-01-15 LAB
ALBUMIN SERPL BCP-MCNC: 1.8 G/DL (ref 3.5–5.2)
ALP SERPL-CCNC: 135 U/L (ref 55–135)
ALT SERPL W/O P-5'-P-CCNC: 46 U/L (ref 10–44)
ANION GAP SERPL CALC-SCNC: 16 MMOL/L (ref 8–16)
AST SERPL-CCNC: 69 U/L (ref 10–40)
BACTERIA BLD CULT: NORMAL
BACTERIA BLD CULT: NORMAL
BILIRUB SERPL-MCNC: 0.4 MG/DL (ref 0.1–1)
BUN SERPL-MCNC: 83 MG/DL (ref 6–20)
CALCIUM SERPL-MCNC: 7.8 MG/DL (ref 8.7–10.5)
CHLORIDE SERPL-SCNC: 104 MMOL/L (ref 95–110)
CO2 SERPL-SCNC: 16 MMOL/L (ref 23–29)
CREAT SERPL-MCNC: 9.6 MG/DL (ref 0.5–1.4)
ERYTHROCYTE [DISTWIDTH] IN BLOOD BY AUTOMATED COUNT: 16.5 % (ref 11.5–14.5)
EST. GFR  (NO RACE VARIABLE): 6 ML/MIN/1.73 M^2
GLUCOSE SERPL-MCNC: 83 MG/DL (ref 70–110)
HCT VFR BLD AUTO: 23.5 % (ref 40–54)
HGB BLD-MCNC: 7.4 G/DL (ref 14–18)
MAGNESIUM SERPL-MCNC: 2 MG/DL (ref 1.6–2.6)
MCH RBC QN AUTO: 26.7 PG (ref 27–31)
MCHC RBC AUTO-ENTMCNC: 31.5 G/DL (ref 32–36)
MCV RBC AUTO: 85 FL (ref 82–98)
PHOSPHATE SERPL-MCNC: 6 MG/DL (ref 2.7–4.5)
PLATELET # BLD AUTO: 149 K/UL (ref 150–450)
PMV BLD AUTO: 11.1 FL (ref 9.2–12.9)
POCT GLUCOSE: 193 MG/DL (ref 70–110)
POCT GLUCOSE: 82 MG/DL (ref 70–110)
POCT GLUCOSE: 88 MG/DL (ref 70–110)
POTASSIUM SERPL-SCNC: 5.5 MMOL/L (ref 3.5–5.1)
PROT SERPL-MCNC: 6.9 G/DL (ref 6–8.4)
RBC # BLD AUTO: 2.77 M/UL (ref 4.6–6.2)
SODIUM SERPL-SCNC: 136 MMOL/L (ref 136–145)
WBC # BLD AUTO: 12.3 K/UL (ref 3.9–12.7)

## 2024-01-15 PROCEDURE — 25000003 PHARM REV CODE 250: Performed by: STUDENT IN AN ORGANIZED HEALTH CARE EDUCATION/TRAINING PROGRAM

## 2024-01-15 PROCEDURE — 94660 CPAP INITIATION&MGMT: CPT

## 2024-01-15 PROCEDURE — 80053 COMPREHEN METABOLIC PANEL: CPT | Performed by: STUDENT IN AN ORGANIZED HEALTH CARE EDUCATION/TRAINING PROGRAM

## 2024-01-15 PROCEDURE — 84100 ASSAY OF PHOSPHORUS: CPT | Performed by: STUDENT IN AN ORGANIZED HEALTH CARE EDUCATION/TRAINING PROGRAM

## 2024-01-15 PROCEDURE — 99900035 HC TECH TIME PER 15 MIN (STAT)

## 2024-01-15 PROCEDURE — 90935 HEMODIALYSIS ONE EVALUATION: CPT

## 2024-01-15 PROCEDURE — 85027 COMPLETE CBC AUTOMATED: CPT | Performed by: FAMILY MEDICINE

## 2024-01-15 PROCEDURE — 63600175 PHARM REV CODE 636 W HCPCS: Mod: JZ,JG | Performed by: STUDENT IN AN ORGANIZED HEALTH CARE EDUCATION/TRAINING PROGRAM

## 2024-01-15 PROCEDURE — 36415 COLL VENOUS BLD VENIPUNCTURE: CPT | Performed by: STUDENT IN AN ORGANIZED HEALTH CARE EDUCATION/TRAINING PROGRAM

## 2024-01-15 PROCEDURE — 83735 ASSAY OF MAGNESIUM: CPT | Performed by: STUDENT IN AN ORGANIZED HEALTH CARE EDUCATION/TRAINING PROGRAM

## 2024-01-15 PROCEDURE — 11000001 HC ACUTE MED/SURG PRIVATE ROOM

## 2024-01-15 RX ADMIN — ALLOPURINOL 100 MG: 100 TABLET ORAL at 08:01

## 2024-01-15 RX ADMIN — PANTOPRAZOLE SODIUM 40 MG: 40 TABLET, DELAYED RELEASE ORAL at 08:01

## 2024-01-15 RX ADMIN — TAMSULOSIN HYDROCHLORIDE 0.4 MG: 0.4 CAPSULE ORAL at 08:01

## 2024-01-15 RX ADMIN — METOPROLOL SUCCINATE 25 MG: 25 TABLET, EXTENDED RELEASE ORAL at 08:01

## 2024-01-15 RX ADMIN — AMIODARONE HYDROCHLORIDE 200 MG: 200 TABLET ORAL at 08:01

## 2024-01-15 RX ADMIN — APIXABAN 5 MG: 5 TABLET, FILM COATED ORAL at 08:01

## 2024-01-15 RX ADMIN — ATORVASTATIN CALCIUM 80 MG: 40 TABLET, FILM COATED ORAL at 08:01

## 2024-01-15 RX ADMIN — EPOETIN ALFA-EPBX 10000 UNITS: 10000 INJECTION, SOLUTION INTRAVENOUS; SUBCUTANEOUS at 08:01

## 2024-01-15 NOTE — NURSING
Report received and care assumed. Discussed plan of care and safety with patient . Reviewed call system. No acute [facility]  [facility]es [facility] -- 4 Eyes      1/15/2024       Skin assessed on: Q Shift      [x] No Pressure Injuries Present    [x]Prevention Measures Documented    [] Yes LDA  for Pressure Injury Previously documented     [] Yes New Pressure Injury Discovered   [] LDA for New Pressure Injury Added      Attending RN:  Elizabeth Rutherford RN     Second RN:  Rupa Lomax RN

## 2024-01-15 NOTE — ASSESSMENT & PLAN NOTE
Chronic, controlled. Latest blood pressure and vitals reviewed-     Temp:  [96.3 °F (35.7 °C)-98.5 °F (36.9 °C)]   Pulse:  [59-94]   Resp:  [12-35]   BP: (100-168)/()   SpO2:  [82 %-100 %] .   Home meds for hypertension were reviewed and noted below.   Hypertension Medications               amLODIPine (NORVASC) 10 MG tablet Take 10 mg by mouth once daily.    furosemide (LASIX) 20 MG tablet Take 20 mg by mouth 2 (two) times daily.    metoprolol succinate (TOPROL-XL) 100 MG 24 hr tablet Take 100 mg by mouth once daily.    hydrALAZINE (APRESOLINE) 100 MG tablet Take 1 tablet (100 mg total) by mouth 3 (three) times daily.    NIFEdipine (PROCARDIA-XL) 60 MG (OSM) 24 hr tablet Take 1 tablet (60 mg total) by mouth once daily.            While in the hospital, will manage blood pressure as follows; Continue home antihypertensive regimen    Will utilize p.r.n. blood pressure medication only if patient's blood pressure greater than 160/100 and he develops symptoms such as worsening chest pain or shortness of breath.  
Chronic, controlled. Latest blood pressure and vitals reviewed-     Temp:  [97 °F (36.1 °C)-98 °F (36.7 °C)]   Pulse:  [65-88]   Resp:  [10-19]   BP: ()/(60-99)   SpO2:  [92 %-100 %] .   Home meds for hypertension were reviewed and noted below.   Hypertension Medications               amLODIPine (NORVASC) 10 MG tablet Take 10 mg by mouth once daily.    furosemide (LASIX) 20 MG tablet Take 20 mg by mouth 2 (two) times daily.    metoprolol succinate (TOPROL-XL) 100 MG 24 hr tablet Take 100 mg by mouth once daily.    hydrALAZINE (APRESOLINE) 100 MG tablet Take 1 tablet (100 mg total) by mouth 3 (three) times daily.    NIFEdipine (PROCARDIA-XL) 60 MG (OSM) 24 hr tablet Take 1 tablet (60 mg total) by mouth once daily.            While in the hospital, will manage blood pressure as follows; Continue home antihypertensive regimen    Will utilize p.r.n. blood pressure medication only if patient's blood pressure greater than 160/100 and he develops symptoms such as worsening chest pain or shortness of breath.  
Chronic, controlled. Latest blood pressure and vitals reviewed-     Temp:  [97.1 °F (36.2 °C)-98.4 °F (36.9 °C)]   Pulse:  [63-83]   Resp:  [7-45]   BP: (102-169)/(53-95)   SpO2:  [92 %-100 %] .   Home meds for hypertension were reviewed and noted below.   Hypertension Medications               amLODIPine (NORVASC) 10 MG tablet Take 10 mg by mouth once daily.    furosemide (LASIX) 20 MG tablet Take 20 mg by mouth 2 (two) times daily.    metoprolol succinate (TOPROL-XL) 100 MG 24 hr tablet Take 100 mg by mouth once daily.    hydrALAZINE (APRESOLINE) 100 MG tablet Take 1 tablet (100 mg total) by mouth 3 (three) times daily.    NIFEdipine (PROCARDIA-XL) 60 MG (OSM) 24 hr tablet Take 1 tablet (60 mg total) by mouth once daily.            While in the hospital, will manage blood pressure as follows; Continue home antihypertensive regimen    Will utilize p.r.n. blood pressure medication only if patient's blood pressure greater than 160/100 and he develops symptoms such as worsening chest pain or shortness of breath.  
Chronic, controlled. Latest blood pressure and vitals reviewed-     Temp:  [97.2 °F (36.2 °C)-98.5 °F (36.9 °C)]   Pulse:  [62-83]   Resp:  [16-20]   BP: (102-139)/(56-90)   SpO2:  [95 %-100 %] .   Home meds for hypertension were reviewed and noted below.   Hypertension Medications               amLODIPine (NORVASC) 10 MG tablet Take 10 mg by mouth once daily.    furosemide (LASIX) 20 MG tablet Take 20 mg by mouth 2 (two) times daily.    metoprolol succinate (TOPROL-XL) 100 MG 24 hr tablet Take 100 mg by mouth once daily.    hydrALAZINE (APRESOLINE) 100 MG tablet Take 1 tablet (100 mg total) by mouth 3 (three) times daily.    NIFEdipine (PROCARDIA-XL) 60 MG (OSM) 24 hr tablet Take 1 tablet (60 mg total) by mouth once daily.            While in the hospital, will manage blood pressure as follows; Continue home antihypertensive regimen    Will utilize p.r.n. blood pressure medication only if patient's blood pressure greater than 160/100 and he develops symptoms such as worsening chest pain or shortness of breath.  
Chronic, controlled. Latest blood pressure and vitals reviewed-     Temp:  [97.4 °F (36.3 °C)]   Pulse:  [72-81]   Resp:  [17-22]   BP: (122-135)/(79-88)   SpO2:  [97 %-100 %] .   Home meds for hypertension were reviewed and noted below.   Hypertension Medications               amLODIPine (NORVASC) 10 MG tablet Take 10 mg by mouth once daily.    furosemide (LASIX) 20 MG tablet Take 20 mg by mouth 2 (two) times daily.    metoprolol succinate (TOPROL-XL) 100 MG 24 hr tablet Take 100 mg by mouth once daily.    hydrALAZINE (APRESOLINE) 100 MG tablet Take 1 tablet (100 mg total) by mouth 3 (three) times daily.    NIFEdipine (PROCARDIA-XL) 60 MG (OSM) 24 hr tablet Take 1 tablet (60 mg total) by mouth once daily.            While in the hospital, will manage blood pressure as follows; Continue home antihypertensive regimen    Will utilize p.r.n. blood pressure medication only if patient's blood pressure greater than 160/100 and he develops symptoms such as worsening chest pain or shortness of breath.  
Creatine stable for now. BMP reviewed  Estimated Creatinine Clearance: 11.4 mL/min (A) (based on SCr of 7.5 mg/dL (H)).   Based on current GFR, CKD stage is end stage.    Monitor UOP and serial BMP and adjust therapy as needed. Renally dose meds. Avoid nephrotoxic medications and procedures.  Nephrology consulted   Has DC in place for access  
Creatine stable for now. BMP reviewed  Estimated Creatinine Clearance: 4.3 mL/min (A) (based on SCr of 19.8 mg/dL (H)).   Based on current GFR, CKD stage is end stage.    Monitor UOP and serial BMP and adjust therapy as needed. Renally dose meds. Avoid nephrotoxic medications and procedures.  Nephrology consulted   
Creatine stable for now. BMP reviewed  Estimated Creatinine Clearance: 4.5 mL/min (A) (based on SCr of 19.8 mg/dL (H)).   Based on current GFR, CKD stage is end stage.    Monitor UOP and serial BMP and adjust therapy as needed. Renally dose meds. Avoid nephrotoxic medications and procedures.  Nephrology consulted   
Creatine stable for now. BMP reviewed  Estimated Creatinine Clearance: 5.5 mL/min (A) (based on SCr of 15.6 mg/dL (H)).   Based on current GFR, CKD stage is end stage.    Monitor UOP and serial BMP and adjust therapy as needed. Renally dose meds. Avoid nephrotoxic medications and procedures.  Nephrology consulted   
Creatine stable for now. BMP reviewed  Estimated Creatinine Clearance: 7.2 mL/min (A) (based on SCr of 11.9 mg/dL (H)).   Based on current GFR, CKD stage is end stage.    Monitor UOP and serial BMP and adjust therapy as needed. Renally dose meds. Avoid nephrotoxic medications and procedures.  Nephrology consulted   Has DC in place for access  
Creatine stable for now. BMP reviewed  Estimated Creatinine Clearance: 8.3 mL/min (A) (based on SCr of 10.3 mg/dL (H)).   Based on current GFR, CKD stage is end stage.    Monitor UOP and serial BMP and adjust therapy as needed. Renally dose meds. Avoid nephrotoxic medications and procedures.  Nephrology consulted   Has DC in place for access  
Creatine stable for now. BMP reviewed  Estimated Creatinine Clearance: 8.9 mL/min (A) (based on SCr of 9.6 mg/dL (H)).   Based on current GFR, CKD stage is end stage.    Monitor UOP and serial BMP and adjust therapy as needed. Renally dose meds. Avoid nephrotoxic medications and procedures.  Nephrology consulted   Has DC in place for access  Better understanding of the HD needs and promise to be complaint  Stable for DC home as soon as get HD chair  
DM diet when able  SSI  
Likely 2/2 uremia in light of 2 weeks without his chronic dialysis. Does state his name and denies pain when asked.  -continue to monitor dialysis per nephrology    
Likely 2/2 uremia in light of 2 weeks without his chronic dialysis. Does state his name and denies pain when asked.  -continue to monitor dialysis per nephrology    
Likely 2/2 uremia in light of 2 weeks without his chronic dialysis. Does state his name and denies pain when asked.  -continue to monitor dialysis per nephrology  - appears to be resolving    
Noted hemorrhage  Is on AC for Afib, per son    
Noted hemorrhage  Is on AC for Afib, per son  No CT head done this admit, if any neurologic changes plan for CT head    
Patient with Hypoxic Respiratory failure which is Acute.  he is not on home oxygen.   Supplemental oxygen was provided and noted-    Signs/symptoms of respiratory failure include- tachypnea, increased work of breathing, respiratory distress, and lethargy.   Contributing diagnoses includes -  ESRD not going to HD    Labs and images were reviewed.   Patient Has not had a recent ABG.   Will treat underlying causes and adjust management of respiratory failure as follows-   BiPAP. Now transitioned to NC. Will wean as tolerated  Volume removal via HD  
Patient with Hypoxic Respiratory failure which is Acute.  he is not on home oxygen.   Supplemental oxygen was provided and noted- Oxygen Concentration (%):  [35] 35  Signs/symptoms of respiratory failure include- tachypnea, increased work of breathing, respiratory distress, and lethargy.   Contributing diagnoses includes -  ESRD not going to HD    Labs and images were reviewed.   Patient Has not had a recent ABG.   Will treat underlying causes and adjust management of respiratory failure as follows-   BiPAP  Volume removal via HD  
Patient with Hypoxic Respiratory failure which is Acute.  he is not on home oxygen.   Supplemental oxygen was provided and noted- Oxygen Concentration (%):  [35] 35  Signs/symptoms of respiratory failure include- tachypnea, increased work of breathing, respiratory distress, and lethargy.   Contributing diagnoses includes -  ESRD not going to HD    Labs and images were reviewed.   Patient Has not had a recent ABG.   Will treat underlying causes and adjust management of respiratory failure as follows-   BiPAP  Volume removal via HD  
Patient with Permanent atrial fibrillation which is controlled currently with Beta Blocker. Patient is currently in atrial fibrillation.  On Apixaban, per david HARPER  
Pt left [place] before [place] and decided to stay in [place], but didn't set up dialysis here per family. He had not had dialysis since leaving [place]    
Pt left [place] before [place] and decided to stay in [place], but didn't set up dialysis here per family. He had not had dialysis since leaving [place]    
Pt left [place] before [place] and decided to stay in [place], but didn't set up dialysis here per family. He had not had dialysis since leaving [place]    
Resolving     
S/p therapeutic paracentesis  - may benefit from repeat prior to discharge    
This patient has hyperkalemia which is uncontrolled.   We will monitor for arrhythmias with EKG or continuous telemetry.   We will treat the hyperkalemia with Potassium Binders. The likely etiology of the hyperkalemia is ESRD.  The patients latest potassium has been reviewed and the results are listed below  Recent Labs   Lab 01/06/24  1839   K 6.2*       [facility] ordered  HD for removal  Repeat cmp pending    
This patient has hyperkalemia which is uncontrolled.   We will monitor for arrhythmias with EKG or continuous telemetry.   We will treat the hyperkalemia with Potassium Binders. The likely etiology of the hyperkalemia is ESRD.  The patients latest potassium has been reviewed and the results are listed below  Recent Labs   Lab 01/06/24  1839   K 6.2*     Lokel[facility] ordered  HD for removal    
This patient has hyperkalemia which is uncontrolled.   We will monitor for arrhythmias with EKG or continuous telemetry.   We will treat the hyperkalemia with Potassium Binders. The likely etiology of the hyperkalemia is ESRD.  The patients latest potassium has been reviewed and the results are listed below  Recent Labs   Lab 01/08/24  0450   K 4.8       [facility]  Repeat cmp pending    
This patient has hyperkalemia which is uncontrolled.   We will monitor for arrhythmias with EKG or continuous telemetry.   We will treat the hyperkalemia with Potassium Binders. The likely etiology of the hyperkalemia is ESRD.  The patients latest potassium has been reviewed and the results are listed below  Recent Labs   Lab 01/09/24  0347   K 4.3       [facility]  [facility]  Repeat cmp pending    
Unknown source of infection for now  Blood Cx x 2 sent  Cont tylenol and monitor  Will consider empiric IV Abx if persist    
improving  Likely 2/2 uremia in light of 2 weeks without his chronic dialysis.   -continue to monitor dialysis per nephrology      
(2) very limited
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
## 2024-01-15 NOTE — PLAN OF CARE
Problem: Diabetes Comorbidity  Goal: Blood Glucose Level Within Targeted Range  Intervention: Monitor and Manage Glycemia  Flowsheets (Taken 1/14/2024 1832)  Glycemic Management: blood glucose monitored     Problem: Electrolyte Imbalance (Chronic Kidney Disease)  Goal: Electrolyte Balance  Intervention: Monitor and Manage Electrolyte Imbalance  Flowsheets (Taken 1/14/2024 1832)  Fluid/Electrolyte Management: fluids restricted     Problem: Fluid Volume Excess (Chronic Kidney Disease)  Goal: Fluid Balance  Intervention: Monitor and Manage Hypervolemia  Flowsheets (Taken 1/14/2024 1832)  Skin Protection:   adhesive use limited   incontinence pads utilized   tubing/devices free from skin contact  Fluid/Electrolyte Management: fluids restricted

## 2024-01-15 NOTE — SUBJECTIVE & OBJECTIVE
Interval History:   Stated feeling much better, very alert, talkative, no acute concern or new acute event. Denies CP or SOB, no diarrhea or constipation    Review of Systems   Constitutional:  Positive for activity change. Negative for appetite change, chills and fever.   HENT:  Negative for congestion.    Respiratory:  Negative for chest tightness, shortness of breath and wheezing.    Cardiovascular:  Positive for leg swelling. Negative for chest pain and palpitations.   Gastrointestinal:  Negative for abdominal pain, constipation, diarrhea, nausea and vomiting.   Musculoskeletal:  Positive for gait problem. Negative for back pain.   Skin:  Positive for rash.   Neurological:  Negative for dizziness, speech difficulty, weakness and headaches.   Psychiatric/Behavioral:  Negative for agitation, confusion and dysphoric mood.    All other systems reviewed and are negative.    Objective:     Vital Signs (Most Recent):  Temp: 98.5 °F (36.9 °C) (01/15/24 0739)  Pulse: 66 (01/15/24 0739)  Resp: 18 (01/15/24 0739)  BP: 123/64 (01/15/24 0739)  SpO2: 97 % (01/15/24 0739) Vital Signs (24h Range):  Temp:  [97.6 °F (36.4 °C)-98.9 °F (37.2 °C)] 98.5 °F (36.9 °C)  Pulse:  [66-79] 66  Resp:  [16-18] 18  SpO2:  [95 %-99 %] 97 %  BP: (106-125)/(55-69) 123/64     Weight: 93.3 kg (205 lb 11 oz)  Body mass index is 32.22 kg/m².    Intake/Output Summary (Last 24 hours) at 1/15/2024 0830  Last data filed at 1/14/2024 1913  Gross per 24 hour   Intake 480 ml   Output 0 ml   Net 480 ml         Physical Exam  Vitals and nursing note reviewed.   Constitutional:       General: He is not in acute distress.     Appearance: He is not toxic-appearing.   HENT:      Head: Normocephalic and atraumatic.      Mouth/Throat:      Mouth: Mucous membranes are moist.      Pharynx: No oropharyngeal exudate.   Eyes:      Extraocular Movements: Extraocular movements intact.      Pupils: Pupils are equal, round, and reactive to light.   Cardiovascular:      Rate  "and Rhythm: Normal rate.      Heart sounds: No murmur heard.     No friction rub. No gallop.   Pulmonary:      Effort: Pulmonary effort is normal. No respiratory distress.      Breath sounds: No wheezing or rales.   Chest:      Chest wall: No tenderness.   Abdominal:      General: Bowel sounds are normal. There is no distension.      Palpations: Abdomen is soft.      Tenderness: There is no abdominal tenderness. There is no guarding or rebound.   Musculoskeletal:         General: Swelling present. No tenderness.      Cervical back: No rigidity or tenderness.      Comments: Trace pedal edema   Skin:     Findings: Rash present. No erythema.   Neurological:      General: No focal deficit present.      Mental Status: He is alert and oriented to person, place, and time.      Motor: Weakness present.      Gait: Gait abnormal.   Psychiatric:         Thought Content: Thought content normal.             Significant Labs: All pertinent labs within the past 24 hours have been reviewed.  A1C: No results for input(s): "HGBA1C" in the last 4320 hours.  BMP:   Recent Labs   Lab 01/15/24  0522   GLU 83      K 5.5*      CO2 16*   BUN 83*   CREATININE 9.6*   CALCIUM 7.8*   MG 2.0     CBC:   Recent Labs   Lab 01/15/24  0522   WBC 12.30   HGB 7.4*   HCT 23.5*   *     CMP:   Recent Labs   Lab 01/14/24  0935 01/15/24  0522    136   K 4.5 5.5*    104   CO2 24 16*   * 83   BUN 73* 83*   CREATININE 8.6* 9.6*   CALCIUM 8.6* 7.8*   PROT 6.9 6.9   ALBUMIN 1.9* 1.8*   BILITOT 0.5 0.4   ALKPHOS 140* 135   AST 69* 69*   ALT 39 46*   ANIONGAP 10 16     Cardiac Markers: No results for input(s): "CKMB", "MYOGLOBIN", "BNP", "TROPISTAT" in the last 48 hours.  Lactic Acid: No results for input(s): "LACTATE" in the last 48 hours.  Lipid Panel: No results for input(s): "CHOL", "HDL", "LDLCALC", "TRIG", "CHOLHDL" in the last 48 hours.  Magnesium:   Recent Labs   Lab 01/14/24  0935 01/15/24  0522   MG 2.3 2.0 " "    Prealbumin: No results for input(s): "PREALBUMIN" in the last 48 hours.  Troponin: No results for input(s): "TROPONINI", "TROPONINIHS" in the last 48 hours.  TSH: No results for input(s): "TSH" in the last 4320 hours.  Urine Culture: No results for input(s): "LABURIN" in the last 48 hours.  Recent Lab Results  (Last 5 results in the past 24 hours)        01/15/24  0522   01/14/24  2031   01/14/24  1645   01/14/24  1152   01/14/24  0935        Albumin 1.8         1.9                140       ALT 46         39       Anion Gap 16         10       AST 69         69       BILIRUBIN TOTAL 0.4  Comment: For infants and newborns, interpretation of results should be based  on gestational age, weight and in agreement with clinical  observations.    Premature Infant recommended reference ranges:  Up to 24 hours.............<8.0 mg/dL  Up to 48 hours............<12.0 mg/dL  3-5 days..................<15.0 mg/dL  6-29 days.................<15.0 mg/dL           0.5  Comment: For infants and newborns, interpretation of results should be based  on gestational age, weight and in agreement with clinical  observations.    Premature Infant recommended reference ranges:  Up to 24 hours.............<8.0 mg/dL  Up to 48 hours............<12.0 mg/dL  3-5 days..................<15.0 mg/dL  6-29 days.................<15.0 mg/dL         BUN 83         73       Calcium 7.8         8.6       Chloride 104         103       CO2 16         24       Creatinine 9.6         8.6       eGFR 6         7       Glucose 83         127       Hematocrit 23.5               Hemoglobin 7.4               Magnesium  2.0         2.3       MCH 26.7               MCHC 31.5               MCV 85               MPV 11.1               Phosphorus Level 6.0         5.6       Platelet Count 149               POCT Glucose   117   126   132         Potassium 5.5  Comment: Specimen moderately hemolyzed         4.5       PROTEIN TOTAL 6.9         6.9       RBC 2.77   "             RDW 16.5               Sodium 136         137       WBC 12.30                                      Significant Imaging: I have reviewed all pertinent imaging results/findings within the past 24 hours.

## 2024-01-15 NOTE — PROGRESS NOTES
Mahesh Cespedes is a 58 y.o. male patient.    Follow for ESRD, dialysis    Patient seen while on dialysis  Comfortable, no new c/o    Scheduled Meds:   allopurinoL  100 mg Oral Daily    amiodarone  200 mg Oral Daily    apixaban  5 mg Oral Q12H    atorvastatin  80 mg Oral Daily    epoetin luli-epbx  10,000 Units Subcutaneous Every Mon, Wed, Fri    metoprolol succinate  25 mg Oral Daily    pantoprazole  40 mg Oral Daily    tamsulosin  0.4 mg Oral Daily       Review of patient's allergies indicates:  No Known Allergies      Vital Signs Range (Last 24H):  Temp:  [97.6 °F (36.4 °C)-98.9 °F (37.2 °C)]   Pulse:  [61-86]   Resp:  [16-18]   BP: (106-128)/(55-78)   SpO2:  [95 %-99 %]     I & O (Last 24H):  Intake/Output Summary (Last 24 hours) at 1/15/2024 1238  Last data filed at 1/15/2024 0830  Gross per 24 hour   Intake 340 ml   Output 0 ml   Net 340 ml           Physical Exam:  General appearance: well developed, no distress  Lungs:  clear to auscultation bilaterally and normal respiratory effort  Heart: regular rate and rhythm  Abdomen:  soft, normal bowel sounds  Extremities: no cyanosis or edema, or clubbing    Laboratory:  I have reviewed all pertinent lab results within the past 24 hours.  CBC:   Recent Labs   Lab 01/15/24  0522   WBC 12.30   RBC 2.77*   HGB 7.4*   HCT 23.5*   *   MCV 85   MCH 26.7*   MCHC 31.5*     CMP:   Recent Labs   Lab 01/15/24  0522   GLU 83   CALCIUM 7.8*   ALBUMIN 1.8*   PROT 6.9      K 5.5*   CO2 16*      BUN 83*   CREATININE 9.6*   ALKPHOS 135   ALT 46*   AST 69*   BILITOT 0.4       Assessment & Plan    ESRD - on dialysis, stable, tolerated well  HTN  Anemia of CKD    Continue present Rx  HD q MWF  We'll follow for dialysis      Trajayce Scott  1/15/2024

## 2024-01-15 NOTE — NURSING
Report received and care resumed after return from dialysis via bed. Awake alert. No acute distress noted

## 2024-01-16 LAB
ALBUMIN SERPL BCP-MCNC: 1.9 G/DL (ref 3.5–5.2)
ALP SERPL-CCNC: 135 U/L (ref 55–135)
ALT SERPL W/O P-5'-P-CCNC: 42 U/L (ref 10–44)
ANION GAP SERPL CALC-SCNC: 11 MMOL/L (ref 8–16)
AST SERPL-CCNC: 44 U/L (ref 10–40)
BILIRUB SERPL-MCNC: 0.5 MG/DL (ref 0.1–1)
BUN SERPL-MCNC: 53 MG/DL (ref 6–20)
CALCIUM SERPL-MCNC: 7.9 MG/DL (ref 8.7–10.5)
CHLORIDE SERPL-SCNC: 102 MMOL/L (ref 95–110)
CO2 SERPL-SCNC: 21 MMOL/L (ref 23–29)
CREAT SERPL-MCNC: 6.8 MG/DL (ref 0.5–1.4)
EST. GFR  (NO RACE VARIABLE): 9 ML/MIN/1.73 M^2
GLUCOSE SERPL-MCNC: 76 MG/DL (ref 70–110)
MAGNESIUM SERPL-MCNC: 1.8 MG/DL (ref 1.6–2.6)
PHOSPHATE SERPL-MCNC: 4.3 MG/DL (ref 2.7–4.5)
POCT GLUCOSE: 110 MG/DL (ref 70–110)
POCT GLUCOSE: 124 MG/DL (ref 70–110)
POCT GLUCOSE: 153 MG/DL (ref 70–110)
POCT GLUCOSE: 74 MG/DL (ref 70–110)
POTASSIUM SERPL-SCNC: 4.8 MMOL/L (ref 3.5–5.1)
PROT SERPL-MCNC: 6.9 G/DL (ref 6–8.4)
SODIUM SERPL-SCNC: 134 MMOL/L (ref 136–145)

## 2024-01-16 PROCEDURE — 11000001 HC ACUTE MED/SURG PRIVATE ROOM

## 2024-01-16 PROCEDURE — 84100 ASSAY OF PHOSPHORUS: CPT | Performed by: STUDENT IN AN ORGANIZED HEALTH CARE EDUCATION/TRAINING PROGRAM

## 2024-01-16 PROCEDURE — 27000221 HC OXYGEN, UP TO 24 HOURS

## 2024-01-16 PROCEDURE — 25000003 PHARM REV CODE 250: Performed by: STUDENT IN AN ORGANIZED HEALTH CARE EDUCATION/TRAINING PROGRAM

## 2024-01-16 PROCEDURE — 25000003 PHARM REV CODE 250: Performed by: FAMILY MEDICINE

## 2024-01-16 PROCEDURE — 99900035 HC TECH TIME PER 15 MIN (STAT)

## 2024-01-16 PROCEDURE — 97535 SELF CARE MNGMENT TRAINING: CPT

## 2024-01-16 PROCEDURE — 80053 COMPREHEN METABOLIC PANEL: CPT | Performed by: STUDENT IN AN ORGANIZED HEALTH CARE EDUCATION/TRAINING PROGRAM

## 2024-01-16 PROCEDURE — 97530 THERAPEUTIC ACTIVITIES: CPT

## 2024-01-16 PROCEDURE — 94761 N-INVAS EAR/PLS OXIMETRY MLT: CPT

## 2024-01-16 PROCEDURE — 83735 ASSAY OF MAGNESIUM: CPT | Performed by: STUDENT IN AN ORGANIZED HEALTH CARE EDUCATION/TRAINING PROGRAM

## 2024-01-16 PROCEDURE — 97530 THERAPEUTIC ACTIVITIES: CPT | Mod: CQ

## 2024-01-16 PROCEDURE — 36415 COLL VENOUS BLD VENIPUNCTURE: CPT | Performed by: STUDENT IN AN ORGANIZED HEALTH CARE EDUCATION/TRAINING PROGRAM

## 2024-01-16 RX ADMIN — TAMSULOSIN HYDROCHLORIDE 0.4 MG: 0.4 CAPSULE ORAL at 08:01

## 2024-01-16 RX ADMIN — AMIODARONE HYDROCHLORIDE 200 MG: 200 TABLET ORAL at 08:01

## 2024-01-16 RX ADMIN — PANTOPRAZOLE SODIUM 40 MG: 40 TABLET, DELAYED RELEASE ORAL at 08:01

## 2024-01-16 RX ADMIN — ALLOPURINOL 100 MG: 100 TABLET ORAL at 08:01

## 2024-01-16 RX ADMIN — APIXABAN 5 MG: 5 TABLET, FILM COATED ORAL at 08:01

## 2024-01-16 RX ADMIN — ATORVASTATIN CALCIUM 80 MG: 40 TABLET, FILM COATED ORAL at 08:01

## 2024-01-16 RX ADMIN — ACETAMINOPHEN 650 MG: 325 TABLET ORAL at 03:01

## 2024-01-16 RX ADMIN — METOPROLOL SUCCINATE 25 MG: 25 TABLET, EXTENDED RELEASE ORAL at 08:01

## 2024-01-16 RX ADMIN — APIXABAN 5 MG: 5 TABLET, FILM COATED ORAL at 09:01

## 2024-01-16 NOTE — PLAN OF CARE
Pt lying on bed comfortably. With oxygen support at 1L NC not in distress. Repositioned every two hours to promote circulation. With right sided weakness noted. With condom cath. On tele.  With limb precaution on his left arm. With R IJ noted. HOB elevated. Bed in low position. With telesitter. Bed alarm set. Will continue to monitor.      Problem: Device-Related Complication Risk (Hemodialysis)  Goal: Safe, Effective Therapy Delivery  Outcome: Ongoing, Progressing     Problem: Adult Inpatient Plan of Care  Goal: Plan of Care Review  Outcome: Ongoing, Progressing  Flowsheets (Taken 1/16/2024 0347)  Plan of Care Reviewed With: patient  Goal: Optimal Comfort and Wellbeing  Outcome: Ongoing, Progressing  Intervention: Monitor Pain and Promote Comfort  Flowsheets (Taken 1/16/2024 0347)  Pain Management Interventions:   pillow support provided   position adjusted   quiet environment facilitated     Problem: Diabetes Comorbidity  Goal: Blood Glucose Level Within Targeted Range  Outcome: Ongoing, Progressing  Intervention: Monitor and Manage Glycemia  Flowsheets (Taken 1/16/2024 0347)  Glycemic Management: blood glucose monitored     Problem: Fall Injury Risk  Goal: Absence of Fall and Fall-Related Injury  Outcome: Ongoing, Progressing  Intervention: Promote Injury-Free Environment  Flowsheets (Taken 1/16/2024 0347)  Safety Promotion/Fall Prevention:   /camera at bedside   bed alarm set     Problem: Fluid Volume Excess (Chronic Kidney Disease)  Goal: Fluid Balance  Outcome: Ongoing, Progressing

## 2024-01-16 NOTE — PT/OT/SLP PROGRESS
Physical Therapy Treatment    Patient Name:  Mahesh Cespedes   MRN:  [id number]   Used: Natal[facility] #[id number]  Recommendations:     Discharge Recommendations: Moderate Intensity Therapy  Discharge Equipment Recommendations: none  Barriers to discharge:  Decreased functional mobility, decreased ambulation, increased fall risk    Assessment:     Mahesh Cespedes is a 58 y.o. male admitted with a medical diagnosis of Acute hypoxemic respiratory failure.  He presents with the following impairments/functional limitations: weakness, impaired endurance, impaired functional mobility, gait instability, impaired cognition.    Pt appeared lethargic this date, per , pt stated his whole body hurts and he does not feel well, he has a cold.    Rehab Prognosis: Fair; patient would benefit from acute skilled PT services to address these deficits and reach maximum level of function.    Recent Surgery: * No surgery found *      Plan:     During this hospitalization, patient to be seen 3 x/week to address the identified rehab impairments via gait training, therapeutic activities, therapeutic exercises and progress toward the following goals:    Plan of Care Expires:  01/18/24    Subjective     Chief Complaint: doesn't feel well  Patient/Family Comments/goals: Pt agreed to therapy  Pain/Comfort:  Pain Rating 1:  (pt did not rate)  Pain Addressed 1: Pre-medicate for activity, Nurse notified, Distraction, Reposition      Objective:     Communicated with Selma prior to session.  Patient found HOB elevated with bed alarm, PICC line, Condom Catheter, oxygen, pressure relief boots upon PT entry to room.     General Precautions: Standard,    Orthopedic Precautions: N/A  Braces: N/A  Respiratory Status: Room air upon arrival, per nursing pt placed on 1.5L O2 then 2L O2, pt left on 2L O2     Functional Mobility: Upon arrival in room, pt on RA. Pt spO2 in ~80s on RA, nursing placed pt on 1.5L O2 NC, unable to get good pulse ox reading,  nursing put pt on 2L O2 NC, pt recovered to ~90s on 2L O2 NC.  Bed Mobility:     Rolling Left:  moderate assistance  Scooting: contact guard assistance to scoot EOB and posteriorly in bed  Supine to Sit: moderate assistance and of 2 persons for trunk control and LE assistance  Sit to Supine: minimum assistance and of 2 persons for LE assistance  Transfers: Gait belt donned     Sit to Stand:  moderate assistance and of 2 persons with rolling walker  Gait: Pt took ~2-3 sidesteps using RW and Max A. Pt able to shuffle feet towards HOB, however unable to completely  BLE, pt stated he has a RLE injury. Pt required max verbal/tactile cueing for LE initiation and RW management.  Balance: Pt with Poor sitting balance, pt required Max A to maintain sitting balance, pt with R lateral lean while sitting EOB.      AM-PAC 6 CLICK MOBILITY  Turning over in bed (including adjusting bedclothes, sheets and blankets)?: 3  Sitting down on and standing up from a chair with arms (e.g., wheelchair, bedside commode, etc.): 3  Moving from lying on back to sitting on the side of the bed?: 3  Moving to and from a bed to a chair (including a wheelchair)?: 2  Need to walk in hospital room?: 2  Climbing 3-5 steps with a railing?: 1  Basic Mobility Total Score: 14       Treatment & Education:  Pt performed x2 trials STS from EOB using RW and Mod A x2 persons. Pt took ~2-3 sidesteps using RW.    Patient left HOB elevated right sidelying with all lines intact, call button in reach, bed alarm on, and nursing notified..    GOALS:   Multidisciplinary Problems       Physical Therapy Goals          Problem: Physical Therapy    Goal Priority Disciplines Outcome Goal Variances Interventions   Physical Therapy Goal     PT, PT/OT Ongoing, Progressing     Description: Goals to be met by: 24     Patient will increase functional independence with mobility by performin. Pt to be min A with bed mobility.  2. Pt to transfer with min A.  3. Pt  to ambulate 50' w/RW CGA.  4. Pt to be able to tolerate 1x10 reps BLE exs.                         Time Tracking:     PT Received On: 01/16/24  PT Start Time: 1554     PT Stop Time: 1620  PT Total Time (min): 26 min     Billable Minutes: Therapeutic Activity 26    Treatment Type: Treatment  PT/PTA: PTA     Number of PTA visits since last PT visit: 1 01/16/2024

## 2024-01-16 NOTE — NURSING
Resting quietly in bed No acute distress noted.Gen weakness However right  extremities weaker than left r/o hx of CVA

## 2024-01-16 NOTE — PLAN OF CARE
Problem: Adult Inpatient Plan of Care  Goal: Absence of Hospital-Acquired Illness or Injury  Outcome: Ongoing, Progressing  Intervention: Identify and Manage Fall Risk  Flowsheets (Taken 1/15/2024 1951)  Safety Promotion/Fall Prevention:   assistive device/personal item within reach   bed alarm set   Fall Risk reviewed with patient/family   Fall Risk signage in place   high risk medications identified   lighting adjusted   medications reviewed   side rails raised x 2   /camera at bedside   toileting scheduled   instructed to call staff for mobility  Intervention: Prevent Skin Injury  Flowsheets (Taken 1/15/2024 1951)  Body Position:   legs elevated   heels elevated  Skin Protection:   adhesive use limited   incontinence pads utilized   tubing/devices free from skin contact  Intervention: Prevent and Manage VTE (Venous Thromboembolism) Risk  Flowsheets (Taken 1/15/2024 1951)  Activity Management: Rolling - L1
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
## 2024-01-16 NOTE — PLAN OF CARE
Problem: Physical Therapy  Goal: Physical Therapy Goal  Description: Goals to be met by: 24     Patient will increase functional independence with mobility by performin. Pt to be min A with bed mobility.  2. Pt to transfer with min A.  3. Pt to ambulate 50' w/RW CGA.  4. Pt to be able to tolerate 1x10 reps BLE exs.    Outcome: Ongoing, Progressing       Pt appeared lethargic this date, per , pt stated his whole body hurts and he does not feel well, he has a cold.

## 2024-01-16 NOTE — PLAN OF CARE
Problem: Device-Related Complication Risk (Hemodialysis)  Goal: Safe, Effective Therapy Delivery  Outcome: Ongoing, Progressing     Problem: Infection (Hemodialysis)  Goal: Absence of Infection Signs and Symptoms  Outcome: Ongoing, Progressing     Problem: Adult Inpatient Plan of Care  Goal: Plan of Care Review  Outcome: Ongoing, Progressing  Goal: Patient-Specific Goal (Individualized)  Outcome: Ongoing, Progressing  Goal: Absence of Hospital-Acquired Illness or Injury  Outcome: Ongoing, Progressing  Goal: Optimal Comfort and Wellbeing  Outcome: Ongoing, Progressing  Goal: Readiness for Transition of Care  Outcome: Ongoing, Progressing     Problem: Diabetes Comorbidity  Goal: Blood Glucose Level Within Targeted Range  Outcome: Ongoing, Progressing     Problem: Infection  Goal: Absence of Infection Signs and Symptoms  Outcome: Ongoing, Progressing     Problem: Fall Injury Risk  Goal: Absence of Fall and Fall-Related Injury  Outcome: Ongoing, Progressing

## 2024-01-16 NOTE — NURSING
[facility]  Nurses Note -- 4 Eyes      1/16/2024       Skin assessed on: Q Shift      [x] No Pressure Injuries Present    [x]Prevention Measures Documented    [] Yes LDA  for Pressure Injury Previously documented     [] Yes New Pressure Injury Discovered   [] LDA for New Pressure Injury Added      Attending RN:  Selma Saab, BETO     Second RN:  Dixie JEFFERY PCT

## 2024-01-16 NOTE — PT/OT/SLP PROGRESS
"Occupational Therapy   Treatment    Name: Mahesh Cespedes  MRN: [id number]  Admitting Diagnosis:  Acute hypoxemic respiratory failure       Recommendations:     Discharge Recommendations: Moderate Intensity Therapy  Discharge Equipment Recommendations:  to be determined by next level of care  Barriers to discharge:   (The patient is lethargic and not at his functional baseline)    Assessment:     Mahesh Cespedes is a 58 y.o. male with a medical diagnosis of Acute hypoxemic respiratory failure.   Performance deficits affecting function are weakness, impaired endurance, impaired self care skills, impaired functional mobility, gait instability, impaired balance, impaired cognition, decreased coordination, decreased upper extremity function, decreased lower extremity function, decreased safety awareness, pain, impaired cardiopulmonary response to activity.   The patient was limited by lethargy. The patient req mod assist x2 for bed supine to sit and to stand with a RW. The patient required CGA/min assist for sitting balance. The patient with delayed response to commands and questions.    Rehab Prognosis:  Fair; patient would benefit from acute skilled OT services to address these deficits and reach maximum level of function.       Plan:     Patient to be seen  (3-5x/week) to address the above listed problems via self-care/home management, therapeutic activities, therapeutic exercises  Plan of Care Expires: 01/25/24  Plan of Care Reviewed with: patient    Subjective     Chief Complaint: feels achy all over  Patient/Family Comments/goals: did not state  Pain/Comfort:  Pain Rating 1:  (yes-felt "achy all over")  Pain Addressed 1: Cessation of Activity, Nurse notified (pain meds received during tx)  Pain Rating Post-Intervention 1: 0/10    Objective:     Communicated with: Selma stanford prior to session.  Patient found right sidelying with bed alarm, pressure relief boots, Condom Catheter (Avasys, right chest port) upon OT entry " to room.    General Precautions: Standard, fall, respiratory    Orthopedic Precautions:N/A  Braces: N/A  Respiratory Status: Room air     Occupational Performance:     Bed Mobility:    Patient completed Rolling/Turning to Right with minimum assistance  Patient completed Scooting/Bridging with maximal assistance, dependent, and 2 persons  Patient completed Supine to Sit with moderate assistance and 2 persons  Patient completed Sit to Supine with minimum assistance and with leg lift     Functional Mobility/Transfers:  Patient completed Sit <> Stand Transfer with moderate assistance and of 2 persons  with  rolling walker   Functional Mobility: The patient stood x2 trials using a RW with 2 person, mod assist. The patient took a few steps to the right    Activities of Daily Living:  Upper Body Dressing: moderate assistance    Lower Body Dressing: dependence        AMPAC 6 Click ADL: 13    Treatment & Education:  Performed self care and functional mobility as noted above.  Monitored vitals O2 with the patient c/o dizziness while seated on the EOB. The patient's Spo2 was in the 80's while on RA and did not improve with VC to perform PLB. Nurse was notified and 1.5 L O2 was applied. O2 sats returned to the 90's after O2 was increased to 2L per nurseSelma. O2 sats were difficulty to obtain with the pulse ox not reading  so uncertain of the accuracy.     Patient left right sidelying with all lines intact, call button in reach, bed alarm on, nurse notified    GOALS:   Multidisciplinary Problems       Occupational Therapy Goals          Problem: Occupational Therapy    Goal Priority Disciplines Outcome Interventions   Occupational Therapy Goal     OT, PT/OT Ongoing, Progressing    Description: Goals to be met by: 1/25/2024     Patient will increase functional independence with ADLs by performing:    Feeding with Modified [place].  UE Dressing with Stand-by Assistance and Contact Guard Assistance.  LE Dressing with  Stand-by Assistance and Contact Guard Assistance.  Grooming while seated with Modified [place] and Set-up Assistance.  Toileting from bedside commode with Stand-by Assistance and Assistive Devices as needed for hygiene and clothing management.   Sitting at edge of bed x30 minutes with Supervision.  Rolling to Bilateral with Modified [place] and use of bedrail as needed.   Supine to sit with Stand-by Assistance.  Step transfer with Contact Guard Assistance  Toilet transfer to bedside commode with Contact Guard Assistance.  Upper extremity exercise program x15 reps per handout, with assistance as needed.                         Time Tracking:     OT Date of Treatment: 01/16/24  OT Start Time: 1554  OT Stop Time: 1620  OT Total Time (min): 26 min    Billable Minutes:Self Care/Home Management 10  Therapeutic Activity 16  Total Time 26 (with PT)    OT/FERNANDO: OT          1/16/2024

## 2024-01-16 NOTE — PLAN OF CARE
Problem: Occupational Therapy  Goal: Occupational Therapy Goal  Description: Goals to be met by: 1/25/2024     Patient will increase functional independence with ADLs by performing:    Feeding with Modified [place].  UE Dressing with Stand-by Assistance and Contact Guard Assistance.  LE Dressing with Stand-by Assistance and Contact Guard Assistance.  Grooming while seated with Modified [place] and Set-up Assistance.  Toileting from bedside commode with Stand-by Assistance and Assistive Devices as needed for hygiene and clothing management.   Sitting at edge of bed x30 minutes with Supervision.  Rolling to Bilateral with Modified [place] and use of bedrail as needed.   Supine to sit with Stand-by Assistance.  Step transfer with Contact Guard Assistance  Toilet transfer to bedside commode with Contact Guard Assistance.  Upper extremity exercise program x15 reps per handout, with assistance as needed.    Outcome: Ongoing, Progressing   The patient was limited by lethargy. The patient req mod assist x2 for bed supine to sit and to stand with a RW. The patient required CGA/min assist for sitting balance. The patient will benefit from Moderate Intensity OT.

## 2024-01-17 LAB
ALBUMIN SERPL BCP-MCNC: 1.8 G/DL (ref 3.5–5.2)
ALP SERPL-CCNC: 118 U/L (ref 55–135)
ALT SERPL W/O P-5'-P-CCNC: 37 U/L (ref 10–44)
ANION GAP SERPL CALC-SCNC: 13 MMOL/L (ref 8–16)
AST SERPL-CCNC: 33 U/L (ref 10–40)
BILIRUB SERPL-MCNC: 0.4 MG/DL (ref 0.1–1)
BUN SERPL-MCNC: 66 MG/DL (ref 6–20)
CALCIUM SERPL-MCNC: 7.7 MG/DL (ref 8.7–10.5)
CHLORIDE SERPL-SCNC: 102 MMOL/L (ref 95–110)
CO2 SERPL-SCNC: 18 MMOL/L (ref 23–29)
CREAT SERPL-MCNC: 8.2 MG/DL (ref 0.5–1.4)
EST. GFR  (NO RACE VARIABLE): 7 ML/MIN/1.73 M^2
GLUCOSE SERPL-MCNC: 102 MG/DL (ref 70–110)
MAGNESIUM SERPL-MCNC: 1.9 MG/DL (ref 1.6–2.6)
PHOSPHATE SERPL-MCNC: 4.7 MG/DL (ref 2.7–4.5)
POCT GLUCOSE: 106 MG/DL (ref 70–110)
POCT GLUCOSE: 154 MG/DL (ref 70–110)
POCT GLUCOSE: 79 MG/DL (ref 70–110)
POCT GLUCOSE: 85 MG/DL (ref 70–110)
POTASSIUM SERPL-SCNC: 4.6 MMOL/L (ref 3.5–5.1)
PROT SERPL-MCNC: 6.7 G/DL (ref 6–8.4)
SODIUM SERPL-SCNC: 133 MMOL/L (ref 136–145)

## 2024-01-17 PROCEDURE — 80053 COMPREHEN METABOLIC PANEL: CPT | Performed by: STUDENT IN AN ORGANIZED HEALTH CARE EDUCATION/TRAINING PROGRAM

## 2024-01-17 PROCEDURE — 80100016 HC MAINTENANCE HEMODIALYSIS

## 2024-01-17 PROCEDURE — 63600175 PHARM REV CODE 636 W HCPCS: Mod: JZ,JG | Performed by: STUDENT IN AN ORGANIZED HEALTH CARE EDUCATION/TRAINING PROGRAM

## 2024-01-17 PROCEDURE — 36415 COLL VENOUS BLD VENIPUNCTURE: CPT | Performed by: STUDENT IN AN ORGANIZED HEALTH CARE EDUCATION/TRAINING PROGRAM

## 2024-01-17 PROCEDURE — 11000001 HC ACUTE MED/SURG PRIVATE ROOM

## 2024-01-17 PROCEDURE — 94761 N-INVAS EAR/PLS OXIMETRY MLT: CPT

## 2024-01-17 PROCEDURE — 84100 ASSAY OF PHOSPHORUS: CPT | Performed by: STUDENT IN AN ORGANIZED HEALTH CARE EDUCATION/TRAINING PROGRAM

## 2024-01-17 PROCEDURE — 97535 SELF CARE MNGMENT TRAINING: CPT

## 2024-01-17 PROCEDURE — 97530 THERAPEUTIC ACTIVITIES: CPT

## 2024-01-17 PROCEDURE — 25000003 PHARM REV CODE 250: Performed by: STUDENT IN AN ORGANIZED HEALTH CARE EDUCATION/TRAINING PROGRAM

## 2024-01-17 PROCEDURE — 27000221 HC OXYGEN, UP TO 24 HOURS

## 2024-01-17 PROCEDURE — 97530 THERAPEUTIC ACTIVITIES: CPT | Mod: CQ

## 2024-01-17 PROCEDURE — 97110 THERAPEUTIC EXERCISES: CPT | Mod: CQ

## 2024-01-17 PROCEDURE — 83735 ASSAY OF MAGNESIUM: CPT | Performed by: STUDENT IN AN ORGANIZED HEALTH CARE EDUCATION/TRAINING PROGRAM

## 2024-01-17 RX ADMIN — ATORVASTATIN CALCIUM 80 MG: 40 TABLET, FILM COATED ORAL at 09:01

## 2024-01-17 RX ADMIN — PANTOPRAZOLE SODIUM 40 MG: 40 TABLET, DELAYED RELEASE ORAL at 09:01

## 2024-01-17 RX ADMIN — TAMSULOSIN HYDROCHLORIDE 0.4 MG: 0.4 CAPSULE ORAL at 09:01

## 2024-01-17 RX ADMIN — ALLOPURINOL 100 MG: 100 TABLET ORAL at 09:01

## 2024-01-17 RX ADMIN — APIXABAN 5 MG: 5 TABLET, FILM COATED ORAL at 09:01

## 2024-01-17 RX ADMIN — EPOETIN ALFA-EPBX 10000 UNITS: 10000 INJECTION, SOLUTION INTRAVENOUS; SUBCUTANEOUS at 09:01

## 2024-01-17 RX ADMIN — AMIODARONE HYDROCHLORIDE 200 MG: 200 TABLET ORAL at 09:01

## 2024-01-17 NOTE — PROGRESS NOTES
Mahesh Cespedes is a 58 y.o. male patient.    Follow for ESRD, dialysis    No new c/o, comfortable  Reportedly feeling OK, scheduled for dialysis later today    Scheduled Meds:   allopurinoL  100 mg Oral Daily    amiodarone  200 mg Oral Daily    apixaban  5 mg Oral Q12H    atorvastatin  80 mg Oral Daily    epoetin luli-epbx  10,000 Units Subcutaneous Every Mon, Wed, Fri    metoprolol succinate  25 mg Oral Daily    pantoprazole  40 mg Oral Daily    tamsulosin  0.4 mg Oral Daily       Review of patient's allergies indicates:  No Known Allergies      Vital Signs Range (Last 24H):  Temp:  [97.4 °F (36.3 °C)-98.5 °F (36.9 °C)]   Pulse:  [62-69]   Resp:  [16-20]   BP: (107-133)/(56-64)   SpO2:  [94 %-100 %]     I & O (Last 24H):  Intake/Output Summary (Last 24 hours) at 1/17/2024 1040  Last data filed at 1/17/2024 0806  Gross per 24 hour   Intake 480 ml   Output --   Net 480 ml           Physical Exam:  General appearance: well developed, well nourished, no distress  Lungs:  clear to auscultation bilaterally and normal respiratory effort  Heart: regular rate and rhythm  Abdomen:  soft, normal bowel sounds  Extremities: no cyanosis or edema, or clubbing    Laboratory:  I have reviewed all pertinent lab results within the past 24 hours.  CBC:   Recent Labs   Lab 01/15/24  0522   WBC 12.30   RBC 2.77*   HGB 7.4*   HCT 23.5*   *   MCV 85   MCH 26.7*   MCHC 31.5*     CMP:   Recent Labs   Lab 01/17/24  0500      CALCIUM 7.7*   ALBUMIN 1.8*   PROT 6.7   *   K 4.6   CO2 18*      BUN 66*   CREATININE 8.2*   ALKPHOS 118   ALT 37   AST 33   BILITOT 0.4       Assessment & Plan    ESRD - stable, scheduled for dialysis later today  HTN  Anemia of CKD    Continue present RX  We'll follow q MWF for dialysis        David Scott  1/17/2024

## 2024-01-17 NOTE — PLAN OF CARE
Problem: Device-Related Complication Risk (Hemodialysis)  Goal: Safe, Effective Therapy Delivery  Outcome: Ongoing, Progressing     Problem: Hemodynamic Instability (Hemodialysis)  Goal: Effective Tissue Perfusion  Outcome: Ongoing, Progressing     Problem: Adult Inpatient Plan of Care  Goal: Plan of Care Review  Outcome: Ongoing, Progressing  Goal: Patient-Specific Goal (Individualized)  Outcome: Ongoing, Progressing  Goal: Absence of Hospital-Acquired Illness or Injury  Outcome: Ongoing, Progressing  Goal: Optimal Comfort and Wellbeing  Outcome: Ongoing, Progressing  Goal: Readiness for Transition of Care  Outcome: Ongoing, Progressing     Problem: Diabetes Comorbidity  Goal: Blood Glucose Level Within Targeted Range  Outcome: Ongoing, Progressing     Problem: Infection  Goal: Absence of Infection Signs and Symptoms  Outcome: Ongoing, Progressing     Problem: Fall Injury Risk  Goal: Absence of Fall and Fall-Related Injury  Outcome: Ongoing, Progressing     Problem: Skin Injury Risk Increased  Goal: Skin Health and Integrity  Outcome: Ongoing, Progressing     Problem: Functional Decline (Chronic Kidney Disease)  Goal: Optimal Functional Ability  Outcome: Ongoing, Progressing     Problem: Impaired Wound Healing  Goal: Optimal Wound Healing  Outcome: Ongoing, Progressing

## 2024-01-17 NOTE — PLAN OF CARE
Recommendations  Recommendation:   1. Continue renal diet w/ 1200mL fluid restriction   2. Add Novasource Renal BID   3. Monitor weight and labs    Goals: Pt will consume 50-75% of meals by RD follow up    Nutrition Goal Status: new  Communication of RD Recs: other (comment) (POC)

## 2024-01-17 NOTE — NURSING
[facility], [facility]  Nurses Note -- 4 Eyes      1/17/2024       Skin assessed on: Q Shift      [x] No Pressure Injuries Present    [x]Prevention Measures Documented    [] Yes LDA  for Pressure Injury Previously documented     [] Yes New Pressure Injury Discovered   [] LDA for New Pressure Injury Added      Attending RN:  Selma Saab, BETO     Second RN:  Dixie JEFFERY PCT

## 2024-01-17 NOTE — NURSING
[facility], [facility] [place]  Nurses Note -- 4 Eyes      1/16/2024       Skin assessed on: Q Shift      [x] No Pressure Injuries Present    []Prevention Measures Documented    [] Yes LDA  for Pressure Injury Previously documented     [] Yes New Pressure Injury Discovered   [] LDA for New Pressure Injury Added      Attending RN:  Jb Tariq RN     Second RN:  BETO Hahn

## 2024-01-17 NOTE — PLAN OF CARE
Pt resting on bed comfortably. With oxygen support at 1.5L NC, not in distress. Vital signs taken and recorded. Due pill given, pt tolerated well. Repositioned pt done for circulation. Limb precautioned maintained on left hand. On tele. On condom cath. With telesitter at bedside. Bed alarm set. Pts need attended. HOB elevated. Bed in low positioned. Instructed to call for assistance. Call light within reach. Will continue to monitor.      Problem: Adult Inpatient Plan of Care  Goal: Optimal Comfort and Wellbeing  Outcome: Ongoing, Progressing  Intervention: Monitor Pain and Promote Comfort  Flowsheets (Taken 1/17/2024 0422)  Pain Management Interventions:   position adjusted   pillow support provided   quiet environment facilitated     Problem: Diabetes Comorbidity  Goal: Blood Glucose Level Within Targeted Range  Outcome: Ongoing, Progressing  Intervention: Monitor and Manage Glycemia  Flowsheets (Taken 1/17/2024 0422)  Glycemic Management: blood glucose monitored     Problem: Fall Injury Risk  Goal: Absence of Fall and Fall-Related Injury  Outcome: Ongoing, Progressing  Intervention: Promote Injury-Free Environment  Flowsheets (Taken 1/17/2024 0422)  Safety Promotion/Fall Prevention:   bed alarm set   /camera at bedside     Problem: Skin Injury Risk Increased  Goal: Skin Health and Integrity  Outcome: Ongoing, Progressing  Intervention: Optimize Skin Protection  Flowsheets (Taken 1/17/2024 0422)  Pressure Reduction Devices: positioning supports utilized  Head of Bed (HOB) Positioning: HOB elevated     Problem: Functional Decline (Chronic Kidney Disease)  Goal: Optimal Functional Ability  Outcome: Ongoing, Progressing
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
## 2024-01-17 NOTE — PLAN OF CARE
Problem: Physical Therapy  Goal: Physical Therapy Goal  Description: Goals to be met by: 24     Patient will increase functional independence with mobility by performin. Pt to be min A with bed mobility.  2. Pt to transfer with min A.  3. Pt to ambulate 50' w/RW CGA.  4. Pt to be able to tolerate 1x10 reps BLE exs.    Outcome: Ongoing, Progressing       Pt in bed upon arrival laying on R SL. Pt required Min A x2 persons for STS from EOB. Pt unable to  BLE to sidestep towards HOB.

## 2024-01-17 NOTE — PLAN OF CARE
Pt requiring out patient HD set up. Patient has been denied to [facility], [facility], and [facility]. Pt's dtr stated plan is for pt to live with her and pt's son. Agreeable for SNF once out patient HD chair is arranged. No accepting SNF at this time. Pt will need assistance with  transportation to HD clinic.     Per Chiquita HAHN, [facility] requested referral to be re-faxed for review. CM to continue to follow.     01/17/24 1030   Discharge Reassessment   Assessment Type Discharge Planning Reassessment   Did the patient's condition or plan change since previous assessment? No   Discharge Plan discussed with: Patient   Communicated JAIME with patient/caregiver Date not available/Unable to determine   Discharge Plan A Skilled Nursing Facility   Discharge Plan B Home with family   DME Needed Upon Discharge  none   Transition of Care Barriers DIalysis placement issues   Why the patient remains in the hospital Requires continued medical care;Other (see comment)   Post-Acute Status   Post-Acute Authorization Dialysis   Diaylsis Status Pending medical clearance/testing   Discharge Delays (!) Post-Acute Set-up
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
## 2024-01-17 NOTE — PT/OT/SLP PROGRESS
Physical Therapy Treatment    Patient Name:  Mahesh Cespedes   MRN:  [id number]    Recommendations:     Discharge Recommendations: Moderate Intensity Therapy  Discharge Equipment Recommendations: none  Barriers to discharge: Decreased functional mobility, decreased ambulation, increased fall risk     Assessment:     Mahesh Cespedes is a 58 y.o. male admitted with a medical diagnosis of Acute hypoxemic respiratory failure.  He presents with the following impairments/functional limitations: weakness, impaired endurance, impaired functional mobility, gait instability, impaired cognition.    Pt in bed upon arrival laying on R SL. Pt required Min A x2 persons for STS from EOB. Pt unable to  BLE to sidestep towards HOB.    Rehab Prognosis: Fair; patient would benefit from acute skilled PT services to address these deficits and reach maximum level of function.    Recent Surgery: * No surgery found *      Plan:     During this hospitalization, patient to be seen 3 x/week to address the identified rehab impairments via gait training, therapeutic activities, therapeutic exercises and progress toward the following goals:    Plan of Care Expires:  01/18/24    Subjective     Chief Complaint: None at the time  Patient/Family Comments/goals: Pt agreed to therapy  Pain/Comfort:  Pain Rating 1: 0/10      Objective:   Patient found right sidelying with bed alarm, Condom Catheter, peripheral IV, oxygen, telemetry upon PT entry to room.     General Precautions: Standard,    Orthopedic Precautions: N/A  Braces: N/A  Respiratory Status: Nasal cannula, flow 1.5 L/min     Functional Mobility:  Bed Mobility:     Scooting: contact guard assistance  Supine to Sit: moderate assistance and of 2 persons for LE assistance and trunk control  Sit to Supine: contact guard assistance  Transfers: Gait belt donned     Sit to Stand:  minimum assistance and of 2 persons with rolling walker  Gait: Pt attempted to take ~2-3 steps towards HOB, however  unable to  BLE. Pt required max verbal/tactile cueing to shuffle feet towards the R side. Therapist consistently had to give tactile cueing to the RLE to initiate movement.  Balance: Pt with Fair- seated balance. Pt had one LOB while seated EOB, able to recover back to CGA while working with OT. Pt able to stand ~1 minute using RW and CGA.      AM-PAC 6 CLICK MOBILITY  Turning over in bed (including adjusting bedclothes, sheets and blankets)?: 3  Sitting down on and standing up from a chair with arms (e.g., wheelchair, bedside commode, etc.): 3  Moving from lying on back to sitting on the side of the bed?: 3  Moving to and from a bed to a chair (including a wheelchair)?: 3  Need to walk in hospital room?: 2  Climbing 3-5 steps with a railing?: 1  Basic Mobility Total Score: 15       Treatment & Education:   AAROM performed to BLE LAQ, hip flexion, hip abd/add 2x10. Pt performed seated HR, 2x10 each leg, encouraged to perform throughout the day as well while in bed.    Patient left right sidelying with all lines intact, call button in reach, bed alarm on, and nursing notified.    GOALS:   Multidisciplinary Problems       Physical Therapy Goals          Problem: Physical Therapy    Goal Priority Disciplines Outcome Goal Variances Interventions   Physical Therapy Goal     PT, PT/OT Ongoing, Progressing     Description: Goals to be met by: 24     Patient will increase functional independence with mobility by performin. Pt to be min A with bed mobility.  2. Pt to transfer with min A.  3. Pt to ambulate 50' w/RW CGA.  4. Pt to be able to tolerate 1x10 reps BLE exs.                         Time Tracking:     PT Received On: 24  PT Start Time: 957     PT Stop Time:   PT Total Time (min): 32 min     Billable Minutes: Therapeutic Activity 20 and Therapeutic Exercise 12    Treatment Type: Treatment  PT/PTA: PTA     Number of PTA visits since last PT visit: 2024

## 2024-01-17 NOTE — PT/OT/SLP PROGRESS
Occupational Therapy   Treatment    Name: Mahesh Cespedes  MRN: [id number]  Admitting Diagnosis:  Acute hypoxemic respiratory failure       Recommendations:     Discharge Recommendations: Moderate Intensity Therapy  Discharge Equipment Recommendations:  to be determined by next level of care  Barriers to discharge:   (The patient is lethargic and not at his functional baseline)    Assessment:     Mahesh Cespedes is a 58 y.o. male with a medical diagnosis of Acute hypoxemic respiratory failure.  Performance deficits affecting function are weakness, impaired endurance, impaired self care skills, impaired functional mobility, gait instability, impaired balance, decreased coordination, decreased upper extremity function, decreased lower extremity function, decreased safety awareness, impaired fine motor, impaired cardiopulmonary response to activity.     Rehab Prognosis:  Good and Fair; patient would benefit from acute skilled OT services to address these deficits and reach maximum level of function.       Plan:     Patient to be seen  (3-5x/week) to address the above listed problems via self-care/home management, therapeutic activities, therapeutic exercises  Plan of Care Expires: 01/25/24  Plan of Care Reviewed with: patient    Subjective     Chief Complaint: feels tired  Patient/Family Comments/goals: agreeable to OT  Pain/Comfort:  Pain Rating 1: 0/10    Objective:     Communicated with: Selma stanford prior to session.  Patient found right sidelying with bed alarm, Condom Catheter, oxygen, telemetry upon OT entry to room.    General Precautions: Standard, fall, respiratory    Orthopedic Precautions:N/A  Braces: N/A  Respiratory Status: Nasal cannula, flow 1.5 L/min     Occupational Performance:     Bed Mobility:    Patient completed Rolling/Turning to Right with contact guard assistance  Patient completed Scooting/Bridging with contact guard assistance and 2 persons  Patient completed Supine to Sit with moderate  assistance and 2 persons  Patient completed Sit to Supine with contact guard assistance     Functional Mobility/Transfers:  Patient completed Sit <> Stand Transfer with minimum assistance and of 2 persons  with  rolling walker   Functional Mobility: Pt attempted to take ~2-3 side steps but was unable  to  his feet. Pt required max verbal/tactile cues and increased to take a few shuffle steps.    Activities of Daily Living:  Grooming: minimum assistance to brush his teeth while seated on the EOB. The patient required assist to open toothpaste and mouthwash, CGA to manage the basin and verbal cues  Upper Body Dressing: moderate assistance    Lower Body Dressing: dependence        [facility] 6 Click ADL: 13    Treatment & Education:  The patient participated in OT with use of language line, Eugenia, #[id number]  The patient tolerated sitting on the EOB ~20 min with 1 posterior LOB with CGA to correct  Allowed the patient to ask questions with the assist of the language line but the patient stated he does not have questions    Patient left right sidelying with all lines intact, call button in reach, bed alarm on, and nurseFroilan notified that the patient had a BM and needs care.    GOALS:   Multidisciplinary Problems       Occupational Therapy Goals          Problem: Occupational Therapy    Goal Priority Disciplines Outcome Interventions   Occupational Therapy Goal     OT, PT/OT Ongoing, Progressing    Description: Goals to be met by: 1/25/2024     Patient will increase functional independence with ADLs by performing:    Feeding with Modified [place].  UE Dressing with Stand-by Assistance and Contact Guard Assistance.  LE Dressing with Stand-by Assistance and Contact Guard Assistance.  Grooming while seated with Modified [place] and Set-up Assistance.  Toileting from bedside commode with Stand-by Assistance and Assistive Devices as needed for hygiene and clothing management.   Sitting at edge of bed x30  minutes with Supervision.  Rolling to Bilateral with Modified [place] and use of bedrail as needed.   Supine to sit with Stand-by Assistance.  Step transfer with Contact Guard Assistance  Toilet transfer to bedside commode with Contact Guard Assistance.  Upper extremity exercise program x15 reps per handout, with assistance as needed.                         Time Tracking:     OT Date of Treatment: 01/17/24  OT Start Time: 0957  OT Stop Time: 1029  OT Total Time (min): 32 min    Billable Minutes:Self Care/Home Management 17  Therapeutic Activity 15  Total Time 32 (with PT)    OT/FERNANDO: OT          1/17/2024

## 2024-01-18 PROBLEM — L89.96: Status: ACTIVE | Noted: 2024-01-18

## 2024-01-18 LAB
ALBUMIN SERPL BCP-MCNC: 1.9 G/DL (ref 3.5–5.2)
ALP SERPL-CCNC: 113 U/L (ref 55–135)
ALT SERPL W/O P-5'-P-CCNC: 54 U/L (ref 10–44)
ANION GAP SERPL CALC-SCNC: 9 MMOL/L (ref 8–16)
AST SERPL-CCNC: 55 U/L (ref 10–40)
BILIRUB SERPL-MCNC: 0.4 MG/DL (ref 0.1–1)
BUN SERPL-MCNC: 46 MG/DL (ref 6–20)
CALCIUM SERPL-MCNC: 8.1 MG/DL (ref 8.7–10.5)
CHLORIDE SERPL-SCNC: 100 MMOL/L (ref 95–110)
CO2 SERPL-SCNC: 27 MMOL/L (ref 23–29)
CREAT SERPL-MCNC: 6.5 MG/DL (ref 0.5–1.4)
EST. GFR  (NO RACE VARIABLE): 9 ML/MIN/1.73 M^2
GLUCOSE SERPL-MCNC: 120 MG/DL (ref 70–110)
MAGNESIUM SERPL-MCNC: 1.6 MG/DL (ref 1.6–2.6)
PHOSPHATE SERPL-MCNC: 3.4 MG/DL (ref 2.7–4.5)
POCT GLUCOSE: 130 MG/DL (ref 70–110)
POCT GLUCOSE: 131 MG/DL (ref 70–110)
POCT GLUCOSE: 153 MG/DL (ref 70–110)
POCT GLUCOSE: 163 MG/DL (ref 70–110)
POTASSIUM SERPL-SCNC: 4 MMOL/L (ref 3.5–5.1)
PROT SERPL-MCNC: 6.8 G/DL (ref 6–8.4)
SODIUM SERPL-SCNC: 136 MMOL/L (ref 136–145)

## 2024-01-18 PROCEDURE — 94761 N-INVAS EAR/PLS OXIMETRY MLT: CPT

## 2024-01-18 PROCEDURE — 84100 ASSAY OF PHOSPHORUS: CPT | Performed by: STUDENT IN AN ORGANIZED HEALTH CARE EDUCATION/TRAINING PROGRAM

## 2024-01-18 PROCEDURE — 83735 ASSAY OF MAGNESIUM: CPT | Performed by: STUDENT IN AN ORGANIZED HEALTH CARE EDUCATION/TRAINING PROGRAM

## 2024-01-18 PROCEDURE — 25000003 PHARM REV CODE 250: Performed by: FAMILY MEDICINE

## 2024-01-18 PROCEDURE — 25000003 PHARM REV CODE 250: Performed by: STUDENT IN AN ORGANIZED HEALTH CARE EDUCATION/TRAINING PROGRAM

## 2024-01-18 PROCEDURE — 80053 COMPREHEN METABOLIC PANEL: CPT | Performed by: STUDENT IN AN ORGANIZED HEALTH CARE EDUCATION/TRAINING PROGRAM

## 2024-01-18 PROCEDURE — 11000001 HC ACUTE MED/SURG PRIVATE ROOM

## 2024-01-18 PROCEDURE — 97530 THERAPEUTIC ACTIVITIES: CPT

## 2024-01-18 PROCEDURE — 99223 1ST HOSP IP/OBS HIGH 75: CPT | Mod: ,,,

## 2024-01-18 PROCEDURE — 36415 COLL VENOUS BLD VENIPUNCTURE: CPT | Performed by: STUDENT IN AN ORGANIZED HEALTH CARE EDUCATION/TRAINING PROGRAM

## 2024-01-18 PROCEDURE — 97535 SELF CARE MNGMENT TRAINING: CPT

## 2024-01-18 RX ORDER — TRAMADOL HYDROCHLORIDE 50 MG/1
50 TABLET ORAL EVERY 12 HOURS PRN
Status: DISCONTINUED | OUTPATIENT
Start: 2024-01-18 | End: 2024-01-22

## 2024-01-18 RX ORDER — SERTRALINE HYDROCHLORIDE 25 MG/1
25 TABLET, FILM COATED ORAL DAILY
Status: DISCONTINUED | OUTPATIENT
Start: 2024-01-18 | End: 2024-01-22 | Stop reason: HOSPADM

## 2024-01-18 RX ADMIN — APIXABAN 5 MG: 5 TABLET, FILM COATED ORAL at 09:01

## 2024-01-18 RX ADMIN — TRAMADOL HYDROCHLORIDE 50 MG: 50 TABLET, COATED ORAL at 05:01

## 2024-01-18 RX ADMIN — AMIODARONE HYDROCHLORIDE 200 MG: 200 TABLET ORAL at 09:01

## 2024-01-18 RX ADMIN — TAMSULOSIN HYDROCHLORIDE 0.4 MG: 0.4 CAPSULE ORAL at 09:01

## 2024-01-18 RX ADMIN — APIXABAN 5 MG: 5 TABLET, FILM COATED ORAL at 10:01

## 2024-01-18 RX ADMIN — SERTRALINE HYDROCHLORIDE 25 MG: 25 TABLET ORAL at 09:01

## 2024-01-18 RX ADMIN — PANTOPRAZOLE SODIUM 40 MG: 40 TABLET, DELAYED RELEASE ORAL at 09:01

## 2024-01-18 RX ADMIN — ACETAMINOPHEN 650 MG: 325 TABLET ORAL at 11:01

## 2024-01-18 RX ADMIN — METOPROLOL SUCCINATE 25 MG: 25 TABLET, EXTENDED RELEASE ORAL at 09:01

## 2024-01-18 RX ADMIN — ALLOPURINOL 100 MG: 100 TABLET ORAL at 09:01

## 2024-01-18 RX ADMIN — ATORVASTATIN CALCIUM 80 MG: 40 TABLET, FILM COATED ORAL at 09:01

## 2024-01-18 NOTE — PT/OT/SLP PROGRESS
Occupational Therapy   Treatment    Name: Mahesh Cespedes  MRN: [id number]  Admitting Diagnosis:  Acute hypoxemic respiratory failure       Recommendations:     Discharge Recommendations: Moderate Intensity Therapy  Discharge Equipment Recommendations:  to be determined by next level of care  Barriers to discharge:   (requires assist for self care and transfers, not at his PLOF, fall risk)    Assessment:     Mahesh Cespedes is a 58 y.o. male with a medical diagnosis of Acute hypoxemic respiratory failure.  Performance deficits affecting function are weakness, impaired endurance, impaired self care skills, impaired functional mobility, gait instability, impaired balance, impaired cognition, decreased coordination, decreased upper extremity function, decreased lower extremity function, decreased safety awareness, pain, decreased ROM, impaired fine motor, impaired cardiopulmonary response to activity.     Rehab Prognosis:  Good and Fair; patient would benefit from acute skilled OT services to address these deficits and reach maximum level of function.       Plan:     Patient to be seen  (3-5x/week) to address the above listed problems via self-care/home management, therapeutic activities, therapeutic exercises  Plan of Care Expires: 01/25/24  Plan of Care Reviewed with: patient    Subjective     Chief Complaint: back pain  Patient/Family Comments/goals: agreeable to transfer to the W/C  Pain/Comfort:  Pain Rating 1:  (yes-unable to rate)  Location 1: back  Pain Addressed 1: Nurse notified (pain meds requested)    Objective:     Communicated with: nurse prior to session.  Patient found left sidelying with bed alarm, Condom Catheter, oxygen, telemetry, pressure relief boots (positioning wedge) upon OT entry to room.    General Precautions: Standard, fall, respiratory    Orthopedic Precautions:N/A  Braces: N/A  Respiratory Status: Nasal cannula, flow 1.5 L/min `     Occupational Performance:     Bed Mobility:    Patient  completed Rolling/Turning to Left with  minimum assistance  Patient completed Scooting/Bridging with contact guard assistance to scoot anteriorly to the EOB and min assist x2 to scoot to the top of the bed  Patient completed Supine to Sit with moderate assistance  Patient completed Sit to Supine with moderate assistance     Functional Mobility/Transfers:  Patient completed Sit <> Stand Transfer with minimum assistance and of 2 persons  with  rolling walker   Patient completed Bed <> Chair Transfer using Squat Pivot technique with maximal assistance and of 2 persons with hand-held assist  Functional Mobility: The patient was able to open W/C brakes with VC but was unable to propel the W/C with his UE or feet    Activities of Daily Living:  Feeding:  stand by assistance to drink water from s cup. The patient was noted to cough after drinking water, nurse was notified.   Upper Body Dressing: maximal assistance    Lower Body Dressing: dependence        [facility] 6 Click ADL: 14    Treatment & Education:  Performed self care and functional mobility as noted above  Allowed the patient an opportunity to ask questions via the Babs #[id number]. The patient denied having questions.  The patient sat on the EOB ~20 min with right side lean and CGA/min assist for balance.   The patient c/o dizziness while seated on the EOB. /65, HR 73. SpO2 97-98% on RA    Patient left right sidelying with all lines intact, call button in reach, bed alarm on, and nurse notified    GOALS:   Multidisciplinary Problems       Occupational Therapy Goals          Problem: Occupational Therapy    Goal Priority Disciplines Outcome Interventions   Occupational Therapy Goal     OT, PT/OT Ongoing, Progressing    Description: Goals to be met by: 1/25/2024     Patient will increase functional independence with ADLs by performing:    Feeding with Modified [place].  UE Dressing with Stand-by Assistance and Contact Guard Assistance.  LE  Dressing with Stand-by Assistance and Contact Guard Assistance.  Grooming while seated with Modified [place] and Set-up Assistance.  Toileting from bedside commode with Stand-by Assistance and Assistive Devices as needed for hygiene and clothing management.   Sitting at edge of bed x30 minutes with Supervision.  Rolling to Bilateral with Modified [place] and use of bedrail as needed.   Supine to sit with Stand-by Assistance.  Step transfer with Contact Guard Assistance  Toilet transfer to bedside commode with Contact Guard Assistance.  Upper extremity exercise program x15 reps per handout, with assistance as needed.                         Time Tracking:     OT Date of Treatment: 01/18/24  OT Start Time: 1034  OT Stop Time: 1112  OT Total Time (min): 38 min    Billable Minutes:Self Care/Home Management 15  Therapeutic Activity 23  Total Time 38    OT/FERNANDO: OT          1/18/2024

## 2024-01-18 NOTE — PLAN OF CARE
Problem: Device-Related Complication Risk (Hemodialysis)  Goal: Safe, Effective Therapy Delivery  Outcome: Ongoing, Progressing     Problem: Infection (Hemodialysis)  Goal: Absence of Infection Signs and Symptoms  Outcome: Ongoing, Progressing     Problem: Adult Inpatient Plan of Care  Goal: Plan of Care Review  Outcome: Ongoing, Progressing  Goal: Patient-Specific Goal (Individualized)  Outcome: Ongoing, Progressing  Goal: Optimal Comfort and Wellbeing  Outcome: Ongoing, Progressing  Goal: Readiness for Transition of Care  Outcome: Ongoing, Progressing     Problem: Fall Injury Risk  Goal: Absence of Fall and Fall-Related Injury  Outcome: Ongoing, Progressing     Problem: Pain Acute  Goal: Acceptable Pain Control and Functional Ability  Outcome: Ongoing, Progressing

## 2024-01-18 NOTE — PT/OT/SLP PROGRESS
Physical Therapy Treatment    Patient Name:  Mahesh Cesepdes   MRN:  [id number]    Recommendations:     Discharge Recommendations: Moderate Intensity Therapy  Discharge Equipment Recommendations: none  Barriers to discharge: None    Assessment:     Mahesh Cespedes is a 58 y.o. male admitted with a medical diagnosis of Acute hypoxemic respiratory failure.  He presents with the following impairments/functional limitations: weakness, impaired functional mobility, impaired endurance, impaired self care skills, impaired balance, impaired cognition, gait instability, decreased coordination, decreased upper extremity function, decreased lower extremity function, decreased safety awareness, pain, decreased ROM, edema .  :  Babs # [id number]     Rehab Prognosis: Good; patient would benefit from acute skilled PT services to address these deficits and reach maximum level of function.    Recent Surgery: * No surgery found *      Plan:     During this hospitalization, patient to be seen 3 x/week to address the identified rehab impairments via gait training, therapeutic activities, therapeutic exercises and progress toward the following goals:    Plan of Care Expires:  01/18/24    Subjective     Chief Complaint: weakness , pain   Patient/Family Comments/goals: pt is agreeable to therapy   Pain/Comfort:  Pain Rating 1: 0/10  Pain Rating Post-Intervention 1: 0/10      Objective:     Communicated with nurse prior to session.  Patient found HOB elevated with telemetry, peripheral IV, bed alarm, oxygen, Condom Catheter upon PT entry to room.     General Precautions: Standard, fall  Orthopedic Precautions: N/A  Braces: N/A  Respiratory Status: Nasal cannula, flow 0.5  L/min     Functional Mobility:  Bed Mobility:     Rolling Right: minimum assistance  Scooting: contact guard assistance  Supine to Sit: moderate assistance ,HOB elevated    Sit to Supine: moderate assistance  Transfers:     Sit to Stand:  minimum assistance and  of 2 persons with rolling walker  Bed <> wheelchair: maximal assistance and of 2 persons with  rolling walker  using  Squat Pivot  Balance: fair+ in sitting (pt with R lateral leaning, required V/T cues to improve upright posture ) poor in standing.       AM-PAC 6 CLICK MOBILITY  Turning over in bed (including adjusting bedclothes, sheets and blankets)?: 4  Sitting down on and standing up from a chair with arms (e.g., wheelchair, bedside commode, etc.): 2  Moving from lying on back to sitting on the side of the bed?: 3  Moving to and from a bed to a chair (including a wheelchair)?: 2  Need to walk in hospital room?: 2  Climbing 3-5 steps with a railing?: 1  Basic Mobility Total Score: 14       Treatment & Education:  Lower Extremity Exercises.    Patient educated on: Purpose and Benefits of Therapeutic Exercise(s).    Patient verbalized acceptance/understanding of instruction(s), expectation(s), and limitation(s) (for safety).  Patient performed: 10 reps (each) of B LE Ther Ex: AP, LAQ, Hip abd/add, Hip flexion while sitting up on EOB.       Patient required verbal cues/tactile cues to ensure correct sequence, to maintain proper form, and to allow for self-correction.  Pt reported no complaints or problems with exercise activity.   Attempted to work on wheelchair propulsion however, pt unable to perform c/o weakness , fatigue and dizziness , /65 , HR 83 , SpO2 : 96%-99% on RA     Patient left right sidelying with pillow to offload L side, pressure relief boots to BLE  all lines intact, call button in reach, bed alarm on, nurse notified, and [employer] present..    GOALS:   Multidisciplinary Problems       Physical Therapy Goals          Problem: Physical Therapy    Goal Priority Disciplines Outcome Goal Variances Interventions   Physical Therapy Goal     PT, PT/OT Ongoing, Progressing     Description: Goals to be met by: 24     Patient will increase functional independence with mobility by performin.  Pt to be min A with bed mobility.  2. Pt to transfer with min A.  3. Pt to ambulate 50' w/RW CGA.  4. Pt to be able to tolerate 1x10 reps BLE exs.                         Time Tracking:     PT Received On: 01/18/24  PT Start Time: 1034     PT Stop Time: 1112  PT Total Time (min): 38 min     Billable Minutes: Therapeutic Activity 23, Therapeutic Exercise 15, and Total Time 38 min with OT     Treatment Type: Treatment  PT/PTA: PTA     Number of PTA visits since last PT visit: 2     01/18/2024
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
## 2024-01-18 NOTE — PLAN OF CARE
Problem: Device-Related Complication Risk (Hemodialysis)  Goal: Safe, Effective Therapy Delivery  Outcome: Ongoing, Progressing     Problem: Hemodynamic Instability (Hemodialysis)  Goal: Effective Tissue Perfusion  Outcome: Ongoing, Progressing     Problem: Infection (Hemodialysis)  Goal: Absence of Infection Signs and Symptoms  Outcome: Ongoing, Progressing     Problem: Adult Inpatient Plan of Care  Goal: Plan of Care Review  Outcome: Ongoing, Progressing  Goal: Optimal Comfort and Wellbeing  Outcome: Ongoing, Progressing  Intervention: Monitor Pain and Promote Comfort  Flowsheets (Taken 1/18/2024 1035)  Pain Management Interventions:   around-the-clock dosing utilized   care clustered   quiet environment facilitated   relaxation techniques promoted  Intervention: Provide Person-Centered Care  Flowsheets (Taken 1/18/2024 1035)  Trust Relationship/Rapport:   choices provided   emotional support provided   empathic listening provided   questions answered   questions encouraged   thoughts/feelings acknowledged   reassurance provided   care explained     Problem: Infection  Goal: Absence of Infection Signs and Symptoms  Outcome: Ongoing, Progressing  Intervention: Prevent or Manage Infection  Flowsheets (Taken 1/18/2024 1035)  Fever Reduction/Comfort Measures: fluid intake increased  Infection Management: aseptic technique maintained     Problem: Fall Injury Risk  Goal: Absence of Fall and Fall-Related Injury  Outcome: Ongoing, Progressing  Intervention: Identify and Manage Contributors  Flowsheets (Taken 1/18/2024 1035)  Self-Care Promotion:   independence encouraged   BADL personal objects within reach   BADL personal routines maintained   meal set-up provided   safe use of adaptive equipment encouraged  Medication Review/Management: medications reviewed  Intervention: Promote Injury-Free Environment  Flowsheets (Taken 1/18/2024 1035)  Safety Promotion/Fall Prevention:   assistive device/personal item within  reach   bed alarm set   Fall Risk reviewed with patient/family   Fall Risk signage in place   nonskid shoes/socks when out of bed   room near unit station   instructed to call staff for mobility   side rails raised x 3     Problem: Skin Injury Risk Increased  Goal: Skin Health and Integrity  Outcome: Ongoing, Progressing  Intervention: Optimize Skin Protection  Flowsheets (Taken 1/18/2024 1035)  Pressure Reduction Techniques:   frequent weight shift encouraged   heels elevated off bed   weight shift assistance provided  Pressure Reduction Devices: foam padding utilized  Skin Protection:   adhesive use limited   incontinence pads utilized   tubing/devices free from skin contact  Head of Bed (HOB) Positioning: HOB at 30-45 degrees  Intervention: Promote and Optimize Oral Intake  Flowsheets (Taken 1/18/2024 1035)  Oral Nutrition Promotion:   rest periods promoted   safe use of adaptive equipment encouraged     Problem: Impaired Wound Healing  Goal: Optimal Wound Healing  Outcome: Ongoing, Progressing  Intervention: Promote Wound Healing  Flowsheets (Taken 1/18/2024 1035)  Oral Nutrition Promotion:   rest periods promoted   safe use of adaptive equipment encouraged  Sleep/Rest Enhancement:   awakenings minimized   regular sleep/rest pattern promoted   relaxation techniques promoted  Activity Management: Arm raise - L1  Pain Management Interventions:   around-the-clock dosing utilized   care clustered   quiet environment facilitated   relaxation techniques promoted     Problem: Pain Acute  Goal: Acceptable Pain Control and Functional Ability  Outcome: Ongoing, Progressing  Intervention: Develop Pain Management Plan  Flowsheets (Taken 1/18/2024 1035)  Pain Management Interventions:   around-the-clock dosing utilized   care clustered   quiet environment facilitated   relaxation techniques promoted  Intervention: Prevent or Manage Pain  Flowsheets (Taken 1/18/2024 1035)  Sleep/Rest Enhancement:   awakenings minimized    regular sleep/rest pattern promoted   relaxation techniques promoted  Bowel Elimination Promotion: adequate fluid intake promoted  Medication Review/Management: medications reviewed  Intervention: Optimize Psychosocial Wellbeing  Flowsheets (Taken 1/18/2024 1035)  Supportive Measures:   active listening utilized   decision-making supported   verbalization of feelings encouraged   relaxation techniques promoted  Diversional Activities: television

## 2024-01-18 NOTE — PLAN OF CARE
Per Milly with [facility], pt screened for secondary medicaid and will submit the application today.     Patient may benefit from medicaid secondary for assistance with transportation for HD. CM to continue to follow up.     Per Chiquita HAHN, no updates for HD placement. Messages left for [facility] and [facility]. No accepting out patient clinic at this time.

## 2024-01-19 LAB
ALBUMIN SERPL BCP-MCNC: 1.9 G/DL (ref 3.5–5.2)
ALP SERPL-CCNC: 99 U/L (ref 55–135)
ALT SERPL W/O P-5'-P-CCNC: 42 U/L (ref 10–44)
ANION GAP SERPL CALC-SCNC: 16 MMOL/L (ref 8–16)
AST SERPL-CCNC: 38 U/L (ref 10–40)
BASOPHILS # BLD AUTO: 0.03 K/UL (ref 0–0.2)
BASOPHILS NFR BLD: 0.2 % (ref 0–1.9)
BILIRUB SERPL-MCNC: 0.5 MG/DL (ref 0.1–1)
BUN SERPL-MCNC: 64 MG/DL (ref 6–20)
CALCIUM SERPL-MCNC: 8 MG/DL (ref 8.7–10.5)
CHLORIDE SERPL-SCNC: 100 MMOL/L (ref 95–110)
CO2 SERPL-SCNC: 18 MMOL/L (ref 23–29)
CREAT SERPL-MCNC: 8.2 MG/DL (ref 0.5–1.4)
DIFFERENTIAL METHOD BLD: ABNORMAL
EOSINOPHIL # BLD AUTO: 0.1 K/UL (ref 0–0.5)
EOSINOPHIL NFR BLD: 0.6 % (ref 0–8)
ERYTHROCYTE [DISTWIDTH] IN BLOOD BY AUTOMATED COUNT: 15.9 % (ref 11.5–14.5)
EST. GFR  (NO RACE VARIABLE): 7 ML/MIN/1.73 M^2
GLUCOSE SERPL-MCNC: 76 MG/DL (ref 70–110)
HCT VFR BLD AUTO: 26.6 % (ref 40–54)
HGB BLD-MCNC: 8.5 G/DL (ref 14–18)
IMM GRANULOCYTES # BLD AUTO: 0.12 K/UL (ref 0–0.04)
IMM GRANULOCYTES NFR BLD AUTO: 0.9 % (ref 0–0.5)
LYMPHOCYTES # BLD AUTO: 1.4 K/UL (ref 1–4.8)
LYMPHOCYTES NFR BLD: 10.4 % (ref 18–48)
MAGNESIUM SERPL-MCNC: 1.8 MG/DL (ref 1.6–2.6)
MCH RBC QN AUTO: 26.6 PG (ref 27–31)
MCHC RBC AUTO-ENTMCNC: 32 G/DL (ref 32–36)
MCV RBC AUTO: 83 FL (ref 82–98)
MONOCYTES # BLD AUTO: 1.3 K/UL (ref 0.3–1)
MONOCYTES NFR BLD: 9.7 % (ref 4–15)
NEUTROPHILS # BLD AUTO: 10.3 K/UL (ref 1.8–7.7)
NEUTROPHILS NFR BLD: 78.2 % (ref 38–73)
NRBC BLD-RTO: 0 /100 WBC
PHOSPHATE SERPL-MCNC: 4.3 MG/DL (ref 2.7–4.5)
PLATELET # BLD AUTO: 309 K/UL (ref 150–450)
PMV BLD AUTO: 10.2 FL (ref 9.2–12.9)
POCT GLUCOSE: 104 MG/DL (ref 70–110)
POCT GLUCOSE: 187 MG/DL (ref 70–110)
POCT GLUCOSE: 85 MG/DL (ref 70–110)
POTASSIUM SERPL-SCNC: 6.1 MMOL/L (ref 3.5–5.1)
PROT SERPL-MCNC: 7.4 G/DL (ref 6–8.4)
RBC # BLD AUTO: 3.2 M/UL (ref 4.6–6.2)
SODIUM SERPL-SCNC: 134 MMOL/L (ref 136–145)
WBC # BLD AUTO: 13.21 K/UL (ref 3.9–12.7)

## 2024-01-19 PROCEDURE — 84100 ASSAY OF PHOSPHORUS: CPT | Performed by: STUDENT IN AN ORGANIZED HEALTH CARE EDUCATION/TRAINING PROGRAM

## 2024-01-19 PROCEDURE — 11000001 HC ACUTE MED/SURG PRIVATE ROOM

## 2024-01-19 PROCEDURE — 25000003 PHARM REV CODE 250: Performed by: STUDENT IN AN ORGANIZED HEALTH CARE EDUCATION/TRAINING PROGRAM

## 2024-01-19 PROCEDURE — 63600175 PHARM REV CODE 636 W HCPCS: Mod: JZ,JG | Performed by: STUDENT IN AN ORGANIZED HEALTH CARE EDUCATION/TRAINING PROGRAM

## 2024-01-19 PROCEDURE — 83735 ASSAY OF MAGNESIUM: CPT | Performed by: STUDENT IN AN ORGANIZED HEALTH CARE EDUCATION/TRAINING PROGRAM

## 2024-01-19 PROCEDURE — 36415 COLL VENOUS BLD VENIPUNCTURE: CPT | Performed by: STUDENT IN AN ORGANIZED HEALTH CARE EDUCATION/TRAINING PROGRAM

## 2024-01-19 PROCEDURE — 90935 HEMODIALYSIS ONE EVALUATION: CPT

## 2024-01-19 PROCEDURE — 97530 THERAPEUTIC ACTIVITIES: CPT

## 2024-01-19 PROCEDURE — 85025 COMPLETE CBC W/AUTO DIFF WBC: CPT | Performed by: STUDENT IN AN ORGANIZED HEALTH CARE EDUCATION/TRAINING PROGRAM

## 2024-01-19 PROCEDURE — 80053 COMPREHEN METABOLIC PANEL: CPT | Performed by: STUDENT IN AN ORGANIZED HEALTH CARE EDUCATION/TRAINING PROGRAM

## 2024-01-19 RX ORDER — AMOXICILLIN AND CLAVULANATE POTASSIUM 500; 125 MG/1; MG/1
1 TABLET, FILM COATED ORAL EVERY 24 HOURS
Status: DISCONTINUED | OUTPATIENT
Start: 2024-01-19 | End: 2024-01-22 | Stop reason: HOSPADM

## 2024-01-19 RX ADMIN — SODIUM ZIRCONIUM CYCLOSILICATE 5 G: 5 POWDER, FOR SUSPENSION ORAL at 09:01

## 2024-01-19 RX ADMIN — ALLOPURINOL 100 MG: 100 TABLET ORAL at 08:01

## 2024-01-19 RX ADMIN — SODIUM ZIRCONIUM CYCLOSILICATE 5 G: 5 POWDER, FOR SUSPENSION ORAL at 02:01

## 2024-01-19 RX ADMIN — AMIODARONE HYDROCHLORIDE 200 MG: 200 TABLET ORAL at 08:01

## 2024-01-19 RX ADMIN — TRAMADOL HYDROCHLORIDE 50 MG: 50 TABLET, COATED ORAL at 08:01

## 2024-01-19 RX ADMIN — PANTOPRAZOLE SODIUM 40 MG: 40 TABLET, DELAYED RELEASE ORAL at 08:01

## 2024-01-19 RX ADMIN — AMOXICILLIN AND CLAVULANATE POTASSIUM 500 MG: 500; 125 TABLET, FILM COATED ORAL at 04:01

## 2024-01-19 RX ADMIN — TAMSULOSIN HYDROCHLORIDE 0.4 MG: 0.4 CAPSULE ORAL at 08:01

## 2024-01-19 RX ADMIN — APIXABAN 5 MG: 5 TABLET, FILM COATED ORAL at 08:01

## 2024-01-19 RX ADMIN — SERTRALINE HYDROCHLORIDE 25 MG: 25 TABLET ORAL at 08:01

## 2024-01-19 RX ADMIN — ATORVASTATIN CALCIUM 80 MG: 40 TABLET, FILM COATED ORAL at 08:01

## 2024-01-19 RX ADMIN — EPOETIN ALFA-EPBX 10000 UNITS: 10000 INJECTION, SOLUTION INTRAVENOUS; SUBCUTANEOUS at 08:01

## 2024-01-19 NOTE — PLAN OF CARE
Problem: Adult Inpatient Plan of Care  Goal: Plan of Care Review  Outcome: Ongoing, Progressing  Goal: Patient-Specific Goal (Individualized)  Outcome: Ongoing, Progressing  Goal: Optimal Comfort and Wellbeing  Outcome: Ongoing, Progressing  Goal: Readiness for Transition of Care  Outcome: Ongoing, Progressing     Problem: Infection  Goal: Absence of Infection Signs and Symptoms  Outcome: Ongoing, Progressing     Problem: Fall Injury Risk  Goal: Absence of Fall and Fall-Related Injury  Outcome: Ongoing, Progressing     Problem: Skin Injury Risk Increased  Goal: Skin Health and Integrity  Outcome: Ongoing, Progressing
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
## 2024-01-19 NOTE — PT/OT/SLP PROGRESS
"Occupational Therapy   Treatment    Name: Mahesh Cespedes  MRN: [id number]  Admitting Diagnosis:  Acute hypoxemic respiratory failure     Pre-op Diagnosis: * No surgery found * s/p    The primary encounter diagnosis was Hyperkalemia. Diagnoses of Shortness of breath, Respiratory failure, Acute pulmonary edema, Chest pain, Acute hypoxemic respiratory failure, Abdominal distension, Anemia of chronic disease, ESRD on dialysis, and Decubitus ulcer with suspected deep tissue injury, unspecified ulcer stage [L89.96] were also pertinent to this visit.    Recommendations:     Discharge Recommendations: Moderate Intensity Therapy  Discharge Equipment Recommendations:  to be determined by next level of care  Barriers to discharge:   (increased level of assist with ADLS and mobility. fall risk, not back to PLOF)    Assessment:     Mahesh Cespedes is a 58 y.o. male with a medical diagnosis of Acute hypoxemic respiratory failure.  He presents with Performance deficits affecting function are weakness, impaired endurance, impaired self care skills, impaired functional mobility, gait instability, impaired balance, impaired cognition, decreased coordination, decreased upper extremity function, decreased lower extremity function, decreased safety awareness, pain, decreased ROM, impaired fine motor, impaired cardiopulmonary response to activity.     Pt seen after HD, moaning in pain- reported hurting "all over" and visibly weak and fatigued. 3 attempts made to get a BP reading and were unsuccessful while pt was lying in bed with HOB elevated. A BP reading of 104/65, MAP 79 was obtained after legs elevated and HOB lowered.  Pt was unable to participate in OT as a result of above symptoms. Nurse notified.   used during session ID# [id number].  Continue with OT as able to tolerate.      Rehab Prognosis:  Fair; patient would benefit from acute skilled OT services to address these deficits and reach maximum level of function.       Plan: " "    Patient to be seen  (3-5x/week) to address the above listed problems via self-care/home management, therapeutic activities, therapeutic exercises  Plan of Care Expires: 01/25/24  Plan of Care Reviewed with: patient    Subjective     Chief Complaint: "pain all over"  Patient/Family Comments/goals: agreeable but unable 2/2 fatigued after HD. BP ?? Low, unable to get reading seated upright in bed.   Pain/Comfort:  Pain Rating 1:  (" all over")  Location - Orientation 1: generalized  Location 1:  ("all over")  Pain Addressed 1: Nurse notified, Reposition (pain meds requested)  Pain Rating Post-Intervention 1:  (same)    Objective:     Communicated with: nurse prior to session.  Patient found HOB elevated with telemetry, peripheral IV, bed alarm ([employer]) upon OT entry to room.    General Precautions: Standard, fall, aspiration, diabetic    Orthopedic Precautions:N/A  Braces: N/A  Respiratory Status: Room air     Treatment & Education:  Purpose of OT and POC, pt agreeable however very fatigued, in pain and weak after HD. Unable to get BP reading x 3 attempts with HOB elevated, had to lower HOB and elevate legs to get reading. See vitals above.  Video  used throughout ID# [id number].  Tx session terminated 2/2 pt unable to participate.    Patient left  HOB ~45 degrees, legs elevated  with all lines intact, call button in reach, bed alarm on, and nurse notified    GOALS:   Multidisciplinary Problems       Occupational Therapy Goals          Problem: Occupational Therapy    Goal Priority Disciplines Outcome Interventions   Occupational Therapy Goal     OT, PT/OT Ongoing, Not Progressing    Description: Goals to be met by: 1/25/2024     Patient will increase functional independence with ADLs by performing:    Feeding with Modified [place].  UE Dressing with Stand-by Assistance and Contact Guard Assistance.  LE Dressing with Stand-by Assistance and Contact Guard Assistance.  Grooming while seated with Modified " [place] and Set-up Assistance.  Toileting from bedside commode with Stand-by Assistance and Assistive Devices as needed for hygiene and clothing management.   Sitting at edge of bed x30 minutes with Supervision.  Rolling to Bilateral with Modified [place] and use of bedrail as needed.   Supine to sit with Stand-by Assistance.  Step transfer with Contact Guard Assistance  Toilet transfer to bedside commode with Contact Guard Assistance.  Upper extremity exercise program x15 reps per handout, with assistance as needed.                         Time Tracking:     OT Date of Treatment: 01/19/24  OT Start Time: 1614  OT Stop Time: 1629  OT Total Time (min): 15 min    Billable Minutes:Therapeutic Activity 15  Total Time 15    OT/FERNANDO: OT          1/19/2024

## 2024-01-19 NOTE — PLAN OF CARE
"  Problem: Occupational Therapy  Goal: Occupational Therapy Goal  Description: Goals to be met by: 1/25/2024     Patient will increase functional independence with ADLs by performing:    Feeding with Modified [place].  UE Dressing with Stand-by Assistance and Contact Guard Assistance.  LE Dressing with Stand-by Assistance and Contact Guard Assistance.  Grooming while seated with Modified [place] and Set-up Assistance.  Toileting from bedside commode with Stand-by Assistance and Assistive Devices as needed for hygiene and clothing management.   Sitting at edge of bed x30 minutes with Supervision.  Rolling to Bilateral with Modified [place] and use of bedrail as needed.   Supine to sit with Stand-by Assistance.  Step transfer with Contact Guard Assistance  Toilet transfer to bedside commode with Contact Guard Assistance.  Upper extremity exercise program x15 reps per handout, with assistance as needed.    Outcome: Ongoing, Not Progressing   Pt seen after HD, moaning in pain- reported hurting "all over" and visibly weak and fatigued. 3 attempts made to get a BP reading and were unsuccessful while pt was lying in bed with HOB elevated. A BP reading of 104/65, MAP 79 was obtained after legs elevated and HOB lowered.  Pt was unable to participate in OT as a result of above symptoms. Nurse notified.   used during session ID# [id number].  Continue with OT as able to tolerate.  "

## 2024-01-19 NOTE — NURSING
[facility] MEWS TRIGGER FOLLOW UP       MEWS Monitoring, Score is: 3  Indication for review: BP    Bedside Nurse, Ashley contacted, no concerns verbalized at this time, instructed to call [phone] for further concerns or assistance..

## 2024-01-19 NOTE — NURSING
[facility] [place] 4 [place]      1-18-24      Skin assessed on: Q Shift      [] No Pressure Injuries Present    [x]Prevention Measures Documented    [x] Yes LDA  for Pressure Injury Previously documented     [] Yes New Pressure Injury Discovered   [] LDA for New Pressure Injury Added      Attending RN:  Ashley Bhatti RN     Second RN:  Ashlyn Gerardo RN

## 2024-01-19 NOTE — NURSING
[facility], [facility]  Nurses Note -- 4 Eyes      1/19/2024       Skin assessed on: Q Shift      [] No Pressure Injuries Present    []Prevention Measures Documented    [x] Yes LDA  for Pressure Injury Previously documented     [] Yes New Pressure Injury Discovered   [] LDA for New Pressure Injury Added      Attending RN:  Kelly Huertas RN     Second RN:  Ashley

## 2024-01-19 NOTE — PLAN OF CARE
Problem: Device-Related Complication Risk (Hemodialysis)  Goal: Safe, Effective Therapy Delivery  Outcome: Ongoing, Progressing     Problem: Hemodynamic Instability (Hemodialysis)  Goal: Effective Tissue Perfusion  Outcome: Ongoing, Progressing     Problem: Infection (Hemodialysis)  Goal: Absence of Infection Signs and Symptoms  Outcome: Ongoing, Progressing     Problem: Adult Inpatient Plan of Care  Goal: Plan of Care Review  Outcome: Ongoing, Progressing  Goal: Patient-Specific Goal (Individualized)  Outcome: Ongoing, Progressing  Goal: Absence of Hospital-Acquired Illness or Injury  Outcome: Ongoing, Progressing  Goal: Optimal Comfort and Wellbeing  Outcome: Ongoing, Progressing  Goal: Readiness for Transition of Care  Outcome: Ongoing, Progressing     Problem: Diabetes Comorbidity  Goal: Blood Glucose Level Within Targeted Range  Outcome: Ongoing, Progressing     Problem: Infection  Goal: Absence of Infection Signs and Symptoms  Outcome: Ongoing, Progressing     Problem: Fall Injury Risk  Goal: Absence of Fall and Fall-Related Injury  Outcome: Ongoing, Progressing     Problem: Skin Injury Risk Increased  Goal: Skin Health and Integrity  Outcome: Ongoing, Progressing     Problem: Electrolyte Imbalance (Chronic Kidney Disease)  Goal: Electrolyte Balance  Outcome: Ongoing, Progressing     Problem: Fluid Volume Excess (Chronic Kidney Disease)  Goal: Fluid Balance  Outcome: Ongoing, Progressing     Problem: Functional Decline (Chronic Kidney Disease)  Goal: Optimal Functional Ability  Outcome: Ongoing, Progressing     Problem: Impaired Wound Healing  Goal: Optimal Wound Healing  Outcome: Ongoing, Progressing     Problem: Pain Acute  Goal: Acceptable Pain Control and Functional Ability  Outcome: Ongoing, Progressing

## 2024-01-19 NOTE — PROGRESS NOTES
Mahesh Cespedes is a 58 y.o. male patient.    Follow for ESRD, dialysis    Patient seen while on dialysis  No new c/o, comfortable    Scheduled Meds:   allopurinoL  100 mg Oral Daily    amiodarone  200 mg Oral Daily    amoxicillin-clavulanate 500-125mg  1 tablet Oral Daily    apixaban  5 mg Oral Q12H    atorvastatin  80 mg Oral Daily    epoetin luli-epbx  10,000 Units Subcutaneous Every Mon, Wed, Fri    metoprolol succinate  25 mg Oral Daily    pantoprazole  40 mg Oral Daily    sertraline  25 mg Oral Daily    sodium zirconium cyclosilicate  5 g Oral TID    tamsulosin  0.4 mg Oral Daily       Review of patient's allergies indicates:  No Known Allergies      Vital Signs Range (Last 24H):  Temp:  [97.8 °F (36.6 °C)-99.7 °F (37.6 °C)]   Pulse:  [62-83]   Resp:  [12-19]   BP: ()/(46-62)   SpO2:  [92 %-96 %]     I & O (Last 24H):  Intake/Output Summary (Last 24 hours) at 1/19/2024 1047  Last data filed at 1/18/2024 2222  Gross per 24 hour   Intake 300 ml   Output 50 ml   Net 250 ml           Physical Exam:  General appearance: well developed, no distress  Lungs:  clear to auscultation bilaterally and normal respiratory effort  Heart: regular rate and rhythm  Abdomen:  soft, normal bowel sounds  Extremities: no cyanosis or edema, or clubbing    Laboratory:  I have reviewed all pertinent lab results within the past 24 hours.  CBC:   Recent Labs   Lab 01/19/24  0658   WBC 13.21*   RBC 3.20*   HGB 8.5*   HCT 26.6*      MCV 83   MCH 26.6*   MCHC 32.0     CMP:   Recent Labs   Lab 01/19/24  0545   GLU 76   CALCIUM 8.0*   ALBUMIN 1.9*   PROT 7.4   *   K 6.1*   CO2 18*      BUN 64*   CREATININE 8.2*   ALKPHOS 99   ALT 42   AST 38   BILITOT 0.5       Assessment & Plan    ESRD  HTN  Anemia of CKD    Continue present Rx  We'll follow for dialysis q MWF      Trac T Le  1/19/2024

## 2024-01-19 NOTE — PLAN OF CARE
Problem: Device-Related Complication Risk (Hemodialysis)  Goal: Safe, Effective Therapy Delivery  1/19/2024 1730 by Kelly Huertas RN  Outcome: Ongoing, Progressing  1/19/2024 1716 by Kelly Huertas RN  Outcome: Ongoing, Progressing     Problem: Hemodynamic Instability (Hemodialysis)  Goal: Effective Tissue Perfusion  1/19/2024 1730 by Kelly Huertas RN  Outcome: Ongoing, Progressing  1/19/2024 1716 by Kelly Huertas RN  Outcome: Ongoing, Progressing     Problem: Infection (Hemodialysis)  Goal: Absence of Infection Signs and Symptoms  1/19/2024 1730 by Kelly Huertas RN  Outcome: Ongoing, Progressing  1/19/2024 1716 by Kelly Huertas RN  Outcome: Ongoing, Progressing     Problem: Adult Inpatient Plan of Care  Goal: Plan of Care Review  1/19/2024 1730 by Kelly Huertas RN  Outcome: Ongoing, Progressing  1/19/2024 1716 by Kelly Huertas RN  Outcome: Ongoing, Progressing  Goal: Patient-Specific Goal (Individualized)  1/19/2024 1730 by Kelly Huertas RN  Outcome: Ongoing, Progressing  1/19/2024 1716 by Kelly Huertas RN  Outcome: Ongoing, Progressing  Goal: Absence of Hospital-Acquired Illness or Injury  1/19/2024 1730 by Kelly Huertas RN  Outcome: Ongoing, Progressing  1/19/2024 1716 by Kelly Huertas RN  Outcome: Ongoing, Progressing  Goal: Optimal Comfort and Wellbeing  1/19/2024 1730 by Kelly Huertas RN  Outcome: Ongoing, Progressing  1/19/2024 1716 by Kelly Huertas RN  Outcome: Ongoing, Progressing  Goal: Readiness for Transition of Care  1/19/2024 1730 by Kelly Huertas RN  Outcome: Ongoing, Progressing  1/19/2024 1716 by Kelly Huertas RN  Outcome: Ongoing, Progressing     Problem: Diabetes Comorbidity  Goal: Blood Glucose Level Within Targeted Range  1/19/2024 1730 by Kelly Huertas RN  Outcome: Ongoing, Progressing  1/19/2024 1716 by Kelly Huertas RN  Outcome: Ongoing, Progressing     Problem: Infection  Goal:  Absence of Infection Signs and Symptoms  1/19/2024 1730 by Kelly Huertas RN  Outcome: Ongoing, Progressing  1/19/2024 1716 by Kelly Huertas RN  Outcome: Ongoing, Progressing     Problem: Fall Injury Risk  Goal: Absence of Fall and Fall-Related Injury  1/19/2024 1730 by Kelly Huertas RN  Outcome: Ongoing, Progressing  1/19/2024 1716 by Kelly Huertas RN  Outcome: Ongoing, Progressing     Problem: Skin Injury Risk Increased  Goal: Skin Health and Integrity  1/19/2024 1730 by Kelly Huertas RN  Outcome: Ongoing, Progressing  1/19/2024 1716 by Kelly Huertas RN  Outcome: Ongoing, Progressing     Problem: Electrolyte Imbalance (Chronic Kidney Disease)  Goal: Electrolyte Balance  1/19/2024 1730 by Kelly Huertas RN  Outcome: Ongoing, Progressing  1/19/2024 1716 by Kelly Huertas RN  Outcome: Ongoing, Progressing     Problem: Fluid Volume Excess (Chronic Kidney Disease)  Goal: Fluid Balance  1/19/2024 1730 by Kelly Huertas RN  Outcome: Ongoing, Progressing  1/19/2024 1716 by Kelly Huertas RN  Outcome: Ongoing, Progressing     Problem: Functional Decline (Chronic Kidney Disease)  Goal: Optimal Functional Ability  1/19/2024 1730 by Kelly Huertas RN  Outcome: Ongoing, Progressing  1/19/2024 1716 by Kelly Huertas RN  Outcome: Ongoing, Progressing     Problem: Impaired Wound Healing  Goal: Optimal Wound Healing  1/19/2024 1730 by Kelly Huertas RN  Outcome: Ongoing, Progressing  1/19/2024 1716 by Kelly Huertas RN  Outcome: Ongoing, Progressing     Problem: Pain Acute  Goal: Acceptable Pain Control and Functional Ability  1/19/2024 1730 by Kelly Huertas RN  Outcome: Ongoing, Progressing  1/19/2024 1716 by Kelly Huertas RN  Outcome: Ongoing, Progressing

## 2024-01-20 LAB
ALBUMIN SERPL BCP-MCNC: 1.9 G/DL (ref 3.5–5.2)
ALP SERPL-CCNC: 97 U/L (ref 55–135)
ALT SERPL W/O P-5'-P-CCNC: 32 U/L (ref 10–44)
ANION GAP SERPL CALC-SCNC: 12 MMOL/L (ref 8–16)
AST SERPL-CCNC: 25 U/L (ref 10–40)
BILIRUB SERPL-MCNC: 0.5 MG/DL (ref 0.1–1)
BUN SERPL-MCNC: 37 MG/DL (ref 6–20)
CALCIUM SERPL-MCNC: 8.1 MG/DL (ref 8.7–10.5)
CHLORIDE SERPL-SCNC: 99 MMOL/L (ref 95–110)
CO2 SERPL-SCNC: 23 MMOL/L (ref 23–29)
CREAT SERPL-MCNC: 6.1 MG/DL (ref 0.5–1.4)
EST. GFR  (NO RACE VARIABLE): 10 ML/MIN/1.73 M^2
GLUCOSE SERPL-MCNC: 84 MG/DL (ref 70–110)
MAGNESIUM SERPL-MCNC: 1.7 MG/DL (ref 1.6–2.6)
PHOSPHATE SERPL-MCNC: 4.5 MG/DL (ref 2.7–4.5)
POCT GLUCOSE: 105 MG/DL (ref 70–110)
POCT GLUCOSE: 136 MG/DL (ref 70–110)
POCT GLUCOSE: 170 MG/DL (ref 70–110)
POCT GLUCOSE: 86 MG/DL (ref 70–110)
POTASSIUM SERPL-SCNC: 3.7 MMOL/L (ref 3.5–5.1)
PROT SERPL-MCNC: 7.1 G/DL (ref 6–8.4)
SODIUM SERPL-SCNC: 134 MMOL/L (ref 136–145)

## 2024-01-20 PROCEDURE — 80053 COMPREHEN METABOLIC PANEL: CPT | Performed by: STUDENT IN AN ORGANIZED HEALTH CARE EDUCATION/TRAINING PROGRAM

## 2024-01-20 PROCEDURE — 36415 COLL VENOUS BLD VENIPUNCTURE: CPT | Performed by: STUDENT IN AN ORGANIZED HEALTH CARE EDUCATION/TRAINING PROGRAM

## 2024-01-20 PROCEDURE — 25000003 PHARM REV CODE 250: Performed by: FAMILY MEDICINE

## 2024-01-20 PROCEDURE — 25000003 PHARM REV CODE 250: Performed by: STUDENT IN AN ORGANIZED HEALTH CARE EDUCATION/TRAINING PROGRAM

## 2024-01-20 PROCEDURE — 84100 ASSAY OF PHOSPHORUS: CPT | Performed by: STUDENT IN AN ORGANIZED HEALTH CARE EDUCATION/TRAINING PROGRAM

## 2024-01-20 PROCEDURE — 83735 ASSAY OF MAGNESIUM: CPT | Performed by: STUDENT IN AN ORGANIZED HEALTH CARE EDUCATION/TRAINING PROGRAM

## 2024-01-20 PROCEDURE — 11000001 HC ACUTE MED/SURG PRIVATE ROOM

## 2024-01-20 PROCEDURE — 94761 N-INVAS EAR/PLS OXIMETRY MLT: CPT

## 2024-01-20 RX ADMIN — ACETAMINOPHEN 650 MG: 325 TABLET ORAL at 02:01

## 2024-01-20 RX ADMIN — APIXABAN 5 MG: 5 TABLET, FILM COATED ORAL at 08:01

## 2024-01-20 RX ADMIN — PANTOPRAZOLE SODIUM 40 MG: 40 TABLET, DELAYED RELEASE ORAL at 08:01

## 2024-01-20 RX ADMIN — AMIODARONE HYDROCHLORIDE 200 MG: 200 TABLET ORAL at 08:01

## 2024-01-20 RX ADMIN — TAMSULOSIN HYDROCHLORIDE 0.4 MG: 0.4 CAPSULE ORAL at 08:01

## 2024-01-20 RX ADMIN — SERTRALINE HYDROCHLORIDE 25 MG: 25 TABLET ORAL at 08:01

## 2024-01-20 RX ADMIN — TRAMADOL HYDROCHLORIDE 50 MG: 50 TABLET, COATED ORAL at 09:01

## 2024-01-20 RX ADMIN — TRAMADOL HYDROCHLORIDE 50 MG: 50 TABLET, COATED ORAL at 08:01

## 2024-01-20 RX ADMIN — ATORVASTATIN CALCIUM 80 MG: 40 TABLET, FILM COATED ORAL at 08:01

## 2024-01-20 RX ADMIN — ALLOPURINOL 100 MG: 100 TABLET ORAL at 08:01

## 2024-01-20 RX ADMIN — APIXABAN 5 MG: 5 TABLET, FILM COATED ORAL at 09:01

## 2024-01-20 NOTE — NURSING
[facility]  Nurses Note -- 4 Eyes      1/20/2024       Skin assessed on: Q Shift      [] No Pressure Injuries Present    []Prevention Measures Documented    [x] Yes LDA  for Pressure Injury Previously documented     [] Yes New Pressure Injury Discovered   [] LDA for New Pressure Injury Added      Attending RN:  Sadia Barros RN     Second: JAVIER Cottrell
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
## 2024-01-20 NOTE — PLAN OF CARE
Problem: Adult Inpatient Plan of Care  Goal: Plan of Care Review  Outcome: Ongoing, Progressing  Flowsheets (Taken 1/20/2024 0815)  Plan of Care Reviewed With: patient  Goal: Patient-Specific Goal (Individualized)  Outcome: Ongoing, Progressing     Problem: Diabetes Comorbidity  Goal: Blood Glucose Level Within Targeted Range  Outcome: Ongoing, Progressing  Intervention: Monitor and Manage Glycemia  Flowsheets (Taken 1/20/2024 0815)  Glycemic Management:   blood glucose monitored   carbohydrate replacement provided   oral hydration promoted     Problem: Adult Inpatient Plan of Care  Goal: Plan of Care Review  Outcome: Ongoing, Progressing  Flowsheets (Taken 1/20/2024 0815)  Plan of Care Reviewed With: patient     Problem: Adult Inpatient Plan of Care  Goal: Plan of Care Review  Outcome: Ongoing, Progressing  Flowsheets (Taken 1/20/2024 0815)  Plan of Care Reviewed With: patient     Problem: Adult Inpatient Plan of Care  Goal: Patient-Specific Goal (Individualized)  Outcome: Ongoing, Progressing     Problem: Adult Inpatient Plan of Care  Goal: Patient-Specific Goal (Individualized)  Outcome: Ongoing, Progressing     Problem: Diabetes Comorbidity  Goal: Blood Glucose Level Within Targeted Range  Outcome: Ongoing, Progressing  Intervention: Monitor and Manage Glycemia  Flowsheets (Taken 1/20/2024 0815)  Glycemic Management:   blood glucose monitored   carbohydrate replacement provided   oral hydration promoted     Problem: Diabetes Comorbidity  Goal: Blood Glucose Level Within Targeted Range  Outcome: Ongoing, Progressing     Problem: Diabetes Comorbidity  Goal: Blood Glucose Level Within Targeted Range  Intervention: Monitor and Manage Glycemia  Flowsheets (Taken 1/20/2024 0815)  Glycemic Management:   blood glucose monitored   carbohydrate replacement provided   oral hydration promoted     Problem: Diabetes Comorbidity  Goal: Blood Glucose Level Within Targeted Range  Intervention: Monitor and Manage  Glycemia  Flowsheets (Taken 1/20/2024 0815)  Glycemic Management:   blood glucose monitored   carbohydrate replacement provided   oral hydration promoted

## 2024-01-20 NOTE — PROGRESS NOTES
"                        Renal Progress Note    Date of Admission:  1/6/2024  6:21 PM    Length of Stay: 14  Days    Subjective: n/a    Objective:    Current Facility-Administered Medications   Medication    acetaminophen tablet 650 mg    allopurinoL tablet 100 mg    amiodarone tablet 200 mg    amoxicillin-clavulanate 500-125mg per tablet 500 mg    apixaban tablet 5 mg    atorvastatin tablet 80 mg    calcium gluconate 1 g in NS IVPB (premixed)    dextrose 10% bolus 125 mL 125 mL    dextrose 10% bolus 250 mL 250 mL    epoetin luli-epbx injection 10,000 Units    metoprolol succinate (TOPROL-XL) 24 hr tablet 25 mg    pantoprazole EC tablet 40 mg    sertraline tablet 25 mg    tamsulosin 24 hr capsule 0.4 mg    traMADoL tablet 50 mg       Vitals:    01/19/24 2257 01/20/24 0409 01/20/24 0715 01/20/24 0852   BP: 104/64 113/69 109/67    BP Location: Right arm Right arm Right arm    Patient Position: Lying Lying Lying    Pulse: 70 71 69    Resp: 20 19 17 18   Temp: 97.6 °F (36.4 °C) 98.6 °F (37 °C) 99.1 °F (37.3 °C)    TempSrc: Oral Oral Oral    SpO2: 96% 96% 96%    Weight:       Height:           I/O last 3 completed shifts:  In: 60 [P.O.:60]  Out: 1050 [Urine:50; Other:1000]  No intake/output data recorded.      Physical Exam:     General: NAD  Neck: supple  Heart: RRR  Lungs: unlabored breathing  Abdomen: n/a  Limbs: n/a  Neurologic: Asleep      Laboratories:    No results for input(s): "WBC", "RBC", "HGB", "HCT", "PLT", "MCV", "MCH", "MCHC" in the last 24 hours.    Recent Labs   Lab 01/20/24  0811   CALCIUM 8.1*   ALBUMIN 1.9*   PROT 7.1   *   K 3.7   CO2 23   CL 99   BUN 37*   CREATININE 6.1*   ALKPHOS 97   ALT 32   AST 25   BILITOT 0.5       No results for input(s): "COLORU", "CLARITYU", "SPECGRAV", "PHUR", "PROTEINUA", "GLUCOSEU", "BILIRUBINCON", "BLOODU", "WBCU", "RBCU", "BACTERIA", "MUCUS" in the last 24 hours.    Microbiology Results (last 7 days)       Procedure Component Value Units Date/Time    Blood " culture [[id number]] Collected: 01/11/24 1256    Order Status: Completed Specimen: Blood from Antecubital, Right Arm Updated: 01/15/24 1503     Blood Culture, Routine No Growth after 4 days.    Narrative:      Collection has been rescheduled by CPD at 01/11/2024 12:32 Reason:   Eating lunch  Collection has been rescheduled by CPD at 01/11/2024 12:32 Reason:   Eating lunch    Blood culture [[id number]] Collected: 01/11/24 1250    Order Status: Completed Specimen: Blood from Antecubital, Right Hand Updated: 01/15/24 1503     Blood Culture, Routine No Growth after 4 days.    Narrative:      Collection has been rescheduled by CPD at 01/11/2024 12:32 Reason:   Eating lunch  Collection has been rescheduled by CPD at 01/11/2024 12:32 Reason:   Eating lunch              Diagnostic Tests: n/a        Assessment:    59 y/o male with ESRD (Moved from [facility]. no Dialysis unit to go to) admitted with:    - Dyspnea 2nd. Fluid overload (no HD for weeks)  - Metabolic (uremic) encephalopathy  - Ascites s/p Paracentesis  - Anemia of ckd  - HTN  - Hx. CVA  - Hypoalbuminemia        Plan:    - Dialysis Q MWF  - Epogen as needed  - Renal diet + protein supplements  - Arrange out-pte HD  - Other problems per admitting      Will f/u on HD days.

## 2024-01-21 LAB
ALBUMIN SERPL BCP-MCNC: 1.8 G/DL (ref 3.5–5.2)
ALP SERPL-CCNC: 98 U/L (ref 55–135)
ALT SERPL W/O P-5'-P-CCNC: 31 U/L (ref 10–44)
ANION GAP SERPL CALC-SCNC: 10 MMOL/L (ref 8–16)
AST SERPL-CCNC: 24 U/L (ref 10–40)
BILIRUB SERPL-MCNC: 0.4 MG/DL (ref 0.1–1)
BUN SERPL-MCNC: 53 MG/DL (ref 6–20)
CALCIUM SERPL-MCNC: 8.1 MG/DL (ref 8.7–10.5)
CHLORIDE SERPL-SCNC: 99 MMOL/L (ref 95–110)
CO2 SERPL-SCNC: 22 MMOL/L (ref 23–29)
CREAT SERPL-MCNC: 7.4 MG/DL (ref 0.5–1.4)
EST. GFR  (NO RACE VARIABLE): 8 ML/MIN/1.73 M^2
GLUCOSE SERPL-MCNC: 101 MG/DL (ref 70–110)
MAGNESIUM SERPL-MCNC: 1.8 MG/DL (ref 1.6–2.6)
PHOSPHATE SERPL-MCNC: 5.3 MG/DL (ref 2.7–4.5)
POCT GLUCOSE: 129 MG/DL (ref 70–110)
POCT GLUCOSE: 139 MG/DL (ref 70–110)
POCT GLUCOSE: 161 MG/DL (ref 70–110)
POCT GLUCOSE: 75 MG/DL (ref 70–110)
POTASSIUM SERPL-SCNC: 4.1 MMOL/L (ref 3.5–5.1)
PROT SERPL-MCNC: 7.1 G/DL (ref 6–8.4)
SODIUM SERPL-SCNC: 131 MMOL/L (ref 136–145)

## 2024-01-21 PROCEDURE — 36415 COLL VENOUS BLD VENIPUNCTURE: CPT | Performed by: STUDENT IN AN ORGANIZED HEALTH CARE EDUCATION/TRAINING PROGRAM

## 2024-01-21 PROCEDURE — 84100 ASSAY OF PHOSPHORUS: CPT | Performed by: STUDENT IN AN ORGANIZED HEALTH CARE EDUCATION/TRAINING PROGRAM

## 2024-01-21 PROCEDURE — 83735 ASSAY OF MAGNESIUM: CPT | Performed by: STUDENT IN AN ORGANIZED HEALTH CARE EDUCATION/TRAINING PROGRAM

## 2024-01-21 PROCEDURE — 80053 COMPREHEN METABOLIC PANEL: CPT | Performed by: STUDENT IN AN ORGANIZED HEALTH CARE EDUCATION/TRAINING PROGRAM

## 2024-01-21 PROCEDURE — 11000001 HC ACUTE MED/SURG PRIVATE ROOM

## 2024-01-21 PROCEDURE — 25000003 PHARM REV CODE 250: Performed by: STUDENT IN AN ORGANIZED HEALTH CARE EDUCATION/TRAINING PROGRAM

## 2024-01-21 RX ADMIN — APIXABAN 5 MG: 5 TABLET, FILM COATED ORAL at 09:01

## 2024-01-21 RX ADMIN — TRAMADOL HYDROCHLORIDE 50 MG: 50 TABLET, COATED ORAL at 09:01

## 2024-01-21 RX ADMIN — AMOXICILLIN AND CLAVULANATE POTASSIUM 500 MG: 500; 125 TABLET, FILM COATED ORAL at 09:01

## 2024-01-21 RX ADMIN — ATORVASTATIN CALCIUM 80 MG: 40 TABLET, FILM COATED ORAL at 09:01

## 2024-01-21 RX ADMIN — APIXABAN 5 MG: 5 TABLET, FILM COATED ORAL at 08:01

## 2024-01-21 RX ADMIN — SERTRALINE HYDROCHLORIDE 25 MG: 25 TABLET ORAL at 09:01

## 2024-01-21 RX ADMIN — TAMSULOSIN HYDROCHLORIDE 0.4 MG: 0.4 CAPSULE ORAL at 09:01

## 2024-01-21 RX ADMIN — PANTOPRAZOLE SODIUM 40 MG: 40 TABLET, DELAYED RELEASE ORAL at 09:01

## 2024-01-21 RX ADMIN — ALLOPURINOL 100 MG: 100 TABLET ORAL at 09:01

## 2024-01-21 RX ADMIN — AMIODARONE HYDROCHLORIDE 200 MG: 200 TABLET ORAL at 09:01

## 2024-01-21 NOTE — PLAN OF CARE
Problem: Device-Related Complication Risk (Hemodialysis)  Goal: Safe, Effective Therapy Delivery  Outcome: Ongoing, Progressing     Problem: Hemodynamic Instability (Hemodialysis)  Goal: Effective Tissue Perfusion  Outcome: Ongoing, Progressing     Problem: Adult Inpatient Plan of Care  Goal: Plan of Care Review  Outcome: Ongoing, Progressing  Flowsheets (Taken 1/21/2024 0618)  Plan of Care Reviewed With: patient  Goal: Optimal Comfort and Wellbeing  Outcome: Ongoing, Progressing  Intervention: Monitor Pain and Promote Comfort  Flowsheets (Taken 1/21/2024 0618)  Pain Management Interventions:   position adjusted   pillow support provided   medication offered   quiet environment facilitated     Problem: Diabetes Comorbidity  Goal: Blood Glucose Level Within Targeted Range  Outcome: Ongoing, Progressing  Intervention: Monitor and Manage Glycemia  Flowsheets (Taken 1/21/2024 0618)  Glycemic Management: blood glucose monitored     Problem: Fall Injury Risk  Goal: Absence of Fall and Fall-Related Injury  Outcome: Ongoing, Progressing  Intervention: Identify and Manage Contributors  Flowsheets (Taken 1/21/2024 0618)  Medication Review/Management: medications reviewed  Intervention: Promote Injury-Free Environment  Flowsheets (Taken 1/21/2024 0618)  Safety Promotion/Fall Prevention:   bed alarm set   /camera at bedside     Problem: Fluid Volume Excess (Chronic Kidney Disease)  Goal: Fluid Balance  Outcome: Ongoing, Progressing

## 2024-01-21 NOTE — PROGRESS NOTES
Attempted to call son a few times today, and no response to messages previously and went straight to voice mail thereafter.        Segun Dinero MD  Board-Certified Internal Medicine Attending  Section Head of [facility] Medicine
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
## 2024-01-21 NOTE — NURSING
Patient upset yelling out. Refused tylenol and ultram. Used  yue frias to explain that we will call family. Daughter called and situation explained. She said will call and notified her brother. Patient is requesting to go home.

## 2024-01-21 NOTE — PLAN OF CARE
Problem: Adult Inpatient Plan of Care  Goal: Plan of Care Review  Outcome: Ongoing, Progressing  Flowsheets (Taken 1/21/2024 0815)  Plan of Care Reviewed With: patient  Goal: Patient-Specific Goal (Individualized)  Outcome: Ongoing, Progressing     Problem: Adult Inpatient Plan of Care  Goal: Plan of Care Review  Outcome: Ongoing, Progressing  Flowsheets (Taken 1/21/2024 0815)  Plan of Care Reviewed With: patient     Problem: Adult Inpatient Plan of Care  Goal: Plan of Care Review  Outcome: Ongoing, Progressing  Flowsheets (Taken 1/21/2024 0815)  Plan of Care Reviewed With: patient     Problem: Adult Inpatient Plan of Care  Goal: Patient-Specific Goal (Individualized)  Outcome: Ongoing, Progressing     Problem: Adult Inpatient Plan of Care  Goal: Patient-Specific Goal (Individualized)  Outcome: Ongoing, Progressing

## 2024-01-21 NOTE — NURSING
[facility], [facility]  Nurses Note -- 4 Eyes      1/20/2024       Skin assessed on: Q Shift      [] No Pressure Injuries Present    []Prevention Measures Documented    [x] Yes LDA  for Pressure Injury Previously documented     [] Yes New Pressure Injury Discovered   [] LDA for New Pressure Injury Added      Attending RN:  Jb Tariq RN     Second RN:  BETO Mccullough

## 2024-01-21 NOTE — NURSING
[facility]  [place]urses [place] -- 4 Eyes      1/21/2024       Skin assessed on: Q Shift      [] No Pressure Injuries Present    []Prevention Measures Documented    [x] Yes LDA  for Pressure Injury Previously documented     [] Yes New Pressure Injury Discovered   [] LDA for New Pressure Injury Added      Attending RN:  Sadia Barros RN     Second RN:  Jb

## 2024-01-22 VITALS
BODY MASS INDEX: 32.28 KG/M2 | HEART RATE: 68 BPM | SYSTOLIC BLOOD PRESSURE: 156 MMHG | DIASTOLIC BLOOD PRESSURE: 92 MMHG | WEIGHT: 205.69 LBS | RESPIRATION RATE: 16 BRPM | OXYGEN SATURATION: 100 % | HEIGHT: 67 IN | TEMPERATURE: 97 F

## 2024-01-22 LAB
ALBUMIN SERPL BCP-MCNC: 2 G/DL (ref 3.5–5.2)
ALP SERPL-CCNC: 110 U/L (ref 55–135)
ALT SERPL W/O P-5'-P-CCNC: 27 U/L (ref 10–44)
ANION GAP SERPL CALC-SCNC: 16 MMOL/L (ref 8–16)
AST SERPL-CCNC: 23 U/L (ref 10–40)
BASOPHILS # BLD AUTO: 0.03 K/UL (ref 0–0.2)
BASOPHILS NFR BLD: 0.3 % (ref 0–1.9)
BILIRUB SERPL-MCNC: 0.5 MG/DL (ref 0.1–1)
BUN SERPL-MCNC: 65 MG/DL (ref 6–20)
CALCIUM SERPL-MCNC: 8.3 MG/DL (ref 8.7–10.5)
CHLORIDE SERPL-SCNC: 99 MMOL/L (ref 95–110)
CO2 SERPL-SCNC: 18 MMOL/L (ref 23–29)
CREAT SERPL-MCNC: 9.3 MG/DL (ref 0.5–1.4)
DIFFERENTIAL METHOD BLD: ABNORMAL
EOSINOPHIL # BLD AUTO: 0 K/UL (ref 0–0.5)
EOSINOPHIL NFR BLD: 0.4 % (ref 0–8)
ERYTHROCYTE [DISTWIDTH] IN BLOOD BY AUTOMATED COUNT: 15.8 % (ref 11.5–14.5)
EST. GFR  (NO RACE VARIABLE): 6 ML/MIN/1.73 M^2
GLUCOSE SERPL-MCNC: 170 MG/DL (ref 70–110)
HCT VFR BLD AUTO: 22.5 % (ref 40–54)
HGB BLD-MCNC: 7.3 G/DL (ref 14–18)
IMM GRANULOCYTES # BLD AUTO: 0.06 K/UL (ref 0–0.04)
IMM GRANULOCYTES NFR BLD AUTO: 0.6 % (ref 0–0.5)
LYMPHOCYTES # BLD AUTO: 0.6 K/UL (ref 1–4.8)
LYMPHOCYTES NFR BLD: 6.5 % (ref 18–48)
MAGNESIUM SERPL-MCNC: 1.8 MG/DL (ref 1.6–2.6)
MCH RBC QN AUTO: 26 PG (ref 27–31)
MCHC RBC AUTO-ENTMCNC: 32.4 G/DL (ref 32–36)
MCV RBC AUTO: 80 FL (ref 82–98)
MONOCYTES # BLD AUTO: 0.6 K/UL (ref 0.3–1)
MONOCYTES NFR BLD: 6.3 % (ref 4–15)
NEUTROPHILS # BLD AUTO: 8.2 K/UL (ref 1.8–7.7)
NEUTROPHILS NFR BLD: 85.9 % (ref 38–73)
NRBC BLD-RTO: 0 /100 WBC
PHOSPHATE SERPL-MCNC: 4.8 MG/DL (ref 2.7–4.5)
PLATELET # BLD AUTO: 457 K/UL (ref 150–450)
PMV BLD AUTO: 9.4 FL (ref 9.2–12.9)
POCT GLUCOSE: 175 MG/DL (ref 70–110)
POCT GLUCOSE: 48 MG/DL (ref 70–110)
POCT GLUCOSE: 98 MG/DL (ref 70–110)
POTASSIUM SERPL-SCNC: 3.8 MMOL/L (ref 3.5–5.1)
PROT SERPL-MCNC: 7.4 G/DL (ref 6–8.4)
RBC # BLD AUTO: 2.81 M/UL (ref 4.6–6.2)
SODIUM SERPL-SCNC: 133 MMOL/L (ref 136–145)
WBC # BLD AUTO: 9.58 K/UL (ref 3.9–12.7)

## 2024-01-22 PROCEDURE — 90935 HEMODIALYSIS ONE EVALUATION: CPT

## 2024-01-22 PROCEDURE — 80053 COMPREHEN METABOLIC PANEL: CPT | Performed by: STUDENT IN AN ORGANIZED HEALTH CARE EDUCATION/TRAINING PROGRAM

## 2024-01-22 PROCEDURE — 85025 COMPLETE CBC W/AUTO DIFF WBC: CPT | Performed by: STUDENT IN AN ORGANIZED HEALTH CARE EDUCATION/TRAINING PROGRAM

## 2024-01-22 PROCEDURE — 84100 ASSAY OF PHOSPHORUS: CPT | Performed by: STUDENT IN AN ORGANIZED HEALTH CARE EDUCATION/TRAINING PROGRAM

## 2024-01-22 PROCEDURE — 25000003 PHARM REV CODE 250: Performed by: STUDENT IN AN ORGANIZED HEALTH CARE EDUCATION/TRAINING PROGRAM

## 2024-01-22 PROCEDURE — 83735 ASSAY OF MAGNESIUM: CPT | Performed by: STUDENT IN AN ORGANIZED HEALTH CARE EDUCATION/TRAINING PROGRAM

## 2024-01-22 PROCEDURE — 63600175 PHARM REV CODE 636 W HCPCS: Mod: JZ,JG | Performed by: STUDENT IN AN ORGANIZED HEALTH CARE EDUCATION/TRAINING PROGRAM

## 2024-01-22 PROCEDURE — 36415 COLL VENOUS BLD VENIPUNCTURE: CPT | Performed by: STUDENT IN AN ORGANIZED HEALTH CARE EDUCATION/TRAINING PROGRAM

## 2024-01-22 PROCEDURE — 25000003 PHARM REV CODE 250: Performed by: INTERNAL MEDICINE

## 2024-01-22 RX ORDER — HYDROCODONE BITARTRATE AND ACETAMINOPHEN 5; 325 MG/1; MG/1
1 TABLET ORAL EVERY 8 HOURS PRN
Qty: 20 TABLET | Refills: 0 | Status: SHIPPED | OUTPATIENT
Start: 2024-01-22 | End: 2024-01-29

## 2024-01-22 RX ORDER — AMOXICILLIN AND CLAVULANATE POTASSIUM 500; 125 MG/1; MG/1
1 TABLET, FILM COATED ORAL DAILY
Qty: 4 TABLET | Refills: 0 | Status: ON HOLD | OUTPATIENT
Start: 2024-01-23 | End: 2024-02-22 | Stop reason: HOSPADM

## 2024-01-22 RX ORDER — HYDROCODONE BITARTRATE AND ACETAMINOPHEN 5; 325 MG/1; MG/1
1 TABLET ORAL EVERY 4 HOURS PRN
Status: DISCONTINUED | OUTPATIENT
Start: 2024-01-22 | End: 2024-01-22 | Stop reason: HOSPADM

## 2024-01-22 RX ADMIN — HYDROCODONE BITARTRATE AND ACETAMINOPHEN 1 TABLET: 5; 325 TABLET ORAL at 04:01

## 2024-01-22 RX ADMIN — HYDROCODONE BITARTRATE AND ACETAMINOPHEN 1 TABLET: 5; 325 TABLET ORAL at 10:01

## 2024-01-22 RX ADMIN — TAMSULOSIN HYDROCHLORIDE 0.4 MG: 0.4 CAPSULE ORAL at 09:01

## 2024-01-22 RX ADMIN — ATORVASTATIN CALCIUM 80 MG: 40 TABLET, FILM COATED ORAL at 09:01

## 2024-01-22 RX ADMIN — SERTRALINE HYDROCHLORIDE 25 MG: 25 TABLET ORAL at 09:01

## 2024-01-22 RX ADMIN — DEXTROSE MONOHYDRATE 250 ML: 100 INJECTION, SOLUTION INTRAVENOUS at 08:01

## 2024-01-22 RX ADMIN — METOPROLOL SUCCINATE 25 MG: 25 TABLET, EXTENDED RELEASE ORAL at 09:01

## 2024-01-22 RX ADMIN — PANTOPRAZOLE SODIUM 40 MG: 40 TABLET, DELAYED RELEASE ORAL at 09:01

## 2024-01-22 RX ADMIN — ALLOPURINOL 100 MG: 100 TABLET ORAL at 09:01

## 2024-01-22 RX ADMIN — AMIODARONE HYDROCHLORIDE 200 MG: 200 TABLET ORAL at 09:01

## 2024-01-22 RX ADMIN — APIXABAN 5 MG: 5 TABLET, FILM COATED ORAL at 09:01

## 2024-01-22 RX ADMIN — EPOETIN ALFA-EPBX 10000 UNITS: 10000 INJECTION, SOLUTION INTRAVENOUS; SUBCUTANEOUS at 09:01

## 2024-01-22 NOTE — PLAN OF CARE
No accepting out patient HD clinic at this time.  Pt has been denied by [facility], [facility], [facility] and [facility]. Pt has no accepting SNF at this time. CM spoke with pt's daughter, regarding dc planning. Explained pt plan to discharge home with home health. Pt's daughter stated she will provide transportation home upon dc. CM to continue to follow for dc needs.     01/22/24 1130   Discharge Reassessment   Assessment Type Discharge Planning Reassessment   Did the patient's condition or plan change since previous assessment? No   Discharge Plan discussed with: Adult children   Communicated JAIME with patient/caregiver Yes   Discharge Plan A Home Health   Discharge Plan B Home with family   Transition of Care Barriers Does not adhere to care plan;DIalysis placement issues;Transportation   Why the patient remains in the hospital Requires continued medical care   Post-Acute Status   Post-Acute Authorization Dialysis   Diaylsis Status Discharge Plan Changed   Discharge Delays (!) Post-Acute Set-up

## 2024-01-22 NOTE — PT/OT/SLP PROGRESS
Physical Therapy      Patient Name:  Mahesh Cespedes   MRN:  [id number]    Patient not seen today secondary to Dialysis. Will follow-up as able.

## 2024-01-22 NOTE — PLAN OF CARE
Problem: Device-Related Complication Risk (Hemodialysis)  Goal: Safe, Effective Therapy Delivery  Outcome: Ongoing, Progressing     Problem: Adult Inpatient Plan of Care  Goal: Plan of Care Review  Outcome: Ongoing, Progressing  Flowsheets (Taken 1/22/2024 0745)  Plan of Care Reviewed With: patient  Goal: Optimal Comfort and Wellbeing  Outcome: Ongoing, Progressing  Intervention: Monitor Pain and Promote Comfort  Flowsheets (Taken 1/22/2024 0745)  Pain Management Interventions:   pillow support provided   position adjusted   medication offered but refused     Problem: Diabetes Comorbidity  Goal: Blood Glucose Level Within Targeted Range  Outcome: Ongoing, Progressing  Intervention: Monitor and Manage Glycemia  Flowsheets (Taken 1/22/2024 0745)  Glycemic Management: blood glucose monitored     Problem: Fall Injury Risk  Goal: Absence of Fall and Fall-Related Injury  Outcome: Ongoing, Progressing

## 2024-01-22 NOTE — NURSING
Pt is complaining back pain right now, pain pill prn  offered but he said not yet. Repositioned done. Will continue to monitor.

## 2024-01-22 NOTE — NURSING
Pt screaming pain on his back. Tramadol pain pill prn offered but pt refused. Repositioned done. Foam dressing removed and applied new.

## 2024-01-22 NOTE — PLAN OF CARE
01/22/24 1130   Medicare Message   Important Message from Medicare regarding Discharge Appeal Rights Given to patient/caregiver;Explained to patient/caregiver;Other (comments)  (CM spoke with Rima Cespedes (Daughter) [phone] via phone, verbalized understanding. Copy mailed to address in chart. [phone])   Date IMM was signed 01/22/24   Time IMM was signed [id number]

## 2024-01-22 NOTE — NURSING
[facility], [facility]  Nurses Note -- 4 Eyes      1/21/2024       Skin assessed on: Q Shift      [] No Pressure Injuries Present    []Prevention Measures Documented    [x] Yes LDA  for Pressure Injury Previously documented     [] Yes New Pressure Injury Discovered   [] LDA for New Pressure Injury Added      Attending RN:  Jb Tariq RN     Second RN:  BETO Mccullough

## 2024-01-22 NOTE — PLAN OF CARE
Case Management Final Discharge Note      Discharge Disposition: Home with Daughter, per physician no home health orders at this time.     New DME ordered / company name: none    Relevant SDOH / Transition of Care Barriers:  No accepting out patient HD/SNF    Primary Caretaker and contact information: Rima Cespedes (Daughter)  [phone]     Scheduled followup appointment: Instructed patient to follow up with pcp within 1 week listed on AVS.     Referrals placed: Nephrology     Transportation: Stretcher requested.      Patient and family educated on discharge services and updated on DC plan. Bedside RN notified, patient clear to discharge from Case Management Perspective.      01/22/24 1621   Final Note   Assessment Type Final Discharge Note   Anticipated Discharge Disposition [facility]   Hospital Resources/Appts/Education Provided Provided patient/caregiver with written discharge plan information   Post-Acute Status   Post-Acute Authorization Dialysis   Diaylsis Status Discharge Plan Changed   Discharge Delays None known at this time

## 2024-01-22 NOTE — PT/OT/SLP PROGRESS
Occupational Therapy      Patient Name:  Mahesh Cespedes   MRN:  [id number]    Patient not seen today secondary to Dialysis. Will follow-up as able.    1/22/2024
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
## 2024-01-22 NOTE — NURSING
[facility] [place]es [place] -- 4 [place]      1/22/2024       Skin assessed on: Q Shift      [] No Pressure Injuries Present    []Prevention Measures Documented    [x] Yes LDA  for Pressure Injury Previously documented     [] Yes New Pressure Injury Discovered   [] LDA for New Pressure Injury Added      Attending RN:  Kelly Huertas RN     Second RN:  Jb
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
## 2024-01-22 NOTE — PROGRESS NOTES
"Pt seen while on HD    Awake alert oriented NAD    Denies Cns ent cp gi gu rheum or derm sx    CO of back pain     I/O last 3 completed shifts:  In: 360 [P.O.:360]  Out: 0   I/O this shift:  In: 120 [P.O.:120]  Out: -     Lab Results   Component Value Date    WBC 9.58 01/22/2024    HGB 7.3 (L) 01/22/2024    HCT 22.5 (L) 01/22/2024    MCV 80 (L) 01/22/2024     (H) 01/22/2024       CMP  No results for input(s): "GLU", "CALCIUM", "ALBUMIN", "PROT", "NA", "K", "CO2", "CL", "BUN", "CREATININE", "ALKPHOS", "ALT", "AST", "BILITOT" in the last 24 hours.    Lab Results   Component Value Date    CALCIUM 8.1 (L) 01/21/2024    PHOS 5.3 (H) 01/21/2024       Vitals:  Vitals:    01/21/24 2351 01/22/24 0512 01/22/24 0514 01/22/24 0733   BP: 108/60 120/66  (!) 160/72   Pulse: (!) 57 96  76   Resp: 18 18 18 18   Temp: 97.9 °F (36.6 °C) 98.4 °F (36.9 °C)  98.7 °F (37.1 °C)   TempSrc: Axillary Axillary  Oral   SpO2: 100% 98%  100%   Weight:       Height:         No JVd or thyromegaly  Lung clear to a/p  Cor RRR no gallops or rubs  Abd Soft post bowel sounds Non tender  Ext No E-C-C    A)  Esrd   Htn  Anemia of ckd  2nd hyperpth  Met acidosis    P)  Cont HD as is   Renal Diet  Home meds  Protect access  HD mwf  EPO  prn   Binders prn  Adjust all meds to the degree of renal fx  Close follow up I/O and weights  Maintain Hydration     "

## 2024-01-23 ENCOUNTER — PATIENT OUTREACH (OUTPATIENT)
Dept: ADMINISTRATIVE | Facility: CLINIC | Age: 59
End: 2024-01-23
Payer: MEDICARE

## 2024-01-23 ENCOUNTER — NURSE TRIAGE (OUTPATIENT)
Dept: ADMINISTRATIVE | Facility: CLINIC | Age: 59
End: 2024-01-23
Payer: MEDICARE

## 2024-01-23 NOTE — NURSING
AVS virtually reviewed over the telephone with patient's daughter Rima ([phone]) in its entirety with emphasis on medications, follow-up appointments and reasons to return to the ED or contact the [facility] Line.  Education complete and patient voiced understanding. All questions answered. Discharge teaching complete.

## 2024-01-23 NOTE — PLAN OF CARE
Problem: Device-Related Complication Risk (Hemodialysis)  Goal: Safe, Effective Therapy Delivery  Outcome: Adequate for Care Transition     Problem: Hemodynamic Instability (Hemodialysis)  Goal: Effective Tissue Perfusion  Outcome: Adequate for Care Transition     Problem: Infection (Hemodialysis)  Goal: Absence of Infection Signs and Symptoms  Outcome: Adequate for Care Transition     Problem: Adult Inpatient Plan of Care  Goal: Plan of Care Review  Outcome: Adequate for Care Transition  Goal: Patient-Specific Goal (Individualized)  Outcome: Adequate for Care Transition  Goal: Absence of Hospital-Acquired Illness or Injury  Outcome: Adequate for Care Transition  Goal: Optimal Comfort and Wellbeing  Outcome: Adequate for Care Transition  Goal: Readiness for Transition of Care  Outcome: Adequate for Care Transition     Problem: Diabetes Comorbidity  Goal: Blood Glucose Level Within Targeted Range  Outcome: Adequate for Care Transition     Problem: Infection  Goal: Absence of Infection Signs and Symptoms  Outcome: Adequate for Care Transition     Problem: Fall Injury Risk  Goal: Absence of Fall and Fall-Related Injury  Outcome: Adequate for Care Transition     Problem: Skin Injury Risk Increased  Goal: Skin Health and Integrity  Outcome: Adequate for Care Transition     Problem: Electrolyte Imbalance (Chronic Kidney Disease)  Goal: Electrolyte Balance  Outcome: Adequate for Care Transition     Problem: Fluid Volume Excess (Chronic Kidney Disease)  Goal: Fluid Balance  Outcome: Adequate for Care Transition     Problem: Functional Decline (Chronic Kidney Disease)  Goal: Optimal Functional Ability  Outcome: Adequate for Care Transition     Problem: Impaired Wound Healing  Goal: Optimal Wound Healing  Outcome: Adequate for Care Transition     Problem: Pain Acute  Goal: Acceptable Pain Control and Functional Ability  Outcome: Adequate for Care Transition

## 2024-01-23 NOTE — TELEPHONE ENCOUNTER
"[place]    PCP:  Dtr reports no PCP at this time    Transfer from [facility], Care Coordination for dc'd: 1/22 w/ dx: Acute hypoxemic respiratory failure.  Daughter noticed "his butt hole is blistered up, pus coming out of it, it is really bad".   Has odor.  Pt is on HD.  NT spoke with Dtr, Rima Cespedes, on pts behalf.  C/O severe rectal pain rated 10/10, blistering rash near rectum, purulent drainage/bleeding, and foul odor from wound.  Denies fever, vomiting, abdominal pain, SOB, and CP.  Per protocol, care advised is go to office now.  Since no PCP, unable to schedule appt therefore advised OD VV/UCC/ED.  Dtr VU and will talk to her Brother and they will make a decision on where they will get him seen.  Advised to call for worsening/questions/concerns.  VU.  Unable to route d/t no PCP.    Reason for Disposition   Severe rectal pain    Additional Information   Negative: Sounds like a life-threatening emergency to the triager   Negative: Injury to rectum   Negative: Patient sounds very sick or weak to the triager    Protocols used: Rectal Symptoms-A-OH    "

## 2024-01-23 NOTE — PROGRESS NOTES
C3 nurse spoke with Mahesh Cespedes, patient's daughter, Rima, for a TCC post hospital discharge follow up call. The patient has a scheduled HOSFU appointment with [facility], Dewey Young NP on 2/1/2024 at 0800.

## 2024-01-24 ENCOUNTER — HOSPITAL ENCOUNTER (EMERGENCY)
Facility: HOSPITAL | Age: 59
Discharge: HOME | End: 2024-01-24
Attending: EMERGENCY MEDICINE
Payer: MEDICARE

## 2024-01-24 VITALS
WEIGHT: 160 LBS | DIASTOLIC BLOOD PRESSURE: 64 MMHG | HEART RATE: 73 BPM | BODY MASS INDEX: 25.11 KG/M2 | RESPIRATION RATE: 21 BRPM | OXYGEN SATURATION: 99 % | SYSTOLIC BLOOD PRESSURE: 123 MMHG | HEIGHT: 67 IN | TEMPERATURE: 99 F

## 2024-01-24 DIAGNOSIS — D64.9 ANEMIA, UNSPECIFIED TYPE: ICD-10-CM

## 2024-01-24 DIAGNOSIS — N18.6 ESRD (END STAGE RENAL DISEASE): Primary | ICD-10-CM

## 2024-01-24 DIAGNOSIS — L98.429 SKIN ULCER OF SACRUM, UNSPECIFIED ULCER STAGE: ICD-10-CM

## 2024-01-24 DIAGNOSIS — Z99.2 DIALYSIS PATIENT: ICD-10-CM

## 2024-01-24 LAB
ALBUMIN SERPL BCP-MCNC: 2.3 G/DL (ref 3.5–5.2)
ALP SERPL-CCNC: 142 U/L (ref 55–135)
ALT SERPL W/O P-5'-P-CCNC: 39 U/L (ref 10–44)
ANION GAP SERPL CALC-SCNC: 14 MMOL/L (ref 8–16)
AST SERPL-CCNC: 50 U/L (ref 10–40)
BASOPHILS # BLD AUTO: 0.06 K/UL (ref 0–0.2)
BASOPHILS NFR BLD: 0.5 % (ref 0–1.9)
BILIRUB SERPL-MCNC: 0.5 MG/DL (ref 0.1–1)
BUN SERPL-MCNC: 55 MG/DL (ref 6–20)
CALCIUM SERPL-MCNC: 8.7 MG/DL (ref 8.7–10.5)
CHLORIDE SERPL-SCNC: 98 MMOL/L (ref 95–110)
CO2 SERPL-SCNC: 23 MMOL/L (ref 23–29)
CREAT SERPL-MCNC: 8.3 MG/DL (ref 0.5–1.4)
DIFFERENTIAL METHOD BLD: ABNORMAL
EOSINOPHIL # BLD AUTO: 0.1 K/UL (ref 0–0.5)
EOSINOPHIL NFR BLD: 0.4 % (ref 0–8)
ERYTHROCYTE [DISTWIDTH] IN BLOOD BY AUTOMATED COUNT: 15.8 % (ref 11.5–14.5)
EST. GFR  (NO RACE VARIABLE): 7 ML/MIN/1.73 M^2
GLUCOSE SERPL-MCNC: 113 MG/DL (ref 70–110)
HCT VFR BLD AUTO: 26.4 % (ref 40–54)
HGB BLD-MCNC: 8.4 G/DL (ref 14–18)
IMM GRANULOCYTES # BLD AUTO: 0.03 K/UL (ref 0–0.04)
IMM GRANULOCYTES NFR BLD AUTO: 0.3 % (ref 0–0.5)
LYMPHOCYTES # BLD AUTO: 1 K/UL (ref 1–4.8)
LYMPHOCYTES NFR BLD: 8.3 % (ref 18–48)
MAGNESIUM SERPL-MCNC: 2 MG/DL (ref 1.6–2.6)
MCH RBC QN AUTO: 25.8 PG (ref 27–31)
MCHC RBC AUTO-ENTMCNC: 31.8 G/DL (ref 32–36)
MCV RBC AUTO: 81 FL (ref 82–98)
MONOCYTES # BLD AUTO: 1.2 K/UL (ref 0.3–1)
MONOCYTES NFR BLD: 10.5 % (ref 4–15)
NEUTROPHILS # BLD AUTO: 9.2 K/UL (ref 1.8–7.7)
NEUTROPHILS NFR BLD: 80 % (ref 38–73)
NRBC BLD-RTO: 0 /100 WBC
PHOSPHATE SERPL-MCNC: 3.6 MG/DL (ref 2.7–4.5)
PLATELET # BLD AUTO: 601 K/UL (ref 150–450)
PMV BLD AUTO: 9.6 FL (ref 9.2–12.9)
POTASSIUM SERPL-SCNC: 4.2 MMOL/L (ref 3.5–5.1)
PROT SERPL-MCNC: 8.6 G/DL (ref 6–8.4)
RBC # BLD AUTO: 3.26 M/UL (ref 4.6–6.2)
SODIUM SERPL-SCNC: 135 MMOL/L (ref 136–145)
WBC # BLD AUTO: 11.48 K/UL (ref 3.9–12.7)

## 2024-01-24 PROCEDURE — 84100 ASSAY OF PHOSPHORUS: CPT | Performed by: EMERGENCY MEDICINE

## 2024-01-24 PROCEDURE — 83735 ASSAY OF MAGNESIUM: CPT | Performed by: EMERGENCY MEDICINE

## 2024-01-24 PROCEDURE — 85025 COMPLETE CBC W/AUTO DIFF WBC: CPT | Performed by: EMERGENCY MEDICINE

## 2024-01-24 PROCEDURE — 93005 ELECTROCARDIOGRAM TRACING: CPT

## 2024-01-24 PROCEDURE — 80053 COMPREHEN METABOLIC PANEL: CPT | Performed by: EMERGENCY MEDICINE

## 2024-01-24 PROCEDURE — 99285 EMERGENCY DEPT VISIT HI MDM: CPT | Mod: 25

## 2024-01-24 PROCEDURE — 93010 ELECTROCARDIOGRAM REPORT: CPT | Mod: ,,, | Performed by: INTERNAL MEDICINE

## 2024-01-24 NOTE — DISCHARGE INSTRUCTIONS
Please return for any chest pain, shortness of breath, confusion, fever, worsening pain, or any other concerns as he may need dialysis--I anticipate he will likely need dialysis in the next few days. He needs to be set up with a dialysis chair outpatient, I have referred you to  to help assist with this. I have also referred you to wound care to help with the pressure wound he has, please return if this appears to be infected. I have also referred you to nephrology to assist with dialysis.    Thank you for coming to our Emergency Department today. As we discussed, it is important to remember that some problems are difficult to diagnose and may not be found during your Emergency Department visit. Be sure to follow up with your primary care doctor and review all labs/imaging/tests that were performed during this visit with them. Some labs/tests may be outside of the normal range and require non-emergent follow-up and further investigation to help diagnose/exclude/prevent complications or other medical conditions.    If you do not have a primary care doctor, you may contact the one listed on your discharge paperwork or you may also call the [facility] Appointment Desk at [phone] to schedule an appointment and establish care with one. It is important to your health that you have a primary care doctor.    Please take all medications as directed. All medications may potentially have side-effects and it is impossible to predict which medications may give you side-effects or what side-effects (if any) they will give you.. If you feel that you are having a negative effect or side-effect of any medication you should immediately stop taking them and seek medical attention. If you feel that you are having a life-threatening reaction call 911.    Return to the ER with any questions/concerns, new/concerning symptoms, worsening or failure to improve.     Do not drive, swim, climb to height, take a bath or  make any important decisions for 24 hours if you have received any pain medications, sedatives or mood altering drugs during your ER visit.
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
## 2024-02-01 ENCOUNTER — OFFICE VISIT (OUTPATIENT)
Dept: HOME HEALTH SERVICES | Facility: CLINIC | Age: 59
End: 2024-02-01
Payer: MEDICARE

## 2024-02-01 DIAGNOSIS — N18.6 ESRD ON DIALYSIS: ICD-10-CM

## 2024-02-01 DIAGNOSIS — Z86.73 HISTORY OF CVA (CEREBROVASCULAR ACCIDENT): Primary | Chronic | ICD-10-CM

## 2024-02-01 DIAGNOSIS — Z99.2 ESRD ON DIALYSIS: ICD-10-CM

## 2024-02-01 PROCEDURE — 99495 TRANSJ CARE MGMT MOD F2F 14D: CPT | Mod: S$GLB,,, | Performed by: NURSE PRACTITIONER

## 2024-02-05 ENCOUNTER — HOSPITAL ENCOUNTER (INPATIENT)
Facility: HOSPITAL | Age: 59
LOS: 16 days | Discharge: NURSING, REHAB OR LONG-TERM CARE | DRG: 463 | End: 2024-02-22
Attending: EMERGENCY MEDICINE | Admitting: HOSPITALIST
Payer: MEDICARE

## 2024-02-05 VITALS
HEART RATE: 90 BPM | TEMPERATURE: 98 F | OXYGEN SATURATION: 90 % | RESPIRATION RATE: 16 BRPM | DIASTOLIC BLOOD PRESSURE: 72 MMHG | SYSTOLIC BLOOD PRESSURE: 120 MMHG

## 2024-02-05 DIAGNOSIS — D63.1 ANEMIA IN ESRD (END-STAGE RENAL DISEASE): ICD-10-CM

## 2024-02-05 DIAGNOSIS — L89.96 DECUBITUS ULCER WITH SUSPECTED DEEP TISSUE INJURY, UNSPECIFIED ULCER STAGE: ICD-10-CM

## 2024-02-05 DIAGNOSIS — N18.6 END-STAGE RENAL DISEASE ON HEMODIALYSIS: ICD-10-CM

## 2024-02-05 DIAGNOSIS — Z99.2 ESRD NEEDING DIALYSIS: ICD-10-CM

## 2024-02-05 DIAGNOSIS — R53.1 GENERALIZED WEAKNESS: ICD-10-CM

## 2024-02-05 DIAGNOSIS — N18.6 ESRD ON DIALYSIS: Chronic | ICD-10-CM

## 2024-02-05 DIAGNOSIS — Z99.2 ESRD ON DIALYSIS: Chronic | ICD-10-CM

## 2024-02-05 DIAGNOSIS — L89.150 PRESSURE INJURY OF SACRAL REGION, UNSTAGEABLE: ICD-10-CM

## 2024-02-05 DIAGNOSIS — R78.81 BACTEREMIA: ICD-10-CM

## 2024-02-05 DIAGNOSIS — Z99.2 END-STAGE RENAL DISEASE ON HEMODIALYSIS: ICD-10-CM

## 2024-02-05 DIAGNOSIS — G93.49 UREMIC ENCEPHALOPATHY: Primary | ICD-10-CM

## 2024-02-05 DIAGNOSIS — E87.5 HYPERKALEMIA: ICD-10-CM

## 2024-02-05 DIAGNOSIS — D63.8 ANEMIA OF CHRONIC DISEASE: Chronic | ICD-10-CM

## 2024-02-05 DIAGNOSIS — R07.9 CHEST PAIN: ICD-10-CM

## 2024-02-05 DIAGNOSIS — N18.6 ANEMIA IN ESRD (END-STAGE RENAL DISEASE): ICD-10-CM

## 2024-02-05 DIAGNOSIS — N19 UREMIC ENCEPHALOPATHY: Primary | ICD-10-CM

## 2024-02-05 DIAGNOSIS — N18.6 ESRD NEEDING DIALYSIS: ICD-10-CM

## 2024-02-05 PROBLEM — I48.91 A-FIB: Chronic | Status: ACTIVE | Noted: 2024-01-06

## 2024-02-05 PROBLEM — E83.39 HYPERPHOSPHATEMIA: Status: ACTIVE | Noted: 2024-02-05

## 2024-02-05 PROBLEM — R06.02 SHORTNESS OF BREATH: Status: RESOLVED | Noted: 2024-01-09 | Resolved: 2024-02-05

## 2024-02-05 LAB
ALBUMIN SERPL BCP-MCNC: 2.3 G/DL (ref 3.5–5.2)
ALP SERPL-CCNC: 125 U/L (ref 55–135)
ALT SERPL W/O P-5'-P-CCNC: 15 U/L (ref 10–44)
ANION GAP SERPL CALC-SCNC: 22 MMOL/L (ref 8–16)
AST SERPL-CCNC: 18 U/L (ref 10–40)
BASOPHILS # BLD AUTO: 0.04 K/UL (ref 0–0.2)
BASOPHILS NFR BLD: 0.3 % (ref 0–1.9)
BILIRUB SERPL-MCNC: 0.7 MG/DL (ref 0.1–1)
BUN SERPL-MCNC: 166 MG/DL (ref 6–20)
CALCIUM SERPL-MCNC: 8.4 MG/DL (ref 8.7–10.5)
CHLORIDE SERPL-SCNC: 100 MMOL/L (ref 95–110)
CO2 SERPL-SCNC: 14 MMOL/L (ref 23–29)
CREAT SERPL-MCNC: 21 MG/DL (ref 0.5–1.4)
DIFFERENTIAL METHOD BLD: ABNORMAL
EOSINOPHIL # BLD AUTO: 0 K/UL (ref 0–0.5)
EOSINOPHIL NFR BLD: 0.3 % (ref 0–8)
ERYTHROCYTE [DISTWIDTH] IN BLOOD BY AUTOMATED COUNT: 16.2 % (ref 11.5–14.5)
EST. GFR  (NO RACE VARIABLE): 2 ML/MIN/1.73 M^2
GLUCOSE SERPL-MCNC: 95 MG/DL (ref 70–110)
HCT VFR BLD AUTO: 21.5 % (ref 40–54)
HGB BLD-MCNC: 7.3 G/DL (ref 14–18)
IMM GRANULOCYTES # BLD AUTO: 0.12 K/UL (ref 0–0.04)
IMM GRANULOCYTES NFR BLD AUTO: 0.8 % (ref 0–0.5)
LYMPHOCYTES # BLD AUTO: 0.7 K/UL (ref 1–4.8)
LYMPHOCYTES NFR BLD: 4.6 % (ref 18–48)
MAGNESIUM SERPL-MCNC: 2.2 MG/DL (ref 1.6–2.6)
MCH RBC QN AUTO: 26.1 PG (ref 27–31)
MCHC RBC AUTO-ENTMCNC: 34 G/DL (ref 32–36)
MCV RBC AUTO: 77 FL (ref 82–98)
MONOCYTES # BLD AUTO: 0.5 K/UL (ref 0.3–1)
MONOCYTES NFR BLD: 3.3 % (ref 4–15)
NEUTROPHILS # BLD AUTO: 13.4 K/UL (ref 1.8–7.7)
NEUTROPHILS NFR BLD: 90.7 % (ref 38–73)
NRBC BLD-RTO: 0 /100 WBC
PHOSPHATE SERPL-MCNC: 10.7 MG/DL (ref 2.7–4.5)
PLATELET # BLD AUTO: 427 K/UL (ref 150–450)
PMV BLD AUTO: 10.8 FL (ref 9.2–12.9)
POCT GLUCOSE: 107 MG/DL (ref 70–110)
POCT GLUCOSE: 137 MG/DL (ref 70–110)
POCT GLUCOSE: 139 MG/DL (ref 70–110)
POTASSIUM SERPL-SCNC: 7.4 MMOL/L (ref 3.5–5.1)
PROT SERPL-MCNC: 8.8 G/DL (ref 6–8.4)
RBC # BLD AUTO: 2.8 M/UL (ref 4.6–6.2)
SODIUM SERPL-SCNC: 136 MMOL/L (ref 136–145)
WBC # BLD AUTO: 14.81 K/UL (ref 3.9–12.7)

## 2024-02-05 PROCEDURE — 84100 ASSAY OF PHOSPHORUS: CPT | Performed by: EMERGENCY MEDICINE

## 2024-02-05 PROCEDURE — 96365 THER/PROPH/DIAG IV INF INIT: CPT

## 2024-02-05 PROCEDURE — 93010 ELECTROCARDIOGRAM REPORT: CPT | Mod: ,,, | Performed by: INTERNAL MEDICINE

## 2024-02-05 PROCEDURE — 85025 COMPLETE CBC W/AUTO DIFF WBC: CPT | Performed by: EMERGENCY MEDICINE

## 2024-02-05 PROCEDURE — 25000003 PHARM REV CODE 250: Performed by: INTERNAL MEDICINE

## 2024-02-05 PROCEDURE — 80100014 HC HEMODIALYSIS 1:1

## 2024-02-05 PROCEDURE — 63600175 PHARM REV CODE 636 W HCPCS: Performed by: EMERGENCY MEDICINE

## 2024-02-05 PROCEDURE — 99291 CRITICAL CARE FIRST HOUR: CPT | Mod: 25

## 2024-02-05 PROCEDURE — 82962 GLUCOSE BLOOD TEST: CPT

## 2024-02-05 PROCEDURE — 25000003 PHARM REV CODE 250: Mod: JZ,JG | Performed by: EMERGENCY MEDICINE

## 2024-02-05 PROCEDURE — G0378 HOSPITAL OBSERVATION PER HR: HCPCS

## 2024-02-05 PROCEDURE — 80053 COMPREHEN METABOLIC PANEL: CPT | Performed by: EMERGENCY MEDICINE

## 2024-02-05 PROCEDURE — 83735 ASSAY OF MAGNESIUM: CPT | Performed by: EMERGENCY MEDICINE

## 2024-02-05 PROCEDURE — 96375 TX/PRO/DX INJ NEW DRUG ADDON: CPT

## 2024-02-05 PROCEDURE — 5A1D70Z PERFORMANCE OF URINARY FILTRATION, INTERMITTENT, LESS THAN 6 HOURS PER DAY: ICD-10-PCS | Performed by: INTERNAL MEDICINE

## 2024-02-05 PROCEDURE — 93005 ELECTROCARDIOGRAM TRACING: CPT

## 2024-02-05 RX ORDER — ACETAMINOPHEN 325 MG/1
650 TABLET ORAL EVERY 6 HOURS PRN
Status: DISCONTINUED | OUTPATIENT
Start: 2024-02-05 | End: 2024-02-22 | Stop reason: HOSPADM

## 2024-02-05 RX ORDER — ONDANSETRON HYDROCHLORIDE 2 MG/ML
4 INJECTION, SOLUTION INTRAVENOUS EVERY 8 HOURS PRN
Status: DISCONTINUED | OUTPATIENT
Start: 2024-02-05 | End: 2024-02-22 | Stop reason: HOSPADM

## 2024-02-05 RX ORDER — ATORVASTATIN CALCIUM 40 MG/1
80 TABLET, FILM COATED ORAL DAILY
Status: DISCONTINUED | OUTPATIENT
Start: 2024-02-06 | End: 2024-02-22 | Stop reason: HOSPADM

## 2024-02-05 RX ORDER — IPRATROPIUM BROMIDE AND ALBUTEROL SULFATE 2.5; .5 MG/3ML; MG/3ML
3 SOLUTION RESPIRATORY (INHALATION) EVERY 4 HOURS PRN
Status: DISCONTINUED | OUTPATIENT
Start: 2024-02-05 | End: 2024-02-21

## 2024-02-05 RX ORDER — BENZTROPINE MESYLATE 0.5 MG/1
0.5 TABLET ORAL EVERY 12 HOURS
Status: DISCONTINUED | OUTPATIENT
Start: 2024-02-05 | End: 2024-02-22 | Stop reason: HOSPADM

## 2024-02-05 RX ORDER — IBUPROFEN 200 MG
24 TABLET ORAL
Status: DISCONTINUED | OUTPATIENT
Start: 2024-02-05 | End: 2024-02-22 | Stop reason: HOSPADM

## 2024-02-05 RX ORDER — AMIODARONE HYDROCHLORIDE 200 MG/1
200 TABLET ORAL DAILY
Status: DISCONTINUED | OUTPATIENT
Start: 2024-02-06 | End: 2024-02-22 | Stop reason: HOSPADM

## 2024-02-05 RX ORDER — ALLOPURINOL 100 MG/1
100 TABLET ORAL DAILY
Status: DISCONTINUED | OUTPATIENT
Start: 2024-02-06 | End: 2024-02-22 | Stop reason: HOSPADM

## 2024-02-05 RX ORDER — AMLODIPINE BESYLATE 5 MG/1
10 TABLET ORAL DAILY
Status: DISCONTINUED | OUTPATIENT
Start: 2024-02-06 | End: 2024-02-07

## 2024-02-05 RX ORDER — ALUMINUM HYDROXIDE, MAGNESIUM HYDROXIDE, AND SIMETHICONE 1200; 120; 1200 MG/30ML; MG/30ML; MG/30ML
30 SUSPENSION ORAL 4 TIMES DAILY PRN
Status: DISCONTINUED | OUTPATIENT
Start: 2024-02-05 | End: 2024-02-22 | Stop reason: HOSPADM

## 2024-02-05 RX ORDER — SIMETHICONE 80 MG
1 TABLET,CHEWABLE ORAL 4 TIMES DAILY PRN
Status: DISCONTINUED | OUTPATIENT
Start: 2024-02-05 | End: 2024-02-22 | Stop reason: HOSPADM

## 2024-02-05 RX ORDER — CALCIUM GLUCONATE 20 MG/ML
1 INJECTION, SOLUTION INTRAVENOUS
Status: COMPLETED | OUTPATIENT
Start: 2024-02-05 | End: 2024-02-05

## 2024-02-05 RX ORDER — METOPROLOL SUCCINATE 50 MG/1
100 TABLET, EXTENDED RELEASE ORAL DAILY
Status: DISCONTINUED | OUTPATIENT
Start: 2024-02-06 | End: 2024-02-12

## 2024-02-05 RX ORDER — TAMSULOSIN HYDROCHLORIDE 0.4 MG/1
0.4 CAPSULE ORAL DAILY
Status: DISCONTINUED | OUTPATIENT
Start: 2024-02-06 | End: 2024-02-22 | Stop reason: HOSPADM

## 2024-02-05 RX ORDER — NALOXONE HCL 0.4 MG/ML
0.02 VIAL (ML) INJECTION
Status: DISCONTINUED | OUTPATIENT
Start: 2024-02-05 | End: 2024-02-22 | Stop reason: HOSPADM

## 2024-02-05 RX ORDER — INDOMETHACIN 25 MG/1
50 CAPSULE ORAL ONCE
Status: COMPLETED | OUTPATIENT
Start: 2024-02-05 | End: 2024-02-05

## 2024-02-05 RX ORDER — GLUCAGON 1 MG
1 KIT INJECTION
Status: DISCONTINUED | OUTPATIENT
Start: 2024-02-05 | End: 2024-02-22 | Stop reason: HOSPADM

## 2024-02-05 RX ORDER — IBUPROFEN 200 MG
16 TABLET ORAL
Status: DISCONTINUED | OUTPATIENT
Start: 2024-02-05 | End: 2024-02-22 | Stop reason: HOSPADM

## 2024-02-05 RX ORDER — SODIUM CHLORIDE 0.9 % (FLUSH) 0.9 %
10 SYRINGE (ML) INJECTION EVERY 8 HOURS PRN
Status: DISCONTINUED | OUTPATIENT
Start: 2024-02-05 | End: 2024-02-22 | Stop reason: HOSPADM

## 2024-02-05 RX ORDER — PROCHLORPERAZINE EDISYLATE 5 MG/ML
5 INJECTION INTRAMUSCULAR; INTRAVENOUS EVERY 6 HOURS PRN
Status: DISCONTINUED | OUTPATIENT
Start: 2024-02-05 | End: 2024-02-22 | Stop reason: HOSPADM

## 2024-02-05 RX ORDER — TALC
6 POWDER (GRAM) TOPICAL NIGHTLY PRN
Status: DISCONTINUED | OUTPATIENT
Start: 2024-02-05 | End: 2024-02-22 | Stop reason: HOSPADM

## 2024-02-05 RX ADMIN — SODIUM ZIRCONIUM CYCLOSILICATE 10 G: 10 POWDER, FOR SUSPENSION ORAL at 04:02

## 2024-02-05 RX ADMIN — SODIUM BICARBONATE 50 MEQ: 84 INJECTION, SOLUTION INTRAVENOUS at 05:02

## 2024-02-05 RX ADMIN — Medication 25 G: at 04:02

## 2024-02-05 RX ADMIN — CALCIUM GLUCONATE 1 G: 20 INJECTION, SOLUTION INTRAVENOUS at 04:02

## 2024-02-05 RX ADMIN — INSULIN HUMAN 5 UNITS: 100 INJECTION, SOLUTION PARENTERAL at 04:02

## 2024-02-05 NOTE — ED NOTES
per lab critical values are as followed potassium 7.4, , phos 10.7. Dr. Zhong notified via [employer] chat

## 2024-02-05 NOTE — HPI
58 y.o. male with AFib, DM2, ESRD on dialysis, hypertension presents from home with a complaint of altered mental status.  Family report he has been drowsy and fatigued with increased confusion for the past 2 days.  Has been progressively worsening.  Associated with significant peripheral edema.  Last dialysis was 2 weeks ago.  Denies fever, chills, cough, SOB, chest pain, palpitations, nausea, vomiting, diarrhea, or abdominal pain.  History is somewhat limited given the patient's drowsiness but he is awake and conversant.    In the ED, labs revealed hyperkalemia, K + 7.4.  He was given shifting medications and zirconium.  Nephrology was consulted and plan for urgent dialysis.  Labs also reveal uremia, leukocytosis, hyperphosphatemia, and metabolic acidosis.  Placed in observation to obtain dialysis.

## 2024-02-05 NOTE — ASSESSMENT & PLAN NOTE
--OPCM referral placed for transportation assistance immediately  --patient and caregiver indicates that he is missed multiple appointments due to lack of transportation; I advised the patient to reports the ED immediately and call EMS for emergent dialysis as he has missed his last 2 appointments

## 2024-02-05 NOTE — ASSESSMENT & PLAN NOTE
Patient's anemia is currently controlled. Has not received any PRBCs to date. Etiology likely d/t chronic disease due to Chronic Kidney Disease/ESRD  Current CBC reviewed-   Lab Results   Component Value Date    HGB 7.3 (L) 02/05/2024    HCT 21.5 (L) 02/05/2024     Monitor serial CBC and transfuse if patient becomes hemodynamically unstable, symptomatic or H/H drops below 7/21.
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
## 2024-02-05 NOTE — ASSESSMENT & PLAN NOTE
Patient with Paroxysmal (<7 days) atrial fibrillation which is controlled currently with Amiodarone. Patient is currently in sinus rhythm.[id number] Score: 1.  Anticoagulation indicated. Anticoagulation done with apixaban .

## 2024-02-05 NOTE — ASSESSMENT & PLAN NOTE
Chronic, controlled. Latest blood pressure and vitals reviewed-     Temp:  [98.1 °F (36.7 °C)]   Pulse:  [88-96]   Resp:  [14-22]   BP: (159-188)/(82-94)   SpO2:  [96 %-98 %] .   Home meds for hypertension were reviewed and noted below.   Hypertension Medications               amLODIPine (NORVASC) 10 MG tablet Take 10 mg by mouth once daily.    furosemide (LASIX) 20 MG tablet Take 20 mg by mouth 2 (two) times daily.    hydrALAZINE (APRESOLINE) 100 MG tablet Take 1 tablet (100 mg total) by mouth 3 (three) times daily.    metoprolol succinate (TOPROL-XL) 100 MG 24 hr tablet Take 100 mg by mouth once daily.    NIFEdipine (PROCARDIA-XL) 60 MG (OSM) 24 hr tablet Take 1 tablet (60 mg total) by mouth once daily.            While in the hospital, will manage blood pressure as follows; Continue home antihypertensive regimen    Will utilize p.r.n. blood pressure medication only if patient's blood pressure greater than 180/110 and he develops symptoms such as worsening chest pain or shortness of breath.

## 2024-02-05 NOTE — PROGRESS NOTES
[facility]  Transitional Care Management (TCM) Home Visit    Encounter Provider: Dewey Young   PCP: Cruz Hernandez MD  Consult Requested By: No ref. provider found  Admit Date: 1/24/24   IP Discharge Date: 1/24/24  Hospital Length of Stay: 1 day  Days since discharge (from IP or SNF): 8 days  [facility] Contact Note: 1/23/24  Hospital Diagnosis: No admission diagnoses are documented for this encounter.     HISTORY OF PRESENT ILLNESS      Patient ID: Mahesh Cespedes is a 58 y.o. male was recently admitted to the hospital, this is their TCM encounter.    Hospital Course Synopsis:    58M with esrd who recently came back from [place] for [place] and decided to stay, but did not set up dialysis here. Presented due to encephalopathy and respiratory distress. Pt initially requiring bipap now weaned to NC with dialysis. Nephrology following for HD. Very volume overloaded. Underwent therapeutic paracentesis 1/9 with 5L removed. May need another prior to discharge. CM working on HD outpt set up. PT/OT consulted. S/p HD yesterday with 3500 ml fluids removed yesterday, spiking fever. Blood Cx x 2 ordered and CXR Lungs are little clearer than they were on the previous exam but there is still loss of volume at the lung bases particularly on the right. Very alert, verbal, oriented x 3.     Stop O2, stop Bipap nightly, remove condom cath. Appearing depressed, would like to initiate antidepressant. Temp increase to 99.7 and mild WBC# increase, could be skin/wound infection. Will start 7 days oral Augmentin, end 1/26. Fever curve improved.      Medically ready to go. Awaiting for HD chair. Patient getting restless, wanting to leave. Unfortunately, he has been turned down by HD centers thus far due to history of non-compliance in the past. Son and daughter came up yesterday 1/21, to discuss options, understanding that we may have to discharge without a chair secured.       All facilities have denied him, per CM. Will  need to go back to company in [place] or do PRN at ED.        DECISION MAKING TODAY       Assessment & Plan:  1. History of CVA (cerebrovascular accident)    2. ESRD on dialysis  Assessment & Plan:  --OPCM referral placed for transportation assistance immediately  --patient and caregiver indicates that he is missed multiple appointments due to lack of transportation; I advised the patient to reports the ED immediately and call EMS for emergent dialysis as he has missed his last 2 appointments    Orders:  -     Ambulatory referral/consult to Outpatient Case Management  -     Ambulatory referral/consult to Nephrology; Future; Expected date: 02/12/2024         Medication List on Discharge:     Medication List            Accurate as of February 1, 2024 11:59 PM. If you have any questions, ask your nurse or doctor.                CONTINUE taking these medications      allopurinoL 100 MG tablet  Commonly known as: ZYLOPRIM  Take 100 mg by mouth once daily.     amantadine  mg capsule  Commonly known as: SYMMETREL  Take 1 capsule by mouth every other day.     amiodarone 200 MG Tab  Commonly known as: PACERONE  Take 200 mg by mouth once daily.     amLODIPine 10 MG tablet  Commonly known as: NORVASC  Take 10 mg by mouth once daily.     amoxicillin-clavulanate 500-125mg 500-125 mg Tab  Commonly known as: AUGMENTIN  Take 1 tablet (500 mg total) by mouth once daily. Take daily for 4 more days; on days you have dialysis, take this medication after you complete dialysis.     atorvastatin 80 MG tablet  Commonly known as: LIPITOR  Take 80 mg by mouth once daily.     benztropine 0.5 MG tablet  Commonly known as: COGENTIN  Take 0.5 mg by mouth every 12 (twelve) hours.     ELIQUIS 5 mg Tab  Generic drug: apixaban  Take 5 mg by mouth every 12 (twelve) hours.     ferrous sulfate 325 mg (65 mg iron) Tab tablet  Commonly known as: FEOSOL  Take 325 mg by mouth once daily at 6am.     furosemide 20 MG tablet  Commonly known as:  LASIX  Take 20 mg by mouth 2 (two) times daily.     hydrALAZINE 100 MG tablet  Commonly known as: APRESOLINE  Take 1 tablet (100 mg total) by mouth 3 (three) times daily.     metoprolol succinate 100 MG 24 hr tablet  Commonly known as: TOPROL-XL  Take 100 mg by mouth once daily.     NIFEdipine 60 MG (OSM) 24 hr tablet  Commonly known as: PROCARDIA-XL  Take 1 tablet (60 mg total) by mouth once daily.     pantoprazole 40 MG tablet  Commonly known as: PROTONIX  Take 40 mg by mouth once daily.     RENAL CAPS 1 mg Cap  Generic drug: vitamin renal formula (B-complex-vitamin c-folic acid)  Take 1 capsule by mouth once daily at 6am.     tamsulosin 0.4 mg Cap  Commonly known as: FLOMAX  Take 0.4 mg by mouth once daily.              Medication Reconciliation:  Were medications changed on discharge? Yes  Were medications in the home? Yes  Is the patient taking the medications as directed? Yes  Does the patient understand the medications and changes? Yes  Does updated med list accurately reflects meds patient is currently taking? Yes    ENVIRONMENT OF CARE      Family and/or Caregiver present at visit?  Yes  Name of Caregiver: Nancy salvador  History provided by: patient and caregiver    Advance Care Planning   Advanced Care Planning Status:  Patient does not have an ACP conversation on file  Living Will: No  Power of : No  [place]: No    Impression upon entering the home:  Physical Dwelling: apartment/condo   Appearance of home environment: cleaniness: clean  Functional Status: moderate assistance  Mobility: ambulatory with device  Nutritional access: adequate intake and access  Home Health: No, and does not need it at this time   DME/Supplies: cane     Diagnostic tests reviewed/disposition: No diagnosic tests pending after this hospitalization.  Disease/illness education:  HD and compliance  Establishment or re-establishment of referral orders for community resources: No other necessary community resources.  "  Discussion with other health care providers: No discussion with other health care providers necessary.   Does patient have a PCP at [place]? No  Repatriation plan with PCP? follow-up with PCP within 30d   Does patient have an ostomy (ileostomy, colostomy, suprapubic catheter, nephrostomy tube, tracheostomy, PEG tube, pleurex catheter, cholecystostomy, etc)? No  Were BPAs reviewed? Yes    Social History     Socioeconomic History    Marital status:    Tobacco Use    Smoking status: Never    Smokeless tobacco: Never   Substance and Sexual Activity    Alcohol use: Yes     Alcohol/week: 0.0 standard drinks of alcohol     Comment: "Holidays", unable to specify an amount    Drug use: No    Sexual activity: Not Currently   Social History Narrative    Caregiver Daughter (Rima) Son (Guevara)       OBJECTIVE:     Vital Signs:  Vitals:    02/01/24 1100   BP: 120/72   Pulse: 90   Resp: 16   Temp: 98 °F (36.7 °C)       Review of Systems   Constitutional:  Negative for activity change and appetite change.   HENT:  Negative for congestion and dental problem.    Eyes:  Negative for discharge and itching.   Respiratory:  Positive for shortness of breath (with exertion). Negative for choking and chest tightness.    Cardiovascular:  Negative for chest pain and palpitations.   Gastrointestinal:  Negative for rectal pain and vomiting.   Endocrine: Negative for cold intolerance and heat intolerance.   Genitourinary:  Negative for enuresis and flank pain.   Musculoskeletal:  Negative for myalgias and neck pain.   Skin:  Negative for color change and wound.   Allergic/Immunologic: Negative for environmental allergies and food allergies.   Neurological:  Negative for tremors and syncope.   Hematological:  Does not bruise/bleed easily.   Psychiatric/Behavioral:  Negative for decreased concentration and dysphoric mood.      Physical Exam:  Physical Exam  Vitals and nursing note reviewed.   HENT:      Head: Normocephalic.   Eyes:      " General: Lids are normal.   Cardiovascular:      Rate and Rhythm: Normal rate.   Pulmonary:      Effort: Pulmonary effort is normal.      Breath sounds: Normal breath sounds and air entry.   Abdominal:      General: There is no distension.      Palpations: Abdomen is soft.   Musculoskeletal:      Cervical back: Normal range of motion.      Right lower le+ Pitting Edema present.      Left lower le+ Pitting Edema present.   Skin:     General: Skin is warm and dry.      Comments: Vasc cath in place   Neurological:      Mental Status: He is alert and oriented to person, place, and time. Mental status is at baseline.      GCS: GCS eye subscore is 4. GCS verbal subscore is 5. GCS motor subscore is 6.   Psychiatric:         Attention and Perception: Attention normal.         Mood and Affect: Mood normal.         Speech: Speech normal.     INSTRUCTIONS FOR PATIENT:     Scheduled Follow-up, Appts Reviewed with Modifications if Needed: Yes  No future appointments.    Signature: Dewey Young NP    Transition of Care Visit:  I have reviewed and updated the history and problem list.  I have reconciled the medication list.  I have discussed the hospitalization and current medical issues, prognosis and plans with the patient/family.

## 2024-02-05 NOTE — ASSESSMENT & PLAN NOTE
Creatine stable for now. BMP reviewed- noted Estimated Creatinine Clearance: 3.5 mL/min (A) (based on SCr of 21 mg/dL (H)). according to latest data. Based on current GFR, CKD stage is end stage.  Monitor UOP and serial BMP and adjust therapy as needed. Renally dose meds. Avoid nephrotoxic medications and procedures.    Nephrology consult, plan is for urgent dialysis

## 2024-02-05 NOTE — Clinical Note
Right: Chest.   Scrubbed with Chlorhexidine/Alcohol.    Hair: N/A.  Skin prep dry before draping.  Prepped by: Teddy Bhatti, RT 2/14/2024 3:38 PM.
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
## 2024-02-05 NOTE — ADMISSIONCARE
AdmissionCare    Guideline: Renal Failure (Chronic) - OBS, Observation    Based on the indications selected for the patient, the bed status of Observation was determined to be MET    The following indications were selected as present at the time of evaluation of the patient:      - Significant metabolic or electrolyte abnormalities (eg, acidosis or hyperkalemia)    AdmissionCare documentation entered by: Nely Leos    [facility], 27th edition, Copyright © 2023 [facility], [facility] All Rights Reserved.  [id number]

## 2024-02-05 NOTE — Clinical Note
Left: Chest and Neck.   Scrubbed with Chlorhexidine/Alcohol.    Hair: N/A.  Skin prep dry before draping.  Prepped by: Dez Peck,  2/20/2024 4:13 PM.

## 2024-02-05 NOTE — ASSESSMENT & PLAN NOTE
This patient has hyperkalemia which is uncontrolled. We will monitor for arrhythmias with EKG or continuous telemetry. We will treat the hyperkalemia with Potassium Binders, Calcium gluconate, IV insulin and dextrose, and Sodium Bicarbonate. The likely etiology of the hyperkalemia is ESRD.  The patients latest potassium has been reviewed and the results are listed below  Recent Labs   Lab 02/05/24  1430   K 7.4*

## 2024-02-06 PROBLEM — L89.150 PRESSURE INJURY OF SACRAL REGION, UNSTAGEABLE: Status: ACTIVE | Noted: 2024-02-06

## 2024-02-06 PROBLEM — D63.1 ANEMIA IN ESRD (END-STAGE RENAL DISEASE): Status: ACTIVE | Noted: 2019-05-20

## 2024-02-06 PROBLEM — E87.5 HYPERKALEMIA: Status: RESOLVED | Noted: 2024-01-06 | Resolved: 2024-02-06

## 2024-02-06 PROBLEM — R50.9 FEVER: Status: RESOLVED | Noted: 2024-01-11 | Resolved: 2024-02-06

## 2024-02-06 PROBLEM — Z91.148 NONCOMPLIANCE WITH MEDICATION REGIMEN: Chronic | Status: RESOLVED | Noted: 2019-05-20 | Resolved: 2024-02-06

## 2024-02-06 PROBLEM — I48.0 PAROXYSMAL ATRIAL FIBRILLATION: Status: ACTIVE | Noted: 2024-01-06

## 2024-02-06 PROBLEM — N18.6 ANEMIA IN ESRD (END-STAGE RENAL DISEASE): Status: ACTIVE | Noted: 2019-05-20

## 2024-02-06 PROBLEM — D72.829 LEUKOCYTOSIS: Status: ACTIVE | Noted: 2024-02-06

## 2024-02-06 LAB
ALBUMIN SERPL BCP-MCNC: 2.2 G/DL (ref 3.5–5.2)
ALP SERPL-CCNC: 133 U/L (ref 55–135)
ALT SERPL W/O P-5'-P-CCNC: 14 U/L (ref 10–44)
ANION GAP SERPL CALC-SCNC: 22 MMOL/L (ref 8–16)
ASCENDING AORTA: 3.17 CM
AST SERPL-CCNC: 18 U/L (ref 10–40)
AV INDEX (PROSTH): 0.69
AV MEAN GRADIENT: 10 MMHG
AV PEAK GRADIENT: 17 MMHG
AV VALVE AREA BY VELOCITY RATIO: 2.41 CM²
AV VALVE AREA: 2.3 CM²
AV VELOCITY RATIO: 0.72
BASOPHILS # BLD AUTO: 0.05 K/UL (ref 0–0.2)
BASOPHILS NFR BLD: 0.3 % (ref 0–1.9)
BILIRUB SERPL-MCNC: 0.8 MG/DL (ref 0.1–1)
BSA FOR ECHO PROCEDURE: 1.89 M2
BUN SERPL-MCNC: 83 MG/DL (ref 6–20)
CALCIUM SERPL-MCNC: 9.1 MG/DL (ref 8.7–10.5)
CHLORIDE SERPL-SCNC: 96 MMOL/L (ref 95–110)
CO2 SERPL-SCNC: 22 MMOL/L (ref 23–29)
CREAT SERPL-MCNC: 12.7 MG/DL (ref 0.5–1.4)
CV ECHO LV RWT: 0.41 CM
DIFFERENTIAL METHOD BLD: ABNORMAL
DOP CALC AO PEAK VEL: 2.07 M/S
DOP CALC AO VTI: 45.1 CM
DOP CALC LVOT AREA: 3.3 CM2
DOP CALC LVOT DIAMETER: 2.06 CM
DOP CALC LVOT PEAK VEL: 1.5 M/S
DOP CALC LVOT STROKE VOLUME: 103.93 CM3
DOP CALCLVOT PEAK VEL VTI: 31.2 CM
E WAVE DECELERATION TIME: 179.48 MSEC
E/A RATIO: 1.46
E/E' RATIO: 14.43 M/S
ECHO LV POSTERIOR WALL: 1.1 CM (ref 0.6–1.1)
EOSINOPHIL # BLD AUTO: 0.1 K/UL (ref 0–0.5)
EOSINOPHIL NFR BLD: 0.5 % (ref 0–8)
ERYTHROCYTE [DISTWIDTH] IN BLOOD BY AUTOMATED COUNT: 16 % (ref 11.5–14.5)
EST. GFR  (NO RACE VARIABLE): 4 ML/MIN/1.73 M^2
FRACTIONAL SHORTENING: 37 % (ref 28–44)
GLUCOSE SERPL-MCNC: 73 MG/DL (ref 70–110)
HCT VFR BLD AUTO: 24.9 % (ref 40–54)
HGB BLD-MCNC: 8.2 G/DL (ref 14–18)
IMM GRANULOCYTES # BLD AUTO: 0.1 K/UL (ref 0–0.04)
IMM GRANULOCYTES NFR BLD AUTO: 0.6 % (ref 0–0.5)
INTERVENTRICULAR SEPTUM: 1.01 CM (ref 0.6–1.1)
IVC DIAMETER: 2.22 CM
IVRT: 64.7 MSEC
LA MAJOR: 7.3 CM
LA MINOR: 4.96 CM
LA WIDTH: 5 CM
LEFT ATRIUM SIZE: 5.54 CM
LEFT ATRIUM VOLUME INDEX: 74.4 ML/M2
LEFT ATRIUM VOLUME: 139.07 CM3
LEFT INTERNAL DIMENSION IN SYSTOLE: 3.39 CM (ref 2.1–4)
LEFT VENTRICLE DIASTOLIC VOLUME INDEX: 74.06 ML/M2
LEFT VENTRICLE DIASTOLIC VOLUME: 138.5 ML
LEFT VENTRICLE MASS INDEX: 117 G/M2
LEFT VENTRICLE SYSTOLIC VOLUME INDEX: 25.2 ML/M2
LEFT VENTRICLE SYSTOLIC VOLUME: 47.12 ML
LEFT VENTRICULAR INTERNAL DIMENSION IN DIASTOLE: 5.35 CM (ref 3.5–6)
LEFT VENTRICULAR MASS: 218.61 G
LV LATERAL E/E' RATIO: 14.43 M/S
LV SEPTAL E/E' RATIO: 14.43 M/S
LVOT MG: 4.72 MMHG
LVOT MV: 1.02 CM/S
LYMPHOCYTES # BLD AUTO: 0.5 K/UL (ref 1–4.8)
LYMPHOCYTES NFR BLD: 3.2 % (ref 18–48)
MAGNESIUM SERPL-MCNC: 2 MG/DL (ref 1.6–2.6)
MCH RBC QN AUTO: 26 PG (ref 27–31)
MCHC RBC AUTO-ENTMCNC: 32.9 G/DL (ref 32–36)
MCV RBC AUTO: 79 FL (ref 82–98)
MONOCYTES # BLD AUTO: 0.8 K/UL (ref 0.3–1)
MONOCYTES NFR BLD: 4.6 % (ref 4–15)
MV PEAK A VEL: 0.69 M/S
MV PEAK E VEL: 1.01 M/S
MV STENOSIS PRESSURE HALF TIME: 52.05 MS
MV VALVE AREA P 1/2 METHOD: 4.23 CM2
NEUTROPHILS # BLD AUTO: 15.3 K/UL (ref 1.8–7.7)
NEUTROPHILS NFR BLD: 90.8 % (ref 38–73)
NRBC BLD-RTO: 0 /100 WBC
PHOSPHATE SERPL-MCNC: 6.8 MG/DL (ref 2.7–4.5)
PISA TR MAX VEL: 4.37 M/S
PLATELET # BLD AUTO: 423 K/UL (ref 150–450)
PMV BLD AUTO: 9.2 FL (ref 9.2–12.9)
POTASSIUM SERPL-SCNC: 4.6 MMOL/L (ref 3.5–5.1)
PROT SERPL-MCNC: 8.7 G/DL (ref 6–8.4)
PULM VEIN S/D RATIO: 1.24
PV PEAK D VEL: 0.54 M/S
PV PEAK GRADIENT: 4 MMHG
PV PEAK S VEL: 0.67 M/S
PV PEAK VELOCITY: 1.05 M/S
RA MAJOR: 6.48 CM
RA PRESSURE ESTIMATED: 15 MMHG
RA WIDTH: 4.69 CM
RBC # BLD AUTO: 3.15 M/UL (ref 4.6–6.2)
RIGHT VENTRICULAR END-DIASTOLIC DIMENSION: 4.54 CM
RV TB RVSP: 19 MMHG
SINUS: 3.23 CM
SODIUM SERPL-SCNC: 140 MMOL/L (ref 136–145)
STJ: 2.16 CM
TDI LATERAL: 0.07 M/S
TDI SEPTAL: 0.07 M/S
TDI: 0.07 M/S
TR MAX PG: 76 MMHG
TRICUSPID ANNULAR PLANE SYSTOLIC EXCURSION: 2.13 CM
TV PEAK GRADIENT: 1 MMHG
TV REST PULMONARY ARTERY PRESSURE: 91 MMHG
WBC # BLD AUTO: 16.84 K/UL (ref 3.9–12.7)
Z-SCORE OF LEFT VENTRICULAR DIMENSION IN END DIASTOLE: 0.26
Z-SCORE OF LEFT VENTRICULAR DIMENSION IN END SYSTOLE: 0.42

## 2024-02-06 PROCEDURE — 85025 COMPLETE CBC W/AUTO DIFF WBC: CPT | Performed by: HOSPITALIST

## 2024-02-06 PROCEDURE — 20000000 HC ICU ROOM

## 2024-02-06 PROCEDURE — 25000003 PHARM REV CODE 250: Performed by: HOSPITALIST

## 2024-02-06 PROCEDURE — 36415 COLL VENOUS BLD VENIPUNCTURE: CPT | Mod: XB | Performed by: HOSPITALIST

## 2024-02-06 PROCEDURE — 87186 SC STD MICRODIL/AGAR DIL: CPT | Performed by: HOSPITALIST

## 2024-02-06 PROCEDURE — 25000003 PHARM REV CODE 250: Performed by: INTERNAL MEDICINE

## 2024-02-06 PROCEDURE — 63600175 PHARM REV CODE 636 W HCPCS: Performed by: HOSPITALIST

## 2024-02-06 PROCEDURE — 83735 ASSAY OF MAGNESIUM: CPT | Performed by: HOSPITALIST

## 2024-02-06 PROCEDURE — 87077 CULTURE AEROBIC IDENTIFY: CPT | Performed by: HOSPITALIST

## 2024-02-06 PROCEDURE — 87040 BLOOD CULTURE FOR BACTERIA: CPT | Performed by: HOSPITALIST

## 2024-02-06 PROCEDURE — 99223 1ST HOSP IP/OBS HIGH 75: CPT | Mod: ,,,

## 2024-02-06 PROCEDURE — 87154 CUL TYP ID BLD PTHGN 6+ TRGT: CPT | Performed by: HOSPITALIST

## 2024-02-06 PROCEDURE — 90935 HEMODIALYSIS ONE EVALUATION: CPT

## 2024-02-06 PROCEDURE — 80053 COMPREHEN METABOLIC PANEL: CPT | Performed by: HOSPITALIST

## 2024-02-06 PROCEDURE — 84100 ASSAY OF PHOSPHORUS: CPT | Performed by: HOSPITALIST

## 2024-02-06 PROCEDURE — 25500020 PHARM REV CODE 255: Performed by: HOSPITALIST

## 2024-02-06 PROCEDURE — 36415 COLL VENOUS BLD VENIPUNCTURE: CPT | Performed by: HOSPITALIST

## 2024-02-06 RX ORDER — SEVELAMER CARBONATE 800 MG/1
1600 TABLET, FILM COATED ORAL
Status: DISCONTINUED | OUTPATIENT
Start: 2024-02-06 | End: 2024-02-07

## 2024-02-06 RX ADMIN — SEVELAMER CARBONATE 1600 MG: 800 TABLET, FILM COATED ORAL at 04:02

## 2024-02-06 RX ADMIN — IOHEXOL 75 ML: 350 INJECTION, SOLUTION INTRAVENOUS at 06:02

## 2024-02-06 RX ADMIN — VANCOMYCIN HYDROCHLORIDE 1250 MG: 1.25 INJECTION, POWDER, LYOPHILIZED, FOR SOLUTION INTRAVENOUS at 04:02

## 2024-02-06 RX ADMIN — EPOETIN ALFA-EPBX 10000 UNITS: 10000 INJECTION, SOLUTION INTRAVENOUS; SUBCUTANEOUS at 11:02

## 2024-02-06 RX ADMIN — PIPERACILLIN AND TAZOBACTAM 4.5 G: 4; .5 INJECTION, POWDER, LYOPHILIZED, FOR SOLUTION INTRAVENOUS; PARENTERAL at 06:02
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
## 2024-02-06 NOTE — PROGRESS NOTES
[facility] Medicine  Progress Note    Patient Name: Mahesh Cespedes  MRN: [id number]  Patient Class: IP- Inpatient   Admission Date: 2/5/2024  Length of Stay: 0 days  Attending Physician: Sury Mondragon MD  Primary Care Provider: Cruz Hernandez MD        Subjective:     Principal Problem:Uremic encephalopathy        HPI:  58 y.o. male with AFib, DM2, ESRD on dialysis, hypertension presents from home with a complaint of altered mental status.  Family report he has been drowsy and fatigued with increased confusion for the past 2 days.  Has been progressively worsening.  Associated with significant peripheral edema.  Last dialysis was 2 weeks ago.  Denies fever, chills, cough, SOB, chest pain, palpitations, nausea, vomiting, diarrhea, or abdominal pain.  History is somewhat limited given the patient's drowsiness but he is awake and conversant.    In the ED, labs revealed hyperkalemia, K + 7.4.  He was given shifting medications and zirconium.  Nephrology was consulted and plan for urgent dialysis.  Labs also reveal uremia, leukocytosis, hyperphosphatemia, and metabolic acidosis.  Placed in observation to obtain dialysis.    Overview/Hospital Course:  Mr Mahesh Cespedes is a 58 y.o. man with ESRD but without outpatient dialysis set up who was admitted with uremic encephalopathy, hyperkalemia. Nephrology consulted and he received emergent dialysis. Mental status is improving.     Interval History: He is awake and alert this afternoon. He complains of pain at his sacral wound site. No other complaints. He speaks English and declined .     Review of Systems   Constitutional:  Negative for chills and fever.   Respiratory:  Negative for shortness of breath.    Cardiovascular:  Negative for chest pain.   Gastrointestinal:  Negative for abdominal pain.   Skin:  Positive for wound.     Objective:     Vital Signs (Most Recent):  Temp: 98 °F (36.7 °C) (02/06/24 1130)  Pulse: 85 (02/06/24  1330)  Resp: 17 (02/06/24 1330)  BP: (!) 157/80 (02/06/24 1330)  SpO2: 100 % (02/06/24 1330) Vital Signs (24h Range):  Temp:  [97.8 °F (36.6 °C)-98.6 °F (37 °C)] 98 °F (36.7 °C)  Pulse:  [74-89] 85  Resp:  [10-33] 17  SpO2:  [94 %-100 %] 100 %  BP: (113-188)/(64-95) 157/80     Weight: 75.3 kg (166 lb)  Body mass index is 26 kg/m².    Intake/Output Summary (Last 24 hours) at 2/6/2024 1608  Last data filed at 2/6/2024 1245  Gross per 24 hour   Intake 50 ml   Output 4509 ml   Net -4459 ml         Physical Exam  Vitals and nursing note reviewed.   Constitutional:       General: He is not in acute distress.     Appearance: He is ill-appearing. He is not toxic-appearing.   HENT:      Head: Normocephalic and atraumatic.   Neck:      Comments: R chest wall THDC in place  Cardiovascular:      Rate and Rhythm: Normal rate and regular rhythm.   Pulmonary:      Effort: Pulmonary effort is normal.      Breath sounds: Normal breath sounds.      Comments: Room air  Abdominal:      General: Bowel sounds are normal. There is distension.      Tenderness: There is no abdominal tenderness. There is no guarding or rebound.      Hernia: A hernia (umbilical, reducible) is present.   Musculoskeletal:      Right lower leg: No edema.      Left lower leg: No edema.   Skin:     General: Skin is warm and dry.      Comments: Sacral wound unstageable   Neurological:      Mental Status: He is alert.             Significant Labs: All pertinent labs within the past 24 hours have been reviewed.    Significant Imaging: I have reviewed all pertinent imaging results/findings within the past 24 hours.    Assessment/Plan:      * Uremic encephalopathy   with acute encephalopathy.  Nephrology consulted and received emergent HD. Mental status now improving.     Leukocytosis  WBC 14 on admit, worsening to 16.   - check blood cultures   - does have sacral wound- wound care consulted. Concern for purulent discharge. Check CT pelvis with contrast  - start  vanc/zosyn while awaiting results   - CBC in AM      Hyperphosphatemia  Nephrology consult, plan is for HD, resume binders with PO intake    Paroxysmal atrial fibrillation  Patient with Paroxysmal (<7 days) atrial fibrillation which is controlled currently with Amiodarone. Patient is currently in sinus rhythm.[id number] Score: 1.  Anticoagulation indicated. Anticoagulation done with apixaban .    Anemia in ESRD (end-stage renal disease)  Patient's anemia is currently controlled. Has not received any PRBCs to date. Etiology likely d/t chronic disease due to Chronic Kidney Disease/ESRD  Current CBC reviewed-   Lab Results   Component Value Date    HGB 8.2 (L) 02/06/2024    HCT 24.9 (L) 02/06/2024     Monitor serial CBC and transfuse if patient becomes hemodynamically unstable, symptomatic or H/H drops below 7/21.  - EPO started by Nephrology     Essential hypertension  Chronic, controlled. Latest blood pressure and vitals reviewed-     Temp:  [97.8 °F (36.6 °C)-98.6 °F (37 °C)]   Pulse:  [74-89]   Resp:  [10-33]   BP: (113-188)/(64-95)   SpO2:  [94 %-100 %] .   Home meds for hypertension were reviewed and noted below.   Hypertension Medications               amLODIPine (NORVASC) 10 MG tablet Take 10 mg by mouth once daily.    furosemide (LASIX) 20 MG tablet Take 20 mg by mouth 2 (two) times daily.    hydrALAZINE (APRESOLINE) 100 MG tablet Take 1 tablet (100 mg total) by mouth 3 (three) times daily.    metoprolol succinate (TOPROL-XL) 100 MG 24 hr tablet Take 100 mg by mouth once daily.    NIFEdipine (PROCARDIA-XL) 60 MG (OSM) 24 hr tablet Take 1 tablet (60 mg total) by mouth once daily.            While in the hospital, will manage blood pressure as follows; Continue home antihypertensive regimen    Will utilize p.r.n. blood pressure medication only if patient's blood pressure greater than 180/110 and he develops symptoms such as worsening chest pain or shortness of breath.    ESRD needing dialysis  Admitted with  uremic encephalopathy and hyperkalemia. Received emergent dialysis  - dialysis not able to be arranged as outpatient due to history of nonadherence.   - Nephrology consulted  - monitoring in ICU  - he is currently living in an area where he cannot have dialysis set up. Palliative consulted.     VTE Risk Mitigation (From admission, onward)           Ordered     apixaban tablet 5 mg  Every 12 hours         02/05/24 1733     IP VTE HIGH RISK PATIENT  Once         02/05/24 1733     Place sequential compression device  Until discontinued         02/05/24 1733     Reason for No Pharmacological VTE Prophylaxis  Once        Question:  Reasons:  Answer:  Already adequately anticoagulated on oral Anticoagulants    02/05/24 1733                    Discharge Planning   JAIME: 2/8/2024     Code Status: Full Code   Is the patient medically ready for discharge?:     Reason for patient still in hospital (select all that apply): Patient trending condition               Critical care time spent on the evaluation and treatment of severe organ dysfunction, review of pertinent labs and imaging studies, discussions with consulting providers and discussions with patient/family: 40 minutes.      Sury Mondragon MD  Department of Hospital Medicine   [facility] - Intensive Care

## 2024-02-06 NOTE — ASSESSMENT & PLAN NOTE
This patient has hyperkalemia which is uncontrolled. We will monitor for arrhythmias with EKG or continuous telemetry. We will treat the hyperkalemia with Potassium Binders, Calcium gluconate, IV insulin and dextrose, and Sodium Bicarbonate. The likely etiology of the hyperkalemia is ESRD.  The patients latest potassium has been reviewed and the results are listed below  Recent Labs   Lab 02/06/24  0424   K 4.6

## 2024-02-06 NOTE — ASSESSMENT & PLAN NOTE
Patient's anemia is currently controlled. Has not received any PRBCs to date. Etiology likely d/t chronic disease due to Chronic Kidney Disease/ESRD  Current CBC reviewed-   Lab Results   Component Value Date    HGB 8.2 (L) 02/06/2024    HCT 24.9 (L) 02/06/2024     Monitor serial CBC and transfuse if patient becomes hemodynamically unstable, symptomatic or H/H drops below 7/21.  - EPO started by Nephrology

## 2024-02-06 NOTE — PROGRESS NOTES
Renal Progress Note    Date of Admission:  2/5/2024 12:18 PM    Length of Stay: 0  Days    Subjective: n/a    Objective:    Current Facility-Administered Medications   Medication    acetaminophen tablet 650 mg    albuterol-ipratropium 2.5 mg-0.5 mg/3 mL nebulizer solution 3 mL    allopurinoL tablet 100 mg    aluminum-magnesium hydroxide-simethicone 200-200-20 mg/5 mL suspension 30 mL    amiodarone tablet 200 mg    amLODIPine tablet 10 mg    apixaban tablet 5 mg    atorvastatin tablet 80 mg    benztropine tablet 0.5 mg    dextrose 10% bolus 125 mL 125 mL    dextrose 10% bolus 250 mL 250 mL    glucagon (human recombinant) injection 1 mg    glucose chewable tablet 16 g    glucose chewable tablet 24 g    melatonin tablet 6 mg    metoprolol succinate (TOPROL-XL) 24 hr tablet 100 mg    naloxone 0.4 mg/mL injection 0.02 mg    ondansetron injection 4 mg    prochlorperazine injection Soln 5 mg    simethicone chewable tablet 80 mg    sodium chloride 0.9% bolus 250 mL 250 mL    sodium chloride 0.9% flush 10 mL    tamsulosin 24 hr capsule 0.4 mg    vitamin renal formula (B-complex-vitamin c-folic acid) 1 mg per capsule 1 capsule       Vitals:    02/06/24 0515 02/06/24 0530 02/06/24 0545 02/06/24 0600   BP:  (!) 161/76  (!) 150/76   Pulse: 83 84 87 83   Resp: 17 (!) 25 17 (!) 23   Temp:       TempSrc:       SpO2: 98% 100% 99% 98%   Weight:       Height:           I/O last 3 completed shifts:  In: 50 [IV Piggyback:50]  Out: 2000 [Other:2000]  No intake/output data recorded.      Physical Exam:     General: NAD  Neck: supple  Heart: RRR  Lungs: unlabored breathing  Abdomen: n/a  Limbs: n/a  Neurologic: Awake, confused      Laboratories:    Recent Labs   Lab 02/06/24  0423   WBC 16.84*   RBC 3.15*   HGB 8.2*   HCT 24.9*      MCV 79*   MCH 26.0*   MCHC 32.9       Recent Labs   Lab 02/06/24  0424   CALCIUM 9.1   ALBUMIN 2.2*   PROT 8.7*      K 4.6   CO2 22*   CL 96   BUN 83*   CREATININE 12.7*  "  ALKPHOS 133   ALT 14   AST 18   BILITOT 0.8       No results for input(s): "COLORU", "CLARITYU", "SPECGRAV", "PHUR", "PROTEINUA", "GLUCOSEU", "BILIRUBINCON", "BLOODU", "WBCU", "RBCU", "BACTERIA", "MUCUS" in the last 24 hours.    Microbiology Results (last 7 days)       ** No results found for the last 168 hours. **              Diagnostic Tests:     CXR:  Impression:       Cardiomegaly.  Increased interstitial lung markings.  Possible small pleural effusions.  Question on fluid overload and or CHF.  However, a pneumonic infectious process cannot be excluded in the appropriate clinical circumstances.      Electronically signed by: Sandie Martinez  Date: 02/05/2024           Assessment:    59 y/o male with known to me from prior encounter last month; Hx. ESRD (Moved from [facility]. no Dialysis unit to go to) admitted with:     - Hyperkalemia corrected with Dialysis last p.m.  - HAGMA  - Uncontrolled HTN  - Fluid overload  - Metabolic (uremic) encephalopathy, last HD 1/22/24 while in the Hospital (Pte. Was not accepted by any of the local Dialysis units due to Hx. Of non-compliance and was told either to try to go back to [place] or come to ED periodically as needed for HD)  - Hyperphosphatemia  - Hx. Ascites s/p Paracentesis in January  - Anemia of ckd  - HTN  - Hx. CVA  - Hypoalbuminemia           Plan:    - Dialysis today and daily (for gradual correction of severe azotemia and avoid complications like DDS)  - Epogen as needed  - Transfuse prn Hgb < 7  - Renal diet + protein supplements  - Oral PO4- binders   - Disposition and other problems per admitting                 "

## 2024-02-06 NOTE — PLAN OF CARE
[facility] - Intensive Care  Initial Discharge Assessment       Primary Care Provider: Cruz Hernandez MD  Case Management Assessment     PCP: See above  Pharm[facility]: [facility] [address]    Patient Arrived From: Home with children  Existing Help at Home: adult children    Barriers to Discharge: Unable to arrange dialysis unit    Discharge Plan:    A. Home Health   B. Home with family      Discharge planning assessment completed with assistance from patient's daughter, Rima, via telephone.  Patient lives in the home with his children and need assistance with ADLs. He has a RW to assist with mobility but has not been able to walk since his last visit to the ED.  Patient needs dialysis but has been denied by most units in the area.  Patients daughter feels patient would benefit from home health.  Patient was seen by an NP in the home on 2/1/2024 per daughter.  Daughter reported that patient has been complaining about a wound to his buttock.  SW placed a sticky note on chart to alert hospitalist of concern.  When medically cleared, patient will discharge to home with family.      Admission Diagnosis: Hyperkalemia [E87.5]  End-stage renal disease on hemodialysis [N18.6, Z99.2]  Generalized weakness [R53.1]  Chest pain [R07.9]    Admission Date: 2/5/2024  Expected Discharge Date: 2/8/2024    Transition of Care Barriers: DIalysis placement issues    Payor: MEDICARE / Plan: MEDICARE PART A & B / Product Type: Government /     Extended Emergency Contact Information  Primary Emergency Contact: Rima Cespedes  Address: [address] [place] [address] [place] [address] [facility]  Home Phone: [phone]  Mobile Phone: [phone]  Relation: Daughter    Discharge Plan A: Home Health  Discharge Plan B: Home with family      [facility]/pharmacy #[id number] - [place] - [address]  [address] [place] [address]  Phone: [phone] Fax: [phone]    [id number] - [place], [place] -  [address]  [facility] [address]  [facility] [address]  Phone: [phone] Fax: [phone]      Initial Assessment (most recent)       Adult Discharge Assessment - 02/06/24 1733          Discharge Assessment    Assessment Type Discharge Planning Assessment     Confirmed/corrected address, phone number and insurance Yes     Confirmed Demographics Correct on Facesheet     Source of Information family;health record   Daughter:  Rima via telephone    If unable to respond/provide information was family/caregiver contacted? Yes     Contact Name/Number Rima Cespedes (Daughter) [phone] (Mobile     When was your last doctors appointment? --   2/1/2024 NP visited the home.    Communicated JAIME with patient/caregiver Date not available/Unable to determine     Reason For Admission cephalopathy     People in Home child(macarena), adult     Facility Arrived From: home     Do you expect to return to your current living situation? Yes     Do you have help at home or someone to help you manage your care at home? Yes     Who are your caregiver(s) and their phone number(s)? Children:  Emergency contact:Rima Cespedes (Daughter) [phone] (Mobile)     Prior to hospitilization cognitive status: Unable to Assess     Current cognitive status: Unable to Assess     Walking or Climbing Stairs Difficulty yes     Walking or Climbing Stairs ambulation difficulty, requires equipment;ambulation difficulty, dependent   Daughter reported that patient had been able to use a walker but since last ED visit, he has been non-ambulatory.    Mobility Management RW     Dressing/Bathing Difficulty yes     Dressing/Bathing bathing difficulty, assistance 1 person     Equipment Currently Used at Home walker, rolling     Readmission within 30 days? Yes     Patient currently being followed by outpatient case management? No     Do you currently have service(s) that help you manage your care at home? No   Daughter would like home health    Do you take prescription  medications? Yes     Do you have prescription coverage? Yes     Coverage Medicare/Medicaid     Do you have any problems affording any of your prescribed medications? No     Who is going to help you get home at discharge? family     How do you get to doctors appointments? family or friend will provide     Are you on dialysis? Yes     Dialysis Name and Scheduled days Unable to locate a provider willing to accept patient.     Do you take coumadin? No     Discharge Plan A Home Health     Discharge Plan B Home with family     DME Needed Upon Discharge  other (see comments)   would benefit from PT/OT eval    Discharge Plan discussed with: Adult children     Transition of Care Barriers DIalysis placement issues        OTHER    Name(s) of People in Home Rima Cespedes (Daughter) [phone] (Mobile)

## 2024-02-06 NOTE — ASSESSMENT & PLAN NOTE
with acute encephalopathy.  Nephrology consulted and received emergent HD. Mental status now improving.

## 2024-02-06 NOTE — ED NOTES
Assumed care of pt here for complications w/ ESRD. Pt has generalized malaise and altered mental status. Pt is hyperkalemic and is awaiting bed assignment in ICU and hemodialysis in ICU. Pt awakes to verbal and seems very sleepy. Pt can follow commands. Pt has condom catheter for urine but does not urinate much anymore. Dialysis access to R chest. Pt has skin tear to sacrum towards R.

## 2024-02-06 NOTE — ASSESSMENT & PLAN NOTE
Chronic, controlled. Latest blood pressure and vitals reviewed-     Temp:  [97.8 °F (36.6 °C)-98.6 °F (37 °C)]   Pulse:  [74-89]   Resp:  [10-33]   BP: (113-188)/(64-95)   SpO2:  [94 %-100 %] .   Home meds for hypertension were reviewed and noted below.   Hypertension Medications               amLODIPine (NORVASC) 10 MG tablet Take 10 mg by mouth once daily.    furosemide (LASIX) 20 MG tablet Take 20 mg by mouth 2 (two) times daily.    hydrALAZINE (APRESOLINE) 100 MG tablet Take 1 tablet (100 mg total) by mouth 3 (three) times daily.    metoprolol succinate (TOPROL-XL) 100 MG 24 hr tablet Take 100 mg by mouth once daily.    NIFEdipine (PROCARDIA-XL) 60 MG (OSM) 24 hr tablet Take 1 tablet (60 mg total) by mouth once daily.            While in the hospital, will manage blood pressure as follows; Continue home antihypertensive regimen    Will utilize p.r.n. blood pressure medication only if patient's blood pressure greater than 180/110 and he develops symptoms such as worsening chest pain or shortness of breath.

## 2024-02-06 NOTE — NURSING
[facility] [place]  Nurses Note -- 4 Eyes      2/5/2024       Skin assessed on: Admit      [] No Pressure Injuries Present    []Prevention Measures Documented    [x] Yes LDA  for Pressure Injury Previously documented     [] Yes New Pressure Injury Discovered   [] LDA for New Pressure Injury Added      Attending RN: BETO Rhodes    Second RN:  BETO Sellers

## 2024-02-06 NOTE — ASSESSMENT & PLAN NOTE
WBC 14 on admit, worsening to 16.   - check blood cultures   - does have sacral wound- wound care consulted  - CBC in AM  - hold on antibiotics for now

## 2024-02-06 NOTE — PROGRESS NOTES
"Pharmacokinetic Initial Assessment: IV Vancomycin    Assessment/Plan:    Initiate intravenous vancomycin with loading dose of 1250 mg once with subsequent doses when random concentrations are less than 20 mcg/mL  Desired empiric serum trough concentration is 10 to 20 mcg/mL  Draw vancomycin level on 2/7/24 at 03:00  Pharmacy will continue to follow and monitor vancomycin.      Please contact pharmacy at extension [phone] with any questions regarding this assessment.     Thank you for the consult,   Alejandra Hernandez       Patient brief summary:  Mahesh Cespedes is a 58 y.o. male initiated on antimicrobial therapy with IV Vancomycin for treatment of suspected skin & soft tissue infection    Drug Allergies:   Review of patient's allergies indicates:  No Known Allergies    Actual Body Weight:   75 kg    Renal Function:   Estimated Creatinine Clearance: 5.8 mL/min (A) (based on SCr of 12.7 mg/dL (H)).,     Dialysis Method (if applicable):  intermittent HD    CBC (last 72 hours):  Recent Labs   Lab Result Units 02/05/24  1309 02/06/24  0423   WBC K/uL 14.81* 16.84*   Hemoglobin g/dL 7.3* 8.2*   Hematocrit % 21.5* 24.9*   Platelets K/uL 427 423   Gran % % 90.7* 90.8*   Lymph % % 4.6* 3.2*   Mono % % 3.3* 4.6   Eosinophil % % 0.3 0.5   Basophil % % 0.3 0.3   Differential Method  Automated Automated       Metabolic Panel (last 72 hours):  Recent Labs   Lab Result Units 02/05/24  1430 02/06/24  0424   Sodium mmol/L 136 140   Potassium mmol/L 7.4* 4.6   Chloride mmol/L 100 96   CO2 mmol/L 14* 22*   Glucose mg/dL 95 73   BUN mg/dL 166* 83*   Creatinine mg/dL 21.0* 12.7*   Albumin g/dL 2.3* 2.2*   Total Bilirubin mg/dL 0.7 0.8   Alkaline Phosphatase U/L 125 133   AST U/L 18 18   ALT U/L 15 14   Magnesium mg/dL 2.2 2.0   Phosphorus mg/dL 10.7* 6.8*       Drug levels (last 3 results):  No results for input(s): "VANCOMYCINRA", "VANCORANDOM", "VANCOMYCINPE", "VANCOPEAK", "VANCOMYCINTR", "VANCOTROUGH" in the last 72 hours.    Microbiologic " Results:  Microbiology Results (last 7 days)       Procedure Component Value Units Date/Time    Blood culture [[id number]] Collected: 02/06/24 0841    Order Status: Completed Specimen: Blood from Forearm, Left Updated: 02/06/24 1512     Blood Culture, Routine No Growth to date    Blood culture [[id number]] Collected: 02/06/24 0841    Order Status: Completed Specimen: Blood from Hand, Right Updated: 02/06/24 1512     Blood Culture, Routine No Growth to date

## 2024-02-06 NOTE — SUBJECTIVE & OBJECTIVE
Interval History: He is awake and alert this afternoon. He complains of pain at his sacral wound site. No other complaints. He speaks English and declined .     Review of Systems   Constitutional:  Negative for chills and fever.   Respiratory:  Negative for shortness of breath.    Cardiovascular:  Negative for chest pain.   Gastrointestinal:  Negative for abdominal pain.   Skin:  Positive for wound.     Objective:     Vital Signs (Most Recent):  Temp: 98 °F (36.7 °C) (02/06/24 1130)  Pulse: 85 (02/06/24 1330)  Resp: 17 (02/06/24 1330)  BP: (!) 157/80 (02/06/24 1330)  SpO2: 100 % (02/06/24 1330) Vital Signs (24h Range):  Temp:  [97.8 °F (36.6 °C)-98.6 °F (37 °C)] 98 °F (36.7 °C)  Pulse:  [74-89] 85  Resp:  [10-33] 17  SpO2:  [94 %-100 %] 100 %  BP: (113-188)/(64-95) 157/80     Weight: 75.3 kg (166 lb)  Body mass index is 26 kg/m².    Intake/Output Summary (Last 24 hours) at 2/6/2024 1608  Last data filed at 2/6/2024 1245  Gross per 24 hour   Intake 50 ml   Output 4509 ml   Net -4459 ml         Physical Exam  Vitals and nursing note reviewed.   Constitutional:       General: He is not in acute distress.     Appearance: He is ill-appearing. He is not toxic-appearing.   HENT:      Head: Normocephalic and atraumatic.   Neck:      Comments: R chest wall THDC in place  Cardiovascular:      Rate and Rhythm: Normal rate and regular rhythm.   Pulmonary:      Effort: Pulmonary effort is normal.      Breath sounds: Normal breath sounds.      Comments: Room air  Abdominal:      General: Bowel sounds are normal. There is distension.      Tenderness: There is no abdominal tenderness. There is no guarding or rebound.      Hernia: A hernia (umbilical, reducible) is present.   Musculoskeletal:      Right lower leg: No edema.      Left lower leg: No edema.   Skin:     General: Skin is warm and dry.      Comments: Sacral wound unstageable   Neurological:      Mental Status: He is alert.             Significant Labs: All  pertinent labs within the past 24 hours have been reviewed.    Significant Imaging: I have reviewed all pertinent imaging results/findings within the past 24 hours.

## 2024-02-06 NOTE — PLAN OF CARE
Pt initially drowsy this AM and aroused to voice. PT now awake and alert Oriented Xs 3 on RA. Pt had dialysis this shift. Pt taken to CT. Spoke with Dr. Melchor about orders for contrast with CT MD ok for contrast. Pt diet advanced tolerating with no issues. Wound care done per Wound care RN. NS on monitor BP stable and afebrile. No falls, injuries, or new skin breakdown. Pt turned Q 2 hours.     Problem: Skin Injury Risk Increased  Goal: Skin Health and Integrity  Outcome: Ongoing, Not Progressing     Problem: Device-Related Complication Risk (Hemodialysis)  Goal: Safe, Effective Therapy Delivery  Outcome: Ongoing, Not Progressing     Problem: Hemodynamic Instability (Hemodialysis)  Goal: Effective Tissue Perfusion  Outcome: Ongoing, Not Progressing     Problem: Infection (Hemodialysis)  Goal: Absence of Infection Signs and Symptoms  Outcome: Ongoing, Not Progressing     Problem: Infection  Goal: Absence of Infection Signs and Symptoms  Outcome: Ongoing, Not Progressing     Problem: Diabetes Comorbidity  Goal: Blood Glucose Level Within Targeted Range  Outcome: Ongoing, Not Progressing     Problem: Impaired Wound Healing  Goal: Optimal Wound Healing  Outcome: Ongoing, Not Progressing

## 2024-02-06 NOTE — ASSESSMENT & PLAN NOTE
Patient with Paroxysmal (<7 days) atrial fibrillation which is controlled currently with Amiodarone. Patient is currently in sinus rhythm.[id number] Score: 1.  Anticoagulation indicated. Anticoagulation done with apixaban .
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
## 2024-02-06 NOTE — HOSPITAL COURSE
58 y.o. man with ESRD but without outpatient dialysis set up who was admitted with uremic encephalopathy, hyperkalemia. Nephrology consulted and he received emergent dialysis. Mental status is improving. He has sacral wound with abscess. Started antibiotics. Blood cultures with gram variable rods. General surgery planning OR debridement on 2/8/24. S/P debridement with bone biopsy.  Morganella bacteremia.  Bone cultures also growing Morganella and Klebsiella.  ID consulted.  Recommending tunneled HD catheter removal done by vascular on 2/16 after failed attempt by IR. Plan for line holiday until 2/20.  IR consulted for replacement. .  Previous HD catheter tip culture revealed Staph epidermidis and Staph haemolyticus, oxacillin sensitive.  Repeat blood cultures negative.  Trialysis catheter removed.  ID recommended to continue cefepime for 5 more weeks to complete total 6 weeks off antibiotics with HD.  Continue further hemodialysis per renal.  Accepted at [facility] facility.  To follow up with Infectious Disease at [facility] for antibiotic management

## 2024-02-06 NOTE — ASSESSMENT & PLAN NOTE
Admitted with uremic encephalopathy and hyperkalemia. Received emergent dialysis  - dialysis not able to be arranged as outpatient due to history of nonadherence.   - Nephrology consulted  - monitoring in ICU
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
## 2024-02-07 ENCOUNTER — ANESTHESIA EVENT (OUTPATIENT)
Dept: SURGERY | Facility: HOSPITAL | Age: 59
DRG: 463 | End: 2024-02-07
Payer: MEDICARE

## 2024-02-07 PROBLEM — Z71.89 GOALS OF CARE, COUNSELING/DISCUSSION: Status: ACTIVE | Noted: 2022-03-15

## 2024-02-07 PROBLEM — L02.31 GLUTEAL ABSCESS: Status: ACTIVE | Noted: 2024-02-06

## 2024-02-07 PROBLEM — R78.81 POSITIVE BLOOD CULTURE: Status: ACTIVE | Noted: 2024-02-07

## 2024-02-07 LAB
ACINETOBACTER CALCOACETICUS/BAUMANNII COMPLEX: NOT DETECTED
ALBUMIN SERPL BCP-MCNC: 1.9 G/DL (ref 3.5–5.2)
ALP SERPL-CCNC: 118 U/L (ref 55–135)
ALT SERPL W/O P-5'-P-CCNC: 13 U/L (ref 10–44)
ANION GAP SERPL CALC-SCNC: 12 MMOL/L (ref 8–16)
AST SERPL-CCNC: 15 U/L (ref 10–40)
BACTEROIDES FRAGILIS: NOT DETECTED
BASOPHILS # BLD AUTO: 0.04 K/UL (ref 0–0.2)
BASOPHILS NFR BLD: 0.3 % (ref 0–1.9)
BILIRUB SERPL-MCNC: 0.6 MG/DL (ref 0.1–1)
BUN SERPL-MCNC: 55 MG/DL (ref 6–20)
CALCIUM SERPL-MCNC: 8.4 MG/DL (ref 8.7–10.5)
CANDIDA ALBICANS: NOT DETECTED
CANDIDA AURIS: NOT DETECTED
CANDIDA GLABRATA: NOT DETECTED
CANDIDA KRUSEI: NOT DETECTED
CANDIDA PARAPSILOSIS: NOT DETECTED
CANDIDA TROPICALIS: NOT DETECTED
CHLORIDE SERPL-SCNC: 100 MMOL/L (ref 95–110)
CO2 SERPL-SCNC: 24 MMOL/L (ref 23–29)
CREAT SERPL-MCNC: 9 MG/DL (ref 0.5–1.4)
CRYPTOCOCCUS NEOFORMANS/GATTII: NOT DETECTED
CTX-M GENE (ESBL PRODUCER): NOT DETECTED
DIFFERENTIAL METHOD BLD: ABNORMAL
ENTEROBACTER CLOACAE COMPLEX: NOT DETECTED
ENTEROBACTERALES: DETECTED
ENTEROCOCCUS FAECALIS: NOT DETECTED
ENTEROCOCCUS FAECIUM: NOT DETECTED
EOSINOPHIL # BLD AUTO: 0.3 K/UL (ref 0–0.5)
EOSINOPHIL NFR BLD: 1.6 % (ref 0–8)
ERYTHROCYTE [DISTWIDTH] IN BLOOD BY AUTOMATED COUNT: 16.2 % (ref 11.5–14.5)
ESCHERICHIA COLI: NOT DETECTED
EST. GFR  (NO RACE VARIABLE): 6 ML/MIN/1.73 M^2
FERRITIN SERPL-MCNC: 892 NG/ML (ref 20–300)
GLUCOSE SERPL-MCNC: 92 MG/DL (ref 70–110)
HAEMOPHILUS INFLUENZAE: NOT DETECTED
HBV SURFACE AG SERPL QL IA: NORMAL
HCT VFR BLD AUTO: 22 % (ref 40–54)
HGB BLD-MCNC: 7.3 G/DL (ref 14–18)
IMM GRANULOCYTES # BLD AUTO: 0.13 K/UL (ref 0–0.04)
IMM GRANULOCYTES NFR BLD AUTO: 0.8 % (ref 0–0.5)
IMP GENE (CARBAPENEM RESISTANT): NOT DETECTED
IRON SERPL-MCNC: 12 UG/DL (ref 45–160)
KLEBSIELLA AEROGENES: NOT DETECTED
KLEBSIELLA OXYTOCA: NOT DETECTED
KLEBSIELLA PNEUMONIAE GROUP: NOT DETECTED
KPC RESISTANCE GENE (CARBAPENEM): NOT DETECTED
LISTERIA MONOCYTOGENES: NOT DETECTED
LYMPHOCYTES # BLD AUTO: 0.6 K/UL (ref 1–4.8)
LYMPHOCYTES NFR BLD: 4 % (ref 18–48)
MAGNESIUM SERPL-MCNC: 1.8 MG/DL (ref 1.6–2.6)
MCH RBC QN AUTO: 25.8 PG (ref 27–31)
MCHC RBC AUTO-ENTMCNC: 33.2 G/DL (ref 32–36)
MCR-1: ABNORMAL
MCV RBC AUTO: 78 FL (ref 82–98)
MEC A/C AND MREJ (MRSA): ABNORMAL
MEC A/C: ABNORMAL
MONOCYTES # BLD AUTO: 1.1 K/UL (ref 0.3–1)
MONOCYTES NFR BLD: 6.8 % (ref 4–15)
NDM GENE (CARBAPENEM RESISTANT): NOT DETECTED
NEISSERIA MENINGITIDIS: NOT DETECTED
NEUTROPHILS # BLD AUTO: 13.8 K/UL (ref 1.8–7.7)
NEUTROPHILS NFR BLD: 86.5 % (ref 38–73)
NRBC BLD-RTO: 0 /100 WBC
OHS QRS DURATION: 86 MS
OHS QRS DURATION: 88 MS
OHS QTC CALCULATION: 416 MS
OHS QTC CALCULATION: 452 MS
OXA-48-LIKE (CARBAPENEM RESISTANT): NOT DETECTED
PHOSPHATE SERPL-MCNC: 4.9 MG/DL (ref 2.7–4.5)
PLATELET # BLD AUTO: 378 K/UL (ref 150–450)
PMV BLD AUTO: 9 FL (ref 9.2–12.9)
POTASSIUM SERPL-SCNC: 4.5 MMOL/L (ref 3.5–5.1)
PROT SERPL-MCNC: 7.5 G/DL (ref 6–8.4)
PROTEUS SPECIES: NOT DETECTED
PSEUDOMONAS AERUGINOSA: NOT DETECTED
PTH-INTACT SERPL-MCNC: 482.6 PG/ML (ref 9–77)
RBC # BLD AUTO: 2.83 M/UL (ref 4.6–6.2)
SALMONELLA SP: NOT DETECTED
SATURATED IRON: 8 % (ref 20–50)
SERRATIA MARCESCENS: NOT DETECTED
SODIUM SERPL-SCNC: 136 MMOL/L (ref 136–145)
STAPHYLOCOCCUS AUREUS: NOT DETECTED
STAPHYLOCOCCUS EPIDERMIDIS: NOT DETECTED
STAPHYLOCOCCUS LUGDUNESIS: NOT DETECTED
STAPHYLOCOCCUS SPECIES: NOT DETECTED
STENOTROPHOMONAS MALTOPHILIA: NOT DETECTED
STREPTOCOCCUS AGALACTIAE: NOT DETECTED
STREPTOCOCCUS PNEUMONIAE: NOT DETECTED
STREPTOCOCCUS PYOGENES: NOT DETECTED
STREPTOCOCCUS SPECIES: NOT DETECTED
TOTAL IRON BINDING CAPACITY: 160 UG/DL (ref 250–450)
TRANSFERRIN SERPL-MCNC: 108 MG/DL (ref 200–375)
VAN A/B (VRE GENE): ABNORMAL
VANCOMYCIN SERPL-MCNC: 17.8 UG/ML
VIM GENE (CARBAPENEM RESISTANT): NOT DETECTED
WBC # BLD AUTO: 15.92 K/UL (ref 3.9–12.7)

## 2024-02-07 PROCEDURE — 63600175 PHARM REV CODE 636 W HCPCS: Performed by: SURGERY

## 2024-02-07 PROCEDURE — 84100 ASSAY OF PHOSPHORUS: CPT | Performed by: HOSPITALIST

## 2024-02-07 PROCEDURE — 87340 HEPATITIS B SURFACE AG IA: CPT | Performed by: INTERNAL MEDICINE

## 2024-02-07 PROCEDURE — 83970 ASSAY OF PARATHORMONE: CPT | Performed by: INTERNAL MEDICINE

## 2024-02-07 PROCEDURE — 36415 COLL VENOUS BLD VENIPUNCTURE: CPT | Performed by: INTERNAL MEDICINE

## 2024-02-07 PROCEDURE — 83540 ASSAY OF IRON: CPT | Performed by: INTERNAL MEDICINE

## 2024-02-07 PROCEDURE — 85025 COMPLETE CBC W/AUTO DIFF WBC: CPT | Performed by: HOSPITALIST

## 2024-02-07 PROCEDURE — 25000003 PHARM REV CODE 250: Performed by: HOSPITALIST

## 2024-02-07 PROCEDURE — 80202 ASSAY OF VANCOMYCIN: CPT | Performed by: HOSPITALIST

## 2024-02-07 PROCEDURE — 87040 BLOOD CULTURE FOR BACTERIA: CPT | Mod: 59 | Performed by: HOSPITALIST

## 2024-02-07 PROCEDURE — 80053 COMPREHEN METABOLIC PANEL: CPT | Performed by: HOSPITALIST

## 2024-02-07 PROCEDURE — 83735 ASSAY OF MAGNESIUM: CPT | Performed by: HOSPITALIST

## 2024-02-07 PROCEDURE — 82728 ASSAY OF FERRITIN: CPT | Performed by: INTERNAL MEDICINE

## 2024-02-07 PROCEDURE — 25000003 PHARM REV CODE 250: Performed by: INTERNAL MEDICINE

## 2024-02-07 PROCEDURE — 63600175 PHARM REV CODE 636 W HCPCS: Performed by: HOSPITALIST

## 2024-02-07 PROCEDURE — 99223 1ST HOSP IP/OBS HIGH 75: CPT | Mod: ,,, | Performed by: REGISTERED NURSE

## 2024-02-07 PROCEDURE — 63600175 PHARM REV CODE 636 W HCPCS: Performed by: INTERNAL MEDICINE

## 2024-02-07 PROCEDURE — 99497 ADVNCD CARE PLAN 30 MIN: CPT | Mod: 25,,, | Performed by: REGISTERED NURSE

## 2024-02-07 PROCEDURE — 86706 HEP B SURFACE ANTIBODY: CPT | Performed by: INTERNAL MEDICINE

## 2024-02-07 PROCEDURE — 80100014 HC HEMODIALYSIS 1:1

## 2024-02-07 PROCEDURE — 36415 COLL VENOUS BLD VENIPUNCTURE: CPT | Mod: XB | Performed by: HOSPITALIST

## 2024-02-07 PROCEDURE — 94760 N-INVAS EAR/PLS OXIMETRY 1: CPT

## 2024-02-07 PROCEDURE — 11000001 HC ACUTE MED/SURG PRIVATE ROOM

## 2024-02-07 PROCEDURE — 99222 1ST HOSP IP/OBS MODERATE 55: CPT | Mod: ,,, | Performed by: SURGERY

## 2024-02-07 RX ORDER — MUPIROCIN 20 MG/G
OINTMENT TOPICAL 2 TIMES DAILY
Status: DISPENSED | OUTPATIENT
Start: 2024-02-07 | End: 2024-02-12

## 2024-02-07 RX ORDER — HYDROMORPHONE HYDROCHLORIDE 1 MG/ML
0.2 INJECTION, SOLUTION INTRAMUSCULAR; INTRAVENOUS; SUBCUTANEOUS EVERY 6 HOURS PRN
Status: DISCONTINUED | OUTPATIENT
Start: 2024-02-07 | End: 2024-02-07

## 2024-02-07 RX ORDER — SEVELAMER CARBONATE 800 MG/1
800 TABLET, FILM COATED ORAL
Status: DISCONTINUED | OUTPATIENT
Start: 2024-02-07 | End: 2024-02-12

## 2024-02-07 RX ORDER — AMLODIPINE BESYLATE 5 MG/1
10 TABLET ORAL NIGHTLY
Status: DISCONTINUED | OUTPATIENT
Start: 2024-02-07 | End: 2024-02-12

## 2024-02-07 RX ORDER — HYDROMORPHONE HYDROCHLORIDE 1 MG/ML
0.2 INJECTION, SOLUTION INTRAMUSCULAR; INTRAVENOUS; SUBCUTANEOUS EVERY 6 HOURS PRN
Status: DISCONTINUED | OUTPATIENT
Start: 2024-02-07 | End: 2024-02-08

## 2024-02-07 RX ADMIN — IRON SUCROSE 100 MG: 20 INJECTION, SOLUTION INTRAVENOUS at 10:02

## 2024-02-07 RX ADMIN — NEPHROCAP 1 CAPSULE: 1 CAP ORAL at 08:02

## 2024-02-07 RX ADMIN — ATORVASTATIN CALCIUM 80 MG: 40 TABLET, FILM COATED ORAL at 08:02

## 2024-02-07 RX ADMIN — BENZTROPINE MESYLATE 0.5 MG: 0.5 TABLET ORAL at 08:02

## 2024-02-07 RX ADMIN — TAMSULOSIN HYDROCHLORIDE 0.4 MG: 0.4 CAPSULE ORAL at 08:02

## 2024-02-07 RX ADMIN — APIXABAN 5 MG: 5 TABLET, FILM COATED ORAL at 09:02

## 2024-02-07 RX ADMIN — ALLOPURINOL 100 MG: 100 TABLET ORAL at 08:02

## 2024-02-07 RX ADMIN — HYDROMORPHONE HYDROCHLORIDE 0.2 MG: 1 INJECTION, SOLUTION INTRAMUSCULAR; INTRAVENOUS; SUBCUTANEOUS at 09:02

## 2024-02-07 RX ADMIN — BENZTROPINE MESYLATE 0.5 MG: 0.5 TABLET ORAL at 09:02

## 2024-02-07 RX ADMIN — AMIODARONE HYDROCHLORIDE 200 MG: 200 TABLET ORAL at 08:02

## 2024-02-07 RX ADMIN — PIPERACILLIN AND TAZOBACTAM 4.5 G: 4; .5 INJECTION, POWDER, LYOPHILIZED, FOR SOLUTION INTRAVENOUS; PARENTERAL at 05:02

## 2024-02-07 RX ADMIN — AMLODIPINE BESYLATE 10 MG: 5 TABLET ORAL at 10:02

## 2024-02-07 RX ADMIN — HYDROMORPHONE HYDROCHLORIDE 0.2 MG: 1 INJECTION, SOLUTION INTRAMUSCULAR; INTRAVENOUS; SUBCUTANEOUS at 05:02

## 2024-02-07 RX ADMIN — METOPROLOL SUCCINATE 100 MG: 50 TABLET, EXTENDED RELEASE ORAL at 08:02

## 2024-02-07 RX ADMIN — VANCOMYCIN HYDROCHLORIDE 500 MG: 500 INJECTION, POWDER, LYOPHILIZED, FOR SOLUTION INTRAVENOUS at 04:02

## 2024-02-07 RX ADMIN — SEVELAMER CARBONATE 800 MG: 800 TABLET, FILM COATED ORAL at 04:02

## 2024-02-07 RX ADMIN — APIXABAN 5 MG: 5 TABLET, FILM COATED ORAL at 08:02

## 2024-02-07 RX ADMIN — HYDROMORPHONE HYDROCHLORIDE 0.2 MG: 1 INJECTION, SOLUTION INTRAMUSCULAR; INTRAVENOUS; SUBCUTANEOUS at 03:02

## 2024-02-07 NOTE — PROVIDER TRANSFER
Mr Mahesh Cespedes is a 58 y.o. man with ESRD but without outpatient dialysis set up who was admitted with uremic encephalopathy, hyperkalemia. Nephrology consulted and he received emergent dialysis. Mental status is improving. He has sacral wound with abscess. Started antibiotics. Blood cultures with gram variable rods. General surgery planning OR debridement on 2/7/24.      To do  - plan OR tomorrow  - pending blood culture results, he may or may not need THDC exchanged   - social work will work again to try to find outpatient dialysis. He was not accepted to any facilities last admission due to nonadherence. Palliative consulted    Sury Mondragon MD  02/07/2024 2:17 PM

## 2024-02-07 NOTE — EICU
eICU Physician Virtual/Remote Brief Evaluation Note      Telephone call bedside RN   Positive blood culture in anaerobic bottle at approximately 12:00 p.m.  G variable rods  Chart reviewed   On vancomycin and Zosyn   Continue current antibiotics pending final culture results and sensitivities      YO Rhoades MD  eICU Attending  [phone]    This report has been created through the use of [employer] dictation software. Typographical and content errors may occur with this process. While efforts are made to detect and correct such errors, in some cases errors will persist. For this reason, wording in this document should be considered in the proper context and not strictly verbatim

## 2024-02-07 NOTE — ASSESSMENT & PLAN NOTE
Blood culture 2/6/24 with gram variable rods.   - will choose to treat given presence of gluteal abscess   - follow up cultures

## 2024-02-07 NOTE — HPI
Mr. Cespedes is a 58 yoM with PMH including Afib on Eliquis, DM2, ESRD on dialysis, hypertension, and CVA now bed bound secondary to diffuse weakness (sensation intact). He was admitted for altered mental status secondary to uremic encephalopathy. He recently moved back from [place] and apparently did not have outpatient HD scheduled. Last HD session was 2 weeks ago. Following HD, he became more responsive yesterday and began endorsing pain involving the sacral area. He has a known sacral pressure injury. CT scan was ordered yesterday which revealed a fluid collection deep to the sacral pressure injury with extension into the superior left gluteal region. General Surgery consulted for potential drainage. He remains only oriented to person when I saw him this morning.

## 2024-02-07 NOTE — ASSESSMENT & PLAN NOTE
58 yoM with infected sacral pressure injury with underlying fluid collection. Patient disoriented. I spoke with his daughter and discussed the indication for drainage of the abscess and debridement of devitalized tissue, as well as the risks and benefits, and consent was obtained over the phone. Given sensation is intact, we will proceed with debridement in the OR tomorrow.    - NPO at midnight  - Consent in the paper chart  - OR tomorrow, likely late morning or early afternoon  - Ok to continue Eliquis  - Continue IV abx  - Other care per primary

## 2024-02-07 NOTE — ASSESSMENT & PLAN NOTE
Patient's anemia is currently controlled. Has not received any PRBCs to date. Etiology likely d/t chronic disease due to Chronic Kidney Disease/ESRD  Current CBC reviewed-   Lab Results   Component Value Date    HGB 7.3 (L) 02/07/2024    HCT 22.0 (L) 02/07/2024     Monitor serial CBC and transfuse if patient becomes hemodynamically unstable, symptomatic or H/H drops below 7/21.  - EPO started by Nephrology

## 2024-02-07 NOTE — PLAN OF CARE
Pt in ICU, more alert but still confused. VSS throughout shift. Positive blood cultures reported to MD, pt already on antibiotics. Dilaudid ordered for pain to sacral wound.

## 2024-02-07 NOTE — HPI
"From H&P: " 58 y.o. male with AFib, DM2, ESRD on dialysis, hypertension presents from home with a complaint of altered mental status.  Family report he has been drowsy and fatigued with increased confusion for the past 2 days.  Has been progressively worsening.  Associated with significant peripheral edema.  Last dialysis was 2 weeks ago.  Denies fever, chills, cough, SOB, chest pain, palpitations, nausea, vomiting, diarrhea, or abdominal pain.  History is somewhat limited given the patient's drowsiness but he is awake and conversant.     In the ED, labs revealed hyperkalemia, K + 7.4.  He was given shifting medications and zirconium.  Nephrology was consulted and plan for urgent dialysis.  Labs also reveal uremia, leukocytosis, hyperphosphatemia, and metabolic acidosis.  Placed in observation to obtain dialysis."    Palliative medicine consulted for goals of care discussion and advance care planning; for details of visit, see advance care planning section of plan.   "

## 2024-02-07 NOTE — ASSESSMENT & PLAN NOTE
- oriented to self only, but easily aroused, pleasant, and cooperative during my assessment   - has been non-compliant with HD; now receiving HD inpatient   - continued management per hospital primary
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
## 2024-02-07 NOTE — ASSESSMENT & PLAN NOTE
Noted on CT. With leukocytosis  - started vanc, zosyn-- continue  - General surgery consulted- to OR tomorrow  - wound care consulted  - follow up blood cultures, OR cultures

## 2024-02-07 NOTE — SUBJECTIVE & OBJECTIVE
Interval History: He has no complaints today. Understands plan for OR tomorrow.     Review of Systems   Constitutional:  Negative for chills and fever.   Respiratory:  Negative for shortness of breath.    Cardiovascular:  Negative for chest pain.   Gastrointestinal:  Negative for abdominal pain.   Skin:  Positive for wound.     Objective:     Vital Signs (Most Recent):  Temp: 98.3 °F (36.8 °C) (02/07/24 1300)  Pulse: 74 (02/07/24 1300)  Resp: 11 (02/07/24 1300)  BP: 130/68 (02/07/24 1300)  SpO2: (!) 94 % (02/07/24 1300) Vital Signs (24h Range):  Temp:  [97.6 °F (36.4 °C)-99 °F (37.2 °C)] 98.3 °F (36.8 °C)  Pulse:  [73-92] 74  Resp:  [9-29] 11  SpO2:  [92 %-100 %] 94 %  BP: (101-149)/(51-71) 130/68     Weight: 75.3 kg (166 lb)  Body mass index is 26 kg/m².    Intake/Output Summary (Last 24 hours) at 2/7/2024 1352  Last data filed at 2/7/2024 1300  Gross per 24 hour   Intake 799.44 ml   Output 2000 ml   Net -1200.56 ml           Physical Exam  Vitals and nursing note reviewed.   Constitutional:       General: He is not in acute distress.     Appearance: He is ill-appearing. He is not toxic-appearing.   HENT:      Head: Normocephalic and atraumatic.   Neck:      Comments: R chest wall THDC in place  Cardiovascular:      Rate and Rhythm: Normal rate and regular rhythm.   Pulmonary:      Effort: Pulmonary effort is normal.      Breath sounds: Normal breath sounds.      Comments: Room air  Abdominal:      General: Bowel sounds are normal. There is no distension.      Tenderness: There is no abdominal tenderness. There is no guarding or rebound.      Hernia: A hernia (umbilical, reducible) is present.   Musculoskeletal:      Right lower leg: No edema.      Left lower leg: No edema.   Skin:     General: Skin is warm and dry.   Neurological:      Mental Status: He is alert.             Significant Labs: All pertinent labs within the past 24 hours have been reviewed.    Significant Imaging: I have reviewed all pertinent  imaging results/findings within the past 24 hours.

## 2024-02-07 NOTE — ASSESSMENT & PLAN NOTE
Patient with Paroxysmal (<7 days) atrial fibrillation which is controlled currently with Amiodarone. Patient is currently in sinus rhythm.[id number] Score: 1.  Anticoagulation indicated. Anticoagulation done with apixaban .
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
## 2024-02-07 NOTE — PROGRESS NOTES
[facility] Medicine  Progress Note    Patient Name: Mahesh Cespedes  MRN: [id number]  Patient Class: IP- Inpatient   Admission Date: 2/5/2024  Length of Stay: 1 days  Attending Physician: Sury Mondragon MD  Primary Care Provider: Cruz Hernandez MD        Subjective:     Principal Problem:Uremic encephalopathy        HPI:  58 y.o. male with AFib, DM2, ESRD on dialysis, hypertension presents from home with a complaint of altered mental status.  Family report he has been drowsy and fatigued with increased confusion for the past 2 days.  Has been progressively worsening.  Associated with significant peripheral edema.  Last dialysis was 2 weeks ago.  Denies fever, chills, cough, SOB, chest pain, palpitations, nausea, vomiting, diarrhea, or abdominal pain.  History is somewhat limited given the patient's drowsiness but he is awake and conversant.    In the ED, labs revealed hyperkalemia, K + 7.4.  He was given shifting medications and zirconium.  Nephrology was consulted and plan for urgent dialysis.  Labs also reveal uremia, leukocytosis, hyperphosphatemia, and metabolic acidosis.  Placed in observation to obtain dialysis.    Overview/Hospital Course:  Mr Mahesh Cespedes is a 58 y.o. man with ESRD but without outpatient dialysis set up who was admitted with uremic encephalopathy, hyperkalemia. Nephrology consulted and he received emergent dialysis. Mental status is improving. He has sacral wound with abscess. Started antibiotics. Blood cultures with gram variable rods. General surgery planning OR debridement on 2/7/24.     Interval History: He has no complaints today. Understands plan for OR tomorrow.     Review of Systems   Constitutional:  Negative for chills and fever.   Respiratory:  Negative for shortness of breath.    Cardiovascular:  Negative for chest pain.   Gastrointestinal:  Negative for abdominal pain.   Skin:  Positive for wound.     Objective:     Vital Signs (Most Recent):  Temp:  98.3 °F (36.8 °C) (02/07/24 1300)  Pulse: 74 (02/07/24 1300)  Resp: 11 (02/07/24 1300)  BP: 130/68 (02/07/24 1300)  SpO2: (!) 94 % (02/07/24 1300) Vital Signs (24h Range):  Temp:  [97.6 °F (36.4 °C)-99 °F (37.2 °C)] 98.3 °F (36.8 °C)  Pulse:  [73-92] 74  Resp:  [9-29] 11  SpO2:  [92 %-100 %] 94 %  BP: (101-149)/(51-71) 130/68     Weight: 75.3 kg (166 lb)  Body mass index is 26 kg/m².    Intake/Output Summary (Last 24 hours) at 2/7/2024 1352  Last data filed at 2/7/2024 1300  Gross per 24 hour   Intake 799.44 ml   Output 2000 ml   Net -1200.56 ml           Physical Exam  Vitals and nursing note reviewed.   Constitutional:       General: He is not in acute distress.     Appearance: He is ill-appearing. He is not toxic-appearing.   HENT:      Head: Normocephalic and atraumatic.   Neck:      Comments: R chest wall THDC in place  Cardiovascular:      Rate and Rhythm: Normal rate and regular rhythm.   Pulmonary:      Effort: Pulmonary effort is normal.      Breath sounds: Normal breath sounds.      Comments: Room air  Abdominal:      General: Bowel sounds are normal. There is no distension.      Tenderness: There is no abdominal tenderness. There is no guarding or rebound.      Hernia: A hernia (umbilical, reducible) is present.   Musculoskeletal:      Right lower leg: No edema.      Left lower leg: No edema.   Skin:     General: Skin is warm and dry.   Neurological:      Mental Status: He is alert.             Significant Labs: All pertinent labs within the past 24 hours have been reviewed.    Significant Imaging: I have reviewed all pertinent imaging results/findings within the past 24 hours.    Assessment/Plan:      * Uremic encephalopathy   with acute encephalopathy.  Nephrology consulted and received emergent HD. Mental status now normalized.     Positive blood culture  Blood culture 2/6/24 with gram variable rods.   - will choose to treat given presence of gluteal abscess   - follow up cultures       Pressure  injury of sacral region, unstageable  Wound care consulted       Gluteal abscess  Noted on CT. With leukocytosis  - started vanc, zosyn-- continue  - General surgery consulted- to OR tomorrow  - wound care consulted  - follow up blood cultures, OR cultures       Hyperphosphatemia  Nephrology consult, plan is for HD, resume binders with PO intake    Paroxysmal atrial fibrillation  Patient with Paroxysmal (<7 days) atrial fibrillation which is controlled currently with Amiodarone. Patient is currently in sinus rhythm.[id number] Score: 1.  Anticoagulation indicated. Anticoagulation done with apixaban .    Goals of care, counseling/discussion  Advance Care Planning  Palliative care consulted.          Anemia in ESRD (end-stage renal disease)  Patient's anemia is currently controlled. Has not received any PRBCs to date. Etiology likely d/t chronic disease due to Chronic Kidney Disease/ESRD  Current CBC reviewed-   Lab Results   Component Value Date    HGB 7.3 (L) 02/07/2024    HCT 22.0 (L) 02/07/2024     Monitor serial CBC and transfuse if patient becomes hemodynamically unstable, symptomatic or H/H drops below 7/21.  - EPO started by Nephrology     Essential hypertension  Chronic, controlled. Latest blood pressure and vitals reviewed-     Temp:  [97.6 °F (36.4 °C)-99 °F (37.2 °C)]   Pulse:  [73-92]   Resp:  [9-29]   BP: (101-149)/(51-71)   SpO2:  [92 %-100 %] .   Home meds for hypertension were reviewed and noted below.   Hypertension Medications               amLODIPine (NORVASC) 10 MG tablet Take 10 mg by mouth once daily.    furosemide (LASIX) 20 MG tablet Take 20 mg by mouth 2 (two) times daily.    hydrALAZINE (APRESOLINE) 100 MG tablet Take 1 tablet (100 mg total) by mouth 3 (three) times daily.    metoprolol succinate (TOPROL-XL) 100 MG 24 hr tablet Take 100 mg by mouth once daily.    NIFEdipine (PROCARDIA-XL) 60 MG (OSM) 24 hr tablet Take 1 tablet (60 mg total) by mouth once daily.            While in the  hospital, will manage blood pressure as follows; Continue home antihypertensive regimen    Will utilize p.r.n. blood pressure medication only if patient's blood pressure greater than 180/110 and he develops symptoms such as worsening chest pain or shortness of breath.    ESRD needing dialysis  Admitted with uremic encephalopathy and hyperkalemia. Received emergent dialysis  - dialysis not able to be arranged as outpatient due to history of nonadherence--> this is a major issue. Social work checking again to see if they can arrange. Palliative care consulted  - Nephrology consulted      VTE Risk Mitigation (From admission, onward)           Ordered     apixaban tablet 5 mg  Every 12 hours         02/05/24 1733     IP VTE HIGH RISK PATIENT  Once         02/05/24 1733     Place sequential compression device  Until discontinued         02/05/24 1733     Reason for No Pharmacological VTE Prophylaxis  Once        Question:  Reasons:  Answer:  Already adequately anticoagulated on oral Anticoagulants    02/05/24 1733                    Discharge Planning   JAIME: 2/8/2024     Code Status: Full Code   Is the patient medically ready for discharge?:     Reason for patient still in hospital (select all that apply): Patient trending condition  Discharge Plan A: Home Health   Discharge Delays: None known at this time        Critical care time spent on the evaluation and treatment of severe organ dysfunction, review of pertinent labs and imaging studies, discussions with consulting providers and discussions with patient/family: 40 minutes.      Sury Mondragon MD  Department of Hospital Medicine   [facility] [place] - Intensive Care

## 2024-02-07 NOTE — PLAN OF CARE
[facility] Intensive Care  Discharge Reassessment    Primary Care Provider: Cruz Hernandez MD    Expected Discharge Date: 2/8/2024    Reassessment (most recent)       Discharge Reassessment - 02/07/24 1336          Discharge Reassessment    Assessment Type Discharge Planning Reassessment     Did the patient's condition or plan change since previous assessment? Yes     Discharge Plan discussed with: Adult children   Daughter, Rima    Communicated JAIME with patient/caregiver Date not available/Unable to determine     Discharge Plan A Home Health     Discharge Plan B Home with family     DME Needed Upon Discharge  none   Would benefit from PT/OT eval for recommenations    Transition of Care Barriers DIalysis placement issues     Why the patient remains in the hospital Requires continued medical care        Post-Acute Status    Coverage Medicare/Medicaid     Discharge Delays None known at this time                 This patient has been screened for Case Management needs.  Based on (documentation in medical record/communication with attending physician/communication with nursing and rounding), possible osteo, +blood cultures. Patient is ready for stepdown.    Discharge plan:  Home Health;  OP HD?    Case Management/Social Work remains available if a need arises, please enter consult for assistance.  For urgent needs contact Case Management Department/on-call at:  [phone]

## 2024-02-07 NOTE — ASSESSMENT & PLAN NOTE
2/7/2024 - Consult   - consult received; interval chart reviewed in detail; discussed pt with MDT  - met with patient at bedside; introduction to palliative medicine team and role in current care and admission; pt remains disoriented and only oriented to self; does not have capacity for GOC/ACP discussion at this time   - call placed to pt's daughter, Rima, who shares that she is one of pt's 5 children (2 daughters, 3 sons); pt lives with Rima and her children   - Rima shares that at baseline pt is typically totally oriented and able to make his own medical decisions, but she does help with medical care when needed  - pts debility since prior 1/24 admission is a concern for her and family; he has been primarily bed bound since; discussed determining eligibility for SNF when pt is able to participate in PT/OT once mental status improves   - conversation was limited today as Rima was currently at the pediatricians office with her 3 children; made plan for further discussion in person or by phone tomorrow   - emotional support provided to daughter and answered all questions   - updated hospital primary, Dr. Mondragon   - HD coordination for outpatient will greatly affect discharge/GOC options; plan for transparent discussion with both daughter and pt (when able) regarding HD, including discussion of options for care if pt does not wish to continue with HD, but that optional compliance is not a suitable plan of care
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
## 2024-02-07 NOTE — PT/OT/SLP PROGRESS
Physical Therapy      Patient Name:  Mahesh Cespedes   MRN:  [id number]    Patient not seen today secondary to Dialysis. Will follow-up .

## 2024-02-07 NOTE — PROGRESS NOTES
Pharmacokinetic Assessment Follow Up: IV Vancomycin    Vancomycin serum concentration assessment(s):    The random level was drawn correctly and can be used to guide therapy at this time. The measurement is within the desired definitive target range of 10 to 20 mcg/mL.    Vancomycin Regimen Plan:    Vancomycin 500mg to be administered post HD if patient has dialysis today.    Re-dose when the random level is less than 20 mcg/mL, next level to be drawn at 0300 on 2/8/24    Drug levels (last 3 results):  Recent Labs   Lab Result Units 02/07/24  0447   Vancomycin, Random ug/mL 17.8       Pharmacy will continue to follow and monitor vancomycin.    Please contact pharmacy at extension [phone] for questions regarding this assessment.    Thank you for the consult,   Cristopher Gomez       Patient brief summary:  Mahesh Cespedes is a 58 y.o. male initiated on antimicrobial therapy with IV Vancomycin for treatment of skin & soft tissue infection    The patient's current regimen is random pulse dosing    Drug Allergies:   Review of patient's allergies indicates:  No Known Allergies    Actual Body Weight:   75.3 kg    Renal Function:   Estimated Creatinine Clearance: 8.2 mL/min (A) (based on SCr of 9 mg/dL (H)).,     Dialysis Method (if applicable):  intermittent HD    CBC (last 72 hours):  Recent Labs   Lab Result Units 02/05/24  1309 02/06/24  0423 02/07/24  0447   WBC K/uL 14.81* 16.84* 15.92*   Hemoglobin g/dL 7.3* 8.2* 7.3*   Hematocrit % 21.5* 24.9* 22.0*   Platelets K/uL 427 423 378   Gran % % 90.7* 90.8* 86.5*   Lymph % % 4.6* 3.2* 4.0*   Mono % % 3.3* 4.6 6.8   Eosinophil % % 0.3 0.5 1.6   Basophil % % 0.3 0.3 0.3   Differential Method  Automated Automated Automated       Metabolic Panel (last 72 hours):  Recent Labs   Lab Result Units 02/05/24  1430 02/06/24  0424 02/07/24  0447   Sodium mmol/L 136 140 136   Potassium mmol/L 7.4* 4.6 4.5   Chloride mmol/L 100 96 100   CO2 mmol/L 14* 22* 24   Glucose mg/dL 95 73 92    BUN mg/dL 166* 83* 55*   Creatinine mg/dL 21.0* 12.7* 9.0*   Albumin g/dL 2.3* 2.2* 1.9*   Total Bilirubin mg/dL 0.7 0.8 0.6   Alkaline Phosphatase U/L 125 133 118   AST U/L 18 18 15   ALT U/L 15 14 13   Magnesium mg/dL 2.2 2.0 1.8   Phosphorus mg/dL 10.7* 6.8* 4.9*       Vancomycin Administrations:  vancomycin given in the last 96 hours                     vancomycin 1,250 mg in dextrose 5 % (D5W) 250 mL IVPB (Vial-Mate) (mg) 1,250 mg New Bag 02/06/24 1652                    Microbiologic Results:  Microbiology Results (last 7 days)       Procedure Component Value Units Date/Time    Blood culture [[id number]] Collected: 02/06/24 0841    Order Status: Completed Specimen: Blood from Forearm, Left Updated: 02/07/24 0510     Blood Culture, Routine Gram stain marsha bottle: Gram variable rods      Results called to and read back by: Tee PÉREZ ICU 02/07/2024  05:08    Blood culture [[id number]] Collected: 02/06/24 0841    Order Status: Completed Specimen: Blood from Hand, Right Updated: 02/06/24 1512     Blood Culture, Routine No Growth to date

## 2024-02-07 NOTE — EICU
eICU Physician Virtual/Remote Brief Evaluation Note      Message from bedside RN   Patient asking for pain medication for sacral wound  Chart reviewed  /57, P 88, RR 12, O2 sat 100  ESRD on dialysis, creatinine 12.7  Patient was dialyzed yesterday   Per wound care nurse patient with unstageable sacral decubitus with foul-smelling drainage and necrosis  CT pelvis done yesterday evening and read by me- with subcutaneous gas right hip and small amount of gas in upper buttock soft tissue  Patient on vancomycin and Zosyn  Hydromorphone 0.2 mg IV q.6hp.r.n. pain  Would consider surgery evaluation for gas in soft tissue and unstageable sacral ulcer with necrosis and foul-smelling drainage      YO Rhoades MD  eICU Attending  [phone]    This report has been created through the use of [employer] dictation software. Typographical and content errors may occur with this process. While efforts are made to detect and correct such errors, in some cases errors will persist. For this reason, wording in this document should be considered in the proper context and not strictly verbatim

## 2024-02-07 NOTE — NURSING
[facility]  Nurses Note -- 4 Eyes      2/7/2024       Skin assessed on: Q Shift      [] No Pressure Injuries Present    []Prevention Measures Documented    [x] Yes LDA  for Pressure Injury Previously documented     [] Yes New Pressure Injury Discovered   [] LDA for New Pressure Injury Added      Attending RN:  Jose Robledo RN     Second RN:  BETO Rhodes
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
## 2024-02-07 NOTE — PROGRESS NOTES
Renal ICU Progress Note    Date of Admission:  2/5/2024 12:18 PM    Length of Stay: 1  Days    Subjective: n/a    Objective:    Current Facility-Administered Medications   Medication    acetaminophen tablet 650 mg    albuterol-ipratropium 2.5 mg-0.5 mg/3 mL nebulizer solution 3 mL    allopurinoL tablet 100 mg    aluminum-magnesium hydroxide-simethicone 200-200-20 mg/5 mL suspension 30 mL    amiodarone tablet 200 mg    amLODIPine tablet 10 mg    apixaban tablet 5 mg    atorvastatin tablet 80 mg    benztropine tablet 0.5 mg    dextrose 10% bolus 125 mL 125 mL    dextrose 10% bolus 250 mL 250 mL    epoetin luli-epbx injection 10,000 Units    glucagon (human recombinant) injection 1 mg    glucose chewable tablet 16 g    glucose chewable tablet 24 g    HYDROmorphone injection 0.2 mg    melatonin tablet 6 mg    metoprolol succinate (TOPROL-XL) 24 hr tablet 100 mg    naloxone 0.4 mg/mL injection 0.02 mg    ondansetron injection 4 mg    piperacillin-tazobactam (ZOSYN) 4.5 g in dextrose 5 % in water (D5W) 100 mL IVPB (MB+)    prochlorperazine injection Soln 5 mg    sevelamer carbonate tablet 1,600 mg    simethicone chewable tablet 80 mg    sodium chloride 0.9% bolus 250 mL 250 mL    sodium chloride 0.9% flush 10 mL    tamsulosin 24 hr capsule 0.4 mg    vancomycin - pharmacy to dose    vitamin renal formula (B-complex-vitamin c-folic acid) 1 mg per capsule 1 capsule       Vitals:    02/07/24 0545 02/07/24 0600 02/07/24 0615 02/07/24 0700   BP:  132/62  (!) 127/54   Pulse: 84 86 83 85   Resp: 12 12 12 13   Temp:       TempSrc:       SpO2: 98% (!) 94% 97% 96%   Weight:       Height:           I/O last 3 completed shifts:  In: 299.6 [P.O.:200; IV Piggyback:99.6]  Out: 4509 [Other:4509]  No intake/output data recorded.      Physical Exam:     General: NAD seen at the end of Dialysis  Neck: supple  Heart: RRR  Lungs: unlabored breathing  Abdomen: n/a  Limbs: n/a  Neurologic: mildly  "confused      Laboratories:    Recent Labs   Lab 02/07/24 0447   WBC 15.92*   RBC 2.83*   HGB 7.3*   HCT 22.0*      MCV 78*   MCH 25.8*   MCHC 33.2         Recent Labs   Lab 02/07/24 0447   CALCIUM 8.4*   ALBUMIN 1.9*   PROT 7.5      K 4.5   CO2 24      BUN 55*   CREATININE 9.0*   ALKPHOS 118   ALT 13   AST 15   BILITOT 0.6         No results for input(s): "COLORU", "CLARITYU", "SPECGRAV", "PHUR", "PROTEINUA", "GLUCOSEU", "BILIRUBINCON", "BLOODU", "WBCU", "RBCU", "BACTERIA", "MUCUS" in the last 24 hours.    Microbiology Results (last 7 days)       Procedure Component Value Units Date/Time    Rapid Organism ID by PCR (from Blood culture) [[id number]] Collected: 02/06/24 0841    Order Status: No result Updated: 02/07/24 0640    Blood culture [[id number]] Collected: 02/06/24 0841    Order Status: Completed Specimen: Blood from Forearm, Left Updated: 02/07/24 0510     Blood Culture, Routine Gram stain marsha bottle: Gram variable rods      Results called to and read back by: Tee PÉREZ ICU 02/07/2024  05:08    Blood culture [[id number]] Collected: 02/06/24 0841    Order Status: Completed Specimen: Blood from Hand, Right Updated: 02/06/24 1512     Blood Culture, Routine No Growth to date              Diagnostic Tests:     CXR:  Impression:       Cardiomegaly.  Increased interstitial lung markings.  Possible small pleural effusions.  Question on fluid overload and or CHF.  However, a pneumonic infectious process cannot be excluded in the appropriate clinical circumstances.      Electronically signed by: Sandie Martinez  Date: 02/05/2024           Assessment:    59 y/o male with known to me from prior encounter last month; Hx. ESRD (Moved from [facility]. no Dialysis unit to go to) admitted with:     - Hyperkalemia corrected with Dialysis last p.m.  - HAGMA  - Uncontrolled HTN  - One of the blood cultures reported as "gram variable rods" ID and sensitivity pending  - Fluid overload  - Improving Metabolic " (uremic) encephalopathy, last HD 1/22/24 while in the Hospital (Pte. Was not accepted by any of the local Dialysis units due to Hx. Of non-compliance and was told either to try to go back to [place] or come to ED periodically as needed for HD)  - Hyperphosphatemia improving  - Hx. Ascites s/p Paracentesis in January  - Fe++ def. + Anemia of ckd  - HTN  - Hx. CVA  - Hypoalbuminemia           Plan:    - Empiric antibiotics  - Dialysis today and Q TTS starting tomorrow  - IV Fe++ loading during Dialysis  - Epogen as needed  - Transfuse prn Hgb < 7  - Renal diet + protein supplements  - Oral PO4- binders   - Disposition and other problems per admitting

## 2024-02-07 NOTE — ASSESSMENT & PLAN NOTE
- has been non-compliant with HD in the past  - per MDT/notes, previously lived in [place] and has been in local areas since the holidays   - has required multiple hospital visits for HD as he hasn't been complaint with OP HD; this is causing difficulties with finding an accepting outpatient HD facility which will complicate discharge and potential GOC for SNF placement   - Nephrology following   - continued management per hospital primary and nephrology

## 2024-02-07 NOTE — ASSESSMENT & PLAN NOTE
Chronic, controlled. Latest blood pressure and vitals reviewed-     Temp:  [97.6 °F (36.4 °C)-99 °F (37.2 °C)]   Pulse:  [73-92]   Resp:  [9-29]   BP: (101-149)/(51-71)   SpO2:  [92 %-100 %] .   Home meds for hypertension were reviewed and noted below.   Hypertension Medications               amLODIPine (NORVASC) 10 MG tablet Take 10 mg by mouth once daily.    furosemide (LASIX) 20 MG tablet Take 20 mg by mouth 2 (two) times daily.    hydrALAZINE (APRESOLINE) 100 MG tablet Take 1 tablet (100 mg total) by mouth 3 (three) times daily.    metoprolol succinate (TOPROL-XL) 100 MG 24 hr tablet Take 100 mg by mouth once daily.    NIFEdipine (PROCARDIA-XL) 60 MG (OSM) 24 hr tablet Take 1 tablet (60 mg total) by mouth once daily.            While in the hospital, will manage blood pressure as follows; Continue home antihypertensive regimen    Will utilize p.r.n. blood pressure medication only if patient's blood pressure greater than 180/110 and he develops symptoms such as worsening chest pain or shortness of breath.

## 2024-02-07 NOTE — ASSESSMENT & PLAN NOTE
- pt's daughter reports being bed bound since 1/24 hospital admission  - wound care following   - debility contributes to appropriateness for hospice if/when aligns with GOC   - continued management per hospital primary

## 2024-02-07 NOTE — ASSESSMENT & PLAN NOTE
with acute encephalopathy.  Nephrology consulted and received emergent HD. Mental status now normalized.

## 2024-02-07 NOTE — PT/OT/SLP PROGRESS
Occupational Therapy      Patient Name:  Mahesh Cespedes   MRN:  [id number]    Patient not seen today secondary to Dialysis. Will follow-up as able.    2/7/2024

## 2024-02-07 NOTE — EICU
eICU Physician Virtual/Remote Brief Evaluation Note      Telephone call bedside RN   Platelets 30  Received 1 unit of platelets yesterday for platelet count of 19   Hemoglobin slightly decreased from 7-6.9  Chart reviewed, discussed with RN   /47 (67) P 86, RR 33, O2 sat 98 received 1 unit PRBCs on 2/5 for hemoglobin 6.4  No clinical bleeding   No indication for platelet or PRBC transfusion at present   Repeat CBC at 12:45 p.m.       YO Rhoades MD  eICU Attending  [phone]    This report has been created through the use of [employer] dictation software. Typographical and content errors may occur with this process. While efforts are made to detect and correct such errors, in some cases errors will persist. For this reason, wording in this document should be considered in the proper context and not strictly verbatim

## 2024-02-07 NOTE — ASSESSMENT & PLAN NOTE
Admitted with uremic encephalopathy and hyperkalemia. Received emergent dialysis  - dialysis not able to be arranged as outpatient due to history of nonadherence--> this is a major issue. Social work checking again to see if they can arrange. Palliative care consulted  - Nephrology consulted

## 2024-02-07 NOTE — PLAN OF CARE
Dialysis referrals sent via [facility] to:    [place]: [facility] [place]  [facility]:  [place], [place], [place], [facility]  [facility]: [facility]: [facility]

## 2024-02-08 ENCOUNTER — ANESTHESIA (OUTPATIENT)
Dept: SURGERY | Facility: HOSPITAL | Age: 59
DRG: 463 | End: 2024-02-08
Payer: MEDICARE

## 2024-02-08 LAB
ABO + RH BLD: NORMAL
ALBUMIN SERPL BCP-MCNC: 1.9 G/DL (ref 3.5–5.2)
ALP SERPL-CCNC: 109 U/L (ref 55–135)
ALT SERPL W/O P-5'-P-CCNC: 24 U/L (ref 10–44)
ANION GAP SERPL CALC-SCNC: 11 MMOL/L (ref 8–16)
AST SERPL-CCNC: 27 U/L (ref 10–40)
BASOPHILS # BLD AUTO: 0.04 K/UL (ref 0–0.2)
BASOPHILS NFR BLD: 0.2 % (ref 0–1.9)
BILIRUB SERPL-MCNC: 0.5 MG/DL (ref 0.1–1)
BLD GP AB SCN CELLS X3 SERPL QL: NORMAL
BLD PROD TYP BPU: NORMAL
BLOOD UNIT EXPIRATION DATE: NORMAL
BLOOD UNIT TYPE CODE: 5100
BLOOD UNIT TYPE: NORMAL
BUN SERPL-MCNC: 42 MG/DL (ref 6–20)
CALCIUM SERPL-MCNC: 8.4 MG/DL (ref 8.7–10.5)
CHLORIDE SERPL-SCNC: 101 MMOL/L (ref 95–110)
CO2 SERPL-SCNC: 25 MMOL/L (ref 23–29)
CODING SYSTEM: NORMAL
CREAT SERPL-MCNC: 7 MG/DL (ref 0.5–1.4)
CROSSMATCH INTERPRETATION: NORMAL
DIFFERENTIAL METHOD BLD: ABNORMAL
DISPENSE STATUS: NORMAL
EOSINOPHIL # BLD AUTO: 0.3 K/UL (ref 0–0.5)
EOSINOPHIL NFR BLD: 1.9 % (ref 0–8)
ERYTHROCYTE [DISTWIDTH] IN BLOOD BY AUTOMATED COUNT: 16.4 % (ref 11.5–14.5)
EST. GFR  (NO RACE VARIABLE): 8 ML/MIN/1.73 M^2
GLUCOSE SERPL-MCNC: 93 MG/DL (ref 70–110)
HCT VFR BLD AUTO: 21.2 % (ref 40–54)
HGB BLD-MCNC: 6.9 G/DL (ref 14–18)
IMM GRANULOCYTES # BLD AUTO: 0.16 K/UL (ref 0–0.04)
IMM GRANULOCYTES NFR BLD AUTO: 0.9 % (ref 0–0.5)
LYMPHOCYTES # BLD AUTO: 0.8 K/UL (ref 1–4.8)
LYMPHOCYTES NFR BLD: 4.7 % (ref 18–48)
MAGNESIUM SERPL-MCNC: 1.7 MG/DL (ref 1.6–2.6)
MCH RBC QN AUTO: 26 PG (ref 27–31)
MCHC RBC AUTO-ENTMCNC: 32.5 G/DL (ref 32–36)
MCV RBC AUTO: 80 FL (ref 82–98)
MONOCYTES # BLD AUTO: 1.2 K/UL (ref 0.3–1)
MONOCYTES NFR BLD: 7 % (ref 4–15)
NEUTROPHILS # BLD AUTO: 15 K/UL (ref 1.8–7.7)
NEUTROPHILS NFR BLD: 85.3 % (ref 38–73)
NRBC BLD-RTO: 0 /100 WBC
PHOSPHATE SERPL-MCNC: 3.3 MG/DL (ref 2.7–4.5)
PLATELET # BLD AUTO: 360 K/UL (ref 150–450)
PMV BLD AUTO: 9.6 FL (ref 9.2–12.9)
POTASSIUM SERPL-SCNC: 4.5 MMOL/L (ref 3.5–5.1)
PROT SERPL-MCNC: 7.2 G/DL (ref 6–8.4)
RBC # BLD AUTO: 2.65 M/UL (ref 4.6–6.2)
SODIUM SERPL-SCNC: 137 MMOL/L (ref 136–145)
SPECIMEN OUTDATE: NORMAL
TRANS ERYTHROCYTES VOL PATIENT: NORMAL ML
VANCOMYCIN SERPL-MCNC: 18.4 UG/ML
VANCOMYCIN SERPL-MCNC: 20 UG/ML
WBC # BLD AUTO: 17.58 K/UL (ref 3.9–12.7)

## 2024-02-08 PROCEDURE — 83735 ASSAY OF MAGNESIUM: CPT | Performed by: HOSPITALIST

## 2024-02-08 PROCEDURE — 25000003 PHARM REV CODE 250: Performed by: NURSE ANESTHETIST, CERTIFIED REGISTERED

## 2024-02-08 PROCEDURE — 85025 COMPLETE CBC W/AUTO DIFF WBC: CPT | Performed by: HOSPITALIST

## 2024-02-08 PROCEDURE — 37000009 HC ANESTHESIA EA ADD 15 MINS: Performed by: SURGERY

## 2024-02-08 PROCEDURE — 99232 SBSQ HOSP IP/OBS MODERATE 35: CPT | Mod: ,,, | Performed by: SURGERY

## 2024-02-08 PROCEDURE — D9220A PRA ANESTHESIA: Mod: CRNA,,, | Performed by: NURSE ANESTHETIST, CERTIFIED REGISTERED

## 2024-02-08 PROCEDURE — 80053 COMPREHEN METABOLIC PANEL: CPT | Performed by: HOSPITALIST

## 2024-02-08 PROCEDURE — 25000003 PHARM REV CODE 250: Performed by: INTERNAL MEDICINE

## 2024-02-08 PROCEDURE — 11000001 HC ACUTE MED/SURG PRIVATE ROOM

## 2024-02-08 PROCEDURE — 30233N1 TRANSFUSION OF NONAUTOLOGOUS RED BLOOD CELLS INTO PERIPHERAL VEIN, PERCUTANEOUS APPROACH: ICD-10-PCS | Performed by: SURGERY

## 2024-02-08 PROCEDURE — P9021 RED BLOOD CELLS UNIT: HCPCS

## 2024-02-08 PROCEDURE — 87070 CULTURE OTHR SPECIMN AEROBIC: CPT | Performed by: HOSPITALIST

## 2024-02-08 PROCEDURE — 80202 ASSAY OF VANCOMYCIN: CPT | Performed by: HOSPITALIST

## 2024-02-08 PROCEDURE — 87075 CULTR BACTERIA EXCEPT BLOOD: CPT | Performed by: HOSPITALIST

## 2024-02-08 PROCEDURE — 99900035 HC TECH TIME PER 15 MIN (STAT)

## 2024-02-08 PROCEDURE — 36000706: Performed by: SURGERY

## 2024-02-08 PROCEDURE — 99900031 HC PATIENT EDUCATION (STAT)

## 2024-02-08 PROCEDURE — 86920 COMPATIBILITY TEST SPIN: CPT

## 2024-02-08 PROCEDURE — 37000008 HC ANESTHESIA 1ST 15 MINUTES: Performed by: SURGERY

## 2024-02-08 PROCEDURE — 94761 N-INVAS EAR/PLS OXIMETRY MLT: CPT

## 2024-02-08 PROCEDURE — 27000221 HC OXYGEN, UP TO 24 HOURS

## 2024-02-08 PROCEDURE — 87205 SMEAR GRAM STAIN: CPT | Mod: 59 | Performed by: HOSPITALIST

## 2024-02-08 PROCEDURE — 25000003 PHARM REV CODE 250: Performed by: HOSPITALIST

## 2024-02-08 PROCEDURE — 63600175 PHARM REV CODE 636 W HCPCS

## 2024-02-08 PROCEDURE — 63600175 PHARM REV CODE 636 W HCPCS: Performed by: HOSPITALIST

## 2024-02-08 PROCEDURE — 80202 ASSAY OF VANCOMYCIN: CPT | Mod: 91 | Performed by: HOSPITALIST

## 2024-02-08 PROCEDURE — 87116 MYCOBACTERIA CULTURE: CPT | Mod: 59 | Performed by: HOSPITALIST

## 2024-02-08 PROCEDURE — 87186 SC STD MICRODIL/AGAR DIL: CPT | Performed by: HOSPITALIST

## 2024-02-08 PROCEDURE — D9220A PRA ANESTHESIA: Mod: ANES,,, | Performed by: ANESTHESIOLOGY

## 2024-02-08 PROCEDURE — 36000707: Performed by: SURGERY

## 2024-02-08 PROCEDURE — 0QBS0ZX EXCISION OF COCCYX, OPEN APPROACH, DIAGNOSTIC: ICD-10-PCS | Performed by: SURGERY

## 2024-02-08 PROCEDURE — 87206 SMEAR FLUORESCENT/ACID STAI: CPT | Performed by: HOSPITALIST

## 2024-02-08 PROCEDURE — 86850 RBC ANTIBODY SCREEN: CPT

## 2024-02-08 PROCEDURE — 36415 COLL VENOUS BLD VENIPUNCTURE: CPT | Performed by: HOSPITALIST

## 2024-02-08 PROCEDURE — 63600175 PHARM REV CODE 636 W HCPCS: Performed by: NURSE ANESTHETIST, CERTIFIED REGISTERED

## 2024-02-08 PROCEDURE — 87077 CULTURE AEROBIC IDENTIFY: CPT | Performed by: HOSPITALIST

## 2024-02-08 PROCEDURE — 84100 ASSAY OF PHOSPHORUS: CPT | Performed by: HOSPITALIST

## 2024-02-08 PROCEDURE — 0JB70ZZ EXCISION OF BACK SUBCUTANEOUS TISSUE AND FASCIA, OPEN APPROACH: ICD-10-PCS | Performed by: SURGERY

## 2024-02-08 PROCEDURE — 87102 FUNGUS ISOLATION CULTURE: CPT | Performed by: HOSPITALIST

## 2024-02-08 PROCEDURE — 11044 DBRDMT BONE 1ST 20 SQ CM/<: CPT | Mod: ,,, | Performed by: SURGERY

## 2024-02-08 PROCEDURE — 36415 COLL VENOUS BLD VENIPUNCTURE: CPT | Mod: XB

## 2024-02-08 PROCEDURE — 80100014 HC HEMODIALYSIS 1:1

## 2024-02-08 RX ORDER — ETOMIDATE 2 MG/ML
INJECTION INTRAVENOUS
Status: DISCONTINUED | OUTPATIENT
Start: 2024-02-08 | End: 2024-02-08

## 2024-02-08 RX ORDER — LIDOCAINE HYDROCHLORIDE 20 MG/ML
INJECTION INTRAVENOUS
Status: DISCONTINUED | OUTPATIENT
Start: 2024-02-08 | End: 2024-02-08

## 2024-02-08 RX ORDER — ROCURONIUM BROMIDE 10 MG/ML
INJECTION, SOLUTION INTRAVENOUS
Status: DISCONTINUED | OUTPATIENT
Start: 2024-02-08 | End: 2024-02-08

## 2024-02-08 RX ORDER — FENTANYL CITRATE 50 UG/ML
INJECTION, SOLUTION INTRAMUSCULAR; INTRAVENOUS
Status: DISCONTINUED | OUTPATIENT
Start: 2024-02-08 | End: 2024-02-08

## 2024-02-08 RX ORDER — CALCITRIOL 0.25 UG/1
0.25 CAPSULE ORAL DAILY
Status: DISCONTINUED | OUTPATIENT
Start: 2024-02-08 | End: 2024-02-22 | Stop reason: HOSPADM

## 2024-02-08 RX ORDER — OXYCODONE HYDROCHLORIDE 5 MG/1
10 TABLET ORAL EVERY 6 HOURS PRN
Status: DISCONTINUED | OUTPATIENT
Start: 2024-02-08 | End: 2024-02-22 | Stop reason: HOSPADM

## 2024-02-08 RX ORDER — HYDROMORPHONE HYDROCHLORIDE 1 MG/ML
0.5 INJECTION, SOLUTION INTRAMUSCULAR; INTRAVENOUS; SUBCUTANEOUS EVERY 4 HOURS PRN
Status: DISCONTINUED | OUTPATIENT
Start: 2024-02-08 | End: 2024-02-22 | Stop reason: HOSPADM

## 2024-02-08 RX ORDER — HYDROCODONE BITARTRATE AND ACETAMINOPHEN 500; 5 MG/1; MG/1
TABLET ORAL
Status: DISCONTINUED | OUTPATIENT
Start: 2024-02-08 | End: 2024-02-22

## 2024-02-08 RX ADMIN — BENZTROPINE MESYLATE 0.5 MG: 0.5 TABLET ORAL at 08:02

## 2024-02-08 RX ADMIN — LIDOCAINE HYDROCHLORIDE 60 MG: 20 INJECTION, SOLUTION INTRAVENOUS at 12:02

## 2024-02-08 RX ADMIN — VANCOMYCIN HYDROCHLORIDE 500 MG: 500 INJECTION, POWDER, LYOPHILIZED, FOR SOLUTION INTRAVENOUS at 08:02

## 2024-02-08 RX ADMIN — FENTANYL CITRATE 100 MCG: 0.05 INJECTION, SOLUTION INTRAMUSCULAR; INTRAVENOUS at 12:02

## 2024-02-08 RX ADMIN — SODIUM CHLORIDE: 0.9 INJECTION, SOLUTION INTRAVENOUS at 12:02

## 2024-02-08 RX ADMIN — SODIUM CHLORIDE: 0.9 INJECTION, SOLUTION INTRAVENOUS at 11:02

## 2024-02-08 RX ADMIN — PIPERACILLIN AND TAZOBACTAM 4.5 G: 4; .5 INJECTION, POWDER, LYOPHILIZED, FOR SOLUTION INTRAVENOUS; PARENTERAL at 05:02

## 2024-02-08 RX ADMIN — AMIODARONE HYDROCHLORIDE 200 MG: 200 TABLET ORAL at 09:02

## 2024-02-08 RX ADMIN — HYDROMORPHONE HYDROCHLORIDE 0.2 MG: 1 INJECTION, SOLUTION INTRAMUSCULAR; INTRAVENOUS; SUBCUTANEOUS at 03:02

## 2024-02-08 RX ADMIN — ACETAMINOPHEN 650 MG: 325 TABLET ORAL at 08:02

## 2024-02-08 RX ADMIN — IRON SUCROSE 200 MG: 20 INJECTION, SOLUTION INTRAVENOUS at 01:02

## 2024-02-08 RX ADMIN — ATORVASTATIN CALCIUM 80 MG: 40 TABLET, FILM COATED ORAL at 01:02

## 2024-02-08 RX ADMIN — APIXABAN 5 MG: 5 TABLET, FILM COATED ORAL at 01:02

## 2024-02-08 RX ADMIN — ROCURONIUM BROMIDE 30 MG: 10 INJECTION, SOLUTION INTRAVENOUS at 12:02

## 2024-02-08 RX ADMIN — HYDROMORPHONE HYDROCHLORIDE 0.5 MG: 1 INJECTION, SOLUTION INTRAMUSCULAR; INTRAVENOUS; SUBCUTANEOUS at 09:02

## 2024-02-08 RX ADMIN — TAMSULOSIN HYDROCHLORIDE 0.4 MG: 0.4 CAPSULE ORAL at 01:02

## 2024-02-08 RX ADMIN — AMLODIPINE BESYLATE 10 MG: 5 TABLET ORAL at 08:02

## 2024-02-08 RX ADMIN — BENZTROPINE MESYLATE 0.5 MG: 0.5 TABLET ORAL at 01:02

## 2024-02-08 RX ADMIN — PIPERACILLIN AND TAZOBACTAM 4.5 G: 4; .5 INJECTION, POWDER, LYOPHILIZED, FOR SOLUTION INTRAVENOUS; PARENTERAL at 06:02

## 2024-02-08 RX ADMIN — ALLOPURINOL 100 MG: 100 TABLET ORAL at 01:02

## 2024-02-08 RX ADMIN — SUGAMMADEX 100 MG: 100 INJECTION, SOLUTION INTRAVENOUS at 01:02

## 2024-02-08 RX ADMIN — HYDROMORPHONE HYDROCHLORIDE 0.5 MG: 1 INJECTION, SOLUTION INTRAMUSCULAR; INTRAVENOUS; SUBCUTANEOUS at 06:02

## 2024-02-08 RX ADMIN — CALCITRIOL CAPSULES 0.25 MCG 0.25 MCG: 0.25 CAPSULE ORAL at 01:02

## 2024-02-08 RX ADMIN — NEPHROCAP 1 CAPSULE: 1 CAP ORAL at 01:02

## 2024-02-08 RX ADMIN — ETOMIDATE 20 MG: 2 INJECTION, SOLUTION INTRAVENOUS at 12:02

## 2024-02-08 RX ADMIN — EPOETIN ALFA-EPBX 10000 UNITS: 10000 INJECTION, SOLUTION INTRAVENOUS; SUBCUTANEOUS at 01:02

## 2024-02-08 RX ADMIN — ROCURONIUM BROMIDE 10 MG: 10 INJECTION, SOLUTION INTRAVENOUS at 12:02

## 2024-02-08 RX ADMIN — APIXABAN 5 MG: 5 TABLET, FILM COATED ORAL at 08:02

## 2024-02-08 RX ADMIN — SUGAMMADEX 200 MG: 100 INJECTION, SOLUTION INTRAVENOUS at 12:02

## 2024-02-08 RX ADMIN — METOPROLOL SUCCINATE 100 MG: 50 TABLET, EXTENDED RELEASE ORAL at 09:02

## 2024-02-08 RX ADMIN — MUPIROCIN: 20 OINTMENT TOPICAL at 01:02

## 2024-02-08 NOTE — PROGRESS NOTES
Pharmacokinetic Assessment Follow Up: IV Vancomycin    Vancomycin serum concentration assessment(s):    The random level was drawn correctly and can be used to guide therapy at this time. The measurement is within the desired definitive target range of 10 to 20 mcg/mL.    Vancomycin Regimen Plan:    Give 500 mg after dialysis today.  Re-dose when the random level is less than 20 mcg/mL, next level to be drawn at 0300 on 2/10/2024    Drug levels (last 3 results):  Recent Labs   Lab Result Units 02/07/24 0447 02/08/24  0433   Vancomycin, Random ug/mL 17.8 20.0       Pharmacy will continue to follow and monitor vancomycin.    Please contact pharmacy at extension [phone] for questions regarding this assessment.    Thank you for the consult,   Matthew Marino Jr       Patient brief summary:  Mahesh Cespedes is a 58 y.o. male initiated on antimicrobial therapy with IV Vancomycin for treatment of skin & soft tissue infection    Drug Allergies:   Review of patient's allergies indicates:  No Known Allergies    Actual Body Weight:   75.3 kg    Renal Function:   Estimated Creatinine Clearance: 10.5 mL/min (A) (based on SCr of 7 mg/dL (H)).,     Dialysis Method (if applicable):  intermittent HD    CBC (last 72 hours):  Recent Labs   Lab Result Units 02/05/24  1309 02/06/24  0423 02/07/24 0447 02/08/24  0433   WBC K/uL 14.81* 16.84* 15.92* 17.58*   Hemoglobin g/dL 7.3* 8.2* 7.3* 6.9*   Hematocrit % 21.5* 24.9* 22.0* 21.2*   Platelets K/uL 427 423 378 360   Gran % % 90.7* 90.8* 86.5* 85.3*   Lymph % % 4.6* 3.2* 4.0* 4.7*   Mono % % 3.3* 4.6 6.8 7.0   Eosinophil % % 0.3 0.5 1.6 1.9   Basophil % % 0.3 0.3 0.3 0.2   Differential Method  Automated Automated Automated Automated       Metabolic Panel (last 72 hours):  Recent Labs   Lab Result Units 02/05/24  1430 02/06/24  0424 02/07/24 0447 02/08/24  0433   Sodium mmol/L 136 140 136 137   Potassium mmol/L 7.4* 4.6 4.5 4.5   Chloride mmol/L 100 96 100 101   CO2 mmol/L 14* 22* 24 25    Glucose mg/dL 95 73 92 93   BUN mg/dL 166* 83* 55* 42*   Creatinine mg/dL 21.0* 12.7* 9.0* 7.0*   Albumin g/dL 2.3* 2.2* 1.9* 1.9*   Total Bilirubin mg/dL 0.7 0.8 0.6 0.5   Alkaline Phosphatase U/L 125 133 118 109   AST U/L 18 18 15 27   ALT U/L 15 14 13 24   Magnesium mg/dL 2.2 2.0 1.8 1.7   Phosphorus mg/dL 10.7* 6.8* 4.9* 3.3       Vancomycin Administrations:  vancomycin given in the last 96 hours                     vancomycin (VANCOCIN) 500 mg in dextrose 5 % in water (D5W) 100 mL IVPB (MB+) (mg) 500 mg New Bag 02/07/24 1604    vancomycin 1,250 mg in dextrose 5 % (D5W) 250 mL IVPB (Vial-Mate) (mg) 1,250 mg New Bag 02/06/24 1652                    Microbiologic Results:  Microbiology Results (last 7 days)       Procedure Component Value Units Date/Time    Blood culture [[id number]] Collected: 02/07/24 1344    Order Status: Completed Specimen: Blood from Peripheral, Antecubital, Right Updated: 02/07/24 2112     Blood Culture, Routine No Growth to date    Narrative:      Collection has been rescheduled by CMO at 02/07/2024 09:28 Reason: Pt   in dialysis  Collection has been rescheduled by RBJ at 02/07/2024 11:23 Reason: At   13:30  Collection has been rescheduled by [email or web address] at 02/07/2024 12:05 Reason:   Pt is doing dialysis will be ready at130  Collection has been rescheduled by CMO at 02/07/2024 09:28 Reason: Pt   in dialysis  Collection has been rescheduled by RBJ at 02/07/2024 11:23 Reason: At   13:30  Collection has been rescheduled by [email or web address] at 02/07/2024 12:05 Reason:   Pt is doing dialysis will be ready at130    Blood culture [[id number]] Collected: 02/07/24 1332    Order Status: Completed Specimen: Blood from Peripheral, Hand, Left Updated: 02/07/24 2112     Blood Culture, Routine No Growth to date    Narrative:      Collection has been rescheduled by CMO at 02/07/2024 09:28 Reason: Pt   in dialysis  Collection has been rescheduled by RBJ at 02/07/2024 11:23 Reason: At   13:30  Collection has been rescheduled  by [email or web address] at 02/07/2024 12:05 Reason:   Pt is doing dialysis will be ready at130  Collection has been rescheduled by CMO at 02/07/2024 09:28 Reason: Pt   in dialysis  Collection has been rescheduled by RBJ at 02/07/2024 11:23 Reason: At   13:30  Collection has been rescheduled by [email or web address] at 02/07/2024 12:05 Reason:   Pt is doing dialysis will be ready at130    Rapid Organism ID by PCR (from Blood culture) [[id number]]  (Abnormal) Collected: 02/06/24 0841    Order Status: Completed Updated: 02/07/24 1622     Enterococcus faecalis Not Detected     Enterococcus faecium Not Detected     Listeria monocytogenes Not Detected     Staphylococcus spp. Not Detected     Staphylococcus aureus Not Detected     Staphylococcus epidermidis Not Detected     Staphylococcus lugdunensis Not Detected     Streptococcus species Not Detected     Streptococcus agalactiae Not Detected     Streptococcus pneumoniae Not Detected     Streptococcus pyogenes Not Detected     Acinetobacter calcoaceticus/baumannii complex Not Detected     Bacteroides fragilis Not Detected     Enterobacterales Detected     Enterobacter cloacae complex Not Detected     Escherichia coli Not Detected     Klebsiella aerogenes Not Detected     Klebsiella oxytoca Not Detected     Klebsiella pneumoniae group Not Detected     Proteus Not Detected     Salmonella sp Not Detected     Serratia marcescens Not Detected     Haemophilus influenzae Not Detected     Neisseria meningtidis Not Detected     Pseudomonas aeruginosa Not Detected     Stenotrophomonas maltophilia Not Detected     Candida albicans Not Detected     Candida auris Not Detected     Candida glabrata Not Detected     Candida krusei Not Detected     Candida parapsilosis Not Detected     Candida tropicalis Not Detected     Cryptococcus neoformans/gattii Not Detected     CTX-M (ESBL ) Not Detected     IMP (Carbapenem resistant) Not Detected     KPC resistance gene (Carbapenem resistant) Not Detected     mcr-1  Test  Not Applicable     mec A/C  Test Not Applicable     mec A/C and MREJ (MRSA) gene Test Not Applicable     NDM (Carbapenem resistant) Not Detected     OXA-48-like (Carbapenem resistant) Not Detected     van A/B (VRE gene) Test Not Applicable     VIM (Carbapenem resistant) Not Detected    Blood culture [[id number]] Collected: 02/06/24 0841    Order Status: Completed Specimen: Blood from Hand, Right Updated: 02/07/24 0903     Blood Culture, Routine No Growth to date      No Growth to date    Blood culture [[id number]] Collected: 02/06/24 0841    Order Status: Completed Specimen: Blood from Forearm, Left Updated: 02/07/24 0510     Blood Culture, Routine Gram stain marsha bottle: Gram variable rods      Results called to and read back by: Tee PÉREZ ICU 02/07/2024  05:08

## 2024-02-08 NOTE — SUBJECTIVE & OBJECTIVE
Interval History: no new issues overnight    Review of Systems   HENT:  Negative for ear discharge and ear pain.    Eyes:  Negative for discharge and itching.   Endocrine: Negative for cold intolerance and heat intolerance.   Neurological:  Negative for seizures and syncope.     Objective:     Vital Signs (Most Recent):  Temp: 97.6 °F (36.4 °C) (02/08/24 1327)  Pulse: 74 (02/08/24 1327)  Resp: (!) 23 (02/08/24 1327)  BP: (!) 140/53 (02/08/24 1327)  SpO2: 97 % (02/08/24 1327) Vital Signs (24h Range):  Temp:  [97.6 °F (36.4 °C)-98.9 °F (37.2 °C)] 97.6 °F (36.4 °C)  Pulse:  [63-79] 74  Resp:  [11-23] 23  SpO2:  [94 %-100 %] 97 %  BP: ()/(46-82) 140/53     Weight: 75.3 kg (166 lb)  Body mass index is 26 kg/m².    Intake/Output Summary (Last 24 hours) at 2/8/2024 1511  Last data filed at 2/8/2024 1326  Gross per 24 hour   Intake 827.64 ml   Output 0 ml   Net 827.64 ml         Physical Exam  Vitals and nursing note reviewed.   Constitutional:       General: He is not in acute distress.     Appearance: He is ill-appearing. He is not toxic-appearing.   HENT:      Head: Normocephalic and atraumatic.   Neck:      Comments: R chest wall THDC in place  Cardiovascular:      Rate and Rhythm: Normal rate and regular rhythm.   Pulmonary:      Effort: Pulmonary effort is normal.      Breath sounds: Normal breath sounds.      Comments: Room air  Abdominal:      General: Bowel sounds are normal. There is no distension.      Tenderness: There is no abdominal tenderness. There is no guarding or rebound.      Hernia: A hernia (umbilical, reducible) is present.   Musculoskeletal:      Right lower leg: No edema.      Left lower leg: No edema.   Skin:     General: Skin is warm and dry.   Neurological:      Mental Status: He is alert.             Significant Labs: All pertinent labs within the past 24 hours have been reviewed.  BMP:   Recent Labs   Lab 02/08/24  0433   GLU 93      K 4.5      CO2 25   BUN 42*   CREATININE  7.0*   CALCIUM 8.4*   MG 1.7     CBC:   Recent Labs   Lab 02/07/24  0447 02/08/24  0433   WBC 15.92* 17.58*   HGB 7.3* 6.9*   HCT 22.0* 21.2*    360       Significant Imaging: I have reviewed all pertinent imaging results/findings within the past 24 hours.

## 2024-02-08 NOTE — SUBJECTIVE & OBJECTIVE
Interval History: NAEON. Remains disoriented. AVSS. Hgb drifted down to 6.9.    Medications:  Continuous Infusions:  Scheduled Meds:   allopurinoL  100 mg Oral Daily    amiodarone  200 mg Oral Daily    amLODIPine  10 mg Oral QHS    apixaban  5 mg Oral Q12H    atorvastatin  80 mg Oral Daily    benztropine  0.5 mg Oral Q12H    calcitRIOL  0.25 mcg Oral Daily    epoetin luli-epbx  10,000 Units Subcutaneous Every Tues, Thurs, Sat    IRON SUCROSE IV ORDERABLE  200 mg Intravenous Every Tues, Thurs, Sat    metoprolol succinate  100 mg Oral Daily    mupirocin   Nasal BID    piperacillin-tazobactam (Zosyn) IV (PEDS and ADULTS) (extended infusion is not appropriate)  4.5 g Intravenous Q12H    sevelamer carbonate  800 mg Oral TID WM    tamsulosin  0.4 mg Oral Daily    vitamin renal formula (B-complex-vitamin c-folic acid)  1 capsule Oral Daily     PRN Meds:0.9%  NaCl infusion (for blood administration), acetaminophen, albuterol-ipratropium, aluminum-magnesium hydroxide-simethicone, dextrose 10%, dextrose 10%, glucagon (human recombinant), glucose, glucose, HYDROmorphone, melatonin, naloxone, ondansetron, prochlorperazine, simethicone, sodium chloride 0.9%, sodium chloride 0.9%, Pharmacy to dose Vancomycin consult **AND** vancomycin - pharmacy to dose     Review of patient's allergies indicates:  No Known Allergies  Objective:     Vital Signs (Most Recent):  Temp: 98.9 °F (37.2 °C) (02/08/24 0308)  Pulse: 76 (02/08/24 0600)  Resp: 14 (02/08/24 0600)  BP: 111/78 (02/08/24 0600)  SpO2: 100 % (02/08/24 0600) Vital Signs (24h Range):  Temp:  [98.3 °F (36.8 °C)-98.9 °F (37.2 °C)] 98.9 °F (37.2 °C)  Pulse:  [70-91] 76  Resp:  [9-20] 14  SpO2:  [94 %-100 %] 100 %  BP: ()/(46-82) 111/78     Weight: 75.3 kg (166 lb)  Body mass index is 26 kg/m².    Intake/Output - Last 3 Shifts         02/06 0700 02/07 0659 02/07 0700 02/08 0659 02/08 0700  02/09 0659    P.O. 200 180     Other  500     IV Piggyback 99.6 303     Total  Intake(mL/kg) 299.6 (4) 983 (13.1)     Urine (mL/kg/hr)  0 (0)     Emesis/NG output  0     Other 2509 2000     Stool  0     Total Output 2509 2000     Net -2209.4 -1017            Urine Occurrence  1 x              Physical Exam  Vitals reviewed.   Constitutional:       Appearance: Normal appearance.   Cardiovascular:      Rate and Rhythm: Normal rate.      Comments: R IJ tunneled HD line  Pulmonary:      Effort: Pulmonary effort is normal. No respiratory distress.   Abdominal:      Palpations: Abdomen is soft.      Tenderness: There is no abdominal tenderness.   Genitourinary:     Comments: Unstageable pressure injury overlying the coccyx  Skin:     General: Skin is warm.   Neurological:      General: No focal deficit present.      Mental Status: He is alert. He is disoriented.          Significant Labs:  I have reviewed all pertinent lab results within the past 24 hours.  CBC:   Recent Labs   Lab 02/08/24  0433   WBC 17.58*   RBC 2.65*   HGB 6.9*   HCT 21.2*      MCV 80*   MCH 26.0*   MCHC 32.5     CMP:   Recent Labs   Lab 02/08/24  0433   GLU 93   CALCIUM 8.4*   ALBUMIN 1.9*   PROT 7.2      K 4.5   CO2 25      BUN 42*   CREATININE 7.0*   ALKPHOS 109   ALT 24   AST 27   BILITOT 0.5       Significant Diagnostics:  I have reviewed all pertinent imaging results/findings within the past 24 hours.

## 2024-02-08 NOTE — PROGRESS NOTES
Renal ICU Progress Note    Date of Admission:  2/5/2024 12:18 PM    Length of Stay: 2  Days    Subjective: n/a    Objective:    Current Facility-Administered Medications   Medication    0.9%  NaCl infusion (for blood administration)    acetaminophen tablet 650 mg    albuterol-ipratropium 2.5 mg-0.5 mg/3 mL nebulizer solution 3 mL    allopurinoL tablet 100 mg    aluminum-magnesium hydroxide-simethicone 200-200-20 mg/5 mL suspension 30 mL    amiodarone tablet 200 mg    amLODIPine tablet 10 mg    apixaban tablet 5 mg    atorvastatin tablet 80 mg    benztropine tablet 0.5 mg    dextrose 10% bolus 125 mL 125 mL    dextrose 10% bolus 250 mL 250 mL    epoetin luli-epbx injection 10,000 Units    glucagon (human recombinant) injection 1 mg    glucose chewable tablet 16 g    glucose chewable tablet 24 g    HYDROmorphone injection 0.2 mg    iron sucrose injection 200 mg    melatonin tablet 6 mg    metoprolol succinate (TOPROL-XL) 24 hr tablet 100 mg    mupirocin 2 % ointment    naloxone 0.4 mg/mL injection 0.02 mg    ondansetron injection 4 mg    piperacillin-tazobactam (ZOSYN) 4.5 g in dextrose 5 % in water (D5W) 100 mL IVPB (MB+)    prochlorperazine injection Soln 5 mg    sevelamer carbonate tablet 800 mg    simethicone chewable tablet 80 mg    sodium chloride 0.9% bolus 250 mL 250 mL    sodium chloride 0.9% flush 10 mL    tamsulosin 24 hr capsule 0.4 mg    vancomycin - pharmacy to dose    vitamin renal formula (B-complex-vitamin c-folic acid) 1 mg per capsule 1 capsule       Vitals:    02/08/24 0308 02/08/24 0400 02/08/24 0500 02/08/24 0600   BP: 135/63 (!) 122/53 (!) 118/54 111/78   Pulse: 72 73 73 76   Resp: 13 14 15 14   Temp: 98.9 °F (37.2 °C)      TempSrc: Axillary      SpO2: 99% 100% 98% 100%   Weight:       Height:           I/O last 3 completed shifts:  In: 1000 [P.O.:200; Other:500; IV Piggyback:300]  Out: 4509 [Other:4509]  I/O this shift:  In: 277.5 [P.O.:180; IV Piggyback:97.5]  Out: 0  "      Physical Exam: Out of the unit for procedure      Laboratories:    Recent Labs   Lab 02/08/24  0433   WBC 17.58*   RBC 2.65*   HGB 6.9*   HCT 21.2*      MCV 80*   MCH 26.0*   MCHC 32.5         Recent Labs   Lab 02/08/24  0433   CALCIUM 8.4*   ALBUMIN 1.9*   PROT 7.2      K 4.5   CO2 25      BUN 42*   CREATININE 7.0*   ALKPHOS 109   ALT 24   AST 27   BILITOT 0.5         No results for input(s): "COLORU", "CLARITYU", "SPECGRAV", "PHUR", "PROTEINUA", "GLUCOSEU", "BILIRUBINCON", "BLOODU", "WBCU", "RBCU", "BACTERIA", "MUCUS" in the last 24 hours.    Microbiology Results (last 7 days)       Procedure Component Value Units Date/Time    Blood culture [[id number]] Collected: 02/07/24 1344    Order Status: Completed Specimen: Blood from Peripheral, Antecubital, Right Updated: 02/07/24 2112     Blood Culture, Routine No Growth to date    Narrative:      Collection has been rescheduled by CMO at 02/07/2024 09:28 Reason: Pt   in dialysis  Collection has been rescheduled by RBJ at 02/07/2024 11:23 Reason: At   13:30  Collection has been rescheduled by SK at 02/07/2024 12:05 Reason:   Pt is doing dialysis will be ready at130  Collection has been rescheduled by CMO at 02/07/2024 09:28 Reason: Pt   in dialysis  Collection has been rescheduled by RBJ at 02/07/2024 11:23 Reason: At   13:30  Collection has been rescheduled by [email or web address] at 02/07/2024 12:05 Reason:   Pt is doing dialysis will be ready at130    Blood culture [[id number]] Collected: 02/07/24 1332    Order Status: Completed Specimen: Blood from Peripheral, Hand, Left Updated: 02/07/24 2112     Blood Culture, Routine No Growth to date    Narrative:      Collection has been rescheduled by CMO at 02/07/2024 09:28 Reason: Pt   in dialysis  Collection has been rescheduled by RBJ at 02/07/2024 11:23 Reason: At   13:30  Collection has been rescheduled by [email or web address] at 02/07/2024 12:05 Reason:   Pt is doing dialysis will be ready at130  Collection has been " rescheduled by CMO at 02/07/2024 09:28 Reason: Pt   in dialysis  Collection has been rescheduled by RBJ at 02/07/2024 11:23 Reason: At   13:30  Collection has been rescheduled by [email or web address] at 02/07/2024 12:05 Reason:   Pt is doing dialysis will be ready at130    Rapid Organism ID by PCR (from Blood culture) [[id number]]  (Abnormal) Collected: 02/06/24 0841    Order Status: Completed Updated: 02/07/24 1622     Enterococcus faecalis Not Detected     Enterococcus faecium Not Detected     Listeria monocytogenes Not Detected     Staphylococcus spp. Not Detected     Staphylococcus aureus Not Detected     Staphylococcus epidermidis Not Detected     Staphylococcus lugdunensis Not Detected     Streptococcus species Not Detected     Streptococcus agalactiae Not Detected     Streptococcus pneumoniae Not Detected     Streptococcus pyogenes Not Detected     Acinetobacter calcoaceticus/baumannii complex Not Detected     Bacteroides fragilis Not Detected     Enterobacterales Detected     Enterobacter cloacae complex Not Detected     Escherichia coli Not Detected     Klebsiella aerogenes Not Detected     Klebsiella oxytoca Not Detected     Klebsiella pneumoniae group Not Detected     Proteus Not Detected     Salmonella sp Not Detected     Serratia marcescens Not Detected     Haemophilus influenzae Not Detected     Neisseria meningtidis Not Detected     Pseudomonas aeruginosa Not Detected     Stenotrophomonas maltophilia Not Detected     Candida albicans Not Detected     Candida auris Not Detected     Candida glabrata Not Detected     Candida krusei Not Detected     Candida parapsilosis Not Detected     Candida tropicalis Not Detected     Cryptococcus neoformans/gattii Not Detected     CTX-M (ESBL ) Not Detected     IMP (Carbapenem resistant) Not Detected     KPC resistance gene (Carbapenem resistant) Not Detected     mcr-1  Test Not Applicable     mec A/C  Test Not Applicable     mec A/C and MREJ (MRSA) gene Test Not Applicable  "    NDM (Carbapenem resistant) Not Detected     OXA-48-like (Carbapenem resistant) Not Detected     van A/B (VRE gene) Test Not Applicable     VIM (Carbapenem resistant) Not Detected    Blood culture [[id number]] Collected: 02/06/24 0841    Order Status: Completed Specimen: Blood from Hand, Right Updated: 02/07/24 0903     Blood Culture, Routine No Growth to date      No Growth to date    Blood culture [[id number]] Collected: 02/06/24 0841    Order Status: Completed Specimen: Blood from Forearm, Left Updated: 02/07/24 0510     Blood Culture, Routine Gram stain marsha bottle: Gram variable rods      Results called to and read back by: Tee PÉREZ ICU 02/07/2024  05:08              Diagnostic Tests:     CXR:  Impression:       Cardiomegaly.  Increased interstitial lung markings.  Possible small pleural effusions.  Question on fluid overload and or CHF.  However, a pneumonic infectious process cannot be excluded in the appropriate clinical circumstances.      Electronically signed by: Sandie Martinez  Date: 02/05/2024           Assessment:    57 y/o male with known to me from prior encounter last month; Hx. ESRD (Moved from [facility]. no Dialysis unit to go to) admitted with:     - Hyperkalemia corrected with Dialysis last p.m.  - HAGMA  - Uncontrolled HTN  - One of the blood cultures reported as "gram variable rods" ID and sensitivity pending  - Fluid overload  - Improving Metabolic (uremic) encephalopathy, last HD 1/22/24 while in the Hospital (Pte. Was not accepted by any of the local Dialysis units due to Hx. Of non-compliance and was told either to try to go back to [place] or come to ED periodically as needed for HD)  - Hyperphosphatemia improving  - 2nd. hyperparathyroidism  - Hx. Ascites s/p Paracentesis in January  - Fe++ def. + Anemia of ckd  - HTN  - Hx. CVA  - Hypoalbuminemia  - Sacral pressure ulcer           Plan:    - Empiric antibiotics  - Dialysis today and Q TTS   - IV Fe++ loading during Dialysis  - Epogen as " needed  - Transfuse prn Hgb < 7  - Renal diet + protein supplements  - Adjust oral PO4- binders   - Calcitriol  - Wound care per surgery  - Disposition and other problems per admitting

## 2024-02-08 NOTE — PT/OT/SLP PROGRESS
Physical Therapy      Patient Name:  Mahesh Cespedes   MRN:  [id number]    Patient not seen today secondary to  (Recieving blood prior to Wound debridment). Will follow-up .

## 2024-02-08 NOTE — NURSING
[facility]  Nurses Note -- 4 Eyes      2/7/2024       Skin assessed on: Q Shift      [] No Pressure Injuries Present    []Prevention Measures Documented    [x] Yes LDA  for Pressure Injury Previously documented     [] Yes New Pressure Injury Discovered   [] LDA for New Pressure Injury Added      Attending RN:  DAVE MUÑOZ RN     Second RN:  BETO Garcia

## 2024-02-08 NOTE — PT/OT/SLP PROGRESS
Occupational Therapy      Patient Name:  Mahesh Cespedes   MRN:  [id number]    Patient not seen today secondary to Other (Comment) (The patient is scheduled for wound debridement today and will receive blood prior to surgery). Will follow-up as appropriate.    2/8/2024

## 2024-02-08 NOTE — OP NOTE
[facility]  General Surgery  Operative Note    DATE: 2/8/2024    PREOPERATIVE DIAGNOSIS: Decubitus ulcer with suspected deep tissue injury, unspecified ulcer stage [L89.96]     POSTOPERATIVE DIAGNOSIS: Decubitus ulcer with suspected deep tissue injury, unspecified ulcer stage [L89.96]     Procedure(s):  DEBRIDEMENT     Surgeon(s) and Role:     * Froilan Garcia MD - Primary    ANESTHESIA: General endotracheal anesthesia    FINDINGS: Necrotic sacral pressure injury extending to the coccyx. Bone biopsy of the coccyx taken. Purulent abscess cavities drained.     INDICATION: Mr. Cespedes is a 58 y.o. male with an unstageable sacral pressure injury with concern for underlying abscess on imaging consented for debridement and drainage of the pressure injury.    PROCEDURE IN DETAIL: Patient was brought to the operating room where he was placed in supine position on the operating room table and underwent general anesthesia with endotracheal intubation without complication. He was placed in right lateral decubitus position. The field was sterilely prepped out and draped in standard fashion, and a timeout identifying the correct patient, placement, procedure, and preoperative antibiotics was called with everyone in agreement.    We excised the approximately 3 cm in diameter pressure injury sharply and immediately encountered purulent fluid. This fluid was sent for culture. We then carried the debridement down through the subcutaneous tissue with electrocautery. There was necrotic tissue that extended to the coccyx that was excised. As we debrided to the left we encountered a purulent collection of fluid, likely the pocket seen on imaging. Once all of the necrotic tissue was removed and all of the remaining tissue was viable, we took a bone biopsy of the coccyx. Next we irrigated out the wound and ensured hemostasis with electrocautery. Finally we packed the wound with betadine-soaked kerlix and dressed it  with 4x4s and an ABD.    This completed the proposed operation. All instruments, needles, and sponge counts were reported as correct x2 by the nursing staff. Patient was extubated and awakened from general anesthesia without complication. He was sent to the recovery unit in stable condition.     EBL: 50 mL.    COMPLICATIONS: none apparent.    SPECIMEN: Coccyx bone biopsy    DRAINS: none    DISPOSITION: returned to the ICU      David Turner MD  General Surgery Resident, PGY-3

## 2024-02-08 NOTE — ASSESSMENT & PLAN NOTE
58 yoM with infected sacral pressure injury with underlying fluid collection. Patient disoriented. I spoke with his daughter and discussed the indication for drainage of the abscess and debridement of devitalized tissue, as well as the risks and benefits, and consent was obtained over the phone. Given sensation is intact, we will proceed with debridement in the OR.    - 1 unit of pRBC this morning prior to surgery  - keep NPO  - Consent in the paper chart  - OR this morning  - Ok to continue Eliquis  - Continue IV abx  - Other care per primary

## 2024-02-08 NOTE — ASSESSMENT & PLAN NOTE
Patient with Paroxysmal (<7 days) atrial fibrillation which is controlled currently with Amiodarone. Patient is currently in sinus rhythm.[id number] Score: 1.  Anticoagulation indicated. Anticoagulation done with apixaban .

## 2024-02-08 NOTE — PLAN OF CARE
Neuro: AO X 1    Cardiac: SR    Respiratory:RA    GI: Renal/ NPO after MN    : HD    Lines: PIV     GTT: None    Events:PRN given for pain control. Hgb 6.9; type and screen done.     Bed locked, bed in lowest position, and call light in reach. Care explained.

## 2024-02-08 NOTE — PROGRESS NOTES
[facility] Medicine  Progress Note    Patient Name: Mahesh Cespedes  MRN: [id number]  Patient Class: IP- Inpatient   Admission Date: 2/5/2024  Length of Stay: 2 days  Attending Physician: Thor May MD  Primary Care Provider: Cruz Hernandez MD        Subjective:     Principal Problem:Uremic encephalopathy        HPI:  58 y.o. male with AFib, DM2, ESRD on dialysis, hypertension presents from home with a complaint of altered mental status.  Family report he has been drowsy and fatigued with increased confusion for the past 2 days.  Has been progressively worsening.  Associated with significant peripheral edema.  Last dialysis was 2 weeks ago.  Denies fever, chills, cough, SOB, chest pain, palpitations, nausea, vomiting, diarrhea, or abdominal pain.  History is somewhat limited given the patient's drowsiness but he is awake and conversant.    In the ED, labs revealed hyperkalemia, K + 7.4.  He was given shifting medications and zirconium.  Nephrology was consulted and plan for urgent dialysis.  Labs also reveal uremia, leukocytosis, hyperphosphatemia, and metabolic acidosis.  Placed in observation to obtain dialysis.    Overview/Hospital Course:  Mr Mahesh Cespedes is a 58 y.o. man with ESRD but without outpatient dialysis set up who was admitted with uremic encephalopathy, hyperkalemia. Nephrology consulted and he received emergent dialysis. Mental status is improving. He has sacral wound with abscess. Started antibiotics. Blood cultures with gram variable rods. General surgery planning OR debridement on 2/8/24.     Interval History: no new issues overnight    Review of Systems   HENT:  Negative for ear discharge and ear pain.    Eyes:  Negative for discharge and itching.   Endocrine: Negative for cold intolerance and heat intolerance.   Neurological:  Negative for seizures and syncope.     Objective:     Vital Signs (Most Recent):  Temp: 97.6 °F (36.4 °C) (02/08/24 1327)  Pulse: 74  (02/08/24 1327)  Resp: (!) 23 (02/08/24 1327)  BP: (!) 140/53 (02/08/24 1327)  SpO2: 97 % (02/08/24 1327) Vital Signs (24h Range):  Temp:  [97.6 °F (36.4 °C)-98.9 °F (37.2 °C)] 97.6 °F (36.4 °C)  Pulse:  [63-79] 74  Resp:  [11-23] 23  SpO2:  [94 %-100 %] 97 %  BP: ()/(46-82) 140/53     Weight: 75.3 kg (166 lb)  Body mass index is 26 kg/m².    Intake/Output Summary (Last 24 hours) at 2/8/2024 1511  Last data filed at 2/8/2024 1326  Gross per 24 hour   Intake 827.64 ml   Output 0 ml   Net 827.64 ml         Physical Exam  Vitals and nursing note reviewed.   Constitutional:       General: He is not in acute distress.     Appearance: He is ill-appearing. He is not toxic-appearing.   HENT:      Head: Normocephalic and atraumatic.   Neck:      Comments: R chest wall THDC in place  Cardiovascular:      Rate and Rhythm: Normal rate and regular rhythm.   Pulmonary:      Effort: Pulmonary effort is normal.      Breath sounds: Normal breath sounds.      Comments: Room air  Abdominal:      General: Bowel sounds are normal. There is no distension.      Tenderness: There is no abdominal tenderness. There is no guarding or rebound.      Hernia: A hernia (umbilical, reducible) is present.   Musculoskeletal:      Right lower leg: No edema.      Left lower leg: No edema.   Skin:     General: Skin is warm and dry.   Neurological:      Mental Status: He is alert.             Significant Labs: All pertinent labs within the past 24 hours have been reviewed.  BMP:   Recent Labs   Lab 02/08/24  0433   GLU 93      K 4.5      CO2 25   BUN 42*   CREATININE 7.0*   CALCIUM 8.4*   MG 1.7     CBC:   Recent Labs   Lab 02/07/24  0447 02/08/24  0433   WBC 15.92* 17.58*   HGB 7.3* 6.9*   HCT 22.0* 21.2*    360       Significant Imaging: I have reviewed all pertinent imaging results/findings within the past 24 hours.    Assessment/Plan:      * Uremic encephalopathy   with acute encephalopathy.  Nephrology consulted and  received emergent HD. Mental status now normalized.     Positive blood culture  Blood culture 2/6/24 with gram variable rods.   - will choose to treat given presence of gluteal abscess   - follow up cultures       Pressure injury of sacral region, unstageable  Wound care consulted       Gluteal abscess  Noted on CT. With leukocytosis  - started vanc, zosyn-- continue  - General surgery consulted- to OR today.  - wound care consulted      Hyperphosphatemia  Secondary to ESRD.    Paroxysmal atrial fibrillation  Patient with Paroxysmal (<7 days) atrial fibrillation which is controlled currently with Amiodarone. Patient is currently in sinus rhythm.[id number] Score: 1.  Anticoagulation indicated. Anticoagulation done with apixaban .    Goals of care, counseling/discussion  Advance Care Planning  Palliative care consulted.          Anemia in ESRD (end-stage renal disease)  Patient's anemia is currently controlled.  Etiology likely d/t chronic disease due to Chronic Kidney Disease/ESRD  Current CBC reviewed-   Lab Results   Component Value Date    HGB 6.9 (L) 02/08/2024    HCT 21.2 (L) 02/08/2024     Monitor serial CBC and transfuse if patient becomes hemodynamically unstable, symptomatic or H/H drops below 7/21.  - EPO started by Nephrology   Hgb of 6.9 today and pending surgical debridement.  Will transfuse one unit of blood.    Essential hypertension  Chronic, controlled. Latest blood pressure and vitals reviewed-     Temp:  [97.6 °F (36.4 °C)-98.9 °F (37.2 °C)]   Pulse:  [63-79]   Resp:  [11-23]   BP: ()/(46-82)   SpO2:  [94 %-100 %] .   Home meds for hypertension were reviewed and noted below.   Hypertension Medications               amLODIPine (NORVASC) 10 MG tablet Take 10 mg by mouth once daily.    furosemide (LASIX) 20 MG tablet Take 20 mg by mouth 2 (two) times daily.    hydrALAZINE (APRESOLINE) 100 MG tablet Take 1 tablet (100 mg total) by mouth 3 (three) times daily.    metoprolol succinate (TOPROL-XL)  100 MG 24 hr tablet Take 100 mg by mouth once daily.    NIFEdipine (PROCARDIA-XL) 60 MG (OSM) 24 hr tablet Take 1 tablet (60 mg total) by mouth once daily.            While in the hospital, will manage blood pressure as follows; Continue home antihypertensive regimen    Will utilize p.r.n. blood pressure medication only if patient's blood pressure greater than 180/110 and he develops symptoms such as worsening chest pain or shortness of breath.    ESRD needing dialysis  Admitted with uremic encephalopathy and hyperkalemia. Received emergent dialysis  - dialysis not able to be arranged as outpatient due to history of nonadherence--> this is a major issue. Social work checking again to see if they can arrange. Palliative care consulted  - Nephrology consulted      VTE Risk Mitigation (From admission, onward)           Ordered     apixaban tablet 5 mg  Every 12 hours         02/05/24 1733     IP VTE HIGH RISK PATIENT  Once         02/05/24 1733     Place sequential compression device  Until discontinued         02/05/24 1733     Reason for No Pharmacological VTE Prophylaxis  Once        Question:  Reasons:  Answer:  Already adequately anticoagulated on oral Anticoagulants    02/05/24 1733                    Discharge Planning   JAIME: 2/8/2024     Code Status: Full Code   Is the patient medically ready for discharge?:     Reason for patient still in hospital (select all that apply): Treatment  Discharge Plan A: Home Health   Discharge Delays: None known at this time          Thor May MD  Department of Hospital Medicine   [facility] - Intensive Care

## 2024-02-08 NOTE — ASSESSMENT & PLAN NOTE
Chronic, controlled. Latest blood pressure and vitals reviewed-     Temp:  [97.6 °F (36.4 °C)-98.9 °F (37.2 °C)]   Pulse:  [63-79]   Resp:  [11-23]   BP: ()/(46-82)   SpO2:  [94 %-100 %] .   Home meds for hypertension were reviewed and noted below.   Hypertension Medications               amLODIPine (NORVASC) 10 MG tablet Take 10 mg by mouth once daily.    furosemide (LASIX) 20 MG tablet Take 20 mg by mouth 2 (two) times daily.    hydrALAZINE (APRESOLINE) 100 MG tablet Take 1 tablet (100 mg total) by mouth 3 (three) times daily.    metoprolol succinate (TOPROL-XL) 100 MG 24 hr tablet Take 100 mg by mouth once daily.    NIFEdipine (PROCARDIA-XL) 60 MG (OSM) 24 hr tablet Take 1 tablet (60 mg total) by mouth once daily.            While in the hospital, will manage blood pressure as follows; Continue home antihypertensive regimen    Will utilize p.r.n. blood pressure medication only if patient's blood pressure greater than 180/110 and he develops symptoms such as worsening chest pain or shortness of breath.

## 2024-02-08 NOTE — PROGRESS NOTES
[facility] - Intensive Care  General Surgery  Progress Note    Subjective:     History of Present Illness:  Mr. Cespedes is a 58 yoM with PMH including Afib on Eliquis, DM2, ESRD on dialysis, hypertension, and CVA now bed bound secondary to diffuse weakness (sensation intact). He was admitted for altered mental status secondary to uremic encephalopathy. He recently moved back from [place] and apparently did not have outpatient HD scheduled. Last HD session was 2 weeks ago. Following HD, he became more responsive yesterday and began endorsing pain involving the sacral area. He has a known sacral pressure injury. CT scan was ordered yesterday which revealed a fluid collection deep to the sacral pressure injury with extension into the superior left gluteal region. General Surgery consulted for potential drainage. He remains only oriented to person when I saw him this morning.        Post-Op Info:  Procedure(s) (LRB):  DEBRIDEMENT (N/A)         Interval History: NAEON. Remains disoriented. AVSS. Hgb drifted down to 6.9.    Medications:  Continuous Infusions:  Scheduled Meds:   allopurinoL  100 mg Oral Daily    amiodarone  200 mg Oral Daily    amLODIPine  10 mg Oral QHS    apixaban  5 mg Oral Q12H    atorvastatin  80 mg Oral Daily    benztropine  0.5 mg Oral Q12H    calcitRIOL  0.25 mcg Oral Daily    epoetin luli-epbx  10,000 Units Subcutaneous Every Tues, Thurs, Sat    IRON SUCROSE IV ORDERABLE  200 mg Intravenous Every Tues, Thurs, Sat    metoprolol succinate  100 mg Oral Daily    mupirocin   Nasal BID    piperacillin-tazobactam (Zosyn) IV (PEDS and ADULTS) (extended infusion is not appropriate)  4.5 g Intravenous Q12H    sevelamer carbonate  800 mg Oral TID WM    tamsulosin  0.4 mg Oral Daily    vitamin renal formula (B-complex-vitamin c-folic acid)  1 capsule Oral Daily     PRN Meds:0.9%  NaCl infusion (for blood administration), acetaminophen, albuterol-ipratropium, aluminum-magnesium hydroxide-simethicone, dextrose  10%, dextrose 10%, glucagon (human recombinant), glucose, glucose, HYDROmorphone, melatonin, naloxone, ondansetron, prochlorperazine, simethicone, sodium chloride 0.9%, sodium chloride 0.9%, Pharmacy to dose Vancomycin consult **AND** vancomycin - pharmacy to dose     Review of patient's allergies indicates:  No Known Allergies  Objective:     Vital Signs (Most Recent):  Temp: 98.9 °F (37.2 °C) (02/08/24 0308)  Pulse: 76 (02/08/24 0600)  Resp: 14 (02/08/24 0600)  BP: 111/78 (02/08/24 0600)  SpO2: 100 % (02/08/24 0600) Vital Signs (24h Range):  Temp:  [98.3 °F (36.8 °C)-98.9 °F (37.2 °C)] 98.9 °F (37.2 °C)  Pulse:  [70-91] 76  Resp:  [9-20] 14  SpO2:  [94 %-100 %] 100 %  BP: ()/(46-82) 111/78     Weight: 75.3 kg (166 lb)  Body mass index is 26 kg/m².    Intake/Output - Last 3 Shifts         02/06 0700  02/07 0659 02/07 0700  02/08 0659 02/08 0700  02/09 0659    P.O. 200 180     Other  500     IV Piggyback 99.6 303     Total Intake(mL/kg) 299.6 (4) 983 (13.1)     Urine (mL/kg/hr)  0 (0)     Emesis/NG output  0     Other 2509 2000     Stool  0     Total Output 2509 2000     Net -2209.4 -1017            Urine Occurrence  1 x              Physical Exam  Vitals reviewed.   Constitutional:       Appearance: Normal appearance.   Cardiovascular:      Rate and Rhythm: Normal rate.      Comments: R IJ tunneled HD line  Pulmonary:      Effort: Pulmonary effort is normal. No respiratory distress.   Abdominal:      Palpations: Abdomen is soft.      Tenderness: There is no abdominal tenderness.   Genitourinary:     Comments: Unstageable pressure injury overlying the coccyx  Skin:     General: Skin is warm.   Neurological:      General: No focal deficit present.      Mental Status: He is alert. He is disoriented.          Significant Labs:  I have reviewed all pertinent lab results within the past 24 hours.  CBC:   Recent Labs   Lab 02/08/24  0433   WBC 17.58*   RBC 2.65*   HGB 6.9*   HCT 21.2*      MCV 80*   MCH 26.0*    MCHC 32.5     CMP:   Recent Labs   Lab 02/08/24  0433   GLU 93   CALCIUM 8.4*   ALBUMIN 1.9*   PROT 7.2      K 4.5   CO2 25      BUN 42*   CREATININE 7.0*   ALKPHOS 109   ALT 24   AST 27   BILITOT 0.5       Significant Diagnostics:  I have reviewed all pertinent imaging results/findings within the past 24 hours.  Assessment/Plan:     Pressure injury of sacral region, unstageable  58 yoM with infected sacral pressure injury with underlying fluid collection. Patient disoriented. I spoke with his daughter and discussed the indication for drainage of the abscess and debridement of devitalized tissue, as well as the risks and benefits, and consent was obtained over the phone. Given sensation is intact, we will proceed with debridement in the OR.    - 1 unit of pRBC this morning prior to surgery  - keep NPO  - Consent in the paper chart  - OR this morning  - Ok to continue Eliquis  - Continue IV abx  - Other care per primary        Gopal Turner MD  General Surgery  [facility] - Intensive Care

## 2024-02-08 NOTE — CLINICAL REVIEW
Sepsis Screen (most recent)       Sepsis Screen (IP) - 02/08/24 7681       Is the patient's history or complaint suggestive of a possible infection? Yes  -    Are there at least two of the following signs and symptoms present? Yes  -    Sepsis signs/symptoms - WBC WBC < 4,000 or WBC > 12,000  -    Sepsis signs/symptoms - Altered Mental Status Altered Mental Status  -    Are any of the following organ dysfunction criteria present and not considered to be due to a chronic condition? Yes   ESRD-HD -    Organ Dysfunction Criteria - O2 O2 Saturation < 95% on room air  -    Initiate Sepsis Protocol No  -    Reason sepsis not considered Pt. receiving appropriate management  -              User Key  (r) = Recorded By, (t) = Taken By, (c) = Cosigned By      Initials Name    Ashlyn Ascencio RN

## 2024-02-08 NOTE — ASSESSMENT & PLAN NOTE
Patient's anemia is currently controlled.  Etiology likely d/t chronic disease due to Chronic Kidney Disease/ESRD  Current CBC reviewed-   Lab Results   Component Value Date    HGB 6.9 (L) 02/08/2024    HCT 21.2 (L) 02/08/2024     Monitor serial CBC and transfuse if patient becomes hemodynamically unstable, symptomatic or H/H drops below 7/21.  - EPO started by Nephrology   Hgb of 6.9 today and pending surgical debridement.  Will transfuse one unit of blood.

## 2024-02-08 NOTE — ASSESSMENT & PLAN NOTE
Noted on CT. With leukocytosis  - started vanc, zosyn-- continue  - General surgery consulted- to OR today.  - wound care consulted

## 2024-02-08 NOTE — ANESTHESIA POSTPROCEDURE EVALUATION
Anesthesia Post Evaluation    Patient: Mahesh Cespedes    Procedure(s) Performed: Procedure(s) (LRB):  DEBRIDEMENT (N/A)    Final Anesthesia Type: general      Patient location during evaluation: ICU  Patient participation: Yes- Able to Participate  Level of consciousness: awake and alert  Post-procedure vital signs: reviewed and stable  Pain management: adequate  Airway patency: patent    PONV status at discharge: No PONV  Anesthetic complications: no      Cardiovascular status: hemodynamically stable  Respiratory status: unassisted and spontaneous ventilation  Hydration status: euvolemic  Follow-up not needed.              Vitals Value Taken Time   /68 02/08/24 1346   Temp 36.4 °C (97.6 °F) 02/08/24 1327   Pulse 67 02/08/24 1349   Resp 23 02/08/24 1349   SpO2 96 % 02/08/24 1349   Vitals shown include unvalidated device data.      No case tracking events are documented in the log.      Pain/Meet Score: Pain Rating Prior to Med Admin: 7 (2/8/2024  3:08 AM)  Pain Rating Post Med Admin: 5 (2/8/2024  3:38 AM)

## 2024-02-08 NOTE — PLAN OF CARE
Call received from Maryam with [facility] acknowledging receipt of referral submitted via [employer].  Contact number for Maryam is [phone] ext [phone]

## 2024-02-08 NOTE — ANESTHESIA PROCEDURE NOTES
Intubation    Date/Time: 2/8/2024 12:12 PM    Performed by: Willis Ahumada CRNA  Authorized by: Bruce Montague Chi, MD    Intubation:     Induction:  Intravenous    Intubated:  Postinduction    Mask Ventilation:  Easy mask    Attempts:  1    Attempted By:  BENI    Blade:  Avila 4    Laryngeal View Grade: Grade I - full view of cords      Difficult Airway Encountered?: No      Complications:  None    Airway Device:  Oral endotracheal tube    Airway Device Size:  7.5    Style/Cuff Inflation:  Cuffed    Inflation Amount (mL):  5    Tube secured:  22    Secured at:  The teeth    Placement Verified By:  Capnometry    Complicating Factors:  None    Findings Post-Intubation:  BS equal bilateral

## 2024-02-08 NOTE — TRANSFER OF CARE
"Anesthesia Transfer of Care Note    Patient: Mahesh Cespedes    Procedure(s) Performed: Procedure(s) (LRB):  DEBRIDEMENT (N/A)    Patient location: ICU    Anesthesia Type: general    Transport from OR: Transported from OR on 100% O2 by closed face mask with adequate spontaneous ventilation    Post assessment: no apparent anesthetic complications and tolerated procedure well    Post vital signs: stable    Level of consciousness: awake    Nausea/Vomiting: no nausea/vomiting    Complications: none    Transfer of care protocol was followed      Last vitals: Visit Vitals  BP (!) 140/53   Pulse 74   Temp 36.4 °C (97.6 °F)   Resp (!) 23   Ht 5' 7" (1.702 m)   Wt 75.3 kg (166 lb)   SpO2 97%   BMI 26.00 kg/m²     "

## 2024-02-08 NOTE — NURSING
[facility]  Nurses Note -- 4 Eyes      2/8/2024       Skin assessed on: Q Shift      [] No Pressure Injuries Present    []Prevention Measures Documented    [x] Yes LDA  for Pressure Injury Previously documented     [] Yes New Pressure Injury Discovered   [] LDA for New Pressure Injury Added      Attending RN:  Jose Robledo RN     Second RN:  BETO Moffett

## 2024-02-09 LAB
ANION GAP SERPL CALC-SCNC: 8 MMOL/L (ref 8–16)
BACTERIA BLD CULT: ABNORMAL
BASOPHILS # BLD AUTO: 0.05 K/UL (ref 0–0.2)
BASOPHILS NFR BLD: 0.3 % (ref 0–1.9)
BUN SERPL-MCNC: 33 MG/DL (ref 6–20)
CALCIUM SERPL-MCNC: 8.8 MG/DL (ref 8.7–10.5)
CHLORIDE SERPL-SCNC: 102 MMOL/L (ref 95–110)
CO2 SERPL-SCNC: 27 MMOL/L (ref 23–29)
CREAT SERPL-MCNC: 5.5 MG/DL (ref 0.5–1.4)
DIFFERENTIAL METHOD BLD: ABNORMAL
EOSINOPHIL # BLD AUTO: 0.4 K/UL (ref 0–0.5)
EOSINOPHIL NFR BLD: 2.7 % (ref 0–8)
ERYTHROCYTE [DISTWIDTH] IN BLOOD BY AUTOMATED COUNT: 17 % (ref 11.5–14.5)
EST. GFR  (NO RACE VARIABLE): 11 ML/MIN/1.73 M^2
GLUCOSE SERPL-MCNC: 128 MG/DL (ref 70–110)
GRAM STN SPEC: NORMAL
HCT VFR BLD AUTO: 24 % (ref 40–54)
HGB BLD-MCNC: 7.3 G/DL (ref 14–18)
IMM GRANULOCYTES # BLD AUTO: 0.09 K/UL (ref 0–0.04)
IMM GRANULOCYTES NFR BLD AUTO: 0.6 % (ref 0–0.5)
LYMPHOCYTES # BLD AUTO: 0.8 K/UL (ref 1–4.8)
LYMPHOCYTES NFR BLD: 5 % (ref 18–48)
MCH RBC QN AUTO: 25.1 PG (ref 27–31)
MCHC RBC AUTO-ENTMCNC: 30.4 G/DL (ref 32–36)
MCV RBC AUTO: 83 FL (ref 82–98)
MONOCYTES # BLD AUTO: 1 K/UL (ref 0.3–1)
MONOCYTES NFR BLD: 6.2 % (ref 4–15)
NEUTROPHILS # BLD AUTO: 13.2 K/UL (ref 1.8–7.7)
NEUTROPHILS NFR BLD: 85.2 % (ref 38–73)
NRBC BLD-RTO: 0 /100 WBC
PHOSPHATE SERPL-MCNC: 3.1 MG/DL (ref 2.7–4.5)
PLATELET # BLD AUTO: 313 K/UL (ref 150–450)
PMV BLD AUTO: 9.5 FL (ref 9.2–12.9)
POTASSIUM SERPL-SCNC: 4.5 MMOL/L (ref 3.5–5.1)
RBC # BLD AUTO: 2.91 M/UL (ref 4.6–6.2)
SODIUM SERPL-SCNC: 137 MMOL/L (ref 136–145)
WBC # BLD AUTO: 15.47 K/UL (ref 3.9–12.7)

## 2024-02-09 PROCEDURE — 25000003 PHARM REV CODE 250: Performed by: INTERNAL MEDICINE

## 2024-02-09 PROCEDURE — 36415 COLL VENOUS BLD VENIPUNCTURE: CPT | Performed by: HOSPITALIST

## 2024-02-09 PROCEDURE — 84100 ASSAY OF PHOSPHORUS: CPT | Performed by: HOSPITALIST

## 2024-02-09 PROCEDURE — 99900031 HC PATIENT EDUCATION (STAT)

## 2024-02-09 PROCEDURE — 97161 PT EVAL LOW COMPLEX 20 MIN: CPT

## 2024-02-09 PROCEDURE — 97167 OT EVAL HIGH COMPLEX 60 MIN: CPT

## 2024-02-09 PROCEDURE — 63600175 PHARM REV CODE 636 W HCPCS

## 2024-02-09 PROCEDURE — 25000003 PHARM REV CODE 250: Performed by: HOSPITALIST

## 2024-02-09 PROCEDURE — 99497 ADVNCD CARE PLAN 30 MIN: CPT | Mod: ,,, | Performed by: REGISTERED NURSE

## 2024-02-09 PROCEDURE — 99233 SBSQ HOSP IP/OBS HIGH 50: CPT | Mod: ,,,

## 2024-02-09 PROCEDURE — 97530 THERAPEUTIC ACTIVITIES: CPT

## 2024-02-09 PROCEDURE — 99233 SBSQ HOSP IP/OBS HIGH 50: CPT | Mod: ,,, | Performed by: REGISTERED NURSE

## 2024-02-09 PROCEDURE — 80048 BASIC METABOLIC PNL TOTAL CA: CPT | Performed by: HOSPITALIST

## 2024-02-09 PROCEDURE — 11000001 HC ACUTE MED/SURG PRIVATE ROOM

## 2024-02-09 PROCEDURE — 97110 THERAPEUTIC EXERCISES: CPT

## 2024-02-09 PROCEDURE — 63600175 PHARM REV CODE 636 W HCPCS: Performed by: HOSPITALIST

## 2024-02-09 PROCEDURE — 85025 COMPLETE CBC W/AUTO DIFF WBC: CPT | Performed by: HOSPITALIST

## 2024-02-09 PROCEDURE — 99900035 HC TECH TIME PER 15 MIN (STAT)

## 2024-02-09 RX ADMIN — PIPERACILLIN AND TAZOBACTAM 4.5 G: 4; .5 INJECTION, POWDER, LYOPHILIZED, FOR SOLUTION INTRAVENOUS; PARENTERAL at 06:02

## 2024-02-09 RX ADMIN — CALCITRIOL CAPSULES 0.25 MCG 0.25 MCG: 0.25 CAPSULE ORAL at 08:02

## 2024-02-09 RX ADMIN — METOPROLOL SUCCINATE 100 MG: 50 TABLET, EXTENDED RELEASE ORAL at 08:02

## 2024-02-09 RX ADMIN — ATORVASTATIN CALCIUM 80 MG: 40 TABLET, FILM COATED ORAL at 08:02

## 2024-02-09 RX ADMIN — AMLODIPINE BESYLATE 10 MG: 5 TABLET ORAL at 09:02

## 2024-02-09 RX ADMIN — PIPERACILLIN AND TAZOBACTAM 4.5 G: 4; .5 INJECTION, POWDER, LYOPHILIZED, FOR SOLUTION INTRAVENOUS; PARENTERAL at 05:02

## 2024-02-09 RX ADMIN — SEVELAMER CARBONATE 800 MG: 800 TABLET, FILM COATED ORAL at 08:02

## 2024-02-09 RX ADMIN — Medication 6 MG: at 09:02

## 2024-02-09 RX ADMIN — MUPIROCIN: 20 OINTMENT TOPICAL at 08:02

## 2024-02-09 RX ADMIN — HYDROMORPHONE HYDROCHLORIDE 0.5 MG: 1 INJECTION, SOLUTION INTRAMUSCULAR; INTRAVENOUS; SUBCUTANEOUS at 10:02

## 2024-02-09 RX ADMIN — NEPHROCAP 1 CAPSULE: 1 CAP ORAL at 08:02

## 2024-02-09 RX ADMIN — APIXABAN 5 MG: 5 TABLET, FILM COATED ORAL at 08:02

## 2024-02-09 RX ADMIN — BENZTROPINE MESYLATE 0.5 MG: 0.5 TABLET ORAL at 08:02

## 2024-02-09 RX ADMIN — TAMSULOSIN HYDROCHLORIDE 0.4 MG: 0.4 CAPSULE ORAL at 08:02

## 2024-02-09 RX ADMIN — APIXABAN 5 MG: 5 TABLET, FILM COATED ORAL at 09:02

## 2024-02-09 RX ADMIN — ALLOPURINOL 100 MG: 100 TABLET ORAL at 08:02

## 2024-02-09 RX ADMIN — MUPIROCIN 1 G: 20 OINTMENT TOPICAL at 09:02

## 2024-02-09 RX ADMIN — BENZTROPINE MESYLATE 0.5 MG: 0.5 TABLET ORAL at 09:02

## 2024-02-09 RX ADMIN — AMIODARONE HYDROCHLORIDE 200 MG: 200 TABLET ORAL at 08:02

## 2024-02-09 RX ADMIN — HYDROMORPHONE HYDROCHLORIDE 0.5 MG: 1 INJECTION, SOLUTION INTRAMUSCULAR; INTRAVENOUS; SUBCUTANEOUS at 06:02

## 2024-02-09 RX ADMIN — HYDROMORPHONE HYDROCHLORIDE 0.5 MG: 1 INJECTION, SOLUTION INTRAMUSCULAR; INTRAVENOUS; SUBCUTANEOUS at 05:02

## 2024-02-09 RX ADMIN — SEVELAMER CARBONATE 800 MG: 800 TABLET, FILM COATED ORAL at 05:02

## 2024-02-09 RX ADMIN — SEVELAMER CARBONATE 800 MG: 800 TABLET, FILM COATED ORAL at 12:02

## 2024-02-09 NOTE — PLAN OF CARE
Neuro:AAO X 1    Cardiac: SR     Respiratory: NC @ 1LPM    GI:RENAL     :HD     Lines: PIV X 2    GTT: ABX     Events:PRN for pain control.   Bed locked, bed in lowest position, and call light in place. Care explained.

## 2024-02-09 NOTE — PROGRESS NOTES
Renal Progress Note    Date of Admission:  2/5/2024 12:18 PM    Length of Stay: 3  Days    Subjective: n/a    Objective:    Current Facility-Administered Medications   Medication    0.9%  NaCl infusion (for blood administration)    acetaminophen tablet 650 mg    albuterol-ipratropium 2.5 mg-0.5 mg/3 mL nebulizer solution 3 mL    allopurinoL tablet 100 mg    aluminum-magnesium hydroxide-simethicone 200-200-20 mg/5 mL suspension 30 mL    amiodarone tablet 200 mg    amLODIPine tablet 10 mg    apixaban tablet 5 mg    atorvastatin tablet 80 mg    benztropine tablet 0.5 mg    calcitRIOL capsule 0.25 mcg    dextrose 10% bolus 125 mL 125 mL    dextrose 10% bolus 250 mL 250 mL    epoetin luli-epbx injection 10,000 Units    glucagon (human recombinant) injection 1 mg    glucose chewable tablet 16 g    glucose chewable tablet 24 g    HYDROmorphone injection 0.5 mg    iron sucrose injection 200 mg    melatonin tablet 6 mg    metoprolol succinate (TOPROL-XL) 24 hr tablet 100 mg    mupirocin 2 % ointment    naloxone 0.4 mg/mL injection 0.02 mg    ondansetron injection 4 mg    oxyCODONE immediate release tablet 10 mg    piperacillin-tazobactam (ZOSYN) 4.5 g in dextrose 5 % in water (D5W) 100 mL IVPB (MB+)    prochlorperazine injection Soln 5 mg    sevelamer carbonate tablet 800 mg    simethicone chewable tablet 80 mg    sodium chloride 0.9% bolus 250 mL 250 mL    sodium chloride 0.9% flush 10 mL    tamsulosin 24 hr capsule 0.4 mg    vancomycin - pharmacy to dose    vitamin renal formula (B-complex-vitamin c-folic acid) 1 mg per capsule 1 capsule       Vitals:    02/08/24 2139 02/08/24 2347 02/09/24 0501 02/09/24 0607   BP: (!) 119/56 (!) 116/55 (!) 111/56    Patient Position:  Lying Lying    Pulse: 69 68 62    Resp: 19 18 18 18   Temp: 98.3 °F (36.8 °C) 97.7 °F (36.5 °C) 98.2 °F (36.8 °C)    TempSrc:  Oral Oral    SpO2: 99% 100% 100%    Weight:       Height:           I/O last 3 completed shifts:  In:  "2202.5 [P.O.:180; Blood:275; Other:1000; IV Piggyback:747.5]  Out: 4000 [Other:4000]  I/O this shift:  In: 120 [P.O.:120]  Out: 0       Physical Exam:    General: NAD  Neck: supple  Heart: RRR  Lungs: unlabored breathing  Abdomen: n/a  Limbs: n/a  Neurologic: Awake, confused      Laboratories:    No results for input(s): "WBC", "RBC", "HGB", "HCT", "PLT", "MCV", "MCH", "MCHC" in the last 24 hours.      No results for input(s): "GLUCOSE", "CALCIUM", "ALBUMIN", "PROT", "NA", "K", "CO2", "CL", "BUN", "CREATININE", "ALKPHOS", "ALT", "AST", "BILITOT" in the last 24 hours.      No results for input(s): "COLORU", "CLARITYU", "SPECGRAV", "PHUR", "PROTEINUA", "GLUCOSEU", "BILIRUBINCON", "BLOODU", "WBCU", "RBCU", "BACTERIA", "MUCUS" in the last 24 hours.    Microbiology Results (last 7 days)       Procedure Component Value Units Date/Time    Blood culture [[id number]] Collected: 02/07/24 1344    Order Status: Completed Specimen: Blood from Peripheral, Antecubital, Right Updated: 02/08/24 1503     Blood Culture, Routine No Growth to date      No Growth to date    Narrative:      Collection has been rescheduled by CMO at 02/07/2024 09:28 Reason: Pt   in dialysis  Collection has been rescheduled by RBJ at 02/07/2024 11:23 Reason: At   13:30  Collection has been rescheduled by [email or web address] at 02/07/2024 12:05 Reason:   Pt is doing dialysis will be ready at130  Collection has been rescheduled by CMO at 02/07/2024 09:28 Reason: Pt   in dialysis  Collection has been rescheduled by RBJ at 02/07/2024 11:23 Reason: At   13:30  Collection has been rescheduled by [email or web address] at 02/07/2024 12:05 Reason:   Pt is doing dialysis will be ready at130    Blood culture [[id number]] Collected: 02/07/24 1332    Order Status: Completed Specimen: Blood from Peripheral, Hand, Left Updated: 02/08/24 1503     Blood Culture, Routine No Growth to date      No Growth to date    Narrative:      Collection has been rescheduled by CMO at 02/07/2024 09:28 Reason: Pt   in " dialysis  Collection has been rescheduled by RBJ at 02/07/2024 11:23 Reason: At   13:30  Collection has been rescheduled by [email or web address] at 02/07/2024 12:05 Reason:   Pt is doing dialysis will be ready at130  Collection has been rescheduled by CMO at 02/07/2024 09:28 Reason: Pt   in dialysis  Collection has been rescheduled by RBJ at 02/07/2024 11:23 Reason: At   13:30  Collection has been rescheduled by [email or web address] at 02/07/2024 12:05 Reason:   Pt is doing dialysis will be ready at130    AFB Culture & Smear [[id number]] Collected: 02/08/24 1250    Order Status: Sent Specimen: Bone from Sacrum Updated: 02/08/24 1424    AFB Culture & Smear [[id number]] Collected: 02/08/24 1250    Order Status: Sent Specimen: Bone from Sacrum Updated: 02/08/24 1424    Fungus culture [[id number]] Collected: 02/08/24 1250    Order Status: Sent Specimen: Bone from Sacrum Updated: 02/08/24 1422    Culture, Anaerobe [[id number]] Collected: 02/08/24 1250    Order Status: Sent Specimen: Bone from Sacrum Updated: 02/08/24 1422    Gram stain [[id number]] Collected: 02/08/24 1250    Order Status: Sent Specimen: Bone from Sacrum Updated: 02/08/24 1422    Aerobic culture [[id number]] Collected: 02/08/24 1250    Order Status: Sent Specimen: Bone from Sacrum Updated: 02/08/24 1421    Fungus culture [[id number]] Collected: 02/08/24 1250    Order Status: Sent Specimen: Bone from Sacrum Updated: 02/08/24 1419    Culture, Anaerobe [[id number]] Collected: 02/08/24 1250    Order Status: Sent Specimen: Bone from Sacrum Updated: 02/08/24 1419    Gram stain [[id number]] Collected: 02/08/24 1250    Order Status: Sent Specimen: Bone from Sacrum Updated: 02/08/24 1418    Aerobic culture [[id number]] Collected: 02/08/24 1250    Order Status: Sent Specimen: Bone from Sacrum Updated: 02/08/24 1418    Blood culture [[id number]] Collected: 02/06/24 0841    Order Status: Completed Specimen: Blood from Hand, Right Updated: 02/08/24 0903     Blood Culture, Routine No Growth to date       No Growth to date      No Growth to date    Blood culture [[id number]]  (Abnormal) Collected: 02/06/24 0841    Order Status: Completed Specimen: Blood from Forearm, Left Updated: 02/08/24 0803     Blood Culture, Routine Gram stain marsha bottle: Gram variable rods      Results called to and read back by: Tee PÉREZ ICU 02/07/2024  05:08      GRAM NEGATIVE RIGO, NON-LACTOSE   Identification and susceptibility pending      Rapid Organism ID by PCR (from Blood culture) [[id number]]  (Abnormal) Collected: 02/06/24 0841    Order Status: Completed Updated: 02/07/24 1622     Enterococcus faecalis Not Detected     Enterococcus faecium Not Detected     Listeria monocytogenes Not Detected     Staphylococcus spp. Not Detected     Staphylococcus aureus Not Detected     Staphylococcus epidermidis Not Detected     Staphylococcus lugdunensis Not Detected     Streptococcus species Not Detected     Streptococcus agalactiae Not Detected     Streptococcus pneumoniae Not Detected     Streptococcus pyogenes Not Detected     Acinetobacter calcoaceticus/baumannii complex Not Detected     Bacteroides fragilis Not Detected     Enterobacterales Detected     Enterobacter cloacae complex Not Detected     Escherichia coli Not Detected     Klebsiella aerogenes Not Detected     Klebsiella oxytoca Not Detected     Klebsiella pneumoniae group Not Detected     Proteus Not Detected     Salmonella sp Not Detected     Serratia marcescens Not Detected     Haemophilus influenzae Not Detected     Neisseria meningtidis Not Detected     Pseudomonas aeruginosa Not Detected     Stenotrophomonas maltophilia Not Detected     Candida albicans Not Detected     Candida auris Not Detected     Candida glabrata Not Detected     Candida krusei Not Detected     Candida parapsilosis Not Detected     Candida tropicalis Not Detected     Cryptococcus neoformans/gattii Not Detected     CTX-M (ESBL ) Not Detected     IMP (Carbapenem resistant) Not Detected  "    KPC resistance gene (Carbapenem resistant) Not Detected     mcr-1  Test Not Applicable     mec A/C  Test Not Applicable     mec A/C and MREJ (MRSA) gene Test Not Applicable     NDM (Carbapenem resistant) Not Detected     OXA-48-like (Carbapenem resistant) Not Detected     van A/B (VRE gene) Test Not Applicable     VIM (Carbapenem resistant) Not Detected              Diagnostic Tests:     CXR:  Impression:       Cardiomegaly.  Increased interstitial lung markings.  Possible small pleural effusions.  Question on fluid overload and or CHF.  However, a pneumonic infectious process cannot be excluded in the appropriate clinical circumstances.      Electronically signed by: Sandie Martinez  Date: 02/05/2024           Assessment:    59 y/o male with known to me from prior encounter last month; Hx. ESRD (Moved from [facility]. no Dialysis unit to go to) admitted with:     - Hyperkalemia corrected with Dialysis last p.m.  - HAGMA  - Uncontrolled HTN  - One of the blood cultures reported as "gram variable rods" ID and sensitivity pending  - Fluid overload  - Improving Metabolic (uremic) encephalopathy, last HD 1/22/24 while in the Hospital (Pte. Was not accepted by any of the local Dialysis units due to Hx. Of non-compliance and was told either to try to go back to [place] or come to ED periodically as needed for HD)  - Hyperphosphatemia improving  - 2nd. hyperparathyroidism  - Hx. Ascites s/p Paracentesis in January  - Fe++ def. + Anemia of ckd  - HTN  - Hx. CVA  - Hypoalbuminemia  - Sacral pressure ulcer           Plan:    - Empiric antibiotics  - Dialysis tomorrow and Q MWF next week  - IV Fe++ loading during Dialysis  - Epogen 3 x week  - Transfuse prn Hgb < 7  - Renal diet + protein supplements  - Adjust oral PO4- binders   - Calcitriol  - Wound care per surgery  - Consider Nursing Home placement, Pte. Clearly unable to take care of self  - Disposition and other problems per admitting                 "

## 2024-02-09 NOTE — ASSESSMENT & PLAN NOTE
Blood culture 2/6/24 with gram variable rods.   - will choose to treat given presence of gluteal abscess   - follow up cultures   Morganella bacteremia.

## 2024-02-09 NOTE — SUBJECTIVE & OBJECTIVE
Interval History: s/p operative debridement of sacral wound 2/8.     Medications:  Continuous Infusions:  Scheduled Meds:   allopurinoL  100 mg Oral Daily    amiodarone  200 mg Oral Daily    amLODIPine  10 mg Oral QHS    apixaban  5 mg Oral Q12H    atorvastatin  80 mg Oral Daily    benztropine  0.5 mg Oral Q12H    calcitRIOL  0.25 mcg Oral Daily    epoetin luli-epbx  10,000 Units Subcutaneous Every Tues, Thurs, Sat    IRON SUCROSE IV ORDERABLE  200 mg Intravenous Every Tues, Thurs, Sat    metoprolol succinate  100 mg Oral Daily    mupirocin   Nasal BID    piperacillin-tazobactam (Zosyn) IV (PEDS and ADULTS) (extended infusion is not appropriate)  4.5 g Intravenous Q12H    sevelamer carbonate  800 mg Oral TID WM    tamsulosin  0.4 mg Oral Daily    vitamin renal formula (B-complex-vitamin c-folic acid)  1 capsule Oral Daily     PRN Meds:0.9%  NaCl infusion (for blood administration), acetaminophen, albuterol-ipratropium, aluminum-magnesium hydroxide-simethicone, dextrose 10%, dextrose 10%, glucagon (human recombinant), glucose, glucose, HYDROmorphone, melatonin, naloxone, ondansetron, oxyCODONE, prochlorperazine, simethicone, sodium chloride 0.9%, sodium chloride 0.9%, Pharmacy to dose Vancomycin consult **AND** vancomycin - pharmacy to dose     Review of patient's allergies indicates:  No Known Allergies  Objective:     Vital Signs (Most Recent):  Temp: 98.8 °F (37.1 °C) (02/09/24 0730)  Pulse: 71 (02/09/24 0730)  Resp: 19 (02/09/24 0730)  BP: (!) 146/70 (02/09/24 0730)  SpO2: (!) 91 % (02/09/24 0730) Vital Signs (24h Range):  Temp:  [97.6 °F (36.4 °C)-98.9 °F (37.2 °C)] 98.8 °F (37.1 °C)  Pulse:  [62-75] 71  Resp:  [10-23] 19  SpO2:  [90 %-100 %] 91 %  BP: (108-160)/(51-70) 146/70     Weight: 75.3 kg (166 lb)  Body mass index is 26 kg/m².    Intake/Output - Last 3 Shifts         02/07 0700  02/08 0659 02/08 0700 02/09 0659 02/09 0700  02/10 0659    P.O. 180 120     Blood  275     Other 500 500     IV Piggyback 303  444.5     Total Intake(mL/kg) 983 (13.1) 1339.5 (17.8)     Urine (mL/kg/hr) 0 (0) 0 (0)     Emesis/NG output 0 0     Other 2000 2000     Stool 0 0     Total Output 2000 2000     Net -1017 -660.5            Urine Occurrence 1 x               Physical Exam  Vitals reviewed.   Constitutional:       Appearance: Normal appearance.   Cardiovascular:      Rate and Rhythm: Normal rate.      Comments: R IJ tunneled HD line  Pulmonary:      Effort: Pulmonary effort is normal. No respiratory distress.   Abdominal:      Palpations: Abdomen is soft.      Tenderness: There is no abdominal tenderness.   Genitourinary:     Comments: Sacral wound with packing in place, ss drainage, some fibrinous exudate, packing exchanged   Skin:     General: Skin is warm.   Neurological:      General: No focal deficit present.      Mental Status: He is alert. He is disoriented.          Significant Labs:  I have reviewed all pertinent lab results within the past 24 hours.  CBC:   Recent Labs   Lab 02/09/24  0843   WBC 15.47*   RBC 2.91*   HGB 7.3*   HCT 24.0*      MCV 83   MCH 25.1*   MCHC 30.4*       CMP:   Recent Labs   Lab 02/08/24  0433   GLU 93   CALCIUM 8.4*   ALBUMIN 1.9*   PROT 7.2      K 4.5   CO2 25      BUN 42*   CREATININE 7.0*   ALKPHOS 109   ALT 24   AST 27   BILITOT 0.5         Significant Diagnostics:  I have reviewed all pertinent imaging results/findings within the past 24 hours.
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
## 2024-02-09 NOTE — ASSESSMENT & PLAN NOTE
Chronic, controlled. Latest blood pressure and vitals reviewed-     Temp:  [97.6 °F (36.4 °C)-98.9 °F (37.2 °C)]   Pulse:  [53-75]   Resp:  [10-23]   BP: (111-160)/(53-71)   SpO2:  [90 %-100 %] .   Home meds for hypertension were reviewed and noted below.   Hypertension Medications               amLODIPine (NORVASC) 10 MG tablet Take 10 mg by mouth once daily.    furosemide (LASIX) 20 MG tablet Take 20 mg by mouth 2 (two) times daily.    hydrALAZINE (APRESOLINE) 100 MG tablet Take 1 tablet (100 mg total) by mouth 3 (three) times daily.    metoprolol succinate (TOPROL-XL) 100 MG 24 hr tablet Take 100 mg by mouth once daily.    NIFEdipine (PROCARDIA-XL) 60 MG (OSM) 24 hr tablet Take 1 tablet (60 mg total) by mouth once daily.            While in the hospital, will manage blood pressure as follows; Continue home antihypertensive regimen    Will utilize p.r.n. blood pressure medication only if patient's blood pressure greater than 180/110 and he develops symptoms such as worsening chest pain or shortness of breath.

## 2024-02-09 NOTE — PROGRESS NOTES
[facility] Medicine  Progress Note    Patient Name: Mahesh Cespedes  MRN: [id number]  Patient Class: IP- Inpatient   Admission Date: 2/5/2024  Length of Stay: 3 days  Attending Physician: Thor May MD  Primary Care Provider: Cruz Hernandez MD        Subjective:     Principal Problem:Uremic encephalopathy        HPI:  58 y.o. male with AFib, DM2, ESRD on dialysis, hypertension presents from home with a complaint of altered mental status.  Family report he has been drowsy and fatigued with increased confusion for the past 2 days.  Has been progressively worsening.  Associated with significant peripheral edema.  Last dialysis was 2 weeks ago.  Denies fever, chills, cough, SOB, chest pain, palpitations, nausea, vomiting, diarrhea, or abdominal pain.  History is somewhat limited given the patient's drowsiness but he is awake and conversant.    In the ED, labs revealed hyperkalemia, K + 7.4.  He was given shifting medications and zirconium.  Nephrology was consulted and plan for urgent dialysis.  Labs also reveal uremia, leukocytosis, hyperphosphatemia, and metabolic acidosis.  Placed in observation to obtain dialysis.    Overview/Hospital Course:  Mr Mahesh Cespedes is a 58 y.o. man with ESRD but without outpatient dialysis set up who was admitted with uremic encephalopathy, hyperkalemia. Nephrology consulted and he received emergent dialysis. Mental status is improving. He has sacral wound with abscess. Started antibiotics. Blood cultures with gram variable rods. General surgery planning OR debridement on 2/8/24. S/P debridement with bone biopsy.  Morganella bacteremia.    Interval History: more awake and conversant today.    Review of Systems   HENT:  Negative for ear discharge and ear pain.    Eyes:  Negative for discharge and itching.   Endocrine: Negative for cold intolerance and heat intolerance.   Neurological:  Negative for seizures and syncope.     Objective:     Vital Signs (Most  Recent):  Temp: 98 °F (36.7 °C) (02/09/24 1053)  Pulse: (!) 53 (02/09/24 1053)  Resp: 18 (02/09/24 1053)  BP: 137/71 (02/09/24 1053)  SpO2: 100 % (02/09/24 1053) Vital Signs (24h Range):  Temp:  [97.6 °F (36.4 °C)-98.9 °F (37.2 °C)] 98 °F (36.7 °C)  Pulse:  [53-75] 53  Resp:  [10-23] 18  SpO2:  [90 %-100 %] 100 %  BP: (111-160)/(53-71) 137/71     Weight: 75.3 kg (166 lb)  Body mass index is 26 kg/m².    Intake/Output Summary (Last 24 hours) at 2/9/2024 1238  Last data filed at 2/8/2024 2100  Gross per 24 hour   Intake 995 ml   Output 2000 ml   Net -1005 ml           Physical Exam  Vitals and nursing note reviewed.   Constitutional:       General: He is not in acute distress.     Appearance: He is ill-appearing. He is not toxic-appearing.   HENT:      Head: Normocephalic and atraumatic.   Neck:      Comments: R chest wall THDC in place  Cardiovascular:      Rate and Rhythm: Normal rate and regular rhythm.   Pulmonary:      Effort: Pulmonary effort is normal.      Breath sounds: Normal breath sounds.      Comments: Room air  Abdominal:      General: Bowel sounds are normal. There is no distension.      Tenderness: There is no abdominal tenderness. There is no guarding or rebound.      Hernia: A hernia (umbilical, reducible) is present.   Musculoskeletal:      Right lower leg: No edema.      Left lower leg: No edema.   Skin:     General: Skin is warm and dry.   Neurological:      Mental Status: He is alert.             Significant Labs: All pertinent labs within the past 24 hours have been reviewed.  BMP:   Recent Labs   Lab 02/08/24  0433 02/09/24  0843   GLU 93 128*    137   K 4.5 4.5    102   CO2 25 27   BUN 42* 33*   CREATININE 7.0* 5.5*   CALCIUM 8.4* 8.8   MG 1.7  --        CBC:   Recent Labs   Lab 02/08/24  0433 02/09/24  0843   WBC 17.58* 15.47*   HGB 6.9* 7.3*   HCT 21.2* 24.0*    313         Significant Imaging: I have reviewed all pertinent imaging results/findings within the past 24  hours.    Assessment/Plan:      * Uremic encephalopathy   with acute encephalopathy.  Nephrology consulted and received emergent HD. Mental status now improving.    Positive blood culture  Blood culture 2/6/24 with gram variable rods.   - will choose to treat given presence of gluteal abscess   - follow up cultures   Morganella bacteremia.      Pressure injury of sacral region, unstageable  Wound care consulted       Gluteal abscess  Noted on CT. With leukocytosis  - started vanc, zosyn-- continue  - General surgery consulted- to OR.  - wound care consulted  S/P operative debridement 2/8 with bone biopsy obtained given exposed coccyx        Hyperphosphatemia  Secondary to ESRD.    Paroxysmal atrial fibrillation  Patient with Paroxysmal (<7 days) atrial fibrillation which is controlled currently with Amiodarone. Patient is currently in sinus rhythm.[id number] Score: 1.  Anticoagulation indicated. Anticoagulation done with apixaban .    Goals of care, counseling/discussion  Advance Care Planning  Palliative care consulted.          Anemia in ESRD (end-stage renal disease)  Patient's anemia is currently controlled.  Etiology likely d/t chronic disease due to Chronic Kidney Disease/ESRD  Current CBC reviewed-   Lab Results   Component Value Date    HGB 7.3 (L) 02/09/2024    HCT 24.0 (L) 02/09/2024     Monitor serial CBC and transfuse if patient becomes hemodynamically unstable, symptomatic or H/H drops below 7/21.  - EPO started by Nephrology   Hgb of 6.9 and pending surgical debridement.  Transfused one unit of blood with some improvement of H/H.  Closely monitor.    Essential hypertension  Chronic, controlled. Latest blood pressure and vitals reviewed-     Temp:  [97.6 °F (36.4 °C)-98.9 °F (37.2 °C)]   Pulse:  [53-75]   Resp:  [10-23]   BP: (111-160)/(53-71)   SpO2:  [90 %-100 %] .   Home meds for hypertension were reviewed and noted below.   Hypertension Medications               amLODIPine (NORVASC) 10 MG  tablet Take 10 mg by mouth once daily.    furosemide (LASIX) 20 MG tablet Take 20 mg by mouth 2 (two) times daily.    hydrALAZINE (APRESOLINE) 100 MG tablet Take 1 tablet (100 mg total) by mouth 3 (three) times daily.    metoprolol succinate (TOPROL-XL) 100 MG 24 hr tablet Take 100 mg by mouth once daily.    NIFEdipine (PROCARDIA-XL) 60 MG (OSM) 24 hr tablet Take 1 tablet (60 mg total) by mouth once daily.            While in the hospital, will manage blood pressure as follows; Continue home antihypertensive regimen    Will utilize p.r.n. blood pressure medication only if patient's blood pressure greater than 180/110 and he develops symptoms such as worsening chest pain or shortness of breath.    ESRD needing dialysis  Admitted with uremic encephalopathy and hyperkalemia. Received emergent dialysis  - dialysis not able to be arranged as outpatient due to history of nonadherence--> this is a major issue. Social work checking again to see if they can arrange. Palliative care consulted  - Nephrology consulted      VTE Risk Mitigation (From admission, onward)           Ordered     apixaban tablet 5 mg  Every 12 hours         02/05/24 1733     IP VTE HIGH RISK PATIENT  Once         02/05/24 1733     Place sequential compression device  Until discontinued         02/05/24 1733     Reason for No Pharmacological VTE Prophylaxis  Once        Question:  Reasons:  Answer:  Already adequately anticoagulated on oral Anticoagulants    02/05/24 1733                    Discharge Planning   JAIME: 2/8/2024     Code Status: Full Code   Is the patient medically ready for discharge?:     Reason for patient still in hospital (select all that apply): Treatment  Discharge Plan A: Home Health   Discharge Delays: None known at this time              Thor May MD  Department of Hospital Medicine   [facility] - Med Surg

## 2024-02-09 NOTE — ASSESSMENT & PLAN NOTE
Patient with Paroxysmal (<7 days) atrial fibrillation which is controlled currently with Amiodarone. Patient is currently in sinus rhythm.[id number] Score: 1.  Anticoagulation indicated. Anticoagulation done with apixaban .

## 2024-02-09 NOTE — ASSESSMENT & PLAN NOTE
- oriented to self and place only, but pleasant, and cooperative during my assessment   - has been non-compliant with HD; now receiving HD inpatient   - continued management per hospital primary

## 2024-02-09 NOTE — SUBJECTIVE & OBJECTIVE
Medications:  Continuous Infusions:  Scheduled Meds:   allopurinoL  100 mg Oral Daily    amiodarone  200 mg Oral Daily    amLODIPine  10 mg Oral QHS    apixaban  5 mg Oral Q12H    atorvastatin  80 mg Oral Daily    benztropine  0.5 mg Oral Q12H    calcitRIOL  0.25 mcg Oral Daily    epoetin luli-epbx  10,000 Units Subcutaneous Every Tues, Thurs, Sat    IRON SUCROSE IV ORDERABLE  200 mg Intravenous Every Tues, Thurs, Sat    metoprolol succinate  100 mg Oral Daily    mupirocin   Nasal BID    piperacillin-tazobactam (Zosyn) IV (PEDS and ADULTS) (extended infusion is not appropriate)  4.5 g Intravenous Q12H    sevelamer carbonate  800 mg Oral TID WM    tamsulosin  0.4 mg Oral Daily    vitamin renal formula (B-complex-vitamin c-folic acid)  1 capsule Oral Daily     PRN Meds:0.9%  NaCl infusion (for blood administration), acetaminophen, albuterol-ipratropium, aluminum-magnesium hydroxide-simethicone, dextrose 10%, dextrose 10%, glucagon (human recombinant), glucose, glucose, HYDROmorphone, melatonin, naloxone, ondansetron, oxyCODONE, prochlorperazine, simethicone, sodium chloride 0.9%, sodium chloride 0.9%, Pharmacy to dose Vancomycin consult **AND** vancomycin - pharmacy to dose    Objective:     Vital Signs (Most Recent):  Temp: 98 °F (36.7 °C) (02/09/24 1053)  Pulse: (!) 53 (02/09/24 1053)  Resp: 18 (02/09/24 1053)  BP: 137/71 (02/09/24 1053)  SpO2: 100 % (02/09/24 1053) Vital Signs (24h Range):  Temp:  [97.6 °F (36.4 °C)-98.9 °F (37.2 °C)] 98 °F (36.7 °C)  Pulse:  [53-75] 53  Resp:  [10-23] 18  SpO2:  [91 %-100 %] 100 %  BP: (111-148)/(55-71) 137/71     Weight: 75.3 kg (166 lb)  Body mass index is 26 kg/m².     Physical Exam  Vitals and nursing note reviewed.   Constitutional:       Appearance: He is ill-appearing (acute and chronic).      Comments: Lying in bed, sleeping but easily aroused; pleasant and cooperative    HENT:      Head: Normocephalic and atraumatic.   Pulmonary:      Effort: Pulmonary effort is normal.  No respiratory distress.   Skin:     Comments: Known sacral wound; not directly assessed during visit    Neurological:      Comments: Oriented to self and place         Advance Care Planning   Advance Directives:   Living Will: No    LaPOST: No    Do Not Resuscitate Status: No    Medical Power of : No (5 adult children are collectively legal surrogate decision makers; lives with Daughter Rima)      Decision Making:  Family answered questions and Patient unable to communicate due to disease severity/cognitive impairment  Goals of Care: What is most important right now is to focus on remaining as independent as possible, symptom/pain control, and improvement in current condition. Accordingly, we have decided that the best plan to meet the patient's goals includes continuing with treatment.     Significant Labs: All pertinent labs within the past 24 hours have been reviewed.  CBC:   Recent Labs   Lab 02/09/24  0843   WBC 15.47*   HGB 7.3*   HCT 24.0*   MCV 83        BMP:  Recent Labs   Lab 02/09/24  0843   *      K 4.5      CO2 27   BUN 33*   CREATININE 5.5*   CALCIUM 8.8     LFT:  Lab Results   Component Value Date    AST 27 02/08/2024    ALKPHOS 109 02/08/2024    BILITOT 0.5 02/08/2024     Albumin:   Albumin   Date Value Ref Range Status   02/08/2024 1.9 (L) 3.5 - 5.2 g/dL Final     Protein:   Total Protein   Date Value Ref Range Status   02/08/2024 7.2 6.0 - 8.4 g/dL Final     Lactic acid:   Lab Results   Component Value Date    LACTATE 1.2 02/08/2017    LACTATE 0.7 01/28/2016       Significant Imaging: I have reviewed all pertinent imaging results/findings within the past 24 hours.

## 2024-02-09 NOTE — PLAN OF CARE
Problem: Physical Therapy  Goal: Physical Therapy Goal  Description: Goals to be met by: 24     Patient will increase functional independence with mobility by performin. Supine to sit with Stand-by Assistance  2. Sit to stand transfer with Contact Guard Assistance  3. Bed to chair transfer with Contact Guard Assistance    4. Gait  x 10-15 feet with Minimal Assistance using Rolling Walker.   5. Wheelchair propulsion x50 feet with Stand-by Assistance    6. Lower extremity exercise program x10 reps per handout, with supervision    Outcome: Ongoing, Progressing   Initial PT evaluation performed today.  Pt could benefit from skilled PT services 5-6x/wk in order to maximize function prior to D/C.   Moderate Intensity Therapy recommended as pt is at increased risk for falls, readmissions, further skin breakdown, and Morbidity if he returns home a present time.
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
## 2024-02-09 NOTE — NURSING
[facility], [facility]  Nurses Note -- 4 Eyes    Pt transferred from ICU, oriented to self. With oxygen support at 2L NC, not in distress. With R IJ noted, intact. Vital signs taken and recorded. S/P debridement today, with wound dressing noted on sacral part intact. Turned pt every 2 hours. HOB elevated. With telesitter. Bed in low pOSItion. With SCD. BeD alarm set. Call light within reach.  2/9/2024       Skin assessed on: Transfer      [] No Pressure Injuries Present    []Prevention Measures Documented    [x] Yes LDA  for Pressure Injury Previously documented     [] Yes New Pressure Injury Discovered   [] LDA for New Pressure Injury Added      Attending RN:  Jb Tariq RN     Second RN:  BETO Griggs

## 2024-02-09 NOTE — PROGRESS NOTES
Vancomycin consult follow-up:    Patient reviewed, dialysis scheduled every Tues, Thurs, Sat, no new levels, no dose today; Next levels due: random due 2/10/2024 at 0400

## 2024-02-09 NOTE — ASSESSMENT & PLAN NOTE
Patient's anemia is currently controlled.  Etiology likely d/t chronic disease due to Chronic Kidney Disease/ESRD  Current CBC reviewed-   Lab Results   Component Value Date    HGB 7.3 (L) 02/09/2024    HCT 24.0 (L) 02/09/2024     Monitor serial CBC and transfuse if patient becomes hemodynamically unstable, symptomatic or H/H drops below 7/21.  - EPO started by Nephrology   Hgb of 6.9 and pending surgical debridement.  Transfused one unit of blood with some improvement of H/H.  Closely monitor.

## 2024-02-09 NOTE — PLAN OF CARE
Problem: Skin Injury Risk Increased  Goal: Skin Health and Integrity  Outcome: Ongoing, Progressing     Problem: Infection (Hemodialysis)  Goal: Absence of Infection Signs and Symptoms  Outcome: Ongoing, Progressing     Problem: Adult Inpatient Plan of Care  Goal: Plan of Care Review  Outcome: Ongoing, Progressing  Goal: Patient-Specific Goal (Individualized)  Outcome: Ongoing, Progressing

## 2024-02-09 NOTE — ASSESSMENT & PLAN NOTE
58 yoM with infected sacral pressure injury with underlying fluid collection. Patient disoriented. I spoke with his daughter and discussed the indication for drainage of the abscess and debridement of devitalized tissue, as well as the risks and benefits, and consent was obtained over the phone. Now s/p operative debridement 2/8 with good source control, bone biopsy obtained given exposed coccyx     - Recommend routine sacral ulcer treatment and prevention strategies - nutritional optimization, off loading  - Wound care consult   - Daily wet to dry dressing changes, remainder can be completed by nursing staff, wound care team  - Follow up operative cultures, bone bx pending   - Ok to continue Eliquis  - Other care per primary

## 2024-02-09 NOTE — PLAN OF CARE
Referral sent to [facility]. SW received a call from [facility] to inform her that patient needs 2 nights in telemetry in other to qualify. KARLY notified Dr. May     02/09/24 3136   Discharge Reassessment   Assessment Type Discharge Planning Reassessment   Did the patient's condition or plan change since previous assessment? No   Discharge Plan discussed with: Adult children   Communicated JAIME with patient/caregiver Date not available/Unable to determine   Discharge Plan A Home with family   Discharge Plan B Home with family   DME Needed Upon Discharge  none   Transition of Care Barriers DIalysis placement issues   Why the patient remains in the hospital Requires continued medical care   Post-Acute Status   Coverage Medicare   Discharge Delays (!) Dialysis Set-up        - pt found on floor unresponsive, hypothermic, hypotensive, now resolved   - patient appears AAOx3 with slowed speech   - will cont to monitor

## 2024-02-09 NOTE — PLAN OF CARE
Problem: Skin Injury Risk Increased  Goal: Skin Health and Integrity  Outcome: Ongoing, Progressing     Problem: Device-Related Complication Risk (Hemodialysis)  Goal: Safe, Effective Therapy Delivery  Outcome: Ongoing, Progressing     Problem: Hemodynamic Instability (Hemodialysis)  Goal: Effective Tissue Perfusion  Outcome: Ongoing, Progressing     Problem: Infection (Hemodialysis)  Goal: Absence of Infection Signs and Symptoms  Outcome: Ongoing, Progressing     Problem: Adult Inpatient Plan of Care  Goal: Plan of Care Review  Outcome: Ongoing, Progressing  Goal: Patient-Specific Goal (Individualized)  Outcome: Ongoing, Progressing  Goal: Absence of Hospital-Acquired Illness or Injury  Outcome: Ongoing, Progressing  Goal: Optimal Comfort and Wellbeing  Outcome: Ongoing, Progressing  Goal: Readiness for Transition of Care  Outcome: Ongoing, Progressing     Problem: Infection  Goal: Absence of Infection Signs and Symptoms  Outcome: Ongoing, Progressing     Problem: Diabetes Comorbidity  Goal: Blood Glucose Level Within Targeted Range  Outcome: Ongoing, Progressing     Problem: Impaired Wound Healing  Goal: Optimal Wound Healing  Outcome: Ongoing, Progressing     Problem: Coping Ineffective  Goal: Effective Coping  Outcome: Ongoing, Progressing

## 2024-02-09 NOTE — SUBJECTIVE & OBJECTIVE
Interval History: more awake and conversant today.    Review of Systems   HENT:  Negative for ear discharge and ear pain.    Eyes:  Negative for discharge and itching.   Endocrine: Negative for cold intolerance and heat intolerance.   Neurological:  Negative for seizures and syncope.     Objective:     Vital Signs (Most Recent):  Temp: 98 °F (36.7 °C) (02/09/24 1053)  Pulse: (!) 53 (02/09/24 1053)  Resp: 18 (02/09/24 1053)  BP: 137/71 (02/09/24 1053)  SpO2: 100 % (02/09/24 1053) Vital Signs (24h Range):  Temp:  [97.6 °F (36.4 °C)-98.9 °F (37.2 °C)] 98 °F (36.7 °C)  Pulse:  [53-75] 53  Resp:  [10-23] 18  SpO2:  [90 %-100 %] 100 %  BP: (111-160)/(53-71) 137/71     Weight: 75.3 kg (166 lb)  Body mass index is 26 kg/m².    Intake/Output Summary (Last 24 hours) at 2/9/2024 1238  Last data filed at 2/8/2024 2100  Gross per 24 hour   Intake 995 ml   Output 2000 ml   Net -1005 ml           Physical Exam  Vitals and nursing note reviewed.   Constitutional:       General: He is not in acute distress.     Appearance: He is ill-appearing. He is not toxic-appearing.   HENT:      Head: Normocephalic and atraumatic.   Neck:      Comments: R chest wall THDC in place  Cardiovascular:      Rate and Rhythm: Normal rate and regular rhythm.   Pulmonary:      Effort: Pulmonary effort is normal.      Breath sounds: Normal breath sounds.      Comments: Room air  Abdominal:      General: Bowel sounds are normal. There is no distension.      Tenderness: There is no abdominal tenderness. There is no guarding or rebound.      Hernia: A hernia (umbilical, reducible) is present.   Musculoskeletal:      Right lower leg: No edema.      Left lower leg: No edema.   Skin:     General: Skin is warm and dry.   Neurological:      Mental Status: He is alert.             Significant Labs: All pertinent labs within the past 24 hours have been reviewed.  BMP:   Recent Labs   Lab 02/08/24  0433 02/09/24  0843   GLU 93 128*    137   K 4.5 4.5     102   CO2 25 27   BUN 42* 33*   CREATININE 7.0* 5.5*   CALCIUM 8.4* 8.8   MG 1.7  --        CBC:   Recent Labs   Lab 02/08/24  0433 02/09/24  0843   WBC 17.58* 15.47*   HGB 6.9* 7.3*   HCT 21.2* 24.0*    313         Significant Imaging: I have reviewed all pertinent imaging results/findings within the past 24 hours.

## 2024-02-09 NOTE — ASSESSMENT & PLAN NOTE
- has been non-compliant with HD in the past  - per MDT/notes, previously lived in [place] and has been in local areas since the holidays   - has required multiple hospital visits for HD as he hasn't been complaint with OP HD; this is causing difficulties with finding an accepting outpatient HD facility which will complicate discharge and potential GOC for SNF placement   - daughter shares pt's initial lack of compliance with HD was due to him not wanting to go bc of how poor he felt after HD; but now realizes it is HD or accept end of his life and he wishes to continue with HD and would be complaint   - Nephrology following   - continued management per hospital primary and nephrology
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
## 2024-02-09 NOTE — PLAN OF CARE
Problem: Occupational Therapy  Goal: Occupational Therapy Goal  Description: Goals to be met by: 03/08/2024     Patient will increase functional independence with ADLs by performing:    Feeding with set up High Jain's positioning for gravity assisted digestion.   UE Dressing with Min  Assistance.  LE Dressing with Moderate Assistance.  Grooming while seated with Modified [place] and Set-up Assistance.  Toileting from bedside commode with Max Assistance OOB.  Toilet transfer to bedside commode with Min Assistance.  Upper extremity exercise program x10 reps per handout, with assistance as needed.    Outcome: Ongoing.

## 2024-02-09 NOTE — PROGRESS NOTES
[facility] Surg  General Surgery  Progress Note    Subjective:     History of Present Illness:  Mr. Cespedes is a 58 yoM with PMH including Afib on Eliquis, DM2, ESRD on dialysis, hypertension, and CVA now bed bound secondary to diffuse weakness (sensation intact). He was admitted for altered mental status secondary to uremic encephalopathy. He recently moved back from [place] and apparently did not have outpatient HD scheduled. Last HD session was 2 weeks ago. Following HD, he became more responsive yesterday and began endorsing pain involving the sacral area. He has a known sacral pressure injury. CT scan was ordered yesterday which revealed a fluid collection deep to the sacral pressure injury with extension into the superior left gluteal region. General Surgery consulted for potential drainage. He remains only oriented to person when I saw him this morning.        Post-Op Info:  Procedure(s) (LRB):  DEBRIDEMENT, PRESSURE ULCER (N/A)   1 Day Post-Op     Interval History: s/p operative debridement of sacral wound 2/8.     Medications:  Continuous Infusions:  Scheduled Meds:   allopurinoL  100 mg Oral Daily    amiodarone  200 mg Oral Daily    amLODIPine  10 mg Oral QHS    apixaban  5 mg Oral Q12H    atorvastatin  80 mg Oral Daily    benztropine  0.5 mg Oral Q12H    calcitRIOL  0.25 mcg Oral Daily    epoetin luli-epbx  10,000 Units Subcutaneous Every Tues, Thurs, Sat    IRON SUCROSE IV ORDERABLE  200 mg Intravenous Every Tues, Thurs, Sat    metoprolol succinate  100 mg Oral Daily    mupirocin   Nasal BID    piperacillin-tazobactam (Zosyn) IV (PEDS and ADULTS) (extended infusion is not appropriate)  4.5 g Intravenous Q12H    sevelamer carbonate  800 mg Oral TID WM    tamsulosin  0.4 mg Oral Daily    vitamin renal formula (B-complex-vitamin c-folic acid)  1 capsule Oral Daily     PRN Meds:0.9%  NaCl infusion (for blood administration), acetaminophen, albuterol-ipratropium, aluminum-magnesium hydroxide-simethicone,  dextrose 10%, dextrose 10%, glucagon (human recombinant), glucose, glucose, HYDROmorphone, melatonin, naloxone, ondansetron, oxyCODONE, prochlorperazine, simethicone, sodium chloride 0.9%, sodium chloride 0.9%, Pharmacy to dose Vancomycin consult **AND** vancomycin - pharmacy to dose     Review of patient's allergies indicates:  No Known Allergies  Objective:     Vital Signs (Most Recent):  Temp: 98.8 °F (37.1 °C) (02/09/24 0730)  Pulse: 71 (02/09/24 0730)  Resp: 19 (02/09/24 0730)  BP: (!) 146/70 (02/09/24 0730)  SpO2: (!) 91 % (02/09/24 0730) Vital Signs (24h Range):  Temp:  [97.6 °F (36.4 °C)-98.9 °F (37.2 °C)] 98.8 °F (37.1 °C)  Pulse:  [62-75] 71  Resp:  [10-23] 19  SpO2:  [90 %-100 %] 91 %  BP: (108-160)/(51-70) 146/70     Weight: 75.3 kg (166 lb)  Body mass index is 26 kg/m².    Intake/Output - Last 3 Shifts         02/07 0700  02/08 0659 02/08 0700  02/09 0659 02/09 0700  02/10 0659    P.O. 180 120     Blood  275     Other 500 500     IV Piggyback 303 444.5     Total Intake(mL/kg) 983 (13.1) 1339.5 (17.8)     Urine (mL/kg/hr) 0 (0) 0 (0)     Emesis/NG output 0 0     Other 2000 2000     Stool 0 0     Total Output 2000 2000     Net -1017 -660.5            Urine Occurrence 1 x              Physical Exam  Vitals reviewed.   Constitutional:       Appearance: Normal appearance.   Cardiovascular:      Rate and Rhythm: Normal rate.      Comments: R IJ tunneled HD line  Pulmonary:      Effort: Pulmonary effort is normal. No respiratory distress.   Abdominal:      Palpations: Abdomen is soft.      Tenderness: There is no abdominal tenderness.   Genitourinary:     Comments: Sacral wound with packing in place, ss drainage, some fibrinous exudate, packing exchanged   Skin:     General: Skin is warm.   Neurological:      General: No focal deficit present.      Mental Status: He is alert. He is disoriented.          Significant Labs:  I have reviewed all pertinent lab results within the past 24 hours.  CBC:   Recent Labs    Lab 02/09/24  0843   WBC 15.47*   RBC 2.91*   HGB 7.3*   HCT 24.0*      MCV 83   MCH 25.1*   MCHC 30.4*       CMP:   Recent Labs   Lab 02/08/24  0433   GLU 93   CALCIUM 8.4*   ALBUMIN 1.9*   PROT 7.2      K 4.5   CO2 25      BUN 42*   CREATININE 7.0*   ALKPHOS 109   ALT 24   AST 27   BILITOT 0.5         Significant Diagnostics:  I have reviewed all pertinent imaging results/findings within the past 24 hours.  Assessment/Plan:     Pressure injury of sacral region, unstageable  58 yoM with infected sacral pressure injury with underlying fluid collection. Patient disoriented. I spoke with his daughter and discussed the indication for drainage of the abscess and debridement of devitalized tissue, as well as the risks and benefits, and consent was obtained over the phone. Now s/p operative debridement 2/8 with good source control, bone biopsy obtained given exposed coccyx     - Recommend routine sacral ulcer treatment and prevention strategies - nutritional optimization, off loading  - Wound care consult   - Daily wet to dry dressing changes, remainder can be completed by nursing staff, wound care team  - Follow up operative cultures, bone bx pending   - Ok to continue Eliquis  - Other care per primary    We will sign off. Please call with new questions or concerns.     Joseline Esteves MD  General Surgery  [facility] - Med Surg

## 2024-02-09 NOTE — ASSESSMENT & PLAN NOTE
2/9/2023  - interval cahrt reviewed; pt discussed with hospital primary   - pt working with PT at time of visit but is now oriented to person and place; able to answer simple questions appropriately  - called daughter to continue GOC/ACP discussion; discussed HD compliance see ESRD  - also discussed MDTs recommendation for LTACH to allow for both continued therapy and HD; daughter is in agreement with this plan as his debility and ability to have HD were her primary concerns   - emotional support provided; allowed time for questions/concerns   - plan for further discussions directly with pt when able to participate   - updated MDT     2/7/2024 - Consult   - consult received; interval chart reviewed in detail; discussed pt with MDT  - met with patient at bedside; introduction to palliative medicine team and role in current care and admission; pt remains disoriented and only oriented to self; does not have capacity for GOC/ACP discussion at this time   - call placed to pt's daughter, Rima, who shares that she is one of pt's 5 children (2 daughters, 3 sons); pt lives with Rima and her children   - Rima shares that at baseline pt is typically totally oriented and able to make his own medical decisions, but she does help with medical care when needed  - pts debility since prior 1/24 admission is a concern for her and family; he has been primarily bed bound since; discussed determining eligibility for SNF when pt is able to participate in PT/OT once mental status improves   - conversation was limited today as Rima was currently at the pediatricians office with her 3 children; made plan for further discussion in person or by phone tomorrow   - emotional support provided to daughter and answered all questions   - updated hospital primary, Dr. Mondragon   - HD coordination for outpatient will greatly affect discharge/GOC options; plan for transparent discussion with both daughter and pt (when able) regarding HD,  including discussion of options for care if pt does not wish to continue with HD, but that optional compliance is not a suitable plan of care

## 2024-02-09 NOTE — ASSESSMENT & PLAN NOTE
Noted on CT. With leukocytosis  - started vanc, zosyn-- continue  - General surgery consulted- to OR.  - wound care consulted  S/P operative debridement 2/8 with bone biopsy obtained given exposed coccyx

## 2024-02-09 NOTE — PROGRESS NOTES
[facility]  Palliative Medicine  Progress Note    Patient Name: Mahesh Cespedes  MRN: [id number]  Admission Date: 2/5/2024  Hospital Length of Stay: 3 days  Code Status: Full Code   Attending Provider: Thor May MD  Consulting Provider: Leatha Ramsey NP  Primary Care Physician: Cruz Hernandez MD  Principal Problem:Uremic encephalopathy    Patient information was obtained from patient, relative(s), and primary team.      Assessment/Plan:   Advance Care Planning     Palliative Care  Goals of care, counseling/discussion  2/9/2023  - interval cahrt reviewed; pt discussed with hospital primary   - pt working with PT at time of visit but is now oriented to person and place; able to answer simple questions appropriately  - called daughter to continue GOC/ACP discussion; discussed HD compliance see ESRD  - also discussed MDTs recommendation for LTACH to allow for both continued therapy and HD; daughter is in agreement with this plan as his debility and ability to have HD were her primary concerns   - emotional support provided; allowed time for questions/concerns   - plan for further discussions directly with pt when able to participate   - updated MDT     2/7/2024 - Consult   - consult received; interval chart reviewed in detail; discussed pt with MDT  - met with patient at bedside; introduction to palliative medicine team and role in current care and admission; pt remains disoriented and only oriented to self; does not have capacity for GOC/ACP discussion at this time   - call placed to pt's daughter, Rima, who shares that she is one of pt's 5 children (2 daughters, 3 sons); pt lives with Rima and her children   - Rima shares that at baseline pt is typically totally oriented and able to make his own medical decisions, but she does help with medical care when needed  - pts debility since prior 1/24 admission is a concern for her and family; he has been primarily bed bound since; discussed  determining eligibility for SNF when pt is able to participate in PT/OT once mental status improves   - conversation was limited today as Rima was currently at the pediatricians office with her 3 children; made plan for further discussion in person or by phone tomorrow   - emotional support provided to daughter and answered all questions   - updated hospital primary, Dr. Mondragon   - HD coordination for outpatient will greatly affect discharge/GOC options; plan for transparent discussion with both daughter and pt (when able) regarding HD, including discussion of options for care if pt does not wish to continue with HD, but that optional compliance is not a suitable plan of care     Neuro  * Uremic encephalopathy  - oriented to self and place only, but pleasant, and cooperative during my assessment   - has been non-compliant with HD; now receiving HD inpatient   - continued management per hospital primary     Cardiac/Vascular  Paroxysmal atrial fibrillation  - chronic and acute history noted; continued management per hospital primary     Renal/  ESRD needing dialysis  - has been non-compliant with HD in the past  - per MDT/notes, previously lived in [place] and has been in local areas since the holidays   - has required multiple hospital visits for HD as he hasn't been complaint with OP HD; this is causing difficulties with finding an accepting outpatient HD facility which will complicate discharge and potential GOC for SNF placement   - daughter shares pt's initial lack of compliance with HD was due to him not wanting to go bc of how poor he felt after HD; but now realizes it is HD or accept end of his life and he wishes to continue with HD and would be complaint   - Nephrology following   - continued management per hospital primary and nephrology     Orthopedic  Pressure injury of sacral region, unstageable  - pt's daughter reports being bed bound since 1/24 hospital admission  - wound care following   -  "debility contributes to appropriateness for hospice if/when aligns with GOC   - continued management per hospital primary     I will follow-up with patient. Please contact us if you have any additional questions.    Total visit time: 55 minutes    35 min visit time including: face to face time in discussion of symptom assessment, and exploring options and burdens of offered treatments.  This also includes non-face to face time preparing to see the patient including chart review, obtaining and/or reviewing separately obtained history, documenting clinical information in the electronic or other health record, independently interpreting results and communicating results to the patient/family/caregiver, family discussions by phone if not able to be present, coordination of care with other specialists, and discharge planning.     20 min ACP time spent: goals of care, advanced care planning, emotional support, formulating and communicating prognosis, exploring burden/ benefit of various approaches of treatment.     Leatha Ramsey NP  Palliative Medicine  [facility]      Subjective:     Chief Complaint:   Chief Complaint   Patient presents with    Fatigue     Bib wjems today for lethargy x 1 week. Last dialysis x 2 weeks ago. Per family, no dialysis clinics will take him due to previous behavior. Pt is edematous, no distress at this time. 96 % RA. Last admitted here 1/28       HPI:   From H&P: " 58 y.o. male with AFib, DM2, ESRD on dialysis, hypertension presents from home with a complaint of altered mental status.  Family report he has been drowsy and fatigued with increased confusion for the past 2 days.  Has been progressively worsening.  Associated with significant peripheral edema.  Last dialysis was 2 weeks ago.  Denies fever, chills, cough, SOB, chest pain, palpitations, nausea, vomiting, diarrhea, or abdominal pain.  History is somewhat limited given the patient's drowsiness but he is " "awake and conversant.     In the ED, labs revealed hyperkalemia, K + 7.4.  He was given shifting medications and zirconium.  Nephrology was consulted and plan for urgent dialysis.  Labs also reveal uremia, leukocytosis, hyperphosphatemia, and metabolic acidosis.  Placed in observation to obtain dialysis."    Palliative medicine consulted for goals of care discussion and advance care planning; for details of visit, see advance care planning section of plan.     Hospital Course:  No notes on file    Medications:  Continuous Infusions:  Scheduled Meds:   allopurinoL  100 mg Oral Daily    amiodarone  200 mg Oral Daily    amLODIPine  10 mg Oral QHS    apixaban  5 mg Oral Q12H    atorvastatin  80 mg Oral Daily    benztropine  0.5 mg Oral Q12H    calcitRIOL  0.25 mcg Oral Daily    epoetin luli-epbx  10,000 Units Subcutaneous Every Tues, Thurs, Sat    IRON SUCROSE IV ORDERABLE  200 mg Intravenous Every Tues, Thurs, Sat    metoprolol succinate  100 mg Oral Daily    mupirocin   Nasal BID    piperacillin-tazobactam (Zosyn) IV (PEDS and ADULTS) (extended infusion is not appropriate)  4.5 g Intravenous Q12H    sevelamer carbonate  800 mg Oral TID WM    tamsulosin  0.4 mg Oral Daily    vitamin renal formula (B-complex-vitamin c-folic acid)  1 capsule Oral Daily     PRN Meds:0.9%  NaCl infusion (for blood administration), acetaminophen, albuterol-ipratropium, aluminum-magnesium hydroxide-simethicone, dextrose 10%, dextrose 10%, glucagon (human recombinant), glucose, glucose, HYDROmorphone, melatonin, naloxone, ondansetron, oxyCODONE, prochlorperazine, simethicone, sodium chloride 0.9%, sodium chloride 0.9%, Pharmacy to dose Vancomycin consult **AND** vancomycin - pharmacy to dose    Objective:     Vital Signs (Most Recent):  Temp: 98 °F (36.7 °C) (02/09/24 1053)  Pulse: (!) 53 (02/09/24 1053)  Resp: 18 (02/09/24 1053)  BP: 137/71 (02/09/24 1053)  SpO2: 100 % (02/09/24 1053) Vital Signs (24h Range):  Temp:  [97.6 °F (36.4 °C)-98.9 " °F (37.2 °C)] 98 °F (36.7 °C)  Pulse:  [53-75] 53  Resp:  [10-23] 18  SpO2:  [91 %-100 %] 100 %  BP: (111-148)/(55-71) 137/71     Weight: 75.3 kg (166 lb)  Body mass index is 26 kg/m².     Physical Exam  Vitals and nursing note reviewed.   Constitutional:       Appearance: He is ill-appearing (acute and chronic).      Comments: Lying in bed, sleeping but easily aroused; pleasant and cooperative    HENT:      Head: Normocephalic and atraumatic.   Pulmonary:      Effort: Pulmonary effort is normal. No respiratory distress.   Skin:     Comments: Known sacral wound; not directly assessed during visit    Neurological:      Comments: Oriented to self and place         Advance Care Planning  Advance Directives:   Living Will: No    [place]POST: No    Do Not Resuscitate Status: No    Medical Power of : No (5 adult children are collectively legal surrogate decision makers; lives with Daughter Rima)      Decision Making:  Family answered questions and Patient unable to communicate due to disease severity/cognitive impairment  Goals of Care: What is most important right now is to focus on remaining as independent as possible, symptom/pain control, and improvement in current condition. Accordingly, we have decided that the best plan to meet the patient's goals includes continuing with treatment.     Significant Labs: All pertinent labs within the past 24 hours have been reviewed.  CBC:   Recent Labs   Lab 02/09/24  0843   WBC 15.47*   HGB 7.3*   HCT 24.0*   MCV 83        BMP:  Recent Labs   Lab 02/09/24  0843   *      K 4.5      CO2 27   BUN 33*   CREATININE 5.5*   CALCIUM 8.8     LFT:  Lab Results   Component Value Date    AST 27 02/08/2024    ALKPHOS 109 02/08/2024    BILITOT 0.5 02/08/2024     Albumin:   Albumin   Date Value Ref Range Status   02/08/2024 1.9 (L) 3.5 - 5.2 g/dL Final     Protein:   Total Protein   Date Value Ref Range Status   02/08/2024 7.2 6.0 - 8.4 g/dL Final     Lactic acid:    Lab Results   Component Value Date    LACTATE 1.2 02/08/2017    LACTATE 0.7 01/28/2016       Significant Imaging: I have reviewed all pertinent imaging results/findings within the past 24 hours.    Leatha Ramsey NP  Palliative Medicine  [facility]

## 2024-02-09 NOTE — NURSING
[facility]  Nurses Note -- 4 Eyes      2/8/2024       Skin assessed on: Q Shift      [] No Pressure Injuries Present    []Prevention Measures Documented    [x] Yes LDA  for Pressure Injury Previously documented     [] Yes New Pressure Injury Discovered   [] LDA for New Pressure Injury Added      Attending RN:  DAVE MUÑOZ RN     Second RN:  hiram Garcia

## 2024-02-10 LAB
BACTERIA BLD CULT: NORMAL
BACTERIA SPEC AEROBE CULT: ABNORMAL
BACTERIA SPEC AEROBE CULT: ABNORMAL
HBV SURFACE AB SER QL IA: NEGATIVE
HBV SURFACE AB SERPL IA-ACNC: <3 MIU/ML
VANCOMYCIN SERPL-MCNC: 17.6 UG/ML

## 2024-02-10 PROCEDURE — 87040 BLOOD CULTURE FOR BACTERIA: CPT | Performed by: HOSPITALIST

## 2024-02-10 PROCEDURE — 11000001 HC ACUTE MED/SURG PRIVATE ROOM

## 2024-02-10 PROCEDURE — 63600175 PHARM REV CODE 636 W HCPCS: Performed by: HOSPITALIST

## 2024-02-10 PROCEDURE — 25000003 PHARM REV CODE 250: Performed by: HOSPITALIST

## 2024-02-10 PROCEDURE — 36415 COLL VENOUS BLD VENIPUNCTURE: CPT | Performed by: HOSPITALIST

## 2024-02-10 PROCEDURE — 80202 ASSAY OF VANCOMYCIN: CPT | Performed by: HOSPITALIST

## 2024-02-10 PROCEDURE — 25000003 PHARM REV CODE 250: Performed by: INTERNAL MEDICINE

## 2024-02-10 PROCEDURE — 63600175 PHARM REV CODE 636 W HCPCS

## 2024-02-10 RX ADMIN — MUPIROCIN: 20 OINTMENT TOPICAL at 09:02

## 2024-02-10 RX ADMIN — PIPERACILLIN AND TAZOBACTAM 4.5 G: 4; .5 INJECTION, POWDER, LYOPHILIZED, FOR SOLUTION INTRAVENOUS; PARENTERAL at 06:02

## 2024-02-10 RX ADMIN — IRON SUCROSE 200 MG: 20 INJECTION, SOLUTION INTRAVENOUS at 02:02

## 2024-02-10 RX ADMIN — BENZTROPINE MESYLATE 0.5 MG: 0.5 TABLET ORAL at 08:02

## 2024-02-10 RX ADMIN — HYDROMORPHONE HYDROCHLORIDE 0.5 MG: 1 INJECTION, SOLUTION INTRAMUSCULAR; INTRAVENOUS; SUBCUTANEOUS at 06:02

## 2024-02-10 RX ADMIN — ALLOPURINOL 100 MG: 100 TABLET ORAL at 08:02

## 2024-02-10 RX ADMIN — PIPERACILLIN SODIUM AND TAZOBACTAM SODIUM 4.5 G: 4; .5 INJECTION, POWDER, LYOPHILIZED, FOR SOLUTION INTRAVENOUS at 09:02

## 2024-02-10 RX ADMIN — MUPIROCIN: 20 OINTMENT TOPICAL at 08:02

## 2024-02-10 RX ADMIN — SEVELAMER CARBONATE 800 MG: 800 TABLET, FILM COATED ORAL at 08:02

## 2024-02-10 RX ADMIN — AMLODIPINE BESYLATE 10 MG: 5 TABLET ORAL at 09:02

## 2024-02-10 RX ADMIN — TAMSULOSIN HYDROCHLORIDE 0.4 MG: 0.4 CAPSULE ORAL at 08:02

## 2024-02-10 RX ADMIN — AMIODARONE HYDROCHLORIDE 200 MG: 200 TABLET ORAL at 08:02

## 2024-02-10 RX ADMIN — BENZTROPINE MESYLATE 0.5 MG: 0.5 TABLET ORAL at 09:02

## 2024-02-10 RX ADMIN — APIXABAN 5 MG: 5 TABLET, FILM COATED ORAL at 09:02

## 2024-02-10 RX ADMIN — APIXABAN 5 MG: 5 TABLET, FILM COATED ORAL at 08:02

## 2024-02-10 RX ADMIN — CALCITRIOL CAPSULES 0.25 MCG 0.25 MCG: 0.25 CAPSULE ORAL at 08:02

## 2024-02-10 RX ADMIN — NEPHROCAP 1 CAPSULE: 1 CAP ORAL at 08:02

## 2024-02-10 RX ADMIN — ATORVASTATIN CALCIUM 80 MG: 40 TABLET, FILM COATED ORAL at 08:02

## 2024-02-10 RX ADMIN — EPOETIN ALFA-EPBX 10000 UNITS: 10000 INJECTION, SOLUTION INTRAVENOUS; SUBCUTANEOUS at 08:02

## 2024-02-10 RX ADMIN — EPOETIN ALFA-EPBX 10000 UNITS: 10000 INJECTION, SOLUTION INTRAVENOUS; SUBCUTANEOUS at 05:02

## 2024-02-10 RX ADMIN — SEVELAMER CARBONATE 800 MG: 800 TABLET, FILM COATED ORAL at 12:02

## 2024-02-10 NOTE — ASSESSMENT & PLAN NOTE
Chronic, controlled. Latest blood pressure and vitals reviewed-     Temp:  [97.7 °F (36.5 °C)-98.9 °F (37.2 °C)]   Pulse:  [53-66]   Resp:  [16-19]   BP: (120-139)/(56-64)   SpO2:  [93 %-98 %] .   Home meds for hypertension were reviewed and noted below.   Hypertension Medications               amLODIPine (NORVASC) 10 MG tablet Take 10 mg by mouth once daily.    furosemide (LASIX) 20 MG tablet Take 20 mg by mouth 2 (two) times daily.    hydrALAZINE (APRESOLINE) 100 MG tablet Take 1 tablet (100 mg total) by mouth 3 (three) times daily.    metoprolol succinate (TOPROL-XL) 100 MG 24 hr tablet Take 100 mg by mouth once daily.    NIFEdipine (PROCARDIA-XL) 60 MG (OSM) 24 hr tablet Take 1 tablet (60 mg total) by mouth once daily.            While in the hospital, will manage blood pressure as follows; Continue home antihypertensive regimen    Will utilize p.r.n. blood pressure medication only if patient's blood pressure greater than 180/110 and he develops symptoms such as worsening chest pain or shortness of breath.

## 2024-02-10 NOTE — ASSESSMENT & PLAN NOTE
with acute encephalopathy.  Nephrology consulted and received emergent HD. Mental status now now resolved

## 2024-02-10 NOTE — ASSESSMENT & PLAN NOTE
Blood culture 2/6/24 with gram variable rods.   Morganella bacteremia.  Sacrum as source.  Stop Vanc.  Continue Zosyn.  Repeat BCx.  ID consult.

## 2024-02-10 NOTE — PROGRESS NOTES
[facility]  Progress Note    Patient Name: Mahesh Cespedes  MRN: [id number]  Patient Class: IP- Inpatient   Admission Date: 2/5/2024  Length of Stay: 4 days  Attending Physician: Thor May MD  Primary Care Provider: Cruz Hernandez MD        Subjective:     Principal Problem:Bacteremia        HPI:  58 y.o. male with AFib, DM2, ESRD on dialysis, hypertension presents from home with a complaint of altered mental status.  Family report he has been drowsy and fatigued with increased confusion for the past 2 days.  Has been progressively worsening.  Associated with significant peripheral edema.  Last dialysis was 2 weeks ago.  Denies fever, chills, cough, SOB, chest pain, palpitations, nausea, vomiting, diarrhea, or abdominal pain.  History is somewhat limited given the patient's drowsiness but he is awake and conversant.    In the ED, labs revealed hyperkalemia, K + 7.4.  He was given shifting medications and zirconium.  Nephrology was consulted and plan for urgent dialysis.  Labs also reveal uremia, leukocytosis, hyperphosphatemia, and metabolic acidosis.  Placed in observation to obtain dialysis.    Overview/Hospital Course:  Mr Mahesh Cespedes is a 58 y.o. man with ESRD but without outpatient dialysis set up who was admitted with uremic encephalopathy, hyperkalemia. Nephrology consulted and he received emergent dialysis. Mental status is improving. He has sacral wound with abscess. Started antibiotics. Blood cultures with gram variable rods. General surgery planning OR debridement on 2/8/24. S/P debridement with bone biopsy.  Morganella bacteremia.    Interval History: no new complaints.    Review of Systems   HENT:  Negative for ear discharge and ear pain.    Eyes:  Negative for discharge and itching.   Endocrine: Negative for cold intolerance and heat intolerance.   Neurological:  Negative for seizures and syncope.     Objective:     Vital Signs (Most Recent):  Temp: 98.6 °F (37 °C)  (02/10/24 1130)  Pulse: 64 (02/10/24 1130)  Resp: 16 (02/10/24 1130)  BP: 127/60 (02/10/24 1130)  SpO2: (!) 94 % (02/10/24 1130) Vital Signs (24h Range):  Temp:  [97.7 °F (36.5 °C)-98.9 °F (37.2 °C)] 98.6 °F (37 °C)  Pulse:  [53-66] 64  Resp:  [16-19] 16  SpO2:  [93 %-98 %] 94 %  BP: (120-139)/(56-64) 127/60     Weight: 75.3 kg (166 lb)  Body mass index is 26 kg/m².    Intake/Output Summary (Last 24 hours) at 2/10/2024 1218  Last data filed at 2/10/2024 1040  Gross per 24 hour   Intake 510.83 ml   Output --   Net 510.83 ml           Physical Exam  Vitals and nursing note reviewed.   Constitutional:       General: He is not in acute distress.     Appearance: He is ill-appearing. He is not toxic-appearing.   HENT:      Head: Normocephalic and atraumatic.   Neck:      Comments: R chest wall THDC in place  Cardiovascular:      Rate and Rhythm: Normal rate and regular rhythm.   Pulmonary:      Effort: Pulmonary effort is normal.      Breath sounds: Normal breath sounds.   Abdominal:      General: Bowel sounds are normal. There is no distension.      Tenderness: There is no abdominal tenderness. There is no guarding or rebound.      Hernia: A hernia (umbilical, reducible) is present.   Musculoskeletal:      Right lower leg: No edema.      Left lower leg: No edema.   Skin:     General: Skin is warm and dry.   Neurological:      Mental Status: He is alert.             Significant Labs: All pertinent labs within the past 24 hours have been reviewed.  BMP:   Recent Labs   Lab 02/09/24  0843   *      K 4.5      CO2 27   BUN 33*   CREATININE 5.5*   CALCIUM 8.8       CBC:   Recent Labs   Lab 02/09/24  0843   WBC 15.47*   HGB 7.3*   HCT 24.0*            Significant Imaging: I have reviewed all pertinent imaging results/findings within the past 24 hours.    Assessment/Plan:      * Bacteremia  Blood culture 2/6/24 with gram variable rods.   Morganella bacteremia.  Sacrum as source.  Stop Vanc.  Continue  Zosyn.  Repeat BCx.  ID consult.      Uremic encephalopathy   with acute encephalopathy.  Nephrology consulted and received emergent HD. Mental status now now resolved    Pressure injury of sacral region, unstageable  Wound care consulted       Gluteal abscess  Noted on CT. With leukocytosis  - started vanc, zosyn-- continue  - General surgery consulted- to OR.  - wound care consulted  S/P operative debridement 2/8 with bone biopsy obtained given exposed coccyx        Hyperphosphatemia  Secondary to ESRD.    Paroxysmal atrial fibrillation  Patient with Paroxysmal (<7 days) atrial fibrillation which is controlled currently with Amiodarone. Patient is currently in sinus rhythm.[id number] Score: 1.  Anticoagulation indicated. Anticoagulation done with apixaban .    Goals of care, counseling/discussion  Advance Care Planning  Palliative care consulted.          Anemia in ESRD (end-stage renal disease)  Patient's anemia is currently controlled.  Etiology likely d/t chronic disease due to Chronic Kidney Disease/ESRD  Current CBC reviewed-   Lab Results   Component Value Date    HGB 7.3 (L) 02/09/2024    HCT 24.0 (L) 02/09/2024     Monitor serial CBC and transfuse if patient becomes hemodynamically unstable, symptomatic or H/H drops below 7/21.  - EPO started by Nephrology   Hgb of 6.9 and pending surgical debridement.  Transfused one unit of blood with some improvement of H/H.  Closely monitor.    Essential hypertension  Chronic, controlled. Latest blood pressure and vitals reviewed-     Temp:  [97.7 °F (36.5 °C)-98.9 °F (37.2 °C)]   Pulse:  [53-66]   Resp:  [16-19]   BP: (120-139)/(56-64)   SpO2:  [93 %-98 %] .   Home meds for hypertension were reviewed and noted below.   Hypertension Medications               amLODIPine (NORVASC) 10 MG tablet Take 10 mg by mouth once daily.    furosemide (LASIX) 20 MG tablet Take 20 mg by mouth 2 (two) times daily.    hydrALAZINE (APRESOLINE) 100 MG tablet Take 1 tablet (100 mg  total) by mouth 3 (three) times daily.    metoprolol succinate (TOPROL-XL) 100 MG 24 hr tablet Take 100 mg by mouth once daily.    NIFEdipine (PROCARDIA-XL) 60 MG (OSM) 24 hr tablet Take 1 tablet (60 mg total) by mouth once daily.            While in the hospital, will manage blood pressure as follows; Continue home antihypertensive regimen    Will utilize p.r.n. blood pressure medication only if patient's blood pressure greater than 180/110 and he develops symptoms such as worsening chest pain or shortness of breath.    ESRD needing dialysis  Admitted with uremic encephalopathy and hyperkalemia. Received emergent dialysis  - dialysis not able to be arranged as outpatient due to history of nonadherence--> this is a major issue. Social work checking again to see if they can arrange. Palliative care consulted  - Nephrology consulted      VTE Risk Mitigation (From admission, onward)           Ordered     apixaban tablet 5 mg  Every 12 hours         02/05/24 1733     IP VTE HIGH RISK PATIENT  Once         02/05/24 1733     Place sequential compression device  Until discontinued         02/05/24 1733     Reason for No Pharmacological VTE Prophylaxis  Once        Question:  Reasons:  Answer:  Already adequately anticoagulated on oral Anticoagulants    02/05/24 1733                    Discharge Planning   JAIME: 2/8/2024     Code Status: Full Code   Is the patient medically ready for discharge?:     Reason for patient still in hospital (select all that apply): Treatment  Discharge Plan A: Home with family   Discharge Delays: (!) Dialysis Set-up              Thor May MD  Department of Hospital Medicine   [facility] [place] - Med Surg

## 2024-02-10 NOTE — PROGRESS NOTES
Renal Progress Note    Date of Admission:  2/5/2024 12:18 PM    Length of Stay: 4  Days    Subjective: n/a    Objective:    Current Facility-Administered Medications   Medication    0.9%  NaCl infusion (for blood administration)    acetaminophen tablet 650 mg    albuterol-ipratropium 2.5 mg-0.5 mg/3 mL nebulizer solution 3 mL    allopurinoL tablet 100 mg    aluminum-magnesium hydroxide-simethicone 200-200-20 mg/5 mL suspension 30 mL    amiodarone tablet 200 mg    amLODIPine tablet 10 mg    apixaban tablet 5 mg    atorvastatin tablet 80 mg    benztropine tablet 0.5 mg    calcitRIOL capsule 0.25 mcg    dextrose 10% bolus 125 mL 125 mL    dextrose 10% bolus 250 mL 250 mL    epoetin luli-epbx injection 10,000 Units    glucagon (human recombinant) injection 1 mg    glucose chewable tablet 16 g    glucose chewable tablet 24 g    HYDROmorphone injection 0.5 mg    iron sucrose injection 200 mg    melatonin tablet 6 mg    metoprolol succinate (TOPROL-XL) 24 hr tablet 100 mg    mupirocin 2 % ointment    naloxone 0.4 mg/mL injection 0.02 mg    ondansetron injection 4 mg    oxyCODONE immediate release tablet 10 mg    piperacillin-tazobactam (ZOSYN) 4.5 g in dextrose 5 % in water (D5W) 100 mL IVPB (MB+)    prochlorperazine injection Soln 5 mg    sevelamer carbonate tablet 800 mg    simethicone chewable tablet 80 mg    sodium chloride 0.9% bolus 250 mL 250 mL    sodium chloride 0.9% flush 10 mL    tamsulosin 24 hr capsule 0.4 mg    vancomycin - pharmacy to dose    vitamin renal formula (B-complex-vitamin c-folic acid) 1 mg per capsule 1 capsule       Vitals:    02/09/24 1938 02/09/24 2311 02/10/24 0409 02/10/24 0723   BP:  (!) 120/56 139/63 131/64   BP Location:  Left arm Left arm Left arm   Patient Position:  Lying Lying Lying   Pulse: 66 65 64 (!) 53   Resp: 18 19 19 16   Temp: 98.8 °F (37.1 °C) 97.7 °F (36.5 °C) 97.7 °F (36.5 °C) 98.9 °F (37.2 °C)   TempSrc: Oral Oral Oral Oral   SpO2: 97% (!) 93% 95%  "98%   Weight:       Height:           I/O last 3 completed shifts:  In: 120 [P.O.:120]  Out: 0   No intake/output data recorded.      Physical Exam: unchanged    Laboratories:    Recent Labs   Lab 02/09/24  0843   WBC 15.47*   RBC 2.91*   HGB 7.3*   HCT 24.0*      MCV 83   MCH 25.1*   MCHC 30.4*         Recent Labs   Lab 02/09/24  0843   CALCIUM 8.8      K 4.5   CO2 27      BUN 33*   CREATININE 5.5*         No results for input(s): "COLORU", "CLARITYU", "SPECGRAV", "PHUR", "PROTEINUA", "GLUCOSEU", "BILIRUBINCON", "BLOODU", "WBCU", "RBCU", "BACTERIA", "MUCUS" in the last 24 hours.    Microbiology Results (last 7 days)       Procedure Component Value Units Date/Time    Aerobic culture [[id number]] Collected: 02/08/24 1250    Order Status: Completed Specimen: Bone from Sacrum Updated: 02/10/24 0722     Aerobic Bacterial Culture No growth    Narrative:      Bone from coccyx    Aerobic culture [[id number]]  (Abnormal)  (Susceptibility) Collected: 02/08/24 1250    Order Status: Completed Specimen: Bone from Sacrum Updated: 02/10/24 0719     Aerobic Bacterial Culture MORGANELLA MORGANII  Many        KLEBSIELLA OXYTOCA  Rare      Narrative:      Sacral abcess    Blood culture [[id number]] Collected: 02/07/24 1332    Order Status: Completed Specimen: Blood from Peripheral, Hand, Left Updated: 02/09/24 1503     Blood Culture, Routine No Growth to date      No Growth to date      No Growth to date    Narrative:      Collection has been rescheduled by CMO at 02/07/2024 09:28 Reason: Pt   in dialysis  Collection has been rescheduled by RBJ at 02/07/2024 11:23 Reason: At   13:30  Collection has been rescheduled by SK at 02/07/2024 12:05 Reason:   Pt is doing dialysis will be ready at130  Collection has been rescheduled by CMO at 02/07/2024 09:28 Reason: Pt   in dialysis  Collection has been rescheduled by RBJ at 02/07/2024 11:23 Reason: At   13:30  Collection has been rescheduled by [email or web address] at 02/07/2024 12:05 " Reason:   Pt is doing dialysis will be ready at130    Blood culture [[id number]] Collected: 02/07/24 1344    Order Status: Completed Specimen: Blood from Peripheral, Antecubital, Right Updated: 02/09/24 1503     Blood Culture, Routine No Growth to date      No Growth to date      No Growth to date    Narrative:      Collection has been rescheduled by CMO at 02/07/2024 09:28 Reason: Pt   in dialysis  Collection has been rescheduled by RBJ at 02/07/2024 11:23 Reason: At   13:30  Collection has been rescheduled by [email or web address] at 02/07/2024 12:05 Reason:   Pt is doing dialysis will be ready at130  Collection has been rescheduled by CMO at 02/07/2024 09:28 Reason: Pt   in dialysis  Collection has been rescheduled by RBJ at 02/07/2024 11:23 Reason: At   13:30  Collection has been rescheduled by [email or web address] at 02/07/2024 12:05 Reason:   Pt is doing dialysis will be ready at130    Gram stain [[id number]] Collected: 02/08/24 1250    Order Status: Completed Specimen: Bone from Sacrum Updated: 02/09/24 1104     Gram Stain Result Rare WBC's      No organisms seen    Narrative:      Bone from coccyx    Gram stain [[id number]] Collected: 02/08/24 1250    Order Status: Completed Specimen: Bone from Sacrum Updated: 02/09/24 1104     Gram Stain Result Rare WBC's      Rare Gram positive cocci in pairs    Narrative:      Sacral abcess    AFB Culture & Smear [[id number]] Collected: 02/08/24 1250    Order Status: Sent Specimen: Bone from Sacrum Updated: 02/09/24 1023    AFB Culture & Smear [[id number]] Collected: 02/08/24 1250    Order Status: Sent Specimen: Bone from Sacrum Updated: 02/09/24 1023    Blood culture [[id number]] Collected: 02/06/24 0841    Order Status: Completed Specimen: Blood from Hand, Right Updated: 02/09/24 0903     Blood Culture, Routine No Growth to date      No Growth to date      No Growth to date      No Growth to date    Blood culture [[id number]]  (Abnormal)  (Susceptibility) Collected: 02/06/24 0841    Order Status:  Completed Specimen: Blood from Forearm, Left Updated: 02/09/24 0658     Blood Culture, Routine Gram stain marsha bottle: Gram variable rods      Results called to and read back by: Tee PÉREZ ICU 02/07/2024  05:08      MORGANELLA MORGANII    Fungus culture [[id number]] Collected: 02/08/24 1250    Order Status: Sent Specimen: Bone from Sacrum Updated: 02/08/24 1422    Culture, Anaerobe [[id number]] Collected: 02/08/24 1250    Order Status: Sent Specimen: Bone from Sacrum Updated: 02/08/24 1422    Fungus culture [[id number]] Collected: 02/08/24 1250    Order Status: Sent Specimen: Bone from Sacrum Updated: 02/08/24 1419    Culture, Anaerobe [[id number]] Collected: 02/08/24 1250    Order Status: Sent Specimen: Bone from Sacrum Updated: 02/08/24 1419    Rapid Organism ID by PCR (from Blood culture) [[id number]]  (Abnormal) Collected: 02/06/24 0841    Order Status: Completed Updated: 02/07/24 1622     Enterococcus faecalis Not Detected     Enterococcus faecium Not Detected     Listeria monocytogenes Not Detected     Staphylococcus spp. Not Detected     Staphylococcus aureus Not Detected     Staphylococcus epidermidis Not Detected     Staphylococcus lugdunensis Not Detected     Streptococcus species Not Detected     Streptococcus agalactiae Not Detected     Streptococcus pneumoniae Not Detected     Streptococcus pyogenes Not Detected     Acinetobacter calcoaceticus/baumannii complex Not Detected     Bacteroides fragilis Not Detected     Enterobacterales Detected     Enterobacter cloacae complex Not Detected     Escherichia coli Not Detected     Klebsiella aerogenes Not Detected     Klebsiella oxytoca Not Detected     Klebsiella pneumoniae group Not Detected     Proteus Not Detected     Salmonella sp Not Detected     Serratia marcescens Not Detected     Haemophilus influenzae Not Detected     Neisseria meningtidis Not Detected     Pseudomonas aeruginosa Not Detected     Stenotrophomonas maltophilia Not Detected     Candida  "albicans Not Detected     Candida auris Not Detected     Candida glabrata Not Detected     Candida krusei Not Detected     Candida parapsilosis Not Detected     Candida tropicalis Not Detected     Cryptococcus neoformans/gattii Not Detected     CTX-M (ESBL ) Not Detected     IMP (Carbapenem resistant) Not Detected     KPC resistance gene (Carbapenem resistant) Not Detected     mcr-1  Test Not Applicable     mec A/C  Test Not Applicable     mec A/C and MREJ (MRSA) gene Test Not Applicable     NDM (Carbapenem resistant) Not Detected     OXA-48-like (Carbapenem resistant) Not Detected     van A/B (VRE gene) Test Not Applicable     VIM (Carbapenem resistant) Not Detected              Diagnostic Tests:     CXR:  Impression:       Cardiomegaly.  Increased interstitial lung markings.  Possible small pleural effusions.  Question on fluid overload and or CHF.  However, a pneumonic infectious process cannot be excluded in the appropriate clinical circumstances.      Electronically signed by: Sandie Martinez  Date: 02/05/2024           Assessment:    57 y/o male with known to me from prior encounter last month; Hx. ESRD (Moved from [facility]. no Dialysis unit to go to) admitted with:     - Hyperkalemia corrected with Dialysis last p.m.  - HAGMA  - Uncontrolled HTN  - One of the blood cultures reported as "gram variable rods" ID and sensitivity pending  - Fluid overload  - Improving Metabolic (uremic) encephalopathy, last HD 1/22/24 while in the Hospital (Pte. Was not accepted by any of the local Dialysis units due to Hx. Of non-compliance and was told either to try to go back to [place] or come to ED periodically as needed for HD)  - Hyperphosphatemia improving  - 2nd. hyperparathyroidism  - Hx. Ascites s/p Paracentesis in January  - Fe++ def. + Anemia of ckd  - HTN  - Hx. CVA  - Hypoalbuminemia  - Sacral pressure ulcer           Plan:    - Empiric antibiotics  - Dialysis today and Q MWF next week  - IV Fe++ loading during " Dialysis  - Epogen 3 x week  - Transfuse prn Hgb < 7  - Renal diet + protein supplements  - Adjust oral PO4- binders   - Calcitriol  - Wound care per surgery  - Consider Nursing Home placement, Pte. Clearly unable to take care of self  - Disposition and other problems per admitting      Will follow on Dialysis days.

## 2024-02-10 NOTE — ASSESSMENT & PLAN NOTE
Patient with Paroxysmal (<7 days) atrial fibrillation which is controlled currently with Amiodarone. Patient is currently in sinus rhythm.[id number] Score: 1.  Anticoagulation indicated. Anticoagulation done with apixaban .

## 2024-02-11 LAB
ANION GAP SERPL CALC-SCNC: 13 MMOL/L (ref 8–16)
BACTERIA BLD CULT: NORMAL
BACTERIA BLD CULT: NORMAL
BASOPHILS # BLD AUTO: 0.03 K/UL (ref 0–0.2)
BASOPHILS NFR BLD: 0.2 % (ref 0–1.9)
BUN SERPL-MCNC: 27 MG/DL (ref 6–20)
CALCIUM SERPL-MCNC: 8.4 MG/DL (ref 8.7–10.5)
CHLORIDE SERPL-SCNC: 100 MMOL/L (ref 95–110)
CO2 SERPL-SCNC: 21 MMOL/L (ref 23–29)
CREAT SERPL-MCNC: 4.9 MG/DL (ref 0.5–1.4)
DIFFERENTIAL METHOD BLD: ABNORMAL
EOSINOPHIL # BLD AUTO: 0.4 K/UL (ref 0–0.5)
EOSINOPHIL NFR BLD: 2.9 % (ref 0–8)
ERYTHROCYTE [DISTWIDTH] IN BLOOD BY AUTOMATED COUNT: 17.1 % (ref 11.5–14.5)
EST. GFR  (NO RACE VARIABLE): 13 ML/MIN/1.73 M^2
GLUCOSE SERPL-MCNC: 134 MG/DL (ref 70–110)
HCT VFR BLD AUTO: 23.7 % (ref 40–54)
HGB BLD-MCNC: 7.4 G/DL (ref 14–18)
IMM GRANULOCYTES # BLD AUTO: 0.08 K/UL (ref 0–0.04)
IMM GRANULOCYTES NFR BLD AUTO: 0.6 % (ref 0–0.5)
LYMPHOCYTES # BLD AUTO: 0.9 K/UL (ref 1–4.8)
LYMPHOCYTES NFR BLD: 7 % (ref 18–48)
MAGNESIUM SERPL-MCNC: 1.7 MG/DL (ref 1.6–2.6)
MCH RBC QN AUTO: 26.3 PG (ref 27–31)
MCHC RBC AUTO-ENTMCNC: 31.2 G/DL (ref 32–36)
MCV RBC AUTO: 84 FL (ref 82–98)
MONOCYTES # BLD AUTO: 1.2 K/UL (ref 0.3–1)
MONOCYTES NFR BLD: 9.2 % (ref 4–15)
NEUTROPHILS # BLD AUTO: 10.3 K/UL (ref 1.8–7.7)
NEUTROPHILS NFR BLD: 80.1 % (ref 38–73)
NRBC BLD-RTO: 0 /100 WBC
PHOSPHATE SERPL-MCNC: 2.3 MG/DL (ref 2.7–4.5)
PLATELET # BLD AUTO: 319 K/UL (ref 150–450)
PMV BLD AUTO: 9.6 FL (ref 9.2–12.9)
POTASSIUM SERPL-SCNC: 4.2 MMOL/L (ref 3.5–5.1)
RBC # BLD AUTO: 2.81 M/UL (ref 4.6–6.2)
SODIUM SERPL-SCNC: 134 MMOL/L (ref 136–145)
WBC # BLD AUTO: 12.91 K/UL (ref 3.9–12.7)

## 2024-02-11 PROCEDURE — 84100 ASSAY OF PHOSPHORUS: CPT | Performed by: HOSPITALIST

## 2024-02-11 PROCEDURE — 99900035 HC TECH TIME PER 15 MIN (STAT)

## 2024-02-11 PROCEDURE — 99223 1ST HOSP IP/OBS HIGH 75: CPT | Mod: ,,, | Performed by: INTERNAL MEDICINE

## 2024-02-11 PROCEDURE — 99900031 HC PATIENT EDUCATION (STAT)

## 2024-02-11 PROCEDURE — 85025 COMPLETE CBC W/AUTO DIFF WBC: CPT | Performed by: HOSPITALIST

## 2024-02-11 PROCEDURE — 25000003 PHARM REV CODE 250: Performed by: HOSPITALIST

## 2024-02-11 PROCEDURE — 80048 BASIC METABOLIC PNL TOTAL CA: CPT | Performed by: HOSPITALIST

## 2024-02-11 PROCEDURE — 25000003 PHARM REV CODE 250

## 2024-02-11 PROCEDURE — 25000003 PHARM REV CODE 250: Performed by: INTERNAL MEDICINE

## 2024-02-11 PROCEDURE — 63600175 PHARM REV CODE 636 W HCPCS

## 2024-02-11 PROCEDURE — 11000001 HC ACUTE MED/SURG PRIVATE ROOM

## 2024-02-11 PROCEDURE — 94761 N-INVAS EAR/PLS OXIMETRY MLT: CPT

## 2024-02-11 PROCEDURE — 36415 COLL VENOUS BLD VENIPUNCTURE: CPT | Performed by: HOSPITALIST

## 2024-02-11 PROCEDURE — 83735 ASSAY OF MAGNESIUM: CPT | Performed by: HOSPITALIST

## 2024-02-11 PROCEDURE — 97530 THERAPEUTIC ACTIVITIES: CPT | Mod: CQ

## 2024-02-11 PROCEDURE — 63600175 PHARM REV CODE 636 W HCPCS: Performed by: HOSPITALIST

## 2024-02-11 RX ADMIN — AMLODIPINE BESYLATE 10 MG: 5 TABLET ORAL at 09:02

## 2024-02-11 RX ADMIN — SEVELAMER CARBONATE 800 MG: 800 TABLET, FILM COATED ORAL at 11:02

## 2024-02-11 RX ADMIN — MUPIROCIN: 20 OINTMENT TOPICAL at 09:02

## 2024-02-11 RX ADMIN — Medication 6 MG: at 01:02

## 2024-02-11 RX ADMIN — SEVELAMER CARBONATE 800 MG: 800 TABLET, FILM COATED ORAL at 04:02

## 2024-02-11 RX ADMIN — OXYCODONE 10 MG: 5 TABLET ORAL at 06:02

## 2024-02-11 RX ADMIN — METOPROLOL SUCCINATE 100 MG: 50 TABLET, EXTENDED RELEASE ORAL at 08:02

## 2024-02-11 RX ADMIN — OXYCODONE 10 MG: 5 TABLET ORAL at 11:02

## 2024-02-11 RX ADMIN — PIPERACILLIN SODIUM AND TAZOBACTAM SODIUM 4.5 G: 4; .5 INJECTION, POWDER, LYOPHILIZED, FOR SOLUTION INTRAVENOUS at 10:02

## 2024-02-11 RX ADMIN — CALCITRIOL CAPSULES 0.25 MCG 0.25 MCG: 0.25 CAPSULE ORAL at 08:02

## 2024-02-11 RX ADMIN — AMIODARONE HYDROCHLORIDE 200 MG: 200 TABLET ORAL at 08:02

## 2024-02-11 RX ADMIN — BENZTROPINE MESYLATE 0.5 MG: 0.5 TABLET ORAL at 08:02

## 2024-02-11 RX ADMIN — ALLOPURINOL 100 MG: 100 TABLET ORAL at 08:02

## 2024-02-11 RX ADMIN — TAMSULOSIN HYDROCHLORIDE 0.4 MG: 0.4 CAPSULE ORAL at 08:02

## 2024-02-11 RX ADMIN — PIPERACILLIN SODIUM AND TAZOBACTAM SODIUM 4.5 G: 4; .5 INJECTION, POWDER, LYOPHILIZED, FOR SOLUTION INTRAVENOUS at 09:02

## 2024-02-11 RX ADMIN — BENZTROPINE MESYLATE 0.5 MG: 0.5 TABLET ORAL at 09:02

## 2024-02-11 RX ADMIN — ATORVASTATIN CALCIUM 80 MG: 40 TABLET, FILM COATED ORAL at 08:02

## 2024-02-11 RX ADMIN — APIXABAN 5 MG: 5 TABLET, FILM COATED ORAL at 09:02

## 2024-02-11 RX ADMIN — MUPIROCIN: 20 OINTMENT TOPICAL at 10:02

## 2024-02-11 RX ADMIN — HYDROMORPHONE HYDROCHLORIDE 0.5 MG: 1 INJECTION, SOLUTION INTRAMUSCULAR; INTRAVENOUS; SUBCUTANEOUS at 01:02

## 2024-02-11 RX ADMIN — APIXABAN 5 MG: 5 TABLET, FILM COATED ORAL at 08:02

## 2024-02-11 RX ADMIN — NEPHROCAP 1 CAPSULE: 1 CAP ORAL at 08:02

## 2024-02-11 RX ADMIN — SEVELAMER CARBONATE 800 MG: 800 TABLET, FILM COATED ORAL at 08:02

## 2024-02-11 NOTE — ASSESSMENT & PLAN NOTE
Noted on CT. With leukocytosis  - started vanc, zosyn-- continue  - General surgery consulted- to OR.  - wound care consulted  S/P operative debridement 2/8 with bone biopsy obtained given exposed coccyx    Bone cultures growing morganella and klebsiella.

## 2024-02-11 NOTE — HPI
"Mr Mahesh Cespedes is a pleasant 58 y.o. man with ESRD and sacral pressure ulcer,  who was admitted with uremic encephalopathy. Found to have worsening sacral wound and taken to the OR on 2/8.  Findings: "Necrotic sacral pressure injury extending to the coccyx. Bone biopsy of the coccyx taken. Purulent abscess cavities drained." The patient was continued on abx and blood cultures have grown Morganella, thus far. OR cultures are growing Morganella, Klebsiella, and a yet to be identified GNB. Blood cultures have been repeated and are NGTD.  He is currently on Zosyn. ID is consulted for the bacteremia. Per chart review, patient was admitted on 1/6 - 1/22/24 to receive emergent dialysis for hypoxic respiratory failure and hyperkalemia. Attempting to establish HD chair after relocating from [place] in December. Currently, patient only complains of sacral pain.  "

## 2024-02-11 NOTE — PROGRESS NOTES
[facility] Medicine  Progress Note    Patient Name: Mahesh Cespedes  MRN: [id number]  Patient Class: IP- Inpatient   Admission Date: 2/5/2024  Length of Stay: 5 days  Attending Physician: Thor May MD  Primary Care Provider: Cruz Hernandez MD        Subjective:     Principal Problem:Bacteremia        HPI:  58 y.o. male with AFib, DM2, ESRD on dialysis, hypertension presents from home with a complaint of altered mental status.  Family report he has been drowsy and fatigued with increased confusion for the past 2 days.  Has been progressively worsening.  Associated with significant peripheral edema.  Last dialysis was 2 weeks ago.  Denies fever, chills, cough, SOB, chest pain, palpitations, nausea, vomiting, diarrhea, or abdominal pain.  History is somewhat limited given the patient's drowsiness but he is awake and conversant.    In the ED, labs revealed hyperkalemia, K + 7.4.  He was given shifting medications and zirconium.  Nephrology was consulted and plan for urgent dialysis.  Labs also reveal uremia, leukocytosis, hyperphosphatemia, and metabolic acidosis.  Placed in observation to obtain dialysis.    Overview/Hospital Course:  58 y.o. man with ESRD but without outpatient dialysis set up who was admitted with uremic encephalopathy, hyperkalemia. Nephrology consulted and he received emergent dialysis. Mental status is improving. He has sacral wound with abscess. Started antibiotics. Blood cultures with gram variable rods. General surgery planning OR debridement on 2/8/24. S/P debridement with bone biopsy.  Morganella bacteremia.  Bone cultures also growing Morganella and Klebsiella.    Interval History: no new events overnight.    Review of Systems   HENT:  Negative for ear discharge and ear pain.    Eyes:  Negative for discharge and itching.   Endocrine: Negative for cold intolerance and heat intolerance.   Neurological:  Negative for seizures and syncope.     Objective:     Vital Signs  (Most Recent):  Temp: 98.8 °F (37.1 °C) (02/11/24 0701)  Pulse: 93 (02/11/24 0901)  Resp: 18 (02/11/24 0901)  BP: 131/78 (02/11/24 0701)  SpO2: 95 % (02/11/24 0901) Vital Signs (24h Range):  Temp:  [97.9 °F (36.6 °C)-99.8 °F (37.7 °C)] 98.8 °F (37.1 °C)  Pulse:  [61-96] 93  Resp:  [16-18] 18  SpO2:  [94 %-100 %] 95 %  BP: (121-158)/(58-86) 131/78     Weight: 75.3 kg (166 lb)  Body mass index is 26 kg/m².    Intake/Output Summary (Last 24 hours) at 2/11/2024 1105  Last data filed at 2/11/2024 0857  Gross per 24 hour   Intake 830.28 ml   Output 3000 ml   Net -2169.72 ml           Physical Exam  Vitals and nursing note reviewed.   Constitutional:       General: He is not in acute distress.     Appearance: He is ill-appearing. He is not toxic-appearing.   HENT:      Head: Normocephalic and atraumatic.   Neck:      Comments: R chest wall THDC in place  Cardiovascular:      Rate and Rhythm: Normal rate and regular rhythm.   Pulmonary:      Effort: Pulmonary effort is normal.      Breath sounds: Normal breath sounds.   Abdominal:      General: Bowel sounds are normal. There is no distension.      Tenderness: There is no abdominal tenderness. There is no guarding or rebound.      Hernia: A hernia (umbilical, reducible) is present.   Musculoskeletal:      Right lower leg: No edema.      Left lower leg: No edema.   Skin:     General: Skin is warm and dry.   Neurological:      Mental Status: He is alert.             Significant Labs: All pertinent labs within the past 24 hours have been reviewed.  BMP:   Recent Labs   Lab 02/11/24 0429   *   *   K 4.2      CO2 21*   BUN 27*   CREATININE 4.9*   CALCIUM 8.4*   MG 1.7       CBC:   Recent Labs   Lab 02/11/24 0429   WBC 12.91*   HGB 7.4*   HCT 23.7*            Significant Imaging: I have reviewed all pertinent imaging results/findings within the past 24 hours.    Assessment/Plan:      * Bacteremia  Blood culture 2/6/24 with gram variable rods.    Morganella bacteremia.  Sacral osteomyelitis as source.  Stop Vanc.  Continue Zosyn.  Repeat BCx negative so far.  ID consult.      Uremic encephalopathy   with acute encephalopathy.  Nephrology consulted and received emergent HD. Mental status now now resolved    Pressure injury of sacral region, unstageable  Wound care consulted       Gluteal abscess  Noted on CT. With leukocytosis  - started vanc, zosyn-- continue  - General surgery consulted- to OR.  - wound care consulted  S/P operative debridement 2/8 with bone biopsy obtained given exposed coccyx    Bone cultures growing morganella and klebsiella.      Hyperphosphatemia  Secondary to ESRD.    Paroxysmal atrial fibrillation  Patient with Paroxysmal (<7 days) atrial fibrillation which is controlled currently with Amiodarone. Patient is currently in sinus rhythm.[id number] Score: 1.  Anticoagulation indicated. Anticoagulation done with apixaban .    Goals of care, counseling/discussion  Advance Care Planning  Palliative care consulted.          Anemia in ESRD (end-stage renal disease)  Patient's anemia is currently controlled.  Etiology likely d/t chronic disease due to Chronic Kidney Disease/ESRD  Current CBC reviewed-   Lab Results   Component Value Date    HGB 7.4 (L) 02/11/2024    HCT 23.7 (L) 02/11/2024     Monitor serial CBC and transfuse if patient becomes hemodynamically unstable, symptomatic or H/H drops below 7/21.  - EPO started by Nephrology   Hgb of 6.9 and pending surgical debridement.  Transfused one unit of blood with some improvement of H/H.  Closely monitor.    Essential hypertension  Chronic, controlled. Latest blood pressure and vitals reviewed-     Temp:  [97.9 °F (36.6 °C)-99.8 °F (37.7 °C)]   Pulse:  [61-96]   Resp:  [16-18]   BP: (121-158)/(58-86)   SpO2:  [94 %-100 %] .   Home meds for hypertension were reviewed and noted below.   Hypertension Medications               amLODIPine (NORVASC) 10 MG tablet Take 10 mg by mouth once  daily.    furosemide (LASIX) 20 MG tablet Take 20 mg by mouth 2 (two) times daily.    hydrALAZINE (APRESOLINE) 100 MG tablet Take 1 tablet (100 mg total) by mouth 3 (three) times daily.    metoprolol succinate (TOPROL-XL) 100 MG 24 hr tablet Take 100 mg by mouth once daily.    NIFEdipine (PROCARDIA-XL) 60 MG (OSM) 24 hr tablet Take 1 tablet (60 mg total) by mouth once daily.            While in the hospital, will manage blood pressure as follows; Continue home antihypertensive regimen    Will utilize p.r.n. blood pressure medication only if patient's blood pressure greater than 180/110 and he develops symptoms such as worsening chest pain or shortness of breath.    ESRD needing dialysis  Admitted with uremic encephalopathy and hyperkalemia. Received emergent dialysis  - dialysis not able to be arranged as outpatient due to history of nonadherence--> this is a major issue. Social work checking again to see if they can arrange. Palliative care consulted  - Nephrology consulted      VTE Risk Mitigation (From admission, onward)           Ordered     apixaban tablet 5 mg  Every 12 hours         02/05/24 1733     IP VTE HIGH RISK PATIENT  Once         02/05/24 1733     Place sequential compression device  Until discontinued         02/05/24 1733     Reason for No Pharmacological VTE Prophylaxis  Once        Question:  Reasons:  Answer:  Already adequately anticoagulated on oral Anticoagulants    02/05/24 1733                    Discharge Planning   JAIME: 2/8/2024     Code Status: Full Code   Is the patient medically ready for discharge?:     Reason for patient still in hospital (select all that apply): Treatment  Discharge Plan A: Home with family   Discharge Delays: (!) Dialysis Set-up              Thor May MD  Department of Hospital Medicine   [facility]
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
## 2024-02-11 NOTE — ASSESSMENT & PLAN NOTE
Blood culture 2/6/24 with gram variable rods.   Morganella bacteremia.  Sacral osteomyelitis as source.  Stop Vanc.  Continue Zosyn.  Repeat BCx negative so far.  ID consult.
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
## 2024-02-11 NOTE — ASSESSMENT & PLAN NOTE
57 yo man with ESDR and recent admissions establishing dialysis after relocating from [place] (December, 2023), found to have bacteremia from a infected sacral ulcer  - admitted with uremic encephalopathy  - found to have infected sacral ulcer and likely osteomyelitis  - culture growing Morganella., Klebsiella, and a yet to be identified NLF GNB   - blood cultures from admit also with Morganella  - continue Zosyn pending final culture results  - consider line change given Gram negative bacteremia  - anticipating long-term abx given sacral osteomyelitis and bacteremia

## 2024-02-11 NOTE — PLAN OF CARE
Problem: Physical Therapy  Goal: Physical Therapy Goal  Description: Goals to be met by: 24     Patient will increase functional independence with mobility by performin. Supine to sit with Stand-by Assistance  2. Sit to stand transfer with Contact Guard Assistance  3. Bed to chair transfer with Contact Guard Assistance    4. Gait  x 10-15 feet with Minimal Assistance using Rolling Walker.   5. Wheelchair propulsion x50 feet with Stand-by Assistance    6. Lower extremity exercise program x10 reps per handout, with supervision    Outcome: Ongoing, Progressing         Pt tolerated treatment poor this date, pt stated he is 10/10 pain in his lower back, pt tearful this session with movements. Pt moaning and grimacing throughout repositioning this date.

## 2024-02-11 NOTE — PLAN OF CARE
Dialysis placement update:  Ashlyn from [facility] admissions left a voice message stating that patient had been denied by [facility].  Patient's information had been forwarded to the next closest unit which was [facility]. (2/8/2024).    Message received from  Divya Leonard with [facility] advising that their [facility] location has closed and her unit is located in [facility].  Stated that patient might prefer a unit closer to his home.  [phone] (2/8/2024).    Addendum:    Update of 2/9/2024 from [facility]:  [facility] has denied patient. They have three Medical Director denials and the case will be closed.  If patient is able to secure an OP  nephrologist who is willing to follow  him, the case will be reconsidered in 30 days.    No response from [facility] and [facility].

## 2024-02-11 NOTE — ASSESSMENT & PLAN NOTE
Patient with Paroxysmal (<7 days) atrial fibrillation which is controlled currently with Amiodarone. Patient is currently in sinus rhythm.[id number] Score: 1.  Anticoagulation indicated. Anticoagulation done with apixaban .

## 2024-02-11 NOTE — NURSING
Patient returned to unit via bed, family at bedside, denies pain/discomfort at this time, vitals obtained, bed in low position, call light within reach, will cont plan of care.

## 2024-02-11 NOTE — ASSESSMENT & PLAN NOTE
Patient's anemia is currently controlled.  Etiology likely d/t chronic disease due to Chronic Kidney Disease/ESRD  Current CBC reviewed-   Lab Results   Component Value Date    HGB 7.4 (L) 02/11/2024    HCT 23.7 (L) 02/11/2024     Monitor serial CBC and transfuse if patient becomes hemodynamically unstable, symptomatic or H/H drops below 7/21.  - EPO started by Nephrology   Hgb of 6.9 and pending surgical debridement.  Transfused one unit of blood with some improvement of H/H.  Closely monitor.

## 2024-02-11 NOTE — SUBJECTIVE & OBJECTIVE
Cardiology Interval History: no new events overnight.    Review of Systems   HENT:  Negative for ear discharge and ear pain.    Eyes:  Negative for discharge and itching.   Endocrine: Negative for cold intolerance and heat intolerance.   Neurological:  Negative for seizures and syncope.     Objective:     Vital Signs (Most Recent):  Temp: 98.8 °F (37.1 °C) (02/11/24 0701)  Pulse: 93 (02/11/24 0901)  Resp: 18 (02/11/24 0901)  BP: 131/78 (02/11/24 0701)  SpO2: 95 % (02/11/24 0901) Vital Signs (24h Range):  Temp:  [97.9 °F (36.6 °C)-99.8 °F (37.7 °C)] 98.8 °F (37.1 °C)  Pulse:  [61-96] 93  Resp:  [16-18] 18  SpO2:  [94 %-100 %] 95 %  BP: (121-158)/(58-86) 131/78     Weight: 75.3 kg (166 lb)  Body mass index is 26 kg/m².    Intake/Output Summary (Last 24 hours) at 2/11/2024 1105  Last data filed at 2/11/2024 0857  Gross per 24 hour   Intake 830.28 ml   Output 3000 ml   Net -2169.72 ml           Physical Exam  Vitals and nursing note reviewed.   Constitutional:       General: He is not in acute distress.     Appearance: He is ill-appearing. He is not toxic-appearing.   HENT:      Head: Normocephalic and atraumatic.   Neck:      Comments: R chest wall THDC in place  Cardiovascular:      Rate and Rhythm: Normal rate and regular rhythm.   Pulmonary:      Effort: Pulmonary effort is normal.      Breath sounds: Normal breath sounds.   Abdominal:      General: Bowel sounds are normal. There is no distension.      Tenderness: There is no abdominal tenderness. There is no guarding or rebound.      Hernia: A hernia (umbilical, reducible) is present.   Musculoskeletal:      Right lower leg: No edema.      Left lower leg: No edema.   Skin:     General: Skin is warm and dry.   Neurological:      Mental Status: He is alert.             Significant Labs: All pertinent labs within the past 24 hours have been reviewed.  BMP:   Recent Labs   Lab 02/11/24  0429   *   *   K 4.2      CO2 21*   BUN 27*   CREATININE 4.9*   CALCIUM  8.4*   MG 1.7       CBC:   Recent Labs   Lab 02/11/24  0429   WBC 12.91*   HGB 7.4*   HCT 23.7*            Significant Imaging: I have reviewed all pertinent imaging results/findings within the past 24 hours.

## 2024-02-11 NOTE — ASSESSMENT & PLAN NOTE
Chronic, controlled. Latest blood pressure and vitals reviewed-     Temp:  [97.9 °F (36.6 °C)-99.8 °F (37.7 °C)]   Pulse:  [61-96]   Resp:  [16-18]   BP: (121-158)/(58-86)   SpO2:  [94 %-100 %] .   Home meds for hypertension were reviewed and noted below.   Hypertension Medications               amLODIPine (NORVASC) 10 MG tablet Take 10 mg by mouth once daily.    furosemide (LASIX) 20 MG tablet Take 20 mg by mouth 2 (two) times daily.    hydrALAZINE (APRESOLINE) 100 MG tablet Take 1 tablet (100 mg total) by mouth 3 (three) times daily.    metoprolol succinate (TOPROL-XL) 100 MG 24 hr tablet Take 100 mg by mouth once daily.    NIFEdipine (PROCARDIA-XL) 60 MG (OSM) 24 hr tablet Take 1 tablet (60 mg total) by mouth once daily.            While in the hospital, will manage blood pressure as follows; Continue home antihypertensive regimen    Will utilize p.r.n. blood pressure medication only if patient's blood pressure greater than 180/110 and he develops symptoms such as worsening chest pain or shortness of breath.

## 2024-02-11 NOTE — PT/OT/SLP PROGRESS
Physical Therapy Treatment    Patient Name:  Mahesh Cespedes   MRN:  [id number]    Recommendations:     Discharge Recommendations: Moderate Intensity Therapy  Discharge Equipment Recommendations: to be determined by next level of care  Barriers to discharge:  Pt with decreased functional mobility, decreased ambulation, increased pain with movement, increased fall risk    Assessment:     Mahesh Cespedes is a 58 y.o. male admitted with a medical diagnosis of Bacteremia.  He presents with the following impairments/functional limitations: weakness, impaired self care skills, decreased coordination, decreased safety awareness, impaired balance, impaired skin, impaired endurance, impaired functional mobility, decreased upper extremity function, pain, impaired coordination, gait instability, decreased lower extremity function.    Pt tolerated treatment poor this date, pt stated he is 10/10 pain in his lower back, pt tearful this session with movements. Pt moaning and grimacing throughout repositioning this date.     Rehab Prognosis: Fair; patient would benefit from acute skilled PT services to address these deficits and reach maximum level of function.    Recent Surgery: Procedure(s) (LRB):  DEBRIDEMENT, PRESSURE ULCER (N/A) 3 Days Post-Op    Plan:     During this hospitalization, patient to be seen  (5-6x/wk) to address the identified rehab impairments via gait training, therapeutic activities, therapeutic exercises, neuromuscular re-education, wheelchair management/training and progress toward the following goals:    Plan of Care Expires:  02/23/24    Subjective     Chief Complaint: pain  Patient/Family Comments/goals: Pt agreed to be repositioned  Pain/Comfort:  Pain Rating 1: 10/10  Location 1: back  Pain Addressed 1: Reposition, Pre-medicate for activity, Cessation of Activity, Nurse notified      Objective:     Communicated with Sindy prior to session.  Patient found HOB elevated leaning to the R side over the bed rail  with bed alarm, oxygen, telemetry, SCD, PICC line upon PT entry to room.     General Precautions: Standard, fall (Sacral Wound)  Orthopedic Precautions: N/A  Braces: N/A  Respiratory Status: Nasal cannula, flow .2 L/min     Functional Mobility:  Pt was repositioned in bed R sidelying with pillow under L side, pillow placed between knees, LUE elevated on pillow.      AM-PAC 6 CLICK MOBILITY  Turning over in bed (including adjusting bedclothes, sheets and blankets)?: 3  Sitting down on and standing up from a chair with arms (e.g., wheelchair, bedside commode, etc.): 2  Moving from lying on back to sitting on the side of the bed?: 2  Moving to and from a bed to a chair (including a wheelchair)?: 2  Need to walk in hospital room?: 2  Climbing 3-5 steps with a railing?: 2  Basic Mobility Total Score: 13       Treatment & Education:  Pt was educated on positioning in the bed, pt verbalized understanding. Pt denied to sit EOB or stand or perform any bed level exercises 2* pain    Patient left right sidelying with all lines intact, LUE elevated on pillow, pillow in between knees, call button in reach, bed alarm on, and nursing notified.    GOALS:   Multidisciplinary Problems       Physical Therapy Goals          Problem: Physical Therapy    Goal Priority Disciplines Outcome Goal Variances Interventions   Physical Therapy Goal     PT, PT/OT Ongoing, Progressing     Description: Goals to be met by: 24     Patient will increase functional independence with mobility by performin. Supine to sit with Stand-by Assistance  2. Sit to stand transfer with Contact Guard Assistance  3. Bed to chair transfer with Contact Guard Assistance    4. Gait  x 10-15 feet with Minimal Assistance using Rolling Walker.   5. Wheelchair propulsion x50 feet with Stand-by Assistance    6. Lower extremity exercise program x10 reps per handout, with supervision                         Time Tracking:     PT Received On: 24  PT Start Time:  1118     PT Stop Time: 1127  PT Total Time (min): 9 min     Billable Minutes: Therapeutic Activity 9       PT/PTA: PTA     Number of PTA visits since last PT visit: 1 02/11/2024

## 2024-02-12 PROBLEM — R53.81 PHYSICAL DEBILITY: Status: ACTIVE | Noted: 2024-02-12

## 2024-02-12 LAB
BACTERIA SPEC AEROBE CULT: ABNORMAL
BACTERIA SPEC ANAEROBE CULT: NORMAL
BACTERIA SPEC ANAEROBE CULT: NORMAL

## 2024-02-12 PROCEDURE — 63600175 PHARM REV CODE 636 W HCPCS: Performed by: INTERNAL MEDICINE

## 2024-02-12 PROCEDURE — 25000003 PHARM REV CODE 250: Performed by: HOSPITALIST

## 2024-02-12 PROCEDURE — 63600175 PHARM REV CODE 636 W HCPCS

## 2024-02-12 PROCEDURE — 11000001 HC ACUTE MED/SURG PRIVATE ROOM

## 2024-02-12 PROCEDURE — 25000003 PHARM REV CODE 250: Performed by: INTERNAL MEDICINE

## 2024-02-12 PROCEDURE — 99232 SBSQ HOSP IP/OBS MODERATE 35: CPT | Mod: ,,, | Performed by: INTERNAL MEDICINE

## 2024-02-12 PROCEDURE — 21400001 HC TELEMETRY ROOM

## 2024-02-12 PROCEDURE — 63600175 PHARM REV CODE 636 W HCPCS: Performed by: HOSPITALIST

## 2024-02-12 PROCEDURE — 99233 SBSQ HOSP IP/OBS HIGH 50: CPT | Mod: ,,, | Performed by: NURSE PRACTITIONER

## 2024-02-12 PROCEDURE — 90935 HEMODIALYSIS ONE EVALUATION: CPT

## 2024-02-12 RX ORDER — AMLODIPINE BESYLATE 5 MG/1
5 TABLET ORAL NIGHTLY
Status: DISCONTINUED | OUTPATIENT
Start: 2024-02-12 | End: 2024-02-22 | Stop reason: HOSPADM

## 2024-02-12 RX ORDER — METOPROLOL SUCCINATE 50 MG/1
50 TABLET, EXTENDED RELEASE ORAL DAILY
Status: DISCONTINUED | OUTPATIENT
Start: 2024-02-13 | End: 2024-02-22 | Stop reason: HOSPADM

## 2024-02-12 RX ADMIN — NEPHROCAP 1 CAPSULE: 1 CAP ORAL at 08:02

## 2024-02-12 RX ADMIN — BENZTROPINE MESYLATE 0.5 MG: 0.5 TABLET ORAL at 09:02

## 2024-02-12 RX ADMIN — ATORVASTATIN CALCIUM 80 MG: 40 TABLET, FILM COATED ORAL at 08:02

## 2024-02-12 RX ADMIN — AMLODIPINE BESYLATE 5 MG: 5 TABLET ORAL at 09:02

## 2024-02-12 RX ADMIN — AMIODARONE HYDROCHLORIDE 200 MG: 200 TABLET ORAL at 08:02

## 2024-02-12 RX ADMIN — TAMSULOSIN HYDROCHLORIDE 0.4 MG: 0.4 CAPSULE ORAL at 08:02

## 2024-02-12 RX ADMIN — BENZTROPINE MESYLATE 0.5 MG: 0.5 TABLET ORAL at 08:02

## 2024-02-12 RX ADMIN — PIPERACILLIN SODIUM AND TAZOBACTAM SODIUM 4.5 G: 4; .5 INJECTION, POWDER, LYOPHILIZED, FOR SOLUTION INTRAVENOUS at 09:02

## 2024-02-12 RX ADMIN — HYDROMORPHONE HYDROCHLORIDE 0.5 MG: 1 INJECTION, SOLUTION INTRAMUSCULAR; INTRAVENOUS; SUBCUTANEOUS at 07:02

## 2024-02-12 RX ADMIN — ALLOPURINOL 100 MG: 100 TABLET ORAL at 08:02

## 2024-02-12 RX ADMIN — HYDROMORPHONE HYDROCHLORIDE 0.5 MG: 1 INJECTION, SOLUTION INTRAMUSCULAR; INTRAVENOUS; SUBCUTANEOUS at 04:02

## 2024-02-12 RX ADMIN — APIXABAN 5 MG: 5 TABLET, FILM COATED ORAL at 09:02

## 2024-02-12 RX ADMIN — CALCITRIOL CAPSULES 0.25 MCG 0.25 MCG: 0.25 CAPSULE ORAL at 08:02

## 2024-02-12 RX ADMIN — MUPIROCIN: 20 OINTMENT TOPICAL at 08:02

## 2024-02-12 RX ADMIN — IRON SUCROSE 200 MG: 20 INJECTION, SOLUTION INTRAVENOUS at 05:02

## 2024-02-12 RX ADMIN — APIXABAN 5 MG: 5 TABLET, FILM COATED ORAL at 08:02

## 2024-02-12 NOTE — ASSESSMENT & PLAN NOTE
Patient with Paroxysmal (<7 days) atrial fibrillation which is controlled currently with Amiodarone. Patient is currently in sinus rhythm.[id number] Score: 1.  Anticoagulation indicated. Anticoagulation done with apixaban .

## 2024-02-12 NOTE — ASSESSMENT & PLAN NOTE
59 yo man with ESDR and recent admissions establishing dialysis after relocating from [place] (December, 2023), found to have bacteremia from a infected sacral ulcer with osteomyelitis. Sacral bone culture growing Morganella, Klebsiella. blood cultures from admit also with Morganella. Hd permecath hasn't been exchanged yet    Recommendations:   - de-escalate to cefepime. Stop zosyn. Can be dosed 3x/wk with hd on d/c  - consider line change given Gram negative bacteremia  - anticipating long-term abx given sacral osteomyelitis and bacteremia; 6 weeks  - wound care as per primary team    Outpatient Antibiotic Therapy Plan:    Please send referral to [facility].    1) Infection: sacral osteo/bacteremia    2) Discharge Antibiotics:    Intravenous antibiotics:  Cefepime 1.5gm/1.5gm/2gm 3x/wk with dialysis    3) Therapy Duration:  6 weeks    Estimated end date of IV antibiotics: 3/17    4) Outpatient Weekly Labs:    Order the following labs to be drawn on Mondays:   CBC  CMP   CRP    5) Fax Lab Results to Infectious Diseases Provider: dr Edwards    [facility] Fax Number: [phone]    6) Outpatient Infectious Diseases Follow-up    Follow-up appointment will be arranged by the ID clinic and will be found in the patient's appointments tab.    Prior to discharge, please ensure the patient's follow-up has been scheduled.    If there is still no follow-up scheduled prior to discharge, please send an EPIC message to Zoë Barney in Infectious Diseases.

## 2024-02-12 NOTE — PT/OT/SLP PROGRESS
Occupational Therapy      Patient Name:  Mahesh Cespedes   MRN:  [id number]    Patient not seen today secondary to being out for dialysis. Will follow-up at another time.    2/12/2024

## 2024-02-12 NOTE — ASSESSMENT & PLAN NOTE
PT/OT consulted.  Currently recommending moderate intensity therapy.  SW/CM did send LTAC referrals.

## 2024-02-12 NOTE — ASSESSMENT & PLAN NOTE
with acute encephalopathy.  Nephrology consulted and received emergent HD. Mental status now seems at baseline.

## 2024-02-12 NOTE — PLAN OF CARE
Problem: Skin Injury Risk Increased  Goal: Skin Health and Integrity  Outcome: Ongoing, Progressing     Problem: Device-Related Complication Risk (Hemodialysis)  Goal: Safe, Effective Therapy Delivery  Outcome: Ongoing, Progressing     Problem: Hemodynamic Instability (Hemodialysis)  Goal: Effective Tissue Perfusion  Outcome: Ongoing, Progressing     Problem: Infection (Hemodialysis)  Goal: Absence of Infection Signs and Symptoms  Outcome: Ongoing, Progressing     Problem: Adult Inpatient Plan of Care  Goal: Plan of Care Review  Outcome: Ongoing, Progressing  Goal: Patient-Specific Goal (Individualized)  Outcome: Ongoing, Progressing  Goal: Absence of Hospital-Acquired Illness or Injury  Outcome: Ongoing, Progressing  Goal: Optimal Comfort and Wellbeing  Outcome: Ongoing, Progressing  Goal: Readiness for Transition of Care  Outcome: Ongoing, Progressing     Problem: Infection  Goal: Absence of Infection Signs and Symptoms  Outcome: Ongoing, Progressing     Problem: Diabetes Comorbidity  Goal: Blood Glucose Level Within Targeted Range  Outcome: Ongoing, Progressing     Problem: Impaired Wound Healing  Goal: Optimal Wound Healing  Outcome: Ongoing, Progressing     Problem: Coping Ineffective  Goal: Effective Coping  Outcome: Ongoing, Progressing     Problem: Fall Injury Risk  Goal: Absence of Fall and Fall-Related Injury  Outcome: Ongoing, Progressing
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
## 2024-02-12 NOTE — NURSING
[facility]  Nurses Note -- 4 Eyes      2/12/2024       Skin assessed on: Q Shift      [] No Pressure Injuries Present    []Prevention Measures Documented    [x] Yes LDA  for Pressure Injury Previously documented     [] Yes New Pressure Injury Discovered   [] LDA for New Pressure Injury Added      Attending RN:  Ira Kiran, BETO     Second RN:  MartinRN

## 2024-02-12 NOTE — NURSING
[facility], [facility]  Nurses Note -- 4 Eyes        2/11/2024         Skin assessed on: Admit        [] No Pressure Injuries Present                 []Prevention Measures Documented     [x] Yes LDA  for Pressure Injury Previously documented      [] Yes New Pressure Injury Discovered              [] LDA for New Pressure Injury Added        Attending RN:  Sindy Kowalski RN      Second RN:  BETO Sim
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
## 2024-02-12 NOTE — PLAN OF CARE
Problem: Skin Injury Risk Increased  Goal: Skin Health and Integrity  Outcome: Ongoing, Progressing     Problem: Device-Related Complication Risk (Hemodialysis)  Goal: Safe, Effective Therapy Delivery  Outcome: Ongoing, Progressing     Problem: Adult Inpatient Plan of Care  Goal: Patient-Specific Goal (Individualized)  Outcome: Ongoing, Progressing  Goal: Absence of Hospital-Acquired Illness or Injury  Outcome: Ongoing, Progressing  Goal: Optimal Comfort and Wellbeing  Outcome: Ongoing, Progressing     Problem: Diabetes Comorbidity  Goal: Blood Glucose Level Within Targeted Range  Outcome: Ongoing, Progressing     Problem: Fall Injury Risk  Goal: Absence of Fall and Fall-Related Injury  Outcome: Ongoing, Progressing
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
## 2024-02-12 NOTE — PROGRESS NOTES
[facility] Medicine  Progress Note    Patient Name: Mahesh Cespedes  MRN: [id number]  Patient Class: IP- Inpatient   Admission Date: 2/5/2024  Length of Stay: 6 days  Attending Physician: Thor May MD  Primary Care Provider: Cruz Hernandez MD        Subjective:     Principal Problem:Bacteremia        HPI:  58 y.o. male with AFib, DM2, ESRD on dialysis, hypertension presents from home with a complaint of altered mental status.  Family report he has been drowsy and fatigued with increased confusion for the past 2 days.  Has been progressively worsening.  Associated with significant peripheral edema.  Last dialysis was 2 weeks ago.  Denies fever, chills, cough, SOB, chest pain, palpitations, nausea, vomiting, diarrhea, or abdominal pain.  History is somewhat limited given the patient's drowsiness but he is awake and conversant.    In the ED, labs revealed hyperkalemia, K + 7.4.  He was given shifting medications and zirconium.  Nephrology was consulted and plan for urgent dialysis.  Labs also reveal uremia, leukocytosis, hyperphosphatemia, and metabolic acidosis.  Placed in observation to obtain dialysis.    Overview/Hospital Course:  58 y.o. man with ESRD but without outpatient dialysis set up who was admitted with uremic encephalopathy, hyperkalemia. Nephrology consulted and he received emergent dialysis. Mental status is improving. He has sacral wound with abscess. Started antibiotics. Blood cultures with gram variable rods. General surgery planning OR debridement on 2/8/24. S/P debridement with bone biopsy.  Morganella bacteremia.  Bone cultures also growing Morganella and Klebsiella.  ID consulted.  Recommending tunneled HD catheter removal.  IR consulted.  SW/CM consulted for outpatient dialysis arrangements.    Interval History: sleepy during dialysis.    Review of Systems   HENT:  Negative for ear discharge and ear pain.    Eyes:  Negative for discharge and itching.   Endocrine:  Negative for cold intolerance and heat intolerance.   Neurological:  Negative for seizures and syncope.     Objective:     Vital Signs (Most Recent):  Temp: 97.8 °F (36.6 °C) (02/12/24 1110)  Pulse: (!) 52 (02/12/24 1120)  Resp: 18 (02/12/24 1110)  BP: 101/61 (02/12/24 1120)  SpO2: (!) 93 % (02/12/24 0727) Vital Signs (24h Range):  Temp:  [97.6 °F (36.4 °C)-99.5 °F (37.5 °C)] 97.8 °F (36.6 °C)  Pulse:  [52-73] 52  Resp:  [18] 18  SpO2:  [93 %-100 %] 93 %  BP: (101-134)/(58-66) 101/61     Weight: 75.3 kg (166 lb)  Body mass index is 26 kg/m².    Intake/Output Summary (Last 24 hours) at 2/12/2024 1418  Last data filed at 2/12/2024 0925  Gross per 24 hour   Intake 240 ml   Output --   Net 240 ml           Physical Exam  Vitals and nursing note reviewed.   Constitutional:       General: He is not in acute distress.     Appearance: He is ill-appearing. He is not toxic-appearing.   HENT:      Head: Normocephalic and atraumatic.   Neck:      Comments: R chest wall THDC in place  Cardiovascular:      Rate and Rhythm: Normal rate and regular rhythm.   Pulmonary:      Effort: Pulmonary effort is normal.      Breath sounds: Normal breath sounds.   Abdominal:      General: Bowel sounds are normal. There is no distension.      Tenderness: There is no abdominal tenderness. There is no guarding or rebound.      Hernia: A hernia (umbilical, reducible) is present.   Musculoskeletal:      Right lower leg: No edema.      Left lower leg: No edema.   Skin:     General: Skin is warm and dry.   Neurological:      Mental Status: He is alert.             Significant Labs: All pertinent labs within the past 24 hours have been reviewed.  BMP:   Recent Labs   Lab 02/11/24 0429   *   *   K 4.2      CO2 21*   BUN 27*   CREATININE 4.9*   CALCIUM 8.4*   MG 1.7       CBC:   Recent Labs   Lab 02/11/24 0429   WBC 12.91*   HGB 7.4*   HCT 23.7*            Significant Imaging: I have reviewed all pertinent imaging  results/findings within the past 24 hours.    Assessment/Plan:      * Bacteremia  Blood culture 2/6/24 with gram variable rods.   Morganella bacteremia.  Sacral osteomyelitis as source.  Stop Vanc.  Continued Zosyn.  Repeat BCx negative so far.  ID consulted.  Changed to Cefepime.  Can do ABx's with HD on discharge.  Recommending changing tunneled HD catheter.  Will consult IR.      Uremic encephalopathy   with acute encephalopathy.  Nephrology consulted and received emergent HD. Mental status now seems at baseline.    Physical debility  PT/OT consulted.  Currently recommending moderate intensity therapy.  SW/CM did send LTAC referrals.    Pressure injury of sacral region, unstageable  Wound care consulted       Gluteal abscess  Noted on CT. With leukocytosis  - started vanc, zosyn-- continue  - General surgery consulted- to OR.  - wound care consulted  S/P operative debridement 2/8 with bone biopsy obtained given exposed coccyx    Bone cultures growing morganella and klebsiella.      Hyperphosphatemia  Secondary to ESRD.    Paroxysmal atrial fibrillation  Patient with Paroxysmal (<7 days) atrial fibrillation which is controlled currently with Amiodarone. Patient is currently in sinus rhythm.[id number] Score: 1.  Anticoagulation indicated. Anticoagulation done with apixaban .    Goals of care, counseling/discussion  Advance Care Planning  Palliative care consulted.          Anemia in ESRD (end-stage renal disease)  Patient's anemia is currently controlled.  Etiology likely d/t chronic disease due to Chronic Kidney Disease/ESRD  Current CBC reviewed-   Lab Results   Component Value Date    HGB 7.4 (L) 02/11/2024    HCT 23.7 (L) 02/11/2024     Monitor serial CBC and transfuse if patient becomes hemodynamically unstable, symptomatic or H/H drops below 7/21.  - EPO started by Nephrology   Hgb of 6.9 and pending surgical debridement.  Transfused one unit of blood with some improvement of H/H.  Closely  monitor.    Essential hypertension  Chronic, controlled. Latest blood pressure and vitals reviewed-     Temp:  [97.6 °F (36.4 °C)-99.5 °F (37.5 °C)]   Pulse:  [52-73]   Resp:  [18]   BP: (101-134)/(58-66)   SpO2:  [93 %-100 %] .   Home meds for hypertension were reviewed and noted below.   Hypertension Medications               amLODIPine (NORVASC) 10 MG tablet Take 10 mg by mouth once daily.    furosemide (LASIX) 20 MG tablet Take 20 mg by mouth 2 (two) times daily.    hydrALAZINE (APRESOLINE) 100 MG tablet Take 1 tablet (100 mg total) by mouth 3 (three) times daily.    metoprolol succinate (TOPROL-XL) 100 MG 24 hr tablet Take 100 mg by mouth once daily.    NIFEdipine (PROCARDIA-XL) 60 MG (OSM) 24 hr tablet Take 1 tablet (60 mg total) by mouth once daily.            While in the hospital, will manage blood pressure as follows; Continue home antihypertensive regimen    Will utilize p.r.n. blood pressure medication only if patient's blood pressure greater than 180/110 and he develops symptoms such as worsening chest pain or shortness of breath.    ESRD needing dialysis  Admitted with uremic encephalopathy and hyperkalemia. Received emergent dialysis  - dialysis not able to be arranged as outpatient due to history of nonadherence--> this is a major issue. Social work checking again to see if they can arrange. Palliative care consulted  - Nephrology consulted      VTE Risk Mitigation (From admission, onward)           Ordered     apixaban tablet 5 mg  Every 12 hours         02/05/24 1733     IP VTE HIGH RISK PATIENT  Once         02/05/24 1733     Place sequential compression device  Until discontinued         02/05/24 1733     Reason for No Pharmacological VTE Prophylaxis  Once        Question:  Reasons:  Answer:  Already adequately anticoagulated on oral Anticoagulants    02/05/24 1733                    Discharge Planning   JAIME: 2/8/2024     Code Status: Full Code   Is the patient medically ready for discharge?:      Reason for patient still in hospital (select all that apply): Patient trending condition, Treatment, and Pending disposition  Discharge Plan A: Home with family   Discharge Delays: (!) Dialysis Set-up              Thor May MD  Department of [facility] Medicine   [facility] Surg

## 2024-02-12 NOTE — ASSESSMENT & PLAN NOTE
Chronic, controlled. Latest blood pressure and vitals reviewed-     Temp:  [97.6 °F (36.4 °C)-99.5 °F (37.5 °C)]   Pulse:  [52-73]   Resp:  [18]   BP: (101-134)/(58-66)   SpO2:  [93 %-100 %] .   Home meds for hypertension were reviewed and noted below.   Hypertension Medications               amLODIPine (NORVASC) 10 MG tablet Take 10 mg by mouth once daily.    furosemide (LASIX) 20 MG tablet Take 20 mg by mouth 2 (two) times daily.    hydrALAZINE (APRESOLINE) 100 MG tablet Take 1 tablet (100 mg total) by mouth 3 (three) times daily.    metoprolol succinate (TOPROL-XL) 100 MG 24 hr tablet Take 100 mg by mouth once daily.    NIFEdipine (PROCARDIA-XL) 60 MG (OSM) 24 hr tablet Take 1 tablet (60 mg total) by mouth once daily.            While in the hospital, will manage blood pressure as follows; Continue home antihypertensive regimen    Will utilize p.r.n. blood pressure medication only if patient's blood pressure greater than 180/110 and he develops symptoms such as worsening chest pain or shortness of breath.

## 2024-02-12 NOTE — NURSING
[facility] [place]  Nurses Note -- 4 Eyes      2/12/2024       Skin assessed on: Q Shift      [] No Pressure Injuries Present    []Prevention Measures Documented    [x] Yes LDA  for Pressure Injury Previously documented     [] Yes New Pressure Injury Discovered   [] LDA for New Pressure Injury Added      Attending RN:  Demetrice Moreira RN     Second RN:  BETO Callahan

## 2024-02-12 NOTE — PT/OT/SLP PROGRESS
Physical Therapy      Patient Name:  Mahesh Cespedes   MRN:  [id number]    Patient not seen today secondary to Dialysis. Will follow-up as able.

## 2024-02-12 NOTE — PROGRESS NOTES
Renal Progress Note    Date of Admission:  2/5/2024 12:18 PM    Length of Stay: 6  Days    Subjective: n/a    Objective:    Current Facility-Administered Medications   Medication    0.9%  NaCl infusion (for blood administration)    acetaminophen tablet 650 mg    albuterol-ipratropium 2.5 mg-0.5 mg/3 mL nebulizer solution 3 mL    allopurinoL tablet 100 mg    aluminum-magnesium hydroxide-simethicone 200-200-20 mg/5 mL suspension 30 mL    amiodarone tablet 200 mg    amLODIPine tablet 10 mg    apixaban tablet 5 mg    atorvastatin tablet 80 mg    benztropine tablet 0.5 mg    calcitRIOL capsule 0.25 mcg    dextrose 10% bolus 125 mL 125 mL    dextrose 10% bolus 250 mL 250 mL    epoetin luli-epbx injection 10,000 Units    glucagon (human recombinant) injection 1 mg    glucose chewable tablet 16 g    glucose chewable tablet 24 g    HYDROmorphone injection 0.5 mg    iron sucrose injection 200 mg    melatonin tablet 6 mg    metoprolol succinate (TOPROL-XL) 24 hr tablet 100 mg    mupirocin 2 % ointment    naloxone 0.4 mg/mL injection 0.02 mg    ondansetron injection 4 mg    oxyCODONE immediate release tablet 10 mg    piperacillin-tazobactam (ZOSYN) 4.5 g in dextrose 5 % in water (D5W) 100 mL IVPB (MB+)    prochlorperazine injection Soln 5 mg    sevelamer carbonate tablet 800 mg    simethicone chewable tablet 80 mg    sodium chloride 0.9% bolus 250 mL 250 mL    sodium chloride 0.9% flush 10 mL    tamsulosin 24 hr capsule 0.4 mg    vitamin renal formula (B-complex-vitamin c-folic acid) 1 mg per capsule 1 capsule       Vitals:    02/11/24 2310 02/12/24 0441 02/12/24 0451 02/12/24 0727   BP: (!) 121/58 118/66  (!) 115/59   BP Location:    Right arm   Patient Position: Lying Lying  Lying   Pulse: 61 66  64   Resp: 18 18 18 18   Temp: 97.6 °F (36.4 °C) 98.6 °F (37 °C)  97.8 °F (36.6 °C)   TempSrc: Oral Oral  Oral   SpO2: 99% 100%  (!) 93%   Weight:       Height:           I/O last 3 completed shifts:  In:  "450.3 [P.O.:360; IV Piggyback:90.3]  Out: -   No intake/output data recorded.      Physical Exam: seen during Dialysis otherwise unchanged    Laboratories:    No results for input(s): "WBC", "RBC", "HGB", "HCT", "PLT", "MCV", "MCH", "MCHC" in the last 24 hours.      No results for input(s): "GLUCOSE", "CALCIUM", "ALBUMIN", "PROT", "NA", "K", "CO2", "CL", "BUN", "CREATININE", "ALKPHOS", "ALT", "AST", "BILITOT" in the last 24 hours.      No results for input(s): "COLORU", "CLARITYU", "SPECGRAV", "PHUR", "PROTEINUA", "GLUCOSEU", "BILIRUBINCON", "BLOODU", "WBCU", "RBCU", "BACTERIA", "MUCUS" in the last 24 hours.    Microbiology Results (last 7 days)       Procedure Component Value Units Date/Time    Blood culture [[id number]] Collected: 02/07/24 1344    Order Status: Completed Specimen: Blood from Peripheral, Antecubital, Right Updated: 02/11/24 1503     Blood Culture, Routine No Growth after 4 days.    Narrative:      Collection has been rescheduled by CMO at 02/07/2024 09:28 Reason: Pt   in dialysis  Collection has been rescheduled by RBJ at 02/07/2024 11:23 Reason: At   13:30  Collection has been rescheduled by [email or web address] at 02/07/2024 12:05 Reason:   Pt is doing dialysis will be ready at130  Collection has been rescheduled by CMO at 02/07/2024 09:28 Reason: Pt   in dialysis  Collection has been rescheduled by RBJ at 02/07/2024 11:23 Reason: At   13:30  Collection has been rescheduled by [email or web address] at 02/07/2024 12:05 Reason:   Pt is doing dialysis will be ready at130    Blood culture [[id number]] Collected: 02/07/24 1332    Order Status: Completed Specimen: Blood from Peripheral, Hand, Left Updated: 02/11/24 1503     Blood Culture, Routine No Growth after 4 days.    Narrative:      Collection has been rescheduled by CMO at 02/07/2024 09:28 Reason: Pt   in dialysis  Collection has been rescheduled by [email or web address] at 02/07/2024 11:23 Reason: At   13:30  Collection has been rescheduled by [email or web address] at 02/07/2024 12:05 Reason:   Pt is doing " dialysis will be ready at130  Collection has been rescheduled by MILLAO at 02/07/2024 09:28 Reason: Pt   in dialysis  Collection has been rescheduled by EVE at 02/07/2024 11:23 Reason: At   13:30  Collection has been rescheduled by VIJI at 02/07/2024 12:05 Reason:   Pt is doing dialysis will be ready at130    Blood culture [[id number]] Collected: 02/10/24 1235    Order Status: Completed Specimen: Blood from Peripheral, Hand, Left Updated: 02/11/24 1303     Blood Culture, Routine No Growth to date      No Growth to date    Blood culture [[id number]] Collected: 02/10/24 1240    Order Status: Completed Specimen: Blood from Peripheral, Antecubital, Left Updated: 02/11/24 1303     Blood Culture, Routine No Growth to date      No Growth to date    Aerobic culture [[id number]]  (Abnormal) Collected: 02/08/24 1250    Order Status: Completed Specimen: Bone from Sacrum Updated: 02/11/24 0800     Aerobic Bacterial Culture GRAM NEGATIVE RIGO, NON-LACTOSE   From broth only  Identification and susceptibility pending      Narrative:      Bone from coccyx    AFB Culture & Smear [[id number]] Collected: 02/08/24 1250    Order Status: Completed Specimen: Bone from Sacrum Updated: 02/10/24 2127     AFB Culture & Smear Culture in progress    Narrative:      Sacral abcess    AFB Culture & Smear [[id number]] Collected: 02/08/24 1250    Order Status: Completed Specimen: Bone from Sacrum Updated: 02/10/24 2127     AFB Culture & Smear Culture in progress    Narrative:      Bone from coccyx    Culture, Anaerobe [[id number]] Collected: 02/08/24 1250    Order Status: Completed Specimen: Bone from Sacrum Updated: 02/10/24 1359     Anaerobic Culture Culture in progress    Narrative:      Sacral abcess    Culture, Anaerobe [[id number]] Collected: 02/08/24 1250    Order Status: Completed Specimen: Bone from Sacrum Updated: 02/10/24 1358     Anaerobic Culture Culture in progress    Narrative:      Bone from coccyx    Blood culture [[id number]]  Collected: 02/06/24 0841    Order Status: Completed Specimen: Blood from Hand, Right Updated: 02/10/24 0903     Blood Culture, Routine No Growth after 4 days.    Aerobic culture [[id number]]  (Abnormal)  (Susceptibility) Collected: 02/08/24 1250    Order Status: Completed Specimen: Bone from Sacrum Updated: 02/10/24 0719     Aerobic Bacterial Culture MORGANELLA MORGANII  Many        KLEBSIELLA OXYTOCA  Rare      Narrative:      Sacral abcess    Gram stain [[id number]] Collected: 02/08/24 1250    Order Status: Completed Specimen: Bone from Sacrum Updated: 02/09/24 1104     Gram Stain Result Rare WBC's      No organisms seen    Narrative:      Bone from coccyx    Gram stain [[id number]] Collected: 02/08/24 1250    Order Status: Completed Specimen: Bone from Sacrum Updated: 02/09/24 1104     Gram Stain Result Rare WBC's      Rare Gram positive cocci in pairs    Narrative:      Sacral abcess    Blood culture [[id number]]  (Abnormal)  (Susceptibility) Collected: 02/06/24 0841    Order Status: Completed Specimen: Blood from Forearm, Left Updated: 02/09/24 0658     Blood Culture, Routine Gram stain marsha bottle: Gram variable rods      Results called to and read back by: Tee PÉREZ ICU 02/07/2024  05:08      MORGANELLA MORGANII    Fungus culture [[id number]] Collected: 02/08/24 1250    Order Status: Sent Specimen: Bone from Sacrum Updated: 02/08/24 1422    Fungus culture [[id number]] Collected: 02/08/24 1250    Order Status: Sent Specimen: Bone from Sacrum Updated: 02/08/24 1419    Rapid Organism ID by PCR (from Blood culture) [[id number]]  (Abnormal) Collected: 02/06/24 0841    Order Status: Completed Updated: 02/07/24 1622     Enterococcus faecalis Not Detected     Enterococcus faecium Not Detected     Listeria monocytogenes Not Detected     Staphylococcus spp. Not Detected     Staphylococcus aureus Not Detected     Staphylococcus epidermidis Not Detected     Staphylococcus lugdunensis Not Detected     Streptococcus  species Not Detected     Streptococcus agalactiae Not Detected     Streptococcus pneumoniae Not Detected     Streptococcus pyogenes Not Detected     Acinetobacter calcoaceticus/baumannii complex Not Detected     Bacteroides fragilis Not Detected     Enterobacterales Detected     Enterobacter cloacae complex Not Detected     Escherichia coli Not Detected     Klebsiella aerogenes Not Detected     Klebsiella oxytoca Not Detected     Klebsiella pneumoniae group Not Detected     Proteus Not Detected     Salmonella sp Not Detected     Serratia marcescens Not Detected     Haemophilus influenzae Not Detected     Neisseria meningtidis Not Detected     Pseudomonas aeruginosa Not Detected     Stenotrophomonas maltophilia Not Detected     Candida albicans Not Detected     Candida auris Not Detected     Candida glabrata Not Detected     Candida krusei Not Detected     Candida parapsilosis Not Detected     Candida tropicalis Not Detected     Cryptococcus neoformans/gattii Not Detected     CTX-M (ESBL ) Not Detected     IMP (Carbapenem resistant) Not Detected     KPC resistance gene (Carbapenem resistant) Not Detected     mcr-1  Test Not Applicable     mec A/C  Test Not Applicable     mec A/C and MREJ (MRSA) gene Test Not Applicable     NDM (Carbapenem resistant) Not Detected     OXA-48-like (Carbapenem resistant) Not Detected     van A/B (VRE gene) Test Not Applicable     VIM (Carbapenem resistant) Not Detected              Diagnostic Tests:     CXR:  Impression:       Cardiomegaly.  Increased interstitial lung markings.  Possible small pleural effusions.  Question on fluid overload and or CHF.  However, a pneumonic infectious process cannot be excluded in the appropriate clinical circumstances.      Electronically signed by: Sandie Martinez  Date: 02/05/2024           Assessment:    57 y/o male with known to me from prior encounter last month; Hx. ESRD (Moved from [facility]. no Dialysis unit to go to) admitted with:     -  "Hyperkalemia corrected with Dialysis last p.m.  - HAGMA  - Uncontrolled HTN  - One of the blood cultures reported as "gram variable rods" ID and sensitivity pending  - Fluid overload  - Improving Metabolic (uremic) encephalopathy, last HD 1/22/24 while in the Hospital (Pte. Was not accepted by any of the local Dialysis units due to Hx. Of non-compliance and was told either to try to go back to [place] or come to ED periodically as needed for HD)  - Hyperphosphatemia improving  - 2nd. hyperparathyroidism  - Hx. Ascites s/p Paracentesis in January  - Fe++ def. + Anemia of ckd  - HTN  - Hx. CVA  - Hypoalbuminemia  - Sacral pressure ulcer           Plan:    - Empiric antibiotics  - Dialysis today and Q MWF   - IV Fe++ loading during Dialysis  - Adjust BP meds. (Decrease Toprol dose to 50mg/day)  - Epogen 3 x week  - Transfuse prn Hgb < 7  - Renal diet + protein supplements  - NO need for PO4- binders   - Calcitriol  - Wound care per surgery  - Consider Nursing Home placement, Pte. Clearly unable to take care of self  - Disposition and other problems per admitting      Will follow on Dialysis days.                 "

## 2024-02-12 NOTE — SUBJECTIVE & OBJECTIVE
Interval History: sleepy during dialysis.    Review of Systems   HENT:  Negative for ear discharge and ear pain.    Eyes:  Negative for discharge and itching.   Endocrine: Negative for cold intolerance and heat intolerance.   Neurological:  Negative for seizures and syncope.     Objective:     Vital Signs (Most Recent):  Temp: 97.8 °F (36.6 °C) (02/12/24 1110)  Pulse: (!) 52 (02/12/24 1120)  Resp: 18 (02/12/24 1110)  BP: 101/61 (02/12/24 1120)  SpO2: (!) 93 % (02/12/24 0727) Vital Signs (24h Range):  Temp:  [97.6 °F (36.4 °C)-99.5 °F (37.5 °C)] 97.8 °F (36.6 °C)  Pulse:  [52-73] 52  Resp:  [18] 18  SpO2:  [93 %-100 %] 93 %  BP: (101-134)/(58-66) 101/61     Weight: 75.3 kg (166 lb)  Body mass index is 26 kg/m².    Intake/Output Summary (Last 24 hours) at 2/12/2024 1418  Last data filed at 2/12/2024 0925  Gross per 24 hour   Intake 240 ml   Output --   Net 240 ml           Physical Exam  Vitals and nursing note reviewed.   Constitutional:       General: He is not in acute distress.     Appearance: He is ill-appearing. He is not toxic-appearing.   HENT:      Head: Normocephalic and atraumatic.   Neck:      Comments: R chest wall THDC in place  Cardiovascular:      Rate and Rhythm: Normal rate and regular rhythm.   Pulmonary:      Effort: Pulmonary effort is normal.      Breath sounds: Normal breath sounds.   Abdominal:      General: Bowel sounds are normal. There is no distension.      Tenderness: There is no abdominal tenderness. There is no guarding or rebound.      Hernia: A hernia (umbilical, reducible) is present.   Musculoskeletal:      Right lower leg: No edema.      Left lower leg: No edema.   Skin:     General: Skin is warm and dry.   Neurological:      Mental Status: He is alert.             Significant Labs: All pertinent labs within the past 24 hours have been reviewed.  BMP:   Recent Labs   Lab 02/11/24  0429   *   *   K 4.2      CO2 21*   BUN 27*   CREATININE 4.9*   CALCIUM 8.4*   MG  1.7       CBC:   Recent Labs   Lab 02/11/24  0429   WBC 12.91*   HGB 7.4*   HCT 23.7*            Significant Imaging: I have reviewed all pertinent imaging results/findings within the past 24 hours.

## 2024-02-12 NOTE — ASSESSMENT & PLAN NOTE
Blood culture 2/6/24 with gram variable rods.   Morganella bacteremia.  Sacral osteomyelitis as source.  Stop Vanc.  Continued Zosyn.  Repeat BCx negative so far.  ID consulted.  Changed to Cefepime.  Can do ABx's with HD on discharge.  Recommending changing tunneled HD catheter.  Will consult IR.

## 2024-02-13 LAB
ANION GAP SERPL CALC-SCNC: 11 MMOL/L (ref 8–16)
BASOPHILS # BLD AUTO: 0.05 K/UL (ref 0–0.2)
BASOPHILS NFR BLD: 0.5 % (ref 0–1.9)
BUN SERPL-MCNC: 29 MG/DL (ref 6–20)
CALCIUM SERPL-MCNC: 8.6 MG/DL (ref 8.7–10.5)
CHLORIDE SERPL-SCNC: 102 MMOL/L (ref 95–110)
CO2 SERPL-SCNC: 24 MMOL/L (ref 23–29)
CREAT SERPL-MCNC: 5.5 MG/DL (ref 0.5–1.4)
DIFFERENTIAL METHOD BLD: ABNORMAL
EOSINOPHIL # BLD AUTO: 0.5 K/UL (ref 0–0.5)
EOSINOPHIL NFR BLD: 4.3 % (ref 0–8)
ERYTHROCYTE [DISTWIDTH] IN BLOOD BY AUTOMATED COUNT: 18.7 % (ref 11.5–14.5)
EST. GFR  (NO RACE VARIABLE): 11 ML/MIN/1.73 M^2
GLUCOSE SERPL-MCNC: 68 MG/DL (ref 70–110)
HCT VFR BLD AUTO: 26.8 % (ref 40–54)
HGB BLD-MCNC: 8.2 G/DL (ref 14–18)
IMM GRANULOCYTES # BLD AUTO: 0.06 K/UL (ref 0–0.04)
IMM GRANULOCYTES NFR BLD AUTO: 0.6 % (ref 0–0.5)
LYMPHOCYTES # BLD AUTO: 0.9 K/UL (ref 1–4.8)
LYMPHOCYTES NFR BLD: 8.5 % (ref 18–48)
MCH RBC QN AUTO: 26.9 PG (ref 27–31)
MCHC RBC AUTO-ENTMCNC: 30.6 G/DL (ref 32–36)
MCV RBC AUTO: 88 FL (ref 82–98)
MONOCYTES # BLD AUTO: 1.1 K/UL (ref 0.3–1)
MONOCYTES NFR BLD: 10.4 % (ref 4–15)
NEUTROPHILS # BLD AUTO: 8 K/UL (ref 1.8–7.7)
NEUTROPHILS NFR BLD: 75.7 % (ref 38–73)
NRBC BLD-RTO: 0 /100 WBC
PLATELET # BLD AUTO: 317 K/UL (ref 150–450)
PMV BLD AUTO: 9.5 FL (ref 9.2–12.9)
POTASSIUM SERPL-SCNC: 4.4 MMOL/L (ref 3.5–5.1)
RBC # BLD AUTO: 3.05 M/UL (ref 4.6–6.2)
SODIUM SERPL-SCNC: 137 MMOL/L (ref 136–145)
WBC # BLD AUTO: 10.57 K/UL (ref 3.9–12.7)

## 2024-02-13 PROCEDURE — 25000003 PHARM REV CODE 250: Performed by: HOSPITALIST

## 2024-02-13 PROCEDURE — 99900035 HC TECH TIME PER 15 MIN (STAT)

## 2024-02-13 PROCEDURE — 21400001 HC TELEMETRY ROOM

## 2024-02-13 PROCEDURE — 25000003 PHARM REV CODE 250: Performed by: INTERNAL MEDICINE

## 2024-02-13 PROCEDURE — 97530 THERAPEUTIC ACTIVITIES: CPT | Mod: CQ

## 2024-02-13 PROCEDURE — 63600175 PHARM REV CODE 636 W HCPCS: Performed by: HOSPITALIST

## 2024-02-13 PROCEDURE — 25000003 PHARM REV CODE 250

## 2024-02-13 PROCEDURE — 36415 COLL VENOUS BLD VENIPUNCTURE: CPT | Performed by: HOSPITALIST

## 2024-02-13 PROCEDURE — 85025 COMPLETE CBC W/AUTO DIFF WBC: CPT | Performed by: HOSPITALIST

## 2024-02-13 PROCEDURE — 11000001 HC ACUTE MED/SURG PRIVATE ROOM

## 2024-02-13 PROCEDURE — 80048 BASIC METABOLIC PNL TOTAL CA: CPT | Performed by: HOSPITALIST

## 2024-02-13 PROCEDURE — 63600175 PHARM REV CODE 636 W HCPCS: Mod: JZ,JG | Performed by: HOSPITALIST

## 2024-02-13 PROCEDURE — 97110 THERAPEUTIC EXERCISES: CPT | Mod: CQ

## 2024-02-13 RX ADMIN — CALCITRIOL CAPSULES 0.25 MCG 0.25 MCG: 0.25 CAPSULE ORAL at 09:02

## 2024-02-13 RX ADMIN — BENZTROPINE MESYLATE 0.5 MG: 0.5 TABLET ORAL at 09:02

## 2024-02-13 RX ADMIN — APIXABAN 5 MG: 5 TABLET, FILM COATED ORAL at 09:02

## 2024-02-13 RX ADMIN — ATORVASTATIN CALCIUM 80 MG: 40 TABLET, FILM COATED ORAL at 09:02

## 2024-02-13 RX ADMIN — AMLODIPINE BESYLATE 5 MG: 5 TABLET ORAL at 09:02

## 2024-02-13 RX ADMIN — PIPERACILLIN SODIUM AND TAZOBACTAM SODIUM 4.5 G: 4; .5 INJECTION, POWDER, LYOPHILIZED, FOR SOLUTION INTRAVENOUS at 09:02

## 2024-02-13 RX ADMIN — NEPHROCAP 1 CAPSULE: 1 CAP ORAL at 09:02

## 2024-02-13 RX ADMIN — AMIODARONE HYDROCHLORIDE 200 MG: 200 TABLET ORAL at 09:02

## 2024-02-13 RX ADMIN — TAMSULOSIN HYDROCHLORIDE 0.4 MG: 0.4 CAPSULE ORAL at 09:02

## 2024-02-13 RX ADMIN — OXYCODONE 10 MG: 5 TABLET ORAL at 02:02

## 2024-02-13 RX ADMIN — EPOETIN ALFA-EPBX 10000 UNITS: 10000 INJECTION, SOLUTION INTRAVENOUS; SUBCUTANEOUS at 09:02

## 2024-02-13 RX ADMIN — ALLOPURINOL 100 MG: 100 TABLET ORAL at 09:02

## 2024-02-13 RX ADMIN — METOPROLOL SUCCINATE 50 MG: 50 TABLET, EXTENDED RELEASE ORAL at 09:02

## 2024-02-13 NOTE — NURSING
[facility] [place]  Nurses Note -- 4 Eyes      2/13/2024       Skin assessed on: Q Shift      [] No Pressure Injuries Present    []Prevention Measures Documented    [x] Yes LDA  for Pressure Injury Previously documented     [] Yes New Pressure Injury Discovered   [] LDA for New Pressure Injury Added      Attending RN:  Ira Kiran, BETO     Second RN:  Martin RN

## 2024-02-13 NOTE — NURSING
[facility] [place]  Nurses Note -- 4 Eyes      2/12/2024       Skin assessed on: Q Shift      [] No Pressure Injuries Present    []Prevention Measures Documented    [x] Yes LDA  for Pressure Injury Previously documented     [] Yes New Pressure Injury Discovered   [] LDA for New Pressure Injury Added      Attending RN:  Demetrice Moreira RN     Second RN:  BETO Roth

## 2024-02-13 NOTE — ASSESSMENT & PLAN NOTE
Patient's anemia is currently controlled.  Etiology likely d/t chronic disease due to Chronic Kidney Disease/ESRD  Current CBC reviewed-   Lab Results   Component Value Date    HGB 8.2 (L) 02/13/2024    HCT 26.8 (L) 02/13/2024     Monitor serial CBC and transfuse if patient becomes hemodynamically unstable, symptomatic or H/H drops below 7/21.  - EPO started by Nephrology   Hgb of 6.9 and pending surgical debridement.  Transfused one unit of blood with some improvement of H/H.  Closely monitor.

## 2024-02-13 NOTE — PLAN OF CARE
Problem: Skin Injury Risk Increased  Goal: Skin Health and Integrity  Outcome: Ongoing, Progressing     Problem: Infection (Hemodialysis)  Goal: Absence of Infection Signs and Symptoms  Outcome: Ongoing, Progressing     Problem: Adult Inpatient Plan of Care  Goal: Patient-Specific Goal (Individualized)  Outcome: Ongoing, Progressing  Goal: Absence of Hospital-Acquired Illness or Injury  Outcome: Ongoing, Progressing  Goal: Optimal Comfort and Wellbeing  Outcome: Ongoing, Progressing     Problem: Infection  Goal: Absence of Infection Signs and Symptoms  Outcome: Ongoing, Progressing     Problem: Fall Injury Risk  Goal: Absence of Fall and Fall-Related Injury  Outcome: Ongoing, Progressing

## 2024-02-13 NOTE — PLAN OF CARE
Patient to receive dialysis today. The cath might need to be removed. Per Dr. May patient will be here for some more days.     02/12/24 2033   Discharge Reassessment   Assessment Type Discharge Planning Assessment   Did the patient's condition or plan change since previous assessment? No   Discharge Plan discussed with: Adult children   Communicated JAIME with patient/caregiver Date not available/Unable to determine   Discharge Plan A Home Health   Discharge Plan B Home with family   DME Needed Upon Discharge  none   Transition of Care Barriers DIalysis placement issues   Why the patient remains in the hospital Requires continued medical care   Post-Acute Status   Coverage Medicare   Discharge Delays (!) Dialysis Set-up

## 2024-02-13 NOTE — PROGRESS NOTES
[facility] Medicine  Progress Note    Patient Name: Mahesh Cespedes  MRN: [id number]  Patient Class: IP- Inpatient   Admission Date: 2/5/2024  Length of Stay: 7 days  Attending Physician: Terrance Joseph III, MD  Primary Care Provider: Cruz Hernandez MD        Subjective:     Principal Problem:Bacteremia        HPI:  58 y.o. male with AFib, DM2, ESRD on dialysis, hypertension presents from home with a complaint of altered mental status.  Family report he has been drowsy and fatigued with increased confusion for the past 2 days.  Has been progressively worsening.  Associated with significant peripheral edema.  Last dialysis was 2 weeks ago.  Denies fever, chills, cough, SOB, chest pain, palpitations, nausea, vomiting, diarrhea, or abdominal pain.  History is somewhat limited given the patient's drowsiness but he is awake and conversant.    In the ED, labs revealed hyperkalemia, K + 7.4.  He was given shifting medications and zirconium.  Nephrology was consulted and plan for urgent dialysis.  Labs also reveal uremia, leukocytosis, hyperphosphatemia, and metabolic acidosis.  Placed in observation to obtain dialysis.    Overview/Hospital Course:  58 y.o. man with ESRD but without outpatient dialysis set up who was admitted with uremic encephalopathy, hyperkalemia. Nephrology consulted and he received emergent dialysis. Mental status is improving. He has sacral wound with abscess. Started antibiotics. Blood cultures with gram variable rods. General surgery planning OR debridement on 2/8/24. S/P debridement with bone biopsy.  Morganella bacteremia.  Bone cultures also growing Morganella and Klebsiella.  ID consulted.  Recommending tunneled HD catheter removal.  IR consulted.  SW/CM consulted for outpatient dialysis arrangements.    Interval History:  Patient states he is feeling fine currently.  Asking if he can have some more food.  No fever or chills.    Review of Systems  Objective:     Vital Signs  (Most Recent):  Temp: 97.6 °F (36.4 °C) (02/13/24 0753)  Pulse: 68 (02/13/24 0753)  Resp: 18 (02/13/24 0753)  BP: (!) 148/65 (02/13/24 0753)  SpO2: 100 % (02/13/24 0753) Vital Signs (24h Range):  Temp:  [97.6 °F (36.4 °C)-98.2 °F (36.8 °C)] 97.6 °F (36.4 °C)  Pulse:  [52-81] 68  Resp:  [17-18] 18  SpO2:  [94 %-100 %] 100 %  BP: (101-161)/(61-98) 148/65     Weight: 75.3 kg (166 lb)  Body mass index is 26 kg/m².    Intake/Output Summary (Last 24 hours) at 2/13/2024 1058  Last data filed at 2/12/2024 1642  Gross per 24 hour   Intake 740 ml   Output 1500 ml   Net -760 ml         Physical Exam    Constitutional:       General: He is not in acute distress.     Appearance: He is ill-appearing. He is not toxic-appearing.   HENT:      Head: Normocephalic and atraumatic.   Neck:      Comments: R chest wall THDC in place  Cardiovascular:      Rate and Rhythm: Normal rate and regular rhythm.   Pulmonary:      Effort: Pulmonary effort is normal.      Breath sounds: Normal breath sounds.   Abdominal:      General: Bowel sounds are normal. There is no distension.      Tenderness: There is no abdominal tenderness. There is no guarding or rebound.      Hernia: A hernia (umbilical, reducible) is present.   Musculoskeletal:      Right lower leg: No edema.      Left lower leg: No edema.   Skin:     General: Skin is warm and dry.   Neurological:      Mental Status: He is alert.     Significant Labs: All pertinent labs within the past 24 hours have been reviewed.    Significant Imaging: I have reviewed all pertinent imaging results/findings within the past 24 hours.    Assessment/Plan:      * Bacteremia  Blood culture 2/6/24 with gram variable rods.   Morganella bacteremia.  Sacral osteomyelitis as source.  Stop Vanc.  Continued Zosyn.  Repeat BCx negative so far.  ID consulted.  Changed to Cefepime.  Can do ABx's with HD on discharge.  Recommending changing tunneled HD catheter.  Will consult IR.      Physical debility  PT/OT  consulted.  Currently recommending moderate intensity therapy.  SW/CM did send LTAC referrals.    Pressure injury of sacral region, unstageable  Wound care consulted       Gluteal abscess  Noted on CT. With leukocytosis  - started vanc, zosyn-- continue  - General surgery consulted- to OR.  - wound care consulted  S/P operative debridement 2/8 with bone biopsy obtained given exposed coccyx    Bone cultures growing morganella and klebsiella.      Hyperphosphatemia  Secondary to ESRD.    Uremic encephalopathy   with acute encephalopathy.  Nephrology consulted and received emergent HD. Mental status now seems at baseline.    Paroxysmal atrial fibrillation  Patient with Paroxysmal (<7 days) atrial fibrillation which is controlled currently with Amiodarone. Patient is currently in sinus rhythm.[id number] Score: 1.  Anticoagulation indicated. Anticoagulation done with apixaban .    Goals of care, counseling/discussion  Advance Care Planning  Palliative care consulted.         Anemia in ESRD (end-stage renal disease)  Patient's anemia is currently controlled.  Etiology likely d/t chronic disease due to Chronic Kidney Disease/ESRD  Current CBC reviewed-   Lab Results   Component Value Date    HGB 8.2 (L) 02/13/2024    HCT 26.8 (L) 02/13/2024     Monitor serial CBC and transfuse if patient becomes hemodynamically unstable, symptomatic or H/H drops below 7/21.  - EPO started by Nephrology   Hgb of 6.9 and pending surgical debridement.  Transfused one unit of blood with some improvement of H/H.  Closely monitor.    Essential hypertension  Chronic, controlled. Latest blood pressure and vitals reviewed-     Temp:  [97.6 °F (36.4 °C)-98.2 °F (36.8 °C)]   Pulse:  [52-81]   Resp:  [17-18]   BP: (101-161)/(61-98)   SpO2:  [94 %-100 %] .   Home meds for hypertension were reviewed and noted below.   Hypertension Medications               amLODIPine (NORVASC) 10 MG tablet Take 10 mg by mouth once daily.    furosemide (LASIX) 20  MG tablet Take 20 mg by mouth 2 (two) times daily.    hydrALAZINE (APRESOLINE) 100 MG tablet Take 1 tablet (100 mg total) by mouth 3 (three) times daily.    metoprolol succinate (TOPROL-XL) 100 MG 24 hr tablet Take 100 mg by mouth once daily.    NIFEdipine (PROCARDIA-XL) 60 MG (OSM) 24 hr tablet Take 1 tablet (60 mg total) by mouth once daily.            While in the hospital, will manage blood pressure as follows; Continue home antihypertensive regimen    Will utilize p.r.n. blood pressure medication only if patient's blood pressure greater than 180/110 and he develops symptoms such as worsening chest pain or shortness of breath.    ESRD needing dialysis  Admitted with uremic encephalopathy and hyperkalemia. Received emergent dialysis  - dialysis not able to be arranged as outpatient due to history of nonadherence--> this is a major issue. Social work checking again to see if they can arrange. Palliative care consulted  - Nephrology consulted      VTE Risk Mitigation (From admission, onward)           Ordered     apixaban tablet 5 mg  Every 12 hours         02/05/24 1733     IP VTE HIGH RISK PATIENT  Once         02/05/24 1733     Place sequential compression device  Until discontinued         02/05/24 1733     Reason for No Pharmacological VTE Prophylaxis  Once        Question:  Reasons:  Answer:  Already adequately anticoagulated on oral Anticoagulants    02/05/24 1733                    Discharge Planning   JAIME: 2/8/2024     Code Status: Full Code   Is the patient medically ready for discharge?:     Reason for patient still in hospital (select all that apply): Treatment and Consult recommendations  Discharge Plan A: Home Health   Discharge Delays: (!) Dialysis Set-up              Terrance Joseph III, MD  Department of Hospital Medicine   [facility] [place] [facility]

## 2024-02-13 NOTE — PT/OT/SLP PROGRESS
Physical Therapy Treatment    Patient Name:  Mahesh Cespedes   MRN:  [id number]    Recommendations:     Discharge Recommendations: Moderate Intensity Therapy  Discharge Equipment Recommendations: to be determined by next level of care  Barriers to discharge: None    Assessment:     Mahesh Cespedes is a 58 y.o. male admitted with a medical diagnosis of Bacteremia.  He presents with the following impairments/functional limitations: impaired balance, weakness, impaired endurance, decreased upper extremity function, decreased lower extremity function, gait instability, impaired functional mobility, impaired self care skills, impaired cognition, decreased safety awareness, impaired skin, pain, impaired cardiopulmonary response to activity, decreased ROM, decreased coordination .    Rehab Prognosis: Good; patient would benefit from acute skilled PT services to address these deficits and reach maximum level of function.    Recent Surgery: Procedure(s) (LRB):  DEBRIDEMENT, PRESSURE ULCER (N/A) 5 Days Post-Op    Plan:     During this hospitalization, patient to be seen  (5-6x/wk) to address the identified rehab impairments via gait training, therapeutic activities, therapeutic exercises, neuromuscular re-education, wheelchair management/training and progress toward the following goals:    Plan of Care Expires:  02/23/24    Subjective     Chief Complaint: hungry, asking for food, nurse notified   Patient/Family Comments/goals: pt is pleasantly confused , agreeable to therapy   Pain/Comfort:  Pain Rating 1: 0/10  Pain Rating Post-Intervention 1: 0/10      Objective:     Communicated with nurse prior to session.  Patient found right sidelying with telemetry, oxygen, bed alarm, peripheral IV upon PT entry to room.     General Precautions: Standard, fall  Orthopedic Precautions: N/A  Braces: N/A  Respiratory Status: Nasal cannula, flow 1 L/min   SpO2: 96%- 98% on 1L   Functional Mobility:  Bed Mobility:     Rolling Left:  stand by  assistance  Scooting: contact guard assistance and minimum assistance  Supine to Sit: moderate assistance, HOB elevated,bedside rail   Sit to Supine: moderate assistance  Transfers:     Sit to Stand: x 3 trials from elevated bed  moderate assistance with rolling walker. V/T cues for safety , proper technique and hand placement to push off from bedside rail   Gait:  pt ambulated 6 side steps with RW, MOD A . Noted with decreased eileen,decreased step length, decreased weight shifting ability, no LOB.  V/T cues for safety , proper technique and max A for walker management.   Balance:  fair+ in sitting , poor in standing.       AM-PAC 6 CLICK MOBILITY  Turning over in bed (including adjusting bedclothes, sheets and blankets)?: 4  Sitting down on and standing up from a chair with arms (e.g., wheelchair, bedside commode, etc.): 2  Moving from lying on back to sitting on the side of the bed?: 2  Moving to and from a bed to a chair (including a wheelchair)?: 2  Need to walk in hospital room?: 2  Climbing 3-5 steps with a railing?: 1  Basic Mobility Total Score: 13       Treatment & Education:  Lower Extremity Exercises.    Patient educated on: Purpose and Benefits of Therapeutic Exercise(s).    Patient verbalized acceptance/understanding of instruction(s), expectation(s), and limitation(s) (for safety).  Patient performed: 10 reps (each) of B LE Ther Ex: AP, LAQ, Hip abd/add, Hip flexion( AAROM BLE)  while sitting up on EOB.       Patient required  verbal cues/tactile cues to ensure correct sequence, to maintain proper form, and to allow for self-correction.  Pt reported no complaints or problems with exercise activity.      Patient left left sidelying with foam wedge to offload R side , pillow between knee, heels offloading using pillows  ( declined to use pressure relief boots)  all lines intact, call button in reach, bed alarm on, nurse notified, and [employer]  present..    GOALS:   Multidisciplinary Problems        Physical Therapy Goals          Problem: Physical Therapy    Goal Priority Disciplines Outcome Goal Variances Interventions   Physical Therapy Goal     PT, PT/OT Ongoing, Progressing     Description: Goals to be met by: 24     Patient will increase functional independence with mobility by performin. Supine to sit with Stand-by Assistance  2. Sit to stand transfer with Contact Guard Assistance  3. Bed to chair transfer with Contact Guard Assistance    4. Gait  x 10-15 feet with Minimal Assistance using Rolling Walker.   5. Wheelchair propulsion x50 feet with Stand-by Assistance    6. Lower extremity exercise program x10 reps per handout, with supervision                         Time Tracking:     PT Received On: 24  PT Start Time: 1133     PT Stop Time: 1200  PT Total Time (min): 27 min     Billable Minutes: Therapeutic Activity 15 and Therapeutic Exercise 12    Treatment Type: Treatment  PT/PTA: PTA     Number of PTA visits since last PT visit: 2024

## 2024-02-13 NOTE — SUBJECTIVE & OBJECTIVE
Interval History:  Patient states he is feeling fine currently.  Asking if he can have some more food.  No fever or chills.    Review of Systems  Objective:     Vital Signs (Most Recent):  Temp: 97.6 °F (36.4 °C) (02/13/24 0753)  Pulse: 68 (02/13/24 0753)  Resp: 18 (02/13/24 0753)  BP: (!) 148/65 (02/13/24 0753)  SpO2: 100 % (02/13/24 0753) Vital Signs (24h Range):  Temp:  [97.6 °F (36.4 °C)-98.2 °F (36.8 °C)] 97.6 °F (36.4 °C)  Pulse:  [52-81] 68  Resp:  [17-18] 18  SpO2:  [94 %-100 %] 100 %  BP: (101-161)/(61-98) 148/65     Weight: 75.3 kg (166 lb)  Body mass index is 26 kg/m².    Intake/Output Summary (Last 24 hours) at 2/13/2024 1058  Last data filed at 2/12/2024 1642  Gross per 24 hour   Intake 740 ml   Output 1500 ml   Net -760 ml         Physical Exam    Constitutional:       General: He is not in acute distress.     Appearance: He is ill-appearing. He is not toxic-appearing.   HENT:      Head: Normocephalic and atraumatic.   Neck:      Comments: R chest wall THDC in place  Cardiovascular:      Rate and Rhythm: Normal rate and regular rhythm.   Pulmonary:      Effort: Pulmonary effort is normal.      Breath sounds: Normal breath sounds.   Abdominal:      General: Bowel sounds are normal. There is no distension.      Tenderness: There is no abdominal tenderness. There is no guarding or rebound.      Hernia: A hernia (umbilical, reducible) is present.   Musculoskeletal:      Right lower leg: No edema.      Left lower leg: No edema.   Skin:     General: Skin is warm and dry.   Neurological:      Mental Status: He is alert.     Significant Labs: All pertinent labs within the past 24 hours have been reviewed.    Significant Imaging: I have reviewed all pertinent imaging results/findings within the past 24 hours.

## 2024-02-13 NOTE — ASSESSMENT & PLAN NOTE
Patient with Paroxysmal (<7 days) atrial fibrillation which is controlled currently with Amiodarone. Patient is currently in sinus rhythm.[id number] Score: 1.  Anticoagulation indicated. Anticoagulation done with apixaban .

## 2024-02-13 NOTE — ASSESSMENT & PLAN NOTE
Chronic, controlled. Latest blood pressure and vitals reviewed-     Temp:  [97.6 °F (36.4 °C)-98.2 °F (36.8 °C)]   Pulse:  [52-81]   Resp:  [17-18]   BP: (101-161)/(61-98)   SpO2:  [94 %-100 %] .   Home meds for hypertension were reviewed and noted below.   Hypertension Medications               amLODIPine (NORVASC) 10 MG tablet Take 10 mg by mouth once daily.    furosemide (LASIX) 20 MG tablet Take 20 mg by mouth 2 (two) times daily.    hydrALAZINE (APRESOLINE) 100 MG tablet Take 1 tablet (100 mg total) by mouth 3 (three) times daily.    metoprolol succinate (TOPROL-XL) 100 MG 24 hr tablet Take 100 mg by mouth once daily.    NIFEdipine (PROCARDIA-XL) 60 MG (OSM) 24 hr tablet Take 1 tablet (60 mg total) by mouth once daily.            While in the hospital, will manage blood pressure as follows; Continue home antihypertensive regimen    Will utilize p.r.n. blood pressure medication only if patient's blood pressure greater than 180/110 and he develops symptoms such as worsening chest pain or shortness of breath.

## 2024-02-14 LAB
ANION GAP SERPL CALC-SCNC: 13 MMOL/L (ref 8–16)
BACTERIA BLD CULT: NORMAL
BACTERIA BLD CULT: NORMAL
BASOPHILS # BLD AUTO: 0.05 K/UL (ref 0–0.2)
BASOPHILS NFR BLD: 0.4 % (ref 0–1.9)
BUN SERPL-MCNC: 38 MG/DL (ref 6–20)
CALCIUM SERPL-MCNC: 8.2 MG/DL (ref 8.7–10.5)
CHLORIDE SERPL-SCNC: 102 MMOL/L (ref 95–110)
CO2 SERPL-SCNC: 22 MMOL/L (ref 23–29)
CREAT SERPL-MCNC: 7 MG/DL (ref 0.5–1.4)
DIFFERENTIAL METHOD BLD: ABNORMAL
EOSINOPHIL # BLD AUTO: 0.5 K/UL (ref 0–0.5)
EOSINOPHIL NFR BLD: 3.5 % (ref 0–8)
ERYTHROCYTE [DISTWIDTH] IN BLOOD BY AUTOMATED COUNT: 19.5 % (ref 11.5–14.5)
EST. GFR  (NO RACE VARIABLE): 8 ML/MIN/1.73 M^2
GLUCOSE SERPL-MCNC: 86 MG/DL (ref 70–110)
HCT VFR BLD AUTO: 25.4 % (ref 40–54)
HGB BLD-MCNC: 8 G/DL (ref 14–18)
IMM GRANULOCYTES # BLD AUTO: 0.28 K/UL (ref 0–0.04)
IMM GRANULOCYTES NFR BLD AUTO: 2 % (ref 0–0.5)
LYMPHOCYTES # BLD AUTO: 1.7 K/UL (ref 1–4.8)
LYMPHOCYTES NFR BLD: 12.2 % (ref 18–48)
MAGNESIUM SERPL-MCNC: 1.9 MG/DL (ref 1.6–2.6)
MCH RBC QN AUTO: 26.9 PG (ref 27–31)
MCHC RBC AUTO-ENTMCNC: 31.5 G/DL (ref 32–36)
MCV RBC AUTO: 86 FL (ref 82–98)
MONOCYTES # BLD AUTO: 1.7 K/UL (ref 0.3–1)
MONOCYTES NFR BLD: 12.5 % (ref 4–15)
NEUTROPHILS # BLD AUTO: 9.7 K/UL (ref 1.8–7.7)
NEUTROPHILS NFR BLD: 69.4 % (ref 38–73)
NRBC BLD-RTO: 0 /100 WBC
PLATELET # BLD AUTO: 317 K/UL (ref 150–450)
PMV BLD AUTO: 9.6 FL (ref 9.2–12.9)
POTASSIUM SERPL-SCNC: 4.7 MMOL/L (ref 3.5–5.1)
RBC # BLD AUTO: 2.97 M/UL (ref 4.6–6.2)
SODIUM SERPL-SCNC: 137 MMOL/L (ref 136–145)
WBC # BLD AUTO: 13.9 K/UL (ref 3.9–12.7)

## 2024-02-14 PROCEDURE — 63600175 PHARM REV CODE 636 W HCPCS: Performed by: INTERNAL MEDICINE

## 2024-02-14 PROCEDURE — 90935 HEMODIALYSIS ONE EVALUATION: CPT

## 2024-02-14 PROCEDURE — 25000003 PHARM REV CODE 250: Performed by: HOSPITALIST

## 2024-02-14 PROCEDURE — 80048 BASIC METABOLIC PNL TOTAL CA: CPT

## 2024-02-14 PROCEDURE — 36415 COLL VENOUS BLD VENIPUNCTURE: CPT | Mod: XB | Performed by: STUDENT IN AN ORGANIZED HEALTH CARE EDUCATION/TRAINING PROGRAM

## 2024-02-14 PROCEDURE — 21400001 HC TELEMETRY ROOM

## 2024-02-14 PROCEDURE — 63600175 PHARM REV CODE 636 W HCPCS

## 2024-02-14 PROCEDURE — 25000003 PHARM REV CODE 250: Performed by: INTERNAL MEDICINE

## 2024-02-14 PROCEDURE — 36415 COLL VENOUS BLD VENIPUNCTURE: CPT

## 2024-02-14 PROCEDURE — 63600175 PHARM REV CODE 636 W HCPCS: Performed by: HOSPITALIST

## 2024-02-14 PROCEDURE — 85025 COMPLETE CBC W/AUTO DIFF WBC: CPT | Performed by: STUDENT IN AN ORGANIZED HEALTH CARE EDUCATION/TRAINING PROGRAM

## 2024-02-14 PROCEDURE — 25000003 PHARM REV CODE 250

## 2024-02-14 PROCEDURE — 83735 ASSAY OF MAGNESIUM: CPT

## 2024-02-14 PROCEDURE — 11000001 HC ACUTE MED/SURG PRIVATE ROOM

## 2024-02-14 PROCEDURE — 25000003 PHARM REV CODE 250: Performed by: RADIOLOGY

## 2024-02-14 RX ORDER — LIDOCAINE HYDROCHLORIDE 10 MG/ML
INJECTION INFILTRATION; PERINEURAL
Status: COMPLETED | OUTPATIENT
Start: 2024-02-14 | End: 2024-02-14

## 2024-02-14 RX ADMIN — AMLODIPINE BESYLATE 5 MG: 5 TABLET ORAL at 08:02

## 2024-02-14 RX ADMIN — HYDROMORPHONE HYDROCHLORIDE 0.5 MG: 1 INJECTION, SOLUTION INTRAMUSCULAR; INTRAVENOUS; SUBCUTANEOUS at 04:02

## 2024-02-14 RX ADMIN — APIXABAN 5 MG: 5 TABLET, FILM COATED ORAL at 08:02

## 2024-02-14 RX ADMIN — HYDROMORPHONE HYDROCHLORIDE 0.5 MG: 1 INJECTION, SOLUTION INTRAMUSCULAR; INTRAVENOUS; SUBCUTANEOUS at 11:02

## 2024-02-14 RX ADMIN — BENZTROPINE MESYLATE 0.5 MG: 0.5 TABLET ORAL at 08:02

## 2024-02-14 RX ADMIN — NEPHROCAP 1 CAPSULE: 1 CAP ORAL at 08:02

## 2024-02-14 RX ADMIN — CALCITRIOL CAPSULES 0.25 MCG 0.25 MCG: 0.25 CAPSULE ORAL at 08:02

## 2024-02-14 RX ADMIN — ATORVASTATIN CALCIUM 80 MG: 40 TABLET, FILM COATED ORAL at 08:02

## 2024-02-14 RX ADMIN — LIDOCAINE HYDROCHLORIDE 10 ML: 10 INJECTION, SOLUTION INFILTRATION; PERINEURAL at 03:02

## 2024-02-14 RX ADMIN — OXYCODONE 10 MG: 5 TABLET ORAL at 08:02

## 2024-02-14 RX ADMIN — ALLOPURINOL 100 MG: 100 TABLET ORAL at 08:02

## 2024-02-14 RX ADMIN — Medication 6 MG: at 08:02

## 2024-02-14 RX ADMIN — IRON SUCROSE 200 MG: 20 INJECTION, SOLUTION INTRAVENOUS at 11:02

## 2024-02-14 RX ADMIN — AMIODARONE HYDROCHLORIDE 200 MG: 200 TABLET ORAL at 08:02

## 2024-02-14 RX ADMIN — PIPERACILLIN SODIUM AND TAZOBACTAM SODIUM 4.5 G: 4; .5 INJECTION, POWDER, LYOPHILIZED, FOR SOLUTION INTRAVENOUS at 10:02

## 2024-02-14 RX ADMIN — PIPERACILLIN SODIUM AND TAZOBACTAM SODIUM 4.5 G: 4; .5 INJECTION, POWDER, LYOPHILIZED, FOR SOLUTION INTRAVENOUS at 01:02

## 2024-02-14 RX ADMIN — ACETAMINOPHEN 650 MG: 325 TABLET ORAL at 06:02

## 2024-02-14 RX ADMIN — TAMSULOSIN HYDROCHLORIDE 0.4 MG: 0.4 CAPSULE ORAL at 08:02

## 2024-02-14 NOTE — PLAN OF CARE
[facility] denied patient and [facility] due to previous non compliance. [facility] denied patient as well.    Per nephrology Plan HD line removal/holiday is pending.      [place] talked with daughter Rima Cespedes says father can return to stay with her.    Plan to continue to seek out patient HD for MWF

## 2024-02-14 NOTE — PROGRESS NOTES
[facility] Medicine  Progress Note    Patient Name: Mahesh Cespedes  MRN: [id number]  Patient Class: IP- Inpatient   Admission Date: 2/5/2024  Length of Stay: 8 days  Attending Physician: Terrance Joseph III, MD  Primary Care Provider: Cruz Hernandez MD        Subjective:     Principal Problem:Bacteremia        HPI:  58 y.o. male with AFib, DM2, ESRD on dialysis, hypertension presents from home with a complaint of altered mental status.  Family report he has been drowsy and fatigued with increased confusion for the past 2 days.  Has been progressively worsening.  Associated with significant peripheral edema.  Last dialysis was 2 weeks ago.  Denies fever, chills, cough, SOB, chest pain, palpitations, nausea, vomiting, diarrhea, or abdominal pain.  History is somewhat limited given the patient's drowsiness but he is awake and conversant.    In the ED, labs revealed hyperkalemia, K + 7.4.  He was given shifting medications and zirconium.  Nephrology was consulted and plan for urgent dialysis.  Labs also reveal uremia, leukocytosis, hyperphosphatemia, and metabolic acidosis.  Placed in observation to obtain dialysis.    Overview/Hospital Course:  58 y.o. man with ESRD but without outpatient dialysis set up who was admitted with uremic encephalopathy, hyperkalemia. Nephrology consulted and he received emergent dialysis. Mental status is improving. He has sacral wound with abscess. Started antibiotics. Blood cultures with gram variable rods. General surgery planning OR debridement on 2/8/24. S/P debridement with bone biopsy.  Morganella bacteremia.  Bone cultures also growing Morganella and Klebsiella.  ID consulted.  Recommending tunneled HD catheter removal.  IR consulted.  SW/CM consulted for outpatient dialysis arrangements.    Interval History: NAEON. Seen prior to tunneled line removal by IR. Plan for replacement Friday. No fever or chills    Review of Systems  Objective:     Vital Signs (Most  Recent):  Temp: 98.5 °F (36.9 °C) (02/14/24 1310)  Pulse: 77 (02/14/24 1310)  Resp: 18 (02/14/24 1310)  BP: (!) 158/85 (02/14/24 1310)  SpO2: 98 % (02/14/24 1310) Vital Signs (24h Range):  Temp:  [97.5 °F (36.4 °C)-99.2 °F (37.3 °C)] 98.5 °F (36.9 °C)  Pulse:  [53-77] 77  Resp:  [16-18] 18  SpO2:  [96 %-99 %] 98 %  BP: (105-158)/(46-85) 158/85     Weight: 75.3 kg (166 lb)  Body mass index is 26 kg/m².    Intake/Output Summary (Last 24 hours) at 2/14/2024 1448  Last data filed at 2/14/2024 1330  Gross per 24 hour   Intake 360 ml   Output --   Net 360 ml         Physical Exam      Constitutional:       General: He is not in acute distress.     Appearance: He is ill-appearing. He is not toxic-appearing.   HENT:      Head: Normocephalic and atraumatic.   Cardiovascular:      Rate and Rhythm: Normal rate and regular rhythm.   Pulmonary:      Effort: Pulmonary effort is normal.      Breath sounds: Normal breath sounds.   Abdominal:      General: Bowel sounds are normal. There is no distension.      Tenderness: There is no abdominal tenderness. There is no guarding or rebound.   Musculoskeletal:      Right lower leg: No edema.      Left lower leg: No edema.   Skin:     General: Skin is warm and dry.   Neurological:      Mental Status: He is alert.   Significant Labs: All pertinent labs within the past 24 hours have been reviewed.    Significant Imaging: I have reviewed all pertinent imaging results/findings within the past 24 hours.    Assessment/Plan:      * Bacteremia  Blood culture 2/6/24 with gram variable rods.   Morganella bacteremia.  Sacral osteomyelitis as source.  Stop Vanc.  Continued Zosyn.  Repeat BCx negative so far.  ID consulted.  Changed to Cefepime.  Can do ABx's with HD on discharge.  Recommending changing tunneled HD catheter. Removed 2/14 with plan for line holiday prior to replacement on friday      Physical debility  PT/OT consulted.  Currently recommending moderate intensity therapy.  SW/CM did send  LTAC referrals.    Pressure injury of sacral region, unstageable  Wound care consulted       Gluteal abscess  Noted on CT. With leukocytosis  - started vanc, zosyn-- continue  - General surgery consulted- to OR.  - wound care consulted  S/P operative debridement 2/8 with bone biopsy obtained given exposed coccyx    Bone cultures growing morganella and klebsiella.      Hyperphosphatemia  Secondary to ESRD.    Uremic encephalopathy   with acute encephalopathy.  Nephrology consulted and received emergent HD. Mental status now seems at baseline.    Paroxysmal atrial fibrillation  Patient with Paroxysmal (<7 days) atrial fibrillation which is controlled currently with Amiodarone. Patient is currently in sinus rhythm.[id number] Score: 1.  Anticoagulation indicated. Anticoagulation done with apixaban .    Goals of care, counseling/discussion  Advance Care Planning  Palliative care consulted.         Anemia in ESRD (end-stage renal disease)  Patient's anemia is currently controlled.  Etiology likely d/t chronic disease due to Chronic Kidney Disease/ESRD  Current CBC reviewed-   Lab Results   Component Value Date    HGB 8.2 (L) 02/13/2024    HCT 26.8 (L) 02/13/2024     Monitor serial CBC and transfuse if patient becomes hemodynamically unstable, symptomatic or H/H drops below 7/21.  - EPO started by Nephrology   Hgb of 6.9 and pending surgical debridement.  Transfused one unit of blood with some improvement of H/H.  Closely monitor.    Essential hypertension  Chronic, controlled. Latest blood pressure and vitals reviewed-     Temp:  [97.5 °F (36.4 °C)-99.2 °F (37.3 °C)]   Pulse:  [53-77]   Resp:  [16-18]   BP: (105-158)/(46-85)   SpO2:  [96 %-99 %] .   Home meds for hypertension were reviewed and noted below.   Hypertension Medications               amLODIPine (NORVASC) 10 MG tablet Take 10 mg by mouth once daily.    furosemide (LASIX) 20 MG tablet Take 20 mg by mouth 2 (two) times daily.    hydrALAZINE (APRESOLINE)  100 MG tablet Take 1 tablet (100 mg total) by mouth 3 (three) times daily.    metoprolol succinate (TOPROL-XL) 100 MG 24 hr tablet Take 100 mg by mouth once daily.    NIFEdipine (PROCARDIA-XL) 60 MG (OSM) 24 hr tablet Take 1 tablet (60 mg total) by mouth once daily.            While in the hospital, will manage blood pressure as follows; Continue home antihypertensive regimen    Will utilize p.r.n. blood pressure medication only if patient's blood pressure greater than 180/110 and he develops symptoms such as worsening chest pain or shortness of breath.    ESRD needing dialysis  Admitted with uremic encephalopathy and hyperkalemia. Received emergent dialysis  - dialysis not able to be arranged as outpatient due to history of nonadherence--> this is a major issue. Social work checking again to see if they can arrange. Palliative care consulted  - Nephrology consulted      VTE Risk Mitigation (From admission, onward)           Ordered     apixaban tablet 5 mg  Every 12 hours         02/05/24 1733     IP VTE HIGH RISK PATIENT  Once         02/05/24 1733     Place sequential compression device  Until discontinued         02/05/24 1733     Reason for No Pharmacological VTE Prophylaxis  Once        Question:  Reasons:  Answer:  Already adequately anticoagulated on oral Anticoagulants    02/05/24 1733                    Discharge Planning   JAIME: 2/8/2024     Code Status: Full Code   Is the patient medically ready for discharge?:     Reason for patient still in hospital (select all that apply): Treatment and Consult recommendations  Discharge Plan A: Home Health   Discharge Delays: (!) Dialysis Set-up              Terrance Joseph III, MD  Department of Hospital Medicine   [facility] [place] [facility]

## 2024-02-14 NOTE — ASSESSMENT & PLAN NOTE
Patient with Paroxysmal (<7 days) atrial fibrillation which is controlled currently with Amiodarone. Patient is currently in sinus rhythm.[id number] Score: 1.  Anticoagulation indicated. Anticoagulation done with apixaban .

## 2024-02-14 NOTE — BRIEF OP NOTE
Radiology Post-Procedure Note    Pre Op Diagnosis: Bacteremia with THDC in place  Post Op Diagnosis: Same    Procedure: 1. Fluoroscopic-guide removal of RIJV-approach THDC    Procedure performed by: Ran Durbin MD    Written Informed Consent Obtained: Yes  Specimen Removed: NO  Estimated Blood Loss: Minimal    Findings:   Unsuccessful attempts at removing RIJV-approach THDC as it appears the THDC was tunneled immediately under the skin and not subQ causing skin to scar down along the length of the catheter apparently to the venotomy site. Removal will likely require a 'cut-down' which is out of IR's scope of practice and cannot be performed in the IR suite. Recommend consulting [facility] for THDC removal. Patient tolerated the procedure well. No immediate post-procedural complications noted.     Thank you for considering IR for the care of your patient.     Ran Durbin MD  Interventional Radiology

## 2024-02-14 NOTE — NURSING
Pt had 12 Vtachs, HR went up to 123bpm. Checked pt, only complaint of pain at the back 8/10, informed Sabina GONZALEZ, STAT Bmp, Mg ordered.

## 2024-02-14 NOTE — PT/OT/SLP PROGRESS
Occupational Therapy      Patient Name:  Mahesh Cespedes   MRN:  [id number]    Patient not seen today secondary to Dialysis at 9a and now is a procedure. Will follow-up tomorrow.    2/14/2024

## 2024-02-14 NOTE — PLAN OF CARE
Problem: Skin Injury Risk Increased  Goal: Skin Health and Integrity  Outcome: Ongoing, Progressing     Problem: Device-Related Complication Risk (Hemodialysis)  Goal: Safe, Effective Therapy Delivery  Outcome: Ongoing, Progressing     Problem: Hemodynamic Instability (Hemodialysis)  Goal: Effective Tissue Perfusion  Outcome: Ongoing, Progressing     Problem: Adult Inpatient Plan of Care  Goal: Patient-Specific Goal (Individualized)  Outcome: Ongoing, Progressing  Goal: Absence of Hospital-Acquired Illness or Injury  Outcome: Ongoing, Progressing  Goal: Optimal Comfort and Wellbeing  Outcome: Ongoing, Progressing     Problem: Infection  Goal: Absence of Infection Signs and Symptoms  Outcome: Ongoing, Progressing

## 2024-02-14 NOTE — ASSESSMENT & PLAN NOTE
Chronic, controlled. Latest blood pressure and vitals reviewed-     Temp:  [97.5 °F (36.4 °C)-99.2 °F (37.3 °C)]   Pulse:  [53-77]   Resp:  [16-18]   BP: (105-158)/(46-85)   SpO2:  [96 %-99 %] .   Home meds for hypertension were reviewed and noted below.   Hypertension Medications               amLODIPine (NORVASC) 10 MG tablet Take 10 mg by mouth once daily.    furosemide (LASIX) 20 MG tablet Take 20 mg by mouth 2 (two) times daily.    hydrALAZINE (APRESOLINE) 100 MG tablet Take 1 tablet (100 mg total) by mouth 3 (three) times daily.    metoprolol succinate (TOPROL-XL) 100 MG 24 hr tablet Take 100 mg by mouth once daily.    NIFEdipine (PROCARDIA-XL) 60 MG (OSM) 24 hr tablet Take 1 tablet (60 mg total) by mouth once daily.            While in the hospital, will manage blood pressure as follows; Continue home antihypertensive regimen    Will utilize p.r.n. blood pressure medication only if patient's blood pressure greater than 180/110 and he develops symptoms such as worsening chest pain or shortness of breath.

## 2024-02-14 NOTE — SUBJECTIVE & OBJECTIVE
Interval History: NAEON. Tunneled line reomved by IR. Plan for replacement Friday. No fever or chills    Review of Systems  Objective:     Vital Signs (Most Recent):  Temp: 98.5 °F (36.9 °C) (02/14/24 1310)  Pulse: 77 (02/14/24 1310)  Resp: 18 (02/14/24 1310)  BP: (!) 158/85 (02/14/24 1310)  SpO2: 98 % (02/14/24 1310) Vital Signs (24h Range):  Temp:  [97.5 °F (36.4 °C)-99.2 °F (37.3 °C)] 98.5 °F (36.9 °C)  Pulse:  [53-77] 77  Resp:  [16-18] 18  SpO2:  [96 %-99 %] 98 %  BP: (105-158)/(46-85) 158/85     Weight: 75.3 kg (166 lb)  Body mass index is 26 kg/m².    Intake/Output Summary (Last 24 hours) at 2/14/2024 1448  Last data filed at 2/14/2024 1330  Gross per 24 hour   Intake 360 ml   Output --   Net 360 ml         Physical Exam      Constitutional:       General: He is not in acute distress.     Appearance: He is ill-appearing. He is not toxic-appearing.   HENT:      Head: Normocephalic and atraumatic.   Cardiovascular:      Rate and Rhythm: Normal rate and regular rhythm.   Pulmonary:      Effort: Pulmonary effort is normal.      Breath sounds: Normal breath sounds.   Abdominal:      General: Bowel sounds are normal. There is no distension.      Tenderness: There is no abdominal tenderness. There is no guarding or rebound.   Musculoskeletal:      Right lower leg: No edema.      Left lower leg: No edema.   Skin:     General: Skin is warm and dry.   Neurological:      Mental Status: He is alert.   Significant Labs: All pertinent labs within the past 24 hours have been reviewed.    Significant Imaging: I have reviewed all pertinent imaging results/findings within the past 24 hours.

## 2024-02-14 NOTE — NURSING
[facility], [facility]  Nurses Note -- 4 Eyes      2/13/2024       Skin assessed on: Q Shift      [] No Pressure Injuries Present    []Prevention Measures Documented    [x] Yes LDA  for Pressure Injury Previously documented     [] Yes New Pressure Injury Discovered   [] LDA for New Pressure Injury Added      Attending RN:  Demetrice Moreira RN     Second RN:  BETO Roth

## 2024-02-14 NOTE — PT/OT/SLP PROGRESS
Physical Therapy      Patient Name:  Mahesh Cespedes   MRN:  [id number]    Patient not seen today secondary to Dialysis in AM , second attempt Patient off the floor to IR for tunneled cath removal . Will follow-up tomorrow .

## 2024-02-14 NOTE — PROGRESS NOTES
Renal Progress Note    Date of Admission:  2/5/2024 12:18 PM    Length of Stay: 8  Days    Subjective: n/a    Objective:    Current Facility-Administered Medications   Medication    0.9%  NaCl infusion (for blood administration)    acetaminophen tablet 650 mg    albuterol-ipratropium 2.5 mg-0.5 mg/3 mL nebulizer solution 3 mL    allopurinoL tablet 100 mg    aluminum-magnesium hydroxide-simethicone 200-200-20 mg/5 mL suspension 30 mL    amiodarone tablet 200 mg    amLODIPine tablet 5 mg    apixaban tablet 5 mg    atorvastatin tablet 80 mg    benztropine tablet 0.5 mg    calcitRIOL capsule 0.25 mcg    dextrose 10% bolus 125 mL 125 mL    dextrose 10% bolus 250 mL 250 mL    epoetin luli-epbx injection 10,000 Units    glucagon (human recombinant) injection 1 mg    glucose chewable tablet 16 g    glucose chewable tablet 24 g    HYDROmorphone injection 0.5 mg    iron sucrose injection 200 mg    melatonin tablet 6 mg    metoprolol succinate (TOPROL-XL) 24 hr tablet 50 mg    naloxone 0.4 mg/mL injection 0.02 mg    ondansetron injection 4 mg    oxyCODONE immediate release tablet 10 mg    piperacillin-tazobactam (ZOSYN) 4.5 g in dextrose 5 % in water (D5W) 100 mL IVPB (MB+)    prochlorperazine injection Soln 5 mg    simethicone chewable tablet 80 mg    sodium chloride 0.9% bolus 250 mL 250 mL    sodium chloride 0.9% flush 10 mL    tamsulosin 24 hr capsule 0.4 mg    vitamin renal formula (B-complex-vitamin c-folic acid) 1 mg per capsule 1 capsule       Vitals:    02/14/24 1000 02/14/24 1030 02/14/24 1109 02/14/24 1310   BP: 120/78 108/82  (!) 158/85   BP Location:    Right arm   Patient Position:    Lying   Pulse: (!) 53 (!) 53 66 77   Resp:    18   Temp:    98.5 °F (36.9 °C)   TempSrc:    Oral   SpO2:    98%   Weight:       Height:           I/O last 3 completed shifts:  In: 600 [P.O.:600]  Out: -   I/O this shift:  In: 240 [P.O.:240]  Out: -       Physical Exam: seen during Dialysis otherwise  "unchanged    Laboratories:    No results for input(s): "WBC", "RBC", "HGB", "HCT", "PLT", "MCV", "MCH", "MCHC" in the last 24 hours.      Recent Labs   Lab 02/14/24  0243   CALCIUM 8.2*      K 4.7   CO2 22*      BUN 38*   CREATININE 7.0*         No results for input(s): "COLORU", "CLARITYU", "SPECGRAV", "PHUR", "PROTEINUA", "GLUCOSEU", "BILIRUBINCON", "BLOODU", "WBCU", "RBCU", "BACTERIA", "MUCUS" in the last 24 hours.    Microbiology Results (last 7 days)       Procedure Component Value Units Date/Time    Blood culture [[id number]] Collected: 02/10/24 1235    Order Status: Completed Specimen: Blood from Peripheral, Hand, Left Updated: 02/14/24 1303     Blood Culture, Routine No Growth after 4 days.    Blood culture [[id number]] Collected: 02/10/24 1240    Order Status: Completed Specimen: Blood from Peripheral, Antecubital, Left Updated: 02/14/24 1303     Blood Culture, Routine No Growth after 4 days.    AFB Culture & Smear [[id number]] Collected: 02/08/24 1250    Order Status: Completed Specimen: Bone from Sacrum Updated: 02/12/24 1434     AFB Culture & Smear Culture in progress     AFB CULTURE STAIN No acid fast bacilli seen.    Narrative:      Sacral abcess    AFB Culture & Smear [[id number]] Collected: 02/08/24 1250    Order Status: Completed Specimen: Bone from Sacrum Updated: 02/12/24 1433     AFB Culture & Smear Culture in progress     AFB CULTURE STAIN No acid fast bacilli seen.    Narrative:      Bone from coccyx    Aerobic culture [[id number]]  (Abnormal)  (Susceptibility) Collected: 02/08/24 1250    Order Status: Completed Specimen: Bone from Sacrum Updated: 02/12/24 0854     Aerobic Bacterial Culture MORGANELLA MORGANII  From broth only      Narrative:      Bone from coccyx    Culture, Anaerobe [[id number]] Collected: 02/08/24 1250    Order Status: Completed Specimen: Bone from Sacrum Updated: 02/12/24 0814     Anaerobic Culture No anaerobes isolated    Narrative:      Bone from coccyx    " Culture, Anaerobe [[id number]] Collected: 02/08/24 1250    Order Status: Completed Specimen: Bone from Sacrum Updated: 02/12/24 0814     Anaerobic Culture No anaerobes isolated    Narrative:      Sacral abcess    Blood culture [[id number]] Collected: 02/07/24 1344    Order Status: Completed Specimen: Blood from Peripheral, Antecubital, Right Updated: 02/11/24 1503     Blood Culture, Routine No Growth after 4 days.    Narrative:      Collection has been rescheduled by CMO at 02/07/2024 09:28 Reason: Pt   in dialysis  Collection has been rescheduled by RBJ at 02/07/2024 11:23 Reason: At   13:30  Collection has been rescheduled by [email or web address] at 02/07/2024 12:05 Reason:   Pt is doing dialysis will be ready at130  Collection has been rescheduled by CMO at 02/07/2024 09:28 Reason: Pt   in dialysis  Collection has been rescheduled by RBJ at 02/07/2024 11:23 Reason: At   13:30  Collection has been rescheduled by [email or web address] at 02/07/2024 12:05 Reason:   Pt is doing dialysis will be ready at130    Blood culture [[id number]] Collected: 02/07/24 1332    Order Status: Completed Specimen: Blood from Peripheral, Hand, Left Updated: 02/11/24 1503     Blood Culture, Routine No Growth after 4 days.    Narrative:      Collection has been rescheduled by CMO at 02/07/2024 09:28 Reason: Pt   in dialysis  Collection has been rescheduled by RBJ at 02/07/2024 11:23 Reason: At   13:30  Collection has been rescheduled by SK at 02/07/2024 12:05 Reason:   Pt is doing dialysis will be ready at130  Collection has been rescheduled by CMO at 02/07/2024 09:28 Reason: Pt   in dialysis  Collection has been rescheduled by RBJ at 02/07/2024 11:23 Reason: At   13:30  Collection has been rescheduled by [email or web address] at 02/07/2024 12:05 Reason:   Pt is doing dialysis will be ready at130    Blood culture [[id number]] Collected: 02/06/24 0841    Order Status: Completed Specimen: Blood from Hand, Right Updated: 02/10/24 0903     Blood Culture, Routine No Growth after 4 days.     Aerobic culture [[id number]]  (Abnormal)  (Susceptibility) Collected: 02/08/24 1250    Order Status: Completed Specimen: Bone from Sacrum Updated: 02/10/24 0719     Aerobic Bacterial Culture MORGANELLA MORGANII  Many        KLEBSIELLA OXYTOCA  Rare      Narrative:      Sacral abcess    Gram stain [[id number]] Collected: 02/08/24 1250    Order Status: Completed Specimen: Bone from Sacrum Updated: 02/09/24 1104     Gram Stain Result Rare WBC's      No organisms seen    Narrative:      Bone from coccyx    Gram stain [[id number]] Collected: 02/08/24 1250    Order Status: Completed Specimen: Bone from Sacrum Updated: 02/09/24 1104     Gram Stain Result Rare WBC's      Rare Gram positive cocci in pairs    Narrative:      Sacral abcess    Blood culture [[id number]]  (Abnormal)  (Susceptibility) Collected: 02/06/24 0841    Order Status: Completed Specimen: Blood from Forearm, Left Updated: 02/09/24 0658     Blood Culture, Routine Gram stain marsha bottle: Gram variable rods      Results called to and read back by: Tee PÉREZ ICU 02/07/2024  05:08      MORGANELLA MORGANII    Fungus culture [[id number]] Collected: 02/08/24 1250    Order Status: Sent Specimen: Bone from Sacrum Updated: 02/08/24 1422    Fungus culture [[id number]] Collected: 02/08/24 1250    Order Status: Sent Specimen: Bone from Sacrum Updated: 02/08/24 1419    Rapid Organism ID by PCR (from Blood culture) [[id number]]  (Abnormal) Collected: 02/06/24 0841    Order Status: Completed Updated: 02/07/24 1622     Enterococcus faecalis Not Detected     Enterococcus faecium Not Detected     Listeria monocytogenes Not Detected     Staphylococcus spp. Not Detected     Staphylococcus aureus Not Detected     Staphylococcus epidermidis Not Detected     Staphylococcus lugdunensis Not Detected     Streptococcus species Not Detected     Streptococcus agalactiae Not Detected     Streptococcus pneumoniae Not Detected     Streptococcus pyogenes Not Detected     Acinetobacter  "calcoaceticus/baumannii complex Not Detected     Bacteroides fragilis Not Detected     Enterobacterales Detected     Enterobacter cloacae complex Not Detected     Escherichia coli Not Detected     Klebsiella aerogenes Not Detected     Klebsiella oxytoca Not Detected     Klebsiella pneumoniae group Not Detected     Proteus Not Detected     Salmonella sp Not Detected     Serratia marcescens Not Detected     Haemophilus influenzae Not Detected     Neisseria meningtidis Not Detected     Pseudomonas aeruginosa Not Detected     Stenotrophomonas maltophilia Not Detected     Candida albicans Not Detected     Candida auris Not Detected     Candida glabrata Not Detected     Candida krusei Not Detected     Candida parapsilosis Not Detected     Candida tropicalis Not Detected     Cryptococcus neoformans/gattii Not Detected     CTX-M (ESBL ) Not Detected     IMP (Carbapenem resistant) Not Detected     KPC resistance gene (Carbapenem resistant) Not Detected     mcr-1  Test Not Applicable     mec A/C  Test Not Applicable     mec A/C and MREJ (MRSA) gene Test Not Applicable     NDM (Carbapenem resistant) Not Detected     OXA-48-like (Carbapenem resistant) Not Detected     van A/B (VRE gene) Test Not Applicable     VIM (Carbapenem resistant) Not Detected              Diagnostic Tests:     CXR:  Impression:       Cardiomegaly.  Increased interstitial lung markings.  Possible small pleural effusions.  Question on fluid overload and or CHF.  However, a pneumonic infectious process cannot be excluded in the appropriate clinical circumstances.      Electronically signed by: Sandie Martinez  Date: 02/05/2024           Assessment:    59 y/o male with known to me from prior encounter last month; Hx. ESRD (Moved from [facility]. no Dialysis unit to go to) admitted with:     - Hyperkalemia corrected with Dialysis last p.m.  - HAGMA  - Uncontrolled HTN  - One of the blood cultures reported as "gram variable rods" ID and sensitivity pending  - " Fluid overload  - Improving Metabolic (uremic) encephalopathy, last HD 1/22/24 while in the Hospital (Pte. Was not accepted by any of the local Dialysis units due to Hx. Of non-compliance and was told either to try to go back to [place] or come to ED periodically as needed for HD)  - Hyperphosphatemia improving  - 2nd. hyperparathyroidism  - Hx. Ascites s/p Paracentesis in January  - Fe++ def. + Anemia of ckd  - HTN  - Hx. CVA  - Hypoalbuminemia  - Sacral pressure ulcer           Plan:    - Empiric antibiotics  - HD line removal / holiday pending  - Dialysis Q MWF   - IV Fe++ loading during Dialysis  - Adjust BP meds. (Decrease Toprol dose to 50mg/day)  - Epogen 3 x week  - Transfuse prn Hgb < 7  - Renal diet + protein supplements  - NO need for PO4- binders   - Calcitriol  - Wound care per surgery  - Consider Nursing Home placement, Pte. Clearly unable to take care of self  - Disposition and other problems per admitting      Will follow on Dialysis days.

## 2024-02-14 NOTE — NURSING
[facility], [facility] [place]  Nurses Note -- 4 Eyes      2/14/2024       Skin assessed on: Q Shift      [] No Pressure Injuries Present    []Prevention Measures Documented    [x] Yes LDA  for Pressure Injury Previously documented     [] Yes New Pressure Injury Discovered   [] LDA for New Pressure Injury Added      Attending RN:  Jamila Valdez LPN     Second RN:  Demetrice Moreira RN

## 2024-02-14 NOTE — ASSESSMENT & PLAN NOTE
Blood culture 2/6/24 with gram variable rods.   Morganella bacteremia.  Sacral osteomyelitis as source.  Stop Vanc.  Continued Zosyn.  Repeat BCx negative so far.  ID consulted.  Changed to Cefepime.  Can do ABx's with HD on discharge.  Recommending changing tunneled HD catheter. Removed 2/14 with plan for line holiday prior to replacement on friday

## 2024-02-15 ENCOUNTER — ANESTHESIA EVENT (OUTPATIENT)
Dept: SURGERY | Facility: HOSPITAL | Age: 59
DRG: 463 | End: 2024-02-15
Payer: MEDICARE

## 2024-02-15 LAB
ABO + RH BLD: NORMAL
BASOPHILS # BLD AUTO: 0.05 K/UL (ref 0–0.2)
BASOPHILS NFR BLD: 0.5 % (ref 0–1.9)
BLD GP AB SCN CELLS X3 SERPL QL: NORMAL
BLD PROD TYP BPU: NORMAL
BLOOD UNIT EXPIRATION DATE: NORMAL
BLOOD UNIT TYPE CODE: 5100
BLOOD UNIT TYPE: NORMAL
CODING SYSTEM: NORMAL
CROSSMATCH INTERPRETATION: NORMAL
DIFFERENTIAL METHOD BLD: ABNORMAL
DISPENSE STATUS: NORMAL
EOSINOPHIL # BLD AUTO: 0.4 K/UL (ref 0–0.5)
EOSINOPHIL NFR BLD: 4.3 % (ref 0–8)
ERYTHROCYTE [DISTWIDTH] IN BLOOD BY AUTOMATED COUNT: 19.9 % (ref 11.5–14.5)
HCT VFR BLD AUTO: 22.8 % (ref 40–54)
HGB BLD-MCNC: 6.9 G/DL (ref 14–18)
HGB BLD-MCNC: 7.1 G/DL (ref 14–18)
IMM GRANULOCYTES # BLD AUTO: 0.11 K/UL (ref 0–0.04)
IMM GRANULOCYTES NFR BLD AUTO: 1.1 % (ref 0–0.5)
LYMPHOCYTES # BLD AUTO: 1.1 K/UL (ref 1–4.8)
LYMPHOCYTES NFR BLD: 10.9 % (ref 18–48)
MCH RBC QN AUTO: 26 PG (ref 27–31)
MCHC RBC AUTO-ENTMCNC: 30.3 G/DL (ref 32–36)
MCV RBC AUTO: 86 FL (ref 82–98)
MONOCYTES # BLD AUTO: 1 K/UL (ref 0.3–1)
MONOCYTES NFR BLD: 10.5 % (ref 4–15)
NEUTROPHILS # BLD AUTO: 7 K/UL (ref 1.8–7.7)
NEUTROPHILS NFR BLD: 72.7 % (ref 38–73)
NRBC BLD-RTO: 0 /100 WBC
PLATELET # BLD AUTO: 267 K/UL (ref 150–450)
PMV BLD AUTO: 8.9 FL (ref 9.2–12.9)
RBC # BLD AUTO: 2.65 M/UL (ref 4.6–6.2)
SPECIMEN OUTDATE: NORMAL
TRANS ERYTHROCYTES VOL PATIENT: NORMAL ML
WBC # BLD AUTO: 9.59 K/UL (ref 3.9–12.7)

## 2024-02-15 PROCEDURE — 99223 1ST HOSP IP/OBS HIGH 75: CPT | Mod: GC,,, | Performed by: NURSE PRACTITIONER

## 2024-02-15 PROCEDURE — 36415 COLL VENOUS BLD VENIPUNCTURE: CPT | Mod: XB | Performed by: STUDENT IN AN ORGANIZED HEALTH CARE EDUCATION/TRAINING PROGRAM

## 2024-02-15 PROCEDURE — 63600175 PHARM REV CODE 636 W HCPCS: Mod: JZ,JG | Performed by: HOSPITALIST

## 2024-02-15 PROCEDURE — 86850 RBC ANTIBODY SCREEN: CPT | Performed by: STUDENT IN AN ORGANIZED HEALTH CARE EDUCATION/TRAINING PROGRAM

## 2024-02-15 PROCEDURE — 86920 COMPATIBILITY TEST SPIN: CPT | Performed by: STUDENT IN AN ORGANIZED HEALTH CARE EDUCATION/TRAINING PROGRAM

## 2024-02-15 PROCEDURE — 99233 SBSQ HOSP IP/OBS HIGH 50: CPT | Mod: ,,, | Performed by: NURSE PRACTITIONER

## 2024-02-15 PROCEDURE — 0HQ5XZZ REPAIR CHEST SKIN, EXTERNAL APPROACH: ICD-10-PCS | Performed by: RADIOLOGY

## 2024-02-15 PROCEDURE — 11000001 HC ACUTE MED/SURG PRIVATE ROOM

## 2024-02-15 PROCEDURE — 85018 HEMOGLOBIN: CPT | Performed by: PHYSICIAN ASSISTANT

## 2024-02-15 PROCEDURE — 25000003 PHARM REV CODE 250: Performed by: HOSPITALIST

## 2024-02-15 PROCEDURE — 99900031 HC PATIENT EDUCATION (STAT)

## 2024-02-15 PROCEDURE — P9021 RED BLOOD CELLS UNIT: HCPCS | Performed by: STUDENT IN AN ORGANIZED HEALTH CARE EDUCATION/TRAINING PROGRAM

## 2024-02-15 PROCEDURE — 85025 COMPLETE CBC W/AUTO DIFF WBC: CPT | Performed by: PHYSICIAN ASSISTANT

## 2024-02-15 PROCEDURE — 36415 COLL VENOUS BLD VENIPUNCTURE: CPT | Mod: XB | Performed by: PHYSICIAN ASSISTANT

## 2024-02-15 PROCEDURE — 99900035 HC TECH TIME PER 15 MIN (STAT)

## 2024-02-15 PROCEDURE — 86920 COMPATIBILITY TEST SPIN: CPT | Performed by: ANESTHESIOLOGY

## 2024-02-15 PROCEDURE — 25000003 PHARM REV CODE 250

## 2024-02-15 PROCEDURE — 25000003 PHARM REV CODE 250: Performed by: INTERNAL MEDICINE

## 2024-02-15 PROCEDURE — 63600175 PHARM REV CODE 636 W HCPCS: Performed by: HOSPITALIST

## 2024-02-15 RX ORDER — HYDROCODONE BITARTRATE AND ACETAMINOPHEN 500; 5 MG/1; MG/1
TABLET ORAL
Status: DISCONTINUED | OUTPATIENT
Start: 2024-02-15 | End: 2024-02-22

## 2024-02-15 RX ORDER — HYDROCODONE BITARTRATE AND ACETAMINOPHEN 500; 5 MG/1; MG/1
TABLET ORAL
Status: DISCONTINUED | OUTPATIENT
Start: 2024-02-15 | End: 2024-02-22 | Stop reason: HOSPADM

## 2024-02-15 RX ADMIN — TAMSULOSIN HYDROCHLORIDE 0.4 MG: 0.4 CAPSULE ORAL at 09:02

## 2024-02-15 RX ADMIN — BENZTROPINE MESYLATE 0.5 MG: 0.5 TABLET ORAL at 08:02

## 2024-02-15 RX ADMIN — AMIODARONE HYDROCHLORIDE 200 MG: 200 TABLET ORAL at 09:02

## 2024-02-15 RX ADMIN — ATORVASTATIN CALCIUM 80 MG: 40 TABLET, FILM COATED ORAL at 09:02

## 2024-02-15 RX ADMIN — PIPERACILLIN SODIUM AND TAZOBACTAM SODIUM 4.5 G: 4; .5 INJECTION, POWDER, LYOPHILIZED, FOR SOLUTION INTRAVENOUS at 09:02

## 2024-02-15 RX ADMIN — NEPHROCAP 1 CAPSULE: 1 CAP ORAL at 09:02

## 2024-02-15 RX ADMIN — CALCITRIOL CAPSULES 0.25 MCG 0.25 MCG: 0.25 CAPSULE ORAL at 09:02

## 2024-02-15 RX ADMIN — BENZTROPINE MESYLATE 0.5 MG: 0.5 TABLET ORAL at 09:02

## 2024-02-15 RX ADMIN — PIPERACILLIN SODIUM AND TAZOBACTAM SODIUM 4.5 G: 4; .5 INJECTION, POWDER, LYOPHILIZED, FOR SOLUTION INTRAVENOUS at 10:02

## 2024-02-15 RX ADMIN — ALLOPURINOL 100 MG: 100 TABLET ORAL at 09:02

## 2024-02-15 RX ADMIN — OXYCODONE 10 MG: 5 TABLET ORAL at 02:02

## 2024-02-15 RX ADMIN — AMLODIPINE BESYLATE 5 MG: 5 TABLET ORAL at 08:02

## 2024-02-15 RX ADMIN — EPOETIN ALFA-EPBX 10000 UNITS: 10000 INJECTION, SOLUTION INTRAVENOUS; SUBCUTANEOUS at 09:02

## 2024-02-15 RX ADMIN — METOPROLOL SUCCINATE 50 MG: 50 TABLET, EXTENDED RELEASE ORAL at 09:02
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
## 2024-02-15 NOTE — ASSESSMENT & PLAN NOTE
Patient's anemia is currently controlled.  Etiology likely d/t chronic disease due to Chronic Kidney Disease/ESRD  Current CBC reviewed-   Lab Results   Component Value Date    HGB 6.9 (L) 02/15/2024    HCT 22.8 (L) 02/15/2024     Monitor serial CBC and transfuse if patient becomes hemodynamically unstable, symptomatic or H/H drops below 7/21.  - EPO started by Nephrology   Pt with bleeding from catheter site overnight after attempted removal resulting blood loss. IR consulted this am and stitched site with control of bleeding. Pending vascular eval for removal. Hgb back down to 6.9. 1 unit prbc ordered. Eliquis held.

## 2024-02-15 NOTE — PROGRESS NOTES
IR re-contacted by nursing staff.    Although initial interventions from the am resulted in temporary hemostasis, patient began bleeding this afternoon again from tunneled line site.     Surgiseal and initial stitch removed.   Gel foam slurry injected into subcutaneous tissue in tract around catheter.   Figure 8 stitch using 2.0 silk placed at insertion site around catheter.   Quick clot wrapped around catheter and placed against insertion site.     At the end of the above interventions, no evidence of ongoing bleeding noted.   Gauze + tegaderm dressing placed over quick clot.     Vascular Surgery is planning an taking patient to OR tomorrow morning for line removal.     If patient develops ongoing bleeding again overnight not responsive to manual direct pressure, surgiseal or quick clot, recommend surgical evaluation as the interventions able to be provided by Interventional Radiology have been exhausted.       Rachael Valencia NP  Interventional Radiology

## 2024-02-15 NOTE — NURSING
Patient with continued bleeding to nostril and around dialysis access site. Notified Dr. Mckeon. New orders for pressure dressing over central line dressing, vitals every 1 hour for 6 hours and hold apixaban. Dressing applied.

## 2024-02-15 NOTE — ASSESSMENT & PLAN NOTE
Blood culture 2/6/24 with gram variable rods.   Morganella bacteremia.  Sacral osteomyelitis as source.  Stop Vanc.  Continued Zosyn.  Repeat BCx negative so far.  ID consulted.  Changed to Cefepime.  Can do ABx's with HD on discharge.  Recommending changing tunneled HD catheter. Pt with bleeding from catheter site overnight after attempted removal resulting blood loss. IR consulted this am and stitched site with control of bleeding. Pending vascular eval for removal.

## 2024-02-15 NOTE — PLAN OF CARE
April with [facility] accepted patient for LTAC. April said that patient has to have ICU stay or 3 midnights. Per April, patient has an order for telemetry on 2/12 but the night nurse drops the charge to med surge. April said that  if charges could be changed to telemetry going back to 2/12, they can take patient. April said that they will continue working on dialysis placement while patient is there.     Notified charge nurse, Elizabeth. Elizabeth said that she will reach out to the house supervisor.     House Supervisor unable to assist. Email sent out to Christa, Nurse Audit Supervisor and she was able to assist with the Telemetry charges. Informed Christa that patient is not ready to be transferred to LTAC.

## 2024-02-15 NOTE — NURSING
OM-WB  Rapid Response Nurse Intervention/ Task    Date of Visit: 02/14/2024  Time of Visit: 1944       INTERVENTIONS/ TASK Completed:     Pressure held and pressure dressing applied to bleeding dialysis catheter. Dressing x 2. BP and H&H followed up. Dr. Mckeon aware. Will follow up.     2200:  Dressing changed again. /57.    0000: Multiple dressing changes (6).     0130:  Hgb 7.1, down from 8.

## 2024-02-15 NOTE — NURSING
Patient noted to have leakage from right nostril and from right tunnel cath. Pressure held. MD notified. No new orders placed at this time.

## 2024-02-15 NOTE — PLAN OF CARE
Received call from Alta at [facility]. Alta stated that  location is closed but [facility], [facility] and [facility] are open. Alta is the SW at the [facility] location and is willing to look at referral. Alta said that referral still has to go through Admin and Nurse Manager. Admin will not be in until 2/20 and Nurse Manager comes in tomorrow but will be on the floor. Alta suggested SW call Divya at [facility] and [facility]. Alta stated that they will need 2728, nurses progress notes and recent care plan from last dialysis clinic. Alta stressed that they decline patients that are non compliant with treatment.

## 2024-02-15 NOTE — PLAN OF CARE
Recommendations  Recommendation:   1. When medically acceptable, resume renal diet   2. Monitor need for ONS   3. Monitor weight and labs    Goals: Pt will be on diet by RD follow up    Nutrition Goal Status: new  Communication of RD Recs: other (comment) (POC)

## 2024-02-15 NOTE — PT/OT/SLP PROGRESS
Physical Therapy      Patient Name:  Mahesh Cespedes   MRN:  [id number]    Patient not seen today secondary to Other (pt w/ low H&H of 6.9/22.8;  pt with orders for blood transfusion). Will follow-up as able later hour/day .

## 2024-02-15 NOTE — NURSING
Received report from BETO Oliver. Pt has been profusely bleeding from his dialysis catheter site since 6pm on 2/14. Pressure has been held, dressings has been changed, and pt has been sitting up all night. Notified Dr. Joseph. Per MD call down to IR so they can reevaluate pts site. Spoke with BETO Reyna in IR and explain to her that pt has had a drop in his h/h which it is now 6.9/22.8. Maribell will reach out to a provider in IR to evaluate pt.     [facility], [facility]  Nurses Note -- 4 Eyes      2/15/2024       Skin assessed on: Q Shift      [] No Pressure Injuries Present    []Prevention Measures Documented    [x] Yes LDA  for Pressure Injury Previously documented     [] Yes New Pressure Injury Discovered   [] LDA for New Pressure Injury Added      Attending RN:  Sindy Kowalski RN     Second RN: BETO Oliver

## 2024-02-15 NOTE — ASSESSMENT & PLAN NOTE
Patient with Paroxysmal (<7 days) atrial fibrillation which is controlled currently with Amiodarone. Patient is currently in sinus rhythm.[id number] Score: 1.  Anticoagulation indicated. Anticoagulation done with apixaban .

## 2024-02-15 NOTE — ASSESSMENT & PLAN NOTE
Chronic, controlled. Latest blood pressure and vitals reviewed-     Temp:  [97.6 °F (36.4 °C)-98.7 °F (37.1 °C)]   Pulse:  [51-72]   Resp:  [16-20]   BP: (118-158)/(52-85)   SpO2:  [94 %-99 %] .   Home meds for hypertension were reviewed and noted below.   Hypertension Medications               amLODIPine (NORVASC) 10 MG tablet Take 10 mg by mouth once daily.    furosemide (LASIX) 20 MG tablet Take 20 mg by mouth 2 (two) times daily.    hydrALAZINE (APRESOLINE) 100 MG tablet Take 1 tablet (100 mg total) by mouth 3 (three) times daily.    metoprolol succinate (TOPROL-XL) 100 MG 24 hr tablet Take 100 mg by mouth once daily.    NIFEdipine (PROCARDIA-XL) 60 MG (OSM) 24 hr tablet Take 1 tablet (60 mg total) by mouth once daily.            While in the hospital, will manage blood pressure as follows; Continue home antihypertensive regimen    Will utilize p.r.n. blood pressure medication only if patient's blood pressure greater than 180/110 and he develops symptoms such as worsening chest pain or shortness of breath.
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
## 2024-02-15 NOTE — NURSING
[facility], [facility]  Nurses Note -- 4 Eyes      2/15/2024       Skin assessed on: Q Shift      [] No Pressure Injuries Present    []Prevention Measures Documented    [x] Yes LDA  for Pressure Injury Previously documented     [] Yes New Pressure Injury Discovered   [] LDA for New Pressure Injury Added      Attending RN:  Melody Bernal RN     Second RN:  JAVIER Marino
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
## 2024-02-15 NOTE — SUBJECTIVE & OBJECTIVE
Interval History: Pt with bleeding from catheter site overnight after attempted removal resulting blood loss. IR consulted this am and stitched site with control of bleeding. Pending vascular eval for removal. Pt denies fever chills n/v. States he is feeling ok currently now that bleeding has stopped.     Review of Systems  Objective:     Vital Signs (Most Recent):  Temp: 97.6 °F (36.4 °C) (02/15/24 1147)  Pulse: (!) 56 (02/15/24 1147)  Resp: 18 (02/15/24 1147)  BP: (!) 118/52 (02/15/24 1147)  SpO2: (!) 94 % (02/15/24 0703) Vital Signs (24h Range):  Temp:  [97.6 °F (36.4 °C)-98.7 °F (37.1 °C)] 97.6 °F (36.4 °C)  Pulse:  [51-72] 56  Resp:  [16-20] 18  SpO2:  [94 %-99 %] 94 %  BP: (118-158)/(52-85) 118/52     Weight: 75.3 kg (166 lb 0.1 oz)  Body mass index is 26 kg/m².    Intake/Output Summary (Last 24 hours) at 2/15/2024 1311  Last data filed at 2/15/2024 0835  Gross per 24 hour   Intake 600 ml   Output --   Net 600 ml         Physical Exam    Constitutional:       General: He is not in acute distress.     Appearance: He is ill-appearing. He is not toxic-appearing.   HENT:      Head: Normocephalic and atraumatic.   Neck:      Comments: R chest wall THDC in place with bandages that have some evidence of blood, but no active bleeding around bandage.   Cardiovascular:      Rate and Rhythm: Normal rate and regular rhythm.   Pulmonary:      Effort: Pulmonary effort is normal.      Breath sounds: Normal breath sounds.   Abdominal:      General: Bowel sounds are normal. There is no distension.      Tenderness: There is no abdominal tenderness. There is no guarding or rebound.   Musculoskeletal:      Right lower leg: No edema.      Left lower leg: No edema.   Skin:     General: Skin is warm and dry.   Neurological:      Mental Status: He is alert.     Significant Labs: All pertinent labs within the past 24 hours have been reviewed.    Significant Imaging: I have reviewed all pertinent imaging results/findings within the  past 24 hours.

## 2024-02-15 NOTE — PLAN OF CARE
Faxed dialysis packet to [facility] and [facility].    Patient has been denied at all 7 [facility]:  [facility]
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
## 2024-02-15 NOTE — PROGRESS NOTES
[facility] Medicine  Progress Note    Patient Name: Mahesh Cespedes  MRN: [id number]  Patient Class: IP- Inpatient   Admission Date: 2/5/2024  Length of Stay: 9 days  Attending Physician: Terrance Joseph III, MD  Primary Care Provider: Cruz Hernandez MD        Subjective:     Principal Problem:Bacteremia        HPI:  58 y.o. male with AFib, DM2, ESRD on dialysis, hypertension presents from home with a complaint of altered mental status.  Family report he has been drowsy and fatigued with increased confusion for the past 2 days.  Has been progressively worsening.  Associated with significant peripheral edema.  Last dialysis was 2 weeks ago.  Denies fever, chills, cough, SOB, chest pain, palpitations, nausea, vomiting, diarrhea, or abdominal pain.  History is somewhat limited given the patient's drowsiness but he is awake and conversant.    In the ED, labs revealed hyperkalemia, K + 7.4.  He was given shifting medications and zirconium.  Nephrology was consulted and plan for urgent dialysis.  Labs also reveal uremia, leukocytosis, hyperphosphatemia, and metabolic acidosis.  Placed in observation to obtain dialysis.    Overview/Hospital Course:  58 y.o. man with ESRD but without outpatient dialysis set up who was admitted with uremic encephalopathy, hyperkalemia. Nephrology consulted and he received emergent dialysis. Mental status is improving. He has sacral wound with abscess. Started antibiotics. Blood cultures with gram variable rods. General surgery planning OR debridement on 2/8/24. S/P debridement with bone biopsy.  Morganella bacteremia.  Bone cultures also growing Morganella and Klebsiella.  ID consulted.  Recommending tunneled HD catheter removal.  IR consulted.  SW/CM consulted for outpatient dialysis arrangements.    Interval History: Pt with bleeding from catheter site overnight after attempted removal resulting blood loss. IR consulted this am and stitched site with control of bleeding.  Pending vascular eval for removal. Pt denies fever chills n/v. States he is feeling ok currently now that bleeding has stopped.     Review of Systems  Objective:     Vital Signs (Most Recent):  Temp: 97.6 °F (36.4 °C) (02/15/24 1147)  Pulse: (!) 56 (02/15/24 1147)  Resp: 18 (02/15/24 1147)  BP: (!) 118/52 (02/15/24 1147)  SpO2: (!) 94 % (02/15/24 0703) Vital Signs (24h Range):  Temp:  [97.6 °F (36.4 °C)-98.7 °F (37.1 °C)] 97.6 °F (36.4 °C)  Pulse:  [51-72] 56  Resp:  [16-20] 18  SpO2:  [94 %-99 %] 94 %  BP: (118-158)/(52-85) 118/52     Weight: 75.3 kg (166 lb 0.1 oz)  Body mass index is 26 kg/m².    Intake/Output Summary (Last 24 hours) at 2/15/2024 1311  Last data filed at 2/15/2024 0835  Gross per 24 hour   Intake 600 ml   Output --   Net 600 ml         Physical Exam    Constitutional:       General: He is not in acute distress.     Appearance: He is ill-appearing. He is not toxic-appearing.   HENT:      Head: Normocephalic and atraumatic.   Neck:      Comments: R chest wall THDC in place with bandages that have some evidence of blood, but no active bleeding around bandage.   Cardiovascular:      Rate and Rhythm: Normal rate and regular rhythm.   Pulmonary:      Effort: Pulmonary effort is normal.      Breath sounds: Normal breath sounds.   Abdominal:      General: Bowel sounds are normal. There is no distension.      Tenderness: There is no abdominal tenderness. There is no guarding or rebound.   Musculoskeletal:      Right lower leg: No edema.      Left lower leg: No edema.   Skin:     General: Skin is warm and dry.   Neurological:      Mental Status: He is alert.     Significant Labs: All pertinent labs within the past 24 hours have been reviewed.    Significant Imaging: I have reviewed all pertinent imaging results/findings within the past 24 hours.    Assessment/Plan:      * Bacteremia  Blood culture 2/6/24 with gram variable rods.   Morganella bacteremia.  Sacral osteomyelitis as source.  Stop Vanc.   Continued Zosyn.  Repeat BCx negative so far.  ID consulted.  Changed to Cefepime.  Can do ABx's with HD on discharge.  Recommending changing tunneled HD catheter. Pt with bleeding from catheter site overnight after attempted removal resulting blood loss. IR consulted this am and stitched site with control of bleeding. Pending vascular eval for removal.      Physical debility  PT/OT consulted.  Currently recommending moderate intensity therapy.  SW/CM did send LTAC referrals.    Pressure injury of sacral region, unstageable  Wound care consulted       Gluteal abscess  Noted on CT. With leukocytosis  - started vanc, zosyn-- continue  - General surgery consulted- to OR.  - wound care consulted  S/P operative debridement 2/8 with bone biopsy obtained given exposed coccyx    Bone cultures growing morganella and klebsiella.      Hyperphosphatemia  Secondary to ESRD.    Uremic encephalopathy   with acute encephalopathy.  Nephrology consulted and received emergent HD. Mental status now seems at baseline.    Paroxysmal atrial fibrillation  Patient with Paroxysmal (<7 days) atrial fibrillation which is controlled currently with Amiodarone. Patient is currently in sinus rhythm.[id number] Score: 1.  Anticoagulation indicated. Anticoagulation done with apixaban .    Goals of care, counseling/discussion  Advance Care Planning  Palliative care consulted.         Anemia in ESRD (end-stage renal disease)  Patient's anemia is currently controlled.  Etiology likely d/t chronic disease due to Chronic Kidney Disease/ESRD  Current CBC reviewed-   Lab Results   Component Value Date    HGB 6.9 (L) 02/15/2024    HCT 22.8 (L) 02/15/2024     Monitor serial CBC and transfuse if patient becomes hemodynamically unstable, symptomatic or H/H drops below 7/21.  - EPO started by Nephrology   Pt with bleeding from catheter site overnight after attempted removal resulting blood loss. IR consulted this am and stitched site with control of  bleeding. Pending vascular eval for removal. Hgb back down to 6.9. 1 unit prbc ordered. Eliquis held.      Essential hypertension  Chronic, controlled. Latest blood pressure and vitals reviewed-     Temp:  [97.6 °F (36.4 °C)-98.7 °F (37.1 °C)]   Pulse:  [51-72]   Resp:  [16-20]   BP: (118-158)/(52-85)   SpO2:  [94 %-99 %] .   Home meds for hypertension were reviewed and noted below.   Hypertension Medications               amLODIPine (NORVASC) 10 MG tablet Take 10 mg by mouth once daily.    furosemide (LASIX) 20 MG tablet Take 20 mg by mouth 2 (two) times daily.    hydrALAZINE (APRESOLINE) 100 MG tablet Take 1 tablet (100 mg total) by mouth 3 (three) times daily.    metoprolol succinate (TOPROL-XL) 100 MG 24 hr tablet Take 100 mg by mouth once daily.    NIFEdipine (PROCARDIA-XL) 60 MG (OSM) 24 hr tablet Take 1 tablet (60 mg total) by mouth once daily.            While in the hospital, will manage blood pressure as follows; Continue home antihypertensive regimen    Will utilize p.r.n. blood pressure medication only if patient's blood pressure greater than 180/110 and he develops symptoms such as worsening chest pain or shortness of breath.    ESRD needing dialysis  Admitted with uremic encephalopathy and hyperkalemia. Received emergent dialysis  - dialysis not able to be arranged as outpatient due to history of nonadherence--> this is a major issue. Social work checking again to see if they can arrange. Palliative care consulted  - Nephrology consulted      VTE Risk Mitigation (From admission, onward)           Ordered     apixaban tablet 5 mg  Every 12 hours         02/05/24 1733     IP VTE HIGH RISK PATIENT  Once         02/05/24 1733     Place sequential compression device  Until discontinued         02/05/24 1733     Reason for No Pharmacological VTE Prophylaxis  Once        Question:  Reasons:  Answer:  Already adequately anticoagulated on oral Anticoagulants    02/05/24 1733                    Discharge  Planning   JAIME: 2/8/2024     Code Status: Full Code   Is the patient medically ready for discharge?:     Reason for patient still in hospital (select all that apply): Treatment and Consult recommendations  Discharge Plan A: Home Health   Discharge Delays: (!) Dialysis Set-up              Terrance Joseph III, MD  Department of Hospital Medicine   [facility] Surg
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
## 2024-02-16 ENCOUNTER — ANESTHESIA (OUTPATIENT)
Dept: SURGERY | Facility: HOSPITAL | Age: 59
DRG: 463 | End: 2024-02-16
Payer: MEDICARE

## 2024-02-16 LAB
ALBUMIN SERPL BCP-MCNC: 2 G/DL (ref 3.5–5.2)
ALP SERPL-CCNC: 111 U/L (ref 55–135)
ALT SERPL W/O P-5'-P-CCNC: 28 U/L (ref 10–44)
ANION GAP SERPL CALC-SCNC: 11 MMOL/L (ref 8–16)
ANION GAP SERPL CALC-SCNC: 19 MMOL/L (ref 8–16)
AST SERPL-CCNC: 37 U/L (ref 10–40)
BASOPHILS # BLD AUTO: 0.05 K/UL (ref 0–0.2)
BASOPHILS NFR BLD: 0.5 % (ref 0–1.9)
BILIRUB SERPL-MCNC: 0.6 MG/DL (ref 0.1–1)
BLD PROD TYP BPU: NORMAL
BLOOD UNIT EXPIRATION DATE: NORMAL
BLOOD UNIT TYPE CODE: 5100
BLOOD UNIT TYPE: NORMAL
BUN SERPL-MCNC: 41 MG/DL (ref 6–20)
BUN SERPL-MCNC: 44 MG/DL (ref 6–20)
CALCIUM SERPL-MCNC: 7.1 MG/DL (ref 8.7–10.5)
CALCIUM SERPL-MCNC: 7.5 MG/DL (ref 8.7–10.5)
CHLORIDE SERPL-SCNC: 102 MMOL/L (ref 95–110)
CHLORIDE SERPL-SCNC: 104 MMOL/L (ref 95–110)
CO2 SERPL-SCNC: 17 MMOL/L (ref 23–29)
CO2 SERPL-SCNC: 25 MMOL/L (ref 23–29)
CODING SYSTEM: NORMAL
CREAT SERPL-MCNC: 7.5 MG/DL (ref 0.5–1.4)
CREAT SERPL-MCNC: 7.6 MG/DL (ref 0.5–1.4)
CROSSMATCH INTERPRETATION: NORMAL
DIFFERENTIAL METHOD BLD: ABNORMAL
DISPENSE STATUS: NORMAL
EOSINOPHIL # BLD AUTO: 0.4 K/UL (ref 0–0.5)
EOSINOPHIL NFR BLD: 3.4 % (ref 0–8)
ERYTHROCYTE [DISTWIDTH] IN BLOOD BY AUTOMATED COUNT: 19.7 % (ref 11.5–14.5)
EST. GFR  (NO RACE VARIABLE): 8 ML/MIN/1.73 M^2
EST. GFR  (NO RACE VARIABLE): 8 ML/MIN/1.73 M^2
GLUCOSE SERPL-MCNC: 59 MG/DL (ref 70–110)
GLUCOSE SERPL-MCNC: 77 MG/DL (ref 70–110)
GRAM STN SPEC: NORMAL
GRAM STN SPEC: NORMAL
HCT VFR BLD AUTO: 22.8 % (ref 40–54)
HGB BLD-MCNC: 7.1 G/DL (ref 14–18)
IMM GRANULOCYTES # BLD AUTO: 0.06 K/UL (ref 0–0.04)
IMM GRANULOCYTES NFR BLD AUTO: 0.6 % (ref 0–0.5)
INR PPP: 1.3 (ref 0.8–1.2)
LYMPHOCYTES # BLD AUTO: 0.8 K/UL (ref 1–4.8)
LYMPHOCYTES NFR BLD: 8 % (ref 18–48)
MCH RBC QN AUTO: 27.3 PG (ref 27–31)
MCHC RBC AUTO-ENTMCNC: 31.1 G/DL (ref 32–36)
MCV RBC AUTO: 88 FL (ref 82–98)
MONOCYTES # BLD AUTO: 0.8 K/UL (ref 0.3–1)
MONOCYTES NFR BLD: 7.3 % (ref 4–15)
NEUTROPHILS # BLD AUTO: 8.5 K/UL (ref 1.8–7.7)
NEUTROPHILS NFR BLD: 80.2 % (ref 38–73)
NRBC BLD-RTO: 0 /100 WBC
NUM UNITS TRANS FFP: NORMAL
PLATELET # BLD AUTO: 256 K/UL (ref 150–450)
PMV BLD AUTO: 9.3 FL (ref 9.2–12.9)
POCT GLUCOSE: 59 MG/DL (ref 70–110)
POTASSIUM SERPL-SCNC: 3.9 MMOL/L (ref 3.5–5.1)
POTASSIUM SERPL-SCNC: 6 MMOL/L (ref 3.5–5.1)
PROT SERPL-MCNC: 8.1 G/DL (ref 6–8.4)
PROTHROMBIN TIME: 14.1 SEC (ref 9–12.5)
RBC # BLD AUTO: 2.6 M/UL (ref 4.6–6.2)
SODIUM SERPL-SCNC: 138 MMOL/L (ref 136–145)
SODIUM SERPL-SCNC: 140 MMOL/L (ref 136–145)
WBC # BLD AUTO: 10.56 K/UL (ref 3.9–12.7)

## 2024-02-16 PROCEDURE — 25000003 PHARM REV CODE 250: Performed by: HOSPITALIST

## 2024-02-16 PROCEDURE — 80053 COMPREHEN METABOLIC PANEL: CPT | Performed by: SURGERY

## 2024-02-16 PROCEDURE — 63600175 PHARM REV CODE 636 W HCPCS: Performed by: SURGERY

## 2024-02-16 PROCEDURE — 63600175 PHARM REV CODE 636 W HCPCS: Performed by: INTERNAL MEDICINE

## 2024-02-16 PROCEDURE — 25000003 PHARM REV CODE 250: Performed by: SURGERY

## 2024-02-16 PROCEDURE — C1752 CATH,HEMODIALYSIS,SHORT-TERM: HCPCS | Performed by: SURGERY

## 2024-02-16 PROCEDURE — 63600175 PHARM REV CODE 636 W HCPCS: Mod: JG | Performed by: SURGERY

## 2024-02-16 PROCEDURE — 85610 PROTHROMBIN TIME: CPT | Performed by: SURGERY

## 2024-02-16 PROCEDURE — 36589 REMOVAL TUNNELED CV CATH: CPT | Mod: RT,,, | Performed by: SURGERY

## 2024-02-16 PROCEDURE — 05PY03Z REMOVAL OF INFUSION DEVICE FROM UPPER VEIN, OPEN APPROACH: ICD-10-PCS | Performed by: SURGERY

## 2024-02-16 PROCEDURE — 87205 SMEAR GRAM STAIN: CPT | Performed by: SURGERY

## 2024-02-16 PROCEDURE — 25000003 PHARM REV CODE 250: Performed by: INTERNAL MEDICINE

## 2024-02-16 PROCEDURE — 71000039 HC RECOVERY, EACH ADD'L HOUR: Performed by: SURGERY

## 2024-02-16 PROCEDURE — 87070 CULTURE OTHR SPECIMN AEROBIC: CPT | Mod: 59 | Performed by: SURGERY

## 2024-02-16 PROCEDURE — D9220A PRA ANESTHESIA: Mod: CRNA,,, | Performed by: NURSE ANESTHETIST, CERTIFIED REGISTERED

## 2024-02-16 PROCEDURE — 36000706: Performed by: SURGERY

## 2024-02-16 PROCEDURE — 25000003 PHARM REV CODE 250: Performed by: ANESTHESIOLOGY

## 2024-02-16 PROCEDURE — D9220A PRA ANESTHESIA: Mod: ANES,,, | Performed by: ANESTHESIOLOGY

## 2024-02-16 PROCEDURE — 36415 COLL VENOUS BLD VENIPUNCTURE: CPT | Performed by: SURGERY

## 2024-02-16 PROCEDURE — 63600175 PHARM REV CODE 636 W HCPCS: Performed by: NURSE ANESTHETIST, CERTIFIED REGISTERED

## 2024-02-16 PROCEDURE — 0JPT0XZ REMOVAL OF TUNNELED VASCULAR ACCESS DEVICE FROM TRUNK SUBCUTANEOUS TISSUE AND FASCIA, OPEN APPROACH: ICD-10-PCS | Performed by: SURGERY

## 2024-02-16 PROCEDURE — 27201423 OPTIME MED/SURG SUP & DEVICES STERILE SUPPLY: Performed by: SURGERY

## 2024-02-16 PROCEDURE — 36000707: Performed by: SURGERY

## 2024-02-16 PROCEDURE — 85025 COMPLETE CBC W/AUTO DIFF WBC: CPT | Performed by: SURGERY

## 2024-02-16 PROCEDURE — 87102 FUNGUS ISOLATION CULTURE: CPT | Performed by: SURGERY

## 2024-02-16 PROCEDURE — 87077 CULTURE AEROBIC IDENTIFY: CPT | Mod: 59 | Performed by: SURGERY

## 2024-02-16 PROCEDURE — 37000009 HC ANESTHESIA EA ADD 15 MINS: Performed by: SURGERY

## 2024-02-16 PROCEDURE — 25000003 PHARM REV CODE 250: Performed by: NURSE ANESTHETIST, CERTIFIED REGISTERED

## 2024-02-16 PROCEDURE — 30243R1 TRANSFUSION OF NONAUTOLOGOUS PLATELETS INTO CENTRAL VEIN, PERCUTANEOUS APPROACH: ICD-10-PCS | Performed by: SURGERY

## 2024-02-16 PROCEDURE — 87186 SC STD MICRODIL/AGAR DIL: CPT | Mod: 59 | Performed by: SURGERY

## 2024-02-16 PROCEDURE — 71000033 HC RECOVERY, INTIAL HOUR: Performed by: SURGERY

## 2024-02-16 PROCEDURE — 11000001 HC ACUTE MED/SURG PRIVATE ROOM

## 2024-02-16 PROCEDURE — 87070 CULTURE OTHR SPECIMN AEROBIC: CPT | Performed by: SURGERY

## 2024-02-16 PROCEDURE — 87040 BLOOD CULTURE FOR BACTERIA: CPT | Mod: 59 | Performed by: SURGERY

## 2024-02-16 PROCEDURE — 36556 INSERT NON-TUNNEL CV CATH: CPT | Mod: 51,RT,, | Performed by: SURGERY

## 2024-02-16 PROCEDURE — P9017 PLASMA 1 DONOR FRZ W/IN 8 HR: HCPCS | Performed by: SURGERY

## 2024-02-16 PROCEDURE — 06HY33Z INSERTION OF INFUSION DEVICE INTO LOWER VEIN, PERCUTANEOUS APPROACH: ICD-10-PCS | Performed by: SURGERY

## 2024-02-16 PROCEDURE — 87075 CULTR BACTERIA EXCEPT BLOOD: CPT | Performed by: SURGERY

## 2024-02-16 PROCEDURE — 37000008 HC ANESTHESIA 1ST 15 MINUTES: Performed by: SURGERY

## 2024-02-16 PROCEDURE — 63600175 PHARM REV CODE 636 W HCPCS: Performed by: HOSPITALIST

## 2024-02-16 PROCEDURE — 80048 BASIC METABOLIC PNL TOTAL CA: CPT | Mod: XB | Performed by: SURGERY

## 2024-02-16 DEVICE — TRAY CATH PWR TRIALYSIS 20CM: Type: IMPLANTABLE DEVICE | Site: GROIN | Status: FUNCTIONAL

## 2024-02-16 RX ORDER — LIDOCAINE HYDROCHLORIDE 10 MG/ML
INJECTION, SOLUTION EPIDURAL; INFILTRATION; INTRACAUDAL; PERINEURAL
Status: DISCONTINUED | OUTPATIENT
Start: 2024-02-16 | End: 2024-02-16 | Stop reason: HOSPADM

## 2024-02-16 RX ORDER — HYDROCODONE BITARTRATE AND ACETAMINOPHEN 500; 5 MG/1; MG/1
TABLET ORAL
Status: DISCONTINUED | OUTPATIENT
Start: 2024-02-16 | End: 2024-02-16 | Stop reason: HOSPADM

## 2024-02-16 RX ORDER — DIPHENHYDRAMINE HYDROCHLORIDE 50 MG/ML
25 INJECTION INTRAMUSCULAR; INTRAVENOUS EVERY 6 HOURS PRN
Status: DISCONTINUED | OUTPATIENT
Start: 2024-02-16 | End: 2024-02-16 | Stop reason: HOSPADM

## 2024-02-16 RX ORDER — PROPOFOL 10 MG/ML
VIAL (ML) INTRAVENOUS CONTINUOUS PRN
Status: DISCONTINUED | OUTPATIENT
Start: 2024-02-16 | End: 2024-02-16

## 2024-02-16 RX ORDER — HEPARIN SODIUM 1000 [USP'U]/ML
INJECTION, SOLUTION INTRAVENOUS; SUBCUTANEOUS
Status: DISCONTINUED | OUTPATIENT
Start: 2024-02-16 | End: 2024-02-16 | Stop reason: HOSPADM

## 2024-02-16 RX ORDER — HYDROMORPHONE HYDROCHLORIDE 2 MG/ML
0.2 INJECTION, SOLUTION INTRAMUSCULAR; INTRAVENOUS; SUBCUTANEOUS EVERY 5 MIN PRN
Status: DISCONTINUED | OUTPATIENT
Start: 2024-02-16 | End: 2024-02-16 | Stop reason: HOSPADM

## 2024-02-16 RX ORDER — MEPERIDINE HYDROCHLORIDE 50 MG/ML
12.5 INJECTION INTRAMUSCULAR; INTRAVENOUS; SUBCUTANEOUS EVERY 10 MIN PRN
Status: DISCONTINUED | OUTPATIENT
Start: 2024-02-16 | End: 2024-02-16 | Stop reason: HOSPADM

## 2024-02-16 RX ORDER — PROPOFOL 10 MG/ML
VIAL (ML) INTRAVENOUS
Status: DISCONTINUED | OUTPATIENT
Start: 2024-02-16 | End: 2024-02-16

## 2024-02-16 RX ORDER — FENTANYL CITRATE 50 UG/ML
INJECTION, SOLUTION INTRAMUSCULAR; INTRAVENOUS
Status: DISCONTINUED | OUTPATIENT
Start: 2024-02-16 | End: 2024-02-16

## 2024-02-16 RX ADMIN — NEPHROCAP 1 CAPSULE: 1 CAP ORAL at 01:02

## 2024-02-16 RX ADMIN — CALCITRIOL CAPSULES 0.25 MCG 0.25 MCG: 0.25 CAPSULE ORAL at 01:02

## 2024-02-16 RX ADMIN — PROPOFOL 30 MG: 10 INJECTION, EMULSION INTRAVENOUS at 09:02

## 2024-02-16 RX ADMIN — AMLODIPINE BESYLATE 5 MG: 5 TABLET ORAL at 08:02

## 2024-02-16 RX ADMIN — ATORVASTATIN CALCIUM 80 MG: 40 TABLET, FILM COATED ORAL at 01:02

## 2024-02-16 RX ADMIN — TAMSULOSIN HYDROCHLORIDE 0.4 MG: 0.4 CAPSULE ORAL at 01:02

## 2024-02-16 RX ADMIN — FENTANYL CITRATE 50 MCG: 50 INJECTION, SOLUTION INTRAMUSCULAR; INTRAVENOUS at 09:02

## 2024-02-16 RX ADMIN — Medication 12.5 G: at 11:02

## 2024-02-16 RX ADMIN — DESMOPRESSIN ACETATE 22.59 MCG: 4 SOLUTION INTRAVENOUS at 09:02

## 2024-02-16 RX ADMIN — SODIUM CHLORIDE: 0.9 INJECTION, SOLUTION INTRAVENOUS at 07:02

## 2024-02-16 RX ADMIN — BENZTROPINE MESYLATE 0.5 MG: 0.5 TABLET ORAL at 08:02

## 2024-02-16 RX ADMIN — AMIODARONE HYDROCHLORIDE 200 MG: 200 TABLET ORAL at 01:02

## 2024-02-16 RX ADMIN — FENTANYL CITRATE 50 MCG: 50 INJECTION, SOLUTION INTRAMUSCULAR; INTRAVENOUS at 08:02

## 2024-02-16 RX ADMIN — PROPOFOL 50 MCG/KG/MIN: 10 INJECTION, EMULSION INTRAVENOUS at 08:02

## 2024-02-16 RX ADMIN — PIPERACILLIN SODIUM AND TAZOBACTAM SODIUM 4.5 G: 4; .5 INJECTION, POWDER, LYOPHILIZED, FOR SOLUTION INTRAVENOUS at 01:02

## 2024-02-16 RX ADMIN — METOPROLOL SUCCINATE 50 MG: 50 TABLET, EXTENDED RELEASE ORAL at 01:02

## 2024-02-16 RX ADMIN — ALLOPURINOL 100 MG: 100 TABLET ORAL at 01:02

## 2024-02-16 RX ADMIN — IRON SUCROSE 200 MG: 20 INJECTION, SOLUTION INTRAVENOUS at 04:02

## 2024-02-16 RX ADMIN — INSULIN HUMAN 5 UNITS: 100 INJECTION, SOLUTION PARENTERAL at 08:02

## 2024-02-16 RX ADMIN — TRANEXAMIC ACID 1000 MG: 100 INJECTION, SOLUTION INTRAVENOUS at 09:02

## 2024-02-16 RX ADMIN — DEXTROSE MONOHYDRATE 12.5 G: 25 INJECTION, SOLUTION INTRAVENOUS at 08:02

## 2024-02-16 NOTE — ANESTHESIA POSTPROCEDURE EVALUATION
Anesthesia Post Evaluation    Patient: Mahesh Cespedes    Procedure(s) Performed: Procedure(s) (LRB):  Removal, Vascular Access Catheter (Right)  INSERTION, CATHETER, TRIPLE LUMEN, HEMODIALYSIS, TEMPORARY (Right)    Final Anesthesia Type: general      Patient location during evaluation: PACU  Patient participation: Yes- Able to Participate  Level of consciousness: awake and alert  Post-procedure vital signs: reviewed and stable  Pain management: adequate  Airway patency: patent    PONV status at discharge: No PONV  Anesthetic complications: no      Respiratory status: spontaneous ventilation and room air  Hydration status: euvolemic  Follow-up not needed.              Vitals Value Taken Time   /60 02/16/24 1242   Temp 36.2 °C (97.1 °F) 02/16/24 1242   Pulse 63 02/16/24 1242   Resp 18 02/16/24 1242   SpO2 97 % 02/16/24 1242         Event Time   Out of Recovery 02/16/2024 12:28:00         Pain/Meet Score: Pain Rating Prior to Med Admin: 5 (2/15/2024  2:08 PM)  Pain Rating Post Med Admin: 3 (2/15/2024  3:08 PM)  Meet Score: 10 (2/16/2024 10:40 AM)

## 2024-02-16 NOTE — NURSING
Surgical bath done linen changed. Pt transported to surgery in bed. Pt on 2L O2 NC. Tele. Dressing changed to sacral area. IV saline lock. Safety measures maintained.

## 2024-02-16 NOTE — PROGRESS NOTES
[facility] Medicine  Progress Note    Patient Name: Mahesh Cespedes  MRN: [id number]  Patient Class: IP- Inpatient   Admission Date: 2/5/2024  Length of Stay: 10 days  Attending Physician: Terrance Joseph III, MD  Primary Care Provider: Cruz Hernandez MD        Subjective:     Principal Problem:Bacteremia        HPI:  58 y.o. male with AFib, DM2, ESRD on dialysis, hypertension presents from home with a complaint of altered mental status.  Family report he has been drowsy and fatigued with increased confusion for the past 2 days.  Has been progressively worsening.  Associated with significant peripheral edema.  Last dialysis was 2 weeks ago.  Denies fever, chills, cough, SOB, chest pain, palpitations, nausea, vomiting, diarrhea, or abdominal pain.  History is somewhat limited given the patient's drowsiness but he is awake and conversant.    In the ED, labs revealed hyperkalemia, K + 7.4.  He was given shifting medications and zirconium.  Nephrology was consulted and plan for urgent dialysis.  Labs also reveal uremia, leukocytosis, hyperphosphatemia, and metabolic acidosis.  Placed in observation to obtain dialysis.    Overview/Hospital Course:  58 y.o. man with ESRD but without outpatient dialysis set up who was admitted with uremic encephalopathy, hyperkalemia. Nephrology consulted and he received emergent dialysis. Mental status is improving. He has sacral wound with abscess. Started antibiotics. Blood cultures with gram variable rods. General surgery planning OR debridement on 2/8/24. S/P debridement with bone biopsy.  Morganella bacteremia.  Bone cultures also growing Morganella and Klebsiella.  ID consulted.  Recommending tunneled HD catheter removal.  IR consulted.  SW/CM consulted for outpatient dialysis arrangements.    Interval History:  Patient is seen after tunneled line removal.  Feeling good and asking for food.  Dressing from removal site without gross blood.    Review of  Systems  Objective:     Vital Signs (Most Recent):  Temp: 97.1 °F (36.2 °C) (02/16/24 1242)  Pulse: 69 (02/16/24 1514)  Resp: 18 (02/16/24 1242)  BP: 123/60 (02/16/24 1242)  SpO2: 97 % (02/16/24 1242) Vital Signs (24h Range):  Temp:  [97.1 °F (36.2 °C)-98.9 °F (37.2 °C)] 97.1 °F (36.2 °C)  Pulse:  [51-69] 69  Resp:  [8-18] 18  SpO2:  [97 %-100 %] 97 %  BP: (105-156)/(44-89) 123/60     Weight: 75.3 kg (166 lb 0.1 oz)  Body mass index is 26 kg/m².    Intake/Output Summary (Last 24 hours) at 2/16/2024 1518  Last data filed at 2/16/2024 1145  Gross per 24 hour   Intake 585 ml   Output 125 ml   Net 460 ml         Physical Exam       Constitutional:       General: He is not in acute distress.     Appearance: He is ill-appearing. He is not toxic-appearing.   HENT:      Head: Normocephalic and atraumatic.   Cardiovascular:      Rate and Rhythm: Normal rate and regular rhythm.   Pulmonary:      Effort: Pulmonary effort is normal.      Breath sounds: Normal breath sounds.   Abdominal:      General: Bowel sounds are normal. There is no distension.      Tenderness: There is no abdominal tenderness. There is no guarding or rebound.   Musculoskeletal:      Right lower leg: No edema.      Left lower leg: No edema.   Skin:     General: Skin is warm and dry.   Neurological:      Mental Status: He is alert.      Significant Labs: All pertinent labs within the past 24 hours have been reviewed.    Significant Imaging: I have reviewed all pertinent imaging results/findings within the past 24 hours.    Assessment/Plan:      * Bacteremia  Blood culture 2/6/24 with gram variable rods.   Morganella bacteremia.  Sacral osteomyelitis as source.  Stop Vanc.  Continued Zosyn.  Repeat BCx negative so far.  ID consulted.  Changed to Cefepime.  Can do ABx's with HD on discharge.  Recommending changing tunneled HD catheter. Pt with bleeding from catheter site overnight after attempted removal resulting blood loss. IR consulted this am and stitched  site with control of bleeding. Pending vascular eval for removal.  -removed 2/16 will give line holiday until 2/20 per nephrology recs    Physical debility  PT/OT consulted.  Currently recommending moderate intensity therapy.  SW/CM did send LTAC referrals. And pt accepted pending line holiday completion    Pressure injury of sacral region, unstageable  Wound care consulted       Gluteal abscess  Noted on CT. With leukocytosis  - started vanc, zosyn-- continue  - General surgery consulted- to OR.  - wound care consulted  S/P operative debridement 2/8 with bone biopsy obtained given exposed coccyx    Bone cultures growing morganella and klebsiella.      Hyperphosphatemia  Secondary to ESRD.    Uremic encephalopathy   with acute encephalopathy.  Nephrology consulted and received emergent HD. Mental status now seems at baseline.    Paroxysmal atrial fibrillation  Patient with Paroxysmal (<7 days) atrial fibrillation which is controlled currently with Amiodarone. Patient is currently in sinus rhythm.[id number] Score: 1.  Anticoagulation indicated. Anticoagulation done with apixaban .    Goals of care, counseling/discussion  Advance Care Planning  Palliative care consulted.         Anemia in ESRD (end-stage renal disease)  Patient's anemia is currently controlled.  Etiology likely d/t chronic disease due to Chronic Kidney Disease/ESRD  Current CBC reviewed-   Lab Results   Component Value Date    HGB 7.1 (L) 02/16/2024    HCT 22.8 (L) 02/16/2024     Monitor serial CBC and transfuse if patient becomes hemodynamically unstable, symptomatic or H/H drops below 7/21.  - EPO started by Nephrology   Pt with bleeding from catheter site overnight after attempted removal resulting blood loss. IR consulted this am and stitched site with control of bleeding. Pending vascular eval for removal. Hgb back down to 6.9. 1 unit prbc ordered. Eliquis held.  -hgb stable for now will continue to monitor      Essential  hypertension  Chronic, controlled. Latest blood pressure and vitals reviewed-     Temp:  [97.1 °F (36.2 °C)-98.9 °F (37.2 °C)]   Pulse:  [51-67]   Resp:  [8-18]   BP: (105-156)/(44-89)   SpO2:  [97 %-100 %] .   Home meds for hypertension were reviewed and noted below.   Hypertension Medications               amLODIPine (NORVASC) 10 MG tablet Take 10 mg by mouth once daily.    furosemide (LASIX) 20 MG tablet Take 20 mg by mouth 2 (two) times daily.    hydrALAZINE (APRESOLINE) 100 MG tablet Take 1 tablet (100 mg total) by mouth 3 (three) times daily.    metoprolol succinate (TOPROL-XL) 100 MG 24 hr tablet Take 100 mg by mouth once daily.    NIFEdipine (PROCARDIA-XL) 60 MG (OSM) 24 hr tablet Take 1 tablet (60 mg total) by mouth once daily.            While in the hospital, will manage blood pressure as follows; Continue home antihypertensive regimen    Will utilize p.r.n. blood pressure medication only if patient's blood pressure greater than 180/110 and he develops symptoms such as worsening chest pain or shortness of breath.    ESRD needing dialysis  Admitted with uremic encephalopathy and hyperkalemia. Received emergent dialysis  - dialysis not able to be arranged as outpatient due to history of nonadherence--> this is a major issue. Social work checking again to see if they can arrange. Palliative care consulted  - Nephrology consulted      VTE Risk Mitigation (From admission, onward)           Ordered     IP VTE HIGH RISK PATIENT  Once         02/05/24 1733     Place sequential compression device  Until discontinued         02/05/24 1733     Reason for No Pharmacological VTE Prophylaxis  Once        Question:  Reasons:  Answer:  Already adequately anticoagulated on oral Anticoagulants    02/05/24 1733                    Discharge Planning   JAIME: 2/8/2024     Code Status: Full Code   Is the patient medically ready for discharge?:     Reason for patient still in hospital (select all that apply): Treatment and  Consult recommendations  Discharge Plan A: Home Health   Discharge Delays: (!) Dialysis Set-up              Terrance Joseph III, MD  Department of Hospital Medicine   [facility] Surg

## 2024-02-16 NOTE — PT/OT/SLP PROGRESS
Occupational Therapy      Patient Name:  Mahesh Cespedes   MRN:  [id number]    Patient not seen today secondary to off the floor for procedure/surgery. Will follow-up as able later hour/day .     2/16/2024

## 2024-02-16 NOTE — ASSESSMENT & PLAN NOTE
Patient's anemia is currently controlled.  Etiology likely d/t chronic disease due to Chronic Kidney Disease/ESRD  Current CBC reviewed-   Lab Results   Component Value Date    HGB 7.1 (L) 02/16/2024    HCT 22.8 (L) 02/16/2024     Monitor serial CBC and transfuse if patient becomes hemodynamically unstable, symptomatic or H/H drops below 7/21.  - EPO started by Nephrology   Pt with bleeding from catheter site overnight after attempted removal resulting blood loss. IR consulted this am and stitched site with control of bleeding. Pending vascular eval for removal. Hgb back down to 6.9. 1 unit prbc ordered. Eliquis held.  -hgb stable for now will continue to monitor

## 2024-02-16 NOTE — ASSESSMENT & PLAN NOTE
Patient with Paroxysmal (<7 days) atrial fibrillation which is controlled currently with Amiodarone. Patient is currently in sinus rhythm.[id number] Score: 1.  Anticoagulation indicated. Anticoagulation done with apixaban .

## 2024-02-16 NOTE — PROGRESS NOTES
Renal Progress Note    Date of Admission:  2/5/2024 12:18 PM    Length of Stay: 10  Days    Subjective: n/a    Objective:    Current Facility-Administered Medications   Medication    0.9%  NaCl infusion (for blood administration)    0.9%  NaCl infusion (for blood administration)    0.9%  NaCl infusion (for blood administration)    acetaminophen tablet 650 mg    albuterol-ipratropium 2.5 mg-0.5 mg/3 mL nebulizer solution 3 mL    allopurinoL tablet 100 mg    aluminum-magnesium hydroxide-simethicone 200-200-20 mg/5 mL suspension 30 mL    amiodarone tablet 200 mg    amLODIPine tablet 5 mg    apixaban tablet 5 mg    atorvastatin tablet 80 mg    benztropine tablet 0.5 mg    calcitRIOL capsule 0.25 mcg    dextrose 10% bolus 125 mL 125 mL    dextrose 10% bolus 250 mL 250 mL    glucagon (human recombinant) injection 1 mg    glucose chewable tablet 16 g    glucose chewable tablet 24 g    HYDROmorphone injection 0.5 mg    iron sucrose injection 200 mg    melatonin tablet 6 mg    metoprolol succinate (TOPROL-XL) 24 hr tablet 50 mg    naloxone 0.4 mg/mL injection 0.02 mg    ondansetron injection 4 mg    oxyCODONE immediate release tablet 10 mg    piperacillin-tazobactam (ZOSYN) 4.5 g in dextrose 5 % in water (D5W) 100 mL IVPB (MB+)    prochlorperazine injection Soln 5 mg    simethicone chewable tablet 80 mg    sodium chloride 0.9% bolus 250 mL 250 mL    sodium chloride 0.9% flush 10 mL    tamsulosin 24 hr capsule 0.4 mg    vitamin renal formula (B-complex-vitamin c-folic acid) 1 mg per capsule 1 capsule       Vitals:    02/15/24 2012 02/15/24 2351 02/16/24 0304 02/16/24 0507   BP: 139/72 136/74  (!) 156/89   BP Location: Right arm Right arm  Right arm   Patient Position: Lying Lying  Lying   Pulse: (!) 54 (!) 59 61 65   Resp: 18 18  18   Temp: 98.6 °F (37 °C) 98.7 °F (37.1 °C)  98.1 °F (36.7 °C)   TempSrc: Oral Oral  Oral   SpO2: 100% 100%  100%   Weight:       Height:           I/O last 3 completed  "shifts:  In: 720 [P.O.:720]  Out: -   No intake/output data recorded.      Physical Exam: unchanged    Laboratories:    No results for input(s): "WBC", "RBC", "HGB", "HCT", "PLT", "MCV", "MCH", "MCHC" in the last 24 hours.      Recent Labs   Lab 02/16/24  0644   CALCIUM 7.5*   ALBUMIN 2.0*   PROT 8.1      K 6.0*   CO2 17*      BUN 44*   CREATININE 7.6*   ALKPHOS 111   ALT 28   AST 37   BILITOT 0.6         No results for input(s): "COLORU", "CLARITYU", "SPECGRAV", "PHUR", "PROTEINUA", "GLUCOSEU", "BILIRUBINCON", "BLOODU", "WBCU", "RBCU", "BACTERIA", "MUCUS" in the last 24 hours.    Microbiology Results (last 7 days)       Procedure Component Value Units Date/Time    Blood culture [[id number]] Collected: 02/10/24 1235    Order Status: Completed Specimen: Blood from Peripheral, Hand, Left Updated: 02/14/24 1303     Blood Culture, Routine No Growth after 4 days.    Blood culture [[id number]] Collected: 02/10/24 1240    Order Status: Completed Specimen: Blood from Peripheral, Antecubital, Left Updated: 02/14/24 1303     Blood Culture, Routine No Growth after 4 days.    AFB Culture & Smear [[id number]] Collected: 02/08/24 1250    Order Status: Completed Specimen: Bone from Sacrum Updated: 02/12/24 1434     AFB Culture & Smear Culture in progress     AFB CULTURE STAIN No acid fast bacilli seen.    Narrative:      Sacral abcess    AFB Culture & Smear [[id number]] Collected: 02/08/24 1250    Order Status: Completed Specimen: Bone from Sacrum Updated: 02/12/24 1433     AFB Culture & Smear Culture in progress     AFB CULTURE STAIN No acid fast bacilli seen.    Narrative:      Bone from coccyx    Aerobic culture [[id number]]  (Abnormal)  (Susceptibility) Collected: 02/08/24 1250    Order Status: Completed Specimen: Bone from Sacrum Updated: 02/12/24 0854     Aerobic Bacterial Culture MORGANELLA MORGANII  From broth only      Narrative:      Bone from coccyx    Culture, Anaerobe [[id number]] Collected: 02/08/24 1250 "    Order Status: Completed Specimen: Bone from Sacrum Updated: 02/12/24 0814     Anaerobic Culture No anaerobes isolated    Narrative:      Bone from coccyx    Culture, Anaerobe [[id number]] Collected: 02/08/24 1250    Order Status: Completed Specimen: Bone from Sacrum Updated: 02/12/24 0814     Anaerobic Culture No anaerobes isolated    Narrative:      Sacral abcess    Blood culture [[id number]] Collected: 02/07/24 1344    Order Status: Completed Specimen: Blood from Peripheral, Antecubital, Right Updated: 02/11/24 1503     Blood Culture, Routine No Growth after 4 days.    Narrative:      Collection has been rescheduled by CMO at 02/07/2024 09:28 Reason: Pt   in dialysis  Collection has been rescheduled by RBJ at 02/07/2024 11:23 Reason: At   13:30  Collection has been rescheduled by [email or web address] at 02/07/2024 12:05 Reason:   Pt is doing dialysis will be ready at130  Collection has been rescheduled by CMO at 02/07/2024 09:28 Reason: Pt   in dialysis  Collection has been rescheduled by RBJ at 02/07/2024 11:23 Reason: At   13:30  Collection has been rescheduled by [email or web address] at 02/07/2024 12:05 Reason:   Pt is doing dialysis will be ready at130    Blood culture [[id number]] Collected: 02/07/24 1332    Order Status: Completed Specimen: Blood from Peripheral, Hand, Left Updated: 02/11/24 1503     Blood Culture, Routine No Growth after 4 days.    Narrative:      Collection has been rescheduled by CMO at 02/07/2024 09:28 Reason: Pt   in dialysis  Collection has been rescheduled by RBJ at 02/07/2024 11:23 Reason: At   13:30  Collection has been rescheduled by [email or web address] at 02/07/2024 12:05 Reason:   Pt is doing dialysis will be ready at130  Collection has been rescheduled by CMO at 02/07/2024 09:28 Reason: Pt   in dialysis  Collection has been rescheduled by RBJ at 02/07/2024 11:23 Reason: At   13:30  Collection has been rescheduled by [email or web address] at 02/07/2024 12:05 Reason:   Pt is doing dialysis will be ready at130    Blood culture [[id number]]  "Collected: 02/06/24 0841    Order Status: Completed Specimen: Blood from Hand, Right Updated: 02/10/24 0903     Blood Culture, Routine No Growth after 4 days.    Aerobic culture [[id number]]  (Abnormal)  (Susceptibility) Collected: 02/08/24 1250    Order Status: Completed Specimen: Bone from Sacrum Updated: 02/10/24 0719     Aerobic Bacterial Culture MORGANELLA MORGANII  Many        KLEBSIELLA OXYTOCA  Rare      Narrative:      Sacral abcess    Gram stain [[id number]] Collected: 02/08/24 1250    Order Status: Completed Specimen: Bone from Sacrum Updated: 02/09/24 1104     Gram Stain Result Rare WBC's      No organisms seen    Narrative:      Bone from coccyx    Gram stain [[id number]] Collected: 02/08/24 1250    Order Status: Completed Specimen: Bone from Sacrum Updated: 02/09/24 1104     Gram Stain Result Rare WBC's      Rare Gram positive cocci in pairs    Narrative:      Sacral abcess              Diagnostic Tests:     CXR:  Impression:       Cardiomegaly.  Increased interstitial lung markings.  Possible small pleural effusions.  Question on fluid overload and or CHF.  However, a pneumonic infectious process cannot be excluded in the appropriate clinical circumstances.      Electronically signed by: Sandie Martinez  Date: 02/05/2024           Assessment:    57 y/o male with known to me from prior encounter last month; Hx. ESRD (Moved from [facility]. no Dialysis unit to go to) admitted with:     - Hyperkalemia corrected with Dialysis last p.m.  - HAGMA  - Uncontrolled HTN  - One of the blood cultures reported as "gram variable rods" ID and sensitivity pending  - Fluid overload  - Improving Metabolic (uremic) encephalopathy, last HD 1/22/24 while in the Hospital (Pte. Was not accepted by any of the local Dialysis units due to Hx. Of non-compliance and was told either to try to go back to [place] or come to ED periodically as needed for HD)  - Hyperphosphatemia improving  - 2nd. hyperparathyroidism  - Hx. Ascites s/p " Paracentesis in January  - Fe++ def. + Anemia of ckd  - HTN  - Hx. CVA  - Hypoalbuminemia  - Sacral pressure ulcer           Plan:    - Antibiotics per ID  - s/p HD line removal in OR today  - line holiday until next week  - Resume Dialysis next week  - IV Fe++ loading   - Adjust BP meds. As needed  - Epogen 3 x week  - Transfuse prn Hgb < 7  - Renal diet + protein supplements  - NO need for PO4- binders   - Calcitriol  - Wound care per surgery  - Consider Nursing Home placement, Pte. Clearly unable to take care of self  - Disposition and other problems per admitting

## 2024-02-16 NOTE — BRIEF OP NOTE
[facility] - [place] - Surgery  Brief Operative Note    SUMMARY     Surgery Date: 2/16/2024     Surgeon(s) and Role:     * Gopal Rico MD - Primary    Assisting Surgeon: None    Pre-op Diagnosis:  ESRD needing dialysis [N18.6, Z99.2]    Post-op Diagnosis:  Post-Op Diagnosis Codes:     * ESRD needing dialysis [N18.6, Z99.2]    Procedure(s) (LRB):  Removal, Vascular Access Catheter (Right)  INSERTION, CATHETER, TRIPLE LUMEN, HEMODIALYSIS, TEMPORARY (Right)    Anesthesia: Monitor Anesthesia Care    Implants:  * No implants in log *    Operative Findings:   Hematoma and scarred in catheter in right neck (internal jugular tunneled catheter), small incision made and catheter removed in entirety and sent for culture.  No gross purulence.  Wound irrigated with betadyne saline, packed with surgicel and thrombin spray.  Loosely approximated with 2-0 nylon sutures.      New Trialysis HD catheter placed in R fem vein (20 cm length).  Ready for HD use. 2 sets of Bcx sent    Coagulopathic bleeding, requiring TXA , DDAVP, and 1 hour+  manual pressure to achieve hemostasis. Pressure dressing applied.  Keep patient sitting upright , at least 45 deg    Estimated Blood Loss: 125 mL    Estimated Blood Loss has been documented.         Specimens:   Specimen (24h ago, onward)      None            [id number]

## 2024-02-16 NOTE — ASSESSMENT & PLAN NOTE
Blood culture 2/6/24 with gram variable rods.   Morganella bacteremia.  Sacral osteomyelitis as source.  Stop Vanc.  Continued Zosyn.  Repeat BCx negative so far.  ID consulted.  Changed to Cefepime.  Can do ABx's with HD on discharge.  Recommending changing tunneled HD catheter. Pt with bleeding from catheter site overnight after attempted removal resulting blood loss. IR consulted this am and stitched site with control of bleeding. Pending vascular eval for removal.  -removed 2/16 will give line holiday until 2/20 per nephrology recs

## 2024-02-16 NOTE — INTERVAL H&P NOTE
The patient has been examined and the H&P has been reviewed:    I concur with the findings and no changes have occurred since H&P was written.    Surgery risks, benefits and alternative options discussed and understood by patient/family.    Mallampati Scale Class 2   ASA Classification Class III     Sedation Plan: Moderate sedation        Active Hospital Problems    Diagnosis  POA    *Bacteremia [R78.81]  Yes    Physical debility [R53.81]  Yes    Gluteal abscess [L02.31]  Yes    Pressure injury of sacral region, unstageable [L89.150]  Yes    Hyperphosphatemia [E83.39]  Yes    Uremic encephalopathy [G93.49, N19]  Yes    Paroxysmal atrial fibrillation [I48.0]  Yes    Goals of care, counseling/discussion [Z71.89]  Not Applicable    Anemia in ESRD (end-stage renal disease) [N18.6, D63.1]  Yes    Essential hypertension [I10]  Yes     Chronic    ESRD needing dialysis [N18.6, Z99.2]  Yes      Resolved Hospital Problems    Diagnosis Date Resolved POA    Hyperkalemia [E87.5] 02/06/2024 Yes

## 2024-02-16 NOTE — NURSING
Pt received 1 unit PRBC. Vitals assessed. Scheduled medications given. IV antibiotic infusing. Tele #[id number]. No bleeding noted to HD cath to right subclavian, dressing cdi. Telesitter remains at bedside. Safety measures maintained. Will cont to monitor

## 2024-02-16 NOTE — SUBJECTIVE & OBJECTIVE
Interval History:  Patient is seen after tunneled line removal.  Feeling good and asking for food.  Dressing from removal site without gross blood.    Review of Systems  Objective:     Vital Signs (Most Recent):  Temp: 97.1 °F (36.2 °C) (02/16/24 1242)  Pulse: 69 (02/16/24 1514)  Resp: 18 (02/16/24 1242)  BP: 123/60 (02/16/24 1242)  SpO2: 97 % (02/16/24 1242) Vital Signs (24h Range):  Temp:  [97.1 °F (36.2 °C)-98.9 °F (37.2 °C)] 97.1 °F (36.2 °C)  Pulse:  [51-69] 69  Resp:  [8-18] 18  SpO2:  [97 %-100 %] 97 %  BP: (105-156)/(44-89) 123/60     Weight: 75.3 kg (166 lb 0.1 oz)  Body mass index is 26 kg/m².    Intake/Output Summary (Last 24 hours) at 2/16/2024 1518  Last data filed at 2/16/2024 1145  Gross per 24 hour   Intake 585 ml   Output 125 ml   Net 460 ml         Physical Exam       Constitutional:       General: He is not in acute distress.     Appearance: He is ill-appearing. He is not toxic-appearing.   HENT:      Head: Normocephalic and atraumatic.   Cardiovascular:      Rate and Rhythm: Normal rate and regular rhythm.   Pulmonary:      Effort: Pulmonary effort is normal.      Breath sounds: Normal breath sounds.   Abdominal:      General: Bowel sounds are normal. There is no distension.      Tenderness: There is no abdominal tenderness. There is no guarding or rebound.   Musculoskeletal:      Right lower leg: No edema.      Left lower leg: No edema.   Skin:     General: Skin is warm and dry.   Neurological:      Mental Status: He is alert.      Significant Labs: All pertinent labs within the past 24 hours have been reviewed.    Significant Imaging: I have reviewed all pertinent imaging results/findings within the past 24 hours.

## 2024-02-16 NOTE — ASSESSMENT & PLAN NOTE
Chronic, controlled. Latest blood pressure and vitals reviewed-     Temp:  [97.1 °F (36.2 °C)-98.9 °F (37.2 °C)]   Pulse:  [51-67]   Resp:  [8-18]   BP: (105-156)/(44-89)   SpO2:  [97 %-100 %] .   Home meds for hypertension were reviewed and noted below.   Hypertension Medications               amLODIPine (NORVASC) 10 MG tablet Take 10 mg by mouth once daily.    furosemide (LASIX) 20 MG tablet Take 20 mg by mouth 2 (two) times daily.    hydrALAZINE (APRESOLINE) 100 MG tablet Take 1 tablet (100 mg total) by mouth 3 (three) times daily.    metoprolol succinate (TOPROL-XL) 100 MG 24 hr tablet Take 100 mg by mouth once daily.    NIFEdipine (PROCARDIA-XL) 60 MG (OSM) 24 hr tablet Take 1 tablet (60 mg total) by mouth once daily.            While in the hospital, will manage blood pressure as follows; Continue home antihypertensive regimen    Will utilize p.r.n. blood pressure medication only if patient's blood pressure greater than 180/110 and he develops symptoms such as worsening chest pain or shortness of breath.

## 2024-02-16 NOTE — TRANSFER OF CARE
"Anesthesia Transfer of Care Note    Patient: Mahesh Cespedes    Procedure(s) Performed: Procedure(s) (LRB):  Removal, Vascular Access Catheter (Right)  INSERTION, CATHETER, TRIPLE LUMEN, HEMODIALYSIS, TEMPORARY (Right)    Patient location: PACU    Anesthesia Type: MAC    Transport from OR: Transported from OR on room air with adequate spontaneous ventilation    Post pain: adequate analgesia    Post assessment: no apparent anesthetic complications and tolerated procedure well    Post vital signs: stable    Level of consciousness: awake    Nausea/Vomiting: no nausea/vomiting    Complications: none    Transfer of care protocol was followed    Last vitals: Visit Vitals  BP (!) 115/56 (BP Location: Left leg, Patient Position: Lying)   Pulse (!) 51   Temp 36.4 °C (97.5 °F) (Axillary)   Resp 13   Ht 5' 7" (1.702 m)   Wt 75.3 kg (166 lb 0.1 oz)   SpO2 100%   BMI 26.00 kg/m²     "

## 2024-02-16 NOTE — ASSESSMENT & PLAN NOTE
PT/OT consulted.  Currently recommending moderate intensity therapy.  SW/CM did send LTAC referrals. And pt accepted pending line holiday completion

## 2024-02-16 NOTE — PT/OT/SLP PROGRESS
Physical Therapy      Patient Name:  Mahesh Cespedes   MRN:  [id number]    Patient not seen today secondary to Off the floor for procedure/surgery. Will follow-up as able later hour/day .

## 2024-02-16 NOTE — NURSING
Notified Dr. Joseph about pt still bleeding. Secured Chat Rachael TOMPKINS with IR to come and revaluate the dialysis catheter and site again. Per provider will be up shortly.

## 2024-02-17 PROCEDURE — 63600175 PHARM REV CODE 636 W HCPCS: Performed by: INTERNAL MEDICINE

## 2024-02-17 PROCEDURE — 63600175 PHARM REV CODE 636 W HCPCS: Performed by: HOSPITALIST

## 2024-02-17 PROCEDURE — 25000003 PHARM REV CODE 250: Performed by: HOSPITALIST

## 2024-02-17 PROCEDURE — 11000001 HC ACUTE MED/SURG PRIVATE ROOM

## 2024-02-17 PROCEDURE — 25000003 PHARM REV CODE 250: Performed by: INTERNAL MEDICINE

## 2024-02-17 PROCEDURE — 97530 THERAPEUTIC ACTIVITIES: CPT | Mod: CQ

## 2024-02-17 RX ORDER — SODIUM CHLORIDE 9 MG/ML
INJECTION, SOLUTION INTRAVENOUS
Status: DISCONTINUED | OUTPATIENT
Start: 2024-02-17 | End: 2024-02-22 | Stop reason: HOSPADM

## 2024-02-17 RX ORDER — FERROUS GLUCONATE 324(37.5)
324 TABLET ORAL 2 TIMES DAILY WITH MEALS
Status: DISCONTINUED | OUTPATIENT
Start: 2024-02-17 | End: 2024-02-20

## 2024-02-17 RX ORDER — SODIUM CHLORIDE 9 MG/ML
INJECTION, SOLUTION INTRAVENOUS ONCE
Status: COMPLETED | OUTPATIENT
Start: 2024-02-17 | End: 2024-02-17

## 2024-02-17 RX ADMIN — ATORVASTATIN CALCIUM 80 MG: 40 TABLET, FILM COATED ORAL at 09:02

## 2024-02-17 RX ADMIN — SODIUM CHLORIDE: 9 INJECTION, SOLUTION INTRAVENOUS at 11:02

## 2024-02-17 RX ADMIN — PIPERACILLIN SODIUM AND TAZOBACTAM SODIUM 4.5 G: 4; .5 INJECTION, POWDER, LYOPHILIZED, FOR SOLUTION INTRAVENOUS at 12:02

## 2024-02-17 RX ADMIN — CALCITRIOL CAPSULES 0.25 MCG 0.25 MCG: 0.25 CAPSULE ORAL at 09:02

## 2024-02-17 RX ADMIN — ALLOPURINOL 100 MG: 100 TABLET ORAL at 09:02

## 2024-02-17 RX ADMIN — TAMSULOSIN HYDROCHLORIDE 0.4 MG: 0.4 CAPSULE ORAL at 09:02

## 2024-02-17 RX ADMIN — BENZTROPINE MESYLATE 0.5 MG: 0.5 TABLET ORAL at 09:02

## 2024-02-17 RX ADMIN — NEPHROCAP 1 CAPSULE: 1 CAP ORAL at 09:02

## 2024-02-17 RX ADMIN — Medication 324 MG: at 04:02

## 2024-02-17 RX ADMIN — CEFEPIME 2 G: 2 INJECTION, POWDER, FOR SOLUTION INTRAVENOUS at 10:02

## 2024-02-17 RX ADMIN — AMLODIPINE BESYLATE 5 MG: 5 TABLET ORAL at 09:02

## 2024-02-17 RX ADMIN — AMIODARONE HYDROCHLORIDE 200 MG: 200 TABLET ORAL at 09:02

## 2024-02-17 RX ADMIN — METOPROLOL SUCCINATE 50 MG: 50 TABLET, EXTENDED RELEASE ORAL at 09:02

## 2024-02-17 NOTE — PROGRESS NOTES
[facility] Medicine  Progress Note    Patient Name: Mahesh Cespedes  MRN: [id number]  Patient Class: IP- Inpatient   Admission Date: 2/5/2024  Length of Stay: 11 days  Attending Physician: Terrance Joseph III, MD  Primary Care Provider: Cruz Hernandez MD        Subjective:     Principal Problem:Bacteremia        HPI:  58 y.o. male with AFib, DM2, ESRD on dialysis, hypertension presents from home with a complaint of altered mental status.  Family report he has been drowsy and fatigued with increased confusion for the past 2 days.  Has been progressively worsening.  Associated with significant peripheral edema.  Last dialysis was 2 weeks ago.  Denies fever, chills, cough, SOB, chest pain, palpitations, nausea, vomiting, diarrhea, or abdominal pain.  History is somewhat limited given the patient's drowsiness but he is awake and conversant.    In the ED, labs revealed hyperkalemia, K + 7.4.  He was given shifting medications and zirconium.  Nephrology was consulted and plan for urgent dialysis.  Labs also reveal uremia, leukocytosis, hyperphosphatemia, and metabolic acidosis.  Placed in observation to obtain dialysis.    Overview/Hospital Course:  58 y.o. man with ESRD but without outpatient dialysis set up who was admitted with uremic encephalopathy, hyperkalemia. Nephrology consulted and he received emergent dialysis. Mental status is improving. He has sacral wound with abscess. Started antibiotics. Blood cultures with gram variable rods. General surgery planning OR debridement on 2/8/24. S/P debridement with bone biopsy.  Morganella bacteremia.  Bone cultures also growing Morganella and Klebsiella.  ID consulted.  Recommending tunneled HD catheter removal.  IR consulted.  SW/CM consulted for outpatient dialysis arrangements.    Interval History: Pt states he is doing fine. No noted bleeding overnight.    Review of Systems  Objective:     Vital Signs (Most Recent):  Temp: 98.3 °F (36.8 °C) (02/17/24  0726)  Pulse: 66 (02/17/24 0800)  Resp: 16 (02/17/24 0726)  BP: 138/79 (02/17/24 0726)  SpO2: (!) 93 % (02/17/24 0726) Vital Signs (24h Range):  Temp:  [97.1 °F (36.2 °C)-99.4 °F (37.4 °C)] 98.3 °F (36.8 °C)  Pulse:  [53-71] 66  Resp:  [15-18] 16  SpO2:  [91 %-100 %] 93 %  BP: (119-146)/(49-79) 138/79     Weight: 75.3 kg (166 lb 0.1 oz)  Body mass index is 26 kg/m².    Intake/Output Summary (Last 24 hours) at 2/17/2024 1055  Last data filed at 2/17/2024 0900  Gross per 24 hour   Intake 555 ml   Output --   Net 555 ml         Physical Exam    Constitutional:       General: He is not in acute distress.     Appearance: He is ill-appearing. He is not toxic-appearing.   HENT:      Head: Normocephalic and atraumatic.   Cardiovascular:      Rate and Rhythm: Normal rate and regular rhythm.   Pulmonary:      Effort: Pulmonary effort is normal.      Breath sounds: Normal breath sounds.   Abdominal:      General: Bowel sounds are normal. There is no distension.      Tenderness: There is no abdominal tenderness. There is no guarding or rebound.   Musculoskeletal:      Right lower leg: No edema.      Left lower leg: No edema.   Skin:     General: Skin is warm and dry.   Neurological:      Mental Status: He is alert.     Significant Labs: All pertinent labs within the past 24 hours have been reviewed.    Significant Imaging: I have reviewed all pertinent imaging results/findings within the past 24 hours.    Assessment/Plan:      * Bacteremia  Blood culture 2/6/24 with gram variable rods.   Morganella bacteremia.  Sacral osteomyelitis as source.  Stop Vanc.  Continued Zosyn.  Repeat BCx negative so far.  ID consulted.  Changed to Cefepime.  Can do ABx's with HD on discharge.  Recommending changing tunneled HD catheter. Pt with bleeding from catheter site overnight after attempted removal resulting blood loss. IR consulted this am and stitched site with control of bleeding. Pending vascular eval for removal.  -removed 2/16 will  give line holiday until 2/20 per nephrology recs    Physical debility  PT/OT consulted.  Currently recommending moderate intensity therapy.  SW/CM did send LTAC referrals. And pt accepted pending line holiday completion    Pressure injury of sacral region, unstageable  Wound care consulted       Gluteal abscess  Noted on CT. With leukocytosis  - started vanc, zosyn-- continue  - General surgery consulted- to OR.  - wound care consulted  S/P operative debridement 2/8 with bone biopsy obtained given exposed coccyx    Bone cultures growing morganella and klebsiella.      Hyperphosphatemia  Secondary to ESRD.    Uremic encephalopathy   with acute encephalopathy.  Nephrology consulted and received emergent HD. Mental status now seems at baseline.    Paroxysmal atrial fibrillation  Patient with Paroxysmal (<7 days) atrial fibrillation which is controlled currently with Amiodarone. Patient is currently in sinus rhythm.[id number] Score: 1.  Anticoagulation indicated. Anticoagulation done with apixaban .    Goals of care, counseling/discussion  Advance Care Planning  Palliative care consulted.         Anemia in ESRD (end-stage renal disease)  Patient's anemia is currently controlled.  Etiology likely d/t chronic disease due to Chronic Kidney Disease/ESRD  Current CBC reviewed-   Lab Results   Component Value Date    HGB 7.1 (L) 02/16/2024    HCT 22.8 (L) 02/16/2024     Monitor serial CBC and transfuse if patient becomes hemodynamically unstable, symptomatic or H/H drops below 7/21.  - EPO started by Nephrology   Pt with bleeding from catheter site overnight after attempted removal resulting blood loss. IR consulted this am and stitched site with control of bleeding. Pending vascular eval for removal. Hgb back down to 6.9. 1 unit prbc ordered. Eliquis held.  -hgb stable for now will continue to monitor      Essential hypertension  Chronic, controlled. Latest blood pressure and vitals reviewed-     Temp:  [97.1 °F (36.2  °C)-99.4 °F (37.4 °C)]   Pulse:  [53-71]   Resp:  [15-18]   BP: (119-146)/(49-79)   SpO2:  [91 %-100 %] .   Home meds for hypertension were reviewed and noted below.   Hypertension Medications               amLODIPine (NORVASC) 10 MG tablet Take 10 mg by mouth once daily.    furosemide (LASIX) 20 MG tablet Take 20 mg by mouth 2 (two) times daily.    hydrALAZINE (APRESOLINE) 100 MG tablet Take 1 tablet (100 mg total) by mouth 3 (three) times daily.    metoprolol succinate (TOPROL-XL) 100 MG 24 hr tablet Take 100 mg by mouth once daily.    NIFEdipine (PROCARDIA-XL) 60 MG (OSM) 24 hr tablet Take 1 tablet (60 mg total) by mouth once daily.            While in the hospital, will manage blood pressure as follows; Continue home antihypertensive regimen    Will utilize p.r.n. blood pressure medication only if patient's blood pressure greater than 180/110 and he develops symptoms such as worsening chest pain or shortness of breath.    ESRD needing dialysis  Admitted with uremic encephalopathy and hyperkalemia. Received emergent dialysis  - dialysis not able to be arranged as outpatient due to history of nonadherence--> this is a major issue. Social work checking again to see if they can arrange. Palliative care consulted  - Nephrology consulted      VTE Risk Mitigation (From admission, onward)           Ordered     IP VTE HIGH RISK PATIENT  Once         02/05/24 1733     Place sequential compression device  Until discontinued         02/05/24 1733     Reason for No Pharmacological VTE Prophylaxis  Once        Question:  Reasons:  Answer:  Already adequately anticoagulated on oral Anticoagulants    02/05/24 1733                    Discharge Planning   JAIME: 2/8/2024     Code Status: Full Code   Is the patient medically ready for discharge?:     Reason for patient still in hospital (select all that apply): Treatment and Consult recommendations  Discharge Plan A: Home Health   Discharge Delays: (!) Dialysis  Set-up              Terrance Joseph III, MD  Department of Hospital Medicine   [facility] - [place] - Med Surg

## 2024-02-17 NOTE — NURSING
Scheduled medications given without difficulty. Re external jugular IV saline lock. IV antibiotic given. Tele #[id number].  Pt remains free from fall/injury. Vitals assessed. No bleeding noted to right femoral or right subclav; dressings still intact. Pt remains on room air; no distress noted. Tele sitter remains at bedside. Safety measures maintained. Will cont to monitor

## 2024-02-17 NOTE — PROGRESS NOTES
Date of Admission:2/5/2024    SUBJECTIVE: denies fever   Notes feeling ok this am    Current Facility-Administered Medications   Medication    0.9%  NaCl infusion (for blood administration)    0.9%  NaCl infusion (for blood administration)    0.9%  NaCl infusion (for blood administration)    0.9%  NaCl infusion    0.9%  NaCl infusion    acetaminophen tablet 650 mg    albuterol-ipratropium 2.5 mg-0.5 mg/3 mL nebulizer solution 3 mL    allopurinoL tablet 100 mg    aluminum-magnesium hydroxide-simethicone 200-200-20 mg/5 mL suspension 30 mL    amiodarone tablet 200 mg    amLODIPine tablet 5 mg    atorvastatin tablet 80 mg    benztropine tablet 0.5 mg    calcitRIOL capsule 0.25 mcg    [START ON 2/19/2024] ceFEPIme (MAXIPIME) 1 g in dextrose 5 % in water (D5W) 100 mL IVPB (MB+)    ceFEPIme (MAXIPIME) 2 g in dextrose 5 % in water (D5W) 100 mL IVPB (MB+)    dextrose 10% bolus 125 mL 125 mL    dextrose 10% bolus 250 mL 250 mL    ferrous gluconate tablet 324 mg    glucagon (human recombinant) injection 1 mg    glucose chewable tablet 16 g    glucose chewable tablet 24 g    HYDROmorphone injection 0.5 mg    melatonin tablet 6 mg    metoprolol succinate (TOPROL-XL) 24 hr tablet 50 mg    naloxone 0.4 mg/mL injection 0.02 mg    ondansetron injection 4 mg    oxyCODONE immediate release tablet 10 mg    prochlorperazine injection Soln 5 mg    simethicone chewable tablet 80 mg    sodium chloride 0.9% bolus 250 mL 250 mL    sodium chloride 0.9% bolus 250 mL 250 mL    sodium chloride 0.9% flush 10 mL    tamsulosin 24 hr capsule 0.4 mg    vitamin renal formula (B-complex-vitamin c-folic acid) 1 mg per capsule 1 capsule       Wt Readings from Last 3 Encounters:   02/15/24 75.3 kg (166 lb 0.1 oz)   01/24/24 72.6 kg (160 lb)   01/17/24 93.3 kg (205 lb 11 oz)     Temp Readings from Last 3 Encounters:   02/17/24 98.3 °F (36.8 °C) (Oral)   02/01/24 98 °F (36.7 °C)   01/24/24 98.5 °F (36.9 °C) (Oral)     BP Readings from Last 3 Encounters:  "  02/17/24 138/79   02/01/24 120/72   01/24/24 123/64     Pulse Readings from Last 3 Encounters:   02/17/24 66   02/01/24 90   01/24/24 73       Intake/Output Summary (Last 24 hours) at 2/17/2024 1014  Last data filed at 2/16/2024 1500  Gross per 24 hour   Intake 315 ml   Output --   Net 315 ml       PE:  GEN:wd male in nad  HEENT:ncat,eomi,mm  CVS:s1s2 regular  PULM:ctab  ABD:bs,soft  EXT:no thigh edema  NEURO:awake  Tod in groin    No results for input(s): "GLU", "NA", "K", "CL", "CO2", "BUN", "CREATININE", "CALCIUM", "MG" in the last 24 hours.    Lab Results   Component Value Date    .6 (H) 02/07/2024    CALCIUM 7.1 (L) 02/16/2024    PHOS 2.3 (L) 02/11/2024       No results for input(s): "WBC", "RBC", "HGB", "HCT", "PLT", "MCV", "MCH", "MCHC" in the last 24 hours.        A/P:  1.esrd. hd MWF.  2.2nd hyperpar. [place] phos. Cont calcitrol.  3.htn. sbp ok.  4. Morganella bacteremia. Stopped zosyn. Changed to cefipime.  5.dm2. following sugars.  6.afib. sinus. Cont tx.  7.anemia 2nd to esrd. Cont tx. Iron def too. Cont tx.  "

## 2024-02-17 NOTE — NURSING
[facility]  Nurses Note -- 4 Eyes      2/17/2024       Skin assessed on: Q Shift      [] No Pressure Injuries Present    []Prevention Measures Documented    [x] Yes LDA  for Pressure Injury Previously documented     [] Yes New Pressure Injury Discovered   [] LDA for New Pressure Injury Added      Attending RN:  Ira Kiran, BETO     Second RN:  JAVIER Adair

## 2024-02-17 NOTE — ASSESSMENT & PLAN NOTE
Chronic, controlled. Latest blood pressure and vitals reviewed-     Temp:  [97.1 °F (36.2 °C)-99.4 °F (37.4 °C)]   Pulse:  [53-71]   Resp:  [15-18]   BP: (119-146)/(49-79)   SpO2:  [91 %-100 %] .   Home meds for hypertension were reviewed and noted below.   Hypertension Medications               amLODIPine (NORVASC) 10 MG tablet Take 10 mg by mouth once daily.    furosemide (LASIX) 20 MG tablet Take 20 mg by mouth 2 (two) times daily.    hydrALAZINE (APRESOLINE) 100 MG tablet Take 1 tablet (100 mg total) by mouth 3 (three) times daily.    metoprolol succinate (TOPROL-XL) 100 MG 24 hr tablet Take 100 mg by mouth once daily.    NIFEdipine (PROCARDIA-XL) 60 MG (OSM) 24 hr tablet Take 1 tablet (60 mg total) by mouth once daily.            While in the hospital, will manage blood pressure as follows; Continue home antihypertensive regimen    Will utilize p.r.n. blood pressure medication only if patient's blood pressure greater than 180/110 and he develops symptoms such as worsening chest pain or shortness of breath.

## 2024-02-17 NOTE — PT/OT/SLP PROGRESS
Physical Therapy Treatment    Patient Name:  Mahesh Cespedes   MRN:  [id number]    Recommendations:     Discharge Recommendations: Moderate Intensity Therapy  Discharge Equipment Recommendations: to be determined by next level of care  Barriers to discharge: None    Assessment:     Mahesh Cespedes is a 58 y.o. male admitted with a medical diagnosis of Bacteremia.  He presents with the following impairments/functional limitations: weakness, impaired endurance, impaired self care skills, impaired functional mobility, gait instability, impaired balance, decreased lower extremity function, decreased coordination, decreased safety awareness.    Rehab Prognosis: Good; patient would benefit from acute skilled PT services to address these deficits and reach maximum level of function.    Recent Surgery: Procedure(s) (LRB):  Removal, Vascular Access Catheter (Right)  INSERTION, CATHETER, TRIPLE LUMEN, HEMODIALYSIS, TEMPORARY (Right) 1 Day Post-Op    Plan:     During this hospitalization, patient to be seen  (5-6x/wk) to address the identified rehab impairments via gait training, therapeutic activities, therapeutic exercises, neuromuscular re-education, wheelchair management/training and progress toward the following goals:    Plan of Care Expires:  02/23/24    Subjective     Chief Complaint: Pt with no complaints or concerns at this time.   Patient/Family Comments/goals: Pt agreeable to PT treatment.   Pain/Comfort:  Pain Rating 1: 0/10      Objective:     Patient found HOB elevated with peripheral IV, telemetry upon PT entry to room.     General Precautions: Standard, fall  Orthopedic Precautions: N/A  Braces: N/A       Functional Mobility:  Bed Mobility:     Supine to Sit: contact guard assistance  Sit to Supine: contact guard assistance  Transfers:     Sit to Stand:  minimum assistance with rolling walker  Gait: Pt was able to perform lateral stepping along EOB with RW, min assist requiring moderate verbal as well as hand over  hand cueing on proper RW placement as well as LE step sequencing.    Balance: Pt with good sitting and fair - standing balance.       AM-PAC 6 CLICK MOBILITY  Turning over in bed (including adjusting bedclothes, sheets and blankets)?: 4  Sitting down on and standing up from a chair with arms (e.g., wheelchair, bedside commode, etc.): 3  Moving from lying on back to sitting on the side of the bed?: 3  Moving to and from a bed to a chair (including a wheelchair)?: 2  Need to walk in hospital room?: 2  Climbing 3-5 steps with a railing?: 1  Basic Mobility Total Score: 15       Treatment & Education:      Patient left HOB elevated with all lines intact, call button in reach, and bed alarm on..    GOALS:   Multidisciplinary Problems       Physical Therapy Goals          Problem: Physical Therapy    Goal Priority Disciplines Outcome Goal Variances Interventions   Physical Therapy Goal     PT, PT/OT Ongoing, Progressing     Description: Goals to be met by: 24     Patient will increase functional independence with mobility by performin. Supine to sit with Stand-by Assistance  2. Sit to stand transfer with Contact Guard Assistance  3. Bed to chair transfer with Contact Guard Assistance    4. Gait  x 10-15 feet with Minimal Assistance using Rolling Walker.   5. Wheelchair propulsion x50 feet with Stand-by Assistance    6. Lower extremity exercise program x10 reps per handout, with supervision                         Time Tracking:     PT Received On: 24  PT Start Time: 1316     PT Stop Time: 1331  PT Total Time (min): 15 min     Billable Minutes: Therapeutic Activity 15    Treatment Type: Treatment  PT/PTA: PTA     Number of PTA visits since last PT visit: 3     2024

## 2024-02-17 NOTE — ASSESSMENT & PLAN NOTE
Patient with Paroxysmal (<7 days) atrial fibrillation which is controlled currently with Amiodarone. Patient is currently in sinus rhythm.[id number] Score: 1.  Anticoagulation indicated. Anticoagulation done with apixaban .

## 2024-02-17 NOTE — SUBJECTIVE & OBJECTIVE
Interval History: Pt states he is doing fine. No noted bleeding overnight.    Review of Systems  Objective:     Vital Signs (Most Recent):  Temp: 98.3 °F (36.8 °C) (02/17/24 0726)  Pulse: 66 (02/17/24 0800)  Resp: 16 (02/17/24 0726)  BP: 138/79 (02/17/24 0726)  SpO2: (!) 93 % (02/17/24 0726) Vital Signs (24h Range):  Temp:  [97.1 °F (36.2 °C)-99.4 °F (37.4 °C)] 98.3 °F (36.8 °C)  Pulse:  [53-71] 66  Resp:  [15-18] 16  SpO2:  [91 %-100 %] 93 %  BP: (119-146)/(49-79) 138/79     Weight: 75.3 kg (166 lb 0.1 oz)  Body mass index is 26 kg/m².    Intake/Output Summary (Last 24 hours) at 2/17/2024 1055  Last data filed at 2/17/2024 0900  Gross per 24 hour   Intake 555 ml   Output --   Net 555 ml         Physical Exam    Constitutional:       General: He is not in acute distress.     Appearance: He is ill-appearing. He is not toxic-appearing.   HENT:      Head: Normocephalic and atraumatic.   Cardiovascular:      Rate and Rhythm: Normal rate and regular rhythm.   Pulmonary:      Effort: Pulmonary effort is normal.      Breath sounds: Normal breath sounds.   Abdominal:      General: Bowel sounds are normal. There is no distension.      Tenderness: There is no abdominal tenderness. There is no guarding or rebound.   Musculoskeletal:      Right lower leg: No edema.      Left lower leg: No edema.   Skin:     General: Skin is warm and dry.   Neurological:      Mental Status: He is alert.     Significant Labs: All pertinent labs within the past 24 hours have been reviewed.    Significant Imaging: I have reviewed all pertinent imaging results/findings within the past 24 hours.

## 2024-02-18 PROCEDURE — 25000003 PHARM REV CODE 250: Performed by: HOSPITALIST

## 2024-02-18 PROCEDURE — 36415 COLL VENOUS BLD VENIPUNCTURE: CPT | Performed by: INTERNAL MEDICINE

## 2024-02-18 PROCEDURE — 25000003 PHARM REV CODE 250

## 2024-02-18 PROCEDURE — 94761 N-INVAS EAR/PLS OXIMETRY MLT: CPT

## 2024-02-18 PROCEDURE — 99900035 HC TECH TIME PER 15 MIN (STAT)

## 2024-02-18 PROCEDURE — 25000003 PHARM REV CODE 250: Performed by: INTERNAL MEDICINE

## 2024-02-18 PROCEDURE — 80048 BASIC METABOLIC PNL TOTAL CA: CPT | Performed by: INTERNAL MEDICINE

## 2024-02-18 PROCEDURE — 11000001 HC ACUTE MED/SURG PRIVATE ROOM

## 2024-02-18 RX ADMIN — ATORVASTATIN CALCIUM 80 MG: 40 TABLET, FILM COATED ORAL at 08:02

## 2024-02-18 RX ADMIN — TAMSULOSIN HYDROCHLORIDE 0.4 MG: 0.4 CAPSULE ORAL at 08:02

## 2024-02-18 RX ADMIN — BENZTROPINE MESYLATE 0.5 MG: 0.5 TABLET ORAL at 08:02

## 2024-02-18 RX ADMIN — METOPROLOL SUCCINATE 50 MG: 50 TABLET, EXTENDED RELEASE ORAL at 08:02

## 2024-02-18 RX ADMIN — AMIODARONE HYDROCHLORIDE 200 MG: 200 TABLET ORAL at 08:02

## 2024-02-18 RX ADMIN — OXYCODONE 10 MG: 5 TABLET ORAL at 08:02

## 2024-02-18 RX ADMIN — ALLOPURINOL 100 MG: 100 TABLET ORAL at 08:02

## 2024-02-18 RX ADMIN — AMLODIPINE BESYLATE 5 MG: 5 TABLET ORAL at 08:02

## 2024-02-18 RX ADMIN — CALCITRIOL CAPSULES 0.25 MCG 0.25 MCG: 0.25 CAPSULE ORAL at 08:02

## 2024-02-18 RX ADMIN — NEPHROCAP 1 CAPSULE: 1 CAP ORAL at 08:02

## 2024-02-18 RX ADMIN — OXYCODONE 10 MG: 5 TABLET ORAL at 09:02

## 2024-02-18 RX ADMIN — Medication 324 MG: at 04:02

## 2024-02-18 RX ADMIN — Medication 324 MG: at 08:02

## 2024-02-18 NOTE — PROGRESS NOTES
[facility] Medicine  Progress Note    Patient Name: Mahesh Cespedes  MRN: [id number]  Patient Class: IP- Inpatient   Admission Date: 2/5/2024  Length of Stay: 12 days  Attending Physician: Terrance Joseph III, MD  Primary Care Provider: Cruz Hernandez MD        Subjective:     Principal Problem:Bacteremia        HPI:  58 y.o. male with AFib, DM2, ESRD on dialysis, hypertension presents from home with a complaint of altered mental status.  Family report he has been drowsy and fatigued with increased confusion for the past 2 days.  Has been progressively worsening.  Associated with significant peripheral edema.  Last dialysis was 2 weeks ago.  Denies fever, chills, cough, SOB, chest pain, palpitations, nausea, vomiting, diarrhea, or abdominal pain.  History is somewhat limited given the patient's drowsiness but he is awake and conversant.    In the ED, labs revealed hyperkalemia, K + 7.4.  He was given shifting medications and zirconium.  Nephrology was consulted and plan for urgent dialysis.  Labs also reveal uremia, leukocytosis, hyperphosphatemia, and metabolic acidosis.  Placed in observation to obtain dialysis.    Overview/Hospital Course:  58 y.o. man with ESRD but without outpatient dialysis set up who was admitted with uremic encephalopathy, hyperkalemia. Nephrology consulted and he received emergent dialysis. Mental status is improving. He has sacral wound with abscess. Started antibiotics. Blood cultures with gram variable rods. General surgery planning OR debridement on 2/8/24. S/P debridement with bone biopsy.  Morganella bacteremia.  Bone cultures also growing Morganella and Klebsiella.  ID consulted.  Recommending tunneled HD catheter removal.  IR consulted.  SW/CM consulted for outpatient dialysis arrangements.    Interval History: Pt feeling fine. Awaiting tunneled line placement.     Review of Systems  Objective:     Vital Signs (Most Recent):  Temp: 97.9 °F (36.6 °C) (02/18/24  0735)  Pulse: 67 (02/18/24 0735)  Resp: 16 (02/18/24 0958)  BP: (!) 149/81 (02/18/24 0735)  SpO2: 95 % (02/18/24 0834) Vital Signs (24h Range):  Temp:  [97.7 °F (36.5 °C)-98.7 °F (37.1 °C)] 97.9 °F (36.6 °C)  Pulse:  [55-88] 67  Resp:  [16-18] 16  SpO2:  [93 %-96 %] 95 %  BP: (136-169)/(64-85) 149/81     Weight: 75.3 kg (166 lb 0.1 oz)  Body mass index is 26 kg/m².    Intake/Output Summary (Last 24 hours) at 2/18/2024 1018  Last data filed at 2/17/2024 1740  Gross per 24 hour   Intake 360 ml   Output --   Net 360 ml         Physical Exam    Constitutional:       General: He is not in acute distress.     Appearance: He is ill-appearing. He is not toxic-appearing.   HENT:      Head: Normocephalic and atraumatic.   Cardiovascular:      Rate and Rhythm: Normal rate and regular rhythm.   Pulmonary:      Effort: Pulmonary effort is normal.      Breath sounds: Normal breath sounds.   Abdominal:      General: Bowel sounds are normal. There is no distension.      Tenderness: There is no abdominal tenderness. There is no guarding or rebound.   Musculoskeletal:      Right lower leg: No edema.      Left lower leg: No edema.   Skin:     General: Skin is warm and dry.   Neurological:      Mental Status: He is alert.     Significant Labs: All pertinent labs within the past 24 hours have been reviewed.    Significant Imaging: I have reviewed all pertinent imaging results/findings within the past 24 hours.    Assessment/Plan:      * Bacteremia  Blood culture 2/6/24 with gram variable rods.   Morganella bacteremia.  Sacral osteomyelitis as source.  Stop Vanc.  Continued Zosyn.  Repeat BCx negative so far.  ID consulted.  Changed to Cefepime.  Can do ABx's with HD on discharge.  Recommending changing tunneled HD catheter. Pt with bleeding from catheter site overnight after attempted removal resulting blood loss. IR consulted this am and stitched site with control of bleeding. Pending vascular eval for removal.  -removed 2/16 will give  line holiday until 2/20 per nephrology recs    Physical debility  PT/OT consulted.  Currently recommending moderate intensity therapy.  SW/CM did send LTAC referrals. And pt accepted pending line holiday completion    Pressure injury of sacral region, unstageable  Wound care consulted       Gluteal abscess  Noted on CT. With leukocytosis  - started vanc, zosyn-- continue  - General surgery consulted- to OR.  - wound care consulted  S/P operative debridement 2/8 with bone biopsy obtained given exposed coccyx    Bone cultures growing morganella and klebsiella.      Hyperphosphatemia  Secondary to ESRD.    Uremic encephalopathy   with acute encephalopathy.  Nephrology consulted and received emergent HD. Mental status now seems at baseline.    Paroxysmal atrial fibrillation  Patient with Paroxysmal (<7 days) atrial fibrillation which is controlled currently with Amiodarone. Patient is currently in sinus rhythm.[id number] Score: 1.  Anticoagulation indicated. Anticoagulation done with apixaban .    Goals of care, counseling/discussion  Advance Care Planning  Palliative care consulted.         Anemia in ESRD (end-stage renal disease)  Patient's anemia is currently controlled.  Etiology likely d/t chronic disease due to Chronic Kidney Disease/ESRD  Current CBC reviewed-   Lab Results   Component Value Date    HGB 7.1 (L) 02/16/2024    HCT 22.8 (L) 02/16/2024     Monitor serial CBC and transfuse if patient becomes hemodynamically unstable, symptomatic or H/H drops below 7/21.  - EPO started by Nephrology   Pt with bleeding from catheter site overnight after attempted removal resulting blood loss. IR consulted this am and stitched site with control of bleeding. Pending vascular eval for removal. Hgb back down to 6.9. 1 unit prbc ordered. Eliquis held.  -hgb stable for now will continue to monitor      Essential hypertension  Chronic, controlled. Latest blood pressure and vitals reviewed-     Temp:  [97.7 °F (36.5  °C)-98.7 °F (37.1 °C)]   Pulse:  [55-88]   Resp:  [16-18]   BP: (136-169)/(64-85)   SpO2:  [93 %-96 %] .   Home meds for hypertension were reviewed and noted below.   Hypertension Medications               amLODIPine (NORVASC) 10 MG tablet Take 10 mg by mouth once daily.    furosemide (LASIX) 20 MG tablet Take 20 mg by mouth 2 (two) times daily.    hydrALAZINE (APRESOLINE) 100 MG tablet Take 1 tablet (100 mg total) by mouth 3 (three) times daily.    metoprolol succinate (TOPROL-XL) 100 MG 24 hr tablet Take 100 mg by mouth once daily.    NIFEdipine (PROCARDIA-XL) 60 MG (OSM) 24 hr tablet Take 1 tablet (60 mg total) by mouth once daily.            While in the hospital, will manage blood pressure as follows; Continue home antihypertensive regimen    Will utilize p.r.n. blood pressure medication only if patient's blood pressure greater than 180/110 and he develops symptoms such as worsening chest pain or shortness of breath.    ESRD needing dialysis  Admitted with uremic encephalopathy and hyperkalemia. Received emergent dialysis  - dialysis not able to be arranged as outpatient due to history of nonadherence--> this is a major issue. Social work checking again to see if they can arrange. Palliative care consulted  - Nephrology consulted      VTE Risk Mitigation (From admission, onward)           Ordered     IP VTE HIGH RISK PATIENT  Once         02/05/24 1733     Place sequential compression device  Until discontinued         02/05/24 1733     Reason for No Pharmacological VTE Prophylaxis  Once        Question:  Reasons:  Answer:  Already adequately anticoagulated on oral Anticoagulants    02/05/24 1733                    Discharge Planning   JAIME: 2/8/2024     Code Status: Full Code   Is the patient medically ready for discharge?:     Reason for patient still in hospital (select all that apply): Consult recommendations  Discharge Plan A: Home Health   Discharge Delays: (!) Dialysis Set-up              Terrance Joseph  III, MD  Department of Hospital Medicine   [facility] Surg

## 2024-02-18 NOTE — ASSESSMENT & PLAN NOTE
Patient with Paroxysmal (<7 days) atrial fibrillation which is controlled currently with Amiodarone. Patient is currently in sinus rhythm.[id number] Score: 1.  Anticoagulation indicated. Anticoagulation done with apixaban .

## 2024-02-18 NOTE — ASSESSMENT & PLAN NOTE
Chronic, controlled. Latest blood pressure and vitals reviewed-     Temp:  [97.7 °F (36.5 °C)-98.7 °F (37.1 °C)]   Pulse:  [55-88]   Resp:  [16-18]   BP: (136-169)/(64-85)   SpO2:  [93 %-96 %] .   Home meds for hypertension were reviewed and noted below.   Hypertension Medications               amLODIPine (NORVASC) 10 MG tablet Take 10 mg by mouth once daily.    furosemide (LASIX) 20 MG tablet Take 20 mg by mouth 2 (two) times daily.    hydrALAZINE (APRESOLINE) 100 MG tablet Take 1 tablet (100 mg total) by mouth 3 (three) times daily.    metoprolol succinate (TOPROL-XL) 100 MG 24 hr tablet Take 100 mg by mouth once daily.    NIFEdipine (PROCARDIA-XL) 60 MG (OSM) 24 hr tablet Take 1 tablet (60 mg total) by mouth once daily.            While in the hospital, will manage blood pressure as follows; Continue home antihypertensive regimen    Will utilize p.r.n. blood pressure medication only if patient's blood pressure greater than 180/110 and he develops symptoms such as worsening chest pain or shortness of breath.

## 2024-02-18 NOTE — OP NOTE
[facility] Surg  Operative Note    SUMMARY     Surgery Date: 2/16/2024     Surgeon(s) and Role:     * Gopal Rico MD - Primary    Assisting Surgeon: None    Pre-op Diagnosis:  ESRD needing dialysis [N18.6, Z99.2] bacteremia and sepsis with indwelling right jugular dialysis catheter    Post-op Diagnosis:  Post-Op Diagnosis Codes:     * ESRD needing dialysis [N18.6, Z99.2]      Procedure(s) (LRB):  Removal, Vascular Access Catheter (Right)  INSERTION, CATHETER, TRIPLE LUMEN, HEMODIALYSIS, TEMPORARY (Right)    Anesthesia: Monitor Anesthesia Care    Implants:  Implant Name Type Inv. Item Serial No.  Lot No. LRB No. Used Action   TRAY CATH PWR TRIALYSIS 20CM - [id number]  TRAY CATH PWR TRIALYSIS 20CM  [employer]  Right 1 Implanted       Operative Findings: Hematoma and scarred in catheter in right neck (internal jugular tunneled catheter), small incision made and catheter removed in entirety and sent for culture.  No gross purulence.  Wound irrigated with betadyne saline, packed with surgicel and thrombin spray.  Loosely approximated with 2-0 nylon sutures.       New Trialysis HD catheter placed in R fem vein (20 cm length).  Ready for HD use. 2 sets of Bcx sent     Coagulopathic bleeding, requiring TXA , DDAVP, and 1 hour+  manual pressure to achieve hemostasis. Pressure dressing applied.  Keep patient sitting upright , at least 45 deg    Estimated Blood Loss: 125 mL    Estimated Blood Loss has been documented.         Specimens:   Specimen (24h ago, onward)      None            [id number]    Tunneled Central Venous Catheter Removal &  New non tunneled central venous catheter placement  Vascular Surgery          Procedure in detail:  The patient was placed supine on the procedure table.  A time out was performed.  The patient was prepped and draped in sterile fashion. 2% Lidocaine with Epinephrine was used for local anesthetic. A blunt hemostat was used to dissect the exit sheath and  fibrin sheath from the catheter cuff.  There was noted to be significant fibrotic adhesions, and significant bleeding with hematoma from the previous removal attempt by intervention Radiology.  Given the concern for infection, and the large hematoma present.  I elected to make a small incision over the catheter, in order to open the space debride the hematoma and free the catheter from the fibrotic tissue in order for removal.  After the cuff was freed, the catheter was pulled and pressure was applied to the insertion site in the right IJ above the clavicle.  The catheter was removed in entirety, and a portion of the indwelling catheter was sent for culture.  There was significant bleeding scar tissue in the woundd, which I attempted to control with electrocautery.  However it became quite clear that this was coagulopathic bleeding.  The wound was packed with Surgicel, and thrombin spray.  The skin was then reapproximated with interrupted 2-0 nylon vertical mattress stitches.  A small opening was left in the inferior portion of the wound to allow drainage.  Manual pressure was held for proximally 1 hour to achieve hemostasis, well TXA and DDAVP were infused to help correct the patient's coagulopathy.   Once hemostasis was achieved, a pressure dressing was applied.    Using new sterile gloves and instruments, the left femoral vein was evaluated with ultrasound.  This area had been prepped and draped in the normal sterile fashion planned temporary Trialysis catheter placement.  Using a micropuncture needle wire, the right common femoral vein was accessed under direct visualization using ultrasound guidance.  A 20 cm length Trialysis catheter kit was brought to the field and prepped in the normal sterile fashion.  Using the J wire with the kit the common femoral vein was then serially dilated with the case dilators, and then the Trialysis catheter was placed over the wire.  Wire was removed.  An each port was aspirated  and flushed with heparinized saline.  Hep lock caps were applied.  There was good aspiration easy flushing.  Catheter was secured in place with 3-0 nylon suture.  A pursestring was placed around the catheter entered the skin to control some minor oozing.  The catheter and skin was again prepped with a alcohol containing prep stick, allowed to dry, and dressed with a sterile central line dressing.    All instrument counts were correct at the end of the case.  I was scrubbed for the entirety of the case.  Patient was transferred to the PACU in stable condition    Complications/Comments:  None    Disposition:  PACU in stable condition

## 2024-02-18 NOTE — NURSING
[facility] [place]  Nurses Note -- 4 Eyes      2/17/2024       Skin assessed on: Q Shift      [] No Pressure Injuries Present    []Prevention Measures Documented    [] Yes LDA  for Pressure Injury Previously documented     [x] Yes New Pressure Injury Discovered   [] LDA for New Pressure Injury Added      Attending RN:  Natasha Valencia RN     Second RN:  Ira Kiran RN

## 2024-02-18 NOTE — SUBJECTIVE & OBJECTIVE
Interval History: Pt feeling fine. Awaiting tunneled line placement.     Review of Systems  Objective:     Vital Signs (Most Recent):  Temp: 97.9 °F (36.6 °C) (02/18/24 0735)  Pulse: 67 (02/18/24 0735)  Resp: 16 (02/18/24 0958)  BP: (!) 149/81 (02/18/24 0735)  SpO2: 95 % (02/18/24 0834) Vital Signs (24h Range):  Temp:  [97.7 °F (36.5 °C)-98.7 °F (37.1 °C)] 97.9 °F (36.6 °C)  Pulse:  [55-88] 67  Resp:  [16-18] 16  SpO2:  [93 %-96 %] 95 %  BP: (136-169)/(64-85) 149/81     Weight: 75.3 kg (166 lb 0.1 oz)  Body mass index is 26 kg/m².    Intake/Output Summary (Last 24 hours) at 2/18/2024 1018  Last data filed at 2/17/2024 1740  Gross per 24 hour   Intake 360 ml   Output --   Net 360 ml         Physical Exam    Constitutional:       General: He is not in acute distress.     Appearance: He is ill-appearing. He is not toxic-appearing.   HENT:      Head: Normocephalic and atraumatic.   Cardiovascular:      Rate and Rhythm: Normal rate and regular rhythm.   Pulmonary:      Effort: Pulmonary effort is normal.      Breath sounds: Normal breath sounds.   Abdominal:      General: Bowel sounds are normal. There is no distension.      Tenderness: There is no abdominal tenderness. There is no guarding or rebound.   Musculoskeletal:      Right lower leg: No edema.      Left lower leg: No edema.   Skin:     General: Skin is warm and dry.   Neurological:      Mental Status: He is alert.     Significant Labs: All pertinent labs within the past 24 hours have been reviewed.    Significant Imaging: I have reviewed all pertinent imaging results/findings within the past 24 hours.

## 2024-02-18 NOTE — PROGRESS NOTES
Date of Admission:2/5/2024    SUBJECTIVE: no fever  Cx ngtd  Current Facility-Administered Medications   Medication    0.9%  NaCl infusion (for blood administration)    0.9%  NaCl infusion (for blood administration)    0.9%  NaCl infusion (for blood administration)    0.9%  NaCl infusion    acetaminophen tablet 650 mg    albuterol-ipratropium 2.5 mg-0.5 mg/3 mL nebulizer solution 3 mL    allopurinoL tablet 100 mg    aluminum-magnesium hydroxide-simethicone 200-200-20 mg/5 mL suspension 30 mL    amiodarone tablet 200 mg    amLODIPine tablet 5 mg    atorvastatin tablet 80 mg    benztropine tablet 0.5 mg    calcitRIOL capsule 0.25 mcg    [START ON 2/19/2024] ceFEPIme (MAXIPIME) 1.5 g in dextrose 5 % (D5W) 100 mL IVPB    [START ON 2/21/2024] ceFEPIme (MAXIPIME) 1.5 g in dextrose 5 % (D5W) 100 mL IVPB    [START ON 2/23/2024] ceFEPIme (MAXIPIME) 2 g in dextrose 5 % in water (D5W) 100 mL IVPB (MB+)    dextrose 10% bolus 125 mL 125 mL    dextrose 10% bolus 250 mL 250 mL    [START ON 2/19/2024] epoetin luli injection 7,500 Units    ferrous gluconate tablet 324 mg    glucagon (human recombinant) injection 1 mg    glucose chewable tablet 16 g    glucose chewable tablet 24 g    HYDROmorphone injection 0.5 mg    melatonin tablet 6 mg    metoprolol succinate (TOPROL-XL) 24 hr tablet 50 mg    naloxone 0.4 mg/mL injection 0.02 mg    ondansetron injection 4 mg    oxyCODONE immediate release tablet 10 mg    prochlorperazine injection Soln 5 mg    simethicone chewable tablet 80 mg    sodium chloride 0.9% bolus 250 mL 250 mL    sodium chloride 0.9% bolus 250 mL 250 mL    sodium chloride 0.9% flush 10 mL    tamsulosin 24 hr capsule 0.4 mg    vitamin renal formula (B-complex-vitamin c-folic acid) 1 mg per capsule 1 capsule       Wt Readings from Last 3 Encounters:   02/15/24 75.3 kg (166 lb 0.1 oz)   01/24/24 72.6 kg (160 lb)   01/17/24 93.3 kg (205 lb 11 oz)     Temp Readings from Last 3 Encounters:   02/18/24 97.8 °F (36.6 °C) (Oral)  "  02/01/24 98 °F (36.7 °C)   01/24/24 98.5 °F (36.9 °C) (Oral)     BP Readings from Last 3 Encounters:   02/18/24 (!) 164/83   02/01/24 120/72   01/24/24 123/64     Pulse Readings from Last 3 Encounters:   02/18/24 68   02/01/24 90   01/24/24 73       Intake/Output Summary (Last 24 hours) at 2/18/2024 1515  Last data filed at 2/18/2024 1330  Gross per 24 hour   Intake 360 ml   Output --   Net 360 ml         PE:  GEN:wd male in nad  HEENT:ncat,eomi,mm  CVS:s1s2 regular  PULM:ctab  ABD:bs,soft  EXT:no thigh edema  NEURO:awake  Tod in groin    No results for input(s): "GLU", "NA", "K", "CL", "CO2", "BUN", "CREATININE", "CALCIUM", "MG" in the last 24 hours.    Lab Results   Component Value Date    .6 (H) 02/07/2024    CALCIUM 7.1 (L) 02/16/2024    PHOS 2.3 (L) 02/11/2024       No results for input(s): "WBC", "RBC", "HGB", "HCT", "PLT", "MCV", "MCH", "MCHC" in the last 24 hours.        A/P:  1.esrd. hd MWF. Hd in am. Pc planned for Tuesday.  2.2nd hyperpar. Ana Rosa phos. Cont calcitrol.  3.htn. sbp ok.  4. Morganella bacteremia. Stopped zosyn. Changed to cefipime.  5.dm2. following sugars.  6.afib. sinus. Cont tx.  7.anemia 2nd to esrd. Cont tx. Iron def too. Cont tx. Add epo.  "

## 2024-02-18 NOTE — PLAN OF CARE
Problem: Skin Injury Risk Increased  Goal: Skin Health and Integrity  Outcome: Ongoing, Progressing  Intervention: Optimize Skin Protection  Flowsheets (Taken 2/18/2024 0515)  Skin Protection:   adhesive use limited   transparent dressing maintained     Problem: Adult Inpatient Plan of Care  Goal: Absence of Hospital-Acquired Illness or Injury  Outcome: Ongoing, Progressing  Intervention: Prevent Skin Injury  Flowsheets (Taken 2/18/2024 0515)  Body Position: position changed independently  Skin Protection:   adhesive use limited   transparent dressing maintained  Intervention: Prevent Infection  Flowsheets (Taken 2/18/2024 0515)  Infection Prevention:   hand hygiene promoted   single patient room provided   rest/sleep promoted     Problem: Infection  Goal: Absence of Infection Signs and Symptoms  Outcome: Ongoing, Progressing  Intervention: Prevent or Manage Infection  Flowsheets (Taken 2/18/2024 0515)  Infection Management: aseptic technique maintained

## 2024-02-19 LAB
ANION GAP SERPL CALC-SCNC: 15 MMOL/L (ref 8–16)
BACTERIA SPEC AEROBE CULT: ABNORMAL
BLD PROD TYP BPU: NORMAL
BLOOD UNIT EXPIRATION DATE: NORMAL
BLOOD UNIT TYPE CODE: 5100
BLOOD UNIT TYPE: NORMAL
BUN SERPL-MCNC: 61 MG/DL (ref 6–20)
CALCIUM SERPL-MCNC: 7.4 MG/DL (ref 8.7–10.5)
CHLORIDE SERPL-SCNC: 101 MMOL/L (ref 95–110)
CO2 SERPL-SCNC: 22 MMOL/L (ref 23–29)
CODING SYSTEM: NORMAL
CREAT SERPL-MCNC: 10.5 MG/DL (ref 0.5–1.4)
CROSSMATCH INTERPRETATION: NORMAL
DISPENSE STATUS: NORMAL
EST. GFR  (NO RACE VARIABLE): 5 ML/MIN/1.73 M^2
GLUCOSE SERPL-MCNC: 103 MG/DL (ref 70–110)
PHOSPHATE SERPL-MCNC: 9.1 MG/DL (ref 2.7–4.5)
POTASSIUM SERPL-SCNC: 4.2 MMOL/L (ref 3.5–5.1)
SODIUM SERPL-SCNC: 138 MMOL/L (ref 136–145)
TRANS ERYTHROCYTES VOL PATIENT: NORMAL ML

## 2024-02-19 PROCEDURE — 25000003 PHARM REV CODE 250

## 2024-02-19 PROCEDURE — 25000003 PHARM REV CODE 250: Performed by: INTERNAL MEDICINE

## 2024-02-19 PROCEDURE — 36415 COLL VENOUS BLD VENIPUNCTURE: CPT | Performed by: INTERNAL MEDICINE

## 2024-02-19 PROCEDURE — 63600175 PHARM REV CODE 636 W HCPCS: Performed by: STUDENT IN AN ORGANIZED HEALTH CARE EDUCATION/TRAINING PROGRAM

## 2024-02-19 PROCEDURE — 97530 THERAPEUTIC ACTIVITIES: CPT

## 2024-02-19 PROCEDURE — 84100 ASSAY OF PHOSPHORUS: CPT | Performed by: INTERNAL MEDICINE

## 2024-02-19 PROCEDURE — 25000003 PHARM REV CODE 250: Performed by: HOSPITALIST

## 2024-02-19 PROCEDURE — 99232 SBSQ HOSP IP/OBS MODERATE 35: CPT | Mod: ,,, | Performed by: NURSE PRACTITIONER

## 2024-02-19 PROCEDURE — 63600175 PHARM REV CODE 636 W HCPCS: Mod: JZ,JG | Performed by: INTERNAL MEDICINE

## 2024-02-19 PROCEDURE — 63600175 PHARM REV CODE 636 W HCPCS

## 2024-02-19 PROCEDURE — 25000003 PHARM REV CODE 250: Performed by: STUDENT IN AN ORGANIZED HEALTH CARE EDUCATION/TRAINING PROGRAM

## 2024-02-19 PROCEDURE — 11000001 HC ACUTE MED/SURG PRIVATE ROOM

## 2024-02-19 PROCEDURE — 90935 HEMODIALYSIS ONE EVALUATION: CPT

## 2024-02-19 RX ORDER — SEVELAMER CARBONATE 800 MG/1
1600 TABLET, FILM COATED ORAL
Status: DISCONTINUED | OUTPATIENT
Start: 2024-02-19 | End: 2024-02-22 | Stop reason: HOSPADM

## 2024-02-19 RX ADMIN — Medication 324 MG: at 04:02

## 2024-02-19 RX ADMIN — SEVELAMER CARBONATE 1600 MG: 800 TABLET, FILM COATED ORAL at 04:02

## 2024-02-19 RX ADMIN — ATORVASTATIN CALCIUM 80 MG: 40 TABLET, FILM COATED ORAL at 08:02

## 2024-02-19 RX ADMIN — AMLODIPINE BESYLATE 5 MG: 5 TABLET ORAL at 08:02

## 2024-02-19 RX ADMIN — AMIODARONE HYDROCHLORIDE 200 MG: 200 TABLET ORAL at 08:02

## 2024-02-19 RX ADMIN — TAMSULOSIN HYDROCHLORIDE 0.4 MG: 0.4 CAPSULE ORAL at 08:02

## 2024-02-19 RX ADMIN — Medication 6 MG: at 08:02

## 2024-02-19 RX ADMIN — CALCITRIOL CAPSULES 0.25 MCG 0.25 MCG: 0.25 CAPSULE ORAL at 08:02

## 2024-02-19 RX ADMIN — NEPHROCAP 1 CAPSULE: 1 CAP ORAL at 08:02

## 2024-02-19 RX ADMIN — CEFEPIME 1.5 G: 2 INJECTION, POWDER, FOR SOLUTION INTRAVENOUS at 12:02

## 2024-02-19 RX ADMIN — BENZTROPINE MESYLATE 0.5 MG: 0.5 TABLET ORAL at 08:02

## 2024-02-19 RX ADMIN — ALLOPURINOL 100 MG: 100 TABLET ORAL at 08:02

## 2024-02-19 RX ADMIN — HYDROMORPHONE HYDROCHLORIDE 0.5 MG: 1 INJECTION, SOLUTION INTRAMUSCULAR; INTRAVENOUS; SUBCUTANEOUS at 08:02

## 2024-02-19 RX ADMIN — EPOETIN ALFA-EPBX 7500 UNITS: 10000 INJECTION, SOLUTION INTRAVENOUS; SUBCUTANEOUS at 09:02

## 2024-02-19 RX ADMIN — OXYCODONE 10 MG: 5 TABLET ORAL at 08:02

## 2024-02-19 RX ADMIN — OXYCODONE 10 MG: 5 TABLET ORAL at 04:02

## 2024-02-19 RX ADMIN — SEVELAMER CARBONATE 1600 MG: 800 TABLET, FILM COATED ORAL at 03:02

## 2024-02-19 RX ADMIN — Medication 324 MG: at 08:02

## 2024-02-19 NOTE — PROGRESS NOTES
[facility] Medicine  Progress Note    Patient Name: Mahesh Cespedes  MRN: [id number]  Patient Class: IP- Inpatient   Admission Date: 2/5/2024  Length of Stay: 13 days  Attending Physician: Terrance Joseph III, MD  Primary Care Provider: Cruz Hernandez MD        Subjective:     Principal Problem:Bacteremia        HPI:  58 y.o. male with AFib, DM2, ESRD on dialysis, hypertension presents from home with a complaint of altered mental status.  Family report he has been drowsy and fatigued with increased confusion for the past 2 days.  Has been progressively worsening.  Associated with significant peripheral edema.  Last dialysis was 2 weeks ago.  Denies fever, chills, cough, SOB, chest pain, palpitations, nausea, vomiting, diarrhea, or abdominal pain.  History is somewhat limited given the patient's drowsiness but he is awake and conversant.    In the ED, labs revealed hyperkalemia, K + 7.4.  He was given shifting medications and zirconium.  Nephrology was consulted and plan for urgent dialysis.  Labs also reveal uremia, leukocytosis, hyperphosphatemia, and metabolic acidosis.  Placed in observation to obtain dialysis.    Overview/Hospital Course:  58 y.o. man with ESRD but without outpatient dialysis set up who was admitted with uremic encephalopathy, hyperkalemia. Nephrology consulted and he received emergent dialysis. Mental status is improving. He has sacral wound with abscess. Started antibiotics. Blood cultures with gram variable rods. General surgery planning OR debridement on 2/8/24. S/P debridement with bone biopsy.  Morganella bacteremia.  Bone cultures also growing Morganella and Klebsiella.  ID consulted.  Recommending tunneled HD catheter removal.  IR consulted.  SW/CM consulted for outpatient dialysis arrangements.    Interval History: Pt states he is feeling fine. No fever or chills at this time. Asking for something to drink    Review of Systems  Objective:     Vital Signs (Most  Recent):  Temp: 98.5 °F (36.9 °C) (02/19/24 0705)  Pulse: 68 (02/19/24 0718)  Resp: 18 (02/19/24 0816)  BP: 129/71 (02/19/24 0705)  SpO2: 95 % (02/19/24 0705) Vital Signs (24h Range):  Temp:  [97.8 °F (36.6 °C)-99 °F (37.2 °C)] 98.5 °F (36.9 °C)  Pulse:  [60-70] 68  Resp:  [16-20] 18  SpO2:  [91 %-95 %] 95 %  BP: (129-212)/(71-95) 129/71     Weight: 75.3 kg (166 lb 0.1 oz)  Body mass index is 26 kg/m².    Intake/Output Summary (Last 24 hours) at 2/19/2024 0945  Last data filed at 2/18/2024 1740  Gross per 24 hour   Intake 120 ml   Output --   Net 120 ml         Physical Exam    Constitutional:       General: He is not in acute distress.     Appearance: He is ill-appearing. He is not toxic-appearing.   HENT:      Head: Normocephalic and atraumatic.   Cardiovascular:      Rate and Rhythm: Normal rate and regular rhythm.   Pulmonary:      Effort: Pulmonary effort is normal.      Breath sounds: Normal breath sounds.   Abdominal:      General: Bowel sounds are normal. There is no distension.      Tenderness: There is no abdominal tenderness. There is no guarding or rebound.   Musculoskeletal:      Right lower leg: No edema.      Left lower leg: No edema.   Skin:     General: Skin is warm and dry.   Neurological:      Mental Status: He is alert.     Significant Labs: All pertinent labs within the past 24 hours have been reviewed.    Significant Imaging: I have reviewed all pertinent imaging results/findings within the past 24 hours.    Assessment/Plan:      * Bacteremia  Blood culture 2/6/24 with gram variable rods.   Morganella bacteremia.  Sacral osteomyelitis as source.  Stop Vanc.  Continued Zosyn.  Repeat BCx negative so far.  ID consulted.  Changed to Cefepime.  Can do ABx's with HD on discharge.  Recommending changing tunneled HD catheter. Pt with bleeding from catheter site overnight after attempted removal resulting blood loss. IR consulted this am and stitched site with control of bleeding. Pending vascular eval  for removal.  -removed 2/16 will give line holiday until 2/20 per nephrology recs    Physical debility  PT/OT consulted.  Currently recommending moderate intensity therapy.  SW/CM did send LTAC referrals. And pt accepted pending line holiday completion    Pressure injury of sacral region, unstageable  Wound care consulted       Gluteal abscess  Noted on CT. With leukocytosis  - started vanc, zosyn-- continue  - General surgery consulted- to OR.  - wound care consulted  S/P operative debridement 2/8 with bone biopsy obtained given exposed coccyx    Bone cultures growing morganella and klebsiella.      Hyperphosphatemia  Secondary to ESRD.    Uremic encephalopathy   with acute encephalopathy.  Nephrology consulted and received emergent HD. Mental status now seems at baseline.    Paroxysmal atrial fibrillation  Patient with Paroxysmal (<7 days) atrial fibrillation which is controlled currently with Amiodarone. Patient is currently in sinus rhythm.[id number] Score: 1.  Anticoagulation indicated. Anticoagulation done with apixaban .  -on hold due to recent bleeding and pending line placement.    Goals of care, counseling/discussion  Advance Care Planning  Palliative care consulted.         Anemia in ESRD (end-stage renal disease)  Patient's anemia is currently controlled.  Etiology likely d/t chronic disease due to Chronic Kidney Disease/ESRD  Current CBC reviewed-   Lab Results   Component Value Date    HGB 7.1 (L) 02/16/2024    HCT 22.8 (L) 02/16/2024     Monitor serial CBC and transfuse if patient becomes hemodynamically unstable, symptomatic or H/H drops below 7/21.  - EPO started by Nephrology   Pt with bleeding from catheter site overnight after attempted removal resulting blood loss. IR consulted this am and stitched site with control of bleeding. Pending vascular eval for removal. Hgb back down to 6.9. 1 unit prbc ordered. Eliquis held.  -hgb stable for now will continue to monitor      Essential  hypertension  Chronic, controlled. Latest blood pressure and vitals reviewed-     Temp:  [97.8 °F (36.6 °C)-99 °F (37.2 °C)]   Pulse:  [60-70]   Resp:  [16-20]   BP: (129-212)/(71-95)   SpO2:  [91 %-95 %] .   Home meds for hypertension were reviewed and noted below.   Hypertension Medications               amLODIPine (NORVASC) 10 MG tablet Take 10 mg by mouth once daily.    furosemide (LASIX) 20 MG tablet Take 20 mg by mouth 2 (two) times daily.    hydrALAZINE (APRESOLINE) 100 MG tablet Take 1 tablet (100 mg total) by mouth 3 (three) times daily.    metoprolol succinate (TOPROL-XL) 100 MG 24 hr tablet Take 100 mg by mouth once daily.    NIFEdipine (PROCARDIA-XL) 60 MG (OSM) 24 hr tablet Take 1 tablet (60 mg total) by mouth once daily.            While in the hospital, will manage blood pressure as follows; Continue home antihypertensive regimen    Will utilize p.r.n. blood pressure medication only if patient's blood pressure greater than 180/110 and he develops symptoms such as worsening chest pain or shortness of breath.    ESRD needing dialysis  Admitted with uremic encephalopathy and hyperkalemia. Received emergent dialysis  - dialysis not able to be arranged as outpatient due to history of nonadherence--> this is a major issue. Social work checking again to see if they can arrange. Palliative care consulted  - Nephrology consulted      VTE Risk Mitigation (From admission, onward)           Ordered     IP VTE HIGH RISK PATIENT  Once         02/05/24 1733     Place sequential compression device  Until discontinued         02/05/24 1733     Reason for No Pharmacological VTE Prophylaxis  Once        Question:  Reasons:  Answer:  Already adequately anticoagulated on oral Anticoagulants    02/05/24 1733                    Discharge Planning   JAIME: 2/8/2024     Code Status: Full Code   Is the patient medically ready for discharge?:     Reason for patient still in hospital (select all that apply): Treatment and Consult  recommendations  Discharge Plan A: Home Health   Discharge Delays: (!) Dialysis Set-up              Terrance Joseph III, MD  Department of Hospital Medicine   [facility]

## 2024-02-19 NOTE — NURSING
[facility] [place] [place] 4 [place]      2/18/2024       Skin assessed on: Q Shift      [x] No Pressure Injuries Present    []Prevention Measures Documented    [] Yes LDA  for Pressure Injury Previously documented     [] Yes New Pressure Injury Discovered   [] LDA for New Pressure Injury Added      Attending RN:  Natasha Valencia RN     Second RN:  Jeffery Clinton RN
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
## 2024-02-19 NOTE — PLAN OF CARE
Problem: Skin Injury Risk Increased  Goal: Skin Health and Integrity  2/19/2024 1734 by Sindy Kowalski RN  Outcome: Ongoing, Progressing  2/19/2024 1732 by Sindy Kowalski RN  Outcome: Ongoing, Progressing     Problem: Device-Related Complication Risk (Hemodialysis)  Goal: Safe, Effective Therapy Delivery  2/19/2024 1734 by Sindy Kowalski RN  Outcome: Ongoing, Progressing  2/19/2024 1732 by Sindy Kowalski RN  Outcome: Ongoing, Progressing     Problem: Hemodynamic Instability (Hemodialysis)  Goal: Effective Tissue Perfusion  2/19/2024 1734 by Sindy Kowalski RN  Outcome: Ongoing, Progressing  2/19/2024 1732 by Sindy Kowalski RN  Outcome: Ongoing, Progressing     Problem: Infection (Hemodialysis)  Goal: Absence of Infection Signs and Symptoms  2/19/2024 1734 by Sindy Kowalski RN  Outcome: Ongoing, Progressing  2/19/2024 1732 by Sindy Kowalski RN  Outcome: Ongoing, Progressing     Problem: Adult Inpatient Plan of Care  Goal: Plan of Care Review  2/19/2024 1734 by Sindy Kowalski RN  Outcome: Ongoing, Progressing  2/19/2024 1732 by Sindy Kowalski RN  Outcome: Ongoing, Progressing  Goal: Patient-Specific Goal (Individualized)  2/19/2024 1734 by Sindy Kowalski RN  Outcome: Ongoing, Progressing  2/19/2024 1732 by Sindy Kowalski RN  Outcome: Ongoing, Progressing  Goal: Absence of Hospital-Acquired Illness or Injury  2/19/2024 1734 by Sindy Kowalski RN  Outcome: Ongoing, Progressing  2/19/2024 1732 by Sindy Kowalski RN  Outcome: Ongoing, Progressing  Goal: Optimal Comfort and Wellbeing  2/19/2024 1734 by Sindy Kowalski RN  Outcome: Ongoing, Progressing  2/19/2024 1732 by Sindy Kowalski RN  Outcome: Ongoing, Progressing  Goal: Readiness for Transition of Care  2/19/2024 1734 by Sindy Kowalski RN  Outcome: Ongoing, Progressing  2/19/2024 1732 by Sindy Kowalski RN  Outcome: Ongoing, Progressing     Problem: Infection  Goal: Absence of Infection Signs and  Symptoms  2/19/2024 1734 by Sindy Kowalski RN  Outcome: Ongoing, Progressing  2/19/2024 1732 by Sindy Kowalski RN  Outcome: Ongoing, Progressing     Problem: Diabetes Comorbidity  Goal: Blood Glucose Level Within Targeted Range  2/19/2024 1734 by Sindy Kowalski RN  Outcome: Ongoing, Progressing  2/19/2024 1732 by Sindy Kowalski RN  Outcome: Ongoing, Progressing     Problem: Impaired Wound Healing  Goal: Optimal Wound Healing  2/19/2024 1734 by Sindy Kowalski RN  Outcome: Ongoing, Progressing  2/19/2024 1732 by Sindy Kowalski RN  Outcome: Ongoing, Progressing     Problem: Coping Ineffective  Goal: Effective Coping  2/19/2024 1734 by Sindy Kowalski RN  Outcome: Ongoing, Progressing  2/19/2024 1732 by Sindy Kowalski RN  Outcome: Ongoing, Progressing     Problem: Fall Injury Risk  Goal: Absence of Fall and Fall-Related Injury  2/19/2024 1734 by Sindy Kowalski RN  Outcome: Ongoing, Progressing  2/19/2024 1732 by Sindy Kowalski RN  Outcome: Ongoing, Progressing
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
## 2024-02-19 NOTE — PT/OT/SLP PROGRESS
Physical Therapy      Patient Name:  Mahesh Cespedes   MRN:  [id number]    Patient not seen today secondary to Dialysis. Will follow-up as able.

## 2024-02-19 NOTE — NURSING
[facility] MEWS TRIGGER FOLLOW UP       MEWS Monitoring, Score is: 3  Indication for review: /95    Bedside Nurse, Natasha, contacted, repeat /89 post PM BP med administration. Pt appears comfortable. no concerns verbalized at this time, instructed to call [phone] for further concerns or assistance..

## 2024-02-19 NOTE — PT/OT/SLP PROGRESS
Occupational Therapy   Treatment    Name: Mahesh Cespedes  MRN: [id number]  Admitting Diagnosis:  Bacteremia  3 Days Post-Op    Recommendations:     Discharge Recommendations: Moderate Intensity Therapy  Discharge Equipment Recommendations:  to be determined by next level of care  Barriers to discharge:   Pt is at high risk for falls, readmission and morbidity if d/c home at this time.     Assessment:     Mahesh Cespedes is a 58 y.o. male with a medical diagnosis of Bacteremia.   Performance deficits affecting function are weakness, impaired endurance, impaired self care skills, impaired functional mobility, gait instability, impaired balance, decreased upper extremity function, decreased lower extremity function, decreased safety awareness, pain, decreased ROM.     Rehab Prognosis:  Good; patient would benefit from acute skilled OT services to address these deficits and reach maximum level of function.       Plan:     Patient to be seen 5 x/week to address the above listed problems via self-care/home management, therapeutic activities, therapeutic exercises  Plan of Care Expires: 03/08/24  Plan of Care Reviewed with: patient    Subjective     Chief Complaint: none stated   Patient/Family Comments/goals: pt agreeable to sit up in chair and eat meal after returning from dialysis.   Pain/Comfort:  Pain Rating 1: 0/10  Pain Rating Post-Intervention 1: 0/10    Objective:     Communicated with: Nurse prior to session.  Patient found HOB elevated with peripheral IV, Trialysis (AV fistula, External Jugular) upon OT entry to room.    General Precautions: Standard, fall    Orthopedic Precautions:N/A  Braces: N/A  Respiratory Status: Room air     Occupational Performance:     Bed Mobility:    Patient completed Scooting hip to EOB with stand by assistance  Patient completed Supine to Sit with contact guard assistance and HOB elevated      Functional Mobility/Transfers:  Patient completed Sit <> Stand Transfer x 1 trial from EOB with  minimum assistance  with  rolling walker   Patient completed Bed <> Chair Transfer using Step Transfer technique with moderate assistance with rolling walker; pt required max v/t cueing for safety as he was impulsively attempting to sit before being in front of chair      Activities of Daily Living:  Feeding:  set-up assist for self-feeding     Upper Body Dressing: minimum assistance for donning gown over back       [facility] 6 Click ADL: 17    Treatment & Education:  -Pt performed ADLs and functional mobility as noted above.   -Pt educated on importance of OOB activity with staff.   -Pt positioned in chair w/ air cushion to maintain skin integrity.     Patient left up in chair with all lines intact and call button in reach    GOALS:   Multidisciplinary Problems       Occupational Therapy Goals          Problem: Occupational Therapy    Goal Priority Disciplines Outcome Interventions   Occupational Therapy Goal     OT, PT/OT Ongoing, Progressing    Description: Goals to be met by: 03/08/2024     Patient will increase functional independence with ADLs by performing:    Feeding with set up High Jain's positioning for gravity assisted digestion.   UE Dressing with Min  Assistance.  LE Dressing with Moderate Assistance.  Grooming while seated with Modified [place] and Set-up Assistance.  Toileting from bedside commode with Max Assistance OOB.  Toilet transfer to bedside commode with Min Assistance.  Upper extremity exercise program x10 reps per handout, with assistance as needed.                         Time Tracking:     OT Date of Treatment: 02/19/24  OT Start Time: 1420  OT Stop Time: 1436  OT Total Time (min): 16 min    Billable Minutes:Therapeutic Exercise 16  Total Time 16    OT/FERNANDO: OT          2/19/2024

## 2024-02-19 NOTE — NURSING
[facility], [facility]  Nurses Note -- 4 Eyes      2/19/2024       Skin assessed on: Q Shift      [] No Pressure Injuries Present    []Prevention Measures Documented    [x] Yes LDA  for Pressure Injury Previously documented     [] Yes New Pressure Injury Discovered   [] LDA for New Pressure Injury Added      Attending RN:  Sindy Kowalski RN     Second RN:  Natasha Valencia RN

## 2024-02-19 NOTE — PLAN OF CARE
Problem: Occupational Therapy  Goal: Occupational Therapy Goal  Description: Goals to be met by: 03/08/2024     Patient will increase functional independence with ADLs by performing:    Feeding with set up High Jain's positioning for gravity assisted digestion.   UE Dressing with Min  Assistance.  LE Dressing with Moderate Assistance.  Grooming while seated with Modified [place] and Set-up Assistance.  Toileting from bedside commode with Max Assistance OOB.  Toilet transfer to bedside commode with Min Assistance.  Upper extremity exercise program x10 reps per handout, with assistance as needed.    Outcome: Ongoing, Progressing

## 2024-02-19 NOTE — PLAN OF CARE
Problem: Infection (Hemodialysis)  Goal: Absence of Infection Signs and Symptoms  2/19/2024 0552 by Natasha Valencia, BETO  Outcome: Ongoing, Progressing  2/19/2024 0437 by Natasha Valencia RN  Outcome: Ongoing, Progressing     Problem: Skin Injury Risk Increased  Goal: Skin Health and Integrity  2/19/2024 0552 by Natasha Valencia, BETO  Outcome: Ongoing, Progressing  2/19/2024 0437 by Natasha Valencia RN  Outcome: Ongoing, Progressing  Intervention: Optimize Skin Protection  Flowsheets (Taken 2/19/2024 0552)  Pressure Reduction Techniques: frequent weight shift encouraged  Head of Bed (HOB) Positioning: HOB elevated

## 2024-02-19 NOTE — ASSESSMENT & PLAN NOTE
Chronic, controlled. Latest blood pressure and vitals reviewed-     Temp:  [97.8 °F (36.6 °C)-99 °F (37.2 °C)]   Pulse:  [60-70]   Resp:  [16-20]   BP: (129-212)/(71-95)   SpO2:  [91 %-95 %] .   Home meds for hypertension were reviewed and noted below.   Hypertension Medications               amLODIPine (NORVASC) 10 MG tablet Take 10 mg by mouth once daily.    furosemide (LASIX) 20 MG tablet Take 20 mg by mouth 2 (two) times daily.    hydrALAZINE (APRESOLINE) 100 MG tablet Take 1 tablet (100 mg total) by mouth 3 (three) times daily.    metoprolol succinate (TOPROL-XL) 100 MG 24 hr tablet Take 100 mg by mouth once daily.    NIFEdipine (PROCARDIA-XL) 60 MG (OSM) 24 hr tablet Take 1 tablet (60 mg total) by mouth once daily.            While in the hospital, will manage blood pressure as follows; Continue home antihypertensive regimen    Will utilize p.r.n. blood pressure medication only if patient's blood pressure greater than 180/110 and he develops symptoms such as worsening chest pain or shortness of breath.

## 2024-02-19 NOTE — NURSING
Pt transported to dialysis via bed with transport. Cefepime given to dialysis nurse. Pt AAOx3, respirations even and unlabored, no acute distress, safety precautions in place.

## 2024-02-19 NOTE — ASSESSMENT & PLAN NOTE
Patient with Paroxysmal (<7 days) atrial fibrillation which is controlled currently with Amiodarone. Patient is currently in sinus rhythm.[id number] Score: 1.  Anticoagulation indicated. Anticoagulation done with apixaban .  -on hold due to recent bleeding and pending line placement.

## 2024-02-19 NOTE — SUBJECTIVE & OBJECTIVE
Interval History: Pt states he is feeling fine. No fever or chills at this time. Asking for something to drink    Review of Systems  Objective:     Vital Signs (Most Recent):  Temp: 98.5 °F (36.9 °C) (02/19/24 0705)  Pulse: 68 (02/19/24 0718)  Resp: 18 (02/19/24 0816)  BP: 129/71 (02/19/24 0705)  SpO2: 95 % (02/19/24 0705) Vital Signs (24h Range):  Temp:  [97.8 °F (36.6 °C)-99 °F (37.2 °C)] 98.5 °F (36.9 °C)  Pulse:  [60-70] 68  Resp:  [16-20] 18  SpO2:  [91 %-95 %] 95 %  BP: (129-212)/(71-95) 129/71     Weight: 75.3 kg (166 lb 0.1 oz)  Body mass index is 26 kg/m².    Intake/Output Summary (Last 24 hours) at 2/19/2024 0945  Last data filed at 2/18/2024 1740  Gross per 24 hour   Intake 120 ml   Output --   Net 120 ml         Physical Exam    Constitutional:       General: He is not in acute distress.     Appearance: He is ill-appearing. He is not toxic-appearing.   HENT:      Head: Normocephalic and atraumatic.   Cardiovascular:      Rate and Rhythm: Normal rate and regular rhythm.   Pulmonary:      Effort: Pulmonary effort is normal.      Breath sounds: Normal breath sounds.   Abdominal:      General: Bowel sounds are normal. There is no distension.      Tenderness: There is no abdominal tenderness. There is no guarding or rebound.   Musculoskeletal:      Right lower leg: No edema.      Left lower leg: No edema.   Skin:     General: Skin is warm and dry.   Neurological:      Mental Status: He is alert.     Significant Labs: All pertinent labs within the past 24 hours have been reviewed.    Significant Imaging: I have reviewed all pertinent imaging results/findings within the past 24 hours.
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
## 2024-02-20 LAB
BACTERIA BLD CULT: NORMAL
BACTERIA BLD CULT: NORMAL
BACTERIA SPEC ANAEROBE CULT: NORMAL
BASOPHILS # BLD AUTO: 0.04 K/UL (ref 0–0.2)
BASOPHILS NFR BLD: 0.3 % (ref 0–1.9)
DIFFERENTIAL METHOD BLD: ABNORMAL
EOSINOPHIL # BLD AUTO: 0.4 K/UL (ref 0–0.5)
EOSINOPHIL NFR BLD: 2.9 % (ref 0–8)
ERYTHROCYTE [DISTWIDTH] IN BLOOD BY AUTOMATED COUNT: 22 % (ref 11.5–14.5)
HCT VFR BLD AUTO: 24.8 % (ref 40–54)
HGB BLD-MCNC: 7.8 G/DL (ref 14–18)
IMM GRANULOCYTES # BLD AUTO: 0.04 K/UL (ref 0–0.04)
IMM GRANULOCYTES NFR BLD AUTO: 0.3 % (ref 0–0.5)
LYMPHOCYTES # BLD AUTO: 0.8 K/UL (ref 1–4.8)
LYMPHOCYTES NFR BLD: 6.7 % (ref 18–48)
MCH RBC QN AUTO: 28.1 PG (ref 27–31)
MCHC RBC AUTO-ENTMCNC: 31.5 G/DL (ref 32–36)
MCV RBC AUTO: 89 FL (ref 82–98)
MONOCYTES # BLD AUTO: 1 K/UL (ref 0.3–1)
MONOCYTES NFR BLD: 8.6 % (ref 4–15)
NEUTROPHILS # BLD AUTO: 9.7 K/UL (ref 1.8–7.7)
NEUTROPHILS NFR BLD: 81.2 % (ref 38–73)
NRBC BLD-RTO: 0 /100 WBC
PLATELET # BLD AUTO: 242 K/UL (ref 150–450)
PMV BLD AUTO: 9.6 FL (ref 9.2–12.9)
RBC # BLD AUTO: 2.78 M/UL (ref 4.6–6.2)
WBC # BLD AUTO: 11.96 K/UL (ref 3.9–12.7)

## 2024-02-20 PROCEDURE — 11000001 HC ACUTE MED/SURG PRIVATE ROOM

## 2024-02-20 PROCEDURE — 25000003 PHARM REV CODE 250

## 2024-02-20 PROCEDURE — 97530 THERAPEUTIC ACTIVITIES: CPT

## 2024-02-20 PROCEDURE — 36415 COLL VENOUS BLD VENIPUNCTURE: CPT | Performed by: STUDENT IN AN ORGANIZED HEALTH CARE EDUCATION/TRAINING PROGRAM

## 2024-02-20 PROCEDURE — 25000003 PHARM REV CODE 250: Performed by: INTERNAL MEDICINE

## 2024-02-20 PROCEDURE — 63600175 PHARM REV CODE 636 W HCPCS: Performed by: RADIOLOGY

## 2024-02-20 PROCEDURE — 0JH63XZ INSERTION OF TUNNELED VASCULAR ACCESS DEVICE INTO CHEST SUBCUTANEOUS TISSUE AND FASCIA, PERCUTANEOUS APPROACH: ICD-10-PCS | Performed by: RADIOLOGY

## 2024-02-20 PROCEDURE — 87040 BLOOD CULTURE FOR BACTERIA: CPT | Performed by: STUDENT IN AN ORGANIZED HEALTH CARE EDUCATION/TRAINING PROGRAM

## 2024-02-20 PROCEDURE — 25000003 PHARM REV CODE 250: Performed by: RADIOLOGY

## 2024-02-20 PROCEDURE — 63600175 PHARM REV CODE 636 W HCPCS

## 2024-02-20 PROCEDURE — 85025 COMPLETE CBC W/AUTO DIFF WBC: CPT | Performed by: STUDENT IN AN ORGANIZED HEALTH CARE EDUCATION/TRAINING PROGRAM

## 2024-02-20 PROCEDURE — 25000003 PHARM REV CODE 250: Performed by: HOSPITALIST

## 2024-02-20 PROCEDURE — 25000003 PHARM REV CODE 250: Performed by: STUDENT IN AN ORGANIZED HEALTH CARE EDUCATION/TRAINING PROGRAM

## 2024-02-20 PROCEDURE — 63600175 PHARM REV CODE 636 W HCPCS: Performed by: STUDENT IN AN ORGANIZED HEALTH CARE EDUCATION/TRAINING PROGRAM

## 2024-02-20 RX ORDER — LIDOCAINE HYDROCHLORIDE 10 MG/ML
INJECTION INFILTRATION; PERINEURAL
Status: COMPLETED | OUTPATIENT
Start: 2024-02-20 | End: 2024-02-20

## 2024-02-20 RX ORDER — FENTANYL CITRATE 50 UG/ML
INJECTION, SOLUTION INTRAMUSCULAR; INTRAVENOUS
Status: COMPLETED | OUTPATIENT
Start: 2024-02-20 | End: 2024-02-20

## 2024-02-20 RX ORDER — CEFAZOLIN SODIUM 1 G/50ML
SOLUTION INTRAVENOUS
Status: COMPLETED | OUTPATIENT
Start: 2024-02-20 | End: 2024-02-20

## 2024-02-20 RX ORDER — MIDAZOLAM HYDROCHLORIDE 1 MG/ML
INJECTION INTRAMUSCULAR; INTRAVENOUS
Status: COMPLETED | OUTPATIENT
Start: 2024-02-20 | End: 2024-02-20

## 2024-02-20 RX ADMIN — BENZTROPINE MESYLATE 0.5 MG: 0.5 TABLET ORAL at 09:02

## 2024-02-20 RX ADMIN — METOPROLOL SUCCINATE 50 MG: 50 TABLET, EXTENDED RELEASE ORAL at 09:02

## 2024-02-20 RX ADMIN — CALCITRIOL CAPSULES 0.25 MCG 0.25 MCG: 0.25 CAPSULE ORAL at 09:02

## 2024-02-20 RX ADMIN — SEVELAMER CARBONATE 1600 MG: 800 TABLET, FILM COATED ORAL at 11:02

## 2024-02-20 RX ADMIN — LIDOCAINE HYDROCHLORIDE 10 ML: 10 INJECTION, SOLUTION INFILTRATION; PERINEURAL at 04:02

## 2024-02-20 RX ADMIN — FENTANYL CITRATE 50 MCG: 50 INJECTION INTRAMUSCULAR; INTRAVENOUS at 04:02

## 2024-02-20 RX ADMIN — TAMSULOSIN HYDROCHLORIDE 0.4 MG: 0.4 CAPSULE ORAL at 09:02

## 2024-02-20 RX ADMIN — SEVELAMER CARBONATE 1600 MG: 800 TABLET, FILM COATED ORAL at 05:02

## 2024-02-20 RX ADMIN — VANCOMYCIN HYDROCHLORIDE 1000 MG: 1 INJECTION, POWDER, LYOPHILIZED, FOR SOLUTION INTRAVENOUS at 06:02

## 2024-02-20 RX ADMIN — NEPHROCAP 1 CAPSULE: 1 CAP ORAL at 09:02

## 2024-02-20 RX ADMIN — SEVELAMER CARBONATE 1600 MG: 800 TABLET, FILM COATED ORAL at 09:02

## 2024-02-20 RX ADMIN — MIDAZOLAM HYDROCHLORIDE 1 MG: 1 INJECTION, SOLUTION INTRAMUSCULAR; INTRAVENOUS at 04:02

## 2024-02-20 RX ADMIN — OXYCODONE 10 MG: 5 TABLET ORAL at 09:02

## 2024-02-20 RX ADMIN — CEFAZOLIN SODIUM 2 G: 1 SOLUTION INTRAVENOUS at 04:02

## 2024-02-20 RX ADMIN — AMIODARONE HYDROCHLORIDE 200 MG: 200 TABLET ORAL at 09:02

## 2024-02-20 RX ADMIN — HYDROMORPHONE HYDROCHLORIDE 0.5 MG: 1 INJECTION, SOLUTION INTRAMUSCULAR; INTRAVENOUS; SUBCUTANEOUS at 11:02

## 2024-02-20 RX ADMIN — AMLODIPINE BESYLATE 5 MG: 5 TABLET ORAL at 09:02

## 2024-02-20 RX ADMIN — ATORVASTATIN CALCIUM 80 MG: 40 TABLET, FILM COATED ORAL at 09:02

## 2024-02-20 RX ADMIN — ALLOPURINOL 100 MG: 100 TABLET ORAL at 09:02

## 2024-02-20 NOTE — NURSING
[facility], [facility]  Nurses Note -- 4 Eyes      2/19/2024       Skin assessed on: Q Shift      [] No Pressure Injuries Present    []Prevention Measures Documented    [x] Yes LDA  for Pressure Injury Previously documented     [] Yes New Pressure Injury Discovered   [] LDA for New Pressure Injury Added      Attending RN:  Carol Price RN     Second RN:  BETO Callahan

## 2024-02-20 NOTE — PROGRESS NOTES
"Pharmacokinetic Initial Assessment: IV Vancomycin    Assessment/Plan:    Initiate intravenous vancomycin with loading dose of 1000 mg once with subsequent doses when random concentrations are less than 20 mcg/mL  Desired empiric serum trough concentration is 10 to 20 mcg/mL  Draw vancomycin random level on 2/21/24 at 04:00.  Pharmacy will continue to follow and monitor vancomycin.      Please contact pharmacy at extension [phone] with any questions regarding this assessment.     Thank you for the consult,   Alejandra Hernandez       Patient brief summary:  Mahesh Cespedes is a 58 y.o. male initiated on antimicrobial therapy with IV Vancomycin for treatment of suspected Catheter tip culture with staph epidermidis and staph haemolyticus.  Drug Allergies:   Review of patient's allergies indicates:  No Known Allergies    Actual Body Weight:   75.3 kg    Renal Function:   Estimated Creatinine Clearance: 7 mL/min (A) (based on SCr of 10.5 mg/dL (H)).,     Dialysis Method (if applicable):  N/A    CBC (last 72 hours):  Recent Labs   Lab Result Units 02/20/24  0344   WBC K/uL 11.96   Hemoglobin g/dL 7.8*   Hematocrit % 24.8*   Platelets K/uL 242   Gran % % 81.2*   Lymph % % 6.7*   Mono % % 8.6   Eosinophil % % 2.9   Basophil % % 0.3   Differential Method  Automated       Metabolic Panel (last 72 hours):  Recent Labs   Lab Result Units 02/18/24  2355 02/19/24  0518   Sodium mmol/L 138  --    Potassium mmol/L 4.2  --    Chloride mmol/L 101  --    CO2 mmol/L 22*  --    Glucose mg/dL 103  --    BUN mg/dL 61*  --    Creatinine mg/dL 10.5*  --    Phosphorus mg/dL  --  9.1*       Drug levels (last 3 results):  No results for input(s): "VANCOMYCINRA", "VANCORANDOM", "VANCOMYCINPE", "VANCOPEAK", "VANCOMYCINTR", "VANCOTROUGH" in the last 72 hours.    Microbiologic Results:  Microbiology Results (last 7 days)       Procedure Component Value Units Date/Time    Blood culture [[id number]]     Order Status: Sent Specimen: Blood     Blood culture " [[id number]]     Order Status: Sent Specimen: Blood     Blood culture [[id number]] Collected: 02/16/24 1021    Order Status: Completed Specimen: Blood from Line, Femoral, Right Updated: 02/20/24 1303     Blood Culture, Routine No Growth after 4 days.    Blood culture [[id number]] Collected: 02/16/24 1129    Order Status: Completed Specimen: Blood from Line, Femoral, Right Updated: 02/20/24 1303     Blood Culture, Routine No Growth after 4 days.    Culture, Anaerobe [[id number]] Collected: 02/16/24 1037    Order Status: Completed Specimen: Incision site from Chest, Right Updated: 02/20/24 0836     Anaerobic Culture No anaerobes isolated    Narrative:      Right Dialysis Catheter    IV catheter culture [[id number]]  (Abnormal) Collected: 02/16/24 2028    Order Status: Completed Specimen: Catheter Tip Updated: 02/20/24 0732     Aerobic Culture - Cath tip STAPHYLOCOCCUS EPIDERMIDIS  > 15 colonies  Susceptibility pending        STAPHYLOCOCCUS HAEMOLYTICUS  > 15 colonies  Susceptibility pending      Fungus culture [[id number]] Collected: 02/08/24 1250    Order Status: Completed Specimen: Bone from Sacrum Updated: 02/19/24 1409     Fungus (Mycology) Culture No fungal growth to date    Narrative:      Bone from coccyx    Fungus culture [[id number]] Collected: 02/08/24 1250    Order Status: Completed Specimen: Bone from Sacrum Updated: 02/19/24 1409     Fungus (Mycology) Culture No fungal growth to date    Narrative:      Sacral abcess    Aerobic culture [[id number]]  (Abnormal)  (Susceptibility) Collected: 02/16/24 1037    Order Status: Completed Specimen: Incision site from Chest, Right Updated: 02/19/24 1203     Aerobic Bacterial Culture STAPHYLOCOCCUS EPIDERMIDIS  < 15 colonies      Narrative:      Right Dialysis Catheter    Gram stain [[id number]] Collected: 02/16/24 1037    Order Status: Completed Specimen: Incision site from Chest, Right Updated: 02/16/24 1350     Gram Stain Result No WBC's      No organisms seen     Narrative:      Right Dialysis Catheter    Fungus culture [[id number]] Collected: 02/16/24 1037    Order Status: Sent Specimen: Incision site from Chest, Right Updated: 02/16/24 1112    AFB Culture & Smear [[id number]] Collected: 02/16/24 1037    Order Status: Canceled Specimen: Incision site from Chest, Right     Blood culture [[id number]] Collected: 02/10/24 1235    Order Status: Completed Specimen: Blood from Peripheral, Hand, Left Updated: 02/14/24 1303     Blood Culture, Routine No Growth after 4 days.    Blood culture [[id number]] Collected: 02/10/24 1240    Order Status: Completed Specimen: Blood from Peripheral, Antecubital, Left Updated: 02/14/24 1303     Blood Culture, Routine No Growth after 4 days.

## 2024-02-20 NOTE — PROGRESS NOTES
Renal Progress Note    Date of Admission:  2/5/2024 12:18 PM    Length of Stay: 14  Days    Subjective: n/a    Objective:    Current Facility-Administered Medications   Medication    0.9%  NaCl infusion (for blood administration)    0.9%  NaCl infusion (for blood administration)    0.9%  NaCl infusion (for blood administration)    0.9%  NaCl infusion    acetaminophen tablet 650 mg    albuterol-ipratropium 2.5 mg-0.5 mg/3 mL nebulizer solution 3 mL    allopurinoL tablet 100 mg    aluminum-magnesium hydroxide-simethicone 200-200-20 mg/5 mL suspension 30 mL    amiodarone tablet 200 mg    amLODIPine tablet 5 mg    atorvastatin tablet 80 mg    benztropine tablet 0.5 mg    calcitRIOL capsule 0.25 mcg    ceFEPIme (MAXIPIME) 1.5 g in dextrose 5 % (D5W) 100 mL IVPB    [START ON 2/21/2024] ceFEPIme (MAXIPIME) 1.5 g in dextrose 5 % (D5W) 100 mL IVPB    [START ON 2/23/2024] ceFEPIme (MAXIPIME) 2 g in dextrose 5 % in water (D5W) 100 mL IVPB (MB+)    dextrose 10% bolus 125 mL 125 mL    dextrose 10% bolus 250 mL 250 mL    epoetin luli-epbx injection 10,000 Units    ferrous gluconate tablet 324 mg    glucagon (human recombinant) injection 1 mg    glucose chewable tablet 16 g    glucose chewable tablet 24 g    HYDROmorphone injection 0.5 mg    melatonin tablet 6 mg    metoprolol succinate (TOPROL-XL) 24 hr tablet 50 mg    naloxone 0.4 mg/mL injection 0.02 mg    ondansetron injection 4 mg    oxyCODONE immediate release tablet 10 mg    prochlorperazine injection Soln 5 mg    sevelamer carbonate tablet 1,600 mg    simethicone chewable tablet 80 mg    sodium chloride 0.9% bolus 250 mL 250 mL    sodium chloride 0.9% bolus 250 mL 250 mL    sodium chloride 0.9% flush 10 mL    tamsulosin 24 hr capsule 0.4 mg    vitamin renal formula (B-complex-vitamin c-folic acid) 1 mg per capsule 1 capsule       Vitals:    02/19/24 2300 02/20/24 0022 02/20/24 0259 02/20/24 0410   BP:  139/61  (!) 151/68   BP Location:  Right arm   "Right arm   Patient Position:  Lying  Lying   Pulse: 69 69 69 70   Resp:  18  18   Temp:  98.5 °F (36.9 °C)  98.7 °F (37.1 °C)   TempSrc:  Oral  Oral   SpO2:  95%  99%   Weight:       Height:           I/O last 3 completed shifts:  In: 1200 [P.O.:600; Other:600]  Out: 2200 [Other:2200]  No intake/output data recorded.      Physical Exam: unchanged    Laboratories:    Recent Labs   Lab 02/20/24  0344   WBC 11.96   RBC 2.78*   HGB 7.8*   HCT 24.8*      MCV 89   MCH 28.1   MCHC 31.5*         No results for input(s): "GLUCOSE", "CALCIUM", "ALBUMIN", "PROT", "NA", "K", "CO2", "CL", "BUN", "CREATININE", "ALKPHOS", "ALT", "AST", "BILITOT" in the last 24 hours.      No results for input(s): "COLORU", "CLARITYU", "SPECGRAV", "PHUR", "PROTEINUA", "GLUCOSEU", "BILIRUBINCON", "BLOODU", "WBCU", "RBCU", "BACTERIA", "MUCUS" in the last 24 hours.    Microbiology Results (last 7 days)       Procedure Component Value Units Date/Time    IV catheter culture [[id number]]  (Abnormal) Collected: 02/16/24 2028    Order Status: Completed Specimen: Catheter Tip Updated: 02/19/24 1418     Aerobic Culture - Cath tip STAPHYLOCOCCUS EPIDERMIDIS  > 15 colonies  Susceptibility pending        STAPHYLOCOCCUS HAEMOLYTICUS  > 15 colonies  Susceptibility pending      Fungus culture [[id number]] Collected: 02/08/24 1250    Order Status: Completed Specimen: Bone from Sacrum Updated: 02/19/24 1409     Fungus (Mycology) Culture No fungal growth to date    Narrative:      Bone from coccyx    Fungus culture [[id number]] Collected: 02/08/24 1250    Order Status: Completed Specimen: Bone from Sacrum Updated: 02/19/24 1409     Fungus (Mycology) Culture No fungal growth to date    Narrative:      Sacral abcess    Blood culture [[id number]] Collected: 02/16/24 1021    Order Status: Completed Specimen: Blood from Line, Femoral, Right Updated: 02/19/24 1303     Blood Culture, Routine No Growth to date      No Growth to date      No Growth to date      No " Growth to date    Blood culture [[id number]] Collected: 02/16/24 1129    Order Status: Completed Specimen: Blood from Line, Femoral, Right Updated: 02/19/24 1303     Blood Culture, Routine No Growth to date      No Growth to date      No Growth to date      No Growth to date    Aerobic culture [[id number]]  (Abnormal)  (Susceptibility) Collected: 02/16/24 1037    Order Status: Completed Specimen: Incision site from Chest, Right Updated: 02/19/24 1203     Aerobic Bacterial Culture STAPHYLOCOCCUS EPIDERMIDIS  < 15 colonies      Narrative:      Right Dialysis Catheter    Culture, Anaerobe [[id number]] Collected: 02/16/24 1037    Order Status: Completed Specimen: Incision site from Chest, Right Updated: 02/18/24 0849     Anaerobic Culture Culture in progress    Narrative:      Right Dialysis Catheter    Gram stain [[id number]] Collected: 02/16/24 1037    Order Status: Completed Specimen: Incision site from Chest, Right Updated: 02/16/24 1350     Gram Stain Result No WBC's      No organisms seen    Narrative:      Right Dialysis Catheter    Fungus culture [[id number]] Collected: 02/16/24 1037    Order Status: Sent Specimen: Incision site from Chest, Right Updated: 02/16/24 1112    AFB Culture & Smear [[id number]] Collected: 02/16/24 1037    Order Status: Canceled Specimen: Incision site from Chest, Right     Blood culture [[id number]] Collected: 02/10/24 1235    Order Status: Completed Specimen: Blood from Peripheral, Hand, Left Updated: 02/14/24 1303     Blood Culture, Routine No Growth after 4 days.    Blood culture [[id number]] Collected: 02/10/24 1240    Order Status: Completed Specimen: Blood from Peripheral, Antecubital, Left Updated: 02/14/24 1303     Blood Culture, Routine No Growth after 4 days.              Diagnostic Tests:     CXR:  Impression:       Cardiomegaly.  Increased interstitial lung markings.  Possible small pleural effusions.  Question on fluid overload and or CHF.  However, a pneumonic infectious  "process cannot be excluded in the appropriate clinical circumstances.      Electronically signed by: Sandie Martinez  Date: 02/05/2024           Assessment:    59 y/o male with known to me from prior encounter last month; Hx. ESRD (Moved from [facility]. no Dialysis unit to go to) admitted with:     - Hyperkalemia corrected with Dialysis last p.m.  - HAGMA  - Uncontrolled HTN  - One of the blood cultures reported as "gram variable rods" ID and sensitivity pending  - Fluid overload  - Improving Metabolic (uremic) encephalopathy, last HD 1/22/24 while in the Hospital (Pte. Was not accepted by any of the local Dialysis units due to Hx. Of non-compliance and was told either to try to go back to [place] or come to ED periodically as needed for HD)  - Hyperphosphatemia  - 2nd. hyperparathyroidism  - Hx. Ascites s/p Paracentesis in January  - Fe++ def. + Anemia of ckd  - HTN  - Hx. CVA  - Hypoalbuminemia  - Infected (Morganella) sacral pressure ulcer s/p debridement           Plan:    - Antibiotics per ID  - For new THDC placement today  - Dialysis Q MWF  - IV iron loading during HD.  - Adjust BP meds. As needed  - Epogen 3 x week  - Transfuse prn Hgb < 7  - Renal diet + protein supplements  - PO4- binders: Sevelamer p.o. w/meals  - Calcitriol  - Wound care per surgery  - Consider Nursing Home placement, Pte. Clearly unable to take care of self  - Disposition and other problems per admitting                "

## 2024-02-20 NOTE — BRIEF OP NOTE
Radiology Post-Procedure Note    Pre Op Diagnosis: 1. ESRD requiring central venous access for outpt HD   Post Op Diagnosis: Same    Procedure: 1. US and fluoroscopic-guided placement of a 23-cm LIJV-approach THDC     Procedure performed by: Ran Durbin MD    Written Informed Consent Obtained: Yes  Specimen Removed: NO  Estimated Blood Loss: Minimal    Findings:   Successful US and fluoroscopic-guided placement of a 23-cm LIJV-approach THDC with local anesthetic and moderate conscious sedation. Patient tolerated the procedure well. No immediate post-procedural complications noted.     THDC is ready for use immediately.    Patient transferred back to floor for 2 hours post-op monitoring and bed rest with HOB elevated at least 30 degrees during bed rest.    Thank you for considering IR for the care of your patient.     Ran Durbin MD  Interventional Radiology

## 2024-02-20 NOTE — PLAN OF CARE
Left IJ THDC placed by Dr. Durbin, pt tolerated procedure without s/sx of complication. Called report to JAVIER Marino. Pt transported back to inpatient room by IR staff.

## 2024-02-20 NOTE — PT/OT/SLP PROGRESS
Physical Therapy      Patient Name:  Mahesh Cespedes   MRN:  [id number]    Patient not seen today secondary to attempted to get pt up out of bed for lunch. Pt willing to participate initially and agreed to sit in the chair for lunch, pt stating he is hungry, however, pt started to move, became tearful and screaming 2* pain. Spoke with nurse, per nurse pt asked to get back in bed 2* pain and to let pt rest. Second attempt (1505) Pt off the floor to IR. Will follow-up as able.

## 2024-02-20 NOTE — ASSESSMENT & PLAN NOTE
Patient's anemia is currently controlled.  Etiology likely d/t chronic disease due to Chronic Kidney Disease/ESRD  Current CBC reviewed-   Lab Results   Component Value Date    HGB 7.8 (L) 02/20/2024    HCT 24.8 (L) 02/20/2024     Monitor serial CBC and transfuse if patient becomes hemodynamically unstable, symptomatic or H/H drops below 7/21.  - EPO started by Nephrology   Pt with bleeding from catheter site overnight after attempted removal resulting blood loss. IR consulted this am and stitched site with control of bleeding. Pending vascular eval for removal. Hgb back down to 6.9. 1 unit prbc ordered. Eliquis held.  -hgb stable for now will continue to monitor

## 2024-02-20 NOTE — ASSESSMENT & PLAN NOTE
Patient with Paroxysmal (<7 days) atrial fibrillation which is controlled currently with Amiodarone. Patient is currently in sinus rhythm.[id number] Score: 1.  Anticoagulation indicated. Anticoagulation done with apixaban .  -on hold due to recent bleeding and pending line placement.

## 2024-02-20 NOTE — ASSESSMENT & PLAN NOTE
Blood culture 2/6/24 with gram variable rods.   Morganella bacteremia.  Sacral osteomyelitis as source.  Stop Vanc.  Continued Zosyn.  Repeat BCx negative so far.  ID consulted.  Changed to Cefepime.  Can do ABx's with HD on discharge.  Recommending changing tunneled HD catheter. Pt with bleeding from catheter site overnight after attempted removal resulting blood loss. IR consulted this am and stitched site with control of bleeding. Pending vascular eval for removal.  -removed 2/16 will give line holiday until 2/20 per nephrology recs  -however previous catheter tip culture grew Staph epidermidis and Staph hemolyticus.  Secure chat with ID, pending recommendations.  We will initiate on vancomycin and repeat blood cultures.

## 2024-02-20 NOTE — PLAN OF CARE
Problem: Occupational Therapy  Goal: Occupational Therapy Goal  Description: Goals to be met by: 03/08/2024     Patient will increase functional independence with ADLs by performing:    Feeding with set up [place]ain's positioning for gravity assisted digestion.   UE Dressing with Min  Assistance.  LE Dressing with Moderate Assistance.  Grooming while seated with Modified [place] and Set-up Assistance.  Toileting from bedside commode with Max Assistance OOB.  Toilet transfer to bedside commode with Min Assistance.  Upper extremity exercise program x10 reps per handout, with assistance as needed.    Outcome: Ongoing, Progressing

## 2024-02-20 NOTE — CARE UPDATE
59 y/o M with PMHx of ESRD previously on HD via RIJV-approach THDC which was removed for 'line holiday' 2/2 bacteremia. Pt's BCx now clear and requiring replacement of THDC.    ESRD requiring central venous access for outpt HD - Will attempt US and fluoroscopic-guided placement of LIJV-approach THDC with local anesthetic and moderate conscious sedation.    Risks (including, but not limited to, pain, bleeding, infection, damage to nearby structures, failure to obtain sufficient material for a diagnosis, the need for additional procedures, and death), benefits, and alternatives were discussed with the patient. All questions were answered to the best of my abilities. The patient wishes to proceed with the procedure. Written informed consent was obtained.    Thank you for considering IR for the care of your patient.     Ran Durbin MD  Interventional Radiology

## 2024-02-20 NOTE — SUBJECTIVE & OBJECTIVE
Interval History:  No acute events overnight.  Patient's mentation remains at baseline.  Plan for IR guided Vas-Cath placement today    Review of Systems   Constitutional: Negative.    Respiratory: Negative.     Cardiovascular: Negative.    Genitourinary: Negative.    Psychiatric/Behavioral: Negative.       Objective:     Vital Signs (Most Recent):  Temp: 97.8 °F (36.6 °C) (02/20/24 1615)  Pulse: 66 (02/20/24 1645)  Resp: 15 (02/20/24 1645)  BP: (!) 164/97 (02/20/24 1645)  SpO2: 100 % (02/20/24 1645) Vital Signs (24h Range):  Temp:  [97.7 °F (36.5 °C)-98.7 °F (37.1 °C)] 97.8 °F (36.6 °C)  Pulse:  [66-71] 66  Resp:  [15-20] 15  SpO2:  [93 %-100 %] 100 %  BP: (118-166)/(59-97) 164/97     Weight: 75.3 kg (166 lb 0.1 oz)  Body mass index is 26 kg/m².    Intake/Output Summary (Last 24 hours) at 2/20/2024 1647  Last data filed at 2/20/2024 1335  Gross per 24 hour   Intake 0 ml   Output 0 ml   Net 0 ml         Physical Exam  Constitutional:       General: He is not in acute distress.     Appearance: Normal appearance.   Cardiovascular:      Rate and Rhythm: Normal rate and regular rhythm.      Pulses: Normal pulses.      Heart sounds: No murmur heard.  Pulmonary:      Effort: Pulmonary effort is normal. No respiratory distress.   Abdominal:      General: Bowel sounds are normal.      Palpations: Abdomen is soft.   Skin:     Findings: Lesion (Sacral wound, with active drainage noted.) present.   Neurological:      Mental Status: He is alert. Mental status is at baseline.             Significant Labs: All pertinent labs within the past 24 hours have been reviewed.    Significant Imaging: I have reviewed all pertinent imaging results/findings within the past 24 hours.   Clindamycin Pregnancy And Lactation Text: This medication can be used in pregnancy if certain situations. Clindamycin is also present in breast milk.

## 2024-02-20 NOTE — NURSING
[facility] [place]  Nurses Note -- 4 Eyes      2/20/2024       Skin assessed on: Q Shift      [] No Pressure Injuries Present    []Prevention Measures Documented    [x] Yes LDA  for Pressure Injury Previously documented     [] Yes New Pressure Injury Discovered   [] LDA for New Pressure Injury Added      Attending RN:  Jamila Valdez LPN     Second RN:  Carol Price RN

## 2024-02-20 NOTE — PLAN OF CARE
REASSESSMENT:    Patient will  have a new HD access placed in IR today.  May need HD after line placement prior to discharge to [facility]/.April says will accept on tomorrow if medically ready in the morning.  [facility] to assist patient with out patient HD  Plan A:  [facility]  Plan B:  Need HD chair/ home adult daughter.        02/20/24 1446   Discharge Reassessment   Assessment Type Discharge Planning Reassessment   Did the patient's condition or plan change since previous assessment? No   Discharge Plan discussed with: Adult children   Discharge Plan A Long-term acute care facility (LTAC)   Discharge Plan B Long-term acute care facility (LTAC)   DME Needed Upon Discharge  none   Transition of Care Barriers Other (see comments)  (need HD line replaced)   Why the patient remains in the hospital Requires continued medical care   Post-Acute Status   Post-Acute Authorization Placement   Coverage MEDICARE - MEDICARE PART A & B -   Hospital Resources/Appts/Education Provided Provided patient/caregiver with written discharge plan information   Discharge Delays (!) Procedure Scheduling (IR, OR, Labs, Echo, Cath, Echo, EEG)

## 2024-02-20 NOTE — ASSESSMENT & PLAN NOTE
Chronic, controlled. Latest blood pressure and vitals reviewed-     Temp:  [97.7 °F (36.5 °C)-98.7 °F (37.1 °C)]   Pulse:  [66-71]   Resp:  [15-20]   BP: (118-166)/(59-97)   SpO2:  [93 %-100 %] .   Home meds for hypertension were reviewed and noted below.   Hypertension Medications               amLODIPine (NORVASC) 10 MG tablet Take 10 mg by mouth once daily.    furosemide (LASIX) 20 MG tablet Take 20 mg by mouth 2 (two) times daily.    hydrALAZINE (APRESOLINE) 100 MG tablet Take 1 tablet (100 mg total) by mouth 3 (three) times daily.    metoprolol succinate (TOPROL-XL) 100 MG 24 hr tablet Take 100 mg by mouth once daily.    NIFEdipine (PROCARDIA-XL) 60 MG (OSM) 24 hr tablet Take 1 tablet (60 mg total) by mouth once daily.            While in the hospital, will manage blood pressure as follows; Continue home antihypertensive regimen    Will utilize p.r.n. blood pressure medication only if patient's blood pressure greater than 180/110 and he develops symptoms such as worsening chest pain or shortness of breath.

## 2024-02-20 NOTE — PROGRESS NOTES
[facility] Medicine  Progress Note    Patient Name: Mahesh Cespedes  MRN: [id number]  Patient Class: IP- Inpatient   Admission Date: 2/5/2024  Length of Stay: 14 days  Attending Physician: Kyle Tang,*  Primary Care Provider: Cruz Hernandez MD        Subjective:     Principal Problem:Bacteremia        HPI:  58 y.o. male with AFib, DM2, ESRD on dialysis, hypertension presents from home with a complaint of altered mental status.  Family report he has been drowsy and fatigued with increased confusion for the past 2 days.  Has been progressively worsening.  Associated with significant peripheral edema.  Last dialysis was 2 weeks ago.  Denies fever, chills, cough, SOB, chest pain, palpitations, nausea, vomiting, diarrhea, or abdominal pain.  History is somewhat limited given the patient's drowsiness but he is awake and conversant.    In the ED, labs revealed hyperkalemia, K + 7.4.  He was given shifting medications and zirconium.  Nephrology was consulted and plan for urgent dialysis.  Labs also reveal uremia, leukocytosis, hyperphosphatemia, and metabolic acidosis.  Placed in observation to obtain dialysis.    Overview/Hospital Course:  58 y.o. man with ESRD but without outpatient dialysis set up who was admitted with uremic encephalopathy, hyperkalemia. Nephrology consulted and he received emergent dialysis. Mental status is improving. He has sacral wound with abscess. Started antibiotics. Blood cultures with gram variable rods. General surgery planning OR debridement on 2/8/24. S/P debridement with bone biopsy.  Morganella bacteremia.  Bone cultures also growing Morganella and Klebsiella.  ID consulted.  Recommending tunneled HD catheter removal done by vascular on 2/16 after failed attempt by IR. Plan for line holiday until 2/20.  IR consulted for replacement. .  Previous HD catheter tip culture revealed Staph epidermidis and Staph haemolyticus.  Repeat blood cultures and initiate on  vancomycin..  Consulted ID for further recommendations    Interval History:  No acute events overnight.  Patient's mentation remains at baseline.  Plan for IR guided Vas-Cath placement today    Review of Systems   Constitutional: Negative.    Respiratory: Negative.     Cardiovascular: Negative.    Genitourinary: Negative.    Psychiatric/Behavioral: Negative.       Objective:     Vital Signs (Most Recent):  Temp: 97.8 °F (36.6 °C) (02/20/24 1615)  Pulse: 66 (02/20/24 1645)  Resp: 15 (02/20/24 1645)  BP: (!) 164/97 (02/20/24 1645)  SpO2: 100 % (02/20/24 1645) Vital Signs (24h Range):  Temp:  [97.7 °F (36.5 °C)-98.7 °F (37.1 °C)] 97.8 °F (36.6 °C)  Pulse:  [66-71] 66  Resp:  [15-20] 15  SpO2:  [93 %-100 %] 100 %  BP: (118-166)/(59-97) 164/97     Weight: 75.3 kg (166 lb 0.1 oz)  Body mass index is 26 kg/m².    Intake/Output Summary (Last 24 hours) at 2/20/2024 1647  Last data filed at 2/20/2024 1335  Gross per 24 hour   Intake 0 ml   Output 0 ml   Net 0 ml         Physical Exam  Constitutional:       General: He is not in acute distress.     Appearance: Normal appearance.   Cardiovascular:      Rate and Rhythm: Normal rate and regular rhythm.      Pulses: Normal pulses.      Heart sounds: No murmur heard.  Pulmonary:      Effort: Pulmonary effort is normal. No respiratory distress.   Abdominal:      General: Bowel sounds are normal.      Palpations: Abdomen is soft.   Skin:     Findings: Lesion (Sacral wound, with active drainage noted.) present.   Neurological:      Mental Status: He is alert. Mental status is at baseline.             Significant Labs: All pertinent labs within the past 24 hours have been reviewed.    Significant Imaging: I have reviewed all pertinent imaging results/findings within the past 24 hours.    Assessment/Plan:      * Bacteremia  Blood culture 2/6/24 with gram variable rods.   Morganella bacteremia.  Sacral osteomyelitis as source.  Stop Vanc.  Continued Zosyn.  Repeat BCx negative so  far.  ID consulted.  Changed to Cefepime.  Can do ABx's with HD on discharge.  Recommending changing tunneled HD catheter. Pt with bleeding from catheter site overnight after attempted removal resulting blood loss. IR consulted this am and stitched site with control of bleeding. Pending vascular eval for removal.  -removed 2/16 will give line holiday until 2/20 per nephrology recs  -however previous catheter tip culture grew Staph epidermidis and Staph hemolyticus.  Secure chat with ID, pending recommendations.  We will initiate on vancomycin and repeat blood cultures.    Physical debility  PT/OT consulted.  Currently recommending moderate intensity therapy.  SW/CM did send LTAC referrals. And pt accepted pending line holiday completion    Pressure injury of sacral region, unstageable  Wound care consulted       Gluteal abscess  Noted on CT. With leukocytosis  - started vanc, zosyn-- continue  - General surgery consulted- to OR.  - wound care consulted  S/P operative debridement 2/8 with bone biopsy obtained given exposed coccyx    Bone cultures growing morganella and klebsiella.      Hyperphosphatemia  Secondary to ESRD.    Uremic encephalopathy   with acute encephalopathy.  Nephrology consulted and received emergent HD. Mental status now seems at baseline.    Paroxysmal atrial fibrillation  Patient with Paroxysmal (<7 days) atrial fibrillation which is controlled currently with Amiodarone. Patient is currently in sinus rhythm.[id number] Score: 1.  Anticoagulation indicated. Anticoagulation done with apixaban .  -on hold due to recent bleeding and pending line placement.    Goals of care, counseling/discussion  Advance Care Planning  Palliative care consulted.         Anemia in ESRD (end-stage renal disease)  Patient's anemia is currently controlled.  Etiology likely d/t chronic disease due to Chronic Kidney Disease/ESRD  Current CBC reviewed-   Lab Results   Component Value Date    HGB 7.8 (L) 02/20/2024     HCT 24.8 (L) 02/20/2024     Monitor serial CBC and transfuse if patient becomes hemodynamically unstable, symptomatic or H/H drops below 7/21.  - EPO started by Nephrology   Pt with bleeding from catheter site overnight after attempted removal resulting blood loss. IR consulted this am and stitched site with control of bleeding. Pending vascular eval for removal. Hgb back down to 6.9. 1 unit prbc ordered. Eliquis held.  -hgb stable for now will continue to monitor      Essential hypertension  Chronic, controlled. Latest blood pressure and vitals reviewed-     Temp:  [97.7 °F (36.5 °C)-98.7 °F (37.1 °C)]   Pulse:  [66-71]   Resp:  [15-20]   BP: (118-166)/(59-97)   SpO2:  [93 %-100 %] .   Home meds for hypertension were reviewed and noted below.   Hypertension Medications               amLODIPine (NORVASC) 10 MG tablet Take 10 mg by mouth once daily.    furosemide (LASIX) 20 MG tablet Take 20 mg by mouth 2 (two) times daily.    hydrALAZINE (APRESOLINE) 100 MG tablet Take 1 tablet (100 mg total) by mouth 3 (three) times daily.    metoprolol succinate (TOPROL-XL) 100 MG 24 hr tablet Take 100 mg by mouth once daily.    NIFEdipine (PROCARDIA-XL) 60 MG (OSM) 24 hr tablet Take 1 tablet (60 mg total) by mouth once daily.            While in the hospital, will manage blood pressure as follows; Continue home antihypertensive regimen    Will utilize p.r.n. blood pressure medication only if patient's blood pressure greater than 180/110 and he develops symptoms such as worsening chest pain or shortness of breath.    ESRD needing dialysis  Admitted with uremic encephalopathy and hyperkalemia. Received emergent dialysis  - dialysis not able to be arranged as outpatient due to history of nonadherence--> this is a major issue. Social work checking again to see if they can arrange. Palliative care consulted  - Nephrology consulted      VTE Risk Mitigation (From admission, onward)           Ordered     IP VTE HIGH RISK PATIENT  Once          02/05/24 1733     Place sequential compression device  Until discontinued         02/05/24 1733     Reason for No Pharmacological VTE Prophylaxis  Once        Question:  Reasons:  Answer:  Already adequately anticoagulated on oral Anticoagulants    02/05/24 1733                    Discharge Planning   JAIME: 2/21/2024     Code Status: Full Code   Is the patient medically ready for discharge?:     Reason for patient still in hospital (select all that apply): Patient trending condition, Laboratory test, and Treatment  Discharge Plan A: Long-term acute care facility (LTAC)   Discharge Delays: (!) Procedure Scheduling (IR, OR, Labs, Echo, Cath, Echo, EEG)              Kyle Tang MD  Department of Hospital Medicine   [facility]

## 2024-02-20 NOTE — PT/OT/SLP PROGRESS
Occupational Therapy   Treatment    Name: Mahesh Cespedes  MRN: [id number]  Admitting Diagnosis:  Bacteremia  4 Days Post-Op    Recommendations:     Discharge Recommendations: Moderate Intensity Therapy  Discharge Equipment Recommendations:  to be determined by next level of care  Barriers to discharge:  Pt is at high risk for falls, readmission and morbidity if d/c home at this time.      Assessment:     Mahesh Cespedes is a 58 y.o. male with a medical diagnosis of Bacteremia.   Performance deficits affecting function are weakness, impaired endurance, impaired self care skills, impaired functional mobility, gait instability, impaired balance, decreased upper extremity function, decreased lower extremity function, decreased safety awareness, edema, pain.     Rehab Prognosis:  Good; patient would benefit from acute skilled OT services to address these deficits and reach maximum level of function.       Plan:     Patient to be seen  (3-5x/wk) to address the above listed problems via self-care/home management, therapeutic exercises, therapeutic activities  Plan of Care Expires: 03/08/24  Plan of Care Reviewed with: patient    Subjective     Chief Complaint: pain in R lower back/sacral spine   Patient/Family Comments/goals: agreeable to sit up in chair   Pain/Comfort:  Pain Rating 1: 8/10  Location - Side 1: Right  Location - Orientation 1: lower  Location 1: back  Pain Addressed 1: Reposition, Distraction, Cessation of Activity, Pre-medicate for activity    Objective:     Communicated with: Nurse prior to session.  Patient found HOB elevated with peripheral IV, Trialysis (AV fistula, external jugular line) upon OT entry to room.    General Precautions: Standard, fall    Orthopedic Precautions:N/A  Braces: N/A  Respiratory Status: Room air     Occupational Performance:     Bed Mobility:    Patient completed Scooting hips to EOB with contact guard assistance  Patient completed Supine to Sit with minimum assistance and HOB  elevated     Functional Mobility/Transfers: Pt w/ consistent complaints of pain in lower back and sacral spine; pt w/ pillow, air cushion and additional pillow (for offloading) to provide comfort/relief. Pt requesting something for pain; OT made nurse and pharmacy aware.    Patient completed Sit <> Stand Transfer with contact guard assistance and minimum assistance  with  rolling walker; pt required v/t cueing for safe hand placement   Patient completed Bed <> Chair Transfer using Step Transfer technique with minimum assistance with rolling walker; pt required max v/t cueing for safety as he was impulsively attempting to sit before being in front of ellen   Functional Mobility: Pt was able to ambulate ~8 ft from bed>chair w/ close min A and use of RW; no LOB occurred.     Activities of Daily Living:  Feeding:  set-up assist for self-feeding     Upper Body Dressing: minimum assistance for donning gown over sonido    [facility] 6 Click ADL: 17    Treatment & Education:  -Pt performed ADLs and functional mobility as noted above.   -Pt educated on importance of OOB activity with staff.   -Pt positioned in chair w/ air cushion to maintain skin integrity.     Patient left up in chair with all lines intact and call button in reach    GOALS:   Multidisciplinary Problems       Occupational Therapy Goals          Problem: Occupational Therapy    Goal Priority Disciplines Outcome Interventions   Occupational Therapy Goal     OT, PT/OT Ongoing, Progressing    Description: Goals to be met by: 03/08/2024     Patient will increase functional independence with ADLs by performing:    Feeding with set up High Jain's positioning for gravity assisted digestion.   UE Dressing with Min  Assistance.  LE Dressing with Moderate Assistance.  Grooming while seated with Modified [place] and Set-up Assistance.  Toileting from bedside commode with Max Assistance OOB.  Toilet transfer to bedside commode with Min Assistance.  Upper extremity  exercise program x10 reps per handout, with assistance as needed.                         Time Tracking:     OT Date of Treatment: 02/20/24  OT Start Time: 0917  OT Stop Time: 0935  OT Total Time (min): 18 min    Billable Minutes:Therapeutic Activity 18  Total Time 18    OT/FERNANDO: OT          2/20/2024

## 2024-02-20 NOTE — PLAN OF CARE
Patient remain free from injury during this shift. Dee Dee sitter in room. Dressing change on midline buttocks complete per wound care orders. Will continue plan of care until end of shift.  Problem: Skin Injury Risk Increased  Goal: Skin Health and Integrity  2/20/2024 1523 by Jamila Valdez LPN  Outcome: Ongoing, Progressing  2/20/2024 1506 by Jamila Valdez LPN  Outcome: Ongoing, Progressing     Problem: Device-Related Complication Risk (Hemodialysis)  Goal: Safe, Effective Therapy Delivery  2/20/2024 1523 by Jamila Valdez LPN  Outcome: Ongoing, Progressing  2/20/2024 1506 by Jamila Valdez LPN  Outcome: Ongoing, Progressing     Problem: Hemodynamic Instability (Hemodialysis)  Goal: Effective Tissue Perfusion  2/20/2024 1523 by Jamila Valdez LPN  Outcome: Ongoing, Progressing  2/20/2024 1506 by Jamila Valdez LPN  Outcome: Ongoing, Progressing     Problem: Infection (Hemodialysis)  Goal: Absence of Infection Signs and Symptoms  2/20/2024 1523 by Jamila Valdez LPN  Outcome: Ongoing, Progressing  2/20/2024 1506 by Jamila Valdez LPN  Outcome: Ongoing, Progressing     Problem: Adult Inpatient Plan of Care  Goal: Plan of Care Review  2/20/2024 1523 by Jamila Valdez LPN  Outcome: Ongoing, Progressing  2/20/2024 1506 by Jamila Valdez LPN  Outcome: Ongoing, Progressing  Goal: Patient-Specific Goal (Individualized)  2/20/2024 1523 by Jamila Valdez LPN  Outcome: Ongoing, Progressing  2/20/2024 1506 by Jamila Valdez LPN  Outcome: Ongoing, Progressing  Goal: Absence of Hospital-Acquired Illness or Injury  2/20/2024 1523 by Jamila Valdez LPN  Outcome: Ongoing, Progressing  2/20/2024 1506 by Jamila Valdez LPN  Outcome: Ongoing, Progressing  Goal: Optimal Comfort and Wellbeing  2/20/2024 1523 by Jj-Dougherty, [place], LPN  Outcome: Ongoing, Progressing  2/20/2024  1506 by Jamila Valdez LPN  Outcome: Ongoing, Progressing  Goal: Readiness for Transition of Care  2/20/2024 1523 by Jamila Valdez LPN  Outcome: Ongoing, Progressing  2/20/2024 1506 by Jamila Valdez LPN  Outcome: Ongoing, Progressing     Problem: Infection  Goal: Absence of Infection Signs and Symptoms  2/20/2024 1523 by Jamila Valdez LPN  Outcome: Ongoing, Progressing  2/20/2024 1506 by Jamila Valdez LPN  Outcome: Ongoing, Progressing     Problem: Diabetes Comorbidity  Goal: Blood Glucose Level Within Targeted Range  2/20/2024 1523 by Jamila Valdez LPN  Outcome: Ongoing, Progressing  2/20/2024 1506 by Jamila Valdez LPN  Outcome: Ongoing, Progressing     Problem: Impaired Wound Healing  Goal: Optimal Wound Healing  2/20/2024 1523 by Jamila Valdez LPN  Outcome: Ongoing, Progressing  2/20/2024 1506 by Jamila Valdez LPN  Outcome: Ongoing, Progressing     Problem: Coping Ineffective  Goal: Effective Coping  2/20/2024 1523 by Jamila Valdez LPN  Outcome: Ongoing, Progressing  2/20/2024 1506 by Jamila Valdez LPN  Outcome: Ongoing, Progressing     Problem: Fall Injury Risk  Goal: Absence of Fall and Fall-Related Injury  2/20/2024 1523 by Jamila Valdez LPN  Outcome: Ongoing, Progressing  2/20/2024 1506 by Jamila Valdez LPN  Outcome: Ongoing, Progressing

## 2024-02-21 LAB
ALBUMIN SERPL BCP-MCNC: 2 G/DL (ref 3.5–5.2)
ANION GAP SERPL CALC-SCNC: 12 MMOL/L (ref 8–16)
BACTERIA CATH TIP CULT: ABNORMAL
BACTERIA CATH TIP CULT: ABNORMAL
BASOPHILS # BLD AUTO: 0.05 K/UL (ref 0–0.2)
BASOPHILS NFR BLD: 0.5 % (ref 0–1.9)
BUN SERPL-MCNC: 47 MG/DL (ref 6–20)
CALCIUM SERPL-MCNC: 8 MG/DL (ref 8.7–10.5)
CHLORIDE SERPL-SCNC: 102 MMOL/L (ref 95–110)
CO2 SERPL-SCNC: 20 MMOL/L (ref 23–29)
CREAT SERPL-MCNC: 9.1 MG/DL (ref 0.5–1.4)
DIFFERENTIAL METHOD BLD: ABNORMAL
EOSINOPHIL # BLD AUTO: 0.4 K/UL (ref 0–0.5)
EOSINOPHIL NFR BLD: 4.1 % (ref 0–8)
ERYTHROCYTE [DISTWIDTH] IN BLOOD BY AUTOMATED COUNT: 22 % (ref 11.5–14.5)
EST. GFR  (NO RACE VARIABLE): 6 ML/MIN/1.73 M^2
GLUCOSE SERPL-MCNC: 79 MG/DL (ref 70–110)
HCT VFR BLD AUTO: 24.8 % (ref 40–54)
HGB BLD-MCNC: 7.7 G/DL (ref 14–18)
IMM GRANULOCYTES # BLD AUTO: 0.04 K/UL (ref 0–0.04)
IMM GRANULOCYTES NFR BLD AUTO: 0.4 % (ref 0–0.5)
LYMPHOCYTES # BLD AUTO: 0.7 K/UL (ref 1–4.8)
LYMPHOCYTES NFR BLD: 7.1 % (ref 18–48)
MCH RBC QN AUTO: 27.2 PG (ref 27–31)
MCHC RBC AUTO-ENTMCNC: 31 G/DL (ref 32–36)
MCV RBC AUTO: 88 FL (ref 82–98)
MONOCYTES # BLD AUTO: 0.9 K/UL (ref 0.3–1)
MONOCYTES NFR BLD: 8.5 % (ref 4–15)
NEUTROPHILS # BLD AUTO: 8.2 K/UL (ref 1.8–7.7)
NEUTROPHILS NFR BLD: 79.4 % (ref 38–73)
NRBC BLD-RTO: 0 /100 WBC
PHOSPHATE SERPL-MCNC: 7 MG/DL (ref 2.7–4.5)
PLATELET # BLD AUTO: 254 K/UL (ref 150–450)
PMV BLD AUTO: 9.6 FL (ref 9.2–12.9)
POTASSIUM SERPL-SCNC: 3.8 MMOL/L (ref 3.5–5.1)
RBC # BLD AUTO: 2.83 M/UL (ref 4.6–6.2)
SODIUM SERPL-SCNC: 134 MMOL/L (ref 136–145)
VANCOMYCIN SERPL-MCNC: 17.8 UG/ML
WBC # BLD AUTO: 10.3 K/UL (ref 3.9–12.7)

## 2024-02-21 PROCEDURE — 25000003 PHARM REV CODE 250

## 2024-02-21 PROCEDURE — 97116 GAIT TRAINING THERAPY: CPT | Mod: CQ

## 2024-02-21 PROCEDURE — 85025 COMPLETE CBC W/AUTO DIFF WBC: CPT | Performed by: STUDENT IN AN ORGANIZED HEALTH CARE EDUCATION/TRAINING PROGRAM

## 2024-02-21 PROCEDURE — 97535 SELF CARE MNGMENT TRAINING: CPT

## 2024-02-21 PROCEDURE — 80100016 HC MAINTENANCE HEMODIALYSIS

## 2024-02-21 PROCEDURE — 25000003 PHARM REV CODE 250: Performed by: INTERNAL MEDICINE

## 2024-02-21 PROCEDURE — 11000001 HC ACUTE MED/SURG PRIVATE ROOM

## 2024-02-21 PROCEDURE — 25000003 PHARM REV CODE 250: Performed by: HOSPITALIST

## 2024-02-21 PROCEDURE — 63600175 PHARM REV CODE 636 W HCPCS: Mod: JZ,JG | Performed by: INTERNAL MEDICINE

## 2024-02-21 PROCEDURE — 36415 COLL VENOUS BLD VENIPUNCTURE: CPT | Performed by: STUDENT IN AN ORGANIZED HEALTH CARE EDUCATION/TRAINING PROGRAM

## 2024-02-21 PROCEDURE — 25000003 PHARM REV CODE 250: Performed by: STUDENT IN AN ORGANIZED HEALTH CARE EDUCATION/TRAINING PROGRAM

## 2024-02-21 PROCEDURE — 63600175 PHARM REV CODE 636 W HCPCS: Performed by: STUDENT IN AN ORGANIZED HEALTH CARE EDUCATION/TRAINING PROGRAM

## 2024-02-21 PROCEDURE — 80069 RENAL FUNCTION PANEL: CPT | Performed by: STUDENT IN AN ORGANIZED HEALTH CARE EDUCATION/TRAINING PROGRAM

## 2024-02-21 PROCEDURE — 80202 ASSAY OF VANCOMYCIN: CPT | Performed by: STUDENT IN AN ORGANIZED HEALTH CARE EDUCATION/TRAINING PROGRAM

## 2024-02-21 PROCEDURE — 97530 THERAPEUTIC ACTIVITIES: CPT | Mod: CQ

## 2024-02-21 PROCEDURE — 99233 SBSQ HOSP IP/OBS HIGH 50: CPT | Mod: ,,, | Performed by: INTERNAL MEDICINE

## 2024-02-21 PROCEDURE — 97530 THERAPEUTIC ACTIVITIES: CPT

## 2024-02-21 PROCEDURE — 63600175 PHARM REV CODE 636 W HCPCS: Performed by: INTERNAL MEDICINE

## 2024-02-21 PROCEDURE — 63600175 PHARM REV CODE 636 W HCPCS

## 2024-02-21 RX ADMIN — CEFEPIME 1.5 G: 2 INJECTION, POWDER, FOR SOLUTION INTRAVENOUS at 05:02

## 2024-02-21 RX ADMIN — SEVELAMER CARBONATE 1600 MG: 800 TABLET, FILM COATED ORAL at 12:02

## 2024-02-21 RX ADMIN — SEVELAMER CARBONATE 1600 MG: 800 TABLET, FILM COATED ORAL at 08:02

## 2024-02-21 RX ADMIN — METOPROLOL SUCCINATE 50 MG: 50 TABLET, EXTENDED RELEASE ORAL at 08:02

## 2024-02-21 RX ADMIN — ALLOPURINOL 100 MG: 100 TABLET ORAL at 08:02

## 2024-02-21 RX ADMIN — HYDROMORPHONE HYDROCHLORIDE 0.5 MG: 1 INJECTION, SOLUTION INTRAMUSCULAR; INTRAVENOUS; SUBCUTANEOUS at 01:02

## 2024-02-21 RX ADMIN — AMIODARONE HYDROCHLORIDE 200 MG: 200 TABLET ORAL at 08:02

## 2024-02-21 RX ADMIN — EPOETIN ALFA-EPBX 10000 UNITS: 10000 INJECTION, SOLUTION INTRAVENOUS; SUBCUTANEOUS at 08:02

## 2024-02-21 RX ADMIN — OXYCODONE 10 MG: 5 TABLET ORAL at 05:02

## 2024-02-21 RX ADMIN — ATORVASTATIN CALCIUM 80 MG: 40 TABLET, FILM COATED ORAL at 08:02

## 2024-02-21 RX ADMIN — IRON SUCROSE 100 MG: 20 INJECTION, SOLUTION INTRAVENOUS at 05:02

## 2024-02-21 RX ADMIN — BENZTROPINE MESYLATE 0.5 MG: 0.5 TABLET ORAL at 08:02

## 2024-02-21 RX ADMIN — BENZTROPINE MESYLATE 0.5 MG: 0.5 TABLET ORAL at 09:02

## 2024-02-21 RX ADMIN — CALCITRIOL CAPSULES 0.25 MCG 0.25 MCG: 0.25 CAPSULE ORAL at 08:02

## 2024-02-21 RX ADMIN — TAMSULOSIN HYDROCHLORIDE 0.4 MG: 0.4 CAPSULE ORAL at 08:02

## 2024-02-21 RX ADMIN — SEVELAMER CARBONATE 1600 MG: 800 TABLET, FILM COATED ORAL at 05:02

## 2024-02-21 RX ADMIN — VANCOMYCIN HYDROCHLORIDE 500 MG: 500 INJECTION, POWDER, LYOPHILIZED, FOR SOLUTION INTRAVENOUS at 06:02

## 2024-02-21 RX ADMIN — Medication 6 MG: at 09:02

## 2024-02-21 RX ADMIN — NEPHROCAP 1 CAPSULE: 1 CAP ORAL at 08:02

## 2024-02-21 RX ADMIN — AMLODIPINE BESYLATE 5 MG: 5 TABLET ORAL at 09:02

## 2024-02-21 RX ADMIN — Medication 6 MG: at 01:02

## 2024-02-21 NOTE — ASSESSMENT & PLAN NOTE
Patient with Paroxysmal (<7 days) atrial fibrillation which is controlled currently with Amiodarone. Patient is currently in sinus rhythm.[id number] Score: 1.  Anticoagulation indicated. Anticoagulation done with apixaban .  -on hold due to recent bleeding and pending line placement.

## 2024-02-21 NOTE — ASSESSMENT & PLAN NOTE
Noted on CT. With leukocytosis  - started vanc, zosyn-- continue  - General surgery consulted- to OR.  - wound care consulted  S/P operative debridement 2/8 with bone biopsy obtained given exposed coccyx    Bone cultures growing morganella and klebsiella.  Deescalated to cefepime.

## 2024-02-21 NOTE — ASSESSMENT & PLAN NOTE
Blood culture 2/6/24 with gram variable rods.   Morganella bacteremia.  Sacral osteomyelitis as source.  Stop Vanc.  Continued Zosyn.  Repeat BCx negative so far.  ID consulted.  Changed to Cefepime.  Can do ABx's with HD on discharge.  Recommending changing tunneled HD catheter. Pt with bleeding from catheter site overnight after attempted removal resulting blood loss. IR consulted this am and stitched site with control of bleeding. Pending vascular eval for removal.  -removed 2/16 will give line holiday until 2/20 per nephrology recs  -however previous catheter tip culture grew Staph epidermidis and Staph hemolyticus, oxacillin sensitive.  Repeat blood cultures pending.  ID recommended to continue cefepime for 6 weeks with HD

## 2024-02-21 NOTE — PLAN OF CARE
02/20/24 1500   Medicare Message   Important Message from Medicare regarding Discharge Appeal Rights Given to patient/caregiver;Explained to patient/caregiver;Signed/date by patient/caregiver;Other (comments)   Date IMM was signed 02/20/24     [facility] mail  [phone]

## 2024-02-21 NOTE — NURSING
[facility]  Nurses Note -- 4 Eyes      2/20/2024       Skin assessed on: Q Shift      [] No Pressure Injuries Present    []Prevention Measures Documented    [x] Yes LDA  for Pressure Injury Previously documented     [] Yes New Pressure Injury Discovered   [] LDA for New Pressure Injury Added      Attending RN:  Carol Price RN     Second RN:  JAVIER Marino       No significant past surgical history

## 2024-02-21 NOTE — PT/OT/SLP PROGRESS
Physical Therapy Treatment    Patient Name:  Mahesh Cespedes   MRN:  [id number]    Recommendations:     Discharge Recommendations: Moderate Intensity Therapy  Discharge Equipment Recommendations: to be determined by next level of care  Barriers to discharge:  Decreased functional mobility, decreased ambulation, increased fall risk, increased pain    Assessment:     Mahesh Cespedes is a 58 y.o. male admitted with a medical diagnosis of Bacteremia.  He presents with the following impairments/functional limitations: weakness, impaired endurance, impaired self care skills, impaired functional mobility, gait instability, impaired balance, decreased lower extremity function, decreased coordination, decreased safety awareness.    Pt ambulated ~12ft using RW and Min A with chair following    Rehab Prognosis: Good; patient would benefit from acute skilled PT services to address these deficits and reach maximum level of function.    Recent Surgery: Procedure(s) (LRB):  Removal, Vascular Access Catheter (Right)  INSERTION, CATHETER, TRIPLE LUMEN, HEMODIALYSIS, TEMPORARY (Right) 5 Days Post-Op    Plan:     During this hospitalization, patient to be seen  (5-6x/wk) to address the identified rehab impairments via gait training, therapeutic activities, therapeutic exercises, neuromuscular re-education, wheelchair management/training and progress toward the following goals:    Plan of Care Expires:  02/23/24    Subjective     Chief Complaint: Pain  Patient/Family Comments/goals: Pt agreed to therapy  Pain/Comfort:  Pain Rating 1:  (pt did not rate)  Location - Orientation 1: lower  Pain Addressed 1: Pre-medicate for activity, Reposition      Objective:    Patient found right sidelying with bed alarm, External Jugular IV, HD catheter, pulse ox (continuous), telemetry upon PT entry to room.     General Precautions: Standard, fall  Orthopedic Precautions: N/A  Braces: N/A  Respiratory Status: Room air     Functional Mobility:  Bed Mobility:      Scooting: contact guard assistance  Supine to Sit: contact guard assistance  Transfers: Gait belt donned    Sit to Stand:  minimum assistance with rolling walker; x2 trials from [place], x1 trial EOB  Chair to Bed: Pt performed stand pivot transfer using RW and Min A. Pt required Max A for RW management and verbal cueing for safety awareness. Pt attempted to sit on EOB without the bed being close enough to pt. Pt required tactile cueing when descending to sit for hand placement on bed.  Gait: Pt ambulated ~12ft using RW and Min A. Pt with decreased eileen, step length, and weight shifting. Pt requiring verbal and tactile cueing for RW management, RW too far out in front of pt, cueing to bring RW closer to RAJAT.   Balance: Pt with Fair-standing balance      AM-PAC 6 CLICK MOBILITY  Turning over in bed (including adjusting bedclothes, sheets and blankets)?: 3  Sitting down on and standing up from a chair with arms (e.g., wheelchair, bedside commode, etc.): 3  Moving from lying on back to sitting on the side of the bed?: 3  Moving to and from a bed to a chair (including a wheelchair)?: 3  Need to walk in hospital room?: 3  Climbing 3-5 steps with a railing?: 3  Basic Mobility Total Score: 18       Treatment & Education:  Pt performed gait training this date with RW.    Patient left right sidelying HOB elevated with all lines intact, pillow placed between knees, call button in reach, bed alarm on, and nursing notified.    GOALS:   Multidisciplinary Problems       Physical Therapy Goals          Problem: Physical Therapy    Goal Priority Disciplines Outcome Goal Variances Interventions   Physical Therapy Goal     PT, PT/OT Ongoing, Progressing     Description: Goals to be met by: 24     Patient will increase functional independence with mobility by performin. Supine to sit with Stand-by Assistance  2. Sit to stand transfer with Contact Guard Assistance  3. Bed to chair transfer with Contact Guard  Assistance    4. Gait  x 10-15 feet with Minimal Assistance using Rolling Walker.   5. Wheelchair propulsion x50 feet with Stand-by Assistance    6. Lower extremity exercise program x10 reps per handout, with supervision                         Time Tracking:     PT Received On: 02/21/24  PT Start Time: 0841     PT Stop Time: 0904  PT Total Time (min): 23 min     Billable Minutes: Gait Training 15 and Therapeutic Activity 8    Treatment Type: Treatment  PT/PTA: PTA     Number of PTA visits since last PT visit: 4    02/21/2024

## 2024-02-21 NOTE — PT/OT/SLP PROGRESS
Occupational Therapy   Treatment    Name: Mahesh Cespedes  MRN: [id number]  Admitting Diagnosis:  Bacteremia  5 Days Post-Op    Recommendations:     Discharge Recommendations: Moderate Intensity Therapy  Discharge Equipment Recommendations:  to be determined by next level of care  Barriers to discharge:   Pt is at high risk for falls, readmission and morbidity if d/c home at this time.     Assessment:     Mahesh Cespedes is a 58 y.o. male with a medical diagnosis of Bacteremia.   Performance deficits affecting function are weakness, impaired endurance, impaired self care skills, impaired functional mobility, gait instability, impaired balance, decreased lower extremity function, decreased safety awareness, impaired cardiopulmonary response to activity, decreased ROM.     Pt w/ functional progress this date as he was able to ambulate functional distances w/ close CGA-min A and use of RW, along w/ completing standing ADLs at sink. Pt w/ pain in sacral spine/lower back but tolerated OOB activities well. Pt will continue to benefit from skilled acute OT services to maximize functional capacity for safe performance w/ ADLs and functional mobility.     Rehab Prognosis:  Good; patient would benefit from acute skilled OT services to address these deficits and reach maximum level of function.       Plan:     Patient to be seen  (3-5x/wk) to address the above listed problems via self-care/home management, therapeutic activities, therapeutic exercises  Plan of Care Expires: 03/08/24  Plan of Care Reviewed with: patient    Subjective     Chief Complaint: pain   Patient/Family Comments/goals: Pt agreeable to participate   Pain/Comfort:  Pain Rating 1:  (Pt did not rate.)  Location - Orientation 1: lower  Location 1: back  Pain Addressed 1: Reposition, Distraction, Cessation of Activity    Objective:     Communicated with: Nurse prior to session.  Patient found right sidelying with peripheral IV, Trialysis (hemodialysis  fistula/catheter) upon OT entry to room.    General Precautions: Standard, fall    Orthopedic Precautions:N/A  Braces: N/A  Respiratory Status: Room air     Occupational Performance:     Bed Mobility:    Patient completed Scooting hips to EOB with contact guard assistance  Patient completed Supine to Sit with contact guard assistance and minimum assistance  Patient completed Sit to Supine with minimum assistance     Functional Mobility/Transfers:  Patient completed Sit <> Stand Transfer x 1 trial from EOB and x 2 trials from chair with minimum assistance  with  rolling walker   Patient completed Bed <> Chair Transfer using Step Transfer technique with minimum assistance with rolling walker  Patient completed Chair <> Bed Transfer using Step Transfer technique with minimum assistance with rolling walker  Functional Mobility: Pt was able to ambulate a functional distance of ~12 ft w/ min A and use of RW; no LOB occurred though pt mildly unsteady.     Activities of Daily Living:  Grooming: min A for maintaining balance while standing at sink to wash face and wash B hands     Upper Body Dressing: minimum assistance for donning gown over back       AMPA 6 Click ADL: 17    Treatment & Education:  -Pt performed ADLs and functional mobility as noted above.   -Pt educated on importance of OOB activity with staff.   -Pt transferred back to bed per nurse request in preparation for being transported to dialysis.     Patient left HOB elevated with all lines intact, call button in reach, bed alarm on, and pillow wedged to L side.     GOALS:   Multidisciplinary Problems       Occupational Therapy Goals          Problem: Occupational Therapy    Goal Priority Disciplines Outcome Interventions   Occupational Therapy Goal     OT, PT/OT Ongoing, Progressing    Description: Goals to be met by: 03/08/2024     Patient will increase functional independence with ADLs by performing:    Feeding with set up High Jain's positioning for  gravity assisted digestion.   UE Dressing with Min  Assistance.  LE Dressing with Moderate Assistance.  Grooming while seated with Modified Reading and Set-up Assistance.  Toileting from bedside commode with Max Assistance OOB.  Toilet transfer to bedside commode with Min Assistance.  Upper extremity exercise program x10 reps per handout, with assistance as needed.                         Time Tracking:     OT Date of Treatment: 02/21/24  OT Start Time: 0841  OT Stop Time: 0904  OT Total Time (min): 23 min    Billable Minutes:Self Care/Home Management 10  Therapeutic Activity 13  Total Time 23 (co-tx w/ PT)     OT/FERNANDO: OT          2/21/2024

## 2024-02-21 NOTE — ASSESSMENT & PLAN NOTE
59 yo man with ESDR and recent admissions establishing dialysis after relocating from [place] (December, 2023), found to have bacteremia from a infected sacral ulcer with osteomyelitis. Sacral bone culture growing Morganella, Klebsiella. blood cultures from admit also with Morganella. HD cath changed - tip grew S epi and S hema - both susceptible to oxacillin?). Nevertheless, no need for additional abx, given negative blood cultures.    Recommendations:   - complete 6 weeks of cefepime (see below)  - wound care as per primary team    Outpatient Antibiotic Therapy Plan:    Please send referral to [facility].    1) Infection: sacral osteo/bacteremia    2) Discharge Antibiotics:    Intravenous antibiotics:  Cefepime 1.5gm/1.5gm/2gm 3x/wk with dialysis    3) Therapy Duration:  6 weeks    Estimated end date of IV antibiotics: 3/17    4) Outpatient Weekly Labs:    Order the following labs to be drawn on Mondays:   CBC  CMP   CRP    5) Fax Lab Results to Infectious Diseases Provider: dr Edwards    [facility] Fax Number: [phone]    6) Outpatient Infectious Diseases Follow-up    Follow-up appointment will be arranged by the ID clinic and will be found in the patient's appointments tab.    Prior to discharge, please ensure the patient's follow-up has been scheduled.    If there is still no follow-up scheduled prior to discharge, please send an EPIC message to Zoë Barney in Infectious Diseases.

## 2024-02-21 NOTE — SUBJECTIVE & OBJECTIVE
Interval History: Interval history reviewed. Temporary catheter removed and Permacath placed. Cath tip grew CNS x 2 (both MS?). Patient is without complaints.    Review of Systems   Constitutional:  Negative for chills and fever.   Skin:  Positive for wound.   All other systems reviewed and are negative.    Objective:     Vital Signs (Most Recent):  Temp: 98.5 °F (36.9 °C) (02/21/24 0822)  Pulse: 69 (02/21/24 0822)  Resp: 18 (02/21/24 0822)  BP: 137/73 (02/21/24 0822)  SpO2: (!) 93 % (02/21/24 0822) Vital Signs (24h Range):  Temp:  [97.5 °F (36.4 °C)-98.6 °F (37 °C)] 98.5 °F (36.9 °C)  Pulse:  [63-70] 69  Resp:  [15-20] 18  SpO2:  [93 %-100 %] 93 %  BP: (118-166)/(59-97) 137/73     Weight: 75.3 kg (166 lb 0.1 oz)  Body mass index is 26 kg/m².    Estimated Creatinine Clearance: 8.1 mL/min (A) (based on SCr of 9.1 mg/dL (H)).     Physical Exam  Vitals and nursing note reviewed.   Constitutional:       Appearance: Normal appearance.   HENT:      Head: Normocephalic and atraumatic.   Eyes:      Pupils: Pupils are equal, round, and reactive to light.   Cardiovascular:      Rate and Rhythm: Normal rate.   Neurological:      General: No focal deficit present.      Mental Status: He is alert and oriented to person, place, and time.   Psychiatric:         Mood and Affect: Mood normal.         Thought Content: Thought content normal.         Judgment: Judgment normal.          Significant Labs: Blood Culture:   Recent Labs   Lab 02/10/24  1240 02/16/24  1021 02/16/24  1129 02/20/24  1754 02/20/24  1759   LABBLOO No Growth after 4 days. No Growth after 4 days. No Growth after 4 days. No Growth to date No Growth to date     Wound Culture:   Recent Labs   Lab 02/08/24  1250 02/16/24  1037   LABAERO MORGANELLA MORGANII  From broth only  *  MORGANELLA MORGANII  Many  *  KLEBSIELLA OXYTOCA  Rare  * STAPHYLOCOCCUS EPIDERMIDIS  < 15 colonies  *       Significant Imaging: I have reviewed all pertinent imaging results/findings within  the past 24 hours.

## 2024-02-21 NOTE — PROGRESS NOTES
Pharmacokinetic Assessment Follow Up: IV Vancomycin    Vancomycin serum concentration assessment(s):    The random level was drawn correctly and can be used to guide therapy at this time. The measurement is within the desired definitive target range of 10 to 20 mcg/mL.    Vancomycin Regimen Plan:    Vancomycin 500mg post HD if patient has HD.    Re-dose when the random level is less than 20 mcg/mL, next level to be drawn at 0400 on 2/23/24    Drug levels (last 3 results):  Recent Labs   Lab Result Units 02/21/24  0353   Vancomycin, Random ug/mL 17.8       Pharmacy will continue to follow and monitor vancomycin.    Please contact pharmacy at extension [phone] for questions regarding this assessment.    Thank you for the consult,   Cristopher Gomez       Patient brief summary:  Mahesh Cespedes is a 58 y.o. male initiated on antimicrobial therapy with IV Vancomycin for treatment of  Staph epidermidis & Staph  haemolyticus    The patient's current regimen is random pulse dosing    Drug Allergies:   Review of patient's allergies indicates:  No Known Allergies    Actual Body Weight:   75.3 kg    Renal Function:   Estimated Creatinine Clearance: 7 mL/min (A) (based on SCr of 10.5 mg/dL (H)).,     Dialysis Method (if applicable):  intermittent HD    CBC (last 72 hours):  Recent Labs   Lab Result Units 02/20/24  0344 02/21/24  0353   WBC K/uL 11.96 10.30   Hemoglobin g/dL 7.8* 7.7*   Hematocrit % 24.8* 24.8*   Platelets K/uL 242 254   Gran % % 81.2* 79.4*   Lymph % % 6.7* 7.1*   Mono % % 8.6 8.5   Eosinophil % % 2.9 4.1   Basophil % % 0.3 0.5   Differential Method  Automated Automated       Metabolic Panel (last 72 hours):  Recent Labs   Lab Result Units 02/18/24  2355 02/19/24  0518   Sodium mmol/L 138  --    Potassium mmol/L 4.2  --    Chloride mmol/L 101  --    CO2 mmol/L 22*  --    Glucose mg/dL 103  --    BUN mg/dL 61*  --    Creatinine mg/dL 10.5*  --    Phosphorus mg/dL  --  9.1*       Vancomycin  Administrations:  vancomycin given in the last 96 hours                     vancomycin (VANCOCIN) 1,000 mg in dextrose 5 % (D5W) 250 mL IVPB (Vial-Mate) (mg) 1,000 mg New Bag 02/20/24 1827                    Microbiologic Results:  Microbiology Results (last 7 days)       Procedure Component Value Units Date/Time    Blood culture [[id number]] Collected: 02/20/24 1759    Order Status: Completed Specimen: Blood Updated: 02/21/24 0312     Blood Culture, Routine No Growth to date    Blood culture [[id number]] Collected: 02/20/24 1754    Order Status: Completed Specimen: Blood Updated: 02/21/24 0312     Blood Culture, Routine No Growth to date    Blood culture [[id number]] Collected: 02/16/24 1021    Order Status: Completed Specimen: Blood from Line, Femoral, Right Updated: 02/20/24 1303     Blood Culture, Routine No Growth after 4 days.    Blood culture [[id number]] Collected: 02/16/24 1129    Order Status: Completed Specimen: Blood from Line, Femoral, Right Updated: 02/20/24 1303     Blood Culture, Routine No Growth after 4 days.    Culture, Anaerobe [[id number]] Collected: 02/16/24 1037    Order Status: Completed Specimen: Incision site from Chest, Right Updated: 02/20/24 0836     Anaerobic Culture No anaerobes isolated    Narrative:      Right Dialysis Catheter    IV catheter culture [[id number]]  (Abnormal) Collected: 02/16/24 2028    Order Status: Completed Specimen: Catheter Tip Updated: 02/20/24 0732     Aerobic Culture - Cath tip STAPHYLOCOCCUS EPIDERMIDIS  > 15 colonies  Susceptibility pending        STAPHYLOCOCCUS HAEMOLYTICUS  > 15 colonies  Susceptibility pending      Fungus culture [[id number]] Collected: 02/08/24 1250    Order Status: Completed Specimen: Bone from Sacrum Updated: 02/19/24 1409     Fungus (Mycology) Culture No fungal growth to date    Narrative:      Bone from coccyx    Fungus culture [[id number]] Collected: 02/08/24 1250    Order Status: Completed Specimen: Bone from Sacrum Updated:  02/19/24 1409     Fungus (Mycology) Culture No fungal growth to date    Narrative:      Sacral abcess    Aerobic culture [[id number]]  (Abnormal)  (Susceptibility) Collected: 02/16/24 1037    Order Status: Completed Specimen: Incision site from Chest, Right Updated: 02/19/24 1203     Aerobic Bacterial Culture STAPHYLOCOCCUS EPIDERMIDIS  < 15 colonies      Narrative:      Right Dialysis Catheter    Gram stain [[id number]] Collected: 02/16/24 1037    Order Status: Completed Specimen: Incision site from Chest, Right Updated: 02/16/24 1350     Gram Stain Result No WBC's      No organisms seen    Narrative:      Right Dialysis Catheter    Fungus culture [[id number]] Collected: 02/16/24 1037    Order Status: Sent Specimen: Incision site from Chest, Right Updated: 02/16/24 1112    AFB Culture & Smear [[id number]] Collected: 02/16/24 1037    Order Status: Canceled Specimen: Incision site from Chest, Right     Blood culture [[id number]] Collected: 02/10/24 1235    Order Status: Completed Specimen: Blood from Peripheral, Hand, Left Updated: 02/14/24 1303     Blood Culture, Routine No Growth after 4 days.    Blood culture [[id number]] Collected: 02/10/24 1240    Order Status: Completed Specimen: Blood from Peripheral, Antecubital, Left Updated: 02/14/24 1303     Blood Culture, Routine No Growth after 4 days.

## 2024-02-21 NOTE — PROGRESS NOTES
Renal Progress Note    Date of Admission:  2/5/2024 12:18 PM    Length of Stay: 15  Days    Subjective: n/a    Objective:    Current Facility-Administered Medications   Medication    0.9%  NaCl infusion (for blood administration)    0.9%  NaCl infusion (for blood administration)    0.9%  NaCl infusion (for blood administration)    0.9%  NaCl infusion    acetaminophen tablet 650 mg    allopurinoL tablet 100 mg    aluminum-magnesium hydroxide-simethicone 200-200-20 mg/5 mL suspension 30 mL    amiodarone tablet 200 mg    amLODIPine tablet 5 mg    atorvastatin tablet 80 mg    benztropine tablet 0.5 mg    calcitRIOL capsule 0.25 mcg    ceFEPIme (MAXIPIME) 1.5 g in dextrose 5 % (D5W) 100 mL IVPB    ceFEPIme (MAXIPIME) 1.5 g in dextrose 5 % (D5W) 100 mL IVPB    [START ON 2/23/2024] ceFEPIme (MAXIPIME) 2 g in dextrose 5 % in water (D5W) 100 mL IVPB (MB+)    dextrose 10% bolus 125 mL 125 mL    dextrose 10% bolus 250 mL 250 mL    epoetin luli-epbx injection 10,000 Units    glucagon (human recombinant) injection 1 mg    glucose chewable tablet 16 g    glucose chewable tablet 24 g    HYDROmorphone injection 0.5 mg    iron sucrose injection 100 mg    melatonin tablet 6 mg    metoprolol succinate (TOPROL-XL) 24 hr tablet 50 mg    naloxone 0.4 mg/mL injection 0.02 mg    ondansetron injection 4 mg    oxyCODONE immediate release tablet 10 mg    prochlorperazine injection Soln 5 mg    sevelamer carbonate tablet 1,600 mg    simethicone chewable tablet 80 mg    sodium chloride 0.9% bolus 250 mL 250 mL    sodium chloride 0.9% bolus 250 mL 250 mL    sodium chloride 0.9% flush 10 mL    tamsulosin 24 hr capsule 0.4 mg    vancomycin - pharmacy to dose    vitamin renal formula (B-complex-vitamin c-folic acid) 1 mg per capsule 1 capsule       Vitals:    02/21/24 0347 02/21/24 0407 02/21/24 0730 02/21/24 0822   BP:  125/85  137/73   BP Location:  Right arm  Right arm   Patient Position:  Lying  Lying   Pulse: 68 68 68  "69   Resp:  18  18   Temp:  98.3 °F (36.8 °C)  98.5 °F (36.9 °C)   TempSrc:  Oral  Oral   SpO2:  95%  (!) 93%   Weight:       Height:           I/O last 3 completed shifts:  In: 120 [P.O.:120]  Out: 0   No intake/output data recorded.      Physical Exam: unchanged, seen during Dialysis.    Laboratories:    Recent Labs   Lab 02/21/24  0353   WBC 10.30   RBC 2.83*   HGB 7.7*   HCT 24.8*      MCV 88   MCH 27.2   MCHC 31.0*         Recent Labs   Lab 02/21/24  0353   CALCIUM 8.0*   ALBUMIN 2.0*   *   K 3.8   CO2 20*      BUN 47*   CREATININE 9.1*         No results for input(s): "COLORU", "CLARITYU", "SPECGRAV", "PHUR", "PROTEINUA", "GLUCOSEU", "BILIRUBINCON", "BLOODU", "WBCU", "RBCU", "BACTERIA", "MUCUS" in the last 24 hours.    Microbiology Results (last 7 days)       Procedure Component Value Units Date/Time    IV catheter culture [[id number]]  (Abnormal)  (Susceptibility) Collected: 02/16/24 2028    Order Status: Completed Specimen: Catheter Tip Updated: 02/21/24 0743     Aerobic Culture - Cath tip STAPHYLOCOCCUS EPIDERMIDIS  > 15 colonies  Susceptibility pending        STAPHYLOCOCCUS HAEMOLYTICUS  > 15 colonies      Blood culture [[id number]] Collected: 02/20/24 1759    Order Status: Completed Specimen: Blood Updated: 02/21/24 0312     Blood Culture, Routine No Growth to date    Blood culture [[id number]] Collected: 02/20/24 1754    Order Status: Completed Specimen: Blood Updated: 02/21/24 0312     Blood Culture, Routine No Growth to date    Blood culture [[id number]] Collected: 02/16/24 1021    Order Status: Completed Specimen: Blood from Line, Femoral, Right Updated: 02/20/24 1303     Blood Culture, Routine No Growth after 4 days.    Blood culture [[id number]] Collected: 02/16/24 1129    Order Status: Completed Specimen: Blood from Line, Femoral, Right Updated: 02/20/24 1303     Blood Culture, Routine No Growth after 4 days.    Culture, Anaerobe [[id number]] Collected: 02/16/24 1037    Order " Status: Completed Specimen: Incision site from Chest, Right Updated: 02/20/24 0836     Anaerobic Culture No anaerobes isolated    Narrative:      Right Dialysis Catheter    Fungus culture [[id number]] Collected: 02/08/24 1250    Order Status: Completed Specimen: Bone from Sacrum Updated: 02/19/24 1409     Fungus (Mycology) Culture No fungal growth to date    Narrative:      Bone from coccyx    Fungus culture [[id number]] Collected: 02/08/24 1250    Order Status: Completed Specimen: Bone from Sacrum Updated: 02/19/24 1409     Fungus (Mycology) Culture No fungal growth to date    Narrative:      Sacral abcess    Aerobic culture [[id number]]  (Abnormal)  (Susceptibility) Collected: 02/16/24 1037    Order Status: Completed Specimen: Incision site from Chest, Right Updated: 02/19/24 1203     Aerobic Bacterial Culture STAPHYLOCOCCUS EPIDERMIDIS  < 15 colonies      Narrative:      Right Dialysis Catheter    Gram stain [[id number]] Collected: 02/16/24 1037    Order Status: Completed Specimen: Incision site from Chest, Right Updated: 02/16/24 1350     Gram Stain Result No WBC's      No organisms seen    Narrative:      Right Dialysis Catheter    Fungus culture [[id number]] Collected: 02/16/24 1037    Order Status: Sent Specimen: Incision site from Chest, Right Updated: 02/16/24 1112    AFB Culture & Smear [[id number]] Collected: 02/16/24 1037    Order Status: Canceled Specimen: Incision site from Chest, Right     Blood culture [[id number]] Collected: 02/10/24 1235    Order Status: Completed Specimen: Blood from Peripheral, Hand, Left Updated: 02/14/24 1303     Blood Culture, Routine No Growth after 4 days.    Blood culture [[id number]] Collected: 02/10/24 1240    Order Status: Completed Specimen: Blood from Peripheral, Antecubital, Left Updated: 02/14/24 1303     Blood Culture, Routine No Growth after 4 days.              Diagnostic Tests:     CXR:  Impression:       Cardiomegaly.  Increased interstitial lung markings.   "Possible small pleural effusions.  Question on fluid overload and or CHF.  However, a pneumonic infectious process cannot be excluded in the appropriate clinical circumstances.      Electronically signed by: Sandie Martinez  Date: 02/05/2024           Assessment:    59 y/o male with known to me from prior encounter last month; Hx. ESRD (Moved from [facility]. no Dialysis unit to go to) admitted with:     - Hyperkalemia corrected with Dialysis last p.m.  - HAGMA  - Uncontrolled HTN  - One of the blood cultures reported as "gram variable rods" ID and sensitivity pending  - Fluid overload  - Improving Metabolic (uremic) encephalopathy, last HD 1/22/24 while in the Hospital (Pte. Was not accepted by any of the local Dialysis units due to Hx. Of non-compliance and was told either to try to go back to [place] or come to ED periodically as needed for HD)  - Hyperphosphatemia  - 2nd. hyperparathyroidism  - Hx. Ascites s/p Paracentesis in January  - Fe++ def. + Anemia of ckd  - HTN  - Hx. CVA  - Hypoalbuminemia  - Infected (Morganella) sacral pressure ulcer s/p debridement           Plan:    - Transfer to LTAC after Dialysis today.                  "

## 2024-02-21 NOTE — PLAN OF CARE
Problem: Physical Therapy  Goal: Physical Therapy Goal  Description: Goals to be met by: 24     Patient will increase functional independence with mobility by performin. Supine to sit with Stand-by Assistance  2. Sit to stand transfer with Contact Guard Assistance  3. Bed to chair transfer with Contact Guard Assistance    4. Gait  x 10-15 feet with Minimal Assistance using Rolling Walker.   5. Wheelchair propulsion x50 feet with Stand-by Assistance    6. Lower extremity exercise program x10 reps per handout, with supervision    Outcome: Ongoing, Progressing       Pt ambulated ~12ft using RW and Min A with chair following

## 2024-02-21 NOTE — NURSING
[facility]  Nurses Note -- 4 Eyes      2/21/2024       Skin assessed on: Q Shift      [] No Pressure Injuries Present    []Prevention Measures Documented    [x] Yes LDA  for Pressure Injury Previously documented     [] Yes New Pressure Injury Discovered   [] LDA for New Pressure Injury Added      Attending RN:  Ira Kiran, BETO     Second RN:  JoannaRN

## 2024-02-21 NOTE — ASSESSMENT & PLAN NOTE
PT/OT consulted.  Currently recommending moderate intensity therapy.  SW/CM did send LTAC referrals.  Patient accepted, pending placement, awaiting bed

## 2024-02-21 NOTE — PROGRESS NOTES
"[facility] Surg  Infectious Disease  Progress Note    Patient Name: Mahesh Cespedes  MRN: [id number]  Admission Date: 2/5/2024  Length of Stay: 15 days  Attending Physician: Kyle Tang,*  Primary Care Provider: Cruz Hernandez MD    Isolation Status: No active isolations  Assessment/Plan:      ID  * Bacteremia  59 yo man with ESDR and recent admissions establishing dialysis after relocating from [place] (December, 2023), found to have bacteremia from a infected sacral ulcer with osteomyelitis. Sacral bone culture growing Morganella, Klebsiella. blood cultures from admit also with Morganella. HD cath changed - tip grew S epi and S hema - both susceptible to oxacillin?). Nevertheless, no need for additional abx, given negative blood cultures.    Recommendations:   - complete 6 weeks of cefepime (see below)  - wound care as per primary team  - please consult ID at LTAC    Cefepime 1.5gm/1.5gm/2gm 3x/wk with dialysis    Gopal Buchanan MD  Infectious Disease  [facility] - Med Surg    Subjective:     Principal Problem:Bacteremia    HPI: Mr Mahesh Cespedes is a pleasant 58 y.o. man with ESRD and sacral pressure ulcer,  who was admitted with uremic encephalopathy. Found to have worsening sacral wound and taken to the OR on 2/8.  Findings: "Necrotic sacral pressure injury extending to the coccyx. Bone biopsy of the coccyx taken. Purulent abscess cavities drained." The patient was continued on abx and blood cultures have grown Morganella, thus far. OR cultures are growing Morganella, Klebsiella, and a yet to be identified GNB. Blood cultures have been repeated and are NGTD.  He is currently on Zosyn. ID is consulted for the bacteremia. Per chart review, patient was admitted on 1/6 - 1/22/24 to receive emergent dialysis for hypoxic respiratory failure and hyperkalemia. Attempting to establish HD chair after relocating from [place] in December. Currently, patient only complains of sacral pain.  Interval History: " Interval history reviewed. Temporary catheter removed and Permacath placed. Cath tip grew CNS x 2 (both MS?). Patient is without complaints.    Review of Systems   Constitutional:  Negative for chills and fever.   Skin:  Positive for wound.   All other systems reviewed and are negative.    Objective:     Vital Signs (Most Recent):  Temp: 98.5 °F (36.9 °C) (02/21/24 0822)  Pulse: 69 (02/21/24 0822)  Resp: 18 (02/21/24 0822)  BP: 137/73 (02/21/24 0822)  SpO2: (!) 93 % (02/21/24 0822) Vital Signs (24h Range):  Temp:  [97.5 °F (36.4 °C)-98.6 °F (37 °C)] 98.5 °F (36.9 °C)  Pulse:  [63-70] 69  Resp:  [15-20] 18  SpO2:  [93 %-100 %] 93 %  BP: (118-166)/(59-97) 137/73     Weight: 75.3 kg (166 lb 0.1 oz)  Body mass index is 26 kg/m².    Estimated Creatinine Clearance: 8.1 mL/min (A) (based on SCr of 9.1 mg/dL (H)).     Physical Exam  Vitals and nursing note reviewed.   Constitutional:       Appearance: Normal appearance.   HENT:      Head: Normocephalic and atraumatic.   Eyes:      Pupils: Pupils are equal, round, and reactive to light.   Cardiovascular:      Rate and Rhythm: Normal rate.   Neurological:      General: No focal deficit present.      Mental Status: He is alert and oriented to person, place, and time.   Psychiatric:         Mood and Affect: Mood normal.         Thought Content: Thought content normal.         Judgment: Judgment normal.          Significant Labs: Blood Culture:   Recent Labs   Lab 02/10/24  1240 02/16/24  1021 02/16/24  1129 02/20/24  1754 02/20/24  1759   LABBLOO No Growth after 4 days. No Growth after 4 days. No Growth after 4 days. No Growth to date No Growth to date     Wound Culture:   Recent Labs   Lab 02/08/24  1250 02/16/24  1037   LABAERO MORGANELLA MORGANII  From broth only  *  MORGANELLA MORGANII  Many  *  KLEBSIELLA OXYTOCA  Rare  * STAPHYLOCOCCUS EPIDERMIDIS  < 15 colonies  *       Significant Imaging: I have reviewed all pertinent imaging results/findings within the past 24  hours.

## 2024-02-21 NOTE — PROGRESS NOTES
[facility] Medicine  Progress Note    Patient Name: Mahesh Cespedes  MRN: [id number]  Patient Class: IP- Inpatient   Admission Date: 2/5/2024  Length of Stay: 15 days  Attending Physician: Kyle Tang,*  Primary Care Provider: Cruz Hernandez MD        Subjective:     Principal Problem:Bacteremia        HPI:  58 y.o. male with AFib, DM2, ESRD on dialysis, hypertension presents from home with a complaint of altered mental status.  Family report he has been drowsy and fatigued with increased confusion for the past 2 days.  Has been progressively worsening.  Associated with significant peripheral edema.  Last dialysis was 2 weeks ago.  Denies fever, chills, cough, SOB, chest pain, palpitations, nausea, vomiting, diarrhea, or abdominal pain.  History is somewhat limited given the patient's drowsiness but he is awake and conversant.    In the ED, labs revealed hyperkalemia, K + 7.4.  He was given shifting medications and zirconium.  Nephrology was consulted and plan for urgent dialysis.  Labs also reveal uremia, leukocytosis, hyperphosphatemia, and metabolic acidosis.  Placed in observation to obtain dialysis.    Overview/Hospital Course:  58 y.o. man with ESRD but without outpatient dialysis set up who was admitted with uremic encephalopathy, hyperkalemia. Nephrology consulted and he received emergent dialysis. Mental status is improving. He has sacral wound with abscess. Started antibiotics. Blood cultures with gram variable rods. General surgery planning OR debridement on 2/8/24. S/P debridement with bone biopsy.  Morganella bacteremia.  Bone cultures also growing Morganella and Klebsiella.  ID consulted.  Recommending tunneled HD catheter removal done by vascular on 2/16 after failed attempt by IR. Plan for line holiday until 2/20.  IR consulted for replacement. .  Previous HD catheter tip culture revealed Staph epidermidis and Staph haemolyticus, oxacillin sensitive.  Repeat blood  cultures pending.  ID recommended to continue cefepime for 6 weeks with HD.  Plan for HD per renal today.  Pending LTAC placement.    Interval History:  HD Vas-Cath replaced on 02/20/2024 per IR.  Plan for HD today.  Previous vascular cath tip cultures revealed Staph epidermidis and haemolyticus sensitive to oxacillin.  ID recommended to continue cefepime for 6 weeks.    Review of Systems   Constitutional: Negative.    Respiratory: Negative.     Cardiovascular: Negative.    Gastrointestinal: Negative.    Musculoskeletal: Negative.    Neurological: Negative.      Objective:     Vital Signs (Most Recent):  Temp: 98.6 °F (37 °C) (02/21/24 1201)  Pulse: 68 (02/21/24 1201)  Resp: 18 (02/21/24 1201)  BP: (!) 144/88 (02/21/24 1201)  SpO2: 97 % (02/21/24 1201) Vital Signs (24h Range):  Temp:  [97.5 °F (36.4 °C)-98.6 °F (37 °C)] 98.6 °F (37 °C)  Pulse:  [63-69] 68  Resp:  [18-19] 18  SpO2:  [93 %-100 %] 97 %  BP: (125-144)/(59-88) 144/88     Weight: 75.3 kg (166 lb 0.1 oz)  Body mass index is 26 kg/m².    Intake/Output Summary (Last 24 hours) at 2/21/2024 1712  Last data filed at 2/21/2024 1255  Gross per 24 hour   Intake 600 ml   Output 0 ml   Net 600 ml         Physical Exam  Constitutional:       Appearance: Normal appearance. He is not ill-appearing (Vas-Cath noted in place.).   Cardiovascular:      Rate and Rhythm: Normal rate and regular rhythm.      Pulses: Normal pulses.   Pulmonary:      Effort: No respiratory distress.      Breath sounds: Normal breath sounds.   Abdominal:      General: Bowel sounds are normal.      Palpations: Abdomen is soft.   Musculoskeletal:      Right lower leg: No edema.      Left lower leg: No edema.   Skin:     Findings: Lesion (Stage III sacral wound noted.  Trialysis catheter noted at right groin) present.   Neurological:      Mental Status: He is alert and oriented to person, place, and time. Mental status is at baseline.      Motor: Weakness present.   Psychiatric:         Mood and  Affect: Mood normal.             Significant Labs: All pertinent labs within the past 24 hours have been reviewed.    Significant Imaging: I have reviewed all pertinent imaging results/findings within the past 24 hours.    Assessment/Plan:      * Bacteremia  Blood culture 2/6/24 with gram variable rods.   Morganella bacteremia.  Sacral osteomyelitis as source.  Stop Vanc.  Continued Zosyn.  Repeat BCx negative so far.  ID consulted.  Changed to Cefepime.  Can do ABx's with HD on discharge.  Recommending changing tunneled HD catheter. Pt with bleeding from catheter site overnight after attempted removal resulting blood loss. IR consulted this am and stitched site with control of bleeding. Pending vascular eval for removal.  -removed 2/16 will give line holiday until 2/20 per nephrology recs  -however previous catheter tip culture grew Staph epidermidis and Staph hemolyticus, oxacillin sensitive.  Repeat blood cultures pending.  ID recommended to continue cefepime for 6 weeks with HD    Physical debility  PT/OT consulted.  Currently recommending moderate intensity therapy.  SW/CM did send LTAC referrals.  Patient accepted, pending placement, awaiting bed    Pressure injury of sacral region, unstageable  Wound care on board    Gluteal abscess  Noted on CT. With leukocytosis  - started vanc, zosyn-- continue  - General surgery consulted- to OR.  - wound care consulted  S/P operative debridement 2/8 with bone biopsy obtained given exposed coccyx    Bone cultures growing morganella and klebsiella.  Deescalated to cefepime.      Hyperphosphatemia  Secondary to ESRD.    Uremic encephalopathy   with acute encephalopathy.  Nephrology consulted and received emergent HD. Mental status now seems at baseline.    Paroxysmal atrial fibrillation  Patient with Paroxysmal (<7 days) atrial fibrillation which is controlled currently with Amiodarone. Patient is currently in sinus rhythm.[id number] Score: 1.  Anticoagulation  indicated. Anticoagulation done with apixaban .  -on hold due to recent bleeding and pending line placement.    Goals of care, counseling/discussion  Advance Care Planning  Palliative care consulted.         Anemia in ESRD (end-stage renal disease)  Patient's anemia is currently controlled.  Etiology likely d/t chronic disease due to Chronic Kidney Disease/ESRD  Current CBC reviewed-   Lab Results   Component Value Date    HGB 7.8 (L) 02/20/2024    HCT 24.8 (L) 02/20/2024     Monitor serial CBC and transfuse if patient becomes hemodynamically unstable, symptomatic or H/H drops below 7/21.  - EPO started by Nephrology   Pt with bleeding from catheter site overnight after attempted removal resulting blood loss. IR consulted this am and stitched site with control of bleeding. Pending vascular eval for removal. Hgb back down to 6.9. 1 unit prbc ordered. Eliquis held.  -hgb stable for now will continue to monitor      Essential hypertension  Chronic, controlled. Latest blood pressure and vitals reviewed-     Temp:  [97.5 °F (36.4 °C)-98.6 °F (37 °C)]   Pulse:  [63-69]   Resp:  [18-19]   BP: (125-144)/(59-88)   SpO2:  [93 %-100 %] .   Home meds for hypertension were reviewed and noted below.   Hypertension Medications               amLODIPine (NORVASC) 10 MG tablet Take 10 mg by mouth once daily.    furosemide (LASIX) 20 MG tablet Take 20 mg by mouth 2 (two) times daily.    hydrALAZINE (APRESOLINE) 100 MG tablet Take 1 tablet (100 mg total) by mouth 3 (three) times daily.    metoprolol succinate (TOPROL-XL) 100 MG 24 hr tablet Take 100 mg by mouth once daily.    NIFEdipine (PROCARDIA-XL) 60 MG (OSM) 24 hr tablet Take 1 tablet (60 mg total) by mouth once daily.            While in the hospital, will manage blood pressure as follows; Continue home antihypertensive regimen    Will utilize p.r.n. blood pressure medication only if patient's blood pressure greater than 180/110 and he develops symptoms such as worsening chest  pain or shortness of breath.    ESRD needing dialysis  Admitted with uremic encephalopathy and hyperkalemia. Received emergent dialysis  - dialysis not able to be arranged as outpatient due to history of nonadherence--> this is a major issue. Social work checking again to see if they can arrange. Palliative care consulted  - Nephrology consulted      VTE Risk Mitigation (From admission, onward)           Ordered     IP VTE HIGH RISK PATIENT  Once         02/05/24 1733     Place sequential compression device  Until discontinued         02/05/24 1733     Reason for No Pharmacological VTE Prophylaxis  Once        Question:  Reasons:  Answer:  Already adequately anticoagulated on oral Anticoagulants    02/05/24 1733                    Discharge Planning   JAIME: 2/21/2024     Code Status: Full Code   Is the patient medically ready for discharge?:     Reason for patient still in hospital (select all that apply): Pending disposition  Discharge Plan A: Long-term acute care facility (LTAC)   Discharge Delays: (!) Procedure Scheduling (IR, OR, Labs, Echo, Cath, Echo, EEG)              Kyle Tang MD  Department of Hospital Medicine   [facility] - Med Surg

## 2024-02-21 NOTE — ASSESSMENT & PLAN NOTE
Chronic, controlled. Latest blood pressure and vitals reviewed-     Temp:  [97.5 °F (36.4 °C)-98.6 °F (37 °C)]   Pulse:  [63-69]   Resp:  [18-19]   BP: (125-144)/(59-88)   SpO2:  [93 %-100 %] .   Home meds for hypertension were reviewed and noted below.   Hypertension Medications               amLODIPine (NORVASC) 10 MG tablet Take 10 mg by mouth once daily.    furosemide (LASIX) 20 MG tablet Take 20 mg by mouth 2 (two) times daily.    hydrALAZINE (APRESOLINE) 100 MG tablet Take 1 tablet (100 mg total) by mouth 3 (three) times daily.    metoprolol succinate (TOPROL-XL) 100 MG 24 hr tablet Take 100 mg by mouth once daily.    NIFEdipine (PROCARDIA-XL) 60 MG (OSM) 24 hr tablet Take 1 tablet (60 mg total) by mouth once daily.            While in the hospital, will manage blood pressure as follows; Continue home antihypertensive regimen    Will utilize p.r.n. blood pressure medication only if patient's blood pressure greater than 180/110 and he develops symptoms such as worsening chest pain or shortness of breath.

## 2024-02-21 NOTE — PLAN OF CARE
Problem: Occupational Therapy  Goal: Occupational Therapy Goal  Description: Goals to be met by: 03/08/2024     Patient will increase functional independence with ADLs by performing:    Feeding with set up [place] Jain's positioning for gravity assisted digestion.   UE Dressing with Min  Assistance.  LE Dressing with Moderate Assistance.  Grooming while seated with Modified [place] and Set-up Assistance.  Toileting from bedside commode with Max Assistance OOB.  Toilet transfer to bedside commode with Min Assistance.  Upper extremity exercise program x10 reps per handout, with assistance as needed.    Outcome: Ongoing, Progressing

## 2024-02-21 NOTE — SUBJECTIVE & OBJECTIVE
Interval History:  HD Vas-Cath replaced on 02/20/2024 per IR.  Plan for HD today.  Previous vascular cath tip cultures revealed Staph epidermidis and haemolyticus sensitive to oxacillin.  ID recommended to continue cefepime for 6 weeks.    Review of Systems   Constitutional: Negative.    Respiratory: Negative.     Cardiovascular: Negative.    Gastrointestinal: Negative.    Musculoskeletal: Negative.    Neurological: Negative.      Objective:     Vital Signs (Most Recent):  Temp: 98.6 °F (37 °C) (02/21/24 1201)  Pulse: 68 (02/21/24 1201)  Resp: 18 (02/21/24 1201)  BP: (!) 144/88 (02/21/24 1201)  SpO2: 97 % (02/21/24 1201) Vital Signs (24h Range):  Temp:  [97.5 °F (36.4 °C)-98.6 °F (37 °C)] 98.6 °F (37 °C)  Pulse:  [63-69] 68  Resp:  [18-19] 18  SpO2:  [93 %-100 %] 97 %  BP: (125-144)/(59-88) 144/88     Weight: 75.3 kg (166 lb 0.1 oz)  Body mass index is 26 kg/m².    Intake/Output Summary (Last 24 hours) at 2/21/2024 1712  Last data filed at 2/21/2024 1255  Gross per 24 hour   Intake 600 ml   Output 0 ml   Net 600 ml         Physical Exam  Constitutional:       Appearance: Normal appearance. He is not ill-appearing (Vas-Cath noted in place.).   Cardiovascular:      Rate and Rhythm: Normal rate and regular rhythm.      Pulses: Normal pulses.   Pulmonary:      Effort: No respiratory distress.      Breath sounds: Normal breath sounds.   Abdominal:      General: Bowel sounds are normal.      Palpations: Abdomen is soft.   Musculoskeletal:      Right lower leg: No edema.      Left lower leg: No edema.   Skin:     Findings: Lesion (Stage III sacral wound noted.  Trialysis catheter noted at right groin) present.   Neurological:      Mental Status: He is alert and oriented to person, place, and time. Mental status is at baseline.      Motor: Weakness present.   Psychiatric:         Mood and Affect: Mood normal.             Significant Labs: All pertinent labs within the past 24 hours have been reviewed.    Significant  Imaging: I have reviewed all pertinent imaging results/findings within the past 24 hours.

## 2024-02-22 VITALS
HEIGHT: 67 IN | HEART RATE: 64 BPM | BODY MASS INDEX: 26.06 KG/M2 | OXYGEN SATURATION: 97 % | WEIGHT: 166 LBS | TEMPERATURE: 98 F | DIASTOLIC BLOOD PRESSURE: 79 MMHG | RESPIRATION RATE: 18 BRPM | SYSTOLIC BLOOD PRESSURE: 140 MMHG

## 2024-02-22 PROBLEM — G93.49 UREMIC ENCEPHALOPATHY: Status: RESOLVED | Noted: 2024-01-07 | Resolved: 2024-02-22

## 2024-02-22 PROBLEM — N19 UREMIC ENCEPHALOPATHY: Status: RESOLVED | Noted: 2024-01-07 | Resolved: 2024-02-22

## 2024-02-22 LAB
ALBUMIN SERPL BCP-MCNC: 1.9 G/DL (ref 3.5–5.2)
ANION GAP SERPL CALC-SCNC: 10 MMOL/L (ref 8–16)
BASOPHILS # BLD AUTO: 0.05 K/UL (ref 0–0.2)
BASOPHILS # BLD AUTO: 0.06 K/UL (ref 0–0.2)
BASOPHILS # BLD AUTO: 0.06 K/UL (ref 0–0.2)
BASOPHILS NFR BLD: 0.5 % (ref 0–1.9)
BASOPHILS NFR BLD: 0.6 % (ref 0–1.9)
BASOPHILS NFR BLD: 0.6 % (ref 0–1.9)
BUN SERPL-MCNC: 26 MG/DL (ref 6–20)
CALCIUM SERPL-MCNC: 7.8 MG/DL (ref 8.7–10.5)
CHLORIDE SERPL-SCNC: 100 MMOL/L (ref 95–110)
CO2 SERPL-SCNC: 26 MMOL/L (ref 23–29)
CREAT SERPL-MCNC: 6.4 MG/DL (ref 0.5–1.4)
DIFFERENTIAL METHOD BLD: ABNORMAL
EOSINOPHIL # BLD AUTO: 0.4 K/UL (ref 0–0.5)
EOSINOPHIL NFR BLD: 3.9 % (ref 0–8)
EOSINOPHIL NFR BLD: 3.9 % (ref 0–8)
EOSINOPHIL NFR BLD: 4.2 % (ref 0–8)
ERYTHROCYTE [DISTWIDTH] IN BLOOD BY AUTOMATED COUNT: 22.5 % (ref 11.5–14.5)
ERYTHROCYTE [DISTWIDTH] IN BLOOD BY AUTOMATED COUNT: 22.6 % (ref 11.5–14.5)
ERYTHROCYTE [DISTWIDTH] IN BLOOD BY AUTOMATED COUNT: 22.6 % (ref 11.5–14.5)
EST. GFR  (NO RACE VARIABLE): 9 ML/MIN/1.73 M^2
GLUCOSE SERPL-MCNC: 83 MG/DL (ref 70–110)
HCT VFR BLD AUTO: 26 % (ref 40–54)
HCT VFR BLD AUTO: 28.9 % (ref 40–54)
HCT VFR BLD AUTO: 28.9 % (ref 40–54)
HGB BLD-MCNC: 8.2 G/DL (ref 14–18)
HGB BLD-MCNC: 9 G/DL (ref 14–18)
HGB BLD-MCNC: 9 G/DL (ref 14–18)
IMM GRANULOCYTES # BLD AUTO: 0.03 K/UL (ref 0–0.04)
IMM GRANULOCYTES # BLD AUTO: 0.04 K/UL (ref 0–0.04)
IMM GRANULOCYTES # BLD AUTO: 0.04 K/UL (ref 0–0.04)
IMM GRANULOCYTES NFR BLD AUTO: 0.3 % (ref 0–0.5)
IMM GRANULOCYTES NFR BLD AUTO: 0.4 % (ref 0–0.5)
IMM GRANULOCYTES NFR BLD AUTO: 0.4 % (ref 0–0.5)
LYMPHOCYTES # BLD AUTO: 0.7 K/UL (ref 1–4.8)
LYMPHOCYTES # BLD AUTO: 0.9 K/UL (ref 1–4.8)
LYMPHOCYTES # BLD AUTO: 0.9 K/UL (ref 1–4.8)
LYMPHOCYTES NFR BLD: 7.1 % (ref 18–48)
LYMPHOCYTES NFR BLD: 8.5 % (ref 18–48)
LYMPHOCYTES NFR BLD: 8.5 % (ref 18–48)
MCH RBC QN AUTO: 28.2 PG (ref 27–31)
MCH RBC QN AUTO: 28.4 PG (ref 27–31)
MCH RBC QN AUTO: 28.4 PG (ref 27–31)
MCHC RBC AUTO-ENTMCNC: 31.1 G/DL (ref 32–36)
MCHC RBC AUTO-ENTMCNC: 31.1 G/DL (ref 32–36)
MCHC RBC AUTO-ENTMCNC: 31.5 G/DL (ref 32–36)
MCV RBC AUTO: 89 FL (ref 82–98)
MCV RBC AUTO: 91 FL (ref 82–98)
MCV RBC AUTO: 91 FL (ref 82–98)
MONOCYTES # BLD AUTO: 0.9 K/UL (ref 0.3–1)
MONOCYTES # BLD AUTO: 1.1 K/UL (ref 0.3–1)
MONOCYTES # BLD AUTO: 1.1 K/UL (ref 0.3–1)
MONOCYTES NFR BLD: 10.9 % (ref 4–15)
MONOCYTES NFR BLD: 10.9 % (ref 4–15)
MONOCYTES NFR BLD: 9.1 % (ref 4–15)
NEUTROPHILS # BLD AUTO: 7.7 K/UL (ref 1.8–7.7)
NEUTROPHILS # BLD AUTO: 7.9 K/UL (ref 1.8–7.7)
NEUTROPHILS # BLD AUTO: 7.9 K/UL (ref 1.8–7.7)
NEUTROPHILS NFR BLD: 75.7 % (ref 38–73)
NEUTROPHILS NFR BLD: 75.7 % (ref 38–73)
NEUTROPHILS NFR BLD: 78.8 % (ref 38–73)
NRBC BLD-RTO: 0 /100 WBC
PHOSPHATE SERPL-MCNC: 4.1 MG/DL (ref 2.7–4.5)
PLATELET # BLD AUTO: 257 K/UL (ref 150–450)
PLATELET # BLD AUTO: 263 K/UL (ref 150–450)
PLATELET # BLD AUTO: 263 K/UL (ref 150–450)
PMV BLD AUTO: 9.6 FL (ref 9.2–12.9)
PMV BLD AUTO: 9.8 FL (ref 9.2–12.9)
PMV BLD AUTO: 9.8 FL (ref 9.2–12.9)
POTASSIUM SERPL-SCNC: 3.5 MMOL/L (ref 3.5–5.1)
RBC # BLD AUTO: 2.91 M/UL (ref 4.6–6.2)
RBC # BLD AUTO: 3.17 M/UL (ref 4.6–6.2)
RBC # BLD AUTO: 3.17 M/UL (ref 4.6–6.2)
SODIUM SERPL-SCNC: 136 MMOL/L (ref 136–145)
WBC # BLD AUTO: 10.39 K/UL (ref 3.9–12.7)
WBC # BLD AUTO: 10.39 K/UL (ref 3.9–12.7)
WBC # BLD AUTO: 9.82 K/UL (ref 3.9–12.7)

## 2024-02-22 PROCEDURE — 63600175 PHARM REV CODE 636 W HCPCS

## 2024-02-22 PROCEDURE — 36415 COLL VENOUS BLD VENIPUNCTURE: CPT | Performed by: STUDENT IN AN ORGANIZED HEALTH CARE EDUCATION/TRAINING PROGRAM

## 2024-02-22 PROCEDURE — 94761 N-INVAS EAR/PLS OXIMETRY MLT: CPT

## 2024-02-22 PROCEDURE — 85025 COMPLETE CBC W/AUTO DIFF WBC: CPT | Performed by: STUDENT IN AN ORGANIZED HEALTH CARE EDUCATION/TRAINING PROGRAM

## 2024-02-22 PROCEDURE — 25000003 PHARM REV CODE 250: Performed by: HOSPITALIST

## 2024-02-22 PROCEDURE — 63600175 PHARM REV CODE 636 W HCPCS: Performed by: HOSPITALIST

## 2024-02-22 PROCEDURE — 25000003 PHARM REV CODE 250: Performed by: INTERNAL MEDICINE

## 2024-02-22 PROCEDURE — 80069 RENAL FUNCTION PANEL: CPT | Performed by: STUDENT IN AN ORGANIZED HEALTH CARE EDUCATION/TRAINING PROGRAM

## 2024-02-22 PROCEDURE — 85025 COMPLETE CBC W/AUTO DIFF WBC: CPT | Mod: 91 | Performed by: STUDENT IN AN ORGANIZED HEALTH CARE EDUCATION/TRAINING PROGRAM

## 2024-02-22 PROCEDURE — 25000003 PHARM REV CODE 250

## 2024-02-22 RX ORDER — METOPROLOL SUCCINATE 50 MG/1
50 TABLET, EXTENDED RELEASE ORAL DAILY
Qty: 30 TABLET | Refills: 0 | Status: SHIPPED | OUTPATIENT
Start: 2024-02-22 | End: 2024-03-23

## 2024-02-22 RX ORDER — SEVELAMER CARBONATE 800 MG/1
1600 TABLET, FILM COATED ORAL
Qty: 180 TABLET | Refills: 11
Start: 2024-02-22 | End: 2025-02-21

## 2024-02-22 RX ADMIN — SEVELAMER CARBONATE 1600 MG: 800 TABLET, FILM COATED ORAL at 11:02

## 2024-02-22 RX ADMIN — NEPHROCAP 1 CAPSULE: 1 CAP ORAL at 08:02

## 2024-02-22 RX ADMIN — ATORVASTATIN CALCIUM 80 MG: 40 TABLET, FILM COATED ORAL at 08:02

## 2024-02-22 RX ADMIN — SEVELAMER CARBONATE 1600 MG: 800 TABLET, FILM COATED ORAL at 05:02

## 2024-02-22 RX ADMIN — ALLOPURINOL 100 MG: 100 TABLET ORAL at 08:02

## 2024-02-22 RX ADMIN — METOPROLOL SUCCINATE 50 MG: 50 TABLET, EXTENDED RELEASE ORAL at 08:02

## 2024-02-22 RX ADMIN — HYDROMORPHONE HYDROCHLORIDE 0.5 MG: 1 INJECTION, SOLUTION INTRAMUSCULAR; INTRAVENOUS; SUBCUTANEOUS at 01:02

## 2024-02-22 RX ADMIN — ONDANSETRON 4 MG: 2 INJECTION INTRAMUSCULAR; INTRAVENOUS at 06:02

## 2024-02-22 RX ADMIN — CALCITRIOL CAPSULES 0.25 MCG 0.25 MCG: 0.25 CAPSULE ORAL at 08:02

## 2024-02-22 RX ADMIN — BENZTROPINE MESYLATE 0.5 MG: 0.5 TABLET ORAL at 08:02

## 2024-02-22 RX ADMIN — SEVELAMER CARBONATE 1600 MG: 800 TABLET, FILM COATED ORAL at 08:02

## 2024-02-22 RX ADMIN — AMIODARONE HYDROCHLORIDE 200 MG: 200 TABLET ORAL at 08:02

## 2024-02-22 RX ADMIN — TAMSULOSIN HYDROCHLORIDE 0.4 MG: 0.4 CAPSULE ORAL at 08:02

## 2024-02-22 RX ADMIN — OXYCODONE 10 MG: 5 TABLET ORAL at 03:02

## 2024-02-22 NOTE — NURSING
[facility] [place]  Nurses Note -- 4 Eyes      2/21/2024       Skin assessed on: Q Shift      [] No Pressure Injuries Present    []Prevention Measures Documented    [x] Yes LDA  for Pressure Injury Previously documented     [] Yes New Pressure Injury Discovered   [] LDA for New Pressure Injury Added      Attending RN:  Carol Price RN     Second RN:  BETO Ellis

## 2024-02-22 NOTE — ASSESSMENT & PLAN NOTE
Chronic, controlled. Latest blood pressure and vitals reviewed-     Temp:  [96.4 °F (35.8 °C)-98.9 °F (37.2 °C)]   Pulse:  [63-90]   Resp:  [17-20]   BP: (128-169)/(60-91)   SpO2:  [90 %-98 %] .   Home meds for hypertension were reviewed and noted below.   Hypertension Medications               amLODIPine (NORVASC) 10 MG tablet Take 10 mg by mouth once daily.    furosemide (LASIX) 20 MG tablet Take 20 mg by mouth 2 (two) times daily.    hydrALAZINE (APRESOLINE) 100 MG tablet Take 1 tablet (100 mg total) by mouth 3 (three) times daily.    metoprolol succinate (TOPROL-XL) 100 MG 24 hr tablet Take 100 mg by mouth once daily.    NIFEdipine (PROCARDIA-XL) 60 MG (OSM) 24 hr tablet Take 1 tablet (60 mg total) by mouth once daily.            While in the hospital, will manage blood pressure as follows; Continue home antihypertensive regimen    Will utilize p.r.n. blood pressure medication only if patient's blood pressure greater than 180/110 and he develops symptoms such as worsening chest pain or shortness of breath.

## 2024-02-22 NOTE — SUBJECTIVE & OBJECTIVE
Interval History:  Right groin Trialysis catheter removed today.  Patient denied any acute complaints.  Underwent HD yesterday.  Otherwise no acute events overnight.    Review of Systems   Constitutional: Negative.    Respiratory: Negative.     Cardiovascular: Negative.    Gastrointestinal: Negative.    Skin:  Positive for wound (Sacral wound).   Neurological:  Positive for weakness. Negative for seizures and headaches.     Objective:     Vital Signs (Most Recent):  Temp: 96.4 °F (35.8 °C) (02/22/24 1114)  Pulse: 90 (02/22/24 1132)  Resp: 18 (02/22/24 1114)  BP: 133/73 (02/22/24 1114)  SpO2: 96 % (02/22/24 1114) Vital Signs (24h Range):  Temp:  [96.4 °F (35.8 °C)-98.9 °F (37.2 °C)] 96.4 °F (35.8 °C)  Pulse:  [63-90] 90  Resp:  [17-20] 18  SpO2:  [90 %-98 %] 96 %  BP: (128-169)/(60-91) 133/73     Weight: 75.3 kg (166 lb 0.1 oz)  Body mass index is 26 kg/m².    Intake/Output Summary (Last 24 hours) at 2/22/2024 1238  Last data filed at 2/22/2024 0000  Gross per 24 hour   Intake 980 ml   Output 2000 ml   Net -1020 ml         Physical Exam  Constitutional:       General: He is not in acute distress.     Appearance: Normal appearance.   Cardiovascular:      Rate and Rhythm: Normal rate and regular rhythm.      Pulses: Normal pulses.      Heart sounds: No murmur heard.  Pulmonary:      Effort: Pulmonary effort is normal. No respiratory distress.      Breath sounds: Normal breath sounds. No wheezing.   Abdominal:      General: Bowel sounds are normal. There is no distension.      Palpations: Abdomen is soft.      Tenderness: There is no abdominal tenderness.   Musculoskeletal:      Cervical back: Normal range of motion. No rigidity (Left IJ Vas-Cath in place).      Right lower leg: No edema.      Left lower leg: No edema.   Skin:     General: Skin is warm.      Findings: Lesion (Stage III sacral wound noted) present.   Neurological:      General: No focal deficit present.      Mental Status: He is alert and oriented to  person, place, and time. Mental status is at baseline.   Psychiatric:         Mood and Affect: Mood normal.             Significant Labs: All pertinent labs within the past 24 hours have been reviewed.    Significant Imaging: I have reviewed all pertinent imaging results/findings within the past 24 hours.

## 2024-02-22 NOTE — NURSING
[facility] [place]  Nurses Note -- 4 Eyes      2/22/2024       Skin assessed on: Q Shift      [x] No Pressure Injuries Present    []Prevention Measures Documented    [] Yes LDA  for Pressure Injury Previously documented     [] Yes New Pressure Injury Discovered   [] LDA for New Pressure Injury Added      Attending RN:  Ira Kiran, BETO     Second RN:  JoannaRN

## 2024-02-22 NOTE — PROGRESS NOTES
[facility] Medicine  Progress Note    Patient Name: Mahesh Cespedes  MRN: [id number]  Patient Class: IP- Inpatient   Admission Date: 2/5/2024  Length of Stay: 16 days  Attending Physician: Kyle Tang,*  Primary Care Provider: Cruz Hernandez MD        Subjective:     Principal Problem:Uremic encephalopathy        HPI:  58 y.o. male with AFib, DM2, ESRD on dialysis, hypertension presents from home with a complaint of altered mental status.  Family report he has been drowsy and fatigued with increased confusion for the past 2 days.  Has been progressively worsening.  Associated with significant peripheral edema.  Last dialysis was 2 weeks ago.  Denies fever, chills, cough, SOB, chest pain, palpitations, nausea, vomiting, diarrhea, or abdominal pain.  History is somewhat limited given the patient's drowsiness but he is awake and conversant.    In the ED, labs revealed hyperkalemia, K + 7.4.  He was given shifting medications and zirconium.  Nephrology was consulted and plan for urgent dialysis.  Labs also reveal uremia, leukocytosis, hyperphosphatemia, and metabolic acidosis.  Placed in observation to obtain dialysis.    Overview/Hospital Course:  58 y.o. man with ESRD but without outpatient dialysis set up who was admitted with uremic encephalopathy, hyperkalemia. Nephrology consulted and he received emergent dialysis. Mental status is improving. He has sacral wound with abscess. Started antibiotics. Blood cultures with gram variable rods. General surgery planning OR debridement on 2/8/24. S/P debridement with bone biopsy.  Morganella bacteremia.  Bone cultures also growing Morganella and Klebsiella.  ID consulted.  Recommending tunneled HD catheter removal done by vascular on 2/16 after failed attempt by IR. Plan for line holiday until 2/20.  IR consulted for replacement. .  Previous HD catheter tip culture revealed Staph epidermidis and Staph haemolyticus, oxacillin sensitive.  Repeat  blood cultures pending.  ID recommended to continue cefepime for 6 weeks with HD.   HD per renal  Pending LTAC placement.    Interval History:  Right groin Trialysis catheter removed today.  Patient denied any acute complaints.  Underwent HD yesterday.  Otherwise no acute events overnight.    Review of Systems   Constitutional: Negative.    Respiratory: Negative.     Cardiovascular: Negative.    Gastrointestinal: Negative.    Skin:  Positive for wound (Sacral wound).   Neurological:  Positive for weakness. Negative for seizures and headaches.     Objective:     Vital Signs (Most Recent):  Temp: 96.4 °F (35.8 °C) (02/22/24 1114)  Pulse: 90 (02/22/24 1132)  Resp: 18 (02/22/24 1114)  BP: 133/73 (02/22/24 1114)  SpO2: 96 % (02/22/24 1114) Vital Signs (24h Range):  Temp:  [96.4 °F (35.8 °C)-98.9 °F (37.2 °C)] 96.4 °F (35.8 °C)  Pulse:  [63-90] 90  Resp:  [17-20] 18  SpO2:  [90 %-98 %] 96 %  BP: (128-169)/(60-91) 133/73     Weight: 75.3 kg (166 lb 0.1 oz)  Body mass index is 26 kg/m².    Intake/Output Summary (Last 24 hours) at 2/22/2024 1238  Last data filed at 2/22/2024 0000  Gross per 24 hour   Intake 980 ml   Output 2000 ml   Net -1020 ml         Physical Exam  Constitutional:       General: He is not in acute distress.     Appearance: Normal appearance.   Cardiovascular:      Rate and Rhythm: Normal rate and regular rhythm.      Pulses: Normal pulses.      Heart sounds: No murmur heard.  Pulmonary:      Effort: Pulmonary effort is normal. No respiratory distress.      Breath sounds: Normal breath sounds. No wheezing.   Abdominal:      General: Bowel sounds are normal. There is no distension.      Palpations: Abdomen is soft.      Tenderness: There is no abdominal tenderness.   Musculoskeletal:      Cervical back: Normal range of motion. No rigidity (Left IJ Vas-Cath in place).      Right lower leg: No edema.      Left lower leg: No edema.   Skin:     General: Skin is warm.      Findings: Lesion (Stage III sacral  wound noted) present.   Neurological:      General: No focal deficit present.      Mental Status: He is alert and oriented to person, place, and time. Mental status is at baseline.   Psychiatric:         Mood and Affect: Mood normal.             Significant Labs: All pertinent labs within the past 24 hours have been reviewed.    Significant Imaging: I have reviewed all pertinent imaging results/findings within the past 24 hours.    Assessment/Plan:      * Uremic encephalopathy   with acute encephalopathy.  Nephrology consulted and received emergent HD. Mental status now seems at baseline.    Pressure injury of sacral region, unstageable  Wound care on board    Bacteremia  Blood culture 2/6/24 with gram variable rods.   Morganella bacteremia.  Sacral osteomyelitis as source.  Stop Vanc.  Continued Zosyn.  Repeat BCx negative so far.  ID consulted.  Changed to Cefepime.  Can do ABx's with HD on discharge.  Recommending changing tunneled HD catheter. Pt with bleeding from catheter site overnight after attempted removal resulting blood loss. IR consulted this am and stitched site with control of bleeding. Pending vascular eval for removal.  -removed 2/16 will give line holiday until 2/20 per nephrology recs  -however previous catheter tip culture grew Staph epidermidis and Staph hemolyticus, oxacillin sensitive.  Repeat blood cultures pending.  ID recommended to continue cefepime for 6 weeks with HD    ESRD needing dialysis  Admitted with uremic encephalopathy and hyperkalemia. Received emergent dialysis  - dialysis not able to be arranged as outpatient due to history of nonadherence--> this is a major issue. Social work checking again to see if they can arrange. Palliative care consulted  - Nephrology consulted    Paroxysmal atrial fibrillation  Patient with Paroxysmal (<7 days) atrial fibrillation which is controlled currently with Amiodarone. Patient is currently in sinus rhythm.[id number] Score: 1.   Anticoagulation indicated. Anticoagulation done with apixaban .  Held due to recent bleeding and pending line placement.  Restart Eliquis     Essential hypertension  Chronic, controlled. Latest blood pressure and vitals reviewed-     Temp:  [96.4 °F (35.8 °C)-98.9 °F (37.2 °C)]   Pulse:  [63-90]   Resp:  [17-20]   BP: (128-169)/(60-91)   SpO2:  [90 %-98 %] .   Home meds for hypertension were reviewed and noted below.   Hypertension Medications               amLODIPine (NORVASC) 10 MG tablet Take 10 mg by mouth once daily.    furosemide (LASIX) 20 MG tablet Take 20 mg by mouth 2 (two) times daily.    hydrALAZINE (APRESOLINE) 100 MG tablet Take 1 tablet (100 mg total) by mouth 3 (three) times daily.    metoprolol succinate (TOPROL-XL) 100 MG 24 hr tablet Take 100 mg by mouth once daily.    NIFEdipine (PROCARDIA-XL) 60 MG (OSM) 24 hr tablet Take 1 tablet (60 mg total) by mouth once daily.            While in the hospital, will manage blood pressure as follows; Continue home antihypertensive regimen    Will utilize p.r.n. blood pressure medication only if patient's blood pressure greater than 180/110 and he develops symptoms such as worsening chest pain or shortness of breath.    Physical debility  PT/OT consulted.  Currently recommending moderate intensity therapy.  SW/CM did send LTAC referrals.  Patient accepted, pending placement, awaiting bed    Gluteal abscess  Noted on CT. With leukocytosis  - started vanc, zosyn-- continue  - General surgery consulted- to OR.  - wound care consulted  S/P operative debridement 2/8 with bone biopsy obtained given exposed coccyx    Bone cultures growing morganella and klebsiella.  Deescalated to cefepime.  Continue for 6 weeks per ID.  Wound care on      Hyperphosphatemia  Secondary to ESRD.  On binder    Goals of care, counseling/discussion  Advance Care Planning  Palliative care consulted.         Anemia in ESRD (end-stage renal disease)  Patient's anemia is currently controlled.   Etiology likely d/t chronic disease due to Chronic Kidney Disease/ESRD  Current CBC reviewed-   Lab Results   Component Value Date    HGB 8.2 (L) 02/22/2024    HCT 26.0 (L) 02/22/2024     Monitor serial CBC and transfuse if patient becomes hemodynamically unstable, symptomatic or H/H drops below 7/21.  - EPO started by Nephrology   Pt with bleeding from catheter site overnight after attempted removal resulting blood loss. IR consulted this am and stitched site with control of bleeding. Pending vascular eval for removal. Hgb back down to 6.9. 1 unit prbc ordered. Eliquis held.  -hgb stable for now will continue to monitor        VTE Risk Mitigation (From admission, onward)           Ordered     apixaban tablet 5 mg  2 times daily         02/22/24 1246     IP VTE HIGH RISK PATIENT  Once         02/05/24 1733     Place sequential compression device  Until discontinued         02/05/24 1733     Reason for No Pharmacological VTE Prophylaxis  Once        Question:  Reasons:  Answer:  Already adequately anticoagulated on oral Anticoagulants    02/05/24 1733                    Discharge Planning   JAIME: 2/21/2024     Code Status: Full Code   Is the patient medically ready for discharge?:     Reason for patient still in hospital (select all that apply): Patient trending condition and Pending disposition  Discharge Plan A: Long-term acute care facility (LTAC)   Discharge Delays: (!) Post-Acute Set-up              Kyle Tang MD  Department of Hospital Medicine   [facility]

## 2024-02-22 NOTE — NURSING
Call placed to [facility] at [phone] report given to BETO Dey, sacral dressing changed tolerated verbalized understanding.

## 2024-02-22 NOTE — PROGRESS NOTES
Renal Progress Note    Date of Admission:  2/5/2024 12:18 PM    Length of Stay: 16  Days    Subjective: n/a    Objective:    Current Facility-Administered Medications   Medication    0.9%  NaCl infusion (for blood administration)    0.9%  NaCl infusion    acetaminophen tablet 650 mg    allopurinoL tablet 100 mg    aluminum-magnesium hydroxide-simethicone 200-200-20 mg/5 mL suspension 30 mL    amiodarone tablet 200 mg    amLODIPine tablet 5 mg    atorvastatin tablet 80 mg    benztropine tablet 0.5 mg    calcitRIOL capsule 0.25 mcg    ceFEPIme (MAXIPIME) 1.5 g in dextrose 5 % (D5W) 100 mL IVPB    ceFEPIme (MAXIPIME) 1.5 g in dextrose 5 % (D5W) 100 mL IVPB    [START ON 2/23/2024] ceFEPIme (MAXIPIME) 2 g in dextrose 5 % in water (D5W) 100 mL IVPB (MB+)    dextrose 10% bolus 125 mL 125 mL    dextrose 10% bolus 250 mL 250 mL    epoetin luli-epbx injection 10,000 Units    glucagon (human recombinant) injection 1 mg    glucose chewable tablet 16 g    glucose chewable tablet 24 g    HYDROmorphone injection 0.5 mg    iron sucrose injection 100 mg    melatonin tablet 6 mg    metoprolol succinate (TOPROL-XL) 24 hr tablet 50 mg    naloxone 0.4 mg/mL injection 0.02 mg    ondansetron injection 4 mg    oxyCODONE immediate release tablet 10 mg    prochlorperazine injection Soln 5 mg    sevelamer carbonate tablet 1,600 mg    simethicone chewable tablet 80 mg    sodium chloride 0.9% bolus 250 mL 250 mL    sodium chloride 0.9% bolus 250 mL 250 mL    sodium chloride 0.9% flush 10 mL    tamsulosin 24 hr capsule 0.4 mg    vitamin renal formula (B-complex-vitamin c-folic acid) 1 mg per capsule 1 capsule       Vitals:    02/22/24 0816 02/22/24 0848 02/22/24 1114 02/22/24 1132   BP: (!) 137/91  133/73    BP Location: Left arm  Left arm    Patient Position: Lying  Lying    Pulse: 68 68 64 90   Resp: 18 17 18    Temp: 97.6 °F (36.4 °C)  96.4 °F (35.8 °C)    TempSrc: Oral  Oral    SpO2: 98% 98% 96%    Weight:      "  Height:           I/O last 3 completed shifts:  In: 1220 [P.O.:720; Other:500]  Out: 2000 [Other:2000]  No intake/output data recorded.          Laboratories:    Recent Labs   Lab 02/22/24  0549   WBC 9.82   RBC 2.91*   HGB 8.2*   HCT 26.0*      MCV 89   MCH 28.2   MCHC 31.5*         Recent Labs   Lab 02/22/24  0549   CALCIUM 7.8*   ALBUMIN 1.9*      K 3.5   CO2 26      BUN 26*   CREATININE 6.4*         No results for input(s): "COLORU", "CLARITYU", "SPECGRAV", "PHUR", "PROTEINUA", "GLUCOSEU", "BILIRUBINCON", "BLOODU", "WBCU", "RBCU", "BACTERIA", "MUCUS" in the last 24 hours.    Microbiology Results (last 7 days)       Procedure Component Value Units Date/Time    Blood culture [[id number]] Collected: 02/20/24 1759    Order Status: Completed Specimen: Blood Updated: 02/21/24 1903     Blood Culture, Routine No Growth to date      No Growth to date    Blood culture [[id number]] Collected: 02/20/24 1754    Order Status: Completed Specimen: Blood Updated: 02/21/24 1903     Blood Culture, Routine No Growth to date      No Growth to date    IV catheter culture [[id number]]  (Abnormal)  (Susceptibility) Collected: 02/16/24 2028    Order Status: Completed Specimen: Catheter Tip Updated: 02/21/24 1308     Aerobic Culture - Cath tip STAPHYLOCOCCUS EPIDERMIDIS  > 15 colonies        STAPHYLOCOCCUS HAEMOLYTICUS  > 15 colonies      Blood culture [[id number]] Collected: 02/16/24 1021    Order Status: Completed Specimen: Blood from Line, Femoral, Right Updated: 02/20/24 1303     Blood Culture, Routine No Growth after 4 days.    Blood culture [[id number]] Collected: 02/16/24 1129    Order Status: Completed Specimen: Blood from Line, Femoral, Right Updated: 02/20/24 1303     Blood Culture, Routine No Growth after 4 days.    Culture, Anaerobe [[id number]] Collected: 02/16/24 1037    Order Status: Completed Specimen: Incision site from Chest, Right Updated: 02/20/24 0836     Anaerobic Culture No anaerobes isolated " "   Narrative:      Right Dialysis Catheter    Fungus culture [[id number]] Collected: 02/08/24 1250    Order Status: Completed Specimen: Bone from Sacrum Updated: 02/19/24 1409     Fungus (Mycology) Culture No fungal growth to date    Narrative:      Bone from coccyx    Fungus culture [[id number]] Collected: 02/08/24 1250    Order Status: Completed Specimen: Bone from Sacrum Updated: 02/19/24 1409     Fungus (Mycology) Culture No fungal growth to date    Narrative:      Sacral abcess    Aerobic culture [[id number]]  (Abnormal)  (Susceptibility) Collected: 02/16/24 1037    Order Status: Completed Specimen: Incision site from Chest, Right Updated: 02/19/24 1203     Aerobic Bacterial Culture STAPHYLOCOCCUS EPIDERMIDIS  < 15 colonies      Narrative:      Right Dialysis Catheter    Gram stain [[id number]] Collected: 02/16/24 1037    Order Status: Completed Specimen: Incision site from Chest, Right Updated: 02/16/24 1350     Gram Stain Result No WBC's      No organisms seen    Narrative:      Right Dialysis Catheter    Fungus culture [[id number]] Collected: 02/16/24 1037    Order Status: Sent Specimen: Incision site from Chest, Right Updated: 02/16/24 1112    AFB Culture & Smear [[id number]] Collected: 02/16/24 1037    Order Status: Canceled Specimen: Incision site from Chest, Right               Diagnostic Tests:     CXR:  Impression:       Cardiomegaly.  Increased interstitial lung markings.  Possible small pleural effusions.  Question on fluid overload and or CHF.  However, a pneumonic infectious process cannot be excluded in the appropriate clinical circumstances.      Electronically signed by: Sandie Martinez  Date: 02/05/2024           Assessment:    59 y/o male with known to me from prior encounter last month; Hx. ESRD (Moved from [facility]. no Dialysis unit to go to) admitted with:     - Hyperkalemia corrected with Dialysis last p.m.  - HAGMA  - Uncontrolled HTN  - One of the blood cultures reported as "gram variable " "rods" ID and sensitivity pending  - Fluid overload  - Improving Metabolic (uremic) encephalopathy, last HD 1/22/24 while in the Hospital (Pte. Was not accepted by any of the local Dialysis units due to Hx. Of non-compliance and was told either to try to go back to [place] or come to ED periodically as needed for HD)  - Hyperphosphatemia  - 2nd. hyperparathyroidism  - Hx. Ascites s/p Paracentesis in January  - Fe++ def. + Anemia of ckd  - HTN  - Hx. CVA  - Hypoalbuminemia  - Infected (Morganella) sacral pressure ulcer s/p debridement           Plan:    - Antibiotics per ID  - Dialysis Q MWF  - IV iron loading during HD.  - Adjust BP meds. As needed  - Epogen 3 x week  - Transfuse prn Hgb < 7  - Renal diet + protein supplements  - PO4- binders: Sevelamer p.o. w/meals  - Calcitriol  - Wound care per surgery  - Consider Nursing Home placement, Pte. Clearly unable to take care of self  - Disposition (LTAC) in progress  - Other problems per admitting      Will follow on Dialysis days.                     "

## 2024-02-22 NOTE — ASSESSMENT & PLAN NOTE
Patient with Paroxysmal (<7 days) atrial fibrillation which is controlled currently with Amiodarone. Patient is currently in sinus rhythm.[id number] Score: 1.  Anticoagulation indicated. Anticoagulation done with apixaban .  Held due to recent bleeding and pending line placement.  Restart Eliquis in a.m.

## 2024-02-22 NOTE — ASSESSMENT & PLAN NOTE
Patient's anemia is currently controlled.  Etiology likely d/t chronic disease due to Chronic Kidney Disease/ESRD  Current CBC reviewed-   Lab Results   Component Value Date    HGB 8.2 (L) 02/22/2024    HCT 26.0 (L) 02/22/2024     Monitor serial CBC and transfuse if patient becomes hemodynamically unstable, symptomatic or H/H drops below 7/21.  - EPO started by Nephrology   Pt with bleeding from catheter site overnight after attempted removal resulting blood loss. IR consulted this am and stitched site with control of bleeding. Pending vascular eval for removal. Hgb back down to 6.9. 1 unit prbc ordered. Eliquis held.  -hgb stable for now will continue to monitor

## 2024-02-22 NOTE — ASSESSMENT & PLAN NOTE
Noted on CT. With leukocytosis  - started vanc, zosyn-- continue  - General surgery consulted- to OR.  - wound care consulted  S/P operative debridement 2/8 with bone biopsy obtained given exposed coccyx    Bone cultures growing morganella and klebsiella.  Deescalated to cefepime.  Continue for 6 weeks per ID.  Wound care on

## 2024-02-22 NOTE — DISCHARGE SUMMARY
[facility] Medicine  Discharge Summary      Patient Name: Mahesh Cespedes  MRN: [id number]  [facility]: [id number]  Patient Class: IP- Inpatient  Admission Date: 2/5/2024  Hospital Length of Stay: 16 days  Discharge Date and Time:  02/22/2024 2:23 PM  Attending Physician: Kyle Tang,*   Discharging Provider: Kyle Tang MD  Primary Care Provider: Cruz Hernandez MD    Primary Care Team: Networked reference to record PCT     HPI:   58 y.o. male with AFib, DM2, ESRD on dialysis, hypertension presents from home with a complaint of altered mental status.  Family report he has been drowsy and fatigued with increased confusion for the past 2 days.  Has been progressively worsening.  Associated with significant peripheral edema.  Last dialysis was 2 weeks ago.  Denies fever, chills, cough, SOB, chest pain, palpitations, nausea, vomiting, diarrhea, or abdominal pain.  History is somewhat limited given the patient's drowsiness but he is awake and conversant.    In the ED, labs revealed hyperkalemia, K + 7.4.  He was given shifting medications and zirconium.  Nephrology was consulted and plan for urgent dialysis.  Labs also reveal uremia, leukocytosis, hyperphosphatemia, and metabolic acidosis.  Placed in observation to obtain dialysis.    Procedure(s) (LRB):  Removal, Vascular Access Catheter (Right)  INSERTION, CATHETER, TRIPLE LUMEN, HEMODIALYSIS, TEMPORARY (Right)      Hospital Course:   58 y.o. man with ESRD but without outpatient dialysis set up who was admitted with uremic encephalopathy, hyperkalemia. Nephrology consulted and he received emergent dialysis. Mental status is improving. He has sacral wound with abscess. Started antibiotics. Blood cultures with gram variable rods. General surgery planning OR debridement on 2/8/24. S/P debridement with bone biopsy.  Morganella bacteremia.  Bone cultures also growing Morganella and Klebsiella.  ID consulted.  Recommending tunneled HD  catheter removal done by vascular on 2/16 after failed attempt by IR. Plan for line holiday until 2/20.  IR replaced with left IJ HD catheter on 02/20/2024 .  Previous HD catheter tip culture revealed Staph epidermidis and Staph haemolyticus, oxacillin sensitive.  Repeat blood cultures negative.  Trialysis catheter removed.  ID recommended to continue cefepime for 5 more weeks to complete total 6 weeks off antibiotics with HD.  Continue further hemodialysis per renal.  Accepted at [facility] facility.  To follow up with Infectious Disease at [facility] for antibiotic management     Goals of Care Treatment Preferences:  Code Status: Full Code           Accordingly, we have decided that the best plan to meet the patient's goals includes continuing with treatment.      Consults:   Consults (From admission, onward)          Status Ordering Provider     Inpatient consult to Interventional Radiology  Once        Provider:  Rachael Valencia NP    Completed XIMENA MASTERS     Inpatient consult to Vascular Surgery  Once        Provider:  Willis Mathews MD    Completed JONATHAN ROMERO III     Inpatient consult to Interventional Radiology  Once        Provider:  Rachael Valencia NP    Completed DENIZ MUNOZ     Inpatient consult to Infectious Diseases  Once        Provider:  Gopal Buchanan MD    Completed DENIZ MUNOZ     Inpatient consult to General Surgery  Once        Provider:  Gopal Turner MD    Completed YINA BEAN     Inpatient consult to Palliative Care  Once        Provider:  Leatha Ramsey NP    Completed YINA BEAN     Inpatient consult to Social Work  Once        Provider:  (Not yet assigned)    Acknowledged YINA BEAN     Inpatient consult to Nephrology  Once        Provider:  Rehan Velazquez MD    Acknowledged YINA BEAN              Final Active Diagnoses:    Diagnosis Date Noted POA    Pressure injury of sacral region,  unstageable [L89.150] 02/06/2024 Yes    Bacteremia [R78.81] 02/07/2024 Yes    ESRD needing dialysis [N18.6, Z99.2] 02/10/2017 Yes    Paroxysmal atrial fibrillation [I48.0] 01/06/2024 Yes    Essential hypertension [I10] 05/20/2019 Yes     Chronic    Physical debility [R53.81] 02/12/2024 Yes    Gluteal abscess [L02.31] 02/06/2024 Yes    Hyperphosphatemia [E83.39] 02/05/2024 Yes    Goals of care, counseling/discussion [Z71.89] 03/15/2022 Not Applicable    Anemia in ESRD (end-stage renal disease) [N18.6, D63.1] 05/20/2019 Yes      Problems Resolved During this Admission:    Diagnosis Date Noted Date Resolved POA    PRINCIPAL PROBLEM:  Uremic encephalopathy [G93.49, N19] 01/07/2024 02/22/2024 Yes    Hyperkalemia [E87.5] 01/06/2024 02/06/2024 Yes       Discharged Condition: fair    Disposition:  [facility] facility    Follow Up:   Follow-up Information       [facility] Follow up.    Specialty: [facility]  Contact information:  [address] [place] [address] 4th Floor  [facility] [id number] [phone]                         Patient Instructions:      Diet renal     Activity as tolerated       Significant Diagnostic Studies: Labs: CMP   Recent Labs   Lab 02/21/24  0353 02/22/24  0549   * 136   K 3.8 3.5    100   CO2 20* 26   GLU 79 83   BUN 47* 26*   CREATININE 9.1* 6.4*   CALCIUM 8.0* 7.8*   ALBUMIN 2.0* 1.9*   ANIONGAP 12 10    and CBC   Recent Labs   Lab 02/21/24  0353 02/22/24  0549   WBC 10.30 9.82   HGB 7.7* 8.2*   HCT 24.8* 26.0*    257     Microbiology: Blood Culture   Lab Results   Component Value Date    LABBLOO No Growth to date 02/20/2024    LABBLOO No Growth to date 02/20/2024       Pending Diagnostic Studies:       None           Medications:  Reconciled Home Medications:      Medication List        START taking these medications      * dextrose 5 % in water (D5W) PgBk 100 mL with ceFEPIme 2 gram SolR 2 g  Inject 2 g into the vein Every  Friday. for 5 doses  Start taking on: February 23, 2024     * dextrose 5 % (D5W) SolP 100 mL with ceFEPIme 1 gram SolR 1.5 g  Inject 1.5 g into the vein every Monday. for 5 doses  Start taking on: February 26, 2024     * dextrose 5 % (D5W) SolP 100 mL with ceFEPIme 1 gram SolR 1.5 g  Inject 1.5 g into the vein every Wednesday. for 5 doses  Start taking on: February 28, 2024     epoetin luli-epbx 10,000 unit/mL imjection  Commonly known as: RETACRIT  Inject 1 mL (10,000 Units total) into the skin every Mon, Wed, Fri.  Start taking on: February 23, 2024     iron sucrose 100 mg iron/5 mL injection  Commonly known as: VENOFER  Inject 5 mLs (100 mg total) into the vein every Mon, Wed, Fri. for 4 doses  Start taking on: February 23, 2024     sevelamer carbonate 800 mg Tab  Commonly known as: RENVELA  Take 2 tablets (1,600 mg total) by mouth 3 (three) times daily with meals.           * This list has 3 medication(s) that are the same as other medications prescribed for you. Read the directions carefully, and ask your doctor or other care provider to review them with you.                CHANGE how you take these medications      metoprolol succinate 50 MG 24 hr tablet  Commonly known as: TOPROL-XL  Take 1 tablet (50 mg total) by mouth once daily.  What changed:   medication strength  how much to take            CONTINUE taking these medications      allopurinoL 100 MG tablet  Commonly known as: ZYLOPRIM  Take 100 mg by mouth once daily.     amantadine  mg capsule  Commonly known as: SYMMETREL  Take 1 capsule by mouth every other day.     amiodarone 200 MG Tab  Commonly known as: PACERONE  Take 200 mg by mouth once daily.     amLODIPine 10 MG tablet  Commonly known as: NORVASC  Take 10 mg by mouth once daily.     atorvastatin 80 MG tablet  Commonly known as: LIPITOR  Take 80 mg by mouth once daily.     ELIQUIS 5 mg Tab  Generic drug: apixaban  Take 5 mg by mouth every 12 (twelve) hours.     pantoprazole 40 MG  tablet  Commonly known as: PROTONIX  Take 40 mg by mouth once daily.     RENAL CAPS 1 mg Cap  Generic drug: vitamin renal formula (B-complex-vitamin c-folic acid)  Take 1 capsule by mouth once daily at 6am.     tamsulosin 0.4 mg Cap  Commonly known as: FLOMAX  Take 0.4 mg by mouth once daily.            STOP taking these medications      amoxicillin-clavulanate 500-125mg 500-125 mg Tab  Commonly known as: AUGMENTIN     benztropine 0.5 MG tablet  Commonly known as: COGENTIN     ferrous sulfate 325 mg (65 mg iron) Tab tablet  Commonly known as: FEOSOL     furosemide 20 MG tablet  Commonly known as: LASIX     hydrALAZINE 100 MG tablet  Commonly known as: APRESOLINE     NIFEdipine 60 MG (OSM) 24 hr tablet  Commonly known as: PROCARDIA-XL              Indwelling Lines/Drains at time of discharge:   Lines/Drains/Airways       Central Venous Catheter Line  Duration                  Hemodialysis Catheter 02/20/24 1642 left internal jugular 1 day              Drain  Duration                  Hemodialysis AV Fistula Left upper arm -- days                    Time spent on the discharge of patient: 35 minutes         Kyle Tang MD  Department of Hospital Medicine  [facility] - Med Surg

## 2024-02-22 NOTE — PROGRESS NOTES
Vancomycin consult follow-up:    Patient reviewed, dialysis scheduled every Mon, Wed, Fri, no new levels, no dose today; Next levels due: random due 2/23/2024 at 0400
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
## 2024-02-22 NOTE — NURSING
Upon going to assess right groin site where trialysis catheter was removed patient had a large amount of clotted blood on bed pad, no distress noted, MD notified & came to bedside, CBC & surgical sealant ordered.

## 2024-02-22 NOTE — PLAN OF CARE
9:00 AM Patient accepted out patient HD placement [facility] Precious Mac  [address] [facility] [address] [phone] to start on Monday, February 26, 2024.  10:12 PM April at [facility] will accept patient today.  TN requested PLan of CAre orders from hospitalist.       02/22/24 1005   Post-Acute Status   Post-Acute Authorization Placement;Dialysis   Diaylsis Status Set-up Complete/Auth obtained   Coverage MEDICARE A&B   Discharge Delays (!) Post-Acute Set-up   Discharge Plan   Discharge Plan A Long-term acute care facility (LTAC)   Discharge Plan B Home Health;Home with family  (WOUND CARE AND OUT PATIENT DIALYSIS)

## 2024-02-22 NOTE — PLAN OF CARE
[facility]    FACILITY TRANSFER ORDERS      Patient Name: Mahesh Cespedes  YOB: 1965    PCP: Cruz Hernandez MD   PCP Address: [address] [place] [address] / [id number]  PCP Phone Number: [phone]  PCP Fax: [phone]    Encounter Date: 02/22/2024    Admit to: LTAC    Vital Signs:  Routine    Diagnoses:   Active Hospital Problems    Diagnosis  POA    *Bacteremia [R78.81]  Yes    Physical debility [R53.81]  Yes    Gluteal abscess [L02.31]  Yes    Pressure injury of sacral region, unstageable [L89.150]  Yes    Hyperphosphatemia [E83.39]  Yes    Uremic encephalopathy [G93.49, N19]  Yes    Paroxysmal atrial fibrillation [I48.0]  Yes    Goals of care, counseling/discussion [Z71.89]  Not Applicable    Anemia in ESRD (end-stage renal disease) [N18.6, D63.1]  Yes    Essential hypertension [I10]  Yes     Chronic    ESRD needing dialysis [N18.6, Z99.2]  Yes      Resolved Hospital Problems    Diagnosis Date Resolved POA    Hyperkalemia [E87.5] 02/06/2024 Yes       Allergies:Review of patient's allergies indicates:  No Known Allergies    Diet: renal diet    Activities: Activity as tolerated    Goals of Care Treatment Preferences:  Code Status: Full Code          What is most important right now is to focus on remaining as independent as possible, symptom/pain control, wound care  Accordingly, we have decided that the best plan to meet the patient's goals includes continuing with treatment.        CONSULTS:    Physical Therapy to evaluate and treat.  and Occupational Therapy to evaluate and treat.  Infectious disease consult at LT facility for tailoring IV antibiotics    MISCELLANEOUS CARE:  Routine Skin for Bedridden Patients: Apply moisture barrier cream to all skin folds and wet areas in perineal area daily and after baths and all bowel movements.    WOUND CARE ORDERS  Yes: Pressure Ulcer(s) Stage III:  Location:  Sacral    Consult ET nurse        Apply the following to wound:   Collagenase  (Santyl) daily, cover with telfa, wrap with with kerlix dressing    Medications: Review discharge medications with patient and family and provide education.      Current Discharge Medication List        START taking these medications    Details   !! dextrose 5 % (D5W) SolP 100 mL with ceFEPIme 1 gram SolR 1.5 g Inject 1.5 g into the vein every Monday. for 5 doses  Qty: 5 each, Refills: 0      !! dextrose 5 % (D5W) SolP 100 mL with ceFEPIme 1 gram SolR 1.5 g Inject 1.5 g into the vein every Wednesday. for 5 doses  Qty: 5 each, Refills: 0      dextrose 5 % in water (D5W) PgBk 100 mL with ceFEPIme 2 gram SolR 2 g Inject 2 g into the vein Every Friday. for 5 doses  Qty: 5 each, Refills: 0      epoetin luli-epbx (RETACRIT) 10,000 unit/mL imjection Inject 1 mL (10,000 Units total) into the skin every Mon, Wed, Fri.  Qty: 12 mL, Refills: 0    Associated Diagnoses: Anemia in ESRD (end-stage renal disease)      iron sucrose (VENOFER) 100 mg iron/5 mL injection Inject 5 mLs (100 mg total) into the vein every Mon, Wed, Fri. for 4 doses  Qty: 20 mL, Refills: 0      sevelamer carbonate (RENVELA) 800 mg Tab Take 2 tablets (1,600 mg total) by mouth 3 (three) times daily with meals.  Qty: 180 tablet, Refills: 11       !! - Potential duplicate medications found. Please discuss with provider.        CONTINUE these medications which have CHANGED    Details   metoprolol succinate (TOPROL-XL) 50 MG 24 hr tablet Take 1 tablet (50 mg total) by mouth once daily.  Qty: 30 tablet, Refills: 0    Comments: .           CONTINUE these medications which have NOT CHANGED    Details   allopurinoL (ZYLOPRIM) 100 MG tablet Take 100 mg by mouth once daily.      amantadine HCL (SYMMETREL) 100 mg capsule Take 1 capsule by mouth every other day.      amiodarone (PACERONE) 200 MG Tab Take 200 mg by mouth once daily.      amLODIPine (NORVASC) 10 MG tablet Take 10 mg by mouth once daily.      atorvastatin (LIPITOR) 80 MG tablet Take 80 mg by mouth once  daily.      ELIQUIS 5 mg Tab Take 5 mg by mouth every 12 (twelve) hours.      pantoprazole (PROTONIX) 40 MG tablet Take 40 mg by mouth once daily.      tamsulosin (FLOMAX) 0.4 mg Cap Take 0.4 mg by mouth once daily.      vitamin renal formula, B-complex-vitamin c-folic acid, (RENAL CAPS) 1 mg Cap Take 1 capsule by mouth once daily at 6am.           STOP taking these medications       amoxicillin-clavulanate 500-125mg (AUGMENTIN) 500-125 mg Tab Comments:   Reason for Stopping:         benztropine (COGENTIN) 0.5 MG tablet Comments:   Reason for Stopping:         ferrous sulfate (FEOSOL) 325 mg (65 mg iron) Tab tablet Comments:   Reason for Stopping:         furosemide (LASIX) 20 MG tablet Comments:   Reason for Stopping:         hydrALAZINE (APRESOLINE) 100 MG tablet Comments:   Reason for Stopping:         NIFEdipine (PROCARDIA-XL) 60 MG (OSM) 24 hr tablet Comments:   Reason for Stopping:                  _________________________________  Kyle Tang MD  02/22/2024

## 2024-02-22 NOTE — PLAN OF CARE
Case Management Final Discharge Note    Discharge Disposition: LTAC    Relevant SDOH / Transition of Care Barriers:  none    Primary Caretaker and contact information: staff at   [facility]    Scheduled followup appointment:    Follow-up Information       [facility] Follow up on 2/22/2024.    Specialty: [facility]  Why: LTAC  Contact information:  [facility] 4th [address]  [facility] [address]  [phone]             [facility] Follow up on 2/26/2024.    Why: out patient services  Contact information:  [address] [facility] [address] [phone]                               Transportation:   Call report to [facility] at [phone] nurse with room 4228.  Ambulance transport at 5:00 PM stretcher.      Patient and family educated on discharge services and updated on DC plan. Rhonda, bedside RN notified, patient clear to discharge from Case Management Perspective.

## 2024-02-23 NOTE — PHYSICIAN QUERY
PT Name: Mahesh Cespedes  MR #: [id number]    DOCUMENTATION CLARIFICATION     CDS/: Olivia Singer RN CDI          Contact information:  [email or web address]    This form is a permanent document in the medical record.     Query Date: February 23, 2024    Dear Provider,  By submitting this query, we are merely seeking further clarification of documentation. Please utilize your independent clinical judgment when addressing the question(s) below.    The medical record contains the following:  Supporting Clinical Findings Location in Medical Record     ID consulted.  Recommending tunneled HD catheter removal done by vascular on 2/16 after failed attempt by IR. Plan for line holiday until 2/20.  IR replaced with left IJ HD catheter on 02/20/2024 .  Previous HD catheter tip culture (2/16) revealed Staph epidermidis and Staph haemolyticus, oxacillin sensitive.  Repeat blood cultures negative.    ID recommended to continue cefepime for 5 more weeks to complete total 6 weeks off antibiotics with HD.     Incision site of Rt. Chest Staph Epidermidis < 15 colonies   Discharge summary  OLIVIA Tang MD                       Please clarify if Staph Epidermidis and Staph Haemolyticus positive culture of the catheter tip of the  ( Hemodialysis Catheter ) is:      [  ] Complication or infection due to the HD catheter     [ x ] Present, Not a complication or infection due to the HD catheter     [  ] Other (please specify): __________________     [  ] Clinically Undetermined       Please document in your progress notes daily for the duration of treatment until resolved and include in your discharge summary.

## 2024-02-23 NOTE — NURSING
Discharge via stretcher with [facility]  Nurses Note -- 4 Eyes      2/22/2024       Skin assessed on: Discharge      [] No Pressure Injuries Present    []Prevention Measures Documented    [x] Yes LDA  for Pressure Injury Previously documented     [] Yes New Pressure Injury Discovered   [] LDA for New Pressure Injury Added      Attending RN:  Ira Kiran RN     Second RN:  BETO Escobedo

## 2024-02-24 LAB
BACTERIA BLD CULT: NORMAL
BACTERIA BLD CULT: NORMAL

## 2024-03-11 LAB
FUNGUS SPEC CULT: NORMAL
FUNGUS SPEC CULT: NORMAL

## 2024-03-12 NOTE — PLAN OF CARE
Post Discharge:  Call received from Maryam @ [facility] inquiring if patient still needed dialysis.  KARLY advised Maryam of patient's discharge date and discharge destination (LTAC).  Maryam asked if KARLY could reach out to the LTAC to determine if the patient will discharge soon or to have them contact her at [phone].  KARLY attempted contact with rep at [facility].  Per Marion, the social workers have left for the day.  She suggested that KARLY try again tomorrow.  The contact number for the social workers is [phone].      Addendum:  [facility] contacted and provided with information to followup with [facility]  3/14/2024.

## 2024-03-18 LAB — FUNGUS SPEC CULT: NORMAL

## 2024-03-29 LAB
ACID FAST MOD KINY STN SPEC: NORMAL
ACID FAST MOD KINY STN SPEC: NORMAL
MYCOBACTERIUM SPEC QL CULT: NORMAL
MYCOBACTERIUM SPEC QL CULT: NORMAL

## 2024-04-08 NOTE — ASSESSMENT & PLAN NOTE
continue monitor baseline CRT is 1.5.     Detail Level: Detailed Other (Free Text): We discussed a gradual protocol to reduce the risk of irritation Note Text (......Xxx Chief Complaint.): This diagnosis correlates with the Render Risk Assessment In Note?: no

## 2024-05-07 ENCOUNTER — LAB VISIT (OUTPATIENT)
Dept: LAB | Facility: HOSPITAL | Age: 59
End: 2024-05-07
Payer: MEDICARE

## 2024-05-07 DIAGNOSIS — L89.150 UNSTAGEABLE PRESSURE ULCER OF SACRAL REGION: Primary | ICD-10-CM

## 2024-05-07 LAB
ALBUMIN SERPL BCP-MCNC: 2.9 G/DL (ref 3.5–5.2)
ALP SERPL-CCNC: 330 U/L (ref 55–135)
ALT SERPL W/O P-5'-P-CCNC: 100 U/L (ref 10–44)
ANION GAP SERPL CALC-SCNC: 22 MMOL/L (ref 8–16)
AST SERPL-CCNC: 67 U/L (ref 10–40)
BASOPHILS # BLD AUTO: 0.04 K/UL (ref 0–0.2)
BASOPHILS NFR BLD: 0.5 % (ref 0–1.9)
BILIRUB SERPL-MCNC: 1 MG/DL (ref 0.1–1)
BUN SERPL-MCNC: 118 MG/DL (ref 8–23)
CALCIUM SERPL-MCNC: 9.1 MG/DL (ref 8.7–10.5)
CHLORIDE SERPL-SCNC: 99 MMOL/L (ref 95–110)
CO2 SERPL-SCNC: 16 MMOL/L (ref 23–29)
CREAT SERPL-MCNC: 11.1 MG/DL (ref 0.5–1.4)
CRP SERPL-MCNC: 85.2 MG/L (ref 0–8.2)
DIFFERENTIAL METHOD BLD: ABNORMAL
EOSINOPHIL # BLD AUTO: 0 K/UL (ref 0–0.5)
EOSINOPHIL NFR BLD: 0.4 % (ref 0–8)
ERYTHROCYTE [DISTWIDTH] IN BLOOD BY AUTOMATED COUNT: 17 % (ref 11.5–14.5)
EST. GFR  (NO RACE VARIABLE): 4.8 ML/MIN/1.73 M^2
GLUCOSE SERPL-MCNC: 88 MG/DL (ref 70–110)
HCT VFR BLD AUTO: 25.6 % (ref 40–54)
HGB BLD-MCNC: 8.8 G/DL (ref 14–18)
IMM GRANULOCYTES # BLD AUTO: 0.03 K/UL (ref 0–0.04)
IMM GRANULOCYTES NFR BLD AUTO: 0.4 % (ref 0–0.5)
LYMPHOCYTES # BLD AUTO: 0.9 K/UL (ref 1–4.8)
LYMPHOCYTES NFR BLD: 12.2 % (ref 18–48)
MCH RBC QN AUTO: 28.6 PG (ref 27–31)
MCHC RBC AUTO-ENTMCNC: 34.4 G/DL (ref 32–36)
MCV RBC AUTO: 83 FL (ref 82–98)
MONOCYTES # BLD AUTO: 1 K/UL (ref 0.3–1)
MONOCYTES NFR BLD: 12.5 % (ref 4–15)
NEUTROPHILS # BLD AUTO: 5.6 K/UL (ref 1.8–7.7)
NEUTROPHILS NFR BLD: 74 % (ref 38–73)
NRBC BLD-RTO: 0 /100 WBC
PLATELET # BLD AUTO: 204 K/UL (ref 150–450)
PMV BLD AUTO: 10.6 FL (ref 9.2–12.9)
POTASSIUM SERPL-SCNC: 6.1 MMOL/L (ref 3.5–5.1)
PROT SERPL-MCNC: 8.8 G/DL (ref 6–8.4)
RBC # BLD AUTO: 3.08 M/UL (ref 4.6–6.2)
SODIUM SERPL-SCNC: 137 MMOL/L (ref 136–145)
WBC # BLD AUTO: 7.6 K/UL (ref 3.9–12.7)

## 2024-05-07 PROCEDURE — 80053 COMPREHEN METABOLIC PANEL: CPT

## 2024-05-07 PROCEDURE — 85652 RBC SED RATE AUTOMATED: CPT

## 2024-05-07 PROCEDURE — 86140 C-REACTIVE PROTEIN: CPT

## 2024-05-07 PROCEDURE — 85025 COMPLETE CBC W/AUTO DIFF WBC: CPT

## 2024-05-08 LAB — ERYTHROCYTE [SEDIMENTATION RATE] IN BLOOD BY PHOTOMETRIC METHOD: >120 MM/HR (ref 0–23)

## 2024-05-19 ENCOUNTER — HOSPITAL ENCOUNTER (INPATIENT)
Facility: HOSPITAL | Age: 59
LOS: 2 days | Discharge: HOME HEALTH | DRG: 640 | End: 2024-05-22
Attending: STUDENT IN AN ORGANIZED HEALTH CARE EDUCATION/TRAINING PROGRAM | Admitting: STUDENT IN AN ORGANIZED HEALTH CARE EDUCATION/TRAINING PROGRAM
Payer: MEDICARE

## 2024-05-19 DIAGNOSIS — S20.229A CONTUSION OF BACK, UNSPECIFIED LATERALITY, INITIAL ENCOUNTER: Primary | ICD-10-CM

## 2024-05-19 DIAGNOSIS — D63.1 ANEMIA IN ESRD (END-STAGE RENAL DISEASE): ICD-10-CM

## 2024-05-19 DIAGNOSIS — R07.9 CHEST PAIN: ICD-10-CM

## 2024-05-19 DIAGNOSIS — N18.6 ANEMIA IN ESRD (END-STAGE RENAL DISEASE): ICD-10-CM

## 2024-05-19 DIAGNOSIS — I31.39 PERICARDIAL EFFUSION: ICD-10-CM

## 2024-05-19 DIAGNOSIS — N18.6 ESRD NEEDING DIALYSIS: ICD-10-CM

## 2024-05-19 DIAGNOSIS — N18.6 ESRD ON DIALYSIS: ICD-10-CM

## 2024-05-19 DIAGNOSIS — R05.9 COUGH: ICD-10-CM

## 2024-05-19 DIAGNOSIS — E87.70 FLUID OVERLOAD: ICD-10-CM

## 2024-05-19 DIAGNOSIS — Z99.2 ESRD NEEDING DIALYSIS: ICD-10-CM

## 2024-05-19 DIAGNOSIS — L98.429 SKIN ULCER OF SACRUM, UNSPECIFIED ULCER STAGE: ICD-10-CM

## 2024-05-19 DIAGNOSIS — R00.0 TACHYCARDIA: ICD-10-CM

## 2024-05-19 DIAGNOSIS — I16.0 HYPERTENSIVE URGENCY: ICD-10-CM

## 2024-05-19 DIAGNOSIS — Z99.2 ESRD ON DIALYSIS: ICD-10-CM

## 2024-05-19 LAB
ABO + RH BLD: NORMAL
ALBUMIN SERPL BCP-MCNC: 3.1 G/DL (ref 3.5–5.2)
ALLENS TEST: ABNORMAL
ALP SERPL-CCNC: 376 U/L (ref 55–135)
ALT SERPL W/O P-5'-P-CCNC: 116 U/L (ref 10–44)
ANION GAP SERPL CALC-SCNC: 24 MMOL/L (ref 8–16)
APTT PPP: 29.8 SEC (ref 21–32)
AST SERPL-CCNC: 105 U/L (ref 10–40)
BASOPHILS # BLD AUTO: 0.03 K/UL (ref 0–0.2)
BASOPHILS NFR BLD: 0.4 % (ref 0–1.9)
BILIRUB SERPL-MCNC: 1.5 MG/DL (ref 0.1–1)
BLD GP AB SCN CELLS X3 SERPL QL: NORMAL
BNP SERPL-MCNC: 2857 PG/ML (ref 0–99)
BUN SERPL-MCNC: 119 MG/DL (ref 8–23)
CALCIUM SERPL-MCNC: 7.7 MG/DL (ref 8.7–10.5)
CHLORIDE SERPL-SCNC: 96 MMOL/L (ref 95–110)
CO2 SERPL-SCNC: 19 MMOL/L (ref 23–29)
CREAT SERPL-MCNC: 9.7 MG/DL (ref 0.5–1.4)
CTP QC/QA: YES
CTP QC/QA: YES
DELSYS: ABNORMAL
DIFFERENTIAL METHOD BLD: ABNORMAL
EOSINOPHIL # BLD AUTO: 0 K/UL (ref 0–0.5)
EOSINOPHIL NFR BLD: 0.3 % (ref 0–8)
ERYTHROCYTE [DISTWIDTH] IN BLOOD BY AUTOMATED COUNT: 21.8 % (ref 11.5–14.5)
EST. GFR  (NO RACE VARIABLE): 6 ML/MIN/1.73 M^2
FIO2: 21
GLUCOSE SERPL-MCNC: 88 MG/DL (ref 70–110)
HCO3 UR-SCNC: 24.5 MMOL/L (ref 24–28)
HCT VFR BLD AUTO: 34.4 % (ref 40–54)
HGB BLD-MCNC: 11.2 G/DL (ref 14–18)
IMM GRANULOCYTES # BLD AUTO: 0.05 K/UL (ref 0–0.04)
IMM GRANULOCYTES NFR BLD AUTO: 0.6 % (ref 0–0.5)
INR PPP: 1.2 (ref 0.8–1.2)
LACTATE SERPL-SCNC: 2.6 MMOL/L (ref 0.5–2.2)
LIPASE SERPL-CCNC: 59 U/L (ref 4–60)
LYMPHOCYTES # BLD AUTO: 0.8 K/UL (ref 1–4.8)
LYMPHOCYTES NFR BLD: 10 % (ref 18–48)
MAGNESIUM SERPL-MCNC: 2.1 MG/DL (ref 1.6–2.6)
MCH RBC QN AUTO: 27.9 PG (ref 27–31)
MCHC RBC AUTO-ENTMCNC: 32.6 G/DL (ref 32–36)
MCV RBC AUTO: 86 FL (ref 82–98)
MODE: ABNORMAL
MONOCYTES # BLD AUTO: 0.6 K/UL (ref 0.3–1)
MONOCYTES NFR BLD: 8 % (ref 4–15)
NEUTROPHILS # BLD AUTO: 6.2 K/UL (ref 1.8–7.7)
NEUTROPHILS NFR BLD: 80.7 % (ref 38–73)
NRBC BLD-RTO: 1 /100 WBC
PCO2 BLDA: 48.5 MMHG (ref 35–45)
PH SMN: 7.31 [PH] (ref 7.35–7.45)
PHOSPHATE SERPL-MCNC: 7.8 MG/DL (ref 2.7–4.5)
PLATELET # BLD AUTO: 217 K/UL (ref 150–450)
PMV BLD AUTO: 10.7 FL (ref 9.2–12.9)
PO2 BLDA: 22 MMHG (ref 40–60)
POC BE: -2 MMOL/L
POC MOLECULAR INFLUENZA A AGN: NEGATIVE
POC MOLECULAR INFLUENZA B AGN: NEGATIVE
POC SATURATED O2: 32 % (ref 95–100)
POC TCO2: 26 MMOL/L (ref 24–29)
POTASSIUM SERPL-SCNC: 4.9 MMOL/L (ref 3.5–5.1)
PROCALCITONIN SERPL IA-MCNC: 3.96 NG/ML
PROT SERPL-MCNC: 9.9 G/DL (ref 6–8.4)
PROTHROMBIN TIME: 13.4 SEC (ref 9–12.5)
RBC # BLD AUTO: 4.02 M/UL (ref 4.6–6.2)
SAMPLE: ABNORMAL
SARS-COV-2 RDRP RESP QL NAA+PROBE: NEGATIVE
SITE: ABNORMAL
SODIUM SERPL-SCNC: 139 MMOL/L (ref 136–145)
SPECIMEN OUTDATE: NORMAL
TROPONIN I SERPL DL<=0.01 NG/ML-MCNC: 0.11 NG/ML (ref 0–0.03)
WBC # BLD AUTO: 7.72 K/UL (ref 3.9–12.7)

## 2024-05-19 PROCEDURE — 87502 INFLUENZA DNA AMP PROBE: CPT

## 2024-05-19 PROCEDURE — C9113 INJ PANTOPRAZOLE SODIUM, VIA: HCPCS | Performed by: STUDENT IN AN ORGANIZED HEALTH CARE EDUCATION/TRAINING PROGRAM

## 2024-05-19 PROCEDURE — 85610 PROTHROMBIN TIME: CPT | Performed by: STUDENT IN AN ORGANIZED HEALTH CARE EDUCATION/TRAINING PROGRAM

## 2024-05-19 PROCEDURE — 80053 COMPREHEN METABOLIC PANEL: CPT | Performed by: STUDENT IN AN ORGANIZED HEALTH CARE EDUCATION/TRAINING PROGRAM

## 2024-05-19 PROCEDURE — 84484 ASSAY OF TROPONIN QUANT: CPT | Performed by: STUDENT IN AN ORGANIZED HEALTH CARE EDUCATION/TRAINING PROGRAM

## 2024-05-19 PROCEDURE — 82803 BLOOD GASES ANY COMBINATION: CPT

## 2024-05-19 PROCEDURE — 83690 ASSAY OF LIPASE: CPT | Performed by: STUDENT IN AN ORGANIZED HEALTH CARE EDUCATION/TRAINING PROGRAM

## 2024-05-19 PROCEDURE — 99285 EMERGENCY DEPT VISIT HI MDM: CPT | Mod: 25

## 2024-05-19 PROCEDURE — 85025 COMPLETE CBC W/AUTO DIFF WBC: CPT | Performed by: STUDENT IN AN ORGANIZED HEALTH CARE EDUCATION/TRAINING PROGRAM

## 2024-05-19 PROCEDURE — 82800 BLOOD PH: CPT

## 2024-05-19 PROCEDURE — 84100 ASSAY OF PHOSPHORUS: CPT | Performed by: STUDENT IN AN ORGANIZED HEALTH CARE EDUCATION/TRAINING PROGRAM

## 2024-05-19 PROCEDURE — 87635 SARS-COV-2 COVID-19 AMP PRB: CPT | Performed by: STUDENT IN AN ORGANIZED HEALTH CARE EDUCATION/TRAINING PROGRAM

## 2024-05-19 PROCEDURE — 86901 BLOOD TYPING SEROLOGIC RH(D): CPT | Performed by: STUDENT IN AN ORGANIZED HEALTH CARE EDUCATION/TRAINING PROGRAM

## 2024-05-19 PROCEDURE — 25000003 PHARM REV CODE 250: Performed by: STUDENT IN AN ORGANIZED HEALTH CARE EDUCATION/TRAINING PROGRAM

## 2024-05-19 PROCEDURE — 85730 THROMBOPLASTIN TIME PARTIAL: CPT | Performed by: STUDENT IN AN ORGANIZED HEALTH CARE EDUCATION/TRAINING PROGRAM

## 2024-05-19 PROCEDURE — 83880 ASSAY OF NATRIURETIC PEPTIDE: CPT | Performed by: STUDENT IN AN ORGANIZED HEALTH CARE EDUCATION/TRAINING PROGRAM

## 2024-05-19 PROCEDURE — 83735 ASSAY OF MAGNESIUM: CPT | Performed by: STUDENT IN AN ORGANIZED HEALTH CARE EDUCATION/TRAINING PROGRAM

## 2024-05-19 PROCEDURE — 84145 PROCALCITONIN (PCT): CPT | Performed by: STUDENT IN AN ORGANIZED HEALTH CARE EDUCATION/TRAINING PROGRAM

## 2024-05-19 PROCEDURE — 83605 ASSAY OF LACTIC ACID: CPT | Performed by: STUDENT IN AN ORGANIZED HEALTH CARE EDUCATION/TRAINING PROGRAM

## 2024-05-19 PROCEDURE — 96374 THER/PROPH/DIAG INJ IV PUSH: CPT

## 2024-05-19 PROCEDURE — 99900035 HC TECH TIME PER 15 MIN (STAT)

## 2024-05-19 PROCEDURE — 25500020 PHARM REV CODE 255: Performed by: STUDENT IN AN ORGANIZED HEALTH CARE EDUCATION/TRAINING PROGRAM

## 2024-05-19 PROCEDURE — 63600175 PHARM REV CODE 636 W HCPCS: Performed by: STUDENT IN AN ORGANIZED HEALTH CARE EDUCATION/TRAINING PROGRAM

## 2024-05-19 PROCEDURE — 93005 ELECTROCARDIOGRAM TRACING: CPT

## 2024-05-19 PROCEDURE — 93010 ELECTROCARDIOGRAM REPORT: CPT | Mod: ,,, | Performed by: INTERNAL MEDICINE

## 2024-05-19 RX ORDER — METOPROLOL TARTRATE 1 MG/ML
5 INJECTION, SOLUTION INTRAVENOUS
Status: COMPLETED | OUTPATIENT
Start: 2024-05-19 | End: 2024-05-19

## 2024-05-19 RX ORDER — PANTOPRAZOLE SODIUM 40 MG/10ML
80 INJECTION, POWDER, LYOPHILIZED, FOR SOLUTION INTRAVENOUS
Status: COMPLETED | OUTPATIENT
Start: 2024-05-19 | End: 2024-05-19

## 2024-05-19 RX ORDER — MORPHINE SULFATE 4 MG/ML
2 INJECTION, SOLUTION INTRAMUSCULAR; INTRAVENOUS
Status: COMPLETED | OUTPATIENT
Start: 2024-05-19 | End: 2024-05-19

## 2024-05-19 RX ADMIN — IOHEXOL 80 ML: 350 INJECTION, SOLUTION INTRAVENOUS at 11:05

## 2024-05-19 RX ADMIN — MORPHINE SULFATE 2 MG: 4 INJECTION INTRAVENOUS at 11:05

## 2024-05-19 RX ADMIN — PANTOPRAZOLE SODIUM 80 MG: 40 INJECTION, POWDER, FOR SOLUTION INTRAVENOUS at 10:05

## 2024-05-19 RX ADMIN — METOROPROLOL TARTRATE 5 MG: 5 INJECTION, SOLUTION INTRAVENOUS at 11:05

## 2024-05-20 PROBLEM — E87.70 FLUID OVERLOAD: Status: ACTIVE | Noted: 2024-05-20

## 2024-05-20 LAB
ALBUMIN SERPL BCP-MCNC: 3 G/DL (ref 3.5–5.2)
ALP SERPL-CCNC: 341 U/L (ref 55–135)
ALT SERPL W/O P-5'-P-CCNC: 106 U/L (ref 10–44)
ANION GAP SERPL CALC-SCNC: 24 MMOL/L (ref 8–16)
AST SERPL-CCNC: 86 U/L (ref 10–40)
BASOPHILS # BLD AUTO: 0.02 K/UL (ref 0–0.2)
BASOPHILS NFR BLD: 0.2 % (ref 0–1.9)
BILIRUB SERPL-MCNC: 1.6 MG/DL (ref 0.1–1)
BUN SERPL-MCNC: 116 MG/DL (ref 8–23)
CALCIUM SERPL-MCNC: 7.4 MG/DL (ref 8.7–10.5)
CHLORIDE SERPL-SCNC: 97 MMOL/L (ref 95–110)
CO2 SERPL-SCNC: 16 MMOL/L (ref 23–29)
CREAT SERPL-MCNC: 10.5 MG/DL (ref 0.5–1.4)
DIFFERENTIAL METHOD BLD: ABNORMAL
EOSINOPHIL # BLD AUTO: 0 K/UL (ref 0–0.5)
EOSINOPHIL NFR BLD: 0 % (ref 0–8)
ERYTHROCYTE [DISTWIDTH] IN BLOOD BY AUTOMATED COUNT: 21.7 % (ref 11.5–14.5)
EST. GFR  (NO RACE VARIABLE): 5 ML/MIN/1.73 M^2
GLUCOSE SERPL-MCNC: 57 MG/DL (ref 70–110)
HBV SURFACE AG SERPL QL IA: NORMAL
HCT VFR BLD AUTO: 31.9 % (ref 40–54)
HGB BLD-MCNC: 10.2 G/DL (ref 14–18)
IMM GRANULOCYTES # BLD AUTO: 0.07 K/UL (ref 0–0.04)
IMM GRANULOCYTES NFR BLD AUTO: 0.6 % (ref 0–0.5)
LYMPHOCYTES # BLD AUTO: 0.7 K/UL (ref 1–4.8)
LYMPHOCYTES NFR BLD: 5.9 % (ref 18–48)
MAGNESIUM SERPL-MCNC: 2.1 MG/DL (ref 1.6–2.6)
MCH RBC QN AUTO: 28 PG (ref 27–31)
MCHC RBC AUTO-ENTMCNC: 32 G/DL (ref 32–36)
MCV RBC AUTO: 88 FL (ref 82–98)
MONOCYTES # BLD AUTO: 1.1 K/UL (ref 0.3–1)
MONOCYTES NFR BLD: 9.9 % (ref 4–15)
NEUTROPHILS # BLD AUTO: 9.2 K/UL (ref 1.8–7.7)
NEUTROPHILS NFR BLD: 83.4 % (ref 38–73)
NRBC BLD-RTO: 1 /100 WBC
OHS QRS DURATION: 76 MS
OHS QTC CALCULATION: 473 MS
PHOSPHATE SERPL-MCNC: 9.3 MG/DL (ref 2.7–4.5)
PLATELET # BLD AUTO: 196 K/UL (ref 150–450)
PMV BLD AUTO: 10.1 FL (ref 9.2–12.9)
POCT GLUCOSE: 113 MG/DL (ref 70–110)
POCT GLUCOSE: 191 MG/DL (ref 70–110)
POCT GLUCOSE: 46 MG/DL (ref 70–110)
POCT GLUCOSE: 81 MG/DL (ref 70–110)
POTASSIUM SERPL-SCNC: 5.7 MMOL/L (ref 3.5–5.1)
PROT SERPL-MCNC: 9.1 G/DL (ref 6–8.4)
RBC # BLD AUTO: 3.64 M/UL (ref 4.6–6.2)
SODIUM SERPL-SCNC: 137 MMOL/L (ref 136–145)
TROPONIN I SERPL DL<=0.01 NG/ML-MCNC: 0.1 NG/ML (ref 0–0.03)
TROPONIN I SERPL DL<=0.01 NG/ML-MCNC: 0.11 NG/ML (ref 0–0.03)
TSH SERPL DL<=0.005 MIU/L-ACNC: 1.65 UIU/ML (ref 0.4–4)
WBC # BLD AUTO: 11.07 K/UL (ref 3.9–12.7)

## 2024-05-20 PROCEDURE — 84443 ASSAY THYROID STIM HORMONE: CPT

## 2024-05-20 PROCEDURE — 84484 ASSAY OF TROPONIN QUANT: CPT | Mod: 91

## 2024-05-20 PROCEDURE — 82962 GLUCOSE BLOOD TEST: CPT

## 2024-05-20 PROCEDURE — 63600175 PHARM REV CODE 636 W HCPCS

## 2024-05-20 PROCEDURE — 25000003 PHARM REV CODE 250

## 2024-05-20 PROCEDURE — 86706 HEP B SURFACE ANTIBODY: CPT | Performed by: INTERNAL MEDICINE

## 2024-05-20 PROCEDURE — 87340 HEPATITIS B SURFACE AG IA: CPT | Performed by: INTERNAL MEDICINE

## 2024-05-20 PROCEDURE — 96376 TX/PRO/DX INJ SAME DRUG ADON: CPT

## 2024-05-20 PROCEDURE — 25000003 PHARM REV CODE 250: Performed by: PHYSICIAN ASSISTANT

## 2024-05-20 PROCEDURE — 5A1D70Z PERFORMANCE OF URINARY FILTRATION, INTERMITTENT, LESS THAN 6 HOURS PER DAY: ICD-10-PCS | Performed by: INTERNAL MEDICINE

## 2024-05-20 PROCEDURE — 11000001 HC ACUTE MED/SURG PRIVATE ROOM

## 2024-05-20 PROCEDURE — 25000003 PHARM REV CODE 250: Performed by: NURSE PRACTITIONER

## 2024-05-20 PROCEDURE — 80053 COMPREHEN METABOLIC PANEL: CPT

## 2024-05-20 PROCEDURE — 84100 ASSAY OF PHOSPHORUS: CPT

## 2024-05-20 PROCEDURE — 25000003 PHARM REV CODE 250: Performed by: INTERNAL MEDICINE

## 2024-05-20 PROCEDURE — 96375 TX/PRO/DX INJ NEW DRUG ADDON: CPT

## 2024-05-20 PROCEDURE — 85025 COMPLETE CBC W/AUTO DIFF WBC: CPT

## 2024-05-20 PROCEDURE — 83735 ASSAY OF MAGNESIUM: CPT

## 2024-05-20 PROCEDURE — 90935 HEMODIALYSIS ONE EVALUATION: CPT

## 2024-05-20 RX ORDER — TALC
6 POWDER (GRAM) TOPICAL NIGHTLY PRN
Status: DISCONTINUED | OUTPATIENT
Start: 2024-05-20 | End: 2024-05-23 | Stop reason: HOSPADM

## 2024-05-20 RX ORDER — SODIUM CHLORIDE 0.9 % (FLUSH) 0.9 %
10 SYRINGE (ML) INJECTION EVERY 12 HOURS PRN
Status: DISCONTINUED | OUTPATIENT
Start: 2024-05-20 | End: 2024-05-23 | Stop reason: HOSPADM

## 2024-05-20 RX ORDER — ALUMINUM HYDROXIDE, MAGNESIUM HYDROXIDE, AND SIMETHICONE 1200; 120; 1200 MG/30ML; MG/30ML; MG/30ML
30 SUSPENSION ORAL 4 TIMES DAILY PRN
Status: DISCONTINUED | OUTPATIENT
Start: 2024-05-20 | End: 2024-05-23 | Stop reason: HOSPADM

## 2024-05-20 RX ORDER — HYDRALAZINE HYDROCHLORIDE 20 MG/ML
10 INJECTION INTRAMUSCULAR; INTRAVENOUS
Status: DISCONTINUED | OUTPATIENT
Start: 2024-05-20 | End: 2024-05-20

## 2024-05-20 RX ORDER — ALLOPURINOL 100 MG/1
100 TABLET ORAL DAILY
Status: DISCONTINUED | OUTPATIENT
Start: 2024-05-20 | End: 2024-05-23 | Stop reason: HOSPADM

## 2024-05-20 RX ORDER — ACETAMINOPHEN 325 MG/1
650 TABLET ORAL EVERY 4 HOURS PRN
Status: DISCONTINUED | OUTPATIENT
Start: 2024-05-20 | End: 2024-05-23 | Stop reason: HOSPADM

## 2024-05-20 RX ORDER — SEVELAMER CARBONATE 800 MG/1
1600 TABLET, FILM COATED ORAL
Status: DISCONTINUED | OUTPATIENT
Start: 2024-05-20 | End: 2024-05-22

## 2024-05-20 RX ORDER — IBUPROFEN 200 MG
24 TABLET ORAL
Status: DISCONTINUED | OUTPATIENT
Start: 2024-05-20 | End: 2024-05-23 | Stop reason: HOSPADM

## 2024-05-20 RX ORDER — MUPIROCIN 20 MG/G
OINTMENT TOPICAL 2 TIMES DAILY
Status: DISCONTINUED | OUTPATIENT
Start: 2024-05-20 | End: 2024-05-23 | Stop reason: HOSPADM

## 2024-05-20 RX ORDER — INSULIN ASPART 100 [IU]/ML
0-5 INJECTION, SOLUTION INTRAVENOUS; SUBCUTANEOUS
Status: DISCONTINUED | OUTPATIENT
Start: 2024-05-20 | End: 2024-05-23 | Stop reason: HOSPADM

## 2024-05-20 RX ORDER — ATORVASTATIN CALCIUM 40 MG/1
80 TABLET, FILM COATED ORAL DAILY
Status: DISCONTINUED | OUTPATIENT
Start: 2024-05-20 | End: 2024-05-20

## 2024-05-20 RX ORDER — TAMSULOSIN HYDROCHLORIDE 0.4 MG/1
0.4 CAPSULE ORAL DAILY
Status: DISCONTINUED | OUTPATIENT
Start: 2024-05-20 | End: 2024-05-23 | Stop reason: HOSPADM

## 2024-05-20 RX ORDER — HYDRALAZINE HYDROCHLORIDE 20 MG/ML
10 INJECTION INTRAMUSCULAR; INTRAVENOUS EVERY 6 HOURS PRN
Status: DISCONTINUED | OUTPATIENT
Start: 2024-05-20 | End: 2024-05-23 | Stop reason: HOSPADM

## 2024-05-20 RX ORDER — SODIUM CHLORIDE 9 MG/ML
INJECTION, SOLUTION INTRAVENOUS ONCE
Status: DISCONTINUED | OUTPATIENT
Start: 2024-05-20 | End: 2024-05-23 | Stop reason: HOSPADM

## 2024-05-20 RX ORDER — SODIUM CHLORIDE 9 MG/ML
INJECTION, SOLUTION INTRAVENOUS
Status: DISCONTINUED | OUTPATIENT
Start: 2024-05-20 | End: 2024-05-23 | Stop reason: HOSPADM

## 2024-05-20 RX ORDER — HYDROCODONE BITARTRATE AND ACETAMINOPHEN 5; 325 MG/1; MG/1
1 TABLET ORAL ONCE
Status: COMPLETED | OUTPATIENT
Start: 2024-05-20 | End: 2024-05-20

## 2024-05-20 RX ORDER — IBUPROFEN 200 MG
16 TABLET ORAL
Status: DISCONTINUED | OUTPATIENT
Start: 2024-05-20 | End: 2024-05-23 | Stop reason: HOSPADM

## 2024-05-20 RX ORDER — AMLODIPINE BESYLATE 5 MG/1
10 TABLET ORAL DAILY
Status: DISCONTINUED | OUTPATIENT
Start: 2024-05-20 | End: 2024-05-23 | Stop reason: HOSPADM

## 2024-05-20 RX ORDER — AMIODARONE HYDROCHLORIDE 200 MG/1
200 TABLET ORAL DAILY
Status: DISCONTINUED | OUTPATIENT
Start: 2024-05-20 | End: 2024-05-23 | Stop reason: HOSPADM

## 2024-05-20 RX ORDER — GLUCAGON 1 MG
1 KIT INJECTION
Status: DISCONTINUED | OUTPATIENT
Start: 2024-05-20 | End: 2024-05-23 | Stop reason: HOSPADM

## 2024-05-20 RX ORDER — ONDANSETRON 4 MG/1
4 TABLET, ORALLY DISINTEGRATING ORAL ONCE
Status: COMPLETED | OUTPATIENT
Start: 2024-05-20 | End: 2024-05-20

## 2024-05-20 RX ORDER — NALOXONE HCL 0.4 MG/ML
0.02 VIAL (ML) INJECTION
Status: DISCONTINUED | OUTPATIENT
Start: 2024-05-20 | End: 2024-05-23 | Stop reason: HOSPADM

## 2024-05-20 RX ORDER — AMOXICILLIN 250 MG
1 CAPSULE ORAL DAILY PRN
Status: DISCONTINUED | OUTPATIENT
Start: 2024-05-20 | End: 2024-05-23 | Stop reason: HOSPADM

## 2024-05-20 RX ORDER — ONDANSETRON HYDROCHLORIDE 2 MG/ML
4 INJECTION, SOLUTION INTRAVENOUS EVERY 8 HOURS PRN
Status: DISCONTINUED | OUTPATIENT
Start: 2024-05-20 | End: 2024-05-23 | Stop reason: HOSPADM

## 2024-05-20 RX ORDER — FUROSEMIDE 10 MG/ML
60 INJECTION INTRAMUSCULAR; INTRAVENOUS ONCE
Status: COMPLETED | OUTPATIENT
Start: 2024-05-20 | End: 2024-05-20

## 2024-05-20 RX ADMIN — SEVELAMER CARBONATE 1600 MG: 800 TABLET, FILM COATED ORAL at 12:05

## 2024-05-20 RX ADMIN — MUPIROCIN: 20 OINTMENT TOPICAL at 10:05

## 2024-05-20 RX ADMIN — AMIODARONE HYDROCHLORIDE 200 MG: 200 TABLET ORAL at 08:05

## 2024-05-20 RX ADMIN — ACETAMINOPHEN 650 MG: 325 TABLET ORAL at 04:05

## 2024-05-20 RX ADMIN — HYDRALAZINE HYDROCHLORIDE 10 MG: 20 INJECTION INTRAMUSCULAR; INTRAVENOUS at 12:05

## 2024-05-20 RX ADMIN — SEVELAMER CARBONATE 1600 MG: 800 TABLET, FILM COATED ORAL at 08:05

## 2024-05-20 RX ADMIN — ACETAMINOPHEN 650 MG: 325 TABLET ORAL at 03:05

## 2024-05-20 RX ADMIN — APIXABAN 5 MG: 5 TABLET, FILM COATED ORAL at 10:05

## 2024-05-20 RX ADMIN — ONDANSETRON 4 MG: 2 INJECTION INTRAMUSCULAR; INTRAVENOUS at 04:05

## 2024-05-20 RX ADMIN — DEXTROSE MONOHYDRATE 500 ML: 100 INJECTION, SOLUTION INTRAVENOUS at 08:05

## 2024-05-20 RX ADMIN — ONDANSETRON 4 MG: 4 TABLET, ORALLY DISINTEGRATING ORAL at 10:05

## 2024-05-20 RX ADMIN — ATORVASTATIN CALCIUM 80 MG: 40 TABLET, FILM COATED ORAL at 08:05

## 2024-05-20 RX ADMIN — HYDROCODONE BITARTRATE AND ACETAMINOPHEN 1 TABLET: 5; 325 TABLET ORAL at 10:05

## 2024-05-20 RX ADMIN — HYDRALAZINE HYDROCHLORIDE 10 MG: 20 INJECTION INTRAMUSCULAR; INTRAVENOUS at 02:05

## 2024-05-20 RX ADMIN — APIXABAN 5 MG: 5 TABLET, FILM COATED ORAL at 08:05

## 2024-05-20 RX ADMIN — AMLODIPINE BESYLATE 10 MG: 5 TABLET ORAL at 08:05

## 2024-05-20 RX ADMIN — ONDANSETRON 4 MG: 2 INJECTION INTRAMUSCULAR; INTRAVENOUS at 02:05

## 2024-05-20 RX ADMIN — FUROSEMIDE 60 MG: 10 INJECTION, SOLUTION INTRAVENOUS at 03:05

## 2024-05-20 RX ADMIN — ALLOPURINOL 100 MG: 100 TABLET ORAL at 08:05

## 2024-05-20 RX ADMIN — SODIUM ZIRCONIUM CYCLOSILICATE 10 G: 10 POWDER, FOR SUSPENSION ORAL at 10:05

## 2024-05-20 RX ADMIN — TAMSULOSIN HYDROCHLORIDE 0.4 MG: 0.4 CAPSULE ORAL at 08:05

## 2024-05-20 RX ADMIN — Medication 1 CAPSULE: at 08:05

## 2024-05-20 NOTE — PT/OT/SLP PROGRESS
Occupational Therapy      Patient Name:  Mahesh Cespedes   MRN:  [id number]    0924 - Patient not seen today secondary to MD teran - MD asked therapist to return for eval tomorrow - pt is uremic and will be leaving for dialysis soon. Will follow-up at next scheduled visit.    Luli Raza, OT  5/20/2024

## 2024-05-20 NOTE — ED NOTES
Patient had an episode of non bloody emesis. ALEXSANDRA Muhammad notified about pts elevated BP and emesis

## 2024-05-20 NOTE — ASSESSMENT & PLAN NOTE
"Mr. Cespedes is a 59 yr old male with a hx of DM type 2, ESRD on HD MWF, HTN, anemia of chronic disease, CVA, PAF, hyperphosphatemia, sacral wound, history of intracranial hemorrhage who presented to the ED via EMS with a chief complaint of weakness, hypertension, and hemoptysis.  Workup in the ED revealed PT of 13.4, anion gap of 24, BUN of 119, creatinine of 9.7, GFR of 6, calcium of 7.7 (corrected 8.4), phosphorus of 7.8, ALP of 376, total bili of 1.5, AST of 105, ALT of 116, BNP of 2857, troponin of 0.106, lactic of 2.6, protocol of 3.96.  VBG with pH of 7.312, pCO2 of 48.5, PO2 of 22.  CXR showed left-sided central catheter tip overlies the SVC. Cardiomegaly with perihilar edema and central vascular congestion.  Small bilateral effusions.  CT chest abdomen pelvis with IV contrast showed Cardiomegaly with coronary atherosclerosis and mild-moderate pericardial effusion.  Contrast reflux in the hepatic veins may be associated with cardiac dysfunction.  Recommend clinical correlation. Small basilar pleural effusions left greater than right with probable atelectasis at the left lung base.  Mild ground-glass changes bilaterally could be associated with mild pulmonary edema.  Recommend follow-up. Mild diffuse ascites with soft tissue edema of the subcutaneous tissues and mesentery suggesting anasarca." Patient was given metoprolol 5 mg IV, morphine 2 mg IV, pantoprazole 80 mg IV, and Omnipaque 80 mL IV contrast while in the ED. case discussed with ED provider Erlin Moreland MD and patient will be placed under observation for further management.    - Nephrology consulted, appreciate recs  - Pt states he still makes urine, Lasix 60 mg IV x 1  - May benefit from para if dialysis does not relieve fluid overloaded state   "

## 2024-05-20 NOTE — ASSESSMENT & PLAN NOTE
Last A1c reviewed-   Lab Results   Component Value Date    HGBA1C 5.5 03/15/2022     Most recent fingerstick glucose reviewed-   Recent Labs   Lab 05/20/24  0051   POCTGLUCOSE 81     Current correctional scale  Low  Maintain anti-hyperglycemic dose as follows-   Antihyperglycemics (From admission, onward)      Start     Stop Route Frequency Ordered    05/20/24 0122  insulin aspart U-100 pen 0-5 Units         -- SubQ Before meals & nightly PRN 05/20/24 0025          Hold Oral hypoglycemics while patient is in the hospital.

## 2024-05-20 NOTE — NURSING
Pt assigned to room # 338. Report given to BETO Montelongo on telemetry. Pt currently in dialysis and will transfer to new room after treatment.

## 2024-05-20 NOTE — ASSESSMENT & PLAN NOTE
Patient's anemia is currently controlled. Has not received any PRBCs to date. Etiology likely d/t chronic disease due to ESRD  Current CBC reviewed-   Lab Results   Component Value Date    HGB 11.2 (L) 05/19/2024    HCT 34.4 (L) 05/19/2024     Monitor serial CBC and transfuse if patient becomes hemodynamically unstable, symptomatic or H/H drops below 7/21.

## 2024-05-20 NOTE — PROGRESS NOTES
Date of Admission:5/19/2024    SUBJECTIVE:notes feeling poorly    Current Facility-Administered Medications   Medication    0.9%  NaCl infusion    0.9%  NaCl infusion    acetaminophen tablet 650 mg    allopurinoL tablet 100 mg    aluminum-magnesium hydroxide-simethicone 200-200-20 mg/5 mL suspension 30 mL    amiodarone tablet 200 mg    amLODIPine tablet 10 mg    apixaban tablet 5 mg    atorvastatin tablet 80 mg    dextrose 10% bolus 125 mL 125 mL    dextrose 10% bolus 250 mL 250 mL    glucagon (human recombinant) injection 1 mg    glucose chewable tablet 16 g    glucose chewable tablet 24 g    hydrALAZINE injection 10 mg    insulin aspart U-100 pen 0-5 Units    melatonin tablet 6 mg    mupirocin 2 % ointment    naloxone 0.4 mg/mL injection 0.02 mg    ondansetron injection 4 mg    senna-docusate 8.6-50 mg per tablet 1 tablet    sevelamer carbonate tablet 1,600 mg    sodium chloride 0.9% bolus 250 mL 250 mL    sodium chloride 0.9% flush 10 mL    tamsulosin 24 hr capsule 0.4 mg    vitamin renal formula (B-complex-vitamin c-folic acid) 1 mg per capsule 1 capsule     Current Outpatient Medications   Medication Sig    allopurinoL (ZYLOPRIM) 100 MG tablet Take 100 mg by mouth once daily.    amantadine HCL (SYMMETREL) 100 mg capsule Take 1 capsule by mouth every other day.    amiodarone (PACERONE) 200 MG Tab Take 200 mg by mouth once daily.    amLODIPine (NORVASC) 10 MG tablet Take 10 mg by mouth once daily.    amoxicillin-clavulanate 875-125mg (AUGMENTIN) 875-125 mg per tablet Take 1 tablet by mouth twice a day    atorvastatin (LIPITOR) 80 MG tablet Take 80 mg by mouth once daily.    ELIQUIS 5 mg Tab Take 5 mg by mouth every 12 (twelve) hours.    metoprolol succinate (TOPROL-XL) 50 MG 24 hr tablet Take 1 tablet (50 mg total) by mouth once daily.    pantoprazole (PROTONIX) 40 MG tablet Take 40 mg by mouth once daily.    sevelamer carbonate (RENVELA) 800 mg Tab Take 2 tablets (1,600 mg total) by mouth 3 (three) times daily  with meals.    tamsulosin (FLOMAX) 0.4 mg Cap Take 0.4 mg by mouth once daily.    vitamin renal formula, B-complex-vitamin c-folic acid, (RENAL CAPS) 1 mg Cap Take 1 capsule by mouth once daily at 6am.       Wt Readings from Last 3 Encounters:   05/19/24 74.8 kg (165 lb)   02/15/24 75.3 kg (166 lb 0.1 oz)   01/24/24 72.6 kg (160 lb)     Temp Readings from Last 3 Encounters:   05/19/24 98.1 °F (36.7 °C)   02/22/24 97.6 °F (36.4 °C) (Oral)   02/01/24 98 °F (36.7 °C)     BP Readings from Last 3 Encounters:   05/20/24 (!) 186/97   02/22/24 (!) 140/79   02/01/24 120/72     Pulse Readings from Last 3 Encounters:   05/20/24 95   02/22/24 64   02/01/24 90     No intake or output data in the 24 hours ending 05/20/24 1110    PE:  GEN:wd male in nad  HEENT:ncat,eomi,mm  CVS:s1s2 regular  PULM:ctab  ABD:bs,soft  EXT:no leg edema  NEURO:awake  Pc of left chest    Recent Labs   Lab 05/20/24  0702   GLU 57*      K 5.7*   CL 97   CO2 16*   *   CREATININE 10.5*   CALCIUM 7.4*   MG 2.1       Lab Results   Component Value Date    PTH 1,877 (H) 05/08/2024    CALCIUM 7.4 (L) 05/20/2024    PHOS 9.3 (HH) 05/20/2024       Recent Labs   Lab 05/20/24  0702   WBC 11.07   RBC 3.64*   HGB 10.2*   HCT 31.9*      MCV 88   MCH 28.0   MCHC 32.0           A/P:  1.esrd. hd today. Cont tx MWF.  2.anemia 2nd to esrd. No epo indicated. At goal.  Cont vitamins.  3.2nd hyperpara. Increase binders. Phos too high.  4.hyperkalemia. 2k bath.  5.htn. uf with hd.  6.dm2. following sugars.  7.sacral wound. Cont wound care.

## 2024-05-20 NOTE — PT/OT/SLP PROGRESS
Physical Therapy      Patient Name:  Mahesh Cespedes   MRN:  [id number]    Patient not seen today secondary to  (Hold per Nursing, Pt uremic and in need of dialysis.). Will follow-up .

## 2024-05-20 NOTE — ASSESSMENT & PLAN NOTE
Creatine stable for now. BMP reviewed- noted Estimated Creatinine Clearance: 7.5 mL/min (A) (based on SCr of 9.7 mg/dL (H)). according to latest data. Based on current GFR, CKD stage is end stage.  Monitor UOP and serial BMP and adjust therapy as needed. Renally dose meds. Avoid nephrotoxic medications and procedures.    - Nephrology consulted, appreciate recs
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
## 2024-05-20 NOTE — ED NOTES
ALEXSANDRA Muhammad made aware of Pts elevated blood pressure 209/113 post IV 10mg  hydralazine    Partial Purse String (Simple) Text: Given the location of the defect and the characteristics of the surrounding skin a simple purse string closure was deemed most appropriate.  Undermining was performed circumfirentially around the surgical defect.  A purse string suture was then placed and tightened. Wound tension only allowed a partial closure of the circular defect.
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
## 2024-05-20 NOTE — ASSESSMENT & PLAN NOTE
Chronic,  Latest blood pressure and vitals reviewed-     Temp:  [98.1 °F (36.7 °C)]   Pulse:  [101-113]   Resp:  [13-20]   BP: (166-209)/()   SpO2:  [96 %-100 %] .   Home meds for hypertension were reviewed and noted below.   Hypertension Medications               amLODIPine (NORVASC) 10 MG tablet Take 10 mg by mouth once daily.    metoprolol succinate (TOPROL-XL) 50 MG 24 hr tablet Take 1 tablet (50 mg total) by mouth once daily.            While in the hospital, will manage blood pressure as follows; Continue home antihypertensive regimen    Will utilize p.r.n. blood pressure medication only if patient's blood pressure greater than 180/110 and he develops symptoms such as worsening chest pain or shortness of breath.
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
## 2024-05-20 NOTE — ASSESSMENT & PLAN NOTE
Patient with Paroxysmal (<7 days) atrial fibrillation which is controlled currently with Calcium Channel Blocker and Amiodarone. Patient is currently in sinus rhythm.[id number] Score: 1. Anticoagulation indicated. Anticoagulation done with Apixaban .

## 2024-05-20 NOTE — ADMISSIONCARE
AdmissionCare    Guideline: Hypertension - INPT, Inpatient    Based on the indications selected for the patient, the bed status of Inpatient was determined to be NOT MET    The following indications were selected as present at the time of evaluation of the patient:       AdmissionCare documentation entered by: SAMRA Moran    [facility], 27th edition, Copyright © 2023 [facility], [facility] All Rights Reserved.  [id number]:00

## 2024-05-21 LAB
ALBUMIN SERPL BCP-MCNC: 2.7 G/DL (ref 3.5–5.2)
ALP SERPL-CCNC: 291 U/L (ref 55–135)
ALT SERPL W/O P-5'-P-CCNC: 83 U/L (ref 10–44)
ANION GAP SERPL CALC-SCNC: 17 MMOL/L (ref 8–16)
AST SERPL-CCNC: 51 U/L (ref 10–40)
BASOPHILS # BLD AUTO: 0.02 K/UL (ref 0–0.2)
BASOPHILS NFR BLD: 0.2 % (ref 0–1.9)
BILIRUB SERPL-MCNC: 0.9 MG/DL (ref 0.1–1)
BUN SERPL-MCNC: 77 MG/DL (ref 8–23)
CALCIUM SERPL-MCNC: 8 MG/DL (ref 8.7–10.5)
CHLORIDE SERPL-SCNC: 98 MMOL/L (ref 95–110)
CO2 SERPL-SCNC: 19 MMOL/L (ref 23–29)
CREAT SERPL-MCNC: 7.7 MG/DL (ref 0.5–1.4)
DIFFERENTIAL METHOD BLD: ABNORMAL
EOSINOPHIL # BLD AUTO: 0.1 K/UL (ref 0–0.5)
EOSINOPHIL NFR BLD: 1.5 % (ref 0–8)
ERYTHROCYTE [DISTWIDTH] IN BLOOD BY AUTOMATED COUNT: 21.4 % (ref 11.5–14.5)
EST. GFR  (NO RACE VARIABLE): 7 ML/MIN/1.73 M^2
GLUCOSE SERPL-MCNC: 96 MG/DL (ref 70–110)
HAV IGM SERPL QL IA: NORMAL
HBV CORE IGM SERPL QL IA: NORMAL
HBV SURFACE AG SERPL QL IA: NORMAL
HCT VFR BLD AUTO: 29.7 % (ref 40–54)
HCV AB SERPL QL IA: NORMAL
HGB BLD-MCNC: 9.7 G/DL (ref 14–18)
IMM GRANULOCYTES # BLD AUTO: 0.03 K/UL (ref 0–0.04)
IMM GRANULOCYTES NFR BLD AUTO: 0.4 % (ref 0–0.5)
LYMPHOCYTES # BLD AUTO: 0.7 K/UL (ref 1–4.8)
LYMPHOCYTES NFR BLD: 8.1 % (ref 18–48)
MAGNESIUM SERPL-MCNC: 2 MG/DL (ref 1.6–2.6)
MCH RBC QN AUTO: 27.9 PG (ref 27–31)
MCHC RBC AUTO-ENTMCNC: 32.7 G/DL (ref 32–36)
MCV RBC AUTO: 85 FL (ref 82–98)
MONOCYTES # BLD AUTO: 0.7 K/UL (ref 0.3–1)
MONOCYTES NFR BLD: 8.1 % (ref 4–15)
NEUTROPHILS # BLD AUTO: 6.7 K/UL (ref 1.8–7.7)
NEUTROPHILS NFR BLD: 81.7 % (ref 38–73)
NRBC BLD-RTO: 0 /100 WBC
PHOSPHATE SERPL-MCNC: 7.1 MG/DL (ref 2.7–4.5)
PLATELET # BLD AUTO: 208 K/UL (ref 150–450)
PMV BLD AUTO: 11.8 FL (ref 9.2–12.9)
POCT GLUCOSE: 124 MG/DL (ref 70–110)
POCT GLUCOSE: 140 MG/DL (ref 70–110)
POCT GLUCOSE: 151 MG/DL (ref 70–110)
POCT GLUCOSE: 161 MG/DL (ref 70–110)
POTASSIUM SERPL-SCNC: 4.2 MMOL/L (ref 3.5–5.1)
PROT SERPL-MCNC: 8.5 G/DL (ref 6–8.4)
RBC # BLD AUTO: 3.48 M/UL (ref 4.6–6.2)
SODIUM SERPL-SCNC: 134 MMOL/L (ref 136–145)
WBC # BLD AUTO: 8.16 K/UL (ref 3.9–12.7)

## 2024-05-21 PROCEDURE — 97161 PT EVAL LOW COMPLEX 20 MIN: CPT

## 2024-05-21 PROCEDURE — 97535 SELF CARE MNGMENT TRAINING: CPT

## 2024-05-21 PROCEDURE — 25000003 PHARM REV CODE 250

## 2024-05-21 PROCEDURE — 97165 OT EVAL LOW COMPLEX 30 MIN: CPT

## 2024-05-21 PROCEDURE — 11000001 HC ACUTE MED/SURG PRIVATE ROOM

## 2024-05-21 PROCEDURE — 80053 COMPREHEN METABOLIC PANEL: CPT

## 2024-05-21 PROCEDURE — 63600175 PHARM REV CODE 636 W HCPCS

## 2024-05-21 PROCEDURE — 97530 THERAPEUTIC ACTIVITIES: CPT

## 2024-05-21 PROCEDURE — 84100 ASSAY OF PHOSPHORUS: CPT

## 2024-05-21 PROCEDURE — 94761 N-INVAS EAR/PLS OXIMETRY MLT: CPT

## 2024-05-21 PROCEDURE — 80074 ACUTE HEPATITIS PANEL: CPT | Performed by: NURSE PRACTITIONER

## 2024-05-21 PROCEDURE — 85025 COMPLETE CBC W/AUTO DIFF WBC: CPT

## 2024-05-21 PROCEDURE — 83735 ASSAY OF MAGNESIUM: CPT

## 2024-05-21 PROCEDURE — 27000221 HC OXYGEN, UP TO 24 HOURS

## 2024-05-21 RX ADMIN — MUPIROCIN: 20 OINTMENT TOPICAL at 08:05

## 2024-05-21 RX ADMIN — APIXABAN 5 MG: 5 TABLET, FILM COATED ORAL at 09:05

## 2024-05-21 RX ADMIN — SEVELAMER CARBONATE 1600 MG: 800 TABLET, FILM COATED ORAL at 11:05

## 2024-05-21 RX ADMIN — AMIODARONE HYDROCHLORIDE 200 MG: 200 TABLET ORAL at 09:05

## 2024-05-21 RX ADMIN — TAMSULOSIN HYDROCHLORIDE 0.4 MG: 0.4 CAPSULE ORAL at 09:05

## 2024-05-21 RX ADMIN — SEVELAMER CARBONATE 1600 MG: 800 TABLET, FILM COATED ORAL at 04:05

## 2024-05-21 RX ADMIN — ALLOPURINOL 100 MG: 100 TABLET ORAL at 09:05

## 2024-05-21 RX ADMIN — MUPIROCIN: 20 OINTMENT TOPICAL at 09:05

## 2024-05-21 RX ADMIN — ONDANSETRON 4 MG: 2 INJECTION INTRAMUSCULAR; INTRAVENOUS at 06:05

## 2024-05-21 RX ADMIN — APIXABAN 5 MG: 5 TABLET, FILM COATED ORAL at 08:05

## 2024-05-21 RX ADMIN — ACETAMINOPHEN 650 MG: 325 TABLET ORAL at 12:05

## 2024-05-21 RX ADMIN — SEVELAMER CARBONATE 1600 MG: 800 TABLET, FILM COATED ORAL at 07:05

## 2024-05-21 RX ADMIN — AMLODIPINE BESYLATE 10 MG: 5 TABLET ORAL at 09:05

## 2024-05-21 RX ADMIN — Medication 1 CAPSULE: at 05:05

## 2024-05-21 NOTE — PLAN OF CARE
Problem: Physical Therapy  Goal: Physical Therapy Goal  Description: Goals to be met by: 24     Patient will increase functional independence with mobility by performin. Supine to sit with Modified [place]  2. Sit to stand transfer with Modified [place]  3. Bed to chair transfer with Modified [place]    4. Gait  x 25-50 feet with Modified [place] using Rolling Walker.   5. Wheelchair propulsion x50 feet with Modified [place] using bilateral uppper extremities  6. Ascend/descend 2 stair with Supervision    7. Lower extremity exercise program x10 reps per handout, with supervision    Outcome: Progressing     Initial PT evaluation performed today.  Pt could benefit from skilled PT services 5x/wk in order to maximize function prior to D/C.  Post Acute Therapy (moderate vs Low) and wound care recommended at time of D/C.

## 2024-05-21 NOTE — NURSING
[facility], [facility]  Nurses Note -- 4 Eyes      5/21/2024       Skin assessed on: Q Shift      [x] No Pressure Injuries Present    [x]Prevention Measures Documented    [] Yes LDA  for Pressure Injury Previously documented     [] Yes New Pressure Injury Discovered   [] LDA for New Pressure Injury Added      Attending RN:  Jayda El RN     Second RN:  Devonte
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
## 2024-05-21 NOTE — PLAN OF CARE
Problem: Hemodialysis  Goal: Safe, Effective Therapy Delivery  Outcome: Progressing     Problem: Diabetes Comorbidity  Goal: Blood Glucose Level Within Targeted Range  Outcome: Progressing     Problem: Skin Injury Risk Increased  Goal: Skin Health and Integrity  Outcome: Progressing

## 2024-05-21 NOTE — ASSESSMENT & PLAN NOTE
Chronic,  Latest blood pressure and vitals reviewed-     Temp:  [97.4 °F (36.3 °C)-97.8 °F (36.6 °C)]   Pulse:  []   Resp:  [17-19]   BP: (145-174)/(82-99)   SpO2:  [98 %-100 %] .   Home meds for hypertension were reviewed and noted below.   Hypertension Medications               amLODIPine (NORVASC) 10 MG tablet Take 10 mg by mouth once daily.    metoprolol succinate (TOPROL-XL) 50 MG 24 hr tablet Take 1 tablet (50 mg total) by mouth once daily.            While in the hospital, will manage blood pressure as follows; Continue home antihypertensive regimen    Will utilize p.r.n. blood pressure medication only if patient's blood pressure greater than 180/110 and he develops symptoms such as worsening chest pain or shortness of breath.

## 2024-05-21 NOTE — PLAN OF CARE
Problem: Occupational Therapy  Goal: Occupational Therapy Goal  Description: Goals to be met by: 6/4/24      Patient will increase functional independence with ADLs by performing:    UE Dressing with Modified [place].  LE Dressing with Supervision.  Grooming while standing at sink with Supervision.  Toileting from toilet with Supervision for hygiene and clothing management.   Sitting in chair  x60  minutes with Supervision.  Toilet transfer to toilet with Supervision.  Upper extremity exercise program x10-15  reps per handout, with supervision.    5/21/2024 1309 by Leslie Griffin, DESTINEE  Outcome: Progressing   Patient recommended for post acute therapy (moderate over low) intensity with occupational therapy to see 4x/week while in acute care. Occupational therapy to recommend BSC for home use.

## 2024-05-21 NOTE — PLAN OF CARE
"Case Management Assessment     PCP: Dr. Cruz Hernandez  [facility]    Patient Arrived From: Home  Existing Help at Home: Rima- daughter    Barriers to Discharge: None    Discharge Plan:    A. Home with family   B. Other- tbd      Pt requested CM to contact his daughterRima to complete assessment.Pt is independent and uses a rolling walker and wheel chair. Pt's daughter stated " My dad will need transportation home because the car isn't working."     Pt receives dialysis on Monday, Wednesday and Friday with [facility]. CM will assist as needed.       05/21/24 1241   Discharge Assessment   Assessment Type Discharge Planning Assessment   Confirmed/corrected address, phone number and insurance Yes   Confirmed Demographics Correct on Facesheet   Source of Information patient;family   When was your last doctors appointment?   (Unknown)   Communicated JAIME with patient/caregiver Yes   Reason For Admission Fluid overload   People in Home child(macarena), adult   Facility Arrived From: Home   Do you expect to return to your current living situation? Yes   Do you have help at home or someone to help you manage your care at home? Yes   Who are your caregiver(s) and their phone number(s)? Rima- daughter [phone]   Prior to hospitilization cognitive status: Alert/Oriented   Current cognitive status: Alert/Oriented   Walking or Climbing Stairs Difficulty yes   Walking or Climbing Stairs ambulation difficulty, requires equipment;stair climbing difficulty, requires equipment   Mobility Management rw and wheel chair   Dressing/Bathing Difficulty yes   Dressing/Bathing bathing difficulty, assistance 1 person   Dressing/Bathing Management 1 person assist   Equipment Currently Used at Home walker, rolling;wheelchair   Readmission within 30 days? No   Patient currently being followed by outpatient case management? No   Do you currently have service(s) that help you manage your care at home? No   Do you " take prescription medications? Yes   Do you have prescription coverage? Yes   Coverage Medicare AB   Do you have any problems affording any of your prescribed medications? No   Is the patient taking medications as prescribed? yes   Who is going to help you get home at discharge? Pt will need transportation home   How do you get to doctors appointments? family or friend will provide   Are you on dialysis? Yes   Dialysis Name and Scheduled days [facility] MWF   Do you take coumadin? No   Discharge Plan A Home with family   Discharge Plan B Other  (TBD)   DME Needed Upon Discharge  other (see comments)  (TBD)   Discharge Plan discussed with: Patient;Adult children   Transition of Care Barriers None   SDOH   (NONE)   Physical Activity   On average, how many days per week do you engage in moderate to strenuous exercise (like a brisk walk)? 0 days   On average, how many minutes do you engage in exercise at this level? 0 min   Housing Stability   At any time in the past 12 months, were you homeless or living in a shelter (including now)? N   Transportation Needs   Has the lack of transportation kept you from medical appointments, meetings, work or from getting things needed for daily living? Yes, it has kept me from medical appointments or from getting my medications.   Alcohol Use   Q1: How often do you have a drink containing alcohol? Never   Q2: How many drinks containing alcohol do you have on a typical day when you are drinking? None   Q3: How often do you have six or more drinks on one occasion? Never   Utilities   In the past 12 months has the electric, gas, oil, or water company threatened to shut off services in your home? No   Health Literacy   How often do you need to have someone help you when you read instructions, pamphlets, or other written material from your doctor or pharmacy? Never   OTHER   Name(s) of People in Home Rima- daughter

## 2024-05-21 NOTE — ASSESSMENT & PLAN NOTE
Patient with Paroxysmal (<7 days) atrial fibrillation which is controlled currently with Calcium Channel Blocker and Amiodarone. Patient is currently in sinus rhythm.[id number] Score: 1. Anticoagulation indicated. Anticoagulation done with Apixaban .

## 2024-05-21 NOTE — SUBJECTIVE & OBJECTIVE
Interval History: Pt states he is feeling fine. No cp or sob at this time.     Review of Systems  Objective:     Vital Signs (Most Recent):  Temp: 97.8 °F (36.6 °C) (05/21/24 1108)  Pulse: 105 (05/21/24 1108)  Resp: 18 (05/21/24 1108)  BP: (!) 158/95 (05/21/24 1108)  SpO2: 98 % (05/21/24 1108) Vital Signs (24h Range):  Temp:  [97.4 °F (36.3 °C)-97.8 °F (36.6 °C)] 97.8 °F (36.6 °C)  Pulse:  [] 105  Resp:  [17-19] 18  SpO2:  [98 %-100 %] 98 %  BP: (145-174)/(82-99) 158/95     Weight: 96.4 kg (212 lb 8.4 oz)  Body mass index is 33.29 kg/m².    Intake/Output Summary (Last 24 hours) at 5/21/2024 1458  Last data filed at 5/21/2024 0825  Gross per 24 hour   Intake 740 ml   Output 3500 ml   Net -2760 ml         Physical Exam  Constitutional:       General: He is not in acute distress.     Appearance: He is ill-appearing (chronic).   HENT:      Head: Normocephalic and atraumatic.   Cardiovascular:      Rate and Rhythm: Normal rate and regular rhythm.   Pulmonary:      Effort: Pulmonary effort is normal. No respiratory distress.   Musculoskeletal:         General: Swelling present. No tenderness.   Neurological:      Mental Status: He is alert.   Psychiatric:         Mood and Affect: Mood normal.         Behavior: Behavior normal.             Significant Labs: All pertinent labs within the past 24 hours have been reviewed.    Significant Imaging: I have reviewed all pertinent imaging results/findings within the past 24 hours.

## 2024-05-21 NOTE — NURSING
OT states patient had blood clots coming from nose. Assessed patient and small amount of blood noted but no clots noted. Notified Dr Joseph

## 2024-05-21 NOTE — ASSESSMENT & PLAN NOTE
"Mr. Cespedes is a 59 yr old male with a hx of DM type 2, ESRD on HD MWF, HTN, anemia of chronic disease, CVA, PAF, hyperphosphatemia, sacral wound, history of intracranial hemorrhage who presented to the ED via EMS with a chief complaint of weakness, hypertension, and hemoptysis.  Workup in the ED revealed PT of 13.4, anion gap of 24, BUN of 119, creatinine of 9.7, GFR of 6, calcium of 7.7 (corrected 8.4), phosphorus of 7.8, ALP of 376, total bili of 1.5, AST of 105, ALT of 116, BNP of 2857, troponin of 0.106, lactic of 2.6, protocol of 3.96.  VBG with pH of 7.312, pCO2 of 48.5, PO2 of 22.  CXR showed left-sided central catheter tip overlies the SVC. Cardiomegaly with perihilar edema and central vascular congestion.  Small bilateral effusions.  CT chest abdomen pelvis with IV contrast showed Cardiomegaly with coronary atherosclerosis and mild-moderate pericardial effusion.  Contrast reflux in the hepatic veins may be associated with cardiac dysfunction.  Recommend clinical correlation. Small basilar pleural effusions left greater than right with probable atelectasis at the left lung base.  Mild ground-glass changes bilaterally could be associated with mild pulmonary edema.  Recommend follow-up. Mild diffuse ascites with soft tissue edema of the subcutaneous tissues and mesentery suggesting anasarca." Patient was given metoprolol 5 mg IV, morphine 2 mg IV, pantoprazole 80 mg IV, and Omnipaque 80 mL IV contrast while in the ED. case discussed with ED provider Erlin Moreland MD and patient will be placed under observation for further management.    - Nephrology consulted, appreciate recs. Had dialysis on 5/20.  "

## 2024-05-21 NOTE — NURSING
Patient had nose bleed with dark red blood noted with nausea and vomiting episode, administered zofran and Notified Dr Joseph

## 2024-05-21 NOTE — ASSESSMENT & PLAN NOTE
Creatine stable for now. BMP reviewed- noted Estimated Creatinine Clearance: 11.1 mL/min (A) (based on SCr of 7.7 mg/dL (H)). according to latest data. Based on current GFR, CKD stage is end stage.  Monitor UOP and serial BMP and adjust therapy as needed. Renally dose meds. Avoid nephrotoxic medications and procedures.    - Nephrology consulted, appreciate recs

## 2024-05-21 NOTE — ASSESSMENT & PLAN NOTE
Patient's anemia is currently controlled. Has not received any PRBCs to date. Etiology likely d/t chronic disease due to ESRD  Current CBC reviewed-   Lab Results   Component Value Date    HGB 9.7 (L) 05/21/2024    HCT 29.7 (L) 05/21/2024     Monitor serial CBC and transfuse if patient becomes hemodynamically unstable, symptomatic or H/H drops below 7/21.

## 2024-05-21 NOTE — NURSING
[facility]  Nurses Note -- 4 Eyes      5/21/2024       Skin assessed on: Admit      [] No Pressure Injuries Present    [x]Prevention Measures Documented    [x] Yes LDA  for Pressure Injury Previously documented     [] Yes New Pressure Injury Discovered   [] LDA for New Pressure Injury Added      Attending RN:  Shanique Whitney RN     Second RN:  BETO Gonzalez

## 2024-05-21 NOTE — ASSESSMENT & PLAN NOTE
Last A1c reviewed-   Lab Results   Component Value Date    HGBA1C 5.5 03/15/2022     Most recent fingerstick glucose reviewed-   Recent Labs   Lab 05/20/24 2008 05/21/24  0757 05/21/24  1139   POCTGLUCOSE 113* 140* 161*       Current correctional scale  Low  Maintain anti-hyperglycemic dose as follows-   Antihyperglycemics (From admission, onward)    Start     Stop Route Frequency Ordered    05/20/24 0122  insulin aspart U-100 pen 0-5 Units         -- SubQ Before meals & nightly PRN 05/20/24 0025        Hold Oral hypoglycemics while patient is in the hospital.
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
## 2024-05-22 VITALS
HEIGHT: 67 IN | OXYGEN SATURATION: 100 % | TEMPERATURE: 97 F | HEART RATE: 109 BPM | RESPIRATION RATE: 18 BRPM | SYSTOLIC BLOOD PRESSURE: 171 MMHG | BODY MASS INDEX: 33.35 KG/M2 | WEIGHT: 212.5 LBS | DIASTOLIC BLOOD PRESSURE: 93 MMHG

## 2024-05-22 LAB
ALBUMIN SERPL BCP-MCNC: 2.6 G/DL (ref 3.5–5.2)
ALP SERPL-CCNC: 278 U/L (ref 55–135)
ALT SERPL W/O P-5'-P-CCNC: 77 U/L (ref 10–44)
ANION GAP SERPL CALC-SCNC: 20 MMOL/L (ref 8–16)
AST SERPL-CCNC: 50 U/L (ref 10–40)
BASOPHILS # BLD AUTO: 0.01 K/UL (ref 0–0.2)
BASOPHILS NFR BLD: 0.1 % (ref 0–1.9)
BILIRUB SERPL-MCNC: 0.8 MG/DL (ref 0.1–1)
BUN SERPL-MCNC: 88 MG/DL (ref 8–23)
CALCIUM SERPL-MCNC: 6.9 MG/DL (ref 8.7–10.5)
CHLORIDE SERPL-SCNC: 96 MMOL/L (ref 95–110)
CO2 SERPL-SCNC: 17 MMOL/L (ref 23–29)
CREAT SERPL-MCNC: 9.1 MG/DL (ref 0.5–1.4)
DIFFERENTIAL METHOD BLD: ABNORMAL
EOSINOPHIL # BLD AUTO: 0.1 K/UL (ref 0–0.5)
EOSINOPHIL NFR BLD: 1.7 % (ref 0–8)
ERYTHROCYTE [DISTWIDTH] IN BLOOD BY AUTOMATED COUNT: 21.3 % (ref 11.5–14.5)
EST. GFR  (NO RACE VARIABLE): 6 ML/MIN/1.73 M^2
GLUCOSE SERPL-MCNC: 130 MG/DL (ref 70–110)
HCT VFR BLD AUTO: 29.4 % (ref 40–54)
HGB BLD-MCNC: 9.6 G/DL (ref 14–18)
IMM GRANULOCYTES # BLD AUTO: 0.03 K/UL (ref 0–0.04)
IMM GRANULOCYTES NFR BLD AUTO: 0.4 % (ref 0–0.5)
LYMPHOCYTES # BLD AUTO: 0.7 K/UL (ref 1–4.8)
LYMPHOCYTES NFR BLD: 10.4 % (ref 18–48)
MAGNESIUM SERPL-MCNC: 2.2 MG/DL (ref 1.6–2.6)
MCH RBC QN AUTO: 28.6 PG (ref 27–31)
MCHC RBC AUTO-ENTMCNC: 32.7 G/DL (ref 32–36)
MCV RBC AUTO: 88 FL (ref 82–98)
MONOCYTES # BLD AUTO: 0.8 K/UL (ref 0.3–1)
MONOCYTES NFR BLD: 12 % (ref 4–15)
NEUTROPHILS # BLD AUTO: 5.2 K/UL (ref 1.8–7.7)
NEUTROPHILS NFR BLD: 75.4 % (ref 38–73)
NRBC BLD-RTO: 0 /100 WBC
PHOSPHATE SERPL-MCNC: 7.2 MG/DL (ref 2.7–4.5)
PLATELET # BLD AUTO: 156 K/UL (ref 150–450)
PMV BLD AUTO: 10.9 FL (ref 9.2–12.9)
POCT GLUCOSE: 127 MG/DL (ref 70–110)
POCT GLUCOSE: 152 MG/DL (ref 70–110)
POCT GLUCOSE: 169 MG/DL (ref 70–110)
POTASSIUM SERPL-SCNC: 4.5 MMOL/L (ref 3.5–5.1)
PROT SERPL-MCNC: 7.9 G/DL (ref 6–8.4)
RBC # BLD AUTO: 3.36 M/UL (ref 4.6–6.2)
SODIUM SERPL-SCNC: 133 MMOL/L (ref 136–145)
WBC # BLD AUTO: 6.93 K/UL (ref 3.9–12.7)

## 2024-05-22 PROCEDURE — 11000001 HC ACUTE MED/SURG PRIVATE ROOM

## 2024-05-22 PROCEDURE — 90935 HEMODIALYSIS ONE EVALUATION: CPT

## 2024-05-22 PROCEDURE — 25000003 PHARM REV CODE 250: Performed by: STUDENT IN AN ORGANIZED HEALTH CARE EDUCATION/TRAINING PROGRAM

## 2024-05-22 PROCEDURE — 27000221 HC OXYGEN, UP TO 24 HOURS

## 2024-05-22 PROCEDURE — 25000003 PHARM REV CODE 250

## 2024-05-22 PROCEDURE — 36415 COLL VENOUS BLD VENIPUNCTURE: CPT

## 2024-05-22 PROCEDURE — 97110 THERAPEUTIC EXERCISES: CPT | Mod: CQ

## 2024-05-22 PROCEDURE — 85025 COMPLETE CBC W/AUTO DIFF WBC: CPT

## 2024-05-22 PROCEDURE — 83735 ASSAY OF MAGNESIUM: CPT

## 2024-05-22 PROCEDURE — 97530 THERAPEUTIC ACTIVITIES: CPT | Mod: CQ

## 2024-05-22 PROCEDURE — 99223 1ST HOSP IP/OBS HIGH 75: CPT | Mod: ,,,

## 2024-05-22 PROCEDURE — 84100 ASSAY OF PHOSPHORUS: CPT

## 2024-05-22 PROCEDURE — 63600175 PHARM REV CODE 636 W HCPCS

## 2024-05-22 PROCEDURE — 25000003 PHARM REV CODE 250: Performed by: INTERNAL MEDICINE

## 2024-05-22 PROCEDURE — 94761 N-INVAS EAR/PLS OXIMETRY MLT: CPT

## 2024-05-22 PROCEDURE — 80053 COMPREHEN METABOLIC PANEL: CPT

## 2024-05-22 RX ORDER — SEVELAMER CARBONATE 800 MG/1
2400 TABLET, FILM COATED ORAL
Status: DISCONTINUED | OUTPATIENT
Start: 2024-05-22 | End: 2024-05-23 | Stop reason: HOSPADM

## 2024-05-22 RX ORDER — HYDROCODONE BITARTRATE AND ACETAMINOPHEN 5; 325 MG/1; MG/1
1 TABLET ORAL EVERY 6 HOURS PRN
Status: DISCONTINUED | OUTPATIENT
Start: 2024-05-22 | End: 2024-05-23 | Stop reason: HOSPADM

## 2024-05-22 RX ADMIN — MUPIROCIN: 20 OINTMENT TOPICAL at 08:05

## 2024-05-22 RX ADMIN — SEVELAMER CARBONATE 2400 MG: 800 TABLET, FILM COATED ORAL at 12:05

## 2024-05-22 RX ADMIN — SEVELAMER CARBONATE 2400 MG: 800 TABLET, FILM COATED ORAL at 09:05

## 2024-05-22 RX ADMIN — Medication 1 CAPSULE: at 05:05

## 2024-05-22 RX ADMIN — ONDANSETRON 4 MG: 2 INJECTION INTRAMUSCULAR; INTRAVENOUS at 09:05

## 2024-05-22 RX ADMIN — HYDROCODONE BITARTRATE AND ACETAMINOPHEN 1 TABLET: 5; 325 TABLET ORAL at 08:05

## 2024-05-22 RX ADMIN — ALLOPURINOL 100 MG: 100 TABLET ORAL at 08:05

## 2024-05-22 RX ADMIN — SEVELAMER CARBONATE 1600 MG: 800 TABLET, FILM COATED ORAL at 08:05

## 2024-05-22 RX ADMIN — AMIODARONE HYDROCHLORIDE 200 MG: 200 TABLET ORAL at 08:05

## 2024-05-22 RX ADMIN — TAMSULOSIN HYDROCHLORIDE 0.4 MG: 0.4 CAPSULE ORAL at 08:05

## 2024-05-22 RX ADMIN — APIXABAN 5 MG: 5 TABLET, FILM COATED ORAL at 08:05

## 2024-05-22 RX ADMIN — AMLODIPINE BESYLATE 10 MG: 5 TABLET ORAL at 08:05

## 2024-05-22 NOTE — NURSING
"Spoke with dialysis to check to see when they are coming get the pt.   I was informed that he would be going during "second shift" and they would call back later.   "
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
## 2024-05-22 NOTE — PLAN OF CARE
ADT 30 order placed for Van Transportation.  Requested  time: 9:00 pm.    If transportation does not arrive at ETA time nurse will be instructed to follow protocol for transportation below:  How can I get in touch directly with dispatch, if needed?                 Non-emergent dispatch: [phone] option 6

## 2024-05-22 NOTE — NURSING
[facility] [place]  Nurses Note -- 4 Eyes      5/22/2024       Skin assessed on: Q Shift      [] No Pressure Injuries Present    []Prevention Measures Documented    [x] Yes LDA  for Pressure Injury Previously documented     [] Yes New Pressure Injury Discovered   [] LDA for New Pressure Injury Added      Attending RN:  Cassie Jimenez LPN     Second RN:  BETO Bay

## 2024-05-22 NOTE — PT/OT/SLP PROGRESS
Physical Therapy Treatment    Patient Name:  Mahesh Cespedes   MRN:  [id number]    Recommendations:     Discharge Recommendations:  (Post acute therapy(mod vs Low) and wound care)  Discharge Equipment Recommendations: none  Barriers to discharge: None    Assessment:     Mahesh Cespedes is a 59 y.o. male admitted with a medical diagnosis of Fluid overload.  He presents with the following impairments/functional limitations: weakness, impaired endurance, impaired functional mobility, gait instability, decreased lower extremity function, impaired self care skills, decreased coordination, impaired balance, impaired cognition, pain, decreased safety awareness, impaired skin, decreased ROM, impaired cardiopulmonary response to activity .    Rehab Prognosis: Fair; patient would benefit from acute skilled PT services to address these deficits and reach maximum level of function.    Recent Surgery: * No surgery found *      Plan:     During this hospitalization, patient to be seen 5 x/week to address the identified rehab impairments via gait training, therapeutic activities, therapeutic exercises, neuromuscular re-education, wheelchair management/training and progress toward the following goals:    Plan of Care Expires:  06/04/24    Subjective     Chief Complaint: weakness, pain and dizziness upon sitting EOB    Patient/Family Comments/goals: Pt is pleasant and agreeable to therapy.   Pain/Comfort:  Pain Rating 1: 10/10  Pain Addressed 1: Pre-medicate for activity, Reposition, Cessation of Activity, Nurse notified      Objective:     Communicated with nurse Matthews  prior to session.  Patient found HOB elevated with telemetry, oxygen, bed alarm, peripheral IV upon PT entry to room.     General Precautions: Standard, fall, respiratory  Orthopedic Precautions: N/A  Braces: N/A  Respiratory Status: Room air     Functional Mobility:  Bed Mobility:     Rolling Right: stand by assistance  Scooting: contact guard assistance  Supine to Sit:  minimum assistance and moderate assistance  Sit to Supine: contact guard assistance  Transfers:  V/T cues for safety, proper technique    Sit to Stand:  x 3 trials from bed contact guard assistance and minimum assistance with rolling walker  Gait: pt ambulated 8 side steps with RW, CGA. Noted with decreased eileen,decreased step length, decreased weight shifting ability , no LOB . Limited with further gait training 2* to pain and nose bleed , nurse notified. V/T cues for safety technique and walker management.    Balance:  good in sitting, fair in standing       AM-PAC 6 CLICK MOBILITY  Turning over in bed (including adjusting bedclothes, sheets and blankets)?: 4  Sitting down on and standing up from a chair with arms (e.g., wheelchair, bedside commode, etc.): 3  Moving from lying on back to sitting on the side of the bed?: 2  Moving to and from a bed to a chair (including a wheelchair)?: 3  Need to walk in hospital room?: 3  Climbing 3-5 steps with a railing?: 2  Basic Mobility Total Score: 17       Treatment & Education:  Lower Extremity Exercises.    Patient educated on: Purpose and Benefits of Therapeutic Exercise(s).    Patient verbalized acceptance/understanding of instruction(s), expectation(s), and limitation(s) (for safety).  Patient performed: 2 sets of 10 reps (each) of B LE Ther Ex: AP, LAQ, Hip abd/add, Hip flexion while sitting up on EOB.       Patient required  verbal cues/tactile cues to ensure correct sequence, to maintain proper form, and to allow for self-correction.    Patient left left sidelying with heels offloading, HOB elevated all lines intact, call button in reach, bed alarm on, and nurse notified..    GOALS:   Multidisciplinary Problems       Physical Therapy Goals          Problem: Physical Therapy    Goal Priority Disciplines Outcome Goal Variances Interventions   Physical Therapy Goal     PT, PT/OT Progressing     Description: Goals to be met by: 6/4/24     Patient will increase  functional independence with mobility by performin. Supine to sit with Modified [place]  2. Sit to stand transfer with Modified [place]  3. Bed to chair transfer with Modified [place]    4. Gait  x 25-50 feet with Modified [place] using Rolling Walker.   5. Wheelchair propulsion x50 feet with Modified [place] using bilateral uppper extremities  6. Ascend/descend 2 stair with Supervision    7. Lower extremity exercise program x10 reps per handout, with supervision                         Time Tracking:     PT Received On: 24  PT Start Time: 1040     PT Stop Time: 1108  PT Total Time (min): 28 min     Billable Minutes: Therapeutic Activity 14 and Therapeutic Exercise 14    Treatment Type: Treatment  PT/PTA: PTA     Number of PTA visits since last PT visit: 2024

## 2024-05-22 NOTE — NURSING
[facility] [place]  Nurses Note -- 4 Eyes      5/22/2024       Skin assessed on: Q Shift      [] No Pressure Injuries Present    []Prevention Measures Documented    [x] Yes LDA  for Pressure Injury Previously documented     [] Yes New Pressure Injury Discovered   [] LDA for New Pressure Injury Added      Attending RN:  Shanique Whitney RN     Second RN:  BETO Montelongo

## 2024-05-22 NOTE — PLAN OF CARE
Case Management Final Discharge Note    [facility]    New DME ordered / company name: None    Primary Caretaker and contact information: Rima -daughter [phone]    Scheduled followup appointment: PCP scheduled    Referrals placed: None    Transportation: [facility]    I provided the patient a choice of post acute providers and offered a list of CMS rated home health.     Patient/family chose the following as their preferred providers:  [facility]    CM informed pt's daughterRima that pt will discharge late because he will dialysis. Pt's daughter stated she will be home.    Patient and family educated on discharge services and updated on DC plan. Bedside RN notified, patient clear to discharge from Case Management Perspective.       05/22/24 1437   Final Note   Assessment Type Final Discharge Note   Anticipated Discharge Disposition Home-Health   What phone number can be called within the next 1-3 days to see how you are doing after discharge? [phone]   Hospital Resources/Appts/Education Provided Appointments scheduled and added to AVS   Post-Acute Status   Post-Acute Authorization Home Health   Coverage Medicare AB   Patient choice form signed by patient/caregiver List from System Post-Acute Care   Discharge Delays   (Pt will dialysis before discharging)

## 2024-05-22 NOTE — NURSING
Pt was given a bed bath but teach Audrey, mepilex on sacrum was changed.    Will continue to monitor the pt.    md review

## 2024-05-23 NOTE — NURSING
[facility] at bedside for transport to home. On room air. Patient awake, alert, oriented. No apparent distress noted. IV removed.

## 2024-05-23 NOTE — NURSING
Called the Transportation Dispatched Center, they said they will contact the [facility] and they will update us once they heard about the transportation.

## 2024-05-23 NOTE — NURSING
Discharge instructions given to patient at bedside. Patient verbalized understanding of instructions. Patient states willingness to comply. Still waiting for the transportation for discharge.

## 2024-05-24 LAB
HBV SURFACE AB SER QL IA: NEGATIVE
HBV SURFACE AB SERPL IA-ACNC: <3 MIU/ML
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
## 2024-05-24 NOTE — HOSPITAL COURSE
59M with pmh of esrd who presents due to generalized weakness.  Patient noted to be in fluid overload in ED and nephrology consulted with plan for dialysis.  Wound care consulted for known sacral wound and managed while inpatient.  Patient with 2 sessions of dialysis for improved clearance and cleared for discharge by Nephrology.  Patient discharged home with home health orders after evaluation by PT/OT.  Wound care orders placed per wound care.  Patient to follow up with PCP within a week and continue his normal dialysis routine.

## 2024-06-17 ENCOUNTER — DOCUMENT SCAN (OUTPATIENT)
Dept: HOME HEALTH SERVICES | Facility: HOSPITAL | Age: 59
End: 2024-06-17
Payer: MEDICARE

## 2024-06-25 ENCOUNTER — LAB VISIT (OUTPATIENT)
Dept: LAB | Facility: HOSPITAL | Age: 59
End: 2024-06-25
Payer: MEDICARE

## 2024-06-25 DIAGNOSIS — L89.150 UNSTAGEABLE PRESSURE ULCER OF SACRAL REGION: Primary | ICD-10-CM

## 2024-06-25 DIAGNOSIS — L89.150 UNSTAGEABLE PRESSURE ULCER OF SACRAL REGION: ICD-10-CM

## 2024-06-25 LAB
ALBUMIN SERPL BCP-MCNC: 2.6 G/DL (ref 3.5–5.2)
ALP SERPL-CCNC: 283 U/L (ref 55–135)
ALT SERPL W/O P-5'-P-CCNC: 23 U/L (ref 10–44)
ANION GAP SERPL CALC-SCNC: 17 MMOL/L (ref 8–16)
AST SERPL-CCNC: 25 U/L (ref 10–40)
BASOPHILS # BLD AUTO: 0.06 K/UL (ref 0–0.2)
BASOPHILS NFR BLD: 0.7 % (ref 0–1.9)
BILIRUB SERPL-MCNC: 0.9 MG/DL (ref 0.1–1)
BUN SERPL-MCNC: 59 MG/DL (ref 8–23)
CALCIUM SERPL-MCNC: 9.1 MG/DL (ref 8.7–10.5)
CHLORIDE SERPL-SCNC: 97 MMOL/L (ref 95–110)
CO2 SERPL-SCNC: 28 MMOL/L (ref 23–29)
CREAT SERPL-MCNC: 7.1 MG/DL (ref 0.5–1.4)
CRP SERPL-MCNC: 47.2 MG/L (ref 0–8.2)
DIFFERENTIAL METHOD BLD: ABNORMAL
EOSINOPHIL # BLD AUTO: 0.2 K/UL (ref 0–0.5)
EOSINOPHIL NFR BLD: 2.5 % (ref 0–8)
ERYTHROCYTE [DISTWIDTH] IN BLOOD BY AUTOMATED COUNT: 23.3 % (ref 11.5–14.5)
ERYTHROCYTE [SEDIMENTATION RATE] IN BLOOD BY PHOTOMETRIC METHOD: >120 MM/HR (ref 0–23)
EST. GFR  (NO RACE VARIABLE): 8 ML/MIN/1.73 M^2
GLUCOSE SERPL-MCNC: 111 MG/DL (ref 70–110)
HCT VFR BLD AUTO: 36.2 % (ref 40–54)
HGB BLD-MCNC: 10.8 G/DL (ref 14–18)
IMM GRANULOCYTES # BLD AUTO: 0.05 K/UL (ref 0–0.04)
IMM GRANULOCYTES NFR BLD AUTO: 0.6 % (ref 0–0.5)
LYMPHOCYTES # BLD AUTO: 0.9 K/UL (ref 1–4.8)
LYMPHOCYTES NFR BLD: 11.3 % (ref 18–48)
MCH RBC QN AUTO: 28.1 PG (ref 27–31)
MCHC RBC AUTO-ENTMCNC: 29.8 G/DL (ref 32–36)
MCV RBC AUTO: 94 FL (ref 82–98)
MONOCYTES # BLD AUTO: 0.7 K/UL (ref 0.3–1)
MONOCYTES NFR BLD: 8.4 % (ref 4–15)
NEUTROPHILS # BLD AUTO: 6.2 K/UL (ref 1.8–7.7)
NEUTROPHILS NFR BLD: 76.5 % (ref 38–73)
NRBC BLD-RTO: 0 /100 WBC
PLATELET # BLD AUTO: 205 K/UL (ref 150–450)
PMV BLD AUTO: 10.8 FL (ref 9.2–12.9)
POTASSIUM SERPL-SCNC: 5 MMOL/L (ref 3.5–5.1)
PREALB SERPL-MCNC: 17 MG/DL (ref 20–43)
PROT SERPL-MCNC: 8.7 G/DL (ref 6–8.4)
RBC # BLD AUTO: 3.84 M/UL (ref 4.6–6.2)
SODIUM SERPL-SCNC: 142 MMOL/L (ref 136–145)
WBC # BLD AUTO: 8.14 K/UL (ref 3.9–12.7)

## 2024-06-25 PROCEDURE — 86140 C-REACTIVE PROTEIN: CPT

## 2024-06-25 PROCEDURE — 80053 COMPREHEN METABOLIC PANEL: CPT

## 2024-06-25 PROCEDURE — 84134 ASSAY OF PREALBUMIN: CPT

## 2024-06-25 PROCEDURE — 85652 RBC SED RATE AUTOMATED: CPT

## 2024-06-25 PROCEDURE — 85025 COMPLETE CBC W/AUTO DIFF WBC: CPT

## 2024-07-09 ENCOUNTER — LAB VISIT (OUTPATIENT)
Dept: LAB | Facility: HOSPITAL | Age: 59
End: 2024-07-09
Payer: MEDICARE

## 2024-07-09 DIAGNOSIS — L89.150 UNSTAGEABLE PRESSURE ULCER OF SACRAL REGION: Primary | ICD-10-CM

## 2024-07-09 LAB
ALBUMIN SERPL BCP-MCNC: 2.7 G/DL (ref 3.5–5.2)
ALP SERPL-CCNC: 250 U/L (ref 55–135)
ALT SERPL W/O P-5'-P-CCNC: 19 U/L (ref 10–44)
ANION GAP SERPL CALC-SCNC: 17 MMOL/L (ref 8–16)
AST SERPL-CCNC: 28 U/L (ref 10–40)
BASOPHILS # BLD AUTO: 0.06 K/UL (ref 0–0.2)
BASOPHILS NFR BLD: 0.7 % (ref 0–1.9)
BILIRUB SERPL-MCNC: 0.6 MG/DL (ref 0.1–1)
BUN SERPL-MCNC: 41 MG/DL (ref 8–23)
CALCIUM SERPL-MCNC: 9.2 MG/DL (ref 8.7–10.5)
CHLORIDE SERPL-SCNC: 96 MMOL/L (ref 95–110)
CO2 SERPL-SCNC: 25 MMOL/L (ref 23–29)
CREAT SERPL-MCNC: 5.9 MG/DL (ref 0.5–1.4)
CRP SERPL-MCNC: 57.5 MG/L (ref 0–8.2)
DIFFERENTIAL METHOD BLD: ABNORMAL
EOSINOPHIL # BLD AUTO: 0.1 K/UL (ref 0–0.5)
EOSINOPHIL NFR BLD: 1.5 % (ref 0–8)
ERYTHROCYTE [DISTWIDTH] IN BLOOD BY AUTOMATED COUNT: 19.1 % (ref 11.5–14.5)
ERYTHROCYTE [SEDIMENTATION RATE] IN BLOOD BY PHOTOMETRIC METHOD: >120 MM/HR (ref 0–23)
EST. GFR  (NO RACE VARIABLE): 10 ML/MIN/1.73 M^2
GLUCOSE SERPL-MCNC: 126 MG/DL (ref 70–110)
HCT VFR BLD AUTO: 36 % (ref 40–54)
HGB BLD-MCNC: 10.7 G/DL (ref 14–18)
IMM GRANULOCYTES # BLD AUTO: 0.04 K/UL (ref 0–0.04)
IMM GRANULOCYTES NFR BLD AUTO: 0.4 % (ref 0–0.5)
LYMPHOCYTES # BLD AUTO: 1.2 K/UL (ref 1–4.8)
LYMPHOCYTES NFR BLD: 13.1 % (ref 18–48)
MCH RBC QN AUTO: 28 PG (ref 27–31)
MCHC RBC AUTO-ENTMCNC: 29.7 G/DL (ref 32–36)
MCV RBC AUTO: 94 FL (ref 82–98)
MONOCYTES # BLD AUTO: 0.7 K/UL (ref 0.3–1)
MONOCYTES NFR BLD: 7.9 % (ref 4–15)
NEUTROPHILS # BLD AUTO: 6.8 K/UL (ref 1.8–7.7)
NEUTROPHILS NFR BLD: 76.4 % (ref 38–73)
NRBC BLD-RTO: 0 /100 WBC
PLATELET # BLD AUTO: 253 K/UL (ref 150–450)
PMV BLD AUTO: 10.4 FL (ref 9.2–12.9)
POTASSIUM SERPL-SCNC: 6.2 MMOL/L (ref 3.5–5.1)
PREALB SERPL-MCNC: 21 MG/DL (ref 20–43)
PROT SERPL-MCNC: 8.8 G/DL (ref 6–8.4)
RBC # BLD AUTO: 3.82 M/UL (ref 4.6–6.2)
SODIUM SERPL-SCNC: 138 MMOL/L (ref 136–145)
WBC # BLD AUTO: 8.9 K/UL (ref 3.9–12.7)

## 2024-07-09 PROCEDURE — 80053 COMPREHEN METABOLIC PANEL: CPT

## 2024-07-09 PROCEDURE — 84134 ASSAY OF PREALBUMIN: CPT

## 2024-07-09 PROCEDURE — 85652 RBC SED RATE AUTOMATED: CPT

## 2024-07-09 PROCEDURE — 85025 COMPLETE CBC W/AUTO DIFF WBC: CPT

## 2024-07-09 PROCEDURE — 86140 C-REACTIVE PROTEIN: CPT

## 2024-07-31 ENCOUNTER — HOSPITAL ENCOUNTER (INPATIENT)
Facility: HOSPITAL | Age: 59
LOS: 7 days | Discharge: HOME HEALTH | DRG: 640 | End: 2024-08-08
Attending: EMERGENCY MEDICINE | Admitting: HOSPITALIST
Payer: MEDICARE

## 2024-07-31 DIAGNOSIS — N18.6 ESRD NEEDING DIALYSIS: ICD-10-CM

## 2024-07-31 DIAGNOSIS — R41.82 AMS (ALTERED MENTAL STATUS): ICD-10-CM

## 2024-07-31 DIAGNOSIS — I38 ENDOCARDITIS: ICD-10-CM

## 2024-07-31 DIAGNOSIS — R09.02 HYPOXIA: ICD-10-CM

## 2024-07-31 DIAGNOSIS — L89.159 PRESSURE INJURY OF SKIN OF SACRAL REGION, UNSPECIFIED INJURY STAGE: ICD-10-CM

## 2024-07-31 DIAGNOSIS — I31.39 PERICARDIAL EFFUSION: ICD-10-CM

## 2024-07-31 DIAGNOSIS — J90 PLEURAL EFFUSION: ICD-10-CM

## 2024-07-31 DIAGNOSIS — Z99.2 CONTROLLED TYPE 2 DIABETES MELLITUS WITH CHRONIC KIDNEY DISEASE ON CHRONIC DIALYSIS, WITHOUT LONG-TERM CURRENT USE OF INSULIN: ICD-10-CM

## 2024-07-31 DIAGNOSIS — R00.0 TACHYCARDIA: ICD-10-CM

## 2024-07-31 DIAGNOSIS — R78.81 POSITIVE BLOOD CULTURE: ICD-10-CM

## 2024-07-31 DIAGNOSIS — E11.22 CONTROLLED TYPE 2 DIABETES MELLITUS WITH CHRONIC KIDNEY DISEASE ON CHRONIC DIALYSIS, WITHOUT LONG-TERM CURRENT USE OF INSULIN: ICD-10-CM

## 2024-07-31 DIAGNOSIS — E87.5 HYPERKALEMIA: Primary | ICD-10-CM

## 2024-07-31 DIAGNOSIS — R41.82 ALTERED MENTAL STATUS, UNSPECIFIED ALTERED MENTAL STATUS TYPE: ICD-10-CM

## 2024-07-31 DIAGNOSIS — D63.1 ANEMIA IN ESRD (END-STAGE RENAL DISEASE): ICD-10-CM

## 2024-07-31 DIAGNOSIS — J90 PLEURAL EFFUSION ON LEFT: ICD-10-CM

## 2024-07-31 DIAGNOSIS — I10 ESSENTIAL HYPERTENSION: Chronic | ICD-10-CM

## 2024-07-31 DIAGNOSIS — Z99.2 ESRD NEEDING DIALYSIS: ICD-10-CM

## 2024-07-31 DIAGNOSIS — N18.6 ANEMIA IN ESRD (END-STAGE RENAL DISEASE): ICD-10-CM

## 2024-07-31 DIAGNOSIS — Z86.73 HISTORY OF CVA (CEREBROVASCULAR ACCIDENT): Chronic | ICD-10-CM

## 2024-07-31 DIAGNOSIS — Z71.89 ACP (ADVANCE CARE PLANNING): ICD-10-CM

## 2024-07-31 DIAGNOSIS — N18.6 CONTROLLED TYPE 2 DIABETES MELLITUS WITH CHRONIC KIDNEY DISEASE ON CHRONIC DIALYSIS, WITHOUT LONG-TERM CURRENT USE OF INSULIN: ICD-10-CM

## 2024-07-31 PROCEDURE — 99291 CRITICAL CARE FIRST HOUR: CPT

## 2024-07-31 NOTE — Clinical Note
Diagnosis: Hyperkalemia [821287]   Future Attending Provider: ANGELA SALAZAR [[id number]]   Reason for IP Medical Treatment  (Clinical interventions that can only be accomplished in the IP setting? ) :: Hyperkalemia, ESRD   I certify that Inpatient services for greater than or equal to 2 midnights are medically necessary:: Yes   Plans for Post-Acute care--if anticipated (pick the single best option):: A. No post acute care anticipated at this time

## 2024-08-01 PROBLEM — L89.159 SACRAL DECUBITUS ULCER: Status: ACTIVE | Noted: 2024-02-06

## 2024-08-01 LAB
ALBUMIN SERPL BCP-MCNC: 3.1 G/DL (ref 3.5–5.2)
ALBUMIN SERPL BCP-MCNC: 3.2 G/DL (ref 3.5–5.2)
ALLENS TEST: ABNORMAL
ALLENS TEST: NORMAL
ALP SERPL-CCNC: 174 U/L (ref 55–135)
ALP SERPL-CCNC: 192 U/L (ref 55–135)
ALT SERPL W/O P-5'-P-CCNC: 15 U/L (ref 10–44)
ALT SERPL W/O P-5'-P-CCNC: 15 U/L (ref 10–44)
ANION GAP SERPL CALC-SCNC: 19 MMOL/L (ref 8–16)
ANION GAP SERPL CALC-SCNC: 27 MMOL/L (ref 8–16)
ANION GAP SERPL CALC-SCNC: 28 MMOL/L (ref 8–16)
AST SERPL-CCNC: 12 U/L (ref 10–40)
AST SERPL-CCNC: 12 U/L (ref 10–40)
BACTERIA #/AREA URNS HPF: NORMAL /HPF
BASOPHILS # BLD AUTO: 0.02 K/UL (ref 0–0.2)
BASOPHILS NFR BLD: 0.2 % (ref 0–1.9)
BILIRUB SERPL-MCNC: 0.7 MG/DL (ref 0.1–1)
BILIRUB SERPL-MCNC: 0.8 MG/DL (ref 0.1–1)
BILIRUB UR QL STRIP: NEGATIVE
BUN SERPL-MCNC: 182 MG/DL (ref 8–23)
BUN SERPL-MCNC: 182 MG/DL (ref 8–23)
BUN SERPL-MCNC: >140 MG/DL (ref 6–30)
CALCIUM SERPL-MCNC: 7.9 MG/DL (ref 8.7–10.5)
CALCIUM SERPL-MCNC: 8 MG/DL (ref 8.7–10.5)
CHLORIDE SERPL-SCNC: 100 MMOL/L (ref 95–110)
CHLORIDE SERPL-SCNC: 94 MMOL/L (ref 95–110)
CHLORIDE SERPL-SCNC: 94 MMOL/L (ref 95–110)
CLARITY UR: CLEAR
CO2 SERPL-SCNC: 13 MMOL/L (ref 23–29)
CO2 SERPL-SCNC: 14 MMOL/L (ref 23–29)
COLOR UR: YELLOW
CREAT SERPL-MCNC: 18.8 MG/DL (ref 0.5–1.4)
CREAT SERPL-MCNC: 19.3 MG/DL (ref 0.5–1.4)
CREAT SERPL-MCNC: 19.7 MG/DL (ref 0.5–1.4)
DIFFERENTIAL METHOD BLD: ABNORMAL
EOSINOPHIL # BLD AUTO: 0.1 K/UL (ref 0–0.5)
EOSINOPHIL NFR BLD: 1.1 % (ref 0–8)
ERYTHROCYTE [DISTWIDTH] IN BLOOD BY AUTOMATED COUNT: 16.5 % (ref 11.5–14.5)
EST. GFR  (NO RACE VARIABLE): 2 ML/MIN/1.73 M^2
EST. GFR  (NO RACE VARIABLE): 3 ML/MIN/1.73 M^2
GLUCOSE SERPL-MCNC: 106 MG/DL (ref 70–110)
GLUCOSE SERPL-MCNC: 107 MG/DL (ref 70–110)
GLUCOSE SERPL-MCNC: 109 MG/DL (ref 70–110)
GLUCOSE UR QL STRIP: ABNORMAL
HCT VFR BLD AUTO: 31.4 % (ref 40–54)
HCT VFR BLD CALC: 32 %PCV (ref 36–54)
HGB BLD-MCNC: 10.2 G/DL (ref 14–18)
HGB UR QL STRIP: ABNORMAL
HYALINE CASTS #/AREA URNS LPF: 0 /LPF
IMM GRANULOCYTES # BLD AUTO: 0.03 K/UL (ref 0–0.04)
IMM GRANULOCYTES NFR BLD AUTO: 0.4 % (ref 0–0.5)
INR PPP: 1.2 (ref 0.8–1.2)
KETONES UR QL STRIP: NEGATIVE
LACTATE SERPL-SCNC: 1.1 MMOL/L (ref 0.5–2.2)
LDH SERPL L TO P-CCNC: 1.11 MMOL/L (ref 0.5–2.2)
LEUKOCYTE ESTERASE UR QL STRIP: NEGATIVE
LYMPHOCYTES # BLD AUTO: 0.7 K/UL (ref 1–4.8)
LYMPHOCYTES NFR BLD: 8.3 % (ref 18–48)
MAGNESIUM SERPL-MCNC: 3.4 MG/DL (ref 1.6–2.6)
MCH RBC QN AUTO: 27.5 PG (ref 27–31)
MCHC RBC AUTO-ENTMCNC: 32.5 G/DL (ref 32–36)
MCV RBC AUTO: 85 FL (ref 82–98)
MICROSCOPIC COMMENT: NORMAL
MONOCYTES # BLD AUTO: 0.4 K/UL (ref 0.3–1)
MONOCYTES NFR BLD: 4.9 % (ref 4–15)
NEUTROPHILS # BLD AUTO: 7.1 K/UL (ref 1.8–7.7)
NEUTROPHILS NFR BLD: 85.1 % (ref 38–73)
NITRITE UR QL STRIP: NEGATIVE
NRBC BLD-RTO: 0 /100 WBC
OHS QRS DURATION: 86 MS
OHS QTC CALCULATION: 465 MS
PH UR STRIP: 6 [PH] (ref 5–8)
PLATELET # BLD AUTO: 149 K/UL (ref 150–450)
PMV BLD AUTO: 9.8 FL (ref 9.2–12.9)
POC IONIZED CALCIUM: 0.88 MMOL/L (ref 1.06–1.42)
POC TCO2 (MEASURED): 20 MMOL/L (ref 23–29)
POCT GLUCOSE: 101 MG/DL (ref 70–110)
POCT GLUCOSE: 116 MG/DL (ref 70–110)
POCT GLUCOSE: 121 MG/DL (ref 70–110)
POCT GLUCOSE: 247 MG/DL (ref 70–110)
POCT GLUCOSE: 77 MG/DL (ref 70–110)
POTASSIUM BLD-SCNC: 6.9 MMOL/L (ref 3.5–5.1)
POTASSIUM SERPL-SCNC: 6.1 MMOL/L (ref 3.5–5.1)
POTASSIUM SERPL-SCNC: 7.4 MMOL/L (ref 3.5–5.1)
PROT SERPL-MCNC: 8.7 G/DL (ref 6–8.4)
PROT SERPL-MCNC: 9.1 G/DL (ref 6–8.4)
PROT UR QL STRIP: ABNORMAL
PROTHROMBIN TIME: 13 SEC (ref 9–12.5)
RBC # BLD AUTO: 3.71 M/UL (ref 4.6–6.2)
RBC #/AREA URNS HPF: 2 /HPF (ref 0–4)
SAMPLE: ABNORMAL
SAMPLE: NORMAL
SITE: ABNORMAL
SITE: NORMAL
SODIUM BLD-SCNC: 131 MMOL/L (ref 136–145)
SODIUM SERPL-SCNC: 135 MMOL/L (ref 136–145)
SODIUM SERPL-SCNC: 135 MMOL/L (ref 136–145)
SP GR UR STRIP: 1.01 (ref 1–1.03)
URN SPEC COLLECT METH UR: ABNORMAL
UROBILINOGEN UR STRIP-ACNC: NEGATIVE EU/DL
WBC # BLD AUTO: 8.31 K/UL (ref 3.9–12.7)
WBC #/AREA URNS HPF: 2 /HPF (ref 0–5)

## 2024-08-01 PROCEDURE — 25000003 PHARM REV CODE 250: Performed by: HOSPITALIST

## 2024-08-01 PROCEDURE — 94640 AIRWAY INHALATION TREATMENT: CPT

## 2024-08-01 PROCEDURE — 96367 TX/PROPH/DG ADDL SEQ IV INF: CPT

## 2024-08-01 PROCEDURE — 5A1D70Z PERFORMANCE OF URINARY FILTRATION, INTERMITTENT, LESS THAN 6 HOURS PER DAY: ICD-10-PCS | Performed by: HOSPITALIST

## 2024-08-01 PROCEDURE — 25000003 PHARM REV CODE 250: Performed by: STUDENT IN AN ORGANIZED HEALTH CARE EDUCATION/TRAINING PROGRAM

## 2024-08-01 PROCEDURE — 82330 ASSAY OF CALCIUM: CPT

## 2024-08-01 PROCEDURE — 80053 COMPREHEN METABOLIC PANEL: CPT | Performed by: EMERGENCY MEDICINE

## 2024-08-01 PROCEDURE — 85610 PROTHROMBIN TIME: CPT | Performed by: PHYSICIAN ASSISTANT

## 2024-08-01 PROCEDURE — 87040 BLOOD CULTURE FOR BACTERIA: CPT | Performed by: EMERGENCY MEDICINE

## 2024-08-01 PROCEDURE — 81000 URINALYSIS NONAUTO W/SCOPE: CPT | Performed by: EMERGENCY MEDICINE

## 2024-08-01 PROCEDURE — 96365 THER/PROPH/DIAG IV INF INIT: CPT

## 2024-08-01 PROCEDURE — 83605 ASSAY OF LACTIC ACID: CPT

## 2024-08-01 PROCEDURE — 93010 ELECTROCARDIOGRAM REPORT: CPT | Mod: ,,, | Performed by: INTERNAL MEDICINE

## 2024-08-01 PROCEDURE — 96366 THER/PROPH/DIAG IV INF ADDON: CPT

## 2024-08-01 PROCEDURE — 82565 ASSAY OF CREATININE: CPT

## 2024-08-01 PROCEDURE — 99900035 HC TECH TIME PER 15 MIN (STAT)

## 2024-08-01 PROCEDURE — 96375 TX/PRO/DX INJ NEW DRUG ADDON: CPT

## 2024-08-01 PROCEDURE — 27000221 HC OXYGEN, UP TO 24 HOURS

## 2024-08-01 PROCEDURE — 85025 COMPLETE CBC W/AUTO DIFF WBC: CPT | Performed by: PHYSICIAN ASSISTANT

## 2024-08-01 PROCEDURE — 63600175 PHARM REV CODE 636 W HCPCS: Performed by: EMERGENCY MEDICINE

## 2024-08-01 PROCEDURE — 83605 ASSAY OF LACTIC ACID: CPT | Performed by: EMERGENCY MEDICINE

## 2024-08-01 PROCEDURE — 25000242 PHARM REV CODE 250 ALT 637 W/ HCPCS: Performed by: EMERGENCY MEDICINE

## 2024-08-01 PROCEDURE — 84132 ASSAY OF SERUM POTASSIUM: CPT

## 2024-08-01 PROCEDURE — 94761 N-INVAS EAR/PLS OXIMETRY MLT: CPT

## 2024-08-01 PROCEDURE — 93005 ELECTROCARDIOGRAM TRACING: CPT

## 2024-08-01 PROCEDURE — 25000003 PHARM REV CODE 250: Performed by: EMERGENCY MEDICINE

## 2024-08-01 PROCEDURE — 21400001 HC TELEMETRY ROOM

## 2024-08-01 PROCEDURE — 80053 COMPREHEN METABOLIC PANEL: CPT | Mod: 91 | Performed by: HOSPITALIST

## 2024-08-01 PROCEDURE — 84295 ASSAY OF SERUM SODIUM: CPT

## 2024-08-01 PROCEDURE — 85014 HEMATOCRIT: CPT

## 2024-08-01 PROCEDURE — 80100014 HC HEMODIALYSIS 1:1

## 2024-08-01 PROCEDURE — 83735 ASSAY OF MAGNESIUM: CPT | Performed by: EMERGENCY MEDICINE

## 2024-08-01 RX ORDER — HEPARIN SODIUM 5000 [USP'U]/ML
5000 INJECTION, SOLUTION INTRAVENOUS; SUBCUTANEOUS EVERY 8 HOURS
Status: DISCONTINUED | OUTPATIENT
Start: 2024-08-01 | End: 2024-08-01

## 2024-08-01 RX ORDER — ALBUTEROL SULFATE 2.5 MG/.5ML
10 SOLUTION RESPIRATORY (INHALATION)
Status: COMPLETED | OUTPATIENT
Start: 2024-08-01 | End: 2024-08-01

## 2024-08-01 RX ORDER — FUROSEMIDE 10 MG/ML
80 INJECTION INTRAMUSCULAR; INTRAVENOUS
Status: COMPLETED | OUTPATIENT
Start: 2024-08-01 | End: 2024-08-01

## 2024-08-01 RX ORDER — HYDRALAZINE HYDROCHLORIDE 20 MG/ML
20 INJECTION INTRAMUSCULAR; INTRAVENOUS EVERY 4 HOURS PRN
Status: DISCONTINUED | OUTPATIENT
Start: 2024-08-01 | End: 2024-08-08 | Stop reason: HOSPADM

## 2024-08-01 RX ORDER — MUPIROCIN 20 MG/G
OINTMENT TOPICAL 2 TIMES DAILY
Status: COMPLETED | OUTPATIENT
Start: 2024-08-01 | End: 2024-08-05

## 2024-08-01 RX ORDER — MUPIROCIN 20 MG/G
OINTMENT TOPICAL 2 TIMES DAILY
Status: CANCELLED | OUTPATIENT
Start: 2024-08-01 | End: 2024-08-06

## 2024-08-01 RX ORDER — CALCIUM GLUCONATE 20 MG/ML
1 INJECTION, SOLUTION INTRAVENOUS
Status: COMPLETED | OUTPATIENT
Start: 2024-08-01 | End: 2024-08-01

## 2024-08-01 RX ORDER — SODIUM POLYSTYRENE SULFONATE 4.1 MEQ/G
30 POWDER, FOR SUSPENSION ORAL; RECTAL ONCE
Status: DISCONTINUED | OUTPATIENT
Start: 2024-08-01 | End: 2024-08-01

## 2024-08-01 RX ORDER — METOPROLOL SUCCINATE 50 MG/1
50 TABLET, EXTENDED RELEASE ORAL DAILY
Status: DISCONTINUED | OUTPATIENT
Start: 2024-08-01 | End: 2024-08-01

## 2024-08-01 RX ORDER — TALC
6 POWDER (GRAM) TOPICAL NIGHTLY PRN
Status: DISCONTINUED | OUTPATIENT
Start: 2024-08-01 | End: 2024-08-08 | Stop reason: HOSPADM

## 2024-08-01 RX ORDER — AMIODARONE HYDROCHLORIDE 200 MG/1
200 TABLET ORAL DAILY
Status: DISCONTINUED | OUTPATIENT
Start: 2024-08-01 | End: 2024-08-08 | Stop reason: HOSPADM

## 2024-08-01 RX ORDER — CALCIUM GLUCONATE 20 MG/ML
1 INJECTION, SOLUTION INTRAVENOUS EVERY 10 MIN PRN
Status: DISCONTINUED | OUTPATIENT
Start: 2024-08-01 | End: 2024-08-01

## 2024-08-01 RX ORDER — ATORVASTATIN CALCIUM 40 MG/1
80 TABLET, FILM COATED ORAL DAILY
Status: DISCONTINUED | OUTPATIENT
Start: 2024-08-01 | End: 2024-08-08 | Stop reason: HOSPADM

## 2024-08-01 RX ORDER — SODIUM CHLORIDE 0.9 % (FLUSH) 0.9 %
10 SYRINGE (ML) INJECTION
Status: DISCONTINUED | OUTPATIENT
Start: 2024-08-01 | End: 2024-08-08 | Stop reason: HOSPADM

## 2024-08-01 RX ORDER — IPRATROPIUM BROMIDE AND ALBUTEROL SULFATE 2.5; .5 MG/3ML; MG/3ML
3 SOLUTION RESPIRATORY (INHALATION) EVERY 4 HOURS PRN
Status: DISCONTINUED | OUTPATIENT
Start: 2024-08-01 | End: 2024-08-03

## 2024-08-01 RX ORDER — INDOMETHACIN 25 MG/1
100 CAPSULE ORAL
Status: COMPLETED | OUTPATIENT
Start: 2024-08-01 | End: 2024-08-01

## 2024-08-01 RX ORDER — TAMSULOSIN HYDROCHLORIDE 0.4 MG/1
0.4 CAPSULE ORAL DAILY
Status: DISCONTINUED | OUTPATIENT
Start: 2024-08-01 | End: 2024-08-08 | Stop reason: HOSPADM

## 2024-08-01 RX ORDER — ONDANSETRON HYDROCHLORIDE 2 MG/ML
4 INJECTION, SOLUTION INTRAVENOUS EVERY 8 HOURS PRN
Status: DISCONTINUED | OUTPATIENT
Start: 2024-08-01 | End: 2024-08-08 | Stop reason: HOSPADM

## 2024-08-01 RX ORDER — SODIUM CHLORIDE 9 MG/ML
INJECTION, SOLUTION INTRAVENOUS
Status: CANCELLED | OUTPATIENT
Start: 2024-08-01

## 2024-08-01 RX ORDER — ALLOPURINOL 100 MG/1
100 TABLET ORAL DAILY
Status: DISCONTINUED | OUTPATIENT
Start: 2024-08-01 | End: 2024-08-08 | Stop reason: HOSPADM

## 2024-08-01 RX ORDER — PROCHLORPERAZINE EDISYLATE 5 MG/ML
5 INJECTION INTRAMUSCULAR; INTRAVENOUS EVERY 6 HOURS PRN
Status: DISCONTINUED | OUTPATIENT
Start: 2024-08-01 | End: 2024-08-08 | Stop reason: HOSPADM

## 2024-08-01 RX ORDER — SODIUM CHLORIDE 9 MG/ML
INJECTION, SOLUTION INTRAVENOUS ONCE
Status: CANCELLED | OUTPATIENT
Start: 2024-08-01 | End: 2024-08-01

## 2024-08-01 RX ORDER — SEVELAMER CARBONATE 800 MG/1
1600 TABLET, FILM COATED ORAL
Status: DISCONTINUED | OUTPATIENT
Start: 2024-08-01 | End: 2024-08-08 | Stop reason: HOSPADM

## 2024-08-01 RX ORDER — PANTOPRAZOLE SODIUM 40 MG/1
40 TABLET, DELAYED RELEASE ORAL DAILY
Status: DISCONTINUED | OUTPATIENT
Start: 2024-08-01 | End: 2024-08-08 | Stop reason: HOSPADM

## 2024-08-01 RX ORDER — ACETAMINOPHEN 325 MG/1
650 TABLET ORAL EVERY 4 HOURS PRN
Status: DISCONTINUED | OUTPATIENT
Start: 2024-08-01 | End: 2024-08-08 | Stop reason: HOSPADM

## 2024-08-01 RX ORDER — LABETALOL 100 MG/1
300 TABLET, FILM COATED ORAL EVERY 12 HOURS
Status: DISCONTINUED | OUTPATIENT
Start: 2024-08-01 | End: 2024-08-04

## 2024-08-01 RX ORDER — AMLODIPINE BESYLATE 5 MG/1
10 TABLET ORAL DAILY
Status: DISCONTINUED | OUTPATIENT
Start: 2024-08-01 | End: 2024-08-05

## 2024-08-01 RX ORDER — ONDANSETRON HYDROCHLORIDE 2 MG/ML
4 INJECTION, SOLUTION INTRAVENOUS
Status: COMPLETED | OUTPATIENT
Start: 2024-08-01 | End: 2024-08-01

## 2024-08-01 RX ADMIN — PIPERACILLIN AND TAZOBACTAM 4.5 G: 4; .5 INJECTION, POWDER, LYOPHILIZED, FOR SOLUTION INTRAVENOUS; PARENTERAL at 01:08

## 2024-08-01 RX ADMIN — DEXTROSE MONOHYDRATE 500 ML: 100 INJECTION, SOLUTION INTRAVENOUS at 03:08

## 2024-08-01 RX ADMIN — ONDANSETRON 4 MG: 2 INJECTION, SOLUTION INTRAMUSCULAR; INTRAVENOUS at 12:08

## 2024-08-01 RX ADMIN — PANTOPRAZOLE SODIUM 40 MG: 40 TABLET, DELAYED RELEASE ORAL at 08:08

## 2024-08-01 RX ADMIN — Medication 1 CAPSULE: at 08:08

## 2024-08-01 RX ADMIN — VANCOMYCIN HYDROCHLORIDE 1750 MG: 500 INJECTION, POWDER, LYOPHILIZED, FOR SOLUTION INTRAVENOUS at 04:08

## 2024-08-01 RX ADMIN — FUROSEMIDE 80 MG: 10 INJECTION, SOLUTION INTRAVENOUS at 03:08

## 2024-08-01 RX ADMIN — AMIODARONE HYDROCHLORIDE 200 MG: 200 TABLET ORAL at 12:08

## 2024-08-01 RX ADMIN — SEVELAMER CARBONATE 1600 MG: 800 TABLET, FILM COATED ORAL at 09:08

## 2024-08-01 RX ADMIN — SEVELAMER CARBONATE 1600 MG: 800 TABLET, FILM COATED ORAL at 01:08

## 2024-08-01 RX ADMIN — ALLOPURINOL 100 MG: 100 TABLET ORAL at 08:08

## 2024-08-01 RX ADMIN — ATORVASTATIN CALCIUM 80 MG: 40 TABLET, FILM COATED ORAL at 08:08

## 2024-08-01 RX ADMIN — MUPIROCIN: 20 OINTMENT TOPICAL at 09:08

## 2024-08-01 RX ADMIN — CALCIUM GLUCONATE 1 G: 20 INJECTION, SOLUTION INTRAVENOUS at 03:08

## 2024-08-01 RX ADMIN — SEVELAMER CARBONATE 1600 MG: 800 TABLET, FILM COATED ORAL at 05:08

## 2024-08-01 RX ADMIN — MUPIROCIN: 20 OINTMENT TOPICAL at 08:08

## 2024-08-01 RX ADMIN — INSULIN HUMAN 6.63 UNITS: 100 INJECTION, SOLUTION PARENTERAL at 03:08

## 2024-08-01 RX ADMIN — TAMSULOSIN HYDROCHLORIDE 0.4 MG: 0.4 CAPSULE ORAL at 08:08

## 2024-08-01 RX ADMIN — LABETALOL HYDROCHLORIDE 300 MG: 100 TABLET, FILM COATED ORAL at 11:08

## 2024-08-01 RX ADMIN — SODIUM ZIRCONIUM CYCLOSILICATE 10 G: 10 POWDER, FOR SUSPENSION ORAL at 03:08

## 2024-08-01 RX ADMIN — APIXABAN 2.5 MG: 2.5 TABLET, FILM COATED ORAL at 08:08

## 2024-08-01 RX ADMIN — AMLODIPINE BESYLATE 10 MG: 5 TABLET ORAL at 01:08

## 2024-08-01 RX ADMIN — SODIUM BICARBONATE 100 MEQ: 84 INJECTION, SOLUTION INTRAVENOUS at 04:08

## 2024-08-01 RX ADMIN — ALBUTEROL SULFATE 10 MG: 2.5 SOLUTION RESPIRATORY (INHALATION) at 02:08

## 2024-08-01 NOTE — PT/OT/SLP PROGRESS
Occupational Therapy      Patient Name:  Mahesh Cespedes   MRN:  [id number]    Patient not seen today secondary to Dialysis. Will follow-up later as able.    8/1/2024
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
## 2024-08-01 NOTE — PLAN OF CARE
Case Management Assessment     PCP: Cruz Hernandez MD    Pharmacy: [facility]      Patient Arrived From: home  Existing Help at Home: daughterRima    Barriers to Discharge: non compliant with HD    Discharge Plan:    A. Resume [facility]   B. tbd      Unable to speak to patient to complete DC needs assessment.  Signs of confusion noted on nursing assessment and dialysis in progress.  CM called daughterRima who provided information for assessment.  Rima stated that her father lived with her and she is his primary caregiver.  She stated that he missed 2 weeks of HD because the last time he went his heart rate increased and he was taken to [facility] where he spent the night.  She stated that he ha decided that he does not want to go to HD anymore since that occurrence.  Patient would benefit from a Palliative care consult.           08/01/24 1332   Discharge Assessment   Assessment Type Discharge Planning Assessment   Confirmed/corrected address, phone number and insurance Yes   Confirmed Demographics Correct on Facesheet   Source of Information family   Communicated JAIME with patient/caregiver Yes   People in Home child(macarena), adult   Do you expect to return to your current living situation? Yes   Do you have help at home or someone to help you manage your care at home? Yes   Prior to hospitilization cognitive status: Alert/Oriented   Current cognitive status: Unable to Assess  (Dialysis in progress)   Walking or Climbing Stairs Difficulty yes   Walking or Climbing Stairs ambulation difficulty, requires equipment   Dressing/Bathing Difficulty yes   Dressing/Bathing bathing difficulty, assistance 1 person   Do you have any problems with: Errands/Grocery;Needs other help   Specify other help daughter maintains the home and errands   Home Accessibility wheelchair accessible   Home Layout Able to live on 1st floor   Equipment Currently Used at Home walker, rolling;wheelchair   Readmission  within 30 days? No   Patient currently being followed by outpatient case management? No   Do you currently have service(s) that help you manage your care at home? Yes   Name and Contact number of agency [facility]   Is the pt/caregiver preference to resume services with current agency Yes   Do you take prescription medications? Yes   Do you have prescription coverage? Yes   Do you have any problems affording any of your prescribed medications? No   Is the patient taking medications as prescribed? yes   How do you get to doctors appointments? family or friend will provide   Are you on dialysis? Yes   Dialysis Name and Scheduled days [facility]   Do you take coumadin? No   Discharge Plan A Home Health   Discharge Plan B   (tbd)   DME Needed Upon Discharge    (tbd)   Discharge Plan discussed with: Adult children   Transition of Care Barriers Does not adhere to care plan
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
## 2024-08-01 NOTE — PROGRESS NOTES
Pharmacist Renal Dose Adjustment Note    Mahesh Cespedes is a 59 y.o. male being treated with the medication Piperacillin/tazobactam    Patient Data:    Vital Signs (Most Recent):  Temp: 97.8 °F (36.6 °C) (08/01/24 0042)  Pulse: 94 (08/01/24 0402)  Resp: 19 (08/01/24 0402)  BP: (!) 175/93 (08/01/24 0402)  SpO2: 98 % (08/01/24 0402) Vital Signs (72h Range):  Temp:  [97.8 °F (36.6 °C)-100 °F (37.8 °C)]   Pulse:  [85-94]   Resp:  [12-24]   BP: (130-183)/(80-96)   SpO2:  [88 %-100 %]      Recent Labs   Lab 08/01/24 0224   CREATININE 19.3*     Serum creatinine: 19.3 mg/dL (H) 08/01/24 0224  Estimated creatinine clearance: 3.8 mL/min (A)    Piperacillin/tazobactam 4.5 gram every 8 hours will be renally adjusted to Piperacillin/tazobactam 4.5 gram every 12 hours    Pharmacist's Name: Ezra Hernandez  Pharmacist's Extension: [phone]

## 2024-08-01 NOTE — PLAN OF CARE
Patient has been seen and examinated,patient  with a past medical history of ESRD on HD, AFib/flutter (on Eliquis), type 2 diabetes, hyperlipidemia, hypertension, CVA, who presents with acute metabolic encephaloptyhy,duo to missed HD for long time,Hyperkalemia,sacral wounds,with medical treatment and HD hyperkalemia is improving,nephrology doing HD,  On empiric IV Vancomycin,consulted PT,OT,  No sign of peaked T waves,transferring to floor.

## 2024-08-01 NOTE — ASSESSMENT & PLAN NOTE
Anemia is likely due to chronic disease due to Chronic Kidney Disease. Most recent hemoglobin and hematocrit are listed below.  Recent Labs     08/01/24  0102 08/01/24  0227   HGB 10.2*  --    HCT 31.4* 32*     Monitor

## 2024-08-01 NOTE — PROGRESS NOTES
Mahesh Cespedes is a 59 y.o. male patient.    Follow for ESRD, dialysis    Patient seen while on dialysis  Lethargic, comfortable    Scheduled Meds:   allopurinoL  100 mg Oral Daily    amiodarone  200 mg Oral Daily    amLODIPine  10 mg Oral Daily    apixaban  2.5 mg Oral BID    atorvastatin  80 mg Oral Daily    labetaloL  300 mg Oral Q12H    mupirocin   Nasal BID    pantoprazole  40 mg Oral Daily    sevelamer carbonate  1,600 mg Oral TID WM    tamsulosin  0.4 mg Oral Daily    vitamin renal formula (B-complex-vitamin c-folic acid)  1 capsule Oral Daily       Review of patient's allergies indicates:  No Known Allergies      Vital Signs Range (Last 24H):  Temp:  [97.8 °F (36.6 °C)-100 °F (37.8 °C)]   Pulse:  []   Resp:  [12-27]   BP: (130-206)/()   SpO2:  [88 %-100 %]     I & O (Last 24H):  Intake/Output Summary (Last 24 hours) at 8/1/2024 0947  Last data filed at 8/1/2024 0921  Gross per 24 hour   Intake 1059.7 ml   Output --   Net 1059.7 ml           Physical Exam:  General appearance: well developed, no distress  Lungs:  normal respiratory effort and rales bibasilar  Heart: regular rate and rhythm  Abdomen:  soft, normal bowel sounds  Extremities: edema (+)    Laboratory:  I have reviewed all pertinent lab results within the past 24 hours.  CBC:   Recent Labs   Lab 08/01/24  0102 08/01/24  0227   WBC 8.31  --    RBC 3.71*  --    HGB 10.2*  --    HCT 31.4* 32*   *  --    MCV 85  --    MCH 27.5  --    MCHC 32.5  --      CMP:   Recent Labs   Lab 08/01/24  0725      CALCIUM 7.9*   ALBUMIN 3.1*   PROT 8.7*   *   K 6.1*   CO2 14*   CL 94*   *   CREATININE 18.8*   ALKPHOS 174*   ALT 15   AST 12   BILITOT 0.8       Assessment & Plan    ESRD - on dialysis, stable, tolerated well  Hyperkalemia - being dialyzed  Fluid overload - UF as tolerated  Altered mental status  DM2  HTN  Anemia in CKD    Continue present Rx  HD again in am  We'll follow for dialysis      Trajayce Scott  8/1/2024

## 2024-08-01 NOTE — PROGRESS NOTES
[facility]  Nurses Note -- 4 Eyes      8/1/2024       Skin assessed on: Admit      [] No Pressure Injuries Present    [x]Prevention Measures Documented    [x] Yes LDA  for Pressure Injury Previously documented     [] Yes New Pressure Injury Discovered   [] LDA for New Pressure Injury Added      Attending RN:  Heidy Leon RN     Second RN:  BETO Dai

## 2024-08-01 NOTE — ED NOTES
Called on call dialysis [phone]  spoke with Terrance.  Terrance will paged dialysis on call.

## 2024-08-01 NOTE — NURSING
Pt transferred to unit from ICU via stretcher. Pt aroused to verbal stimuli. Resp even and unlabored. Wound to sacral noted, covered with mepilex. Woundcare consulted. SCD's and heel boots in place. Telebox 3000 in place. Plan of care ongoing.

## 2024-08-01 NOTE — CONSULTS
Reason for consultation:  ESRD, hyperkalemia, dialysis    HPI:  60 yo man with h/o HTN, DM type 2, CVA, ESRD presented to the hospital with altered mental status.  He has not been dialyzed for about 2 weeks.  He was found hyperkalemic and nephrology was consulted for ESRD, dialysis.    PMH:  As above.    Scheduled Meds:   allopurinoL  100 mg Oral Daily    amiodarone  200 mg Oral Daily    amLODIPine  10 mg Oral Daily    apixaban  2.5 mg Oral BID    atorvastatin  80 mg Oral Daily    metoprolol succinate  50 mg Oral Daily    pantoprazole  40 mg Oral Daily    sevelamer carbonate  1,600 mg Oral TID WM    tamsulosin  0.4 mg Oral Daily    vitamin renal formula (B-complex-vitamin c-folic acid)  1 capsule Oral Daily       Review of patient's allergies indicates:  No Known Allergies    Family history:  Non contributory    Social history:  no tobacco, no alcohol    Vital Signs Range (Last 24H):  Temp:  [97.8 °F (36.6 °C)-100 °F (37.8 °C)]   Pulse:  []   Resp:  [12-27]   BP: (130-206)/()   SpO2:  [88 %-100 %]     I & O (Last 24H):  Intake/Output Summary (Last 24 hours) at 8/1/2024 0717  Last data filed at 8/1/2024 0448  Gross per 24 hour   Intake 819.7 ml   Output --   Net 819.7 ml           Physical Exam:  General appearance: well developed, no distress  Lungs:  normal respiratory effort and rales bibasilar  Heart: regular rate and rhythm  Abdomen:  soft, normal bowel sounds  Extremities: edema (+)    Laboratory:  I have reviewed all pertinent lab results within the past 24 hours.  CBC:   Recent Labs   Lab 08/01/24 0102 08/01/24 0227   WBC 8.31  --    RBC 3.71*  --    HGB 10.2*  --    HCT 31.4* 32*   *  --    MCV 85  --    MCH 27.5  --    MCHC 32.5  --      CMP:   Recent Labs   Lab 08/01/24 0224      CALCIUM 8.0*   ALBUMIN 3.2*   PROT 9.1*   *   K 7.4*   CO2 13*   CL 94*   *   CREATININE 19.3*   ALKPHOS 192*   ALT 15   AST 12   BILITOT 0.7     Cardiac markers: No results for  "input(s): "CKMB", "CPKMB", "TROPONINT", "TROPONINI", "MYOGLOBIN" in the last 168 hours.  ABGs: No results for input(s): "PH", "PCO2", "PO2", "HCO3", "POCSATURATED", "BE" in the last 168 hours.    Assessment & Plan    ESRD  Altered mental status - uremia due to missing dialysis  Hyperkalemia  Fluid overload  HTN  DM type 2  Non compliance  Anemia in CKD    Discussed with Dr. Traylor around  4 am about patient condition's.  Patient had no EKG change at that time associated with hyperkalemia.  He will be admitted to the ICU but no bed available at this time, and by policy patient is not allowed to get dialysis in the ER,  He can not get dialysis on inpatient dialysis unit since he was admitted to ICU.  Patient was given IV bicarb, albuterol neb, IV insulin for shifting treatment.  He also received IV calcium gluconate.  Ask to let me know if ICU bed available so dialysis nurse can set up dialysis. Repeat K is not available at this time, and patient still in the ER.  Discussed with ER nurse, patient need to go to the ICU ASAP for dialysis.      HD as ordered  We'll follow for dialysis        David Scott  8/1/2024    "

## 2024-08-01 NOTE — PROGRESS NOTES
Pt arrived to ICU via stretcher, Awake and Alert able to answer simple question in English. Primary language [place]ian will call Interpretor.  Sacral Skin with wound mepilex reapplied. Male external cath applied. R knee skin tear noted. Pt with generalized edema noted. L chest wall dialysis access present. JVD present. SCDs and heel boots applied. Bed in low position. Call light in reach. HOB elevated.
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
## 2024-08-01 NOTE — ASSESSMENT & PLAN NOTE
Hyperkalemia is likely due to ESRD.The patients most recent potassium results are listed below.  Recent Labs     08/01/24  0224   K 7.4*     Plan  - Monitor for arrhythmias with EKG and/or continuous telemetry.   - Treat the hyperkalemia with Potassium Binders, Calcium gluconate, IV insulin and dextrose, Nebulized albuterol sulfate, Furosemide, and Sodium Bicarbonate.   - Monitor potassium: Daily  - Nephrology consult for dialysis

## 2024-08-01 NOTE — ED TRIAGE NOTES
Pt presents to the ED via EMS from home with altered mental status per family, SOB, and fecal incontinence. Pt complains of back pain. Dry blood noted to both nares, pt states his noses has been bleeding everyday. Pt is a dialysis client and has not received treatment in two weeks. Swelling noted to bilateral legs and feet. Noted covered in feces on arrival. AAOX3 Liberian creole  #[id number] used for translation.

## 2024-08-01 NOTE — HPI
This is a 59-year-old male with a past medical history of ESRD on HD, AFib/flutter (on Eliquis), type 2 diabetes, hyperlipidemia, hypertension, CVA, who presents with altered mental status.      Patient presents for evaluation of altered mental status in the setting of missing dialysis for 2 weeks.  Per the patient's family, he has been bedridden, more confused and developed fecal incontinence. On arrival to the ER, the patient was covered in stool.  Patient is unable to contribute to the history.     In the ED, the patient was I hypertensive go toxic (SpO2:  88%), requiring 2 L O2. Labs were remarkable for hyperkalemia (7.4), elevated BUN (182) , normocytic anemia (10.2).  CT head showed no acute process.  CT chest showed loculated moderate left pleural effusion, left lower lobe atelectasis/consolidation, a large pericardial effusion measuring 3.6 cm, findings suggestive of pulmonary edema. Nephrology was consulted, with plans to dialyze the patient.  Patient was given albuterol, calcium gluconate, insulin/D10, Lasix 80 mg IV, sodium bicarb 100 mEq, Lokelma 10 g, vancomycin, Zosyn, Zofran 4 mg IV. He was admitted for further management.

## 2024-08-01 NOTE — PROGRESS NOTES
"Pharmacokinetic Initial Assessment: IV Vancomycin    Assessment/Plan:    Initiate intravenous vancomycin with loading dose of 1750 mg once with subsequent doses when random concentrations are less than 20 mcg/mL  Desired empiric serum trough concentration is 10 to 20 mcg/mL  Draw vancomycin random level on 8/2 at 03:00.  Pharmacy will continue to follow and monitor vancomycin.      Please contact pharmacy at extension [phone] with any questions regarding this assessment.     Thank you for the consult,   Eleanor Salinas       Patient brief summary:  Mahesh Cespedes is a 59 y.o. male initiated on antimicrobial therapy with IV Vancomycin for treatment of suspected skin & soft tissue infection    Drug Allergies:   Review of patient's allergies indicates:  No Known Allergies    Actual Body Weight:   66.3 kg    Renal Function:   Estimated Creatinine Clearance: 3.9 mL/min (A) (based on SCr of 18.8 mg/dL (H)).,     Dialysis Method (if applicable):  intermittent HD    CBC (last 72 hours):  Recent Labs   Lab Result Units 08/01/24  0102   WBC K/uL 8.31   Hemoglobin g/dL 10.2*   Hematocrit % 31.4*   Platelets K/uL 149*   Gran % % 85.1*   Lymph % % 8.3*   Mono % % 4.9   Eosinophil % % 1.1   Basophil % % 0.2   Differential Method  Automated       Metabolic Panel (last 72 hours):  Recent Labs   Lab Result Units 08/01/24  0224 08/01/24  0443 08/01/24  0725   Sodium mmol/L 135*  --  135*   Potassium mmol/L 7.4*  --  6.1*   Chloride mmol/L 94*  --  94*   CO2 mmol/L 13*  --  14*   Glucose mg/dL 109  --  106   Glucose, UA   --  2+*  --    BUN mg/dL 182*  --  182*   Creatinine mg/dL 19.3*  --  18.8*   Albumin g/dL 3.2*  --  3.1*   Total Bilirubin mg/dL 0.7  --  0.8   Alkaline Phosphatase U/L 192*  --  174*   AST U/L 12  --  12   ALT U/L 15  --  15   Magnesium mg/dL 3.4*  --   --        Drug levels (last 3 results):  No results for input(s): "VANCOMYCINRA", "VANCORANDOM", "VANCOMYCINPE", "VANCOPEAK", "VANCOMYCINTR", "VANCOTROUGH" in the last " 72 hours.    Microbiologic Results:  Microbiology Results (last 7 days)       Procedure Component Value Units Date/Time    Blood culture x two cultures. Draw prior to antibiotics. [[id number]] Collected: 08/01/24 0104    Order Status: Completed Specimen: Blood from Peripheral, Forearm, Right Updated: 08/01/24 0712     Blood Culture, Routine No Growth to date    Narrative:      Aerobic and anaerobic    Blood culture x two cultures. Draw prior to antibiotics. [[id number]] Collected: 08/01/24 0103    Order Status: Completed Specimen: Blood from Peripheral, Forearm, Left Updated: 08/01/24 0712     Blood Culture, Routine No Growth to date    Narrative:      Aerobic and anaerobic    Stool culture [[id number]]     Order Status: Canceled Specimen: Stool     Clostridium difficile EIA [[id number]]     Order Status: Canceled Specimen: Stool

## 2024-08-01 NOTE — ED NOTES
"Resumed care of pt. Pt moaning and groaning in room. Use interpretor to talk with pt. Ask pt if in pain and states "no, I can't talk." Pt then able to state name and birthday. Pt updated on plan of care and to be transported to ICU.  "

## 2024-08-01 NOTE — ASSESSMENT & PLAN NOTE
Creatine stable for now. BMP reviewed- noted Estimated Creatinine Clearance: 3.8 mL/min (A) (based on SCr of 19.3 mg/dL (H)). according to latest data. Based on current GFR, CKD stage is end stage.  Monitor UOP and serial BMP and adjust therapy as needed. Renally dose meds. Avoid nephrotoxic medications and procedures. Nephrology consult

## 2024-08-01 NOTE — PT/OT/SLP PROGRESS
Physical Therapy      Patient Name:  Mahesh Cespedes   MRN:  [id number]    Patient not seen today secondary to Dialysis. Will follow-up 8/2/24.

## 2024-08-01 NOTE — PROGRESS NOTES
08/01/24 1315   Vital Signs   Pulse 109   Resp (!) 9   SpO2 100 %   BP (!) 124/91   MAP (mmHg) 102     HD completed. Hand off given to ICU RN

## 2024-08-01 NOTE — ASSESSMENT & PLAN NOTE
Chronic, controlled. Latest blood pressure and vitals reviewed-     Temp:  [97.8 °F (36.6 °C)-100 °F (37.8 °C)]   Pulse:  [85-94]   Resp:  [12-24]   BP: (130-183)/(80-96)   SpO2:  [88 %-100 %] .   Home meds for hypertension were reviewed and noted below.   Hypertension Medications               amLODIPine (NORVASC) 10 MG tablet Take 10 mg by mouth once daily.    metoprolol succinate (TOPROL-XL) 50 MG 24 hr tablet Take 1 tablet (50 mg total) by mouth once daily.            While in the hospital, will manage blood pressure as follows; Continue home antihypertensive regimen    Will utilize p.r.n. blood pressure medication only if patient's blood pressure greater than 180/110 and he develops symptoms such as worsening chest pain or shortness of breath.

## 2024-08-01 NOTE — PROGRESS NOTES
"Attempted to use Interpretor Florecita #[id number] pt states I am "asking too many question" He does not know where he is. He turns his head and closes eyes during interview. Informed pt that I have some medication for him he states "No" Informed pt with Interpretor that the dialysis nurse his on the way and pt will receive dialysis shortly.   "

## 2024-08-02 PROBLEM — R41.82 ALTERED MENTAL STATUS: Status: ACTIVE | Noted: 2024-08-02

## 2024-08-02 PROBLEM — Z71.89 ACP (ADVANCE CARE PLANNING): Status: ACTIVE | Noted: 2024-08-02

## 2024-08-02 PROBLEM — I31.39 PERICARDIAL EFFUSION: Status: ACTIVE | Noted: 2024-08-02

## 2024-08-02 LAB
ANION GAP SERPL CALC-SCNC: 19 MMOL/L (ref 8–16)
APICAL FOUR CHAMBER EJECTION FRACTION: 57 %
APICAL TWO CHAMBER EJECTION FRACTION: 54 %
ASCENDING AORTA: 3.13 CM
AV INDEX (PROSTH): 0.5
AV MEAN GRADIENT: 6 MMHG
AV PEAK GRADIENT: 10 MMHG
AV VALVE AREA BY VELOCITY RATIO: 1.46 CM²
AV VALVE AREA: 1.39 CM²
AV VELOCITY RATIO: 0.53
BASOPHILS # BLD AUTO: 0.02 K/UL (ref 0–0.2)
BASOPHILS NFR BLD: 0.3 % (ref 0–1.9)
BSA FOR ECHO PROCEDURE: 1.77 M2
BUN SERPL-MCNC: 98 MG/DL (ref 8–23)
CALCIUM SERPL-MCNC: 7.6 MG/DL (ref 8.7–10.5)
CHLORIDE SERPL-SCNC: 93 MMOL/L (ref 95–110)
CO2 SERPL-SCNC: 20 MMOL/L (ref 23–29)
CREAT SERPL-MCNC: 11.8 MG/DL (ref 0.5–1.4)
CV ECHO LV RWT: 0.75 CM
DIFFERENTIAL METHOD BLD: ABNORMAL
DOP CALC AO PEAK VEL: 1.6 M/S
DOP CALC AO VTI: 27.7 CM
DOP CALC LVOT AREA: 2.8 CM2
DOP CALC LVOT DIAMETER: 1.88 CM
DOP CALC LVOT PEAK VEL: 0.84 M/S
DOP CALC LVOT STROKE VOLUME: 38.57 CM3
DOP CALCLVOT PEAK VEL VTI: 13.9 CM
E WAVE DECELERATION TIME: 139.32 MSEC
E/A RATIO: 3.91
E/E' RATIO: 12 M/S
ECHO LV POSTERIOR WALL: 1.79 CM (ref 0.6–1.1)
EOSINOPHIL # BLD AUTO: 0.1 K/UL (ref 0–0.5)
EOSINOPHIL NFR BLD: 1.1 % (ref 0–8)
ERYTHROCYTE [DISTWIDTH] IN BLOOD BY AUTOMATED COUNT: 16.1 % (ref 11.5–14.5)
EST. GFR  (NO RACE VARIABLE): 4 ML/MIN/1.73 M^2
FRACTIONAL SHORTENING: 29 % (ref 28–44)
GLUCOSE SERPL-MCNC: 163 MG/DL (ref 70–110)
HCT VFR BLD AUTO: 30.7 % (ref 40–54)
HGB BLD-MCNC: 9.6 G/DL (ref 14–18)
IMM GRANULOCYTES # BLD AUTO: 0.04 K/UL (ref 0–0.04)
IMM GRANULOCYTES NFR BLD AUTO: 0.5 % (ref 0–0.5)
INTERVENTRICULAR SEPTUM: 1.87 CM (ref 0.6–1.1)
IVC DIAMETER: 2.18 CM
LA MAJOR: 5.7 CM
LA MINOR: 6.68 CM
LA WIDTH: 4.9 CM
LEFT ATRIUM SIZE: 5.14 CM
LEFT ATRIUM VOLUME INDEX: 74.4 ML/M2
LEFT ATRIUM VOLUME: 131.69 CM3
LEFT INTERNAL DIMENSION IN SYSTOLE: 3.41 CM (ref 2.1–4)
LEFT VENTRICLE DIASTOLIC VOLUME INDEX: 60.73 ML/M2
LEFT VENTRICLE DIASTOLIC VOLUME: 107.49 ML
LEFT VENTRICLE END DIASTOLIC VOLUME APICAL 2 CHAMBER: 102.84 ML
LEFT VENTRICLE END DIASTOLIC VOLUME APICAL 4 CHAMBER: 68.59 ML
LEFT VENTRICLE MASS INDEX: 233 G/M2
LEFT VENTRICLE SYSTOLIC VOLUME INDEX: 26.9 ML/M2
LEFT VENTRICLE SYSTOLIC VOLUME: 47.7 ML
LEFT VENTRICULAR INTERNAL DIMENSION IN DIASTOLE: 4.8 CM (ref 3.5–6)
LEFT VENTRICULAR MASS: 412.36 G
LV LATERAL E/E' RATIO: 12.86 M/S
LV SEPTAL E/E' RATIO: 11.25 M/S
LVED V (TEICH): 107.49 ML
LVES V (TEICH): 47.7 ML
LVOT MG: 1.54 MMHG
LVOT MV: 0.59 CM/S
LYMPHOCYTES # BLD AUTO: 0.5 K/UL (ref 1–4.8)
LYMPHOCYTES NFR BLD: 6.1 % (ref 18–48)
MCH RBC QN AUTO: 27.2 PG (ref 27–31)
MCHC RBC AUTO-ENTMCNC: 31.3 G/DL (ref 32–36)
MCV RBC AUTO: 87 FL (ref 82–98)
MONOCYTES # BLD AUTO: 0.5 K/UL (ref 0.3–1)
MONOCYTES NFR BLD: 6.3 % (ref 4–15)
MV PEAK A VEL: 0.23 M/S
MV PEAK E VEL: 0.9 M/S
MV STENOSIS PRESSURE HALF TIME: 40.4 MS
MV VALVE AREA P 1/2 METHOD: 5.45 CM2
NEUTROPHILS # BLD AUTO: 6.5 K/UL (ref 1.8–7.7)
NEUTROPHILS NFR BLD: 85.7 % (ref 38–73)
NRBC BLD-RTO: 0 /100 WBC
OHS CV RV/LV RATIO: 0.88 CM
OHS LV EJECTION FRACTION SIMPSONS BIPLANE MOD: 54 %
PISA TR MAX VEL: 3.44 M/S
PLATELET # BLD AUTO: 150 K/UL (ref 150–450)
PMV BLD AUTO: 10 FL (ref 9.2–12.9)
POCT GLUCOSE: 137 MG/DL (ref 70–110)
POCT GLUCOSE: 158 MG/DL (ref 70–110)
POTASSIUM SERPL-SCNC: 5 MMOL/L (ref 3.5–5.1)
PV PEAK GRADIENT: 2 MMHG
PV PEAK VELOCITY: 0.76 M/S
RA MAJOR: 6.02 CM
RA PRESSURE ESTIMATED: 8 MMHG
RA WIDTH: 5.2 CM
RBC # BLD AUTO: 3.53 M/UL (ref 4.6–6.2)
RIGHT VENTRICLE DIASTOLIC BASEL DIMENSION: 4.2 CM
RIGHT VENTRICULAR END-DIASTOLIC DIMENSION: 4.21 CM
RV TB RVSP: 11 MMHG
RV TISSUE DOPPLER FREE WALL SYSTOLIC VELOCITY 1 (APICAL 4 CHAMBER VIEW): 12.33 CM/S
SINUS: 3.21 CM
SODIUM SERPL-SCNC: 132 MMOL/L (ref 136–145)
STJ: 3.37 CM
TDI LATERAL: 0.07 M/S
TDI SEPTAL: 0.08 M/S
TDI: 0.08 M/S
TR MAX PG: 47 MMHG
TRICUSPID ANNULAR PLANE SYSTOLIC EXCURSION: 1.51 CM
TV REST PULMONARY ARTERY PRESSURE: 55 MMHG
VANCOMYCIN SERPL-MCNC: 6.8 UG/ML
WBC # BLD AUTO: 7.56 K/UL (ref 3.9–12.7)
Z-SCORE OF LEFT VENTRICULAR DIMENSION IN END DIASTOLE: -0.19
Z-SCORE OF LEFT VENTRICULAR DIMENSION IN END SYSTOLE: 0.94

## 2024-08-02 PROCEDURE — 97535 SELF CARE MNGMENT TRAINING: CPT

## 2024-08-02 PROCEDURE — 25000003 PHARM REV CODE 250: Performed by: STUDENT IN AN ORGANIZED HEALTH CARE EDUCATION/TRAINING PROGRAM

## 2024-08-02 PROCEDURE — 99223 1ST HOSP IP/OBS HIGH 75: CPT | Mod: ,,, | Performed by: REGISTERED NURSE

## 2024-08-02 PROCEDURE — 97530 THERAPEUTIC ACTIVITIES: CPT

## 2024-08-02 PROCEDURE — 25000003 PHARM REV CODE 250: Performed by: HOSPITALIST

## 2024-08-02 PROCEDURE — 80202 ASSAY OF VANCOMYCIN: CPT | Performed by: HOSPITALIST

## 2024-08-02 PROCEDURE — 63600175 PHARM REV CODE 636 W HCPCS: Performed by: STUDENT IN AN ORGANIZED HEALTH CARE EDUCATION/TRAINING PROGRAM

## 2024-08-02 PROCEDURE — 80048 BASIC METABOLIC PNL TOTAL CA: CPT | Performed by: HOSPITALIST

## 2024-08-02 PROCEDURE — 36415 COLL VENOUS BLD VENIPUNCTURE: CPT | Performed by: HOSPITALIST

## 2024-08-02 PROCEDURE — 93005 ELECTROCARDIOGRAM TRACING: CPT

## 2024-08-02 PROCEDURE — 85025 COMPLETE CBC W/AUTO DIFF WBC: CPT | Performed by: HOSPITALIST

## 2024-08-02 PROCEDURE — 25000003 PHARM REV CODE 250: Performed by: NURSE PRACTITIONER

## 2024-08-02 PROCEDURE — 63600175 PHARM REV CODE 636 W HCPCS: Performed by: HOSPITALIST

## 2024-08-02 PROCEDURE — 97166 OT EVAL MOD COMPLEX 45 MIN: CPT

## 2024-08-02 PROCEDURE — 93010 ELECTROCARDIOGRAM REPORT: CPT | Mod: ,,, | Performed by: INTERNAL MEDICINE

## 2024-08-02 PROCEDURE — 21400001 HC TELEMETRY ROOM

## 2024-08-02 PROCEDURE — 99223 1ST HOSP IP/OBS HIGH 75: CPT | Mod: ,,,

## 2024-08-02 RX ORDER — TRAMADOL HYDROCHLORIDE 50 MG/1
50 TABLET ORAL ONCE
Status: COMPLETED | OUTPATIENT
Start: 2024-08-02 | End: 2024-08-02

## 2024-08-02 RX ADMIN — ACETAMINOPHEN 650 MG: 325 TABLET ORAL at 04:08

## 2024-08-02 RX ADMIN — SEVELAMER CARBONATE 1600 MG: 800 TABLET, FILM COATED ORAL at 12:08

## 2024-08-02 RX ADMIN — TAMSULOSIN HYDROCHLORIDE 0.4 MG: 0.4 CAPSULE ORAL at 08:08

## 2024-08-02 RX ADMIN — PANTOPRAZOLE SODIUM 40 MG: 40 TABLET, DELAYED RELEASE ORAL at 08:08

## 2024-08-02 RX ADMIN — ACETAMINOPHEN 650 MG: 325 TABLET ORAL at 10:08

## 2024-08-02 RX ADMIN — VANCOMYCIN HYDROCHLORIDE 500 MG: 500 INJECTION, POWDER, LYOPHILIZED, FOR SOLUTION INTRAVENOUS at 08:08

## 2024-08-02 RX ADMIN — AMIODARONE HYDROCHLORIDE 200 MG: 200 TABLET ORAL at 08:08

## 2024-08-02 RX ADMIN — SEVELAMER CARBONATE 1600 MG: 800 TABLET, FILM COATED ORAL at 08:08

## 2024-08-02 RX ADMIN — MUPIROCIN 1 G: 20 OINTMENT TOPICAL at 08:08

## 2024-08-02 RX ADMIN — Medication 1 CAPSULE: at 08:08

## 2024-08-02 RX ADMIN — ATORVASTATIN CALCIUM 80 MG: 40 TABLET, FILM COATED ORAL at 08:08

## 2024-08-02 RX ADMIN — TRAMADOL HYDROCHLORIDE 50 MG: 50 TABLET, COATED ORAL at 11:08

## 2024-08-02 RX ADMIN — LABETALOL HYDROCHLORIDE 300 MG: 100 TABLET, FILM COATED ORAL at 08:08

## 2024-08-02 RX ADMIN — ONDANSETRON 4 MG: 2 INJECTION INTRAMUSCULAR; INTRAVENOUS at 08:08

## 2024-08-02 RX ADMIN — Medication 6 MG: at 08:08

## 2024-08-02 RX ADMIN — APIXABAN 2.5 MG: 2.5 TABLET, FILM COATED ORAL at 08:08

## 2024-08-02 RX ADMIN — MUPIROCIN: 20 OINTMENT TOPICAL at 08:08

## 2024-08-02 RX ADMIN — ALLOPURINOL 100 MG: 100 TABLET ORAL at 08:08
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
## 2024-08-02 NOTE — ASSESSMENT & PLAN NOTE
8/2/2024 - Consult   - consult received; interval chart reviewed in detail; discussed pt hospital primary   - pt known to myself and palliative medicine team from previous admission   - met with patient at bedside; introduction to palliative medicine team and role in current care and admission   - attempted to use virtual  to assess mental status and attempt GOC discussion, pt not responsive to ; unclear if related to mental status or frequent difficulty with [place] creole virtual    - attempted to call pt's daughter to discuss GOC related to HD (see ESRD); unable to reach, LM with return number provided; will continue attempts to reach this afternoon   - communicated recommendation for further discussions of HD wishes and hospice with family when able to reach with CM/SW   - recommendation for in-person  for communication with pt as well

## 2024-08-02 NOTE — PLAN OF CARE
Problem: Adult Inpatient Plan of Care  Goal: Plan of Care Review  Outcome: Progressing  Goal: Patient-Specific Goal (Individualized)  Outcome: Progressing     Problem: Hemodialysis  Goal: Safe, Effective Therapy Delivery  Outcome: Progressing  Goal: Effective Tissue Perfusion  Outcome: Progressing     Problem: Diabetes Comorbidity  Goal: Blood Glucose Level Within Targeted Range  Outcome: Progressing     Problem: Infection  Goal: Absence of Infection Signs and Symptoms  Outcome: Progressing     Problem: Wound  Goal: Optimal Coping  Outcome: Progressing
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
## 2024-08-02 NOTE — PROGRESS NOTES
Mahesh Cespedes is a 59 y.o. male patient.    Follow for ESRD, dialysis    No new c/o, having episode of tachycardia  Otherwise comfortable    Scheduled Meds:   allopurinoL  100 mg Oral Daily    amiodarone  200 mg Oral Daily    amLODIPine  10 mg Oral Daily    apixaban  2.5 mg Oral BID    atorvastatin  80 mg Oral Daily    labetaloL  300 mg Oral Q12H    mupirocin   Nasal BID    pantoprazole  40 mg Oral Daily    sevelamer carbonate  1,600 mg Oral TID WM    tamsulosin  0.4 mg Oral Daily    vitamin renal formula (B-complex-vitamin c-folic acid)  1 capsule Oral Daily       Review of patient's allergies indicates:  No Known Allergies      Vital Signs Range (Last 24H):  Temp:  [97 °F (36.1 °C)-98.6 °F (37 °C)]   Pulse:  [72-76]   Resp:  [9-18]   BP: (112-165)/(70-93)   SpO2:  [93 %-100 %]     I & O (Last 24H):  Intake/Output Summary (Last 24 hours) at 8/2/2024 1352  Last data filed at 8/2/2024 1007  Gross per 24 hour   Intake 420 ml   Output 200 ml   Net 220 ml           Physical Exam:  General appearance: well developed, no distress  Lungs:  clear to auscultation bilaterally and normal respiratory effort  Heart: irregularly irregular rhythm  Abdomen:  soft, normal bowel sounds  Extremities: edema negative    Laboratory:  I have reviewed all pertinent lab results within the past 24 hours.  CBC:   Recent Labs   Lab 08/02/24  0430   WBC 7.56   RBC 3.53*   HGB 9.6*   HCT 30.7*      MCV 87   MCH 27.2   MCHC 31.3*     CMP:   Recent Labs   Lab 08/01/24  0725 08/02/24  0430    163*   CALCIUM 7.9* 7.6*   ALBUMIN 3.1*  --    PROT 8.7*  --    * 132*   K 6.1* 5.0   CO2 14* 20*   CL 94* 93*   * 98*   CREATININE 18.8* 11.8*   ALKPHOS 174*  --    ALT 15  --    AST 12  --    BILITOT 0.8  --        Assessment & Plan    ESRD  Hyperkalemia - resolved  Fluid overload - resolved  Altered mental status  HTN  DM type 2  Anemia in CKD    Continue present Rx  HD in am  We'll follow for dialysis      Trac T  Sonia  8/2/2024

## 2024-08-02 NOTE — HPI
"From H&P: " This is a 59-year-old male with a past medical history of ESRD on HD, AFib/flutter (on Eliquis), type 2 diabetes, hyperlipidemia, hypertension, CVA, who presents with altered mental status.       Patient presents for evaluation of altered mental status in the setting of missing dialysis for 2 weeks.  Per the patient's family, he has been bedridden, more confused and developed fecal incontinence. On arrival to the ER, the patient was covered in stool.  Patient is unable to contribute to the history. "    Palliative medicine consulted for continuity of care, goals of care discussion, and advance care planning; for details of visit, see advance care planning section of plan.     "

## 2024-08-02 NOTE — NURSING
[facility]  Nurses Note -- 4 Eyes      8/1/2024      Skin assessed on: Q Shift      [] No Pressure Injuries Present    [x]Prevention Measures Documented    [x] Yes LDA  for Pressure Injury Previously documented     [] Yes New Pressure Injury Discovered   [] LDA for New Pressure Injury Added      Attending RN:  Carlie Senior LPN     Second RN:  Rubi Gomes RN

## 2024-08-02 NOTE — ASSESSMENT & PLAN NOTE
- pt with history of lack of compliance with HD, with questionable desire for participation in continued HD noted in previous admission  - per EMR and MDT discussions, family shared pt recently decided he no longer wanted to undergo HD and was the reason for the 2 week lapse as OP (see 8/1 case management note)   - Attempted to call daughter, Rima, to discuss this and what would be next steps if pt did stop HD as well as discuss IP GOC as pt has been receiving HD since admission   - Would recommend in person Dorian Hurt  coordination to involve pt in this discussion as well   - if pt and family were to decide to stop HD, both would have to understand that death would be imminent and transition to DNR; would highly recommend transition to hospice at time of daichrge as well and discussion of what this entails with CM/SW and potential hospice provider

## 2024-08-02 NOTE — PROGRESS NOTES
Pharmacokinetic Assessment Follow Up: IV Vancomycin    Vancomycin serum concentration assessment(s):    The random level was drawn correctly and can be used to guide therapy at this time. The measurement is above the desired definitive target range of 10 to 20 mcg/mL.    Vancomycin Regimen Plan:    Vancomycin 1000mg to be administered post HD if patient has HD    Re-dose when the random level is less than 20 mcg/mL, next level to be drawn at 0400 on 8/3/24    Drug levels (last 3 results):  Recent Labs   Lab Result Units 08/02/24  0430   Vancomycin, Random ug/mL 6.8       Pharmacy will continue to follow and monitor vancomycin.    Please contact pharmacy at extension [phone] for questions regarding this assessment.    Thank you for the consult,   Cristopher Gomez       Patient brief summary:  Mahesh Cespedes is a 59 y.o. male initiated on antimicrobial therapy with IV Vancomycin for treatment of skin & soft tissue infection    The patient's current regimen is random pulse dosing    Drug Allergies:   Review of patient's allergies indicates:  No Known Allergies    Actual Body Weight:   66.3 kg    Renal Function:   Estimated Creatinine Clearance: 3.9 mL/min (A) (based on SCr of 18.8 mg/dL (H)).,     Dialysis Method (if applicable):  intermittent HD    CBC (last 72 hours):  Recent Labs   Lab Result Units 08/01/24  0102 08/02/24  0430   WBC K/uL 8.31 7.56   Hemoglobin g/dL 10.2* 9.6*   Hematocrit % 31.4* 30.7*   Platelets K/uL 149* 150   Gran % % 85.1* 85.7*   Lymph % % 8.3* 6.1*   Mono % % 4.9 6.3   Eosinophil % % 1.1 1.1   Basophil % % 0.2 0.3   Differential Method  Automated Automated       Metabolic Panel (last 72 hours):  Recent Labs   Lab Result Units 08/01/24  0224 08/01/24  0443 08/01/24  0725   Sodium mmol/L 135*  --  135*   Potassium mmol/L 7.4*  --  6.1*   Chloride mmol/L 94*  --  94*   CO2 mmol/L 13*  --  14*   Glucose mg/dL 109  --  106   Glucose, UA   --  2+*  --    BUN mg/dL 182*  --  182*   Creatinine  mg/dL 19.3*  --  18.8*   Albumin g/dL 3.2*  --  3.1*   Total Bilirubin mg/dL 0.7  --  0.8   Alkaline Phosphatase U/L 192*  --  174*   AST U/L 12  --  12   ALT U/L 15  --  15   Magnesium mg/dL 3.4*  --   --        Vancomycin Administrations:  vancomycin given in the last 96 hours                     vancomycin (VANCOCIN) 1,750 mg in D5W 500 mL IVPB (mg) 1,750 mg New Bag 08/01/24 0405                    Microbiologic Results:  Microbiology Results (last 7 days)       Procedure Component Value Units Date/Time    Blood culture x two cultures. Draw prior to antibiotics. [[id number]] Collected: 08/01/24 0104    Order Status: Completed Specimen: Blood from Peripheral, Forearm, Right Updated: 08/02/24 0303     Blood Culture, Routine No Growth to date      No Growth to date    Narrative:      Aerobic and anaerobic    Blood culture x two cultures. Draw prior to antibiotics. [[id number]] Collected: 08/01/24 0103    Order Status: Completed Specimen: Blood from Peripheral, Forearm, Left Updated: 08/02/24 0303     Blood Culture, Routine No Growth to date      No Growth to date    Narrative:      Aerobic and anaerobic    Stool culture [[id number]]     Order Status: Canceled Specimen: Stool     Clostridium difficile EIA [[id number]]     Order Status: Canceled Specimen: Stool

## 2024-08-02 NOTE — ASSESSMENT & PLAN NOTE
Hyperkalemia is likely due to ESRD.The patients most recent potassium results are listed below.  Recent Labs     08/01/24  0224 08/01/24  0725 08/02/24  0430   K 7.4* 6.1* 5.0       Plan  - Monitor for arrhythmias with EKG and/or continuous telemetry.   - Treat the hyperkalemia with Potassium Binders, Calcium gluconate, IV insulin and dextrose, Nebulized albuterol sulfate, Furosemide, and Sodium Bicarbonate.   - Monitor potassium: Daily  - Nephrology consult for dialysis   with medical treatment and HD hyperkalemia is improving,nephrology doing HD,  On empiric IV Vancomycin,consulted PT,OT,  No sign of peaked T waves,transferring to floor.  Consulted palliative.

## 2024-08-02 NOTE — CONSULTS
[facility] - [place] - Med Surg  Adult Nutrition  Consult Note    SUMMARY     Recommendations  Recommendation:  1. Continue Renal Diet   2. Add diabetic diet restriction 2000 kcals   3. Add Novasource Renal TID   4. Encourage PO intake as tolerated   5. Monitor weight and labs    Goals: Pt will consume 50-75% of meals by RD follow up    Nutrition Goal Status: new  Communication of RD Recs:  (POC)    Assessment and Plan  Nutrition Problem  Inadequate energy intake     Related to (etiology):   ESRD     Signs and Symptoms (as evidenced by):   Pt on HD   Noted 59 lb weight loss in 6 months      Interventions:  Collaboration with other providers   Commercial beverage     Nutrition Diagnosis Status:   New    Malnutrition Assessment  Noted 59 lb weight loss in 6 months   Unable to conduct NFPE at this time     Reason for Assessment  Reason For Assessment: consult (Dialysis pt)  Diagnosis: other (see comments) (hyperkalemia)  Relevant Medical History: HTN, ESRD on HD  Interdisciplinary Rounds: did not attend  General Information Comments: Pt is currently on a renal diet. ESRD on HD. Oliver-14/skin intact. Noted 25% meal  intake. Pt presented for AMS in the setting of missing dialysis for 2 weeks. Per chart, pt is bedridden, more confused and developed fecal incontinence. Upon arrival to ER, pt was covered in stool. Pt unable to contribute to history. Noted 59 lb weight loss in 6 months. Unable to conduct NFPE at this time.  Nutrition Discharge Planning: d/c on renal/diabetic diet with ONS    Nutrition Risk Screen  Nutrition Risk Screen: large or nonhealing wound, burn or pressure injury    Nutrition/Diet History  Patient Reported Diet/Restrictions/Preferences: diabetic diet, renal  Food Preferences: KELLEY  Factors Affecting Nutritional Intake: None identified at this time    Nutrition Related Social Determinants of Health: SDOH: Unable to assess at this time.     Anthropometrics  Temp: 98.6 °F (37 °C)  Height Method: Stated  Height:  "5' 7" (170.2 cm)  Height (inches): 67 in  Weight Method: Bed Scale  Weight: 66.3 kg (146 lb 2.6 oz)  Weight (lb): 146.17 lb  Ideal Body Weight (IBW), Male: 148 lb  % Ideal Body Weight, Male (lb): 98.76 %  BMI (Calculated): 22.9  BMI Grade: 18.5-24.9 - normal  Usual Body Weight (UBW), k.3 kg (24)  % Usual Body Weight: 71.21  % Weight Change From Usual Weight: -28.94 %     Lab/Procedures/Meds  Pertinent Labs Reviewed: reviewed  Pertinent Labs Comments: Na 132, CL 93, CO2 20, BUN 98, Creatinine 11.8, Ca 7.6, Glucose 163, GFR 4  Pertinent Medications Reviewed: reviewed  Pertinent Medications Comments: atorvastatin, pantoprazole, tamsulosin, renal vitamin    Estimated/Assessed Needs  Weight Used For Calorie Calculations: 66.3 kg (146 lb 2.6 oz)  Energy Calorie Requirements (kcal):  kcals (30 kcals/kg)  Energy Need Method: Kcal/kg  Protein Requirements: 93g (1.4g/kg)  Weight Used For Protein Calculations: 66.3 kg (146 lb 2.6 oz)     Estimated Fluid Requirement Method: RDA Method  RDA Method (mL):   CHO Requirement: 250g    Nutrition Prescription Ordered  Current Diet Order: Renal Diet    Evaluation of Received Nutrient/Fluid Intake  I/O: 600/100  Energy Calories Required: not meeting needs  Protein Required: not meeting needs  Fluid Required: not meeting needs  Comments: LB-24  Tolerance: tolerating  % Intake of Estimated Energy Needs: 25 - 50 %  % Meal Intake: 25 - 50 %    Nutrition Risk  Level of Risk/Frequency of Follow-up:  (2x/weekly)     Monitor and Evaluation  Food and Nutrient Intake: energy intake, food and beverage intake  Food and Nutrient Adminstration: diet order  Anthropometric Measurements: weight, weight change, body mass index  Biochemical Data, Medical Tests and Procedures: electrolyte and renal panel, gastrointestinal profile, glucose/endocrine profile, inflammatory profile, lipid profile     Nutrition Follow-Up  RD Follow-up?: Yes    "

## 2024-08-02 NOTE — PLAN OF CARE
Patient is alert to self and place. Patient was drowsy early in the shift and refused to take scheduled medication. Colombian  was used to communicate with patient. Patient states that he live with daughter who refuses to bring him to dialysis. No acute distress noted, patient free from falls or injury this shift.  Bed in low position, wheels locked, call light in reach for assistance, plan of care continued.      Problem: Hemodialysis  Goal: Safe, Effective Therapy Delivery  Outcome: Progressing  Goal: Effective Tissue Perfusion  Outcome: Progressing     Problem: Diabetes Comorbidity  Goal: Blood Glucose Level Within Targeted Range  Outcome: Progressing     Problem: Infection  Goal: Absence of Infection Signs and Symptoms  Outcome: Progressing

## 2024-08-02 NOTE — ASSESSMENT & PLAN NOTE
- ongoing since prior admission; consistent with reported debility   - hospice would be very appropriate to manage this ongoing and prevent further wounds, if aligns with GOC

## 2024-08-02 NOTE — PLAN OF CARE
Problem: Occupational Therapy  Goal: Occupational Therapy Goal  Description: Goals to be met by: 8/16/2024     Patient will increase functional independence with ADLs by performing:    UE Dressing with Modified [place].  LE Dressing with Contact Guard Assistance.  Toileting from bedside commode with Contact Guard Assistance for hygiene and clothing management.   Toilet transfer to bedside commode with Contact Guard Assistance.    Outcome: Progressing     OT initial eval completed, limited 2/2 pt with dizziness and nausea. Pt able to perform bedside functional mobility with CGA and ADLs with Mod A. Skilled acute OT to follow. Recommendations TBD with progression as able.

## 2024-08-02 NOTE — PLAN OF CARE
Recommendations  Recommendation:  1. Continue Renal Diet   2. Add diabetic diet restriction 2000 kcals   3. Add Novasource Renal TID   4. Encourage PO intake as tolerated   5. Monitor weight and labs    Goals: Pt will consume 50-75% of meals by RD follow up    Nutrition Goal Status: new  Communication of RD Recs:  (POC)

## 2024-08-02 NOTE — PT/OT/SLP PROGRESS
Physical Therapy      Patient Name:  Mahesh Cespedes   MRN:  [id number]    Patient not seen today secondary to Testing/imaging (xray/CT/MRI). Will follow-up this afternoon or tomorrow morning.
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
## 2024-08-02 NOTE — ASSESSMENT & PLAN NOTE
- unclear current mental status given language barrier; familiar with pt from prior admission and had similar difficulties with virtual  service to my recollection  - family acted as interpreters in the past, but were not present at bedside during visit   - pt would not respond to virtual , had difficulty with him engaging with me and minimal eye contact as well   - suspect mental status is still an issue, would like to see difference with family present and easier communication   - would recommend in person Chinese creole  for further assessment of mental status as well as GOC discussion

## 2024-08-02 NOTE — SUBJECTIVE & OBJECTIVE
Interval History: with medical treatment and HD hyperkalemia is improving,nephrology doing HD,  On empiric IV Vancomycin,consulted PT,OT,  No sign of peaked T waves,transferring to floor.  Consulted palliative.    Review of Systems   Constitutional:  Positive for activity change and appetite change.   Respiratory:  Negative for apnea and choking.    Gastrointestinal:  Negative for abdominal distention and abdominal pain.   Musculoskeletal:  Negative for arthralgias.   Skin:  Positive for wound.   Neurological:  Positive for weakness.   Psychiatric/Behavioral:  Positive for behavioral problems. Negative for agitation.      Objective:     Vital Signs (Most Recent):  Temp: 98.6 °F (37 °C) (08/02/24 1120)  Pulse: 74 (08/02/24 1126)  Resp: 18 (08/02/24 1120)  BP: 137/70 (08/02/24 1120)  SpO2: 100 % (08/02/24 1120) Vital Signs (24h Range):  Temp:  [97 °F (36.1 °C)-98.6 °F (37 °C)] 98.6 °F (37 °C)  Pulse:  [] 74  Resp:  [9-21] 18  SpO2:  [93 %-100 %] 100 %  BP: (112-165)/(70-95) 137/70     Weight: 66.3 kg (146 lb 2.6 oz)  Body mass index is 22.89 kg/m².    Intake/Output Summary (Last 24 hours) at 8/2/2024 1159  Last data filed at 8/2/2024 1007  Gross per 24 hour   Intake 480 ml   Output 200 ml   Net 280 ml         Physical Exam  Pulmonary:      Effort: No respiratory distress.   Abdominal:      General: There is no distension.   Musculoskeletal:      Cervical back: Normal range of motion.   Skin:     General: Skin is warm.      Comments: Sacral wounds    Neurological:      General: No focal deficit present.      Mental Status: He is alert.             Significant Labs: All pertinent labs within the past 24 hours have been reviewed.  BMP:   Recent Labs   Lab 08/01/24  0224 08/01/24  0725 08/02/24  0430      < > 163*   *   < > 132*   K 7.4*   < > 5.0   CL 94*   < > 93*   CO2 13*   < > 20*   *   < > 98*   CREATININE 19.3*   < > 11.8*   CALCIUM 8.0*   < > 7.6*   MG 3.4*  --   --     < > = values in  this interval not displayed.     CBC:   Recent Labs   Lab 08/01/24  0102 08/01/24 0227 08/02/24  0430   WBC 8.31  --  7.56   HGB 10.2*  --  9.6*   HCT 31.4* 32* 30.7*   *  --  150     CMP:   Recent Labs   Lab 08/01/24 0224 08/01/24  0725 08/02/24  0430   * 135* 132*   K 7.4* 6.1* 5.0   CL 94* 94* 93*   CO2 13* 14* 20*    106 163*   * 182* 98*   CREATININE 19.3* 18.8* 11.8*   CALCIUM 8.0* 7.9* 7.6*   PROT 9.1* 8.7*  --    ALBUMIN 3.2* 3.1*  --    BILITOT 0.7 0.8  --    ALKPHOS 192* 174*  --    AST 12 12  --    ALT 15 15  --    ANIONGAP 28* 27* 19*       Significant Imaging: I have reviewed all pertinent imaging results/findings within the past 24 hours.

## 2024-08-02 NOTE — NURSING
[facility], [facility]  Nurses Note -- 4 Eyes      8/2/2024       Skin assessed on: Q Shift      [] No Pressure Injuries Present    [x]Prevention Measures Documented    [x] Yes LDA  for Pressure Injury Previously documented     [] Yes New Pressure Injury Discovered   [] LDA for New Pressure Injury Added      Attending RN:  Amanda Gaxiola LPN     Second RN:  JAVIER Sexton

## 2024-08-02 NOTE — PROGRESS NOTES
[facility] Medicine  Progress Note    Patient Name: Mahesh Cespedes  MRN: [id number]  Patient Class: IP- Inpatient   Admission Date: 7/31/2024  Length of Stay: 1 days  Attending Physician: Suzie Ron, *  Primary Care Provider: Cruz Hernandez MD        Subjective:     Principal Problem:Hyperkalemia        HPI:  This is a 59-year-old male with a past medical history of ESRD on HD, AFib/flutter (on Eliquis), type 2 diabetes, hyperlipidemia, hypertension, CVA, who presents with altered mental status.      Patient presents for evaluation of altered mental status in the setting of missing dialysis for 2 weeks.  Per the patient's family, he has been bedridden, more confused and developed fecal incontinence. On arrival to the ER, the patient was covered in stool.  Patient is unable to contribute to the history.     In the ED, the patient was I hypertensive go toxic (SpO2:  88%), requiring 2 L O2. Labs were remarkable for hyperkalemia (7.4), elevated BUN (182) , normocytic anemia (10.2).  CT head showed no acute process.  CT chest showed loculated moderate left pleural effusion, left lower lobe atelectasis/consolidation, a large pericardial effusion measuring 3.6 cm, findings suggestive of pulmonary edema. Nephrology was consulted, with plans to dialyze the patient.  Patient was given albuterol, calcium gluconate, insulin/D10, Lasix 80 mg IV, sodium bicarb 100 mEq, Lokelma 10 g, vancomycin, Zosyn, Zofran 4 mg IV. He was admitted for further management.     Overview/Hospital Course:  patient  with a past medical history of ESRD on HD, AFib/flutter (on Eliquis), type 2 diabetes, hyperlipidemia, hypertension, CVA, who presents with acute metabolic encephaloptyhy,duo to missed HD for long time,Hyperkalemia,sacral wounds,with medical treatment and HD hyperkalemia is improving,nephrology doing HD,  On empiric IV Vancomycin,consulted PT,OT,  No sign of peaked T waves,transferring to floor.  Consulted  palliative.    Interval History: with medical treatment and HD hyperkalemia is improving,nephrology doing HD,  On empiric IV Vancomycin,consulted PT,OT,  No sign of peaked T waves,transferring to floor.  Consulted palliative.    Review of Systems   Constitutional:  Positive for activity change and appetite change.   Respiratory:  Negative for apnea and choking.    Gastrointestinal:  Negative for abdominal distention and abdominal pain.   Musculoskeletal:  Negative for arthralgias.   Skin:  Positive for wound.   Neurological:  Positive for weakness.   Psychiatric/Behavioral:  Positive for behavioral problems. Negative for agitation.      Objective:     Vital Signs (Most Recent):  Temp: 98.6 °F (37 °C) (08/02/24 1120)  Pulse: 74 (08/02/24 1126)  Resp: 18 (08/02/24 1120)  BP: 137/70 (08/02/24 1120)  SpO2: 100 % (08/02/24 1120) Vital Signs (24h Range):  Temp:  [97 °F (36.1 °C)-98.6 °F (37 °C)] 98.6 °F (37 °C)  Pulse:  [] 74  Resp:  [9-21] 18  SpO2:  [93 %-100 %] 100 %  BP: (112-165)/(70-95) 137/70     Weight: 66.3 kg (146 lb 2.6 oz)  Body mass index is 22.89 kg/m².    Intake/Output Summary (Last 24 hours) at 8/2/2024 1159  Last data filed at 8/2/2024 1007  Gross per 24 hour   Intake 480 ml   Output 200 ml   Net 280 ml         Physical Exam  Pulmonary:      Effort: No respiratory distress.   Abdominal:      General: There is no distension.   Musculoskeletal:      Cervical back: Normal range of motion.   Skin:     General: Skin is warm.      Comments: Sacral wounds    Neurological:      General: No focal deficit present.      Mental Status: He is alert.             Significant Labs: All pertinent labs within the past 24 hours have been reviewed.  BMP:   Recent Labs   Lab 08/01/24  0224 08/01/24  0725 08/02/24  0430      < > 163*   *   < > 132*   K 7.4*   < > 5.0   CL 94*   < > 93*   CO2 13*   < > 20*   *   < > 98*   CREATININE 19.3*   < > 11.8*   CALCIUM 8.0*   < > 7.6*   MG 3.4*  --   --     < >  = values in this interval not displayed.     CBC:   Recent Labs   Lab 08/01/24  0102 08/01/24 0227 08/02/24 0430   WBC 8.31  --  7.56   HGB 10.2*  --  9.6*   HCT 31.4* 32* 30.7*   *  --  150     CMP:   Recent Labs   Lab 08/01/24 0224 08/01/24  0725 08/02/24  0430   * 135* 132*   K 7.4* 6.1* 5.0   CL 94* 94* 93*   CO2 13* 14* 20*    106 163*   * 182* 98*   CREATININE 19.3* 18.8* 11.8*   CALCIUM 8.0* 7.9* 7.6*   PROT 9.1* 8.7*  --    ALBUMIN 3.2* 3.1*  --    BILITOT 0.7 0.8  --    ALKPHOS 192* 174*  --    AST 12 12  --    ALT 15 15  --    ANIONGAP 28* 27* 19*       Significant Imaging: I have reviewed all pertinent imaging results/findings within the past 24 hours.    Assessment/Plan:      * Hyperkalemia  Hyperkalemia is likely due to ESRD.The patients most recent potassium results are listed below.  Recent Labs     08/01/24 0224 08/01/24 0725 08/02/24 0430   K 7.4* 6.1* 5.0       Plan  - Monitor for arrhythmias with EKG and/or continuous telemetry.   - Treat the hyperkalemia with Potassium Binders, Calcium gluconate, IV insulin and dextrose, Nebulized albuterol sulfate, Furosemide, and Sodium Bicarbonate.   - Monitor potassium: Daily  - Nephrology consult for dialysis   with medical treatment and HD hyperkalemia is improving,nephrology doing HD,  On empiric IV Vancomycin,consulted PT,OT,  No sign of peaked T waves,transferring to floor.  Consulted palliative.          Controlled type 2 diabetes mellitus with chronic kidney disease on chronic dialysis, without long-term current use of insulin  Lab Results   Component Value Date    HGBA1C 5.5 03/15/2022     Daily BMPs.     Sacral decubitus ulcer  Noted. Frequent turns. Wound care       Paroxysmal atrial fibrillation  Continue Eliquis, Metoprolol.   [id number] Score: 1.     History of CVA (cerebrovascular accident)  Continue home medications       Anemia in ESRD (end-stage renal disease)  Anemia is likely due to chronic disease due to  Chronic Kidney Disease. Most recent hemoglobin and hematocrit are listed below.  Recent Labs     08/01/24  0102 08/01/24  0227   HGB 10.2*  --    HCT 31.4* 32*     Monitor     Essential hypertension  Chronic, controlled. Latest blood pressure and vitals reviewed-     Temp:  [97.8 °F (36.6 °C)-100 °F (37.8 °C)]   Pulse:  [85-94]   Resp:  [12-24]   BP: (130-183)/(80-96)   SpO2:  [88 %-100 %] .   Home meds for hypertension were reviewed and noted below.   Hypertension Medications               amLODIPine (NORVASC) 10 MG tablet Take 10 mg by mouth once daily.    metoprolol succinate (TOPROL-XL) 50 MG 24 hr tablet Take 1 tablet (50 mg total) by mouth once daily.            While in the hospital, will manage blood pressure as follows; Continue home antihypertensive regimen    Will utilize p.r.n. blood pressure medication only if patient's blood pressure greater than 180/110 and he develops symptoms such as worsening chest pain or shortness of breath.    ESRD needing dialysis  Creatine stable for now. BMP reviewed- noted Estimated Creatinine Clearance: 3.8 mL/min (A) (based on SCr of 19.3 mg/dL (H)). according to latest data. Based on current GFR, CKD stage is end stage.  Monitor UOP and serial BMP and adjust therapy as needed. Renally dose meds. Avoid nephrotoxic medications and procedures. Nephrology consult       VTE Risk Mitigation (From admission, onward)           Ordered     apixaban tablet 2.5 mg  2 times daily         08/01/24 0441     IP VTE LOW RISK PATIENT  Once         08/01/24 0441     Place sequential compression device  Until discontinued         08/01/24 0441                    Discharge Planning   JAIME:      Code Status: Full Code   Is the patient medically ready for discharge?:     Reason for patient still in hospital (select all that apply): Patient trending condition  Discharge Plan A: Home Health                  Suzie Ron MD  Department of Hospital Medicine   [facility] - Med Surg

## 2024-08-02 NOTE — HOSPITAL COURSE
60y/o M pt with hx of ESRD on HD, AFib/flutter, T2DM, HTN, CVA, who presented with altered mental status after missing HD for 2 weeks. He was admitted for hyperkalemia with potassium of 7.4. Nephrology consulted- pt requiring HD on 7/31. Patient was also hypoxic with O2 sats of 88%, requiring NC 2 L O2.  Labs remarkable for no leukocytosis. CT head showed no acute process.  CT chest showed loculated moderate left pleural effusion, left lower lobe atelectasis/consolidation, a large pericardial effusion measuring 3.6 cm, findings suggestive of pulmonary edema. Pt started on antibiotics. Echo with small to moderate posterior effusion. No indication of cardiac tamponade. Pulmunology consulted- possible that fluid collection represent some degree of chronic fluid from chronic volume overload related to relative non adherence to hemodialysis, rather than nidus of infection. ID consulted- recommends sampling pleural fluid. IR consulted for thoracentisis, however family (Daughter) would like to defer procedure as she feels risks outweigh benefit. BCx 8/1 growing CONS. Repeat BCx NGTD. continue empiric vanc pending repeat bcx. PT/OT consulted and recommends moderate intensity therapy.  Patient and family agreeable.  /case management consulted to assist with placement. Patient expressed suicidal ideation on 08/07/2024. Psych consulted-started on sertraline. Plan for SNF but after discussion with daughter on 08/08/2024, they have decided to take him home with home health.  Extensively discussed with the patient and his daughter that the patient would benefit from acute skilled OT and PT services to address his deficits and reach maximum level of function.  They both verbalized understanding but currently declined.  Would like to go home with home health    Admits back pain and fatigued, at baseline.  Pt denies any fever, headaches, chest pain, shortness of breath, palpitations, abdominal pain, nausea, vomiting, or  any new weaknesses. Feels ready to go home. Patient's exam on discharge was as follow: Patient is alert and oriented, appears in no acute distress, heart with regular rate and rhythm, lungs with diminished breath sounds on the the left side, currently on room air, abdomen soft and nondistended and nontender, and Bilateral lower extremities without any edema or calf tenderness.  Patient with generalized weakness.  Patient with stage IV pressure injury on the sacrum.  Does not appear acutely infected at this time.    Patient and family (daughter-Rima) was counseled regarding any abnormal labs, differential diagnosis, treatment options, risk-benefit, lifestyle changes, prognosis, current condition, and medications. Patient was interactive and attentive.  Patient and family (daughter-Rima) questions were answered in a respectful and timely manner. Patient was instructed to follow-up with PCP within 1 week and to continue taking medications as prescribed.  Instructed to also follow up with dialysis as scheduled. Also, extensively discussed the risks, benefits, and side effects of patient's medications. Discussed with Patient and family (daughter-Rima) about any medication changes. Patient and family (daughter-Rima) verbalized understanding and agrees to treatment plan.  Patient is stable for discharge.  Patient and family (daughter-Rima) has no other questions or concerns at this time.  ED precautions discussed with the patient.    Vital signs are stable. Ambulating without any difficulty. Tolerating p.o. intake without any nausea or vomiting. Afebrile for over 24 hours. Patient is in stable condition and has no questions or concerns. Patient will be discharge to home with home health once transportation secured . Prescriptions sent to pharmacy.  CM/SW to assist with discharge planning.     Vitals:    08/08/24 0731 08/08/24 0740 08/08/24 1124 08/08/24 1136   BP:  (!) 153/72 139/72    BP Location:       Patient  Position:       Pulse: 74 74 75 75   Resp:  17 17    Temp:  98.7 °F (37.1 °C) 97.9 °F (36.6 °C)    TempSrc:       SpO2:  95% 95%    Weight:       Height:

## 2024-08-03 LAB
ANION GAP SERPL CALC-SCNC: 18 MMOL/L (ref 8–16)
BUN SERPL-MCNC: 114 MG/DL (ref 8–23)
CALCIUM SERPL-MCNC: 7.8 MG/DL (ref 8.7–10.5)
CHLORIDE SERPL-SCNC: 94 MMOL/L (ref 95–110)
CO2 SERPL-SCNC: 21 MMOL/L (ref 23–29)
CREAT SERPL-MCNC: 12.9 MG/DL (ref 0.5–1.4)
EST. GFR  (NO RACE VARIABLE): 4 ML/MIN/1.73 M^2
GLUCOSE SERPL-MCNC: 75 MG/DL (ref 70–110)
POCT GLUCOSE: 138 MG/DL (ref 70–110)
POTASSIUM SERPL-SCNC: 5.5 MMOL/L (ref 3.5–5.1)
SODIUM SERPL-SCNC: 133 MMOL/L (ref 136–145)
VANCOMYCIN SERPL-MCNC: 6.5 UG/ML

## 2024-08-03 PROCEDURE — 80100016 HC MAINTENANCE HEMODIALYSIS

## 2024-08-03 PROCEDURE — 25000003 PHARM REV CODE 250: Performed by: STUDENT IN AN ORGANIZED HEALTH CARE EDUCATION/TRAINING PROGRAM

## 2024-08-03 PROCEDURE — 63600175 PHARM REV CODE 636 W HCPCS: Performed by: STUDENT IN AN ORGANIZED HEALTH CARE EDUCATION/TRAINING PROGRAM

## 2024-08-03 PROCEDURE — 36415 COLL VENOUS BLD VENIPUNCTURE: CPT | Performed by: INTERNAL MEDICINE

## 2024-08-03 PROCEDURE — 86706 HEP B SURFACE ANTIBODY: CPT | Performed by: INTERNAL MEDICINE

## 2024-08-03 PROCEDURE — 63600175 PHARM REV CODE 636 W HCPCS: Performed by: HOSPITALIST

## 2024-08-03 PROCEDURE — 94761 N-INVAS EAR/PLS OXIMETRY MLT: CPT

## 2024-08-03 PROCEDURE — 36415 COLL VENOUS BLD VENIPUNCTURE: CPT | Performed by: HOSPITALIST

## 2024-08-03 PROCEDURE — 80048 BASIC METABOLIC PNL TOTAL CA: CPT | Performed by: HOSPITALIST

## 2024-08-03 PROCEDURE — 80202 ASSAY OF VANCOMYCIN: CPT | Performed by: HOSPITALIST

## 2024-08-03 PROCEDURE — 87340 HEPATITIS B SURFACE AG IA: CPT | Performed by: INTERNAL MEDICINE

## 2024-08-03 PROCEDURE — 21400001 HC TELEMETRY ROOM

## 2024-08-03 PROCEDURE — 25000003 PHARM REV CODE 250: Performed by: HOSPITALIST

## 2024-08-03 RX ADMIN — PANTOPRAZOLE SODIUM 40 MG: 40 TABLET, DELAYED RELEASE ORAL at 03:08

## 2024-08-03 RX ADMIN — MUPIROCIN: 20 OINTMENT TOPICAL at 03:08

## 2024-08-03 RX ADMIN — AMIODARONE HYDROCHLORIDE 200 MG: 200 TABLET ORAL at 03:08

## 2024-08-03 RX ADMIN — Medication 1 CAPSULE: at 03:08

## 2024-08-03 RX ADMIN — AMLODIPINE BESYLATE 10 MG: 5 TABLET ORAL at 01:08

## 2024-08-03 RX ADMIN — APIXABAN 2.5 MG: 2.5 TABLET, FILM COATED ORAL at 09:08

## 2024-08-03 RX ADMIN — ALLOPURINOL 100 MG: 100 TABLET ORAL at 03:08

## 2024-08-03 RX ADMIN — TAMSULOSIN HYDROCHLORIDE 0.4 MG: 0.4 CAPSULE ORAL at 03:08

## 2024-08-03 RX ADMIN — VANCOMYCIN HYDROCHLORIDE 1000 MG: 1 INJECTION, POWDER, LYOPHILIZED, FOR SOLUTION INTRAVENOUS at 03:08

## 2024-08-03 RX ADMIN — MUPIROCIN: 20 OINTMENT TOPICAL at 09:08

## 2024-08-03 RX ADMIN — ONDANSETRON 4 MG: 2 INJECTION INTRAMUSCULAR; INTRAVENOUS at 12:08

## 2024-08-03 RX ADMIN — SEVELAMER CARBONATE 1600 MG: 800 TABLET, FILM COATED ORAL at 04:08

## 2024-08-03 RX ADMIN — LABETALOL HYDROCHLORIDE 300 MG: 100 TABLET, FILM COATED ORAL at 01:08

## 2024-08-03 RX ADMIN — ATORVASTATIN CALCIUM 80 MG: 40 TABLET, FILM COATED ORAL at 03:08

## 2024-08-03 RX ADMIN — PROCHLORPERAZINE EDISYLATE 5 MG: 5 INJECTION INTRAMUSCULAR; INTRAVENOUS at 08:08

## 2024-08-03 NOTE — PROGRESS NOTES
Pharmacokinetic Assessment Follow Up: IV Vancomycin    Vancomycin serum concentration assessment(s):    The random level was drawn correctly and can be used to guide therapy at this time. The measurement is below the desired definitive target range of 10 to 20 mcg/mL.    Vancomycin Regimen Plan:    Vancomycin 1000mg to be administered post HD if patient has HD.    Re-dose when the random level is less than 20 mcg/mL, next level to be drawn at 0400 on 8/4/24    Drug levels (last 3 results):  Recent Labs   Lab Result Units 08/02/24 0430 08/03/24  0423   Vancomycin, Random ug/mL 6.8 6.5       Pharmacy will continue to follow and monitor vancomycin.    Please contact pharmacy at extension [phone] for questions regarding this assessment.    Thank you for the consult,   Cristopher Gomez       Patient brief summary:  Mahesh Cespedes is a 59 y.o. male initiated on antimicrobial therapy with IV Vancomycin for treatment of skin & soft tissue infection    The patient's current regimen is random pulse dosing    Drug Allergies:   Review of patient's allergies indicates:  No Known Allergies    Actual Body Weight:   66.3 kg    Renal Function:   Estimated Creatinine Clearance: 5.6 mL/min (A) (based on SCr of 12.9 mg/dL (H)).,     Dialysis Method (if applicable):  intermittent HD    CBC (last 72 hours):  Recent Labs   Lab Result Units 08/01/24  0102 08/02/24  0430   WBC K/uL 8.31 7.56   Hemoglobin g/dL 10.2* 9.6*   Hematocrit % 31.4* 30.7*   Platelets K/uL 149* 150   Gran % % 85.1* 85.7*   Lymph % % 8.3* 6.1*   Mono % % 4.9 6.3   Eosinophil % % 1.1 1.1   Basophil % % 0.2 0.3   Differential Method  Automated Automated       Metabolic Panel (last 72 hours):  Recent Labs   Lab Result Units 08/01/24  0224 08/01/24  0443 08/01/24  0725 08/02/24  0430 08/03/24  0423   Sodium mmol/L 135*  --  135* 132* 133*   Potassium mmol/L 7.4*  --  6.1* 5.0 5.5*   Chloride mmol/L 94*  --  94* 93* 94*   CO2 mmol/L 13*  --  14* 20* 21*   Glucose mg/dL  109  --  106 163* 75   Glucose, UA   --  2+*  --   --   --    BUN mg/dL 182*  --  182* 98* 114*   Creatinine mg/dL 19.3*  --  18.8* 11.8* 12.9*   Albumin g/dL 3.2*  --  3.1*  --   --    Total Bilirubin mg/dL 0.7  --  0.8  --   --    Alkaline Phosphatase U/L 192*  --  174*  --   --    AST U/L 12  --  12  --   --    ALT U/L 15  --  15  --   --    Magnesium mg/dL 3.4*  --   --   --   --        Vancomycin Administrations:  vancomycin given in the last 96 hours                     vancomycin (VANCOCIN) 500 mg in D5W 100 mL IVPB (MB+) (mg) 500 mg New Bag 08/02/24 2052    vancomycin (VANCOCIN) 1,750 mg in D5W 500 mL IVPB (mg) 1,750 mg New Bag 08/01/24 0405                    Microbiologic Results:  Microbiology Results (last 7 days)       Procedure Component Value Units Date/Time    Blood culture x two cultures. Draw prior to antibiotics. [[id number]] Collected: 08/01/24 0104    Order Status: Completed Specimen: Blood from Peripheral, Forearm, Right Updated: 08/03/24 0303     Blood Culture, Routine No Growth to date      No Growth to date      No Growth to date    Narrative:      Aerobic and anaerobic    Blood culture x two cultures. Draw prior to antibiotics. [[id number]] Collected: 08/01/24 0103    Order Status: Completed Specimen: Blood from Peripheral, Forearm, Left Updated: 08/03/24 0303     Blood Culture, Routine No Growth to date      No Growth to date      No Growth to date    Narrative:      Aerobic and anaerobic    Stool culture [[id number]]     Order Status: Canceled Specimen: Stool     Clostridium difficile EIA [[id number]]     Order Status: Canceled Specimen: Stool

## 2024-08-03 NOTE — ASSESSMENT & PLAN NOTE
Hyperkalemia is likely due to ESRD.The patients most recent potassium results are listed below.  Recent Labs     08/01/24  0725 08/02/24  0430 08/03/24  0423   K 6.1* 5.0 5.5*       Plan  - Monitor for arrhythmias with EKG and/or continuous telemetry.   - Treat the hyperkalemia with Potassium Binders, Calcium gluconate, IV insulin and dextrose, Nebulized albuterol sulfate, Furosemide, and Sodium Bicarbonate.   - Monitor potassium: Daily  - Nephrology consult for dialysis   with medical treatment and HD hyperkalemia is improving,nephrology doing HD,  On empiric IV Vancomycin,consulted PT,OT,  No sign of peaked T waves,transferred to floor.  Consulted palliative.  Resolved with HD

## 2024-08-03 NOTE — PROGRESS NOTES
[facility] Medicine  Progress Note    Patient Name: Mahesh Cespedes  MRN: [id number]  Patient Class: IP- Inpatient   Admission Date: 7/31/2024  Length of Stay: 2 days  Attending Physician: Suzie Ron, *  Primary Care Provider: Cruz Hernandez MD        Subjective:     Principal Problem:Hyperkalemia        HPI:  This is a 59-year-old male with a past medical history of ESRD on HD, AFib/flutter (on Eliquis), type 2 diabetes, hyperlipidemia, hypertension, CVA, who presents with altered mental status.      Patient presents for evaluation of altered mental status in the setting of missing dialysis for 2 weeks.  Per the patient's family, he has been bedridden, more confused and developed fecal incontinence. On arrival to the ER, the patient was covered in stool.  Patient is unable to contribute to the history.     In the ED, the patient was I hypertensive go toxic (SpO2:  88%), requiring 2 L O2. Labs were remarkable for hyperkalemia (7.4), elevated BUN (182) , normocytic anemia (10.2).  CT head showed no acute process.  CT chest showed loculated moderate left pleural effusion, left lower lobe atelectasis/consolidation, a large pericardial effusion measuring 3.6 cm, findings suggestive of pulmonary edema. Nephrology was consulted, with plans to dialyze the patient.  Patient was given albuterol, calcium gluconate, insulin/D10, Lasix 80 mg IV, sodium bicarb 100 mEq, Lokelma 10 g, vancomycin, Zosyn, Zofran 4 mg IV. He was admitted for further management.     Overview/Hospital Course:  patient  with a past medical history of ESRD on HD, AFib/flutter (on Eliquis), type 2 diabetes, hyperlipidemia, hypertension, CVA, who presents with acute metabolic encephaloptyhy,duo to missed HD for long time,Hyperkalemia,sacral wounds,with medical treatment and HD hyperkalemia is improving,nephrology doing HD,  On empiric IV Vancomycin,consulted PT,OT,  No sign of peaked T waves,transfered to floor.  Consulted  palliative.  Sacral wound care by wound care.  Will have HD today.      Interval History: with medical treatment and HD hyperkalemia is improving,nephrology doing HD,  On empiric IV Vancomycin,consulted PT,OT,  No sign of peaked T waves,transferred to floor.  Consulted palliative.  Sacral wound care by wound care.  Will have HD today.    Review of Systems   Constitutional:  Positive for activity change and appetite change.   Respiratory:  Negative for apnea and choking.    Gastrointestinal:  Negative for abdominal distention and abdominal pain.   Musculoskeletal:  Negative for arthralgias.   Skin:  Positive for wound.   Neurological:  Positive for weakness.   Psychiatric/Behavioral:  Positive for behavioral problems. Negative for agitation.      Objective:     Vital Signs (Most Recent):  Temp: 97.8 °F (36.6 °C) (08/03/24 0722)  Pulse: 68 (08/03/24 0723)  Resp: 17 (08/03/24 0722)  BP: (!) 148/72 (08/03/24 0722)  SpO2: (!) 93 % (08/03/24 0722) Vital Signs (24h Range):  Temp:  [97.7 °F (36.5 °C)-98.6 °F (37 °C)] 97.8 °F (36.6 °C)  Pulse:  [68-76] 68  Resp:  [17-18] 17  SpO2:  [90 %-100 %] 93 %  BP: (109-151)/(61-76) 148/72     Weight: 66.3 kg (146 lb 2.6 oz)  Body mass index is 22.89 kg/m².    Intake/Output Summary (Last 24 hours) at 8/3/2024 0802  Last data filed at 8/3/2024 0543  Gross per 24 hour   Intake 460 ml   Output 125 ml   Net 335 ml         Physical Exam  Pulmonary:      Effort: No respiratory distress.   Abdominal:      General: There is no distension.   Musculoskeletal:      Cervical back: Normal range of motion.   Skin:     General: Skin is warm.      Comments: Sacral wounds    Neurological:      General: No focal deficit present.      Mental Status: He is alert.             Significant Labs: All pertinent labs within the past 24 hours have been reviewed.  BMP:   Recent Labs   Lab 08/03/24  0423   GLU 75   *   K 5.5*   CL 94*   CO2 21*   *   CREATININE 12.9*   CALCIUM 7.8*     CBC:   Recent  Labs   Lab 08/02/24  0430   WBC 7.56   HGB 9.6*   HCT 30.7*        CMP:   Recent Labs   Lab 08/02/24  0430 08/03/24  0423   * 133*   K 5.0 5.5*   CL 93* 94*   CO2 20* 21*   * 75   BUN 98* 114*   CREATININE 11.8* 12.9*   CALCIUM 7.6* 7.8*   ANIONGAP 19* 18*       Significant Imaging: I have reviewed all pertinent imaging results/findings within the past 24 hours.    Assessment/Plan:      * Hyperkalemia  Hyperkalemia is likely due to ESRD.The patients most recent potassium results are listed below.  Recent Labs     08/01/24  0725 08/02/24 0430 08/03/24  0423   K 6.1* 5.0 5.5*       Plan  - Monitor for arrhythmias with EKG and/or continuous telemetry.   - Treat the hyperkalemia with Potassium Binders, Calcium gluconate, IV insulin and dextrose, Nebulized albuterol sulfate, Furosemide, and Sodium Bicarbonate.   - Monitor potassium: Daily  - Nephrology consult for dialysis   with medical treatment and HD hyperkalemia is improving,nephrology doing HD,  On empiric IV Vancomycin,consulted PT,OT,  No sign of peaked T waves,transferred to floor.  Consulted palliative.  Resolved with HD          Controlled type 2 diabetes mellitus with chronic kidney disease on chronic dialysis, without long-term current use of insulin  Lab Results   Component Value Date    HGBA1C 5.5 03/15/2022     Daily BMPs.     Pericardial effusion  Echo. Is stable       Sacral decubitus ulcer  Noted. Frequent turns. Wound care       Paroxysmal atrial fibrillation  Continue Eliquis, Metoprolol.   [id number] Score: 1.     History of CVA (cerebrovascular accident)  Continue home medications       Anemia in ESRD (end-stage renal disease)  Anemia is likely due to chronic disease due to Chronic Kidney Disease. Most recent hemoglobin and hematocrit are listed below.  Recent Labs     08/01/24  0102 08/01/24 0227   HGB 10.2*  --    HCT 31.4* 32*     Monitor     Essential hypertension  Chronic, controlled. Latest blood pressure and vitals  reviewed-     Temp:  [97.8 °F (36.6 °C)-100 °F (37.8 °C)]   Pulse:  [85-94]   Resp:  [12-24]   BP: (130-183)/(80-96)   SpO2:  [88 %-100 %] .   Home meds for hypertension were reviewed and noted below.   Hypertension Medications               amLODIPine (NORVASC) 10 MG tablet Take 10 mg by mouth once daily.    metoprolol succinate (TOPROL-XL) 50 MG 24 hr tablet Take 1 tablet (50 mg total) by mouth once daily.            While in the hospital, will manage blood pressure as follows; Continue home antihypertensive regimen    Will utilize p.r.n. blood pressure medication only if patient's blood pressure greater than 180/110 and he develops symptoms such as worsening chest pain or shortness of breath.    ESRD needing dialysis  Creatine stable for now. BMP reviewed- noted Estimated Creatinine Clearance: 3.8 mL/min (A) (based on SCr of 19.3 mg/dL (H)). according to latest data. Based on current GFR, CKD stage is end stage.  Monitor UOP and serial BMP and adjust therapy as needed. Renally dose meds. Avoid nephrotoxic medications and procedures. Nephrology consult       VTE Risk Mitigation (From admission, onward)           Ordered     apixaban tablet 2.5 mg  2 times daily         08/01/24 0441     IP VTE LOW RISK PATIENT  Once         08/01/24 0441     Place sequential compression device  Until discontinued         08/01/24 0441                    Discharge Planning   JAIME: 8/5/2024     Code Status: Full Code   Is the patient medically ready for discharge?:     Reason for patient still in hospital (select all that apply): Patient trending condition  Discharge Plan A: Home Health                  Suzie Ron MD  Department of Hospital Medicine   [facility] - Med Surg

## 2024-08-03 NOTE — PLAN OF CARE
Problem: Adult Inpatient Plan of Care  Goal: Plan of Care Review  Outcome: Progressing  Goal: Patient-Specific Goal (Individualized)  Outcome: Progressing     Problem: Hemodialysis  Goal: Safe, Effective Therapy Delivery  Outcome: Progressing     Problem: Diabetes Comorbidity  Goal: Blood Glucose Level Within Targeted Range  Outcome: Progressing     Problem: Infection  Goal: Absence of Infection Signs and Symptoms  Outcome: Progressing     Problem: Wound  Goal: Optimal Coping  Outcome: Progressing

## 2024-08-03 NOTE — NURSING
Received report from Lizette.   Used  to addressed concerns.     [facility]  Nurses Note -- 4 Eyes      8/3/2024       Skin assessed on: Q Shift      [] No Pressure Injuries Present    []Prevention Measures Documented    [x] Yes LDA  for Pressure Injury Previously documented     [] Yes New Pressure Injury Discovered   [] LDA for New Pressure Injury Added      Attending RN:  Massiel Hernandez RN     Second: JAVIER Bauer

## 2024-08-03 NOTE — PLAN OF CARE
Problem: Adult Inpatient Plan of Care  Goal: Plan of Care Review  Outcome: Progressing     Problem: Hemodialysis  Goal: Absence of Infection Signs and Symptoms  Outcome: Progressing     Problem: Diabetes Comorbidity  Goal: Blood Glucose Level Within Targeted Range  Outcome: Progressing     Problem: Wound  Goal: Optimal Coping  Outcome: Progressing  Goal: Optimal Pain Control and Function  Outcome: Progressing  Goal: Skin Health and Integrity  Outcome: Progressing  Goal: Optimal Wound Healing  Outcome: Progressing     Problem: Fall Injury Risk  Goal: Absence of Fall and Fall-Related Injury  Outcome: Progressing     Problem: Skin Injury Risk Increased  Goal: Skin Health and Integrity  Outcome: Progressing     Problem: Coping Ineffective  Goal: Effective Coping  Outcome: Progressing

## 2024-08-03 NOTE — SUBJECTIVE & OBJECTIVE
Interval History: with medical treatment and HD hyperkalemia is improving,nephrology doing HD,  On empiric IV Vancomycin,consulted PT,OT,  No sign of peaked T waves,transferred to floor.  Consulted palliative.  Sacral wound care by wound care.  Will have HD today.    Review of Systems   Constitutional:  Positive for activity change and appetite change.   Respiratory:  Negative for apnea and choking.    Gastrointestinal:  Negative for abdominal distention and abdominal pain.   Musculoskeletal:  Negative for arthralgias.   Skin:  Positive for wound.   Neurological:  Positive for weakness.   Psychiatric/Behavioral:  Positive for behavioral problems. Negative for agitation.      Objective:     Vital Signs (Most Recent):  Temp: 97.8 °F (36.6 °C) (08/03/24 0722)  Pulse: 68 (08/03/24 0723)  Resp: 17 (08/03/24 0722)  BP: (!) 148/72 (08/03/24 0722)  SpO2: (!) 93 % (08/03/24 0722) Vital Signs (24h Range):  Temp:  [97.7 °F (36.5 °C)-98.6 °F (37 °C)] 97.8 °F (36.6 °C)  Pulse:  [68-76] 68  Resp:  [17-18] 17  SpO2:  [90 %-100 %] 93 %  BP: (109-151)/(61-76) 148/72     Weight: 66.3 kg (146 lb 2.6 oz)  Body mass index is 22.89 kg/m².    Intake/Output Summary (Last 24 hours) at 8/3/2024 0802  Last data filed at 8/3/2024 0543  Gross per 24 hour   Intake 460 ml   Output 125 ml   Net 335 ml         Physical Exam  Pulmonary:      Effort: No respiratory distress.   Abdominal:      General: There is no distension.   Musculoskeletal:      Cervical back: Normal range of motion.   Skin:     General: Skin is warm.      Comments: Sacral wounds    Neurological:      General: No focal deficit present.      Mental Status: He is alert.             Significant Labs: All pertinent labs within the past 24 hours have been reviewed.  BMP:   Recent Labs   Lab 08/03/24  0423   GLU 75   *   K 5.5*   CL 94*   CO2 21*   *   CREATININE 12.9*   CALCIUM 7.8*     CBC:   Recent Labs   Lab 08/02/24  0430   WBC 7.56   HGB 9.6*   HCT 30.7*         CMP:   Recent Labs   Lab 08/02/24  0430 08/03/24  0423   * 133*   K 5.0 5.5*   CL 93* 94*   CO2 20* 21*   * 75   BUN 98* 114*   CREATININE 11.8* 12.9*   CALCIUM 7.6* 7.8*   ANIONGAP 19* 18*       Significant Imaging: I have reviewed all pertinent imaging results/findings within the past 24 hours.

## 2024-08-03 NOTE — PROGRESS NOTES
08/03/24 1240   Vital Signs   Temp 98.4 °F (36.9 °C)   Temp Source Oral   Pulse 95   Heart Rate Source Apical   Resp 17   SpO2 96 %   Pulse Oximetry Type Intermittent   BP (!) 171/99   MAP (mmHg) 125   BP Location Left arm   BP Method Automatic   Patient Position Lying     Stable VS sent back to room

## 2024-08-03 NOTE — PROGRESS NOTES
"                        Renal Progress Note    Date of Admission:  7/31/2024 11:55 PM    Length of Stay: 2  Days    Subjective: n/a    Objective:    Current Facility-Administered Medications   Medication    acetaminophen tablet 650 mg    albuterol-ipratropium 2.5 mg-0.5 mg/3 mL nebulizer solution 3 mL    allopurinoL tablet 100 mg    amiodarone tablet 200 mg    amLODIPine tablet 10 mg    apixaban tablet 2.5 mg    atorvastatin tablet 80 mg    hydrALAZINE injection 20 mg    labetaloL tablet 300 mg    melatonin tablet 6 mg    mupirocin 2 % ointment    ondansetron injection 4 mg    pantoprazole EC tablet 40 mg    prochlorperazine injection Soln 5 mg    sevelamer carbonate tablet 1,600 mg    sodium chloride 0.9% flush 10 mL    tamsulosin 24 hr capsule 0.4 mg    vancomycin - pharmacy to dose    vitamin renal formula (B-complex-vitamin c-folic acid) 1 mg per capsule 1 capsule       Vitals:    08/03/24 0244 08/03/24 0432 08/03/24 0722 08/03/24 0723   BP:  (!) 151/71 (!) 148/72    BP Location:       Patient Position:  Lying Lying    Pulse: 68 69 69 68   Resp:  18 17    Temp:  97.7 °F (36.5 °C) 97.8 °F (36.6 °C)    TempSrc:  Oral Oral    SpO2:  (!) 92% (!) 93%    Weight:       Height:           I/O last 3 completed shifts:  In: 700 [P.O.:600; Other:100]  Out: 175 [Urine:175]  No intake/output data recorded.      Physical Exam:     General: NAD  Neck: supple  Heart: RRR  Lungs: unlabored breathing  Abdomen: n/a  Limbs: n/a  Neurologic: obtunded as usual      Laboratories:    No results for input(s): "WBC", "RBC", "HGB", "HCT", "PLT", "MCV", "MCH", "MCHC" in the last 24 hours.    Recent Labs   Lab 08/03/24  0423   CALCIUM 7.8*   *   K 5.5*   CO2 21*   CL 94*   *   CREATININE 12.9*       No results for input(s): "COLORU", "CLARITYU", "SPECGRAV", "PHUR", "PROTEINUA", "GLUCOSEU", "BILIRUBINCON", "BLOODU", "WBCU", "RBCU", "BACTERIA", "MUCUS" in the last 24 hours.    Microbiology Results (last 7 days)       Procedure " Component Value Units Date/Time    Blood culture x two cultures. Draw prior to antibiotics. [[id number]] Collected: 08/01/24 0104    Order Status: Completed Specimen: Blood from Peripheral, Forearm, Right Updated: 08/03/24 0303     Blood Culture, Routine No Growth to date      No Growth to date      No Growth to date    Narrative:      Aerobic and anaerobic    Blood culture x two cultures. Draw prior to antibiotics. [[id number]] Collected: 08/01/24 0103    Order Status: Completed Specimen: Blood from Peripheral, Forearm, Left Updated: 08/03/24 0303     Blood Culture, Routine No Growth to date      No Growth to date      No Growth to date    Narrative:      Aerobic and anaerobic    Stool culture [[id number]]     Order Status: Canceled Specimen: Stool     Clostridium difficile EIA [[id number]]     Order Status: Canceled Specimen: Stool               Diagnostic Tests:     CT Head:  Impression:       No acute intracranial finding      Electronically signed by: Virtual Radiologist  Date: 08/01/2024           Assessment:    Mahesh Cespedes is a 59 y.o.male with ESRD on HD admitted with:     - AMS changes 2nd. To missing HD x 2 weeks (uremia)  - Hypertensive urgency  - [id number]   - Hyperkalemia  - HAGMA  - Hx. CVA  - Anemia of ckd  - Hypoalbuminemia  - Hx. Remote CVA   - Chronic confusion  - Debility  - Failure to thrive        Plan:    - Dialysis today and Q MWF  - Epogen 3 x week once BP controlled  - Renal diet + protein supplements  - Check PO4- and adjust PO4- binders as needed  - Palliative care following  - Other problems per admitting     Will follow on Dialysis days.

## 2024-08-03 NOTE — NURSING
[facility] [place]  Nurses Note -- 4 Eyes      8/3/2024       Skin assessed on: Q Shift      [] No Pressure Injuries Present    []Prevention Measures Documented    [x] Yes LDA  for Pressure Injury Previously documented     [] Yes New Pressure Injury Discovered   [] LDA for New Pressure Injury Added      Attending RN:  Amanda Gaxiola LPN     Second RN:  BETO Rankin

## 2024-08-03 NOTE — PT/OT/SLP PROGRESS
Physical Therapy      Patient Name:  Mahesh Cespedes   MRN:  [id number]    Patient not seen today secondary to Dialysis. Will follow-up later as able.

## 2024-08-04 LAB
ANION GAP SERPL CALC-SCNC: 12 MMOL/L (ref 8–16)
BUN SERPL-MCNC: 62 MG/DL (ref 8–23)
CALCIUM SERPL-MCNC: 8 MG/DL (ref 8.7–10.5)
CHLORIDE SERPL-SCNC: 96 MMOL/L (ref 95–110)
CO2 SERPL-SCNC: 25 MMOL/L (ref 23–29)
CREAT SERPL-MCNC: 9 MG/DL (ref 0.5–1.4)
EST. GFR  (NO RACE VARIABLE): 6 ML/MIN/1.73 M^2
GLUCOSE SERPL-MCNC: 158 MG/DL (ref 70–110)
HBV SURFACE AG SERPL QL IA: NORMAL
PHOSPHATE SERPL-MCNC: 5 MG/DL (ref 2.7–4.5)
POTASSIUM SERPL-SCNC: 4.4 MMOL/L (ref 3.5–5.1)
SODIUM SERPL-SCNC: 133 MMOL/L (ref 136–145)
VANCOMYCIN SERPL-MCNC: 15.7 UG/ML

## 2024-08-04 PROCEDURE — 94760 N-INVAS EAR/PLS OXIMETRY 1: CPT

## 2024-08-04 PROCEDURE — 36415 COLL VENOUS BLD VENIPUNCTURE: CPT | Performed by: HOSPITALIST

## 2024-08-04 PROCEDURE — 80202 ASSAY OF VANCOMYCIN: CPT | Performed by: HOSPITALIST

## 2024-08-04 PROCEDURE — 84100 ASSAY OF PHOSPHORUS: CPT | Performed by: INTERNAL MEDICINE

## 2024-08-04 PROCEDURE — 25000003 PHARM REV CODE 250: Performed by: STUDENT IN AN ORGANIZED HEALTH CARE EDUCATION/TRAINING PROGRAM

## 2024-08-04 PROCEDURE — 97110 THERAPEUTIC EXERCISES: CPT

## 2024-08-04 PROCEDURE — 25000003 PHARM REV CODE 250: Performed by: HOSPITALIST

## 2024-08-04 PROCEDURE — 87040 BLOOD CULTURE FOR BACTERIA: CPT | Mod: 59 | Performed by: HOSPITALIST

## 2024-08-04 PROCEDURE — 97161 PT EVAL LOW COMPLEX 20 MIN: CPT

## 2024-08-04 PROCEDURE — 36415 COLL VENOUS BLD VENIPUNCTURE: CPT | Performed by: INTERNAL MEDICINE

## 2024-08-04 PROCEDURE — 80048 BASIC METABOLIC PNL TOTAL CA: CPT | Performed by: HOSPITALIST

## 2024-08-04 PROCEDURE — 21400001 HC TELEMETRY ROOM

## 2024-08-04 RX ORDER — LABETALOL 100 MG/1
100 TABLET, FILM COATED ORAL EVERY 12 HOURS
Status: DISCONTINUED | OUTPATIENT
Start: 2024-08-04 | End: 2024-08-08 | Stop reason: HOSPADM

## 2024-08-04 RX ADMIN — APIXABAN 2.5 MG: 2.5 TABLET, FILM COATED ORAL at 08:08

## 2024-08-04 RX ADMIN — Medication 1 CAPSULE: at 08:08

## 2024-08-04 RX ADMIN — ATORVASTATIN CALCIUM 80 MG: 40 TABLET, FILM COATED ORAL at 08:08

## 2024-08-04 RX ADMIN — SEVELAMER CARBONATE 1600 MG: 800 TABLET, FILM COATED ORAL at 04:08

## 2024-08-04 RX ADMIN — APIXABAN 2.5 MG: 2.5 TABLET, FILM COATED ORAL at 09:08

## 2024-08-04 RX ADMIN — LABETALOL HYDROCHLORIDE 100 MG: 100 TABLET, FILM COATED ORAL at 09:08

## 2024-08-04 RX ADMIN — SEVELAMER CARBONATE 1600 MG: 800 TABLET, FILM COATED ORAL at 08:08

## 2024-08-04 RX ADMIN — PANTOPRAZOLE SODIUM 40 MG: 40 TABLET, DELAYED RELEASE ORAL at 08:08

## 2024-08-04 RX ADMIN — ACETAMINOPHEN 650 MG: 325 TABLET ORAL at 02:08

## 2024-08-04 RX ADMIN — ALLOPURINOL 100 MG: 100 TABLET ORAL at 08:08

## 2024-08-04 RX ADMIN — AMLODIPINE BESYLATE 10 MG: 5 TABLET ORAL at 08:08

## 2024-08-04 RX ADMIN — AMIODARONE HYDROCHLORIDE 200 MG: 200 TABLET ORAL at 08:08

## 2024-08-04 RX ADMIN — ACETAMINOPHEN 650 MG: 325 TABLET ORAL at 07:08

## 2024-08-04 RX ADMIN — LABETALOL HYDROCHLORIDE 300 MG: 100 TABLET, FILM COATED ORAL at 08:08

## 2024-08-04 RX ADMIN — MUPIROCIN: 20 OINTMENT TOPICAL at 08:08

## 2024-08-04 RX ADMIN — MUPIROCIN: 20 OINTMENT TOPICAL at 09:08

## 2024-08-04 RX ADMIN — TAMSULOSIN HYDROCHLORIDE 0.4 MG: 0.4 CAPSULE ORAL at 08:08

## 2024-08-04 RX ADMIN — SEVELAMER CARBONATE 1600 MG: 800 TABLET, FILM COATED ORAL at 11:08
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
## 2024-08-04 NOTE — PROGRESS NOTES
[facility] Medicine  Progress Note    Patient Name: Mahesh Cespedes  MRN: [id number]  Patient Class: IP- Inpatient   Admission Date: 7/31/2024  Length of Stay: 3 days  Attending Physician: Suzie Ron, *  Primary Care Provider: Cruz Hernandez MD        Subjective:     Principal Problem:Hyperkalemia        HPI:  This is a 59-year-old male with a past medical history of ESRD on HD, AFib/flutter (on Eliquis), type 2 diabetes, hyperlipidemia, hypertension, CVA, who presents with altered mental status.      Patient presents for evaluation of altered mental status in the setting of missing dialysis for 2 weeks.  Per the patient's family, he has been bedridden, more confused and developed fecal incontinence. On arrival to the ER, the patient was covered in stool.  Patient is unable to contribute to the history.     In the ED, the patient was I hypertensive go toxic (SpO2:  88%), requiring 2 L O2. Labs were remarkable for hyperkalemia (7.4), elevated BUN (182) , normocytic anemia (10.2).  CT head showed no acute process.  CT chest showed loculated moderate left pleural effusion, left lower lobe atelectasis/consolidation, a large pericardial effusion measuring 3.6 cm, findings suggestive of pulmonary edema. Nephrology was consulted, with plans to dialyze the patient.  Patient was given albuterol, calcium gluconate, insulin/D10, Lasix 80 mg IV, sodium bicarb 100 mEq, Lokelma 10 g, vancomycin, Zosyn, Zofran 4 mg IV. He was admitted for further management.     Overview/Hospital Course:  patient  with a past medical history of ESRD on HD, AFib/flutter (on Eliquis), type 2 diabetes, hyperlipidemia, hypertension, CVA, who presents with acute metabolic encephaloptyhy,duo to missed HD for long time,Hyperkalemia,sacral wounds,with medical treatment and HD hyperkalemia is improving,nephrology doing HD,  On empiric IV Vancomycin,consulted PT,OT,  No sign of peaked T waves,transfered to floor.  Consulted  palliative.  Sacral wound care by wound care.  Uremia and hyperkalemia is much better  with HD.      Interval History: with medical treatment and HD hyperkalemia is improving,nephrology doing HD,  On empiric IV Vancomycin,consulted PT,OT,  No sign of peaked T waves,transferred to floor.  Consulted palliative.  Sacral wound care by wound care.    Uremia and hyperkalemia is much better  with HD.  Review of Systems   Constitutional:  Positive for activity change and appetite change.   Respiratory:  Negative for apnea and choking.    Gastrointestinal:  Negative for abdominal distention and abdominal pain.   Musculoskeletal:  Negative for arthralgias.   Skin:  Positive for wound.   Neurological:  Positive for weakness.   Psychiatric/Behavioral:  Positive for behavioral problems. Negative for agitation.      Objective:     Vital Signs (Most Recent):  Temp: 98.6 °F (37 °C) (08/04/24 0740)  Pulse: 81 (08/04/24 0743)  Resp: 17 (08/04/24 0740)  BP: 123/61 (08/04/24 0740)  SpO2: 96 % (08/04/24 0800) Vital Signs (24h Range):  Temp:  [97.6 °F (36.4 °C)-98.6 °F (37 °C)] 98.6 °F (37 °C)  Pulse:  [69-95] 81  Resp:  [16-18] 17  SpO2:  [92 %-96 %] 96 %  BP: ()/() 123/61     Weight: 66.3 kg (146 lb 2.6 oz)  Body mass index is 22.89 kg/m².    Intake/Output Summary (Last 24 hours) at 8/4/2024 1024  Last data filed at 8/3/2024 1654  Gross per 24 hour   Intake 216.8 ml   Output 3 ml   Net 213.8 ml         Physical Exam  Pulmonary:      Effort: No respiratory distress.   Abdominal:      General: There is no distension.   Musculoskeletal:      Cervical back: Normal range of motion.   Skin:     General: Skin is warm.      Comments: Sacral wounds    Neurological:      General: No focal deficit present.      Mental Status: He is alert.             Significant Labs: All pertinent labs within the past 24 hours have been reviewed.  BMP:   Recent Labs   Lab 08/04/24  0502   *   *   K 4.4   CL 96   CO2 25   BUN 62*  "  CREATININE 9.0*   CALCIUM 8.0*     CBC:   No results for input(s): "WBC", "HGB", "HCT", "PLT" in the last 48 hours.    CMP:   Recent Labs   Lab 08/03/24  0423 08/04/24  0502   * 133*   K 5.5* 4.4   CL 94* 96   CO2 21* 25   GLU 75 158*   * 62*   CREATININE 12.9* 9.0*   CALCIUM 7.8* 8.0*   ANIONGAP 18* 12       Significant Imaging: I have reviewed all pertinent imaging results/findings within the past 24 hours.    Assessment/Plan:      * Hyperkalemia  Hyperkalemia is likely due to ESRD.The patients most recent potassium results are listed below.  Recent Labs     08/01/24  0725 08/02/24  0430 08/03/24  0423   K 6.1* 5.0 5.5*       Plan  - Monitor for arrhythmias with EKG and/or continuous telemetry.   - Treat the hyperkalemia with Potassium Binders, Calcium gluconate, IV insulin and dextrose, Nebulized albuterol sulfate, Furosemide, and Sodium Bicarbonate.   - Monitor potassium: Daily  - Nephrology consult for dialysis   with medical treatment and HD hyperkalemia is improving,nephrology doing HD,  On empiric IV Vancomycin,consulted PT,OT,  No sign of peaked T waves,transferred to floor.  Consulted palliative.  Resolved with HD          Controlled type 2 diabetes mellitus with chronic kidney disease on chronic dialysis, without long-term current use of insulin  Lab Results   Component Value Date    HGBA1C 5.5 03/15/2022     Daily BMPs.     Pericardial effusion  Echo. Is stable       Sacral decubitus ulcer  Noted. Frequent turns. Wound care       Paroxysmal atrial fibrillation  Continue Eliquis, Metoprolol.   [id number] Score: 1.     History of CVA (cerebrovascular accident)  Continue home medications       Anemia in ESRD (end-stage renal disease)  Anemia is likely due to chronic disease due to Chronic Kidney Disease. Most recent hemoglobin and hematocrit are listed below.  Recent Labs     08/01/24  0102 08/01/24  0227   HGB 10.2*  --    HCT 31.4* 32*     Monitor     Essential hypertension  Chronic, " controlled. Latest blood pressure and vitals reviewed-     Temp:  [97.8 °F (36.6 °C)-100 °F (37.8 °C)]   Pulse:  [85-94]   Resp:  [12-24]   BP: (130-183)/(80-96)   SpO2:  [88 %-100 %] .   Home meds for hypertension were reviewed and noted below.   Hypertension Medications               amLODIPine (NORVASC) 10 MG tablet Take 10 mg by mouth once daily.    metoprolol succinate (TOPROL-XL) 50 MG 24 hr tablet Take 1 tablet (50 mg total) by mouth once daily.            While in the hospital, will manage blood pressure as follows; Continue home antihypertensive regimen    Will utilize p.r.n. blood pressure medication only if patient's blood pressure greater than 180/110 and he develops symptoms such as worsening chest pain or shortness of breath.    ESRD needing dialysis  Creatine stable for now. BMP reviewed- noted Estimated Creatinine Clearance: 3.8 mL/min (A) (based on SCr of 19.3 mg/dL (H)). according to latest data. Based on current GFR, CKD stage is end stage.  Monitor UOP and serial BMP and adjust therapy as needed. Renally dose meds. Avoid nephrotoxic medications and procedures. Nephrology consult       VTE Risk Mitigation (From admission, onward)           Ordered     apixaban tablet 2.5 mg  2 times daily         08/01/24 0441     IP VTE LOW RISK PATIENT  Once         08/01/24 0441     Place sequential compression device  Until discontinued         08/01/24 0441                    Discharge Planning   JAIME: 8/5/2024     Code Status: Full Code   Is the patient medically ready for discharge?:     Reason for patient still in hospital (select all that apply): Patient trending condition  Discharge Plan A: Home Health                  Suzie Ron MD  Department of Hospital Medicine   [facility] [place] - Med Surg

## 2024-08-04 NOTE — NURSING
[facility]  Nurses Note -- 4 Eyes      8/3/2024       Skin assessed on: Q Shift      [] No Pressure Injuries Present    []Prevention Measures Documented    [x] Yes LDA  for Pressure Injury Previously documented     [] Yes New Pressure Injury Discovered   [] LDA for New Pressure Injury Added      Attending RN:  Demetrice Moreira RN     Second RN:  JAVIER Patel

## 2024-08-04 NOTE — PLAN OF CARE
Problem: Adult Inpatient Plan of Care  Goal: Plan of Care Review  Outcome: Progressing  Goal: Patient-Specific Goal (Individualized)  Outcome: Progressing     Problem: Hemodialysis  Goal: Absence of Infection Signs and Symptoms  Outcome: Progressing     Problem: Diabetes Comorbidity  Goal: Blood Glucose Level Within Targeted Range  Outcome: Progressing     Problem: Infection  Goal: Absence of Infection Signs and Symptoms  Outcome: Progressing     Problem: Wound  Goal: Optimal Coping  Outcome: Progressing     Problem: Fall Injury Risk  Goal: Absence of Fall and Fall-Related Injury  Outcome: Progressing     Problem: Skin Injury Risk Increased  Goal: Skin Health and Integrity  Outcome: Progressing

## 2024-08-04 NOTE — PROGRESS NOTES
Pharmacokinetic Assessment Follow Up: IV Vancomycin    Vancomycin serum concentration assessment(s):    The trough level was drawn correctly and can be used to guide therapy at this time. The measurement is within the desired definitive target range of 10 to 20 mcg/mL.    Vancomycin Regimen Plan:    Re-dose when the random level is less than 20 mcg/mL, next level to be drawn at 0400 on 8/5/24    Drug levels (last 3 results):  Recent Labs   Lab Result Units 08/02/24  0430 08/03/24  0423 08/04/24  0502   Vancomycin, Random ug/mL 6.8 6.5 15.7       Pharmacy will continue to follow and monitor vancomycin.    Please contact pharmacy at extension [phone] for questions regarding this assessment.    Thank you for the consult,   Cristopher Gomez       Patient brief summary:  Mahesh Cespedes is a 59 y.o. male initiated on antimicrobial therapy with IV Vancomycin for treatment of skin & soft tissue infection    The patient's current regimen is random pulse dosing    Drug Allergies:   Review of patient's allergies indicates:  No Known Allergies    Actual Body Weight:   66.3 kg    Renal Function:   Estimated Creatinine Clearance: 8.1 mL/min (A) (based on SCr of 9 mg/dL (H)).,     Dialysis Method (if applicable):  intermittent HD    CBC (last 72 hours):  Recent Labs   Lab Result Units 08/02/24  0430   WBC K/uL 7.56   Hemoglobin g/dL 9.6*   Hematocrit % 30.7*   Platelets K/uL 150   Gran % % 85.7*   Lymph % % 6.1*   Mono % % 6.3   Eosinophil % % 1.1   Basophil % % 0.3   Differential Method  Automated       Metabolic Panel (last 72 hours):  Recent Labs   Lab Result Units 08/01/24  0725 08/02/24  0430 08/03/24  0423 08/04/24  0502   Sodium mmol/L 135* 132* 133* 133*   Potassium mmol/L 6.1* 5.0 5.5* 4.4   Chloride mmol/L 94* 93* 94* 96   CO2 mmol/L 14* 20* 21* 25   Glucose mg/dL 106 163* 75 158*   BUN mg/dL 182* 98* 114* 62*   Creatinine mg/dL 18.8* 11.8* 12.9* 9.0*   Albumin g/dL 3.1*  --   --   --    Total Bilirubin mg/dL 0.8  --    --   --    Alkaline Phosphatase U/L 174*  --   --   --    AST U/L 12  --   --   --    ALT U/L 15  --   --   --    Phosphorus mg/dL  --   --   --  5.0*       Vancomycin Administrations:  vancomycin given in the last 96 hours                     vancomycin (VANCOCIN) 1,000 mg in D5W 250 mL IVPB (Vial-Mate) (mg) 1,000 mg New Bag 08/03/24 1525    vancomycin (VANCOCIN) 500 mg in D5W 100 mL IVPB (MB+) (mg) 500 mg New Bag 08/02/24 2052    vancomycin (VANCOCIN) 1,750 mg in D5W 500 mL IVPB (mg) 1,750 mg New Bag 08/01/24 0405                    Microbiologic Results:  Microbiology Results (last 7 days)       Procedure Component Value Units Date/Time    Blood culture x two cultures. Draw prior to antibiotics. [[id number]] Collected: 08/01/24 0104    Order Status: Completed Specimen: Blood from Peripheral, Forearm, Right Updated: 08/04/24 0303     Blood Culture, Routine No Growth to date      No Growth to date      No Growth to date      No Growth to date    Narrative:      Aerobic and anaerobic    Blood culture x two cultures. Draw prior to antibiotics. [[id number]] Collected: 08/01/24 0103    Order Status: Completed Specimen: Blood from Peripheral, Forearm, Left Updated: 08/04/24 0303     Blood Culture, Routine No Growth to date      No Growth to date      No Growth to date      No Growth to date    Narrative:      Aerobic and anaerobic    Stool culture [[id number]]     Order Status: Canceled Specimen: Stool     Clostridium difficile EIA [[id number]]     Order Status: Canceled Specimen: Stool

## 2024-08-04 NOTE — NURSING
Positive blood culture results from Mr. Cespedes's R FA on 8/1/24 @ 12:04 was received from Ran SHETTY in the lab. MD notified.

## 2024-08-04 NOTE — PLAN OF CARE
Problem: Adult Inpatient Plan of Care  Goal: Absence of Hospital-Acquired Illness or Injury  Outcome: Progressing  Intervention: Identify and Manage Fall Risk  Flowsheets (Taken 8/4/2024 0312)  Safety Promotion/Fall Prevention:   assistive device/personal item within reach   bed alarm set   side rails raised x 2   nonskid shoes/socks when out of bed   room near unit station   lighting adjusted   medications reviewed  Goal: Optimal Comfort and Wellbeing  Outcome: Progressing  Intervention: Monitor Pain and Promote Comfort  Flowsheets (Taken 8/4/2024 0312)  Pain Management Interventions:   pillow support provided   position adjusted   pain management plan reviewed with patient/caregiver     Problem: Hemodialysis  Goal: Effective Tissue Perfusion  Outcome: Progressing     Problem: Diabetes Comorbidity  Goal: Blood Glucose Level Within Targeted Range  Outcome: Progressing  Intervention: Monitor and Manage Glycemia  Flowsheets (Taken 8/4/2024 0312)  Glycemic Management: blood glucose monitored     Problem: Infection  Goal: Absence of Infection Signs and Symptoms  Outcome: Progressing  Intervention: Prevent or Manage Infection  Flowsheets (Taken 8/4/2024 0312)  Isolation Precautions: precautions maintained     Problem: Wound  Goal: Optimal Pain Control and Function  Outcome: Progressing  Intervention: Prevent or Manage Pain  Flowsheets (Taken 8/4/2024 0312)  Sleep/Rest Enhancement: awakenings minimized  Pain Management Interventions:   pillow support provided   position adjusted   pain management plan reviewed with patient/caregiver

## 2024-08-04 NOTE — PLAN OF CARE
Problem: Physical Therapy  Goal: Physical Therapy Goal  Description: Goals to be met by: 24     Patient will increase functional independence with mobility by performin. Supine to sit with Modified [place]  2. Sit to supine with Modified [place]  3. Sit to stand transfer with Modified [place]  4. Bed to chair transfer with Modified [place] using Rolling Walker  5. Gait  x 50 feet with Modified [place] using Rolling Walker.   6. Lower extremity exercise program x30 reps per handout, with independence    Outcome: Progressing

## 2024-08-04 NOTE — SUBJECTIVE & OBJECTIVE
"Interval History: with medical treatment and HD hyperkalemia is improving,nephrology doing HD,  On empiric IV Vancomycin,consulted PT,OT,  No sign of peaked T waves,transferred to floor.  Consulted palliative.  Sacral wound care by wound care.    Uremia and hyperkalemia is much better  with HD.  Review of Systems   Constitutional:  Positive for activity change and appetite change.   Respiratory:  Negative for apnea and choking.    Gastrointestinal:  Negative for abdominal distention and abdominal pain.   Musculoskeletal:  Negative for arthralgias.   Skin:  Positive for wound.   Neurological:  Positive for weakness.   Psychiatric/Behavioral:  Positive for behavioral problems. Negative for agitation.      Objective:     Vital Signs (Most Recent):  Temp: 98.6 °F (37 °C) (08/04/24 0740)  Pulse: 81 (08/04/24 0743)  Resp: 17 (08/04/24 0740)  BP: 123/61 (08/04/24 0740)  SpO2: 96 % (08/04/24 0800) Vital Signs (24h Range):  Temp:  [97.6 °F (36.4 °C)-98.6 °F (37 °C)] 98.6 °F (37 °C)  Pulse:  [69-95] 81  Resp:  [16-18] 17  SpO2:  [92 %-96 %] 96 %  BP: ()/() 123/61     Weight: 66.3 kg (146 lb 2.6 oz)  Body mass index is 22.89 kg/m².    Intake/Output Summary (Last 24 hours) at 8/4/2024 1024  Last data filed at 8/3/2024 1654  Gross per 24 hour   Intake 216.8 ml   Output 3 ml   Net 213.8 ml         Physical Exam  Pulmonary:      Effort: No respiratory distress.   Abdominal:      General: There is no distension.   Musculoskeletal:      Cervical back: Normal range of motion.   Skin:     General: Skin is warm.      Comments: Sacral wounds    Neurological:      General: No focal deficit present.      Mental Status: He is alert.             Significant Labs: All pertinent labs within the past 24 hours have been reviewed.  BMP:   Recent Labs   Lab 08/04/24  0502   *   *   K 4.4   CL 96   CO2 25   BUN 62*   CREATININE 9.0*   CALCIUM 8.0*     CBC:   No results for input(s): "WBC", "HGB", "HCT", "PLT" in the last " 48 hours.    CMP:   Recent Labs   Lab 08/03/24  0423 08/04/24  0502   * 133*   K 5.5* 4.4   CL 94* 96   CO2 21* 25   GLU 75 158*   * 62*   CREATININE 12.9* 9.0*   CALCIUM 7.8* 8.0*   ANIONGAP 18* 12       Significant Imaging: I have reviewed all pertinent imaging results/findings within the past 24 hours.

## 2024-08-04 NOTE — NURSING
[facility] [place]  Nurses Note -- 4 Eyes      8/4/2024       Skin assessed on: Q Shift      [x] No Pressure Injuries Present    []Prevention Measures Documented    [] Yes LDA  for Pressure Injury Previously documented     [] Yes New Pressure Injury Discovered   [] LDA for New Pressure Injury Added      Attending RN:  Amanda Gaxiola LPN     Second RN:  BETO Suresh

## 2024-08-05 PROBLEM — R78.81 POSITIVE BLOOD CULTURE: Status: ACTIVE | Noted: 2024-08-05

## 2024-08-05 PROBLEM — B95.8 BACTEREMIA DUE TO STAPHYLOCOCCUS: Status: ACTIVE | Noted: 2024-02-07

## 2024-08-05 LAB
ANION GAP SERPL CALC-SCNC: 14 MMOL/L (ref 8–16)
BACTERIA BLD CULT: NORMAL
BASOPHILS # BLD AUTO: 0.03 K/UL (ref 0–0.2)
BASOPHILS NFR BLD: 0.4 % (ref 0–1.9)
BUN SERPL-MCNC: 76 MG/DL (ref 8–23)
CALCIUM SERPL-MCNC: 7.8 MG/DL (ref 8.7–10.5)
CHLORIDE SERPL-SCNC: 95 MMOL/L (ref 95–110)
CO2 SERPL-SCNC: 23 MMOL/L (ref 23–29)
CREAT SERPL-MCNC: 10.2 MG/DL (ref 0.5–1.4)
DIFFERENTIAL METHOD BLD: ABNORMAL
EOSINOPHIL # BLD AUTO: 0.2 K/UL (ref 0–0.5)
EOSINOPHIL NFR BLD: 2.6 % (ref 0–8)
ERYTHROCYTE [DISTWIDTH] IN BLOOD BY AUTOMATED COUNT: 16.1 % (ref 11.5–14.5)
EST. GFR  (NO RACE VARIABLE): 5 ML/MIN/1.73 M^2
GLUCOSE SERPL-MCNC: 133 MG/DL (ref 70–110)
HCT VFR BLD AUTO: 30.7 % (ref 40–54)
HGB BLD-MCNC: 9.4 G/DL (ref 14–18)
IMM GRANULOCYTES # BLD AUTO: 0.03 K/UL (ref 0–0.04)
IMM GRANULOCYTES NFR BLD AUTO: 0.4 % (ref 0–0.5)
LYMPHOCYTES # BLD AUTO: 0.8 K/UL (ref 1–4.8)
LYMPHOCYTES NFR BLD: 10.6 % (ref 18–48)
MCH RBC QN AUTO: 27.1 PG (ref 27–31)
MCHC RBC AUTO-ENTMCNC: 30.6 G/DL (ref 32–36)
MCV RBC AUTO: 89 FL (ref 82–98)
MONOCYTES # BLD AUTO: 0.6 K/UL (ref 0.3–1)
MONOCYTES NFR BLD: 8.4 % (ref 4–15)
NEUTROPHILS # BLD AUTO: 5.6 K/UL (ref 1.8–7.7)
NEUTROPHILS NFR BLD: 77.6 % (ref 38–73)
NRBC BLD-RTO: 0 /100 WBC
OHS QRS DURATION: 84 MS
OHS QTC CALCULATION: 480 MS
PLATELET # BLD AUTO: 145 K/UL (ref 150–450)
PMV BLD AUTO: 9.8 FL (ref 9.2–12.9)
POCT GLUCOSE: 190 MG/DL (ref 70–110)
POTASSIUM SERPL-SCNC: 4.6 MMOL/L (ref 3.5–5.1)
RBC # BLD AUTO: 3.47 M/UL (ref 4.6–6.2)
SODIUM SERPL-SCNC: 132 MMOL/L (ref 136–145)
VANCOMYCIN SERPL-MCNC: 13.3 UG/ML
WBC # BLD AUTO: 7.17 K/UL (ref 3.9–12.7)

## 2024-08-05 PROCEDURE — 90935 HEMODIALYSIS ONE EVALUATION: CPT

## 2024-08-05 PROCEDURE — 25000003 PHARM REV CODE 250

## 2024-08-05 PROCEDURE — 21400001 HC TELEMETRY ROOM

## 2024-08-05 PROCEDURE — 25000003 PHARM REV CODE 250: Performed by: HOSPITALIST

## 2024-08-05 PROCEDURE — 85025 COMPLETE CBC W/AUTO DIFF WBC: CPT | Performed by: HOSPITALIST

## 2024-08-05 PROCEDURE — 63600175 PHARM REV CODE 636 W HCPCS: Mod: JZ,JG | Performed by: INTERNAL MEDICINE

## 2024-08-05 PROCEDURE — 80048 BASIC METABOLIC PNL TOTAL CA: CPT | Performed by: HOSPITALIST

## 2024-08-05 PROCEDURE — 99223 1ST HOSP IP/OBS HIGH 75: CPT | Mod: ,,, | Performed by: INTERNAL MEDICINE

## 2024-08-05 PROCEDURE — 80202 ASSAY OF VANCOMYCIN: CPT | Performed by: HOSPITALIST

## 2024-08-05 PROCEDURE — 86706 HEP B SURFACE ANTIBODY: CPT | Performed by: HOSPITALIST

## 2024-08-05 PROCEDURE — 99233 SBSQ HOSP IP/OBS HIGH 50: CPT | Mod: ,,, | Performed by: INTERNAL MEDICINE

## 2024-08-05 PROCEDURE — 99497 ADVNCD CARE PLAN 30 MIN: CPT | Mod: ,,, | Performed by: INTERNAL MEDICINE

## 2024-08-05 PROCEDURE — 25000003 PHARM REV CODE 250: Performed by: STUDENT IN AN ORGANIZED HEALTH CARE EDUCATION/TRAINING PROGRAM

## 2024-08-05 PROCEDURE — 36415 COLL VENOUS BLD VENIPUNCTURE: CPT | Performed by: HOSPITALIST

## 2024-08-05 PROCEDURE — 63600175 PHARM REV CODE 636 W HCPCS: Performed by: HOSPITALIST

## 2024-08-05 RX ORDER — BISACODYL 10 MG/1
10 SUPPOSITORY RECTAL ONCE
Status: COMPLETED | OUTPATIENT
Start: 2024-08-05 | End: 2024-08-05

## 2024-08-05 RX ORDER — AMLODIPINE BESYLATE 5 MG/1
5 TABLET ORAL DAILY
Status: DISCONTINUED | OUTPATIENT
Start: 2024-08-06 | End: 2024-08-08 | Stop reason: HOSPADM

## 2024-08-05 RX ADMIN — Medication 1 CAPSULE: at 08:08

## 2024-08-05 RX ADMIN — APIXABAN 2.5 MG: 2.5 TABLET, FILM COATED ORAL at 08:08

## 2024-08-05 RX ADMIN — ACETAMINOPHEN 650 MG: 325 TABLET ORAL at 11:08

## 2024-08-05 RX ADMIN — TAMSULOSIN HYDROCHLORIDE 0.4 MG: 0.4 CAPSULE ORAL at 08:08

## 2024-08-05 RX ADMIN — MUPIROCIN: 20 OINTMENT TOPICAL at 08:08

## 2024-08-05 RX ADMIN — ATORVASTATIN CALCIUM 80 MG: 40 TABLET, FILM COATED ORAL at 08:08

## 2024-08-05 RX ADMIN — LABETALOL HYDROCHLORIDE 100 MG: 100 TABLET, FILM COATED ORAL at 08:08

## 2024-08-05 RX ADMIN — VANCOMYCIN HYDROCHLORIDE 500 MG: 500 INJECTION, POWDER, LYOPHILIZED, FOR SOLUTION INTRAVENOUS at 04:08

## 2024-08-05 RX ADMIN — BISACODYL 10 MG: 10 SUPPOSITORY RECTAL at 02:08

## 2024-08-05 RX ADMIN — EPOETIN ALFA-EPBX 10000 UNITS: 10000 INJECTION, SOLUTION INTRAVENOUS; SUBCUTANEOUS at 08:08

## 2024-08-05 RX ADMIN — SEVELAMER CARBONATE 1600 MG: 800 TABLET, FILM COATED ORAL at 08:08

## 2024-08-05 RX ADMIN — ALLOPURINOL 100 MG: 100 TABLET ORAL at 08:08

## 2024-08-05 RX ADMIN — PANTOPRAZOLE SODIUM 40 MG: 40 TABLET, DELAYED RELEASE ORAL at 08:08

## 2024-08-05 RX ADMIN — AMIODARONE HYDROCHLORIDE 200 MG: 200 TABLET ORAL at 08:08

## 2024-08-05 RX ADMIN — SEVELAMER CARBONATE 1600 MG: 800 TABLET, FILM COATED ORAL at 06:08

## 2024-08-05 RX ADMIN — SEVELAMER CARBONATE 1600 MG: 800 TABLET, FILM COATED ORAL at 12:08

## 2024-08-05 RX ADMIN — MUPIROCIN: 20 OINTMENT TOPICAL at 09:08

## 2024-08-05 NOTE — ASSESSMENT & PLAN NOTE
- Pt has voiced desire to continue with this, though would benefit from transportation assistance

## 2024-08-05 NOTE — ASSESSMENT & PLAN NOTE
Creatine stable for now. BMP reviewed- noted Estimated Creatinine Clearance: 7.1 mL/min (A) (based on SCr of 10.2 mg/dL (H)). according to latest data. Based on current GFR, CKD stage is end stage.  Monitor UOP and serial BMP and adjust therapy as needed. Renally dose meds. Avoid nephrotoxic medications and procedures. Nephrology consult   Has HD,consulted SW help with transportation,.

## 2024-08-05 NOTE — PLAN OF CARE
CM spoke to pt's daughterRima to discuss discharge disposition. Pt's daughter stated transportation has been an issue to get pt to dialysis and appointments. CM inquired if they are interested in nursing home placement. Pt's daughter stated she will discuss with pt today after work.    MILLA spoke to Keely with [facility] and she stated they had to call police for two welfare checks on 7/19 and 7/26. Per Keely, the police stated that the pt didn't want to go to dialysis. Pt hasn't received dialysis in 2 weeks.     Per Keely pt's son and daughter doesn't answer their phone calls. Keely stated they will assist pt to apply for [employer].    CM also contacted [facility] and pt has missed visits with nurse.     08/05/24 1451   Discharge Reassessment   Assessment Type Discharge Planning Reassessment   Did the patient's condition or plan change since previous assessment? No   Discharge Plan discussed with: Adult children   Communicated JAIME with patient/caregiver Yes   Discharge Plan A Home with family   Discharge Plan B Other  (tbd)   DME Needed Upon Discharge  other (see comments)  (tbd)   Transition of Care Barriers Does not adhere to care plan   Why the patient remains in the hospital Requires continued medical care   Post-Acute Status   Discharge Delays None known at this time

## 2024-08-05 NOTE — ASSESSMENT & PLAN NOTE
24  - Chart and interval history reviewed; pt tolerating HD.   - Visited with pt at bedside; using a Uzbek Creole interpretor, had a long conversation with pt about his reasoning for missing HD over the last two weeks, as well as discussion of his care preferences moving forward. During entirety of visit he was alert and readily able to participate in discussion.   - He admitted he has been depressed since  (when his wife ), and has been seeing other family members less and less. Emotioanl support provided; validated that it is certainly understandable to be saddened by this situation.   - In discussing desire to restart HD vs transition to end-of-life/hospice care, pt was clear that he would like to resume HD in hopes of having more time ahead of him. Validated this choice, though reminded him that HD is a form of life support, and it is imperative that he not skip any sessions. As he brought up having occasional challneges with getting to HD center, I let him know that SW/CM can assist him with arranging this.   - At this time, plan remains to continue with HD; patient understands the importance of attending every session, though would benefit from SW/CM consult to discuss transportation options for this. He would also benefit from home health and home-based palliative care follow-up.   - Updated primary team in person after visit; will follow up with pt     24  - See initial consult note by Leatha Ramsey
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
## 2024-08-05 NOTE — PLAN OF CARE
Problem: Adult Inpatient Plan of Care  Goal: Absence of Hospital-Acquired Illness or Injury  Outcome: Progressing  Intervention: Identify and Manage Fall Risk  Flowsheets (Taken 8/5/2024 0343)  Safety Promotion/Fall Prevention:   assistive device/personal item within reach   side rails raised x 2   nonskid shoes/socks when out of bed   bed alarm set   room near unit station   lighting adjusted   medications reviewed  Goal: Optimal Comfort and Wellbeing  Outcome: Progressing  Intervention: Monitor Pain and Promote Comfort  Flowsheets (Taken 8/5/2024 0343)  Pain Management Interventions:   pain management plan reviewed with patient/caregiver   pillow support provided   quiet environment facilitated     Problem: Diabetes Comorbidity  Goal: Blood Glucose Level Within Targeted Range  Outcome: Progressing  Intervention: Monitor and Manage Glycemia  Flowsheets (Taken 8/5/2024 0343)  Glycemic Management: blood glucose monitored     Problem: Fall Injury Risk  Goal: Absence of Fall and Fall-Related Injury  Outcome: Progressing  Intervention: Promote Injury-Free Environment  Flowsheets (Taken 8/5/2024 0343)  Safety Promotion/Fall Prevention:   assistive device/personal item within reach   side rails raised x 2   nonskid shoes/socks when out of bed   bed alarm set   room near unit station   lighting adjusted   medications reviewed     Problem: Skin Injury Risk Increased  Goal: Skin Health and Integrity  Outcome: Progressing  Intervention: Optimize Skin Protection  Flowsheets (Taken 8/5/2024 0343)  Pressure Reduction Techniques: frequent weight shift encouraged  Activity Management:   Walk with assistive devise and /or staff member - L3   Arm raise - L1   Rolling - L1   Sitting at edge of bed - L2  Head of Bed (HOB) Positioning: HOB at 30-45 degrees

## 2024-08-05 NOTE — ASSESSMENT & PLAN NOTE
60y/o M pt with hx of ESRD on HD, AFib/flutter, T2DM, HTN, CVA, who presented with altered mental status after missing HD for 2 weeks and was admitted for hyperkalemia requiring HD on 7/31.     In the ED, the patient was hypoxic (SpO2:  88%), requiring 2 L O2.  Labs remarkable for no leukocytosis. CT head showed no acute process.  CT chest showed loculated moderate left pleural effusion, left lower lobe atelectasis/consolidation, a large pericardial effusion measuring 3.6 cm, findings suggestive of pulmonary edema. Nephrology was consulted for HD. Received empiric vanc and zosyn.    ID consulted for bacteremia. BCx 8/1 growing an unidentified GPC.    Recommendations:  --continue empiric vanc pending Id and susceptibilities of GPC  --f/u repeat BCx  --pulm eval of left loculated pleural effusion for possible  thora

## 2024-08-05 NOTE — NURSING
[facility]  Nurses Note -- 4 Eyes      8/5/2024       Skin assessed on: Q Shift      [] No Pressure Injuries Present    [x]Prevention Measures Documented    [x] Yes LDA  for Pressure Injury Previously documented     [] Yes New Pressure Injury Discovered   [] LDA for New Pressure Injury Added      Attending RN:  Zoë Kiran LPN     Second RN:  BETO Suresh

## 2024-08-05 NOTE — SUBJECTIVE & OBJECTIVE
Interval History: spoke with pt at bedside; see ACP section of plan     Medications:  Continuous Infusions:  Scheduled Meds:   allopurinoL  100 mg Oral Daily    amiodarone  200 mg Oral Daily    amLODIPine  10 mg Oral Daily    apixaban  2.5 mg Oral BID    atorvastatin  80 mg Oral Daily    epoetin luli-epbx  10,000 Units Subcutaneous Every Mon, Wed, Fri    labetaloL  100 mg Oral Q12H    mupirocin   Nasal BID    pantoprazole  40 mg Oral Daily    sevelamer carbonate  1,600 mg Oral TID WM    tamsulosin  0.4 mg Oral Daily    vitamin renal formula (B-complex-vitamin c-folic acid)  1 capsule Oral Daily     PRN Meds:  Current Facility-Administered Medications:     acetaminophen, 650 mg, Oral, Q4H PRN    hydrALAZINE, 20 mg, Intravenous, Q4H PRN    melatonin, 6 mg, Oral, Nightly PRN    ondansetron, 4 mg, Intravenous, Q8H PRN    prochlorperazine, 5 mg, Intravenous, Q6H PRN    sodium chloride 0.9%, 10 mL, Intravenous, PRN    Pharmacy to dose Vancomycin consult, , , Once **AND** vancomycin - pharmacy to dose, , Intravenous, pharmacy to manage frequency    Objective:     Vital Signs (Most Recent):  Temp: 98.2 °F (36.8 °C) (08/05/24 0925)  Pulse: 78 (08/05/24 0930)  Resp: 18 (08/05/24 0925)  BP: 127/76 (08/05/24 0930)  SpO2: 97 % (08/05/24 0711) Vital Signs (24h Range):  Temp:  [98 °F (36.7 °C)-98.3 °F (36.8 °C)] 98.2 °F (36.8 °C)  Pulse:  [69-88] 78  Resp:  [15-18] 18  SpO2:  [94 %-99 %] 97 %  BP: (104-141)/(56-79) 127/76     Weight: 66.3 kg (146 lb 2.6 oz)  Body mass index is 22.89 kg/m².       Physical Exam  Constitutional:       Comments: Somewhat chronically ill-appearing, lying in bed, in no distress   Neurological:      Mental Status: He is alert.      Comments: Able to have coherent and insightful conversation        Significant Labs: All pertinent labs within the past 24 hours have been reviewed.  CBC:   Recent Labs   Lab 08/05/24  0421   WBC 7.17   HGB 9.4*   HCT 30.7*   MCV 89   *     BMP:  Recent Labs   Lab  08/05/24  0421   *   *   K 4.6   CL 95   CO2 23   BUN 76*   CREATININE 10.2*   CALCIUM 7.8*     LFT:  Lab Results   Component Value Date    AST 12 08/01/2024    ALKPHOS 174 (H) 08/01/2024    BILITOT 0.8 08/01/2024     Albumin:   Albumin   Date Value Ref Range Status   08/01/2024 3.1 (L) 3.5 - 5.2 g/dL Final     Protein:   Total Protein   Date Value Ref Range Status   08/01/2024 8.7 (H) 6.0 - 8.4 g/dL Final     Lactic acid:   Lab Results   Component Value Date    LACTATE 1.1 08/01/2024    LACTATE 2.6 (H) 05/19/2024       Significant Imaging: I have reviewed all pertinent imaging results/findings within the past 24 hours.

## 2024-08-05 NOTE — HPI
58y/o M pt with hx of ESRD on HD, AFib/flutter, type 2 diabetes, hyperlipidemia, hypertension, CVA, who presented with altered mental status after missing HD for 2 weeks and was admitted for hyperkalemia requiring HD on 7/31.     Per chart, the patient's family reported he has been bedridden, more confused and developed fecal incontinence. On arrival to the ER, the patient was covered in stool.       In the ED, the patient was hypoxic (SpO2:  88%), requiring 2 L O2. Labs were remarkable for hyperkalemia (7.4). CT head showed no acute process.  CT chest showed loculated moderate left pleural effusion, left lower lobe atelectasis/consolidation, a large pericardial effusion measuring 3.6 cm, findings suggestive of pulmonary edema. Nephrology was consulted for HD. Received empiric vanc and zosyn.    ID consulted for bacteremia. BCx 8/1 growing an unidentified GPC.

## 2024-08-05 NOTE — PROGRESS NOTES
Pharmacokinetic Assessment Follow Up: IV Vancomycin    Vancomycin serum concentration assessment(s):    The random level was drawn correctly and can be used to guide therapy at this time. The measurement is within the desired definitive target range of 10 to 20 mcg/mL.    Vancomycin Regimen Plan:    Vancomycin 500mg to be administered post HD if patient has HD.    Re-dose when the random level is less than 20 mcg/mL, next level to be drawn at 0400 on 8/7/24    Drug levels (last 3 results):  Recent Labs   Lab Result Units 08/03/24  0423 08/04/24  0502 08/05/24  0421   Vancomycin, Random ug/mL 6.5 15.7 13.3       Pharmacy will continue to follow and monitor vancomycin.    Please contact pharmacy at extension [phone] for questions regarding this assessment.    Thank you for the consult,   Cristopher Gomez       Patient brief summary:  Mahesh Cespedes is a 59 y.o. male initiated on antimicrobial therapy with IV Vancomycin for treatment of skin & soft tissue infection    The patient's current regimen is random pulse dosing    Drug Allergies:   Review of patient's allergies indicates:  No Known Allergies    Actual Body Weight:   66.3 kg    Renal Function:   Estimated Creatinine Clearance: 8.1 mL/min (A) (based on SCr of 9 mg/dL (H)).,     Dialysis Method (if applicable):  intermittent HD    CBC (last 72 hours):  Recent Labs   Lab Result Units 08/05/24  0421   WBC K/uL 7.17   Hemoglobin g/dL 9.4*   Hematocrit % 30.7*   Platelets K/uL 145*   Gran % % 77.6*   Lymph % % 10.6*   Mono % % 8.4   Eosinophil % % 2.6   Basophil % % 0.4   Differential Method  Automated       Metabolic Panel (last 72 hours):  Recent Labs   Lab Result Units 08/03/24  0423 08/04/24  0502   Sodium mmol/L 133* 133*   Potassium mmol/L 5.5* 4.4   Chloride mmol/L 94* 96   CO2 mmol/L 21* 25   Glucose mg/dL 75 158*   BUN mg/dL 114* 62*   Creatinine mg/dL 12.9* 9.0*   Phosphorus mg/dL  --  5.0*       Vancomycin Administrations:  vancomycin given in the last 96  hours                     vancomycin (VANCOCIN) 1,000 mg in D5W 250 mL IVPB (Vial-Mate) (mg) 1,000 mg New Bag 08/03/24 1525    vancomycin (VANCOCIN) 500 mg in D5W 100 mL IVPB (MB+) (mg) 500 mg New Bag 08/02/24 2052                    Microbiologic Results:  Microbiology Results (last 7 days)       Procedure Component Value Units Date/Time    Blood culture [[id number]] Collected: 08/04/24 1822    Order Status: Completed Specimen: Blood Updated: 08/05/24 0512     Blood Culture, Routine No Growth to date    Blood culture [[id number]] Collected: 08/04/24 1821    Order Status: Completed Specimen: Blood Updated: 08/05/24 0512     Blood Culture, Routine No Growth to date    Blood culture x two cultures. Draw prior to antibiotics. [[id number]] Collected: 08/01/24 0103    Order Status: Completed Specimen: Blood from Peripheral, Forearm, Left Updated: 08/05/24 0303     Blood Culture, Routine No Growth after 4 days.    Narrative:      Aerobic and anaerobic    Blood culture x two cultures. Draw prior to antibiotics. [[id number]] Collected: 08/01/24 0104    Order Status: Completed Specimen: Blood from Peripheral, Forearm, Right Updated: 08/04/24 1744     Blood Culture, Routine Gram stain aer bottle: Gram positive cocci in clusters resembling Staph      Results called to and read back by: Amanda BLANCAS RN 08/04/2024  17:41    Narrative:      Aerobic and anaerobic    Stool culture [[id number]]     Order Status: Canceled Specimen: Stool     Clostridium difficile EIA [[id number]]     Order Status: Canceled Specimen: Stool

## 2024-08-05 NOTE — PT/OT/SLP PROGRESS
Physical Therapy      Patient Name:  Mahesh Cespedes   MRN:  [id number]    Patient not seen today secondary to Dialysis (dialysis 1st attempt and refused due to being tired 2nd attempt). Will follow-up tomorrow.
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
## 2024-08-05 NOTE — NURSING
[facility]  Nurses Note -- 4 Eyes      8/4/2024       Skin assessed on: Q Shift      [] No Pressure Injuries Present    []Prevention Measures Documented    [x] Yes LDA  for Pressure Injury Previously documented     [] Yes New Pressure Injury Discovered   [] LDA for New Pressure Injury Added      Attending RN:  Demetrice Moreira RN     Second RN:  JAVIER Wagoner

## 2024-08-05 NOTE — PROGRESS NOTES
[facility]  Palliative Medicine  Progress Note    Patient Name: Mahesh Cespedes  MRN: [id number]  Admission Date: 2024  Hospital Length of Stay: 4 days  Code Status: Full Code   Attending Provider: Suzie Ron, *  Consulting Provider: Maida Zhong MD  Primary Care Physician: Cruz Hernandez MD  Principal Problem:Hyperkalemia    Assessment/Plan:     Advance Care Planning    24  - Chart and interval history reviewed; pt tolerating HD.   - Visited with pt at bedside; using a [employer] Creole interpretor, had a long conversation with pt about his reasoning for missing HD over the last two weeks, as well as discussion of his care preferences moving forward. During entirety of visit he was alert and readily able to participate in discussion.   - He admitted he has been depressed since  (when his wife ), and has been seeing other family members less and less. Emotioanl support provided; validated that it is certainly understandable to be saddened by this situation.   - In discussing desire to restart HD vs transition to end-of-life/hospice care, pt was clear that he would like to resume HD in hopes of having more time ahead of him. Validated this choice, though reminded him that HD is a form of life support, and it is imperative that he not skip any sessions. As he brought up having occasional challneges with getting to HD center, I let him know that SW/CM can assist him with arranging this.   - At this time, plan remains to continue with HD; patient understands the importance of attending every session, though would benefit from SW/CM consult to discuss transportation options for this. He would also benefit from home health and home-based palliative care follow-up.   - Updated primary team in person after visit; will follow up with pt     24  - See initial consult note by Leatha Ramsey     Neuro  History of CVA (cerebrovascular accident)  - Noted, though seems to perform at least  some ADLs unassisted. PT/OT consult.     Renal/  ESRD needing dialysis  - Pt has voiced desire to continue with this, though would benefit from transportation assistance     Orthopedic  Sacral decubitus ulcer  - Wound care and PT/OT consults    I will follow-up with patient. Please contact us if you have any additional questions.    LAUREN Jaime  Palliative Medicine Staff   [phone]    Total visit time: 51 minutes    > 50% of 35 min visit spent in chart review, face to face discussion of symptom assessment, coordination of care with other specialists, and discharge planning.    16 min ACP time spent: goals of care, emotional support, formulating and communicating prognosis, exploring burden/ benefit of various approaches of treatment.      Subjective:     Interval History: spoke with pt at bedside; see ACP section of plan     Medications:  Continuous Infusions:  Scheduled Meds:   allopurinoL  100 mg Oral Daily    amiodarone  200 mg Oral Daily    amLODIPine  10 mg Oral Daily    apixaban  2.5 mg Oral BID    atorvastatin  80 mg Oral Daily    epoetin luli-epbx  10,000 Units Subcutaneous Every Mon, Wed, Fri    labetaloL  100 mg Oral Q12H    mupirocin   Nasal BID    pantoprazole  40 mg Oral Daily    sevelamer carbonate  1,600 mg Oral TID WM    tamsulosin  0.4 mg Oral Daily    vitamin renal formula (B-complex-vitamin c-folic acid)  1 capsule Oral Daily     PRN Meds:  Current Facility-Administered Medications:     acetaminophen, 650 mg, Oral, Q4H PRN    hydrALAZINE, 20 mg, Intravenous, Q4H PRN    melatonin, 6 mg, Oral, Nightly PRN    ondansetron, 4 mg, Intravenous, Q8H PRN    prochlorperazine, 5 mg, Intravenous, Q6H PRN    sodium chloride 0.9%, 10 mL, Intravenous, PRN    Pharmacy to dose Vancomycin consult, , , Once **AND** vancomycin - pharmacy to dose, , Intravenous, pharmacy to manage frequency    Objective:     Vital Signs (Most Recent):  Temp: 98.2 °F (36.8 °C) (08/05/24 0925)  Pulse: 78 (08/05/24  0930)  Resp: 18 (08/05/24 0925)  BP: 127/76 (08/05/24 0930)  SpO2: 97 % (08/05/24 0711) Vital Signs (24h Range):  Temp:  [98 °F (36.7 °C)-98.3 °F (36.8 °C)] 98.2 °F (36.8 °C)  Pulse:  [69-88] 78  Resp:  [15-18] 18  SpO2:  [94 %-99 %] 97 %  BP: (104-141)/(56-79) 127/76     Weight: 66.3 kg (146 lb 2.6 oz)  Body mass index is 22.89 kg/m².       Physical Exam  Constitutional:       Comments: Somewhat chronically ill-appearing, lying in bed, in no distress   Neurological:      Mental Status: He is alert.      Comments: Able to have coherent and insightful conversation        Significant Labs: All pertinent labs within the past 24 hours have been reviewed.  CBC:   Recent Labs   Lab 08/05/24 0421   WBC 7.17   HGB 9.4*   HCT 30.7*   MCV 89   *     BMP:  Recent Labs   Lab 08/05/24  0421   *   *   K 4.6   CL 95   CO2 23   BUN 76*   CREATININE 10.2*   CALCIUM 7.8*     LFT:  Lab Results   Component Value Date    AST 12 08/01/2024    ALKPHOS 174 (H) 08/01/2024    BILITOT 0.8 08/01/2024     Albumin:   Albumin   Date Value Ref Range Status   08/01/2024 3.1 (L) 3.5 - 5.2 g/dL Final     Protein:   Total Protein   Date Value Ref Range Status   08/01/2024 8.7 (H) 6.0 - 8.4 g/dL Final     Lactic acid:   Lab Results   Component Value Date    LACTATE 1.1 08/01/2024    LACTATE 2.6 (H) 05/19/2024       Significant Imaging: I have reviewed all pertinent imaging results/findings within the past 24 hours.

## 2024-08-05 NOTE — PROGRESS NOTES
[facility]  Progress Note    Patient Name: Mahesh Cespedes  MRN: [id number]  Patient Class: IP- Inpatient   Admission Date: 7/31/2024  Length of Stay: 4 days  Attending Physician: Suzie Ron, *  Primary Care Provider: Cruz Hernandez MD        Subjective:     Principal Problem:Hyperkalemia        HPI:  This is a 59-year-old male with a past medical history of ESRD on HD, AFib/flutter (on Eliquis), type 2 diabetes, hyperlipidemia, hypertension, CVA, who presents with altered mental status.      Patient presents for evaluation of altered mental status in the setting of missing dialysis for 2 weeks.  Per the patient's family, he has been bedridden, more confused and developed fecal incontinence. On arrival to the ER, the patient was covered in stool.  Patient is unable to contribute to the history.     In the ED, the patient was I hypertensive go toxic (SpO2:  88%), requiring 2 L O2. Labs were remarkable for hyperkalemia (7.4), elevated BUN (182) , normocytic anemia (10.2).  CT head showed no acute process.  CT chest showed loculated moderate left pleural effusion, left lower lobe atelectasis/consolidation, a large pericardial effusion measuring 3.6 cm, findings suggestive of pulmonary edema. Nephrology was consulted, with plans to dialyze the patient.  Patient was given albuterol, calcium gluconate, insulin/D10, Lasix 80 mg IV, sodium bicarb 100 mEq, Lokelma 10 g, vancomycin, Zosyn, Zofran 4 mg IV. He was admitted for further management.     Overview/Hospital Course:  patient  with a past medical history of ESRD on HD, AFib/flutter (on Eliquis), type 2 diabetes, hyperlipidemia, hypertension, CVA, who presents with acute metabolic encephaloptyhy,duo to missed HD for long time,Hyperkalemia,sacral wounds,with medical treatment and HD hyperkalemia is improving,nephrology doing HD,  On empiric IV Vancomycin,consulted PT,OT,  No sign of peaked T waves,transfered to floor.  Consulted  palliative.remains full code.  Sacral wound care by wound care.  Uremia and hyperkalemia is much better  with HD.  Has staph. Bacteremia on 8.1.24,was already on vanc. Repeated blood culture.check TTE for endocarditis,consulted ID,not sure is if real infection.  Consulted SW for help with transportation.  Per PT,OT , TBD      Interval History: with medical treatment and HD hyperkalemia is improving,nephrology doing HD,  On empiric IV Vancomycin,consulted PT,OT,  No sign of peaked T waves,transferred to floor.  Consulted palliative.  Sacral wound care by wound care.  Uremia and hyperkalemia is much better  with HD.  Has staph. Bacteremia on 8.1.24,was already on vanc. Repeated blood culture.check TTE for endocarditis,consulted ID,not sure is if real infection.  Review of Systems   Constitutional:  Positive for activity change and appetite change.   Respiratory:  Negative for apnea and choking.    Gastrointestinal:  Negative for abdominal distention and abdominal pain.   Musculoskeletal:  Negative for arthralgias.   Skin:  Positive for wound.   Neurological:  Positive for weakness.   Psychiatric/Behavioral:  Positive for behavioral problems. Negative for agitation.      Objective:     Vital Signs (Most Recent):  Temp: 98.2 °F (36.8 °C) (08/05/24 0925)  Pulse: 78 (08/05/24 1100)  Resp: 18 (08/05/24 0925)  BP: 119/66 (08/05/24 1100)  SpO2: 97 % (08/05/24 0711) Vital Signs (24h Range):  Temp:  [98 °F (36.7 °C)-98.3 °F (36.8 °C)] 98.2 °F (36.8 °C)  Pulse:  [69-88] 78  Resp:  [15-18] 18  SpO2:  [94 %-99 %] 97 %  BP: (104-141)/(56-79) 119/66     Weight: 66.3 kg (146 lb 2.6 oz)  Body mass index is 22.89 kg/m².    Intake/Output Summary (Last 24 hours) at 8/5/2024 1124  Last data filed at 8/5/2024 0925  Gross per 24 hour   Intake 240 ml   Output 170 ml   Net 70 ml         Physical Exam  Pulmonary:      Effort: No respiratory distress.   Abdominal:      General: There is no distension.   Musculoskeletal:      Cervical back:  Normal range of motion.   Skin:     General: Skin is warm.      Comments: Sacral wounds    Neurological:      General: No focal deficit present.      Mental Status: He is alert.             Significant Labs: All pertinent labs within the past 24 hours have been reviewed.  BMP:   Recent Labs   Lab 08/05/24  0421   *   *   K 4.6   CL 95   CO2 23   BUN 76*   CREATININE 10.2*   CALCIUM 7.8*     CBC:   Recent Labs   Lab 08/05/24  0421   WBC 7.17   HGB 9.4*   HCT 30.7*   *       CMP:   Recent Labs   Lab 08/04/24  0502 08/05/24  0421   * 132*   K 4.4 4.6   CL 96 95   CO2 25 23   * 133*   BUN 62* 76*   CREATININE 9.0* 10.2*   CALCIUM 8.0* 7.8*   ANIONGAP 12 14       Significant Imaging: I have reviewed all pertinent imaging results/findings within the past 24 hours.    Assessment/Plan:      * Hyperkalemia  Hyperkalemia is likely due to ESRD.The patients most recent potassium results are listed below.  Recent Labs     08/01/24  0725 08/02/24  0430 08/03/24  0423   K 6.1* 5.0 5.5*       Plan  - Monitor for arrhythmias with EKG and/or continuous telemetry.   - Treat the hyperkalemia with Potassium Binders, Calcium gluconate, IV insulin and dextrose, Nebulized albuterol sulfate, Furosemide, and Sodium Bicarbonate.   - Monitor potassium: Daily  - Nephrology consult for dialysis   with medical treatment and HD hyperkalemia is improving,nephrology doing HD,  On empiric IV Vancomycin,consulted PT,OT,  No sign of peaked T waves,transferred to floor.  Consulted palliative.  Resolved with HD          Controlled type 2 diabetes mellitus with chronic kidney disease on chronic dialysis, without long-term current use of insulin  Lab Results   Component Value Date    HGBA1C 5.5 03/15/2022     Daily BMPs.     ACP (advance care planning)  Remains full code,spent over 5 minutes.      Pericardial effusion  Echo. Is stable       Bacteremia due to Staphylococcus  Has staph. Bacteremia on 8.1.24,was already on  vanc. Repeated blood culture.check TTE for endocarditis,consulted ID,not sure is if real infection.      Sacral decubitus ulcer  Noted. Frequent turns. Wound care       Paroxysmal atrial fibrillation  Continue Eliquis, Metoprolol.   [id number] Score: 1.     History of CVA (cerebrovascular accident)  Continue home medications       Anemia in ESRD (end-stage renal disease)  Anemia is likely due to chronic disease due to Chronic Kidney Disease. Most recent hemoglobin and hematocrit are listed below.  Recent Labs     08/01/24  0102 08/01/24  0227   HGB 10.2*  --    HCT 31.4* 32*     Monitor     Essential hypertension  Chronic, controlled. Latest blood pressure and vitals reviewed-     Temp:  [97.8 °F (36.6 °C)-100 °F (37.8 °C)]   Pulse:  [85-94]   Resp:  [12-24]   BP: (130-183)/(80-96)   SpO2:  [88 %-100 %] .   Home meds for hypertension were reviewed and noted below.   Hypertension Medications               amLODIPine (NORVASC) 10 MG tablet Take 10 mg by mouth once daily.    metoprolol succinate (TOPROL-XL) 50 MG 24 hr tablet Take 1 tablet (50 mg total) by mouth once daily.            While in the hospital, will manage blood pressure as follows; Continue home antihypertensive regimen    Will utilize p.r.n. blood pressure medication only if patient's blood pressure greater than 180/110 and he develops symptoms such as worsening chest pain or shortness of breath.    ESRD needing dialysis  Creatine stable for now. BMP reviewed- noted Estimated Creatinine Clearance: 7.1 mL/min (A) (based on SCr of 10.2 mg/dL (H)). according to latest data. Based on current GFR, CKD stage is end stage.  Monitor UOP and serial BMP and adjust therapy as needed. Renally dose meds. Avoid nephrotoxic medications and procedures. Nephrology consult   Has HD,consulted SW help with transportation,.      VTE Risk Mitigation (From admission, onward)           Ordered     apixaban tablet 2.5 mg  2 times daily         08/01/24 0441     IP VTE LOW RISK  PATIENT  Once         08/01/24 0441     Place sequential compression device  Until discontinued         08/01/24 0441                    Discharge Planning   JAIME: 8/5/2024     Code Status: Full Code   Is the patient medically ready for discharge?:     Reason for patient still in hospital (select all that apply): Patient trending condition  Discharge Plan A: Home Health                  Suzie Ron MD  Department of Hospital Medicine   [facility]

## 2024-08-05 NOTE — PLAN OF CARE
Problem: Adult Inpatient Plan of Care  Goal: Plan of Care Review  Outcome: Progressing  Flowsheets (Taken 8/5/2024 1249)  Plan of Care Reviewed With: patient  Goal: Patient-Specific Goal (Individualized)  Outcome: Progressing  Goal: Absence of Hospital-Acquired Illness or Injury  Outcome: Progressing  Intervention: Identify and Manage Fall Risk  Flowsheets (Taken 8/5/2024 1249)  Safety Promotion/Fall Prevention:   assistive device/personal item within reach   bed alarm set   high risk medications identified   nonskid shoes/socks when out of bed   medications reviewed   instructed to call staff for mobility   side rails raised x 2   room near unit station   Fall Risk reviewed with patient/family   patient expresses understanding of fall risk and prevention  Intervention: Prevent Skin Injury  Flowsheets (Taken 8/5/2024 1249)  Body Position:   position changed independently   weight shifting  Device Skin Pressure Protection:   absorbent pad utilized/changed   adhesive use limited   pressure points protected   tubing/devices free from skin contact  Intervention: Prevent and Manage VTE (Venous Thromboembolism) Risk  Flowsheets (Taken 8/5/2024 1249)  VTE Prevention/Management:   ambulation promoted   bleeding precations maintained   bleeding risk assessed  Intervention: Prevent Infection  Flowsheets (Taken 8/5/2024 1249)  Infection Prevention: hand hygiene promoted  Goal: Optimal Comfort and Wellbeing  Outcome: Progressing  Goal: Readiness for Transition of Care  Outcome: Progressing     Problem: Hemodialysis  Goal: Safe, Effective Therapy Delivery  Outcome: Progressing  Goal: Effective Tissue Perfusion  Outcome: Progressing  Goal: Absence of Infection Signs and Symptoms  Outcome: Progressing  Intervention: Prevent or Manage Infection  Flowsheets (Taken 8/5/2024 1249)  Infection Prevention: hand hygiene promoted  Infection Management: aseptic technique maintained     Problem: Infection  Goal: Absence of Infection Signs  and Symptoms  Outcome: Progressing  Intervention: Prevent or Manage Infection  Flowsheets (Taken 8/5/2024 1249)  Infection Management: aseptic technique maintained     Problem: Wound  Goal: Optimal Coping  Outcome: Progressing  Goal: Optimal Functional Ability  Outcome: Progressing  Goal: Absence of Infection Signs and Symptoms  Outcome: Progressing  Intervention: Prevent or Manage Infection  Flowsheets (Taken 8/5/2024 1249)  Infection Management: aseptic technique maintained  Goal: Improved Oral Intake  Outcome: Progressing  Goal: Optimal Pain Control and Function  Outcome: Progressing  Goal: Skin Health and Integrity  Outcome: Progressing  Intervention: Optimize Skin Protection  Flowsheets (Taken 8/5/2024 1249)  Pressure Reduction Techniques:   frequent weight shift encouraged   weight shift assistance provided   pressure points protected  Activity Management:   Ankle pumps - L1   Arm raise - L1   Rolling - L1   Straight leg raise - L1  Head of Bed (HOB) Positioning: HOB at 30-45 degrees  Goal: Optimal Wound Healing  Outcome: Progressing     Problem: Fall Injury Risk  Goal: Absence of Fall and Fall-Related Injury  Outcome: Progressing  Intervention: Identify and Manage Contributors  Flowsheets (Taken 8/5/2024 1249)  Self-Care Promotion:   independence encouraged   BADL personal objects within reach   BADL personal routines maintained   meal set-up provided   adaptive equipment use encouraged  Medication Review/Management:   medications reviewed   high-risk medications identified  Intervention: Promote Injury-Free Environment  Flowsheets (Taken 8/5/2024 1249)  Safety Promotion/Fall Prevention:   assistive device/personal item within reach   bed alarm set   high risk medications identified   nonskid shoes/socks when out of bed   medications reviewed   instructed to call staff for mobility   side rails raised x 2   room near unit station   Fall Risk reviewed with patient/family   patient expresses understanding of  fall risk and prevention     Problem: Skin Injury Risk Increased  Goal: Skin Health and Integrity  Outcome: Progressing     Problem: Coping Ineffective  Goal: Effective Coping  Outcome: Progressing   Pt alert able to make needs known,kira meds well,no s/s adverse reaction noted,reposition self q 2hrs,no c/o pain at this time,POC explained,remains free from falls and pressure injuries,safety maintained,continue monitoring.

## 2024-08-05 NOTE — PT/OT/SLP PROGRESS
Occupational Therapy      Patient Name:  Mahesh Cespedes   MRN:  [id number]    Patient not seen today secondary to Dialysis. Will follow-up as able.    8/5/2024

## 2024-08-05 NOTE — ASSESSMENT & PLAN NOTE
Has staph. Bacteremia on 8.1.24,was already on vanc. Repeated blood culture.check TTE for endocarditis,consulted ID,not sure is if real infection.

## 2024-08-05 NOTE — SUBJECTIVE & OBJECTIVE
Interval History: with medical treatment and HD hyperkalemia is improving,nephrology doing HD,  On empiric IV Vancomycin,consulted PT,OT,  No sign of peaked T waves,transferred to floor.  Consulted palliative.  Sacral wound care by wound care.  Uremia and hyperkalemia is much better  with HD.  Has staph. Bacteremia on 8.1.24,was already on vanc. Repeated blood culture.check TTE for endocarditis,consulted ID,not sure is if real infection.  Review of Systems   Constitutional:  Positive for activity change and appetite change.   Respiratory:  Negative for apnea and choking.    Gastrointestinal:  Negative for abdominal distention and abdominal pain.   Musculoskeletal:  Negative for arthralgias.   Skin:  Positive for wound.   Neurological:  Positive for weakness.   Psychiatric/Behavioral:  Positive for behavioral problems. Negative for agitation.      Objective:     Vital Signs (Most Recent):  Temp: 98.2 °F (36.8 °C) (08/05/24 0925)  Pulse: 78 (08/05/24 1100)  Resp: 18 (08/05/24 0925)  BP: 119/66 (08/05/24 1100)  SpO2: 97 % (08/05/24 0711) Vital Signs (24h Range):  Temp:  [98 °F (36.7 °C)-98.3 °F (36.8 °C)] 98.2 °F (36.8 °C)  Pulse:  [69-88] 78  Resp:  [15-18] 18  SpO2:  [94 %-99 %] 97 %  BP: (104-141)/(56-79) 119/66     Weight: 66.3 kg (146 lb 2.6 oz)  Body mass index is 22.89 kg/m².    Intake/Output Summary (Last 24 hours) at 8/5/2024 1124  Last data filed at 8/5/2024 0925  Gross per 24 hour   Intake 240 ml   Output 170 ml   Net 70 ml         Physical Exam  Pulmonary:      Effort: No respiratory distress.   Abdominal:      General: There is no distension.   Musculoskeletal:      Cervical back: Normal range of motion.   Skin:     General: Skin is warm.      Comments: Sacral wounds    Neurological:      General: No focal deficit present.      Mental Status: He is alert.             Significant Labs: All pertinent labs within the past 24 hours have been reviewed.  BMP:   Recent Labs   Lab 08/05/24  0421   *   NA  132*   K 4.6   CL 95   CO2 23   BUN 76*   CREATININE 10.2*   CALCIUM 7.8*     CBC:   Recent Labs   Lab 08/05/24  0421   WBC 7.17   HGB 9.4*   HCT 30.7*   *       CMP:   Recent Labs   Lab 08/04/24  0502 08/05/24  0421   * 132*   K 4.4 4.6   CL 96 95   CO2 25 23   * 133*   BUN 62* 76*   CREATININE 9.0* 10.2*   CALCIUM 8.0* 7.8*   ANIONGAP 12 14       Significant Imaging: I have reviewed all pertinent imaging results/findings within the past 24 hours.
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
## 2024-08-06 PROBLEM — J90 PLEURAL EFFUSION: Status: ACTIVE | Noted: 2024-08-06

## 2024-08-06 PROBLEM — R53.81 DEBILITY: Status: ACTIVE | Noted: 2024-08-06

## 2024-08-06 LAB
ANION GAP SERPL CALC-SCNC: 12 MMOL/L (ref 8–16)
BACTERIA BLD CULT: ABNORMAL
BUN SERPL-MCNC: 39 MG/DL (ref 8–23)
CALCIUM SERPL-MCNC: 8 MG/DL (ref 8.7–10.5)
CHLORIDE SERPL-SCNC: 103 MMOL/L (ref 95–110)
CO2 SERPL-SCNC: 25 MMOL/L (ref 23–29)
CREAT SERPL-MCNC: 6.7 MG/DL (ref 0.5–1.4)
EST. GFR  (NO RACE VARIABLE): 9 ML/MIN/1.73 M^2
GLUCOSE SERPL-MCNC: 117 MG/DL (ref 70–110)
HIV 1+2 AB+HIV1 P24 AG SERPL QL IA: NORMAL
POCT GLUCOSE: 140 MG/DL (ref 70–110)
POCT GLUCOSE: 150 MG/DL (ref 70–110)
POTASSIUM SERPL-SCNC: 4.4 MMOL/L (ref 3.5–5.1)
SODIUM SERPL-SCNC: 140 MMOL/L (ref 136–145)
TREPONEMA PALLIDUM IGG+IGM AB [PRESENCE] IN SERUM OR PLASMA BY IMMUNOASSAY: NONREACTIVE

## 2024-08-06 PROCEDURE — 97110 THERAPEUTIC EXERCISES: CPT

## 2024-08-06 PROCEDURE — 97116 GAIT TRAINING THERAPY: CPT

## 2024-08-06 PROCEDURE — 80048 BASIC METABOLIC PNL TOTAL CA: CPT | Performed by: HOSPITALIST

## 2024-08-06 PROCEDURE — 25000003 PHARM REV CODE 250: Performed by: HOSPITALIST

## 2024-08-06 PROCEDURE — 86593 SYPHILIS TEST NON-TREP QUANT: CPT | Performed by: INTERNAL MEDICINE

## 2024-08-06 PROCEDURE — 99233 SBSQ HOSP IP/OBS HIGH 50: CPT | Mod: ,,, | Performed by: INTERNAL MEDICINE

## 2024-08-06 PROCEDURE — 94761 N-INVAS EAR/PLS OXIMETRY MLT: CPT

## 2024-08-06 PROCEDURE — 97535 SELF CARE MNGMENT TRAINING: CPT

## 2024-08-06 PROCEDURE — 87389 HIV-1 AG W/HIV-1&-2 AB AG IA: CPT | Performed by: INTERNAL MEDICINE

## 2024-08-06 PROCEDURE — 99223 1ST HOSP IP/OBS HIGH 75: CPT | Mod: ,,, | Performed by: STUDENT IN AN ORGANIZED HEALTH CARE EDUCATION/TRAINING PROGRAM

## 2024-08-06 PROCEDURE — 36415 COLL VENOUS BLD VENIPUNCTURE: CPT | Performed by: INTERNAL MEDICINE

## 2024-08-06 PROCEDURE — 97530 THERAPEUTIC ACTIVITIES: CPT

## 2024-08-06 PROCEDURE — 25000003 PHARM REV CODE 250: Performed by: STUDENT IN AN ORGANIZED HEALTH CARE EDUCATION/TRAINING PROGRAM

## 2024-08-06 PROCEDURE — 21400001 HC TELEMETRY ROOM

## 2024-08-06 RX ADMIN — ALLOPURINOL 100 MG: 100 TABLET ORAL at 08:08

## 2024-08-06 RX ADMIN — LABETALOL HYDROCHLORIDE 100 MG: 100 TABLET, FILM COATED ORAL at 09:08

## 2024-08-06 RX ADMIN — SEVELAMER CARBONATE 1600 MG: 800 TABLET, FILM COATED ORAL at 12:08

## 2024-08-06 RX ADMIN — Medication 1 CAPSULE: at 08:08

## 2024-08-06 RX ADMIN — LABETALOL HYDROCHLORIDE 100 MG: 100 TABLET, FILM COATED ORAL at 08:08

## 2024-08-06 RX ADMIN — AMIODARONE HYDROCHLORIDE 200 MG: 200 TABLET ORAL at 08:08

## 2024-08-06 RX ADMIN — AMLODIPINE BESYLATE 5 MG: 5 TABLET ORAL at 08:08

## 2024-08-06 RX ADMIN — SEVELAMER CARBONATE 1600 MG: 800 TABLET, FILM COATED ORAL at 08:08

## 2024-08-06 RX ADMIN — ATORVASTATIN CALCIUM 80 MG: 40 TABLET, FILM COATED ORAL at 08:08

## 2024-08-06 RX ADMIN — APIXABAN 2.5 MG: 2.5 TABLET, FILM COATED ORAL at 08:08

## 2024-08-06 RX ADMIN — SEVELAMER CARBONATE 1600 MG: 800 TABLET, FILM COATED ORAL at 05:08

## 2024-08-06 RX ADMIN — PANTOPRAZOLE SODIUM 40 MG: 40 TABLET, DELAYED RELEASE ORAL at 08:08

## 2024-08-06 RX ADMIN — TAMSULOSIN HYDROCHLORIDE 0.4 MG: 0.4 CAPSULE ORAL at 08:08

## 2024-08-06 NOTE — SUBJECTIVE & OBJECTIVE
Interval History: Remained afebrile overnight. Feeling well today.     Review of Systems   Constitutional:  Negative for chills and fever.   Eyes:  Negative for visual disturbance.   Respiratory:  Negative for cough and shortness of breath.    Musculoskeletal:  Positive for back pain and gait problem.   Skin:  Negative for wound.     Objective:     Vital Signs (Most Recent):  Temp: 97.8 °F (36.6 °C) (08/06/24 1133)  Pulse: 73 (08/06/24 1515)  Resp: 18 (08/06/24 1133)  BP: (!) 167/79 (08/06/24 1133)  SpO2: 99 % (08/06/24 1133) Vital Signs (24h Range):  Temp:  [97.8 °F (36.6 °C)-98.5 °F (36.9 °C)] 97.8 °F (36.6 °C)  Pulse:  [73-77] 73  Resp:  [17-18] 18  SpO2:  [95 %-99 %] 99 %  BP: (138-167)/(63-79) 167/79     Weight: 59.7 kg (131 lb 9.8 oz)  Body mass index is 20.61 kg/m².    Estimated Creatinine Clearance: 9.8 mL/min (A) (based on SCr of 6.7 mg/dL (H)).     Physical Exam  Vitals and nursing note reviewed.   Constitutional:       Appearance: Normal appearance.   HENT:      Head: Normocephalic and atraumatic.      Nose: Nose normal. No congestion.      Mouth/Throat:      Mouth: Mucous membranes are moist.      Pharynx: Oropharynx is clear.   Eyes:      Conjunctiva/sclera: Conjunctivae normal.      Pupils: Pupils are equal, round, and reactive to light.   Cardiovascular:      Rate and Rhythm: Normal rate and regular rhythm.      Comments: Lt chest tunneled line in place  Pulmonary:      Effort: Pulmonary effort is normal. No respiratory distress.      Breath sounds: Normal breath sounds.   Abdominal:      General: Abdomen is flat. There is no distension.      Palpations: Abdomen is soft.   Musculoskeletal:         General: No swelling. Normal range of motion.      Cervical back: Normal range of motion and neck supple.      Comments: TTP on spine (difficult pin point)   Skin:     General: Skin is warm and dry.      Findings: No lesion.   Neurological:      General: No focal deficit present.      Mental Status: He is  alert and oriented to person, place, and time.   Psychiatric:         Mood and Affect: Mood normal.         Behavior: Behavior normal.          Significant Labs: Blood Culture:   Recent Labs   Lab 02/20/24  1759 08/01/24  0103 08/01/24  0104 08/04/24  1821 08/04/24  1822   LABBLOO No Growth after 4 days. No Growth after 4 days. Gram stain aer bottle: Gram positive cocci in clusters resembling Staph  Results called to and read back by: Amanda BLANCAS RN 08/04/2024  17:41  COAGULASE-NEGATIVE STAPHYLOCOCCUS SPECIES  Organism is a probable contaminant  * No Growth to date  No Growth to date No Growth to date  No Growth to date       Significant Imaging: I have reviewed all pertinent imaging results/findings within the past 24 hours.
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
## 2024-08-06 NOTE — PLAN OF CARE
Problem: Adult Inpatient Plan of Care  Goal: Absence of Hospital-Acquired Illness or Injury  Outcome: Progressing  Intervention: Identify and Manage Fall Risk  Flowsheets (Taken 8/6/2024 0419)  Safety Promotion/Fall Prevention:   assistive device/personal item within reach   instructed to call staff for mobility   bed alarm set   side rails raised x 2   room near unit station   lighting adjusted   medications reviewed  Goal: Optimal Comfort and Wellbeing  Outcome: Progressing  Intervention: Monitor Pain and Promote Comfort  Flowsheets (Taken 8/6/2024 0419)  Pain Management Interventions:   pain management plan reviewed with patient/caregiver   pillow support provided     Problem: Diabetes Comorbidity  Goal: Blood Glucose Level Within Targeted Range  Outcome: Progressing  Intervention: Monitor and Manage Glycemia  Flowsheets (Taken 8/6/2024 0419)  Glycemic Management: blood glucose monitored     Problem: Fall Injury Risk  Goal: Absence of Fall and Fall-Related Injury  Outcome: Progressing  Intervention: Identify and Manage Contributors  Flowsheets (Taken 8/6/2024 0419)  Self-Care Promotion: independence encouraged  Medication Review/Management: medications reviewed

## 2024-08-06 NOTE — ASSESSMENT & PLAN NOTE
Lab Results   Component Value Date    HGBA1C 5.5 03/15/2022     SSI PRN to maintain goal 140-180  ADA diet, accuchecks ACHS, hypoglycemic protocol

## 2024-08-06 NOTE — ASSESSMENT & PLAN NOTE
PT/OT consulted:  Recommends moderate intensity therapy   Family agreeable   /case management consulted for placement and to assist with discharge planning

## 2024-08-06 NOTE — ASSESSMENT & PLAN NOTE
Anemia is likely due to chronic disease due to Chronic Kidney Disease. Most recent hemoglobin and hematocrit are listed below.  Recent Labs     08/05/24  0421   HGB 9.4*   HCT 30.7*       Monitor

## 2024-08-06 NOTE — ASSESSMENT & PLAN NOTE
Hyperkalemia is likely due to ESRD.The patients most recent potassium results are listed below.  Recent Labs     08/04/24  0502 08/05/24  0421 08/06/24  0407   K 4.4 4.6 4.4       Plan  -presented with K >7, likely due to missed multiple dialysis sessions  - Monitor for arrhythmias with EKG and/or continuous telemetry.   - Treat the hyperkalemia with Potassium Binders, Calcium gluconate, IV insulin and dextrose, Nebulized albuterol sulfate, Furosemide, and Sodium Bicarbonate.   - Monitor potassium: Daily  - Nephrology consult for dialysis   -hypokalemia resolved with medical treatment and HD   -Nephrology consulted  -No sign of peaked T waves  -Resolved with HD

## 2024-08-06 NOTE — ASSESSMENT & PLAN NOTE
CT with L loculated pleural effusion. Appreciate pulm and IR assistance. Family opting to forego thora as they feel risks of procedure outweigh benefits.

## 2024-08-06 NOTE — PROGRESS NOTES
[facility] Medicine  Progress Note    Patient Name: Mahesh Cespedes  MRN: [id number]  Patient Class: IP- Inpatient   Admission Date: 7/31/2024  Length of Stay: 5 days  Attending Physician: Jonathan Martinez DO  Primary Care Provider: Cruz Hernandez MD        Subjective:     Principal Problem:Hyperkalemia        HPI:  This is a 59-year-old male with a past medical history of ESRD on HD, AFib/flutter (on Eliquis), type 2 diabetes, hyperlipidemia, hypertension, CVA, who presents with altered mental status.      Patient presents for evaluation of altered mental status in the setting of missing dialysis for 2 weeks.  Per the patient's family, he has been bedridden, more confused and developed fecal incontinence. On arrival to the ER, the patient was covered in stool.  Patient is unable to contribute to the history.     In the ED, the patient was I hypertensive go toxic (SpO2:  88%), requiring 2 L O2. Labs were remarkable for hyperkalemia (7.4), elevated BUN (182) , normocytic anemia (10.2).  CT head showed no acute process.  CT chest showed loculated moderate left pleural effusion, left lower lobe atelectasis/consolidation, a large pericardial effusion measuring 3.6 cm, findings suggestive of pulmonary edema. Nephrology was consulted, with plans to dialyze the patient.  Patient was given albuterol, calcium gluconate, insulin/D10, Lasix 80 mg IV, sodium bicarb 100 mEq, Lokelma 10 g, vancomycin, Zosyn, Zofran 4 mg IV. He was admitted for further management.     Overview/Hospital Course:  58y/o M pt with hx of ESRD on HD, AFib/flutter, T2DM, HTN, CVA, who presented with altered mental status after missing HD for 2 weeks. He was admitted for hyperkalemia with potassium of 7.4. Nephrology consulted- pt requiring HD on 7/31. Patient was also hypoxic with O2 sats of 88%, requiring NC 2 L O2.  Labs remarkable for no leukocytosis. CT head showed no acute process.  CT chest showed loculated moderate left pleural  effusion, left lower lobe atelectasis/consolidation, a large pericardial effusion measuring 3.6 cm, findings suggestive of pulmonary edema. Pt started on antibiotics. Echo with small to moderate posterior effusion. No indication of cardiac tamponade. Pulmunology consulted- possible that fluid collection represent some degree of chronic fluid from chronic volume overload related to relative non adherence to hemodialysis, rather than nidus of infection. ID consulted- recommends sampling pleural fluid. IR consulted for thoracentisis, however family (Daughter) would like to defer procedure as she feels risks outweigh benefit. BCx 8/1 growing CONS. Repeat BCx NGTD. continue empiric vanc pending repeat bcx. PT/OT consulted and recommends moderate intensity therapy.  Patient and family agreeable.  /case management consulted to assist with placement    Interval History:  No acute overnight events.  Patient remained afebrile.  Currently on room air, denies any cough or shortness a breath.  Laying in bed, appears comfortable.  Pulmonology consulted for loculated pleural effusion.  PT/OT consulted.  Patient denies any pain at this time.  Patient alert and oriented x3.  Mental status appears to be at baseline.  No family at bedside.  Case management called daughter, who plans to visit patient later in the evening.    Review of Systems   Constitutional:  Negative for chills and fever.   Eyes:  Negative for visual disturbance.   Respiratory:  Negative for cough, shortness of breath and wheezing.    Cardiovascular:  Negative for chest pain.   Genitourinary:  Negative for difficulty urinating and dysuria.   Musculoskeletal:  Positive for back pain and gait problem.   Skin:  Positive for wound.   Neurological:  Positive for weakness. Negative for dizziness, light-headedness and headaches.     Objective:     Vital Signs (Most Recent):  Temp: 97.8 °F (36.6 °C) (08/06/24 1133)  Pulse: 73 (08/06/24 1515)  Resp: 18 (08/06/24  1133)  BP: (!) 167/79 (08/06/24 1133)  SpO2: 99 % (08/06/24 1133) Vital Signs (24h Range):  Temp:  [97.8 °F (36.6 °C)-98.5 °F (36.9 °C)] 97.8 °F (36.6 °C)  Pulse:  [73-77] 73  Resp:  [17-18] 18  SpO2:  [95 %-99 %] 99 %  BP: (138-167)/(63-79) 167/79     Weight: 59.7 kg (131 lb 9.8 oz)  Body mass index is 20.61 kg/m².    Intake/Output Summary (Last 24 hours) at 8/6/2024 1707  Last data filed at 8/6/2024 1227  Gross per 24 hour   Intake 936.44 ml   Output --   Net 936.44 ml         Physical Exam  Vitals and nursing note reviewed.   Constitutional:       General: He is not in acute distress.     Appearance: He is not ill-appearing.   HENT:      Head: Atraumatic.      Nose: Nose normal.      Mouth/Throat:      Mouth: Mucous membranes are moist.   Eyes:      Extraocular Movements: Extraocular movements intact.      Conjunctiva/sclera: Conjunctivae normal.   Cardiovascular:      Rate and Rhythm: Normal rate and regular rhythm.      Comments: Lt chest tunneled line in place  Pulmonary:      Effort: Pulmonary effort is normal. No respiratory distress.      Breath sounds: No wheezing.      Comments: On room air.  Diminished breath sounds in left lower lung fields  Abdominal:      General: Abdomen is flat. There is no distension.      Palpations: Abdomen is soft.      Tenderness: There is no abdominal tenderness.   Musculoskeletal:         General: No swelling.      Cervical back: Normal range of motion and neck supple.      Right lower leg: No edema.      Left lower leg: No edema.      Comments: TTP on spine (difficult pin point)   Skin:     General: Skin is warm and dry.   Neurological:      Mental Status: He is alert and oriented to person, place, and time.      Motor: Weakness (Generalized weakness) present.   Psychiatric:         Mood and Affect: Mood normal.             Significant Labs: All pertinent labs within the past 24 hours have been reviewed.    Significant Imaging: I have reviewed all pertinent imaging  results/findings within the past 24 hours.    Assessment/Plan:      * Hyperkalemia  Hyperkalemia is likely due to ESRD.The patients most recent potassium results are listed below.  Recent Labs     08/04/24  0502 08/05/24  0421 08/06/24  0407   K 4.4 4.6 4.4       Plan  -presented with K >7, likely due to missed multiple dialysis sessions  - Monitor for arrhythmias with EKG and/or continuous telemetry.   - Treat the hyperkalemia with Potassium Binders, Calcium gluconate, IV insulin and dextrose, Nebulized albuterol sulfate, Furosemide, and Sodium Bicarbonate.   - Monitor potassium: Daily  - Nephrology consult for dialysis   -hypokalemia resolved with medical treatment and HD   -Nephrology consulted  -No sign of peaked T waves  -Resolved with HD          ESRD needing dialysis  Creatine stable for now. BMP reviewed- noted Estimated Creatinine Clearance: 9.8 mL/min (A) (based on SCr of 6.7 mg/dL (H)). according to latest data. Based on current GFR, CKD stage is end stage.  Monitor UOP and serial BMP and adjust therapy as needed. Renally dose meds. Avoid nephrotoxic medications and procedures.     Nephrology consulted for HD  Continue dialysis as scheduled   consulted SW help with transportation    Positive blood culture  See bacteremia plan as above      Bacteremia due to Staphylococcus  -CT chest showed loculated moderate left pleural effusion, left lower lobe atelectasis/consolidation, a large pericardial effusion measuring 3.6 cm, findings suggestive of pulmonary edema.   -Pt started on antibiotics.   -Pulmunology consulted- possible that fluid collection represent some degree of chronic fluid from chronic volume overload related to relative non adherence to hemodialysis, rather than nidus of infection.   -ID consulted: recommends sampling pleural fluid.   -IR consulted for thoracentisis, however family (Daughter) would like to defer procedure as she feels risks outweigh benefit.   -BCx 8/1 growing CONS. Repeat BCx  NGTD.  - continue empiric vanc pending repeat bcx.     Sacral decubitus ulcer  Noted. Frequent turns. Wound care       Left pleural effusion  Patient found to have large pleural effusion on imaging. I have personally reviewed and interpreted the following imaging: CT. A thoracentesis was  ordered  but family would like to deferred procedure . Most likely etiology includes Volume overload not due to CHF. Management to include  volume removal with HD    -CT chest showed loculated moderate left pleural effusion, left lower lobe atelectasis/consolidation, a large pericardial effusion measuring 3.6 cm, findings suggestive of pulmonary edema.   -Pt started on antibiotics.   -Pulmunology consulted- possible that fluid collection represent some degree of chronic fluid from chronic volume overload related to relative non adherence to hemodialysis, rather than nidus of infection.   -ID consulted: recommends sampling pleural fluid.   -IR consulted for thoracentisis, however family (Daughter) would like to defer procedure as she feels risks outweigh benefit.   -BCx 8/1 growing CONS. Repeat BCx NGTD.  - continue empiric vanc pending repeat bcx.       Pericardial effusion  Echo with small to moderate posterior effusion.   No indication of cardiac tamponade.       Anemia in ESRD (end-stage renal disease)  Anemia is likely due to chronic disease due to Chronic Kidney Disease. Most recent hemoglobin and hematocrit are listed below.  Recent Labs     08/05/24  0421   HGB 9.4*   HCT 30.7*       Monitor     Essential hypertension  Chronic, controlled. Latest blood pressure and vitals reviewed-     Temp:  [97.8 °F (36.6 °C)-98.5 °F (36.9 °C)]   Pulse:  [73-77]   Resp:  [17-18]   BP: (130-167)/(63-79)   SpO2:  [95 %-99 %] .   Home meds for hypertension were reviewed and noted below.   Hypertension Medications               amLODIPine (NORVASC) 10 MG tablet Take 10 mg by mouth once daily.    metoprolol succinate (TOPROL-XL) 50 MG 24 hr  tablet Take 1 tablet (50 mg total) by mouth once daily.            While in the hospital, will manage blood pressure as follows; Continue home antihypertensive regimen    Will utilize p.r.n. blood pressure medication only if patient's blood pressure greater than 180/110 and he develops symptoms such as worsening chest pain or shortness of breath.    Paroxysmal atrial fibrillation  Continue Eliquis, Metoprolol.   [id number] Score: 1.     Controlled type 2 diabetes mellitus with chronic kidney disease on chronic dialysis, without long-term current use of insulin  Lab Results   Component Value Date    HGBA1C 5.5 03/15/2022     SSI PRN to maintain goal 140-180  ADA diet, accuchecks ACHS, hypoglycemic protocol      History of CVA (cerebrovascular accident)  Continue home medications       Debility  PT/OT consulted:  Recommends moderate intensity therapy   Family agreeable   /case management consulted for placement and to assist with discharge planning    ACP (advance care planning)  Remains full code,spent over 5 minutes.        VTE Risk Mitigation (From admission, onward)           Ordered     IP VTE LOW RISK PATIENT  Once         08/01/24 0441     Place sequential compression device  Until discontinued         08/01/24 0441                    Discharge Planning   JAIME: 8/5/2024     Code Status: Full Code   Is the patient medically ready for discharge?:     Reason for patient still in hospital (select all that apply): Patient trending condition, Laboratory test, Treatment, Consult recommendations, and PT / OT recommendations  Discharge Plan A: Skilled Nursing Facility   Discharge Delays: (!) Post-Acute Set-up              Jonathan Martinez DO  Department of Hospital Medicine   [facility]
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
## 2024-08-06 NOTE — ASSESSMENT & PLAN NOTE
Moderate left-sided pleural effusion with possible infiltrate on recent CT chest.  ESRD on HD having missed HD for 2 weeks.  Bld Cx 1/4 coag-negative staph.  TTE negative.  Id consulted and requesting sampling of pleural effusion.  POCUS with very inferior pocket of fluid containing internal septations riding along the diaphragm and a decent pericardial effusion.    - IR consult was placed for evaluation of pleural fluid sampling.  Given small size of pocket, would favor drainage, if possible, over pleural catheter placement.  Concern about patient being able to provide consent, so family was contacted, however they would like to defer procedure at this time  - possible that fluid collection represent some degree of chronic fluid from chronic volume overload related to relative non adherence to hemodialysis, rather than nidus of infection.  That being said, if patient declines or infection persists, I am happy to revisit the idea of sampling pleural fluid

## 2024-08-06 NOTE — HPI
59-year-old male with ESRD on HD, AFib/flutter on apixaban, T2 dm, hyperlipidemia, HTN, CVA presenting with altered mental status having missed dialysis for the past 2 weeks.  Per chart, he has been bed ridden, confused and incontinent.  On arrival, patient unable to provide a history and was covered in stool.  Found to be hypoxic and hyperkalemic, but CT head negative.  CT chest with large pericardial effusion and moderate left-sided pleural effusion. Started on broad-spectrum ABx.  8/1 Bld Cx with coag-negative staph, but TTE negative.  Pulmonary has been consulted for evaluation of pleural effusion per ID request.
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
## 2024-08-06 NOTE — PT/OT/SLP PROGRESS
Physical Therapy Treatment    Patient Name:  Mahesh Cespedes   MRN:  [id number]    Recommendations:     Discharge Recommendations: Moderate Intensity Therapy  Discharge Equipment Recommendations: bath bench  Barriers to discharge home:  Pt with decreased safety awareness and decreased functional mobility/ambulation at this time.    Assessment:     Mahesh Cespedes is a 59 y.o. male admitted with a medical diagnosis of Hyperkalemia.  He presents with the following impairments/functional limitations: weakness, impaired endurance, impaired self care skills, impaired functional mobility, gait instability, impaired balance, decreased lower extremity function, decreased safety awareness, pain, decreased ROM.    Rehab Prognosis: Good; patient would benefit from acute skilled PT services to address these deficits and reach maximum level of function.    Recent Surgery: * No surgery found *      Plan:     During this hospitalization, patient to be seen 3 x/week to address the identified rehab impairments via gait training, therapeutic activities, therapeutic exercises, neuromuscular re-education and progress toward the following goals:    Plan of Care Expires:  08/11/24    Subjective     Chief Complaint: N/A  Patient/Family Comments/goals: Pt reported that he's doing ok.   Pain/Comfort:  Pain Rating 1: 0/10      Objective:     Communicated with nurse Camp prior to session.  Patient found right sidelying with peripheral IV, telemetry upon PT entry to room.     General Precautions: Standard, fall, diabetic (HD MWF)  Orthopedic Precautions: N/A  Braces: N/A  Respiratory Status: Room air     Functional Mobility:  Pt with delayed initiation and required extra time to complete functional mobility.  Pt required rest breaks during LE therex.  Pt feeling sad about being alone at home, spouse passed away ~10 years ago.    Bed Mobility:     Rolling Left: contact guard assistance and minimum assistance  Scooting: contact guard  assistance  Supine to Sit: contact guard assistance and minimum assistance  Transfers:     Sit to Stand: contact guard assistance with rolling walker  Bed to Chair: contact guard assistance with  rolling walker  using  Step Transfer  Gait: Pt ambulated ~30 ft with CGA using RW.  Pt with decreased step length and decreased eileen.  Pt declined further gait training.   Balance: Pt with fair dynamic standing balance.       AM-PAC 6 CLICK MOBILITY  Turning over in bed (including adjusting bedclothes, sheets and blankets)?: 3  Sitting down on and standing up from a chair with arms (e.g., wheelchair, bedside commode, etc.): 3  Moving from lying on back to sitting on the side of the bed?: 3  Moving to and from a bed to a chair (including a wheelchair)?: 3  Need to walk in hospital room?: 3  Climbing 3-5 steps with a railing?: 3  Basic Mobility Total Score: 18       Treatment & Education:  BLE seated therex 2 sets x10 reps: hip flex, LAQ, and AP.       Patient left up in chair with all lines intact, call button in reach, and nurse notified.  Lunch tray set up for pt.     GOALS:   Multidisciplinary Problems       Physical Therapy Goals          Problem: Physical Therapy    Goal Priority Disciplines Outcome Goal Variances Interventions   Physical Therapy Goal     PT, PT/OT Progressing     Description: Goals to be met by: 24     Patient will increase functional independence with mobility by performin. Supine to sit with Modified [place]  2. Sit to supine with Modified [place]  3. Sit to stand transfer with Modified [place]  4. Bed to chair transfer with Modified [place] using Rolling Walker  5. Gait  x 50 feet with Modified [place] using Rolling Walker.   6. Lower extremity exercise program x30 reps per handout, with independence                         Time Tracking:     PT Received On: 24  PT Start Time: 1147     PT Stop Time: 1210  PT Total Time (min): 23 min     Billable Minutes:  Gait Training 13 min and Therapeutic Exercise 10 min partial co-tx with OT    Treatment Type: Treatment  PT/PTA: PT     Number of PTA visits since last PT visit: 0     08/06/2024

## 2024-08-06 NOTE — ASSESSMENT & PLAN NOTE
60y/o M pt with hx of ESRD on HD, AFib/flutter, T2DM, HTN, CVA, who presented with altered mental status after missing HD for 2 weeks and was admitted for hyperkalemia requiring HD on 7/31.     In the ED, the patient was hypoxic (SpO2:  88%), requiring 2 L O2.  Labs remarkable for no leukocytosis. CT head showed no acute process.  CT chest showed loculated moderate left pleural effusion, left lower lobe atelectasis/consolidation, a large pericardial effusion measuring 3.6 cm, findings suggestive of pulmonary edema. Nephrology was consulted for HD. Received empiric vanc and zosyn.    ID consulted for bacteremia. BCx 8/1 growing CONS. Repeat BCx NGTD.    Recommendations:  --continue empiric vanc pending repeat bcx  --anticipate stopping abx if bcx remain negative

## 2024-08-06 NOTE — PLAN OF CARE
Problem: Adult Inpatient Plan of Care  Goal: Plan of Care Review  Outcome: Progressing  Flowsheets (Taken 8/6/2024 1520)  Plan of Care Reviewed With: patient  Goal: Patient-Specific Goal (Individualized)  Outcome: Progressing  Goal: Absence of Hospital-Acquired Illness or Injury  Outcome: Progressing  Intervention: Identify and Manage Fall Risk  Flowsheets (Taken 8/6/2024 1520)  Safety Promotion/Fall Prevention:   assistive device/personal item within reach   bed alarm set   Fall Risk reviewed with patient/family   high risk medications identified   medications reviewed   instructed to call staff for mobility   nonskid shoes/socks when out of bed   room near unit station   side rails raised x 2  Intervention: Prevent Skin Injury  Flowsheets (Taken 8/6/2024 1520)  Body Position:   position changed independently   weight shifting  Device Skin Pressure Protection:   adhesive use limited   absorbent pad utilized/changed   tubing/devices free from skin contact   pressure points protected  Intervention: Prevent and Manage VTE (Venous Thromboembolism) Risk  Flowsheets (Taken 8/6/2024 1520)  VTE Prevention/Management:   ambulation promoted   bleeding precations maintained   bleeding risk assessed  Intervention: Prevent Infection  Flowsheets (Taken 8/6/2024 1520)  Infection Prevention: hand hygiene promoted  Goal: Optimal Comfort and Wellbeing  Outcome: Progressing  Goal: Readiness for Transition of Care  Outcome: Progressing     Problem: Hemodialysis  Goal: Safe, Effective Therapy Delivery  Outcome: Progressing  Goal: Effective Tissue Perfusion  Outcome: Progressing  Goal: Absence of Infection Signs and Symptoms  Outcome: Progressing  Intervention: Prevent or Manage Infection  Flowsheets (Taken 8/6/2024 1520)  Infection Prevention: hand hygiene promoted  Infection Management: aseptic technique maintained     Problem: Diabetes Comorbidity  Goal: Blood Glucose Level Within Targeted Range  Outcome: Progressing     Problem:  Infection  Goal: Absence of Infection Signs and Symptoms  Outcome: Progressing     Problem: Wound  Goal: Optimal Coping  Outcome: Progressing  Goal: Optimal Functional Ability  Outcome: Progressing  Goal: Absence of Infection Signs and Symptoms  Outcome: Progressing  Intervention: Prevent or Manage Infection  Flowsheets (Taken 8/6/2024 1520)  Infection Management: aseptic technique maintained  Goal: Improved Oral Intake  Outcome: Progressing  Goal: Optimal Pain Control and Function  Outcome: Progressing  Goal: Skin Health and Integrity  Outcome: Progressing  Goal: Optimal Wound Healing  Outcome: Progressing     Problem: Fall Injury Risk  Goal: Absence of Fall and Fall-Related Injury  Outcome: Progressing  Intervention: Identify and Manage Contributors  Flowsheets (Taken 8/6/2024 1520)  Self-Care Promotion:   independence encouraged   BADL personal objects within reach   BADL personal routines maintained   meal set-up provided   adaptive equipment use encouraged  Medication Review/Management:   medications reviewed   high-risk medications identified  Intervention: Promote Injury-Free Environment  Flowsheets (Taken 8/6/2024 1520)  Safety Promotion/Fall Prevention:   assistive device/personal item within reach   bed alarm set   Fall Risk reviewed with patient/family   high risk medications identified   medications reviewed   instructed to call staff for mobility   nonskid shoes/socks when out of bed   room near unit station   side rails raised x 2     Problem: Skin Injury Risk Increased  Goal: Skin Health and Integrity  Outcome: Progressing  Intervention: Optimize Skin Protection  Flowsheets (Taken 8/6/2024 1520)  Pressure Reduction Techniques:   frequent weight shift encouraged   pressure points protected   weight shift assistance provided  Activity Management:   Ankle pumps - L1   Arm raise - L1   Rolling - L1   Straight leg raise - L1  Head of Bed (HOB) Positioning: HOB at 30-45 degrees     Problem: Coping  Ineffective  Goal: Effective Coping  Outcome: Progressing   Pt alert able to make needs known,kira meds well,no s/s adverse reaction noted,reposition self q 2hrs,no c/o pain at this time,POC explained,remains free from falls and pressure injuries,safety maintained,continue monitoring.

## 2024-08-06 NOTE — ASSESSMENT & PLAN NOTE
Chronic, controlled. Latest blood pressure and vitals reviewed-     Temp:  [97.8 °F (36.6 °C)-98.5 °F (36.9 °C)]   Pulse:  [73-77]   Resp:  [17-18]   BP: (130-167)/(63-79)   SpO2:  [95 %-99 %] .   Home meds for hypertension were reviewed and noted below.   Hypertension Medications               amLODIPine (NORVASC) 10 MG tablet Take 10 mg by mouth once daily.    metoprolol succinate (TOPROL-XL) 50 MG 24 hr tablet Take 1 tablet (50 mg total) by mouth once daily.            While in the hospital, will manage blood pressure as follows; Continue home antihypertensive regimen    Will utilize p.r.n. blood pressure medication only if patient's blood pressure greater than 180/110 and he develops symptoms such as worsening chest pain or shortness of breath.

## 2024-08-06 NOTE — NURSING
[facility], [facility]  Nurses Note -- 4 Eyes      8/6/2024       Skin assessed on: Q Shift      [] No Pressure Injuries Present    [x]Prevention Measures Documented    [x] Yes LDA  for Pressure Injury Previously documented     [] Yes New Pressure Injury Discovered   [] LDA for New Pressure Injury Added      Attending RN:  Zoë Kiran LPN     Second RN:  BETO Suresh

## 2024-08-06 NOTE — SUBJECTIVE & OBJECTIVE
Interval History:  No acute overnight events.  Patient remained afebrile.  Currently on room air, denies any cough or shortness a breath.  Laying in bed, appears comfortable.  Pulmonology consulted for loculated pleural effusion.  PT/OT consulted.  Patient denies any pain at this time.  Patient alert and oriented x3.  Mental status appears to be at baseline.  No family at bedside.  Case management called daughter, who plans to visit patient later in the evening.    Review of Systems   Constitutional:  Negative for chills and fever.   Eyes:  Negative for visual disturbance.   Respiratory:  Negative for cough, shortness of breath and wheezing.    Cardiovascular:  Negative for chest pain.   Genitourinary:  Negative for difficulty urinating and dysuria.   Musculoskeletal:  Positive for back pain and gait problem.   Skin:  Positive for wound.   Neurological:  Positive for weakness. Negative for dizziness, light-headedness and headaches.     Objective:     Vital Signs (Most Recent):  Temp: 97.8 °F (36.6 °C) (08/06/24 1133)  Pulse: 73 (08/06/24 1515)  Resp: 18 (08/06/24 1133)  BP: (!) 167/79 (08/06/24 1133)  SpO2: 99 % (08/06/24 1133) Vital Signs (24h Range):  Temp:  [97.8 °F (36.6 °C)-98.5 °F (36.9 °C)] 97.8 °F (36.6 °C)  Pulse:  [73-77] 73  Resp:  [17-18] 18  SpO2:  [95 %-99 %] 99 %  BP: (138-167)/(63-79) 167/79     Weight: 59.7 kg (131 lb 9.8 oz)  Body mass index is 20.61 kg/m².    Intake/Output Summary (Last 24 hours) at 8/6/2024 1707  Last data filed at 8/6/2024 1227  Gross per 24 hour   Intake 936.44 ml   Output --   Net 936.44 ml         Physical Exam  Vitals and nursing note reviewed.   Constitutional:       General: He is not in acute distress.     Appearance: He is not ill-appearing.   HENT:      Head: Atraumatic.      Nose: Nose normal.      Mouth/Throat:      Mouth: Mucous membranes are moist.   Eyes:      Extraocular Movements: Extraocular movements intact.      Conjunctiva/sclera: Conjunctivae normal.    Cardiovascular:      Rate and Rhythm: Normal rate and regular rhythm.      Comments: Lt chest tunneled line in place  Pulmonary:      Effort: Pulmonary effort is normal. No respiratory distress.      Breath sounds: No wheezing.      Comments: On room air.  Diminished breath sounds in left lower lung fields  Abdominal:      General: Abdomen is flat. There is no distension.      Palpations: Abdomen is soft.      Tenderness: There is no abdominal tenderness.   Musculoskeletal:         General: No swelling.      Cervical back: Normal range of motion and neck supple.      Right lower leg: No edema.      Left lower leg: No edema.      Comments: TTP on spine (difficult pin point)   Skin:     General: Skin is warm and dry.   Neurological:      Mental Status: He is alert and oriented to person, place, and time.      Motor: Weakness (Generalized weakness) present.   Psychiatric:         Mood and Affect: Mood normal.             Significant Labs: All pertinent labs within the past 24 hours have been reviewed.    Significant Imaging: I have reviewed all pertinent imaging results/findings within the past 24 hours.

## 2024-08-06 NOTE — PHYSICIAN QUERY
Please further specify the documented Pulmonary Edema:      Acute pulmonary edema, non-cardiac cause (please specify causative condition): volume overload due to missed dialysis

## 2024-08-06 NOTE — PLAN OF CARE
CM sent SNF referrals to [facility].    Patient has declined to select a preferred provider and elects placement with the first accepting in network provider that is available to provide services as ordered by the physician.       08/06/24 1545   Post-Acute Status   Post-Acute Authorization Placement   Post-Acute Placement Status Pending payor review/awaiting authorization (if required)   Patient choice form signed by patient/caregiver List from System Post-Acute Care   Discharge Delays (!) Post-Acute Set-up   Discharge Plan   Discharge Plan A Skilled Nursing Facility   Discharge Plan B Home with family

## 2024-08-06 NOTE — NURSING
[facility], [facility]  Nurses Note -- 4 Eyes      8/5/2024       Skin assessed on: Q Shift      [] No Pressure Injuries Present    []Prevention Measures Documented    [x] Yes LDA  for Pressure Injury Previously documented     [] Yes New Pressure Injury Discovered   [] LDA for New Pressure Injury Added      Attending RN:  Demetrice Moreira RN     Second RN:  JAVIER Camp

## 2024-08-06 NOTE — PROGRESS NOTES
Vancomycin consult follow-up:    Patient reviewed, dialysis scheduled every Mon, Wed, Fri, no new levels, no dose today; Next levels due: random due 8/7/2024 at 0400

## 2024-08-06 NOTE — ASSESSMENT & PLAN NOTE
-CT chest showed loculated moderate left pleural effusion, left lower lobe atelectasis/consolidation, a large pericardial effusion measuring 3.6 cm, findings suggestive of pulmonary edema.   -Pt started on antibiotics.   -Pulmunology consulted- possible that fluid collection represent some degree of chronic fluid from chronic volume overload related to relative non adherence to hemodialysis, rather than nidus of infection.   -ID consulted: recommends sampling pleural fluid.   -IR consulted for thoracentisis, however family (Daughter) would like to defer procedure as she feels risks outweigh benefit.   -BCx 8/1 growing CONS. Repeat BCx NGTD.  - continue empiric vanc pending repeat bcx.
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
## 2024-08-06 NOTE — PT/OT/SLP PROGRESS
Occupational Therapy   Treatment    Name: Mahesh Cespedes  MRN: [id number]  Admitting Diagnosis:  Hyperkalemia       Recommendations:     Discharge Recommendations: Moderate Intensity Therapy  Discharge Equipment Recommendations:  to be determined by next level of care  Barriers to discharge:  Other (Comment) (fall risk)    Assessment:     Mahesh Cespedes is a 59 y.o. male with a medical diagnosis of Hyperkalemia.  He presents with The primary encounter diagnosis was Pericardial effusion. Diagnoses of AMS (altered mental status), Pleural effusion on left, Hyperkalemia, Hypoxia, ESRD needing dialysis, Tachycardia, Altered mental status, unspecified altered mental status type [R41.82], History of CVA (cerebrovascular accident) [Z86.73], Pressure injury of skin of sacral region, unspecified injury stage [L89.159], ACP (advance care planning) [Z71.89], Anemia in ESRD (end-stage renal disease), and Endocarditis were also pertinent to this visit. . Performance deficits affecting function are weakness, impaired endurance, impaired self care skills, impaired functional mobility, impaired balance, pain, impaired skin, edema, impaired cardiopulmonary response to activity.     Rehab Prognosis:  Good; patient would benefit from acute skilled OT services to address these deficits and reach maximum level of function.       Plan:     Patient to be seen 4 x/week to address the above listed problems via self-care/home management, therapeutic activities, therapeutic exercises  Plan of Care Expires: 08/16/24  Plan of Care Reviewed with: patient    Subjective     Chief Complaint: pain  Patient/Family Comments/goals: pt reporting increased pain with movement  Pain/Comfort:  Pain Rating 1:  (not rated; pain all over)  Pain Addressed 1: Reposition, Distraction, Cessation of Activity, Nurse notified  Pain Rating Post-Intervention 1:  (pain all over)    Objective:     Communicated with: RN prior to session.  Patient found HOB elevated with bed  alarm, telemetry upon OT entry to room.    General Precautions: Standard, fall    Orthopedic Precautions:N/A  Braces: N/A  Respiratory Status: Room air     Occupational Performance:     Bed Mobility:    Patient completed Rolling/Turning to Left with  stand by assistance  Patient completed Scooting/Bridging with stand by assistance  Patient completed Supine to Sit with stand by assistance     Functional Mobility/Transfers:  Patient completed Sit <> Stand Transfer with contact guard assistance  with  rolling walker   Patient completed Bed <> Chair Transfer using Step Transfer technique with contact guard assistance with rolling walker  Functional Mobility: Pt performed in room/out of room mobility with RW and CGA with 2nd person with chair follow. Pt tolerated sitting eob with SBA progressing to close supervision    Activities of Daily Living:  Upper Body Dressing: modified independence with gown setup  Lower Body Dressing: maximal assistance to fix socks; multiple attempts and techniques offered for pt to initiate; pt deferred 2/2 pain and requested assist      AMPAC 6 Click ADL: 18    Treatment & Education:  OT/PT cotreat overlap to maximize function and safety.  ADL/functional mobility training as above with education on various adaptive techniques for pain relieving strategies to facilitate pt's independence in ADLs.  All needs met.    Patient left up in chair with PT present, all lines intact, call button in reach, and RN notified    GOALS:   Multidisciplinary Problems       Occupational Therapy Goals          Problem: Occupational Therapy    Goal Priority Disciplines Outcome Interventions   Occupational Therapy Goal     OT, PT/OT Progressing    Description: Goals to be met by: 8/16/2024     Patient will increase functional independence with ADLs by performing:    UE Dressing with Modified [place].  LE Dressing with Contact Guard Assistance.  Toileting from bedside commode with Contact Guard Assistance for  hygiene and clothing management.   Toilet transfer to bedside commode with Contact Guard Assistance.                         Time Tracking:     OT Date of Treatment: 08/06/24  OT Start Time: 1148  OT Stop Time: 1204  OT Total Time (min): 16 min PT cotreat    Billable Minutes:Self Care/Home Management 8  Therapeutic Activity 8    OT/FERNANDO: OT          8/6/2024

## 2024-08-06 NOTE — ASSESSMENT & PLAN NOTE
Patient found to have large pleural effusion on imaging. I have personally reviewed and interpreted the following imaging: CT. A thoracentesis was  ordered  but family would like to deferred procedure . Most likely etiology includes Volume overload not due to CHF. Management to include  volume removal with HD    -CT chest showed loculated moderate left pleural effusion, left lower lobe atelectasis/consolidation, a large pericardial effusion measuring 3.6 cm, findings suggestive of pulmonary edema.   -Pt started on antibiotics.   -Pulmunology consulted- possible that fluid collection represent some degree of chronic fluid from chronic volume overload related to relative non adherence to hemodialysis, rather than nidus of infection.   -ID consulted: recommends sampling pleural fluid.   -IR consulted for thoracentisis, however family (Daughter) would like to defer procedure as she feels risks outweigh benefit.   -BCx 8/1 growing CONS. Repeat BCx NGTD.  - continue empiric vanc pending repeat bcx.

## 2024-08-06 NOTE — ASSESSMENT & PLAN NOTE
Creatine stable for now. BMP reviewed- noted Estimated Creatinine Clearance: 9.8 mL/min (A) (based on SCr of 6.7 mg/dL (H)). according to latest data. Based on current GFR, CKD stage is end stage.  Monitor UOP and serial BMP and adjust therapy as needed. Renally dose meds. Avoid nephrotoxic medications and procedures.     Nephrology consulted for HD  Continue dialysis as scheduled   consulted SW help with transportation

## 2024-08-06 NOTE — PROGRESS NOTES
[facility] - Med Surg  Infectious Disease  Progress Note    Patient Name: Mahesh Cespedes  MRN: [id number]  Admission Date: 7/31/2024  Length of Stay: 5 days  Attending Physician: Jonathan Martinez DO  Primary Care Provider: Cruz Hernandez MD    Isolation Status: No active isolations  Assessment/Plan:      Pulmonary  Left pleural effusion  CT with L loculated pleural effusion. Appreciate pulm and IR assistance. Family opting to forego thora as they feel risks of procedure outweigh benefits.       ID  Positive blood culture  60y/o M pt with hx of ESRD on HD, AFib/flutter, T2DM, HTN, CVA, who presented with altered mental status after missing HD for 2 weeks and was admitted for hyperkalemia requiring HD on 7/31.     In the ED, the patient was hypoxic (SpO2:  88%), requiring 2 L O2.  Labs remarkable for no leukocytosis. CT head showed no acute process.  CT chest showed loculated moderate left pleural effusion, left lower lobe atelectasis/consolidation, a large pericardial effusion measuring 3.6 cm, findings suggestive of pulmonary edema. Nephrology was consulted for HD. Received empiric vanc and zosyn.    ID consulted for bacteremia. BCx 8/1 growing CONS. Repeat BCx NGTD.    Recommendations:  --continue empiric vanc pending repeat bcx  --anticipate stopping abx if bcx remain negative        ESRD on HD  Admitted for non adherence to HD  Appreciate palliative care assistance  Renally dose antibiotics      Anticipated Disposition: NH vs home    Thank you for your consult. I will follow-up with patient. Please contact us if you have any additional questions.    Yissel Marie MD  Infectious Disease  [facility] - Med Surg    Subjective:     Principal Problem:Hyperkalemia    HPI: 60y/o M pt with hx of ESRD on HD, AFib/flutter, type 2 diabetes, hyperlipidemia, hypertension, CVA, who presented with altered mental status after missing HD for 2 weeks and was admitted for hyperkalemia requiring HD on 7/31.     Per chart,  the patient's family reported he has been bedridden, more confused and developed fecal incontinence. On arrival to the ER, the patient was covered in stool.       In the ED, the patient was hypoxic (SpO2:  88%), requiring 2 L O2. Labs were remarkable for hyperkalemia (7.4). CT head showed no acute process.  CT chest showed loculated moderate left pleural effusion, left lower lobe atelectasis/consolidation, a large pericardial effusion measuring 3.6 cm, findings suggestive of pulmonary edema. Nephrology was consulted for HD. Received empiric vanc and zosyn.    ID consulted for bacteremia. BCx 8/1 growing an unidentified GPC.     Interval History: Remained afebrile overnight. Feeling well today.     Review of Systems   Constitutional:  Negative for chills and fever.   Eyes:  Negative for visual disturbance.   Respiratory:  Negative for cough and shortness of breath.    Musculoskeletal:  Positive for back pain and gait problem.   Skin:  Negative for wound.     Objective:     Vital Signs (Most Recent):  Temp: 97.8 °F (36.6 °C) (08/06/24 1133)  Pulse: 73 (08/06/24 1515)  Resp: 18 (08/06/24 1133)  BP: (!) 167/79 (08/06/24 1133)  SpO2: 99 % (08/06/24 1133) Vital Signs (24h Range):  Temp:  [97.8 °F (36.6 °C)-98.5 °F (36.9 °C)] 97.8 °F (36.6 °C)  Pulse:  [73-77] 73  Resp:  [17-18] 18  SpO2:  [95 %-99 %] 99 %  BP: (138-167)/(63-79) 167/79     Weight: 59.7 kg (131 lb 9.8 oz)  Body mass index is 20.61 kg/m².    Estimated Creatinine Clearance: 9.8 mL/min (A) (based on SCr of 6.7 mg/dL (H)).     Physical Exam  Vitals and nursing note reviewed.   Constitutional:       Appearance: Normal appearance.   HENT:      Head: Normocephalic and atraumatic.      Nose: Nose normal. No congestion.      Mouth/Throat:      Mouth: Mucous membranes are moist.      Pharynx: Oropharynx is clear.   Eyes:      Conjunctiva/sclera: Conjunctivae normal.      Pupils: Pupils are equal, round, and reactive to light.   Cardiovascular:      Rate and Rhythm:  Normal rate and regular rhythm.      Comments: Lt chest tunneled line in place  Pulmonary:      Effort: Pulmonary effort is normal. No respiratory distress.      Breath sounds: Normal breath sounds.   Abdominal:      General: Abdomen is flat. There is no distension.      Palpations: Abdomen is soft.   Musculoskeletal:         General: No swelling. Normal range of motion.      Cervical back: Normal range of motion and neck supple.      Comments: TTP on spine (difficult pin point)   Skin:     General: Skin is warm and dry.      Findings: No lesion.   Neurological:      General: No focal deficit present.      Mental Status: He is alert and oriented to person, place, and time.   Psychiatric:         Mood and Affect: Mood normal.         Behavior: Behavior normal.          Significant Labs: Blood Culture:   Recent Labs   Lab 02/20/24  1759 08/01/24  0103 08/01/24  0104 08/04/24  1821 08/04/24  1822   LABBLOO No Growth after 4 days. No Growth after 4 days. Gram stain aer bottle: Gram positive cocci in clusters resembling Staph  Results called to and read back by: Amanda BLANCAS RN 08/04/2024  17:41  COAGULASE-NEGATIVE STAPHYLOCOCCUS SPECIES  Organism is a probable contaminant  * No Growth to date  No Growth to date No Growth to date  No Growth to date       Significant Imaging: I have reviewed all pertinent imaging results/findings within the past 24 hours.

## 2024-08-06 NOTE — CARE UPDATE
Question was raised of whether or not patient is competent to provide informed consent at this time, even with  assistance.     Called patient's daughter to obtain consent. At this time patient's daughter would like to defer procedure as she feels risks outweigh benefit. Referring provider notified.     Please notify IR if clinical situation changes, patient/family wish to go through with procedure and thoracentesis is desired.      Rachael Valencia NP  Interventional Radiology

## 2024-08-07 PROBLEM — F32.A DEPRESSION: Status: ACTIVE | Noted: 2024-08-07

## 2024-08-07 LAB
ANION GAP SERPL CALC-SCNC: 12 MMOL/L (ref 8–16)
BUN SERPL-MCNC: 51 MG/DL (ref 8–23)
CALCIUM SERPL-MCNC: 7.9 MG/DL (ref 8.7–10.5)
CHLORIDE SERPL-SCNC: 100 MMOL/L (ref 95–110)
CO2 SERPL-SCNC: 23 MMOL/L (ref 23–29)
CREAT SERPL-MCNC: 8.5 MG/DL (ref 0.5–1.4)
EST. GFR  (NO RACE VARIABLE): 7 ML/MIN/1.73 M^2
GLUCOSE SERPL-MCNC: 65 MG/DL (ref 70–110)
HBV SURFACE AB SER QL IA: NEGATIVE
HBV SURFACE AB SER QL IA: NEGATIVE
HBV SURFACE AB SERPL IA-ACNC: 5 MIU/ML
HBV SURFACE AB SERPL IA-ACNC: 5 MIU/ML
POTASSIUM SERPL-SCNC: 4.4 MMOL/L (ref 3.5–5.1)
SODIUM SERPL-SCNC: 135 MMOL/L (ref 136–145)
VANCOMYCIN SERPL-MCNC: 16.6 UG/ML

## 2024-08-07 PROCEDURE — 80202 ASSAY OF VANCOMYCIN: CPT | Performed by: HOSPITALIST

## 2024-08-07 PROCEDURE — G0427 INPT/ED TELECONSULT70: HCPCS | Mod: 95,,, | Performed by: PSYCHIATRY & NEUROLOGY

## 2024-08-07 PROCEDURE — 63600175 PHARM REV CODE 636 W HCPCS: Performed by: STUDENT IN AN ORGANIZED HEALTH CARE EDUCATION/TRAINING PROGRAM

## 2024-08-07 PROCEDURE — 25000003 PHARM REV CODE 250: Performed by: STUDENT IN AN ORGANIZED HEALTH CARE EDUCATION/TRAINING PROGRAM

## 2024-08-07 PROCEDURE — 80048 BASIC METABOLIC PNL TOTAL CA: CPT | Performed by: HOSPITALIST

## 2024-08-07 PROCEDURE — 63600175 PHARM REV CODE 636 W HCPCS: Mod: JZ,JG | Performed by: INTERNAL MEDICINE

## 2024-08-07 PROCEDURE — 80100016 HC MAINTENANCE HEMODIALYSIS

## 2024-08-07 PROCEDURE — 21400001 HC TELEMETRY ROOM

## 2024-08-07 PROCEDURE — 94761 N-INVAS EAR/PLS OXIMETRY MLT: CPT

## 2024-08-07 PROCEDURE — 25000003 PHARM REV CODE 250: Performed by: HOSPITALIST

## 2024-08-07 RX ORDER — SERTRALINE HYDROCHLORIDE 50 MG/1
50 TABLET, FILM COATED ORAL DAILY
Status: DISCONTINUED | OUTPATIENT
Start: 2024-08-07 | End: 2024-08-08 | Stop reason: HOSPADM

## 2024-08-07 RX ADMIN — ONDANSETRON 4 MG: 2 INJECTION INTRAMUSCULAR; INTRAVENOUS at 10:08

## 2024-08-07 RX ADMIN — AMIODARONE HYDROCHLORIDE 200 MG: 200 TABLET ORAL at 09:08

## 2024-08-07 RX ADMIN — EPOETIN ALFA-EPBX 10000 UNITS: 10000 INJECTION, SOLUTION INTRAVENOUS; SUBCUTANEOUS at 09:08

## 2024-08-07 RX ADMIN — LABETALOL HYDROCHLORIDE 100 MG: 100 TABLET, FILM COATED ORAL at 08:08

## 2024-08-07 RX ADMIN — ATORVASTATIN CALCIUM 80 MG: 40 TABLET, FILM COATED ORAL at 09:08

## 2024-08-07 RX ADMIN — SEVELAMER CARBONATE 1600 MG: 800 TABLET, FILM COATED ORAL at 09:08

## 2024-08-07 RX ADMIN — SERTRALINE HYDROCHLORIDE 50 MG: 50 TABLET ORAL at 03:08

## 2024-08-07 RX ADMIN — ALLOPURINOL 100 MG: 100 TABLET ORAL at 09:08

## 2024-08-07 RX ADMIN — PANTOPRAZOLE SODIUM 40 MG: 40 TABLET, DELAYED RELEASE ORAL at 09:08

## 2024-08-07 RX ADMIN — Medication 1 CAPSULE: at 09:08

## 2024-08-07 RX ADMIN — TAMSULOSIN HYDROCHLORIDE 0.4 MG: 0.4 CAPSULE ORAL at 09:08

## 2024-08-07 RX ADMIN — SEVELAMER CARBONATE 1600 MG: 800 TABLET, FILM COATED ORAL at 05:08
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
## 2024-08-07 NOTE — ASSESSMENT & PLAN NOTE
Patient found to have large pleural effusion on imaging. I have personally reviewed and interpreted the following imaging: CT. A thoracentesis was  ordered  but family would like to deferred procedure . Most likely etiology includes Volume overload not due to CHF. Management to include  volume removal with HD    -CT chest showed loculated moderate left pleural effusion, left lower lobe atelectasis/consolidation, a large pericardial effusion measuring 3.6 cm, findings suggestive of pulmonary edema.   -Pt started on antibiotics.   -Pulmunology consulted- possible that fluid collection represent some degree of chronic fluid from chronic volume overload related to relative non adherence to hemodialysis, rather than nidus of infection.   -ID consulted: recommends sampling pleural fluid.   -IR consulted for thoracentisis, however family (Daughter) would like to defer procedure as she feels risks outweigh benefit.   -BCx 8/1 growing CONS. Repeat BCx NGTD.  -if febrile or clinically worsening would consider treating empirically

## 2024-08-07 NOTE — CONSULTS
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 assess due to medical acuity     PSYCHIATRIC   Behavior/Cooperation:  intermittently cooperative, eye contact normal, calm  Speech:  normal tone, normal rate, normal pitch, normal volume  Language: grossly intact, able to name with spontaneous speech  Mood: "depressed"  Affect:  constricted   Associations: intact; no JR  Thought Process: Linear with intermittent confusion ; still future-oriented about going to [place] to live with adult son   Thought Content: denies SI, HI, AH, VH, TH, delusions, or paranoia (no RIS observed)   Sensorium: Awake/Delirium  Alert and Oriented: to person, place, city, and state.  He was disoriented to month, year, and situation.  Memory: Attempted but patient was unwilling / unable to participate   Attention/concentration: Fair / Limited. Attempted but patient was unwilling / unable to spell w-o-r-l-d & d-l-r-o-w.   Similarities: Limited (difference between apple and orange?)  Abstract reasoning: Limited  Fund of Knowledge: Attempted but patient was unwilling / unable to participate  Insight: Limited   Judgment: Limited     CAM ICU Delirium Assessment - He was initially CAM-ICU positive and then later negative for delirium during the interview (waxing and waning mentation observed)        MEDICAL DECISION MAKING    ASSESSMENT "        Adjustment Disorder with Mixed Disturbance of Emotions and Conduct   Major Depressive Disorder, Recurrent, Moderate   Unspecified Insomnia        RECOMMENDATIONS       - With reasonable medical certainty, based on a present-state examination and information from the patient's adult daughter (Rima, whom patient lives with), the patient does not currently meet PEC/CEC criteria due to not being an imminent threat to self/others and not being gravely disabled 2/2 mental illness at this time.        - Begin Zoloft 50 mg PO daily for depression (discussed risks/benefits/alt vs no treatment with patient and his adult daughter with patient's verbal consent).    - Consider adding Remeron 7.5 mg PO QHS if insomnia and/or poor appetite become a notable concern (discussed risks/benefits/alt vs no treatment with patient and his adult daughter with patient's verbal consent).     - Psychotherapy was performed with patient as noted above with a focus on improving depression, safe coping / adjustment, and sleep.    - Patient's most recent resulted labs, imaging, and EKG were reviewed today ; HIV and Syphilis labs were unremarkable ; order and f/u B12 and Folate levels and supplement if needed     - With patient's verbal consent, I spoke at length this morning with the patient's adult daughter (Rima Cespedes) at her number listed on the patient contacts section in the chart.  Information from the patient's daughter was used to complete this consultation note.  She states that the patient lives with her and two of the patient's adult sons in [place], [place].  She does state that transportation is a challenge for them.  She state that the patient has made intermittent passively suicidal statements for the past several months since his CVA, mainly when he is frustrated with his limited mobility due to physical limitations as well as his chronic pain.  She does not think that the patient would ever try to harm himself, stating that  she thinks he says these things out of frustration.  She states that he has never tried to hurt himself before and confirms that they have no firearms in the home.  She and her adult brothers plan to come to the hospital to help encourage the patient to go to SNF with an eventual goal of possibly moving the patient to [place] to live with his other adult son.      - Most appropriate and beneficial post-acute dispo at this time continues to appear to be SNF.       - Please have Hospital CM/SW assist patient with ASAP outpatient mental health f/u for s/p discharge from this facility.      - Patient and his adult daughter (Rima) were instructed to call 911 and/or [phone] and return to the nearest ED if the patient begins feeling suicidal, homicidal, or gravely disabled (for s/p discharge from this facility).       - Thank you for this consult         Total time spent with patient face-to-face and/or managing/coordinating patient's care today (excluding the time spent on psychotherapy): 77 minutes   Time spent on psychotherapy today (as noted above): 18 minutes   Total time for encounter today including psychotherapy: 95 minutes      More than 50% of the time was spent counseling/coordinating care.     Consulting clinician was informed of the encounter and consult note.     Consultation ended: 8/7/2024 at 10:34 AM        STAFF:  Lukas Mojica MD  [facility]   8/7/2024

## 2024-08-07 NOTE — NURSING
[facility]  Nurses Note -- 4 Eyes      8/7/2024       Skin assessed on: Q Shift      [] No Pressure Injuries Present    [x]Prevention Measures Documented    [x] Yes LDA  for Pressure Injury Previously documented     [] Yes New Pressure Injury Discovered   [] LDA for New Pressure Injury Added      Attending RN:  Rubi Gomes RN     Second RN:  BETO House

## 2024-08-07 NOTE — ASSESSMENT & PLAN NOTE
"Patient has persistent depression which is moderate and is currently uncontrolled. Will Begin anti-depressant medications. We will consult psychiatry at this time. Patient does not display psychosis at this time. Continue to monitor closely and adjust plan of care as needed.    -pt admits he has been depressed since his wife passed away. Expressed SI on 08/07/24, stating "I am going to kill myself if I don't go home. You will find a corpse here".  -Psych consulted: pt does not meet PEC/CEC criteria. Recommend to Begin Zoloft 50 mg PO daily for depression   -zoloft initiated on 08/07/24      "

## 2024-08-07 NOTE — PLAN OF CARE
Pt off the floor at time of eval. Chart reviewed. Has remained afebrile and HD stable. BCx from 8/4 remain NGTD.    #positive blood culture  --suspect contaminant with CONs in only 1 bottle   --stop vancomycin    #loculated pleural effusion  --unable to work up due to family refusing thora   --if febrile or clinically worsening would consider treating empirically    ID will sign off. Please contact me with additional questions or new culture results.    Yissel Marie MD  Infectious Disease

## 2024-08-07 NOTE — ASSESSMENT & PLAN NOTE
58y/o M pt with hx of ESRD on HD, AFib/flutter, T2DM, HTN, CVA, who presented with altered mental status after missing HD for 2 weeks and was admitted for hyperkalemia requiring HD on 7/31.     In the ED, the patient was hypoxic (SpO2:  88%), requiring 2 L O2.  Labs remarkable for no leukocytosis. CT head showed no acute process.  CT chest showed loculated moderate left pleural effusion, left lower lobe atelectasis/consolidation, a large pericardial effusion measuring 3.6 cm, findings suggestive of pulmonary edema. Nephrology was consulted for HD. Received empiric vanc and zosyn.    ID consulted for bacteremia. BCx 8/1 growing CONS. Repeat BCx NGTD.    Recommendations:  --continue empiric vanc pending repeat bcx  --anticipate stopping abx if bcx remain negative

## 2024-08-07 NOTE — PROGRESS NOTES
Pharmacokinetic Assessment Follow Up: IV Vancomycin    Vancomycin serum concentration assessment(s):    The random level was drawn correctly and can be used to guide therapy at this time. The measurement is within the desired definitive target range of 10 to 20 mcg/mL.    Vancomycin Regimen Plan:    Vancomycin 500mg to be administered post HD    Re-dose when the random level is less than 20 mcg/mL, next level to be drawn at 0400 on 8/9/24    Drug levels (last 3 results):  Recent Labs   Lab Result Units 08/05/24  0421 08/07/24  0405   Vancomycin, Random ug/mL 13.3 16.6       Pharmacy will continue to follow and monitor vancomycin.    Please contact pharmacy at extension [phone] for questions regarding this assessment.    Thank you for the consult,   Cristopher Gomez       Patient brief summary:  Mahesh Cespedes is a 59 y.o. male initiated on antimicrobial therapy with IV Vancomycin for treatment of skin & soft tissue infection    The patient's current regimen is random pulse dosing    Drug Allergies:   Review of patient's allergies indicates:  No Known Allergies    Actual Body Weight:   59.7 kg    Renal Function:   Estimated Creatinine Clearance: 7.7 mL/min (A) (based on SCr of 8.5 mg/dL (H)).,     Dialysis Method (if applicable):  intermittent HD    CBC (last 72 hours):  Recent Labs   Lab Result Units 08/05/24  0421   WBC K/uL 7.17   Hemoglobin g/dL 9.4*   Hematocrit % 30.7*   Platelets K/uL 145*   Gran % % 77.6*   Lymph % % 10.6*   Mono % % 8.4   Eosinophil % % 2.6   Basophil % % 0.4   Differential Method  Automated       Metabolic Panel (last 72 hours):  Recent Labs   Lab Result Units 08/05/24  0421 08/06/24  0407 08/07/24  0405   Sodium mmol/L 132* 140 135*   Potassium mmol/L 4.6 4.4 4.4   Chloride mmol/L 95 103 100   CO2 mmol/L 23 25 23   Glucose mg/dL 133* 117* 65*   BUN mg/dL 76* 39* 51*   Creatinine mg/dL 10.2* 6.7* 8.5*       Vancomycin Administrations:  vancomycin given in the last 96 hours                      vancomycin (VANCOCIN) 500 mg in D5W 100 mL IVPB (MB+) (mg) 500 mg New Bag 08/05/24 1614    vancomycin (VANCOCIN) 1,000 mg in D5W 250 mL IVPB (Vial-Mate) (mg) 1,000 mg New Bag 08/03/24 1525                    Microbiologic Results:  Microbiology Results (last 7 days)       Procedure Component Value Units Date/Time    Blood culture [[id number]] Collected: 08/04/24 1822    Order Status: Completed Specimen: Blood Updated: 08/06/24 2303     Blood Culture, Routine No Growth to date      No Growth to date      No Growth to date    Blood culture [[id number]] Collected: 08/04/24 1821    Order Status: Completed Specimen: Blood Updated: 08/06/24 2303     Blood Culture, Routine No Growth to date      No Growth to date      No Growth to date    Culture, Anaerobic [[id number]]     Order Status: No result Specimen: Pleural Fluid     AFB culture [[id number]]     Order Status: No result Specimen: Pleural Fluid     AFB stain [[id number]]     Order Status: No result Specimen: Pleural Fluid     Fungus culture [[id number]]     Order Status: No result Specimen: Pleural Fluid     Culture, Fluid with Gram [[id number]]     Order Status: No result Specimen: Body Fluid from Thoracentesis Fluid     Culture, Body Fluid (Aerobic) w/ GS [[id number]]     Order Status: No result Specimen: Body Fluid from Pleural Fluid     Blood culture x two cultures. Draw prior to antibiotics. [[id number]]  (Abnormal) Collected: 08/01/24 0104    Order Status: Completed Specimen: Blood from Peripheral, Forearm, Right Updated: 08/06/24 0838     Blood Culture, Routine Gram stain aer bottle: Gram positive cocci in clusters resembling Staph      Results called to and read back by: Amanda BLANCAS RN 08/04/2024  17:41      COAGULASE-NEGATIVE STAPHYLOCOCCUS SPECIES  Organism is a probable contaminant      Narrative:      Aerobic and anaerobic    Blood culture x two cultures. Draw prior to antibiotics. [[id number]] Collected: 08/01/24 0103    Order Status: Completed  Specimen: Blood from Peripheral, Forearm, Left Updated: 08/05/24 0303     Blood Culture, Routine No Growth after 4 days.    Narrative:      Aerobic and anaerobic    Stool culture [[id number]]     Order Status: Canceled Specimen: Stool     Clostridium difficile EIA [[id number]]     Order Status: Canceled Specimen: Stool

## 2024-08-07 NOTE — PT/OT/SLP PROGRESS
Occupational Therapy      Patient Name:  Mahesh Cespedes   MRN:  [id number]    Patient not seen today secondary to Dialysis. Will follow-up as able.    8/7/2024

## 2024-08-07 NOTE — PT/OT/SLP PROGRESS
Occupational Therapy      Patient Name:  Mahesh Cespedes   MRN:  [id number]    Patient not seen today secondary to Patient fatigue (Pt reporting increased fatigue post-HD, presenting with minimal eye opening.). Will follow-up tomorrow as able.    8/7/2024

## 2024-08-07 NOTE — SUBJECTIVE & OBJECTIVE
"Interval History:  No acute overnight events.  Patient remained afebrile.  Currently on room air, denies any cough or shortness a breath.  Laying in bed, appears comfortable.  Discussed with patient about PTOT recs for SNF and patient became really upset. Stated "I am going to kill myself if I don't go home. You will find a corpse here". Nephrology present. Psych consulted     line used: [id number]    Review of Systems   Constitutional:  Negative for chills and fever.   Eyes:  Negative for visual disturbance.   Respiratory:  Negative for cough, shortness of breath and wheezing.    Cardiovascular:  Negative for chest pain.   Genitourinary:  Negative for difficulty urinating and dysuria.   Musculoskeletal:  Positive for back pain and gait problem.   Skin:  Positive for wound.   Neurological:  Positive for weakness. Negative for dizziness, light-headedness and headaches.   Psychiatric/Behavioral:  Positive for suicidal ideas. Negative for behavioral problems and hallucinations.      Objective:     Vital Signs (Most Recent):  Temp: 98.2 °F (36.8 °C) (08/07/24 1324)  Pulse: 75 (08/07/24 1324)  Resp: 18 (08/07/24 1324)  BP: 136/67 (08/07/24 1324)  SpO2: 98 % (08/07/24 1324) Vital Signs (24h Range):  Temp:  [98.2 °F (36.8 °C)-98.8 °F (37.1 °C)] 98.2 °F (36.8 °C)  Pulse:  [73-88] 75  Resp:  [17-18] 18  SpO2:  [95 %-98 %] 98 %  BP: (107-168)/(53-80) 136/67     Weight: 59.7 kg (131 lb 9.8 oz)  Body mass index is 20.61 kg/m².    Intake/Output Summary (Last 24 hours) at 8/7/2024 1427  Last data filed at 8/7/2024 1230  Gross per 24 hour   Intake 740 ml   Output 3000 ml   Net -2260 ml         Physical Exam  Vitals and nursing note reviewed.   Constitutional:       General: He is not in acute distress.     Appearance: He is ill-appearing (chronically).   HENT:      Head: Atraumatic.      Nose: Nose normal.      Mouth/Throat:      Mouth: Mucous membranes are moist.   Eyes:      Extraocular Movements: Extraocular " movements intact.      Conjunctiva/sclera: Conjunctivae normal.   Cardiovascular:      Rate and Rhythm: Normal rate and regular rhythm.      Comments: Lt chest tunneled line in place  Pulmonary:      Effort: Pulmonary effort is normal. No respiratory distress.      Breath sounds: No wheezing.      Comments: On room air.  Diminished breath sounds in left lower lung fields  Abdominal:      General: Abdomen is flat. There is no distension.      Palpations: Abdomen is soft.      Tenderness: There is no abdominal tenderness.   Musculoskeletal:         General: No swelling.      Cervical back: Normal range of motion and neck supple.      Right lower leg: No edema.      Left lower leg: No edema.      Comments: TTP on spine (difficult pin point)   Skin:     General: Skin is warm and dry.   Neurological:      Mental Status: He is alert and oriented to person, place, and time.      Motor: Weakness (Generalized weakness) present.   Psychiatric:         Mood and Affect: Mood is depressed.         Thought Content: Thought content includes suicidal ideation.      Comments: Expressed thoughts of killing himself.              Significant Labs: All pertinent labs within the past 24 hours have been reviewed.    Significant Imaging: I have reviewed all pertinent imaging results/findings within the past 24 hours.

## 2024-08-07 NOTE — ASSESSMENT & PLAN NOTE
-CT chest showed loculated moderate left pleural effusion, left lower lobe atelectasis/consolidation, a large pericardial effusion measuring 3.6 cm, findings suggestive of pulmonary edema.   -Pt started on antibiotics.   -Pulmunology consulted- possible that fluid collection represent some degree of chronic fluid from chronic volume overload related to relative non adherence to hemodialysis, rather than nidus of infection.   -ID consulted: recommends sampling pleural fluid.   -IR consulted for thoracentisis, however family (Daughter) would like to defer procedure as she feels risks outweigh benefit.   -BCx 8/1 growing CONS.suspect contaminant with CONs in only 1 bottle   -stop vancomycin

## 2024-08-07 NOTE — PROGRESS NOTES
[facility] Medicine  Progress Note    Patient Name: Mahesh Cespedes  MRN: [id number]  Patient Class: IP- Inpatient   Admission Date: 7/31/2024  Length of Stay: 6 days  Attending Physician: Jonathan Martinez DO  Primary Care Provider: Cruz Hernandez MD        Subjective:     Principal Problem:Hyperkalemia        HPI:  This is a 59-year-old male with a past medical history of ESRD on HD, AFib/flutter (on Eliquis), type 2 diabetes, hyperlipidemia, hypertension, CVA, who presents with altered mental status.      Patient presents for evaluation of altered mental status in the setting of missing dialysis for 2 weeks.  Per the patient's family, he has been bedridden, more confused and developed fecal incontinence. On arrival to the ER, the patient was covered in stool.  Patient is unable to contribute to the history.     In the ED, the patient was I hypertensive go toxic (SpO2:  88%), requiring 2 L O2. Labs were remarkable for hyperkalemia (7.4), elevated BUN (182) , normocytic anemia (10.2).  CT head showed no acute process.  CT chest showed loculated moderate left pleural effusion, left lower lobe atelectasis/consolidation, a large pericardial effusion measuring 3.6 cm, findings suggestive of pulmonary edema. Nephrology was consulted, with plans to dialyze the patient.  Patient was given albuterol, calcium gluconate, insulin/D10, Lasix 80 mg IV, sodium bicarb 100 mEq, Lokelma 10 g, vancomycin, Zosyn, Zofran 4 mg IV. He was admitted for further management.     Overview/Hospital Course:  60y/o M pt with hx of ESRD on HD, AFib/flutter, T2DM, HTN, CVA, who presented with altered mental status after missing HD for 2 weeks. He was admitted for hyperkalemia with potassium of 7.4. Nephrology consulted- pt requiring HD on 7/31. Patient was also hypoxic with O2 sats of 88%, requiring NC 2 L O2.  Labs remarkable for no leukocytosis. CT head showed no acute process.  CT chest showed loculated moderate left pleural  "effusion, left lower lobe atelectasis/consolidation, a large pericardial effusion measuring 3.6 cm, findings suggestive of pulmonary edema. Pt started on antibiotics. Echo with small to moderate posterior effusion. No indication of cardiac tamponade. Pulmunology consulted- possible that fluid collection represent some degree of chronic fluid from chronic volume overload related to relative non adherence to hemodialysis, rather than nidus of infection. ID consulted- recommends sampling pleural fluid. IR consulted for thoracentisis, however family (Daughter) would like to defer procedure as she feels risks outweigh benefit. BCx 8/1 growing CONS. Repeat BCx NGTD. continue empiric vanc pending repeat bcx. PT/OT consulted and recommends moderate intensity therapy.  Patient and family agreeable.  /case management consulted to assist with placement. Patient expressed suicidal ideation on 08/07/2024. Psych consulted    Interval History:  No acute overnight events.  Patient remained afebrile.  Currently on room air, denies any cough or shortness a breath.  Laying in bed, appears comfortable.  Discussed with patient about PTOT recs for SNF and patient became really upset. Stated "I am going to kill myself if I don't go home. You will find a corpse here". Nephrology present. Psych consulted     line used: Margaret [id number]    Review of Systems   Constitutional:  Negative for chills and fever.   Eyes:  Negative for visual disturbance.   Respiratory:  Negative for cough, shortness of breath and wheezing.    Cardiovascular:  Negative for chest pain.   Genitourinary:  Negative for difficulty urinating and dysuria.   Musculoskeletal:  Positive for back pain and gait problem.   Skin:  Positive for wound.   Neurological:  Positive for weakness. Negative for dizziness, light-headedness and headaches.   Psychiatric/Behavioral:  Positive for suicidal ideas. Negative for behavioral problems and hallucinations.  "     Objective:     Vital Signs (Most Recent):  Temp: 98.2 °F (36.8 °C) (08/07/24 1324)  Pulse: 75 (08/07/24 1324)  Resp: 18 (08/07/24 1324)  BP: 136/67 (08/07/24 1324)  SpO2: 98 % (08/07/24 1324) Vital Signs (24h Range):  Temp:  [98.2 °F (36.8 °C)-98.8 °F (37.1 °C)] 98.2 °F (36.8 °C)  Pulse:  [73-88] 75  Resp:  [17-18] 18  SpO2:  [95 %-98 %] 98 %  BP: (107-168)/(53-80) 136/67     Weight: 59.7 kg (131 lb 9.8 oz)  Body mass index is 20.61 kg/m².    Intake/Output Summary (Last 24 hours) at 8/7/2024 1427  Last data filed at 8/7/2024 1230  Gross per 24 hour   Intake 740 ml   Output 3000 ml   Net -2260 ml         Physical Exam  Vitals and nursing note reviewed.   Constitutional:       General: He is not in acute distress.     Appearance: He is ill-appearing (chronically).   HENT:      Head: Atraumatic.      Nose: Nose normal.      Mouth/Throat:      Mouth: Mucous membranes are moist.   Eyes:      Extraocular Movements: Extraocular movements intact.      Conjunctiva/sclera: Conjunctivae normal.   Cardiovascular:      Rate and Rhythm: Normal rate and regular rhythm.      Comments: Lt chest tunneled line in place  Pulmonary:      Effort: Pulmonary effort is normal. No respiratory distress.      Breath sounds: No wheezing.      Comments: On room air.  Diminished breath sounds in left lower lung fields  Abdominal:      General: Abdomen is flat. There is no distension.      Palpations: Abdomen is soft.      Tenderness: There is no abdominal tenderness.   Musculoskeletal:         General: No swelling.      Cervical back: Normal range of motion and neck supple.      Right lower leg: No edema.      Left lower leg: No edema.      Comments: TTP on spine (difficult pin point)   Skin:     General: Skin is warm and dry.   Neurological:      Mental Status: He is alert and oriented to person, place, and time.      Motor: Weakness (Generalized weakness) present.   Psychiatric:         Mood and Affect: Mood is depressed.         Thought  Content: Thought content includes suicidal ideation.      Comments: Expressed thoughts of killing himself.              Significant Labs: All pertinent labs within the past 24 hours have been reviewed.    Significant Imaging: I have reviewed all pertinent imaging results/findings within the past 24 hours.    Assessment/Plan:      * Hyperkalemia  Hyperkalemia is likely due to ESRD.The patients most recent potassium results are listed below.  Recent Labs     08/04/24  0502 08/05/24  0421 08/06/24  0407   K 4.4 4.6 4.4       Plan  -presented with K >7, likely due to missed multiple dialysis sessions  - Monitor for arrhythmias with EKG and/or continuous telemetry.   - Treat the hyperkalemia with Potassium Binders, Calcium gluconate, IV insulin and dextrose, Nebulized albuterol sulfate, Furosemide, and Sodium Bicarbonate.   - Monitor potassium: Daily  - Nephrology consult for dialysis   -hypokalemia resolved with medical treatment and HD   -Nephrology consulted  -No sign of peaked T waves  -Resolved with HD          ESRD needing dialysis  Creatine stable for now. BMP reviewed- noted Estimated Creatinine Clearance: 9.8 mL/min (A) (based on SCr of 6.7 mg/dL (H)). according to latest data. Based on current GFR, CKD stage is end stage.  Monitor UOP and serial BMP and adjust therapy as needed. Renally dose meds. Avoid nephrotoxic medications and procedures.     Nephrology consulted for HD  Continue dialysis as scheduled   consulted SW help with transportation    Positive blood culture  See bacteremia plan as above      Bacteremia due to Staphylococcus  -CT chest showed loculated moderate left pleural effusion, left lower lobe atelectasis/consolidation, a large pericardial effusion measuring 3.6 cm, findings suggestive of pulmonary edema.   -Pt started on antibiotics.   -Pulmunology consulted- possible that fluid collection represent some degree of chronic fluid from chronic volume overload related to relative non adherence to  hemodialysis, rather than nidus of infection.   -ID consulted: recommends sampling pleural fluid.   -IR consulted for thoracentisis, however family (Daughter) would like to defer procedure as she feels risks outweigh benefit.   -BCx 8/1 growing CONS.suspect contaminant with CONs in only 1 bottle   -stop vancomycin    Sacral decubitus ulcer  Noted. Frequent turns. Wound care       Left loculated pleural effusion  Patient found to have large pleural effusion on imaging. I have personally reviewed and interpreted the following imaging: CT. A thoracentesis was  ordered  but family would like to deferred procedure . Most likely etiology includes Volume overload not due to CHF. Management to include  volume removal with HD    -CT chest showed loculated moderate left pleural effusion, left lower lobe atelectasis/consolidation, a large pericardial effusion measuring 3.6 cm, findings suggestive of pulmonary edema.   -Pt started on antibiotics.   -Pulmunology consulted- possible that fluid collection represent some degree of chronic fluid from chronic volume overload related to relative non adherence to hemodialysis, rather than nidus of infection.   -ID consulted: recommends sampling pleural fluid.   -IR consulted for thoracentisis, however family (Daughter) would like to defer procedure as she feels risks outweigh benefit.   -BCx 8/1 growing CONS. Repeat BCx NGTD.  -if febrile or clinically worsening would consider treating empirically      Pericardial effusion  Echo with small to moderate posterior effusion.   No indication of cardiac tamponade.       Anemia in ESRD (end-stage renal disease)  Anemia is likely due to chronic disease due to Chronic Kidney Disease. Most recent hemoglobin and hematocrit are listed below.  Recent Labs     08/05/24  0421   HGB 9.4*   HCT 30.7*       Monitor     Essential hypertension  Chronic, controlled. Latest blood pressure and vitals reviewed-     Temp:  [97.8 °F (36.6 °C)-98.5 °F (36.9 °C)]  "  Pulse:  [73-77]   Resp:  [17-18]   BP: (130-167)/(63-79)   SpO2:  [95 %-99 %] .   Home meds for hypertension were reviewed and noted below.   Hypertension Medications               amLODIPine (NORVASC) 10 MG tablet Take 10 mg by mouth once daily.    metoprolol succinate (TOPROL-XL) 50 MG 24 hr tablet Take 1 tablet (50 mg total) by mouth once daily.            While in the hospital, will manage blood pressure as follows; Continue home antihypertensive regimen    Will utilize p.r.n. blood pressure medication only if patient's blood pressure greater than 180/110 and he develops symptoms such as worsening chest pain or shortness of breath.    Paroxysmal atrial fibrillation  Continue Eliquis, Metoprolol.   [id number] Score: 1.     Controlled type 2 diabetes mellitus with chronic kidney disease on chronic dialysis, without long-term current use of insulin  Lab Results   Component Value Date    HGBA1C 5.5 03/15/2022     SSI PRN to maintain goal 140-180  ADA diet, accuchecks ACHS, hypoglycemic protocol      History of CVA (cerebrovascular accident)  Continue home medications       Debility  PT/OT consulted:  Recommends moderate intensity therapy   Family agreeable   /case management consulted for placement and to assist with discharge planning    Depression  Patient has persistent depression which is moderate and is currently uncontrolled. Will Begin anti-depressant medications. We will consult psychiatry at this time. Patient does not display psychosis at this time. Continue to monitor closely and adjust plan of care as needed.    -pt admits he has been depressed since his wife passed away. Expressed SI on 08/07/24, stating "I am going to kill myself if I don't go home. You will find a corpse here".  -Psych consulted: pt does not meet PEC/CEC criteria. Recommend to Begin Zoloft 50 mg PO daily for depression   -zoloft initiated on 08/07/24        ACP (advance care planning)  Remains full code,spent over 5 " minutes.        VTE Risk Mitigation (From admission, onward)           Ordered     IP VTE LOW RISK PATIENT  Once         08/01/24 0441     Place sequential compression device  Until discontinued         08/01/24 0441                    Discharge Planning   JAIME: 8/5/2024     Code Status: Full Code   Is the patient medically ready for discharge?:     Reason for patient still in hospital (select all that apply): Patient trending condition, Treatment, Consult recommendations, PT / OT recommendations, and Pending disposition  Discharge Plan A: Skilled Nursing Facility   Discharge Delays: (!) Post-Acute Set-up              Jonathan Martinez DO  Department of Hospital Medicine   [facility]

## 2024-08-07 NOTE — PT/OT/SLP PROGRESS
Physical Therapy      Patient Name:  Mahesh Cespedes   MRN:  [id number]    Patient not seen today secondary to Dialysis. Will follow-up as able later hour/day .

## 2024-08-07 NOTE — NURSING
[facility] [place]  Nurses Note -- 4 Eyes      8/6/2024       Skin assessed on: Q Shift      [] No Pressure Injuries Present    [x]Prevention Measures Documented    [x] Yes LDA  for Pressure Injury Previously documented     [] Yes New Pressure Injury Discovered   [] LDA for New Pressure Injury Added      Attending RN:  Stephanie Barthelemy, RN     Second RN:  Zoë Kiran LPN

## 2024-08-07 NOTE — PLAN OF CARE
Message received from Sheyla at [facility] to inform that they are willing to accept patient for SNF. Sheyla stated that she will call the family and submit for auth today.

## 2024-08-07 NOTE — PLAN OF CARE
Problem: Hemodialysis  Goal: Absence of Infection Signs and Symptoms  Outcome: Progressing  Intervention: Prevent or Manage Infection  Flowsheets (Taken 8/7/2024 0558)  Infection Prevention:   rest/sleep promoted   single patient room provided  Fever Reduction/Comfort Measures: (vital signs closely monitored) other (see comments)     Problem: Diabetes Comorbidity  Goal: Blood Glucose Level Within Targeted Range  Outcome: Progressing  Intervention: Monitor and Manage Glycemia  Flowsheets (Taken 8/7/2024 0558)  Glycemic Management: blood glucose monitored     Problem: Infection  Goal: Absence of Infection Signs and Symptoms  Outcome: Progressing  Intervention: Prevent or Manage Infection  Flowsheets (Taken 8/7/2024 0558)  Fever Reduction/Comfort Measures: (vital signs closely monitored) other (see comments)  Isolation Precautions: protective     Problem: Wound  Goal: Skin Health and Integrity  Outcome: Progressing  Intervention: Optimize Skin Protection  Flowsheets (Taken 8/7/2024 0558)  Pressure Reduction Techniques:   frequent weight shift encouraged   positioned off wounds   pressure points protected  Activity Management:   Arm raise - L1   Rolling - L1  Head of Bed (HOB) Positioning: HOB elevated     Problem: Fall Injury Risk  Goal: Absence of Fall and Fall-Related Injury  Outcome: Progressing  Intervention: Identify and Manage Contributors  Flowsheets (Taken 8/7/2024 0558)  Self-Care Promotion:   independence encouraged   BADL personal objects within reach  Medication Review/Management: medications reviewed  Intervention: Promote Injury-Free Environment  Flowsheets (Taken 8/7/2024 0558)  Safety Promotion/Fall Prevention:   assistive device/personal item within reach   nonskid shoes/socks when out of bed   lighting adjusted   side rails raised x 2   room near unit station     Problem: Skin Injury Risk Increased  Goal: Skin Health and Integrity  Outcome: Progressing  Intervention: Optimize Skin  Protection  Flowsheets (Taken 8/7/2024 4057)  Pressure Reduction Techniques:   frequent weight shift encouraged   positioned off wounds   pressure points protected  Activity Management:   Arm raise - L1   Rolling - L1  Head of Bed (HOB) Positioning: HOB elevated

## 2024-08-08 VITALS
HEART RATE: 72 BPM | SYSTOLIC BLOOD PRESSURE: 115 MMHG | DIASTOLIC BLOOD PRESSURE: 59 MMHG | BODY MASS INDEX: 20.66 KG/M2 | TEMPERATURE: 98 F | WEIGHT: 131.63 LBS | HEIGHT: 67 IN | OXYGEN SATURATION: 94 % | RESPIRATION RATE: 18 BRPM

## 2024-08-08 LAB
BACTERIA BLD CULT: NORMAL
BACTERIA BLD CULT: NORMAL

## 2024-08-08 PROCEDURE — 25000003 PHARM REV CODE 250: Performed by: STUDENT IN AN ORGANIZED HEALTH CARE EDUCATION/TRAINING PROGRAM

## 2024-08-08 PROCEDURE — 25000003 PHARM REV CODE 250: Performed by: HOSPITALIST

## 2024-08-08 PROCEDURE — 97116 GAIT TRAINING THERAPY: CPT | Mod: CQ

## 2024-08-08 PROCEDURE — 97110 THERAPEUTIC EXERCISES: CPT | Mod: CQ

## 2024-08-08 RX ORDER — SERTRALINE HYDROCHLORIDE 50 MG/1
50 TABLET, FILM COATED ORAL DAILY
Qty: 30 TABLET | Refills: 1 | Status: SHIPPED | OUTPATIENT
Start: 2024-08-09 | End: 2024-10-08

## 2024-08-08 RX ADMIN — AMLODIPINE BESYLATE 5 MG: 5 TABLET ORAL at 10:08

## 2024-08-08 RX ADMIN — AMIODARONE HYDROCHLORIDE 200 MG: 200 TABLET ORAL at 10:08

## 2024-08-08 RX ADMIN — ALLOPURINOL 100 MG: 100 TABLET ORAL at 10:08

## 2024-08-08 RX ADMIN — Medication 1 CAPSULE: at 09:08

## 2024-08-08 RX ADMIN — SERTRALINE HYDROCHLORIDE 50 MG: 50 TABLET ORAL at 10:08

## 2024-08-08 RX ADMIN — LABETALOL HYDROCHLORIDE 100 MG: 100 TABLET, FILM COATED ORAL at 10:08

## 2024-08-08 RX ADMIN — TAMSULOSIN HYDROCHLORIDE 0.4 MG: 0.4 CAPSULE ORAL at 10:08

## 2024-08-08 RX ADMIN — SEVELAMER CARBONATE 1600 MG: 800 TABLET, FILM COATED ORAL at 10:08

## 2024-08-08 RX ADMIN — PANTOPRAZOLE SODIUM 40 MG: 40 TABLET, DELAYED RELEASE ORAL at 10:08

## 2024-08-08 RX ADMIN — ATORVASTATIN CALCIUM 80 MG: 40 TABLET, FILM COATED ORAL at 10:08

## 2024-08-08 NOTE — NURSING
Pt ambulated with walker to bathroom, bm x1. Remained free from fall/injury. Pt back in bed talking on phone with daughter. Safety measures maintained. Will cont to monitor

## 2024-08-08 NOTE — PROGRESS NOTES
[facility] Medicine  Progress Note    Patient Name: Mahesh Cespedes  MRN: [id number]  Patient Class: IP- Inpatient   Admission Date: 7/31/2024  Length of Stay: 7 days  Attending Physician: Jonathan Martinez DO  Primary Care Provider: Cruz Hernandez MD        Subjective:     Principal Problem:Hyperkalemia        HPI:  This is a 59-year-old male with a past medical history of ESRD on HD, AFib/flutter (on Eliquis), type 2 diabetes, hyperlipidemia, hypertension, CVA, who presents with altered mental status.      Patient presents for evaluation of altered mental status in the setting of missing dialysis for 2 weeks.  Per the patient's family, he has been bedridden, more confused and developed fecal incontinence. On arrival to the ER, the patient was covered in stool.  Patient is unable to contribute to the history.     In the ED, the patient was I hypertensive go toxic (SpO2:  88%), requiring 2 L O2. Labs were remarkable for hyperkalemia (7.4), elevated BUN (182) , normocytic anemia (10.2).  CT head showed no acute process.  CT chest showed loculated moderate left pleural effusion, left lower lobe atelectasis/consolidation, a large pericardial effusion measuring 3.6 cm, findings suggestive of pulmonary edema. Nephrology was consulted, with plans to dialyze the patient.  Patient was given albuterol, calcium gluconate, insulin/D10, Lasix 80 mg IV, sodium bicarb 100 mEq, Lokelma 10 g, vancomycin, Zosyn, Zofran 4 mg IV. He was admitted for further management.     Overview/Hospital Course:  58y/o M pt with hx of ESRD on HD, AFib/flutter, T2DM, HTN, CVA, who presented with altered mental status after missing HD for 2 weeks. He was admitted for hyperkalemia with potassium of 7.4. Nephrology consulted- pt requiring HD on 7/31. Patient was also hypoxic with O2 sats of 88%, requiring NC 2 L O2.  Labs remarkable for no leukocytosis. CT head showed no acute process.  CT chest showed loculated moderate left pleural  effusion, left lower lobe atelectasis/consolidation, a large pericardial effusion measuring 3.6 cm, findings suggestive of pulmonary edema. Pt started on antibiotics. Echo with small to moderate posterior effusion. No indication of cardiac tamponade. Pulmunology consulted- possible that fluid collection represent some degree of chronic fluid from chronic volume overload related to relative non adherence to hemodialysis, rather than nidus of infection. ID consulted- recommends sampling pleural fluid. IR consulted for thoracentisis, however family (Daughter) would like to defer procedure as she feels risks outweigh benefit. BCx 8/1 growing CONS. Repeat BCx NGTD. continue empiric vanc pending repeat bcx. PT/OT consulted and recommends moderate intensity therapy.  Patient and family agreeable.  /case management consulted to assist with placement. Patient expressed suicidal ideation on 08/07/2024. Psych consulted-started on sertraline. Plan for SNF but after discussion with daughter on 08/08/2024, they have decided to take him home with home health.     Interval History:  No acute overnight events.  Patient remained afebrile.  Currently on room air, denies any cough or shortness a breath.  Wants to go home.  States that he does not want to go to skilled nursing facility.  Called and spoke with the patient daughter today.  She stated that after discussing with her family and with the patient, they have decided that they do not want to take patient to rehab anymore and wants to have patient go home with home health.  Extensively discussed with daughter and patient that he would likely benefit from acute skilled care with PT and OT.  They verbalized understanding and still wants to go home with home health    Review of Systems   Constitutional:  Negative for chills and fever.   Eyes:  Negative for visual disturbance.   Respiratory:  Negative for cough, shortness of breath and wheezing.    Cardiovascular:   Negative for chest pain.   Genitourinary:  Negative for difficulty urinating and dysuria.   Musculoskeletal:  Positive for back pain and gait problem.   Skin:  Positive for wound.   Neurological:  Positive for weakness. Negative for dizziness, light-headedness and headaches.   Psychiatric/Behavioral:  negative for suicidal ideas. Negative for behavioral problems and hallucinations.      Objective:     Vital Signs (Most Recent):  Temp: 97.9 °F (36.6 °C) (08/08/24 1124)  Pulse: 75 (08/08/24 1136)  Resp: 17 (08/08/24 1124)  BP: 139/72 (08/08/24 1124)  SpO2: 95 % (08/08/24 1124) Vital Signs (24h Range):  Temp:  [97.9 °F (36.6 °C)-98.7 °F (37.1 °C)] 97.9 °F (36.6 °C)  Pulse:  [73-76] 75  Resp:  [17-19] 17  SpO2:  [93 %-98 %] 95 %  BP: (129-153)/(62-78) 139/72     Weight: 59.7 kg (131 lb 9.8 oz)  Body mass index is 20.61 kg/m².    Intake/Output Summary (Last 24 hours) at 8/8/2024 1229  Last data filed at 8/8/2024 0800  Gross per 24 hour   Intake 860 ml   Output 3000 ml   Net -2140 ml         Physical Exam  Vitals and nursing note reviewed.   Constitutional:       General: He is not in acute distress.     Appearance: He is ill-appearing (chronically).   HENT:      Head: Atraumatic.      Nose: Nose normal.      Mouth/Throat:      Mouth: Mucous membranes are moist.   Eyes:      Extraocular Movements: Extraocular movements intact.      Conjunctiva/sclera: Conjunctivae normal.   Cardiovascular:      Rate and Rhythm: Normal rate and regular rhythm.      Comments: Lt chest tunneled line in place  Pulmonary:      Effort: Pulmonary effort is normal. No respiratory distress.      Breath sounds: No wheezing.      Comments: On room air.  Diminished breath sounds in left lower lung fields  Abdominal:      General: Abdomen is flat. There is no distension.      Palpations: Abdomen is soft.      Tenderness: There is no abdominal tenderness.   Musculoskeletal:         General: No swelling.      Cervical back: Normal range of motion and  neck supple.      Right lower leg: No edema.      Left lower leg: No edema.      Comments: TTP on spine (difficult pin point)   Skin:     General: Skin is warm and dry.   Neurological:      Mental Status: He is alert and oriented to person, place, and time.      Motor: Weakness (Generalized weakness) present.   Psychiatric:         Mood and Affect: Mood is depressed.         Thought Content: No suicidal ideation.                  Significant Labs: All pertinent labs within the past 24 hours have been reviewed.    Significant Imaging: I have reviewed all pertinent imaging results/findings within the past 24 hours.    Assessment/Plan:      * Hyperkalemia  Hyperkalemia is likely due to ESRD.The patients most recent potassium results are listed below.  Recent Labs     08/04/24  0502 08/05/24  0421 08/06/24  0407   K 4.4 4.6 4.4       Plan  -presented with K >7, likely due to missed multiple dialysis sessions  - Monitor for arrhythmias with EKG and/or continuous telemetry.   - Treat the hyperkalemia with Potassium Binders, Calcium gluconate, IV insulin and dextrose, Nebulized albuterol sulfate, Furosemide, and Sodium Bicarbonate.   - Monitor potassium: Daily  - Nephrology consult for dialysis   -hypokalemia resolved with medical treatment and HD   -Nephrology consulted  -No sign of peaked T waves  -Resolved with HD          ESRD needing dialysis  Creatine stable for now. BMP reviewed- noted Estimated Creatinine Clearance: 9.8 mL/min (A) (based on SCr of 6.7 mg/dL (H)). according to latest data. Based on current GFR, CKD stage is end stage.  Monitor UOP and serial BMP and adjust therapy as needed. Renally dose meds. Avoid nephrotoxic medications and procedures.     Nephrology consulted for HD  Continue dialysis as scheduled   consulted SW help with transportation    Positive blood culture  See bacteremia plan as above      Bacteremia due to Staphylococcus  -CT chest showed loculated moderate left pleural effusion, left  lower lobe atelectasis/consolidation, a large pericardial effusion measuring 3.6 cm, findings suggestive of pulmonary edema.   -Pt started on antibiotics.   -Pulmunology consulted- possible that fluid collection represent some degree of chronic fluid from chronic volume overload related to relative non adherence to hemodialysis, rather than nidus of infection.   -ID consulted: recommends sampling pleural fluid.   -IR consulted for thoracentisis, however family (Daughter) would like to defer procedure as she feels risks outweigh benefit.   -BCx 8/1 growing CONS.suspect contaminant with CONs in only 1 bottle   -stop vancomycin    Sacral decubitus ulcer  Noted. Frequent turns. Wound care       Left loculated pleural effusion  Patient found to have large pleural effusion on imaging. I have personally reviewed and interpreted the following imaging: CT. A thoracentesis was  ordered  but family would like to deferred procedure . Most likely etiology includes Volume overload not due to CHF. Management to include  volume removal with HD    -CT chest showed loculated moderate left pleural effusion, left lower lobe atelectasis/consolidation, a large pericardial effusion measuring 3.6 cm, findings suggestive of pulmonary edema.   -Pt started on antibiotics.   -Pulmunology consulted- possible that fluid collection represent some degree of chronic fluid from chronic volume overload related to relative non adherence to hemodialysis, rather than nidus of infection.   -ID consulted: recommends sampling pleural fluid.   -IR consulted for thoracentisis, however family (Daughter) would like to defer procedure as she feels risks outweigh benefit.   -BCx 8/1 growing CONS. Repeat BCx NGTD.  -if febrile or clinically worsening would consider treating empirically      Pericardial effusion  Echo with small to moderate posterior effusion.   No indication of cardiac tamponade.       Anemia in ESRD (end-stage renal disease)  Anemia is likely  due to chronic disease due to Chronic Kidney Disease. Most recent hemoglobin and hematocrit are listed below.  Recent Labs     08/05/24  0421   HGB 9.4*   HCT 30.7*       Monitor     Essential hypertension  Chronic, controlled. Latest blood pressure and vitals reviewed-     Temp:  [97.8 °F (36.6 °C)-98.5 °F (36.9 °C)]   Pulse:  [73-77]   Resp:  [17-18]   BP: (130-167)/(63-79)   SpO2:  [95 %-99 %] .   Home meds for hypertension were reviewed and noted below.   Hypertension Medications               amLODIPine (NORVASC) 10 MG tablet Take 10 mg by mouth once daily.    metoprolol succinate (TOPROL-XL) 50 MG 24 hr tablet Take 1 tablet (50 mg total) by mouth once daily.            While in the hospital, will manage blood pressure as follows; Continue home antihypertensive regimen    Will utilize p.r.n. blood pressure medication only if patient's blood pressure greater than 180/110 and he develops symptoms such as worsening chest pain or shortness of breath.    Paroxysmal atrial fibrillation  Continue Eliquis, Metoprolol.   [id number] Score: 1.     Controlled type 2 diabetes mellitus with chronic kidney disease on chronic dialysis, without long-term current use of insulin  Lab Results   Component Value Date    HGBA1C 5.5 03/15/2022     SSI PRN to maintain goal 140-180  ADA diet, accuchecks ACHS, hypoglycemic protocol      History of CVA (cerebrovascular accident)  Continue home medications       Debility  PT/OT consulted:  Recommends moderate intensity therapy   Family initially agreeable but now have changed their mind.  Would like patient to go home with home health  /case management consulted for placement and to assist with discharge planning    Depression  Patient has persistent depression which is moderate and is currently uncontrolled. Will Begin anti-depressant medications. We will consult psychiatry at this time. Patient does not display psychosis at this time. Continue to monitor closely and adjust  "plan of care as needed.    -pt admits he has been depressed since his wife passed away. Expressed SI on 08/07/24, stating "I am going to kill myself if I don't go home. You will find a corpse here".  -Psych consulted: pt does not meet PEC/CEC criteria. Recommend to Begin Zoloft 50 mg PO daily for depression   -zoloft initiated on 08/07/24        ACP (advance care planning)  Remains full code,spent over 5 minutes.        VTE Risk Mitigation (From admission, onward)           Ordered     IP VTE LOW RISK PATIENT  Once         08/01/24 0441     Place sequential compression device  Until discontinued         08/01/24 0441                    Discharge Planning   JAIME: 8/5/2024     Code Status: Full Code   Is the patient medically ready for discharge?:     Reason for patient still in hospital (select all that apply): Patient trending condition, PT / OT recommendations, and Pending disposition  Discharge Plan A: Skilled Nursing Facility   Discharge Delays: (!) Post-Acute Set-up              Jonathan Martinez DO  Department of Hospital Medicine   [facility] - Med Surg    "

## 2024-08-08 NOTE — PLAN OF CARE
[facility]      HOME HEALTH ORDERS  FACE TO FACE ENCOUNTER    Patient Name: Mahesh Cespedes  YOB: 1965    PCP: Cruz Hernandez MD   PCP Address: [address] [place] [address] / [id number]  PCP Phone Number: [phone]  PCP Fax: [phone]    Encounter Date: 7/31/24    Admit to Home Health    Diagnoses:  Active Hospital Problems    Diagnosis  POA    *Hyperkalemia [E87.5]  Yes     Priority: 1 - High    ESRD needing dialysis [N18.6, Z99.2]  Yes     Priority: 2     Positive blood culture [R78.81]  Yes     Priority: 3     Bacteremia due to Staphylococcus [R78.81, B95.8]  Yes     Priority: 3     Sacral decubitus ulcer [L89.159]  Yes     Priority: 4     Left loculated pleural effusion [J90]  Yes     Priority: 5     Pericardial effusion [I31.39]  Yes     Priority: 6     Anemia in ESRD (end-stage renal disease) [N18.6, D63.1]  Yes     Priority: 7     Essential hypertension [I10]  Yes     Priority: 8      Chronic    Paroxysmal atrial fibrillation [I48.0]  Yes     Priority: 9     Controlled type 2 diabetes mellitus with chronic kidney disease on chronic dialysis, without long-term current use of insulin [E11.22, N18.6, Z99.2]  Not Applicable     Priority: 10     History of CVA (cerebrovascular accident) [Z86.73]  Not Applicable     Priority: 11      Chronic    Debility [R53.81]  Yes     Priority: 12     Depression [F32.A]  Yes     Priority: 13     ACP (advance care planning) [Z71.89]  Not Applicable     Priority: 14       Resolved Hospital Problems   No resolved problems to display.       Follow Up Appointments:  No future appointments.    Allergies:Review of patient's allergies indicates:  No Known Allergies    Medications: Review discharge medications with patient and family and provide education.    Current Facility-Administered Medications   Medication Dose Route Frequency Provider Last Rate Last Admin    acetaminophen tablet 650 mg  650 mg Oral Q4H PRN Andre Perkins MD   650 mg at 08/05/24 5534     allopurinoL tablet 100 mg  100 mg Oral Daily Andre Perkins MD   100 mg at 08/08/24 1012    amiodarone tablet 200 mg  200 mg Oral Daily Andre Perkins MD   200 mg at 08/08/24 1012    amLODIPine tablet 5 mg  5 mg Oral Daily Suzie Ron MD   5 mg at 08/08/24 1012    atorvastatin tablet 80 mg  80 mg Oral Daily Andre Perkins MD   80 mg at 08/08/24 1012    epoetin luli-epbx injection 10,000 Units  10,000 Units Subcutaneous Every Mon, Wed, Fri VelazquezRehan MD   10,000 Units at 08/07/24 0903    hydrALAZINE injection 20 mg  20 mg Intravenous Q4H PRN Suzie Ron MD        labetaloL tablet 100 mg  100 mg Oral Q12H Suzie Ron MD   100 mg at 08/08/24 1012    melatonin tablet 6 mg  6 mg Oral Nightly PRN Andre Perkins MD   6 mg at 08/02/24 2047    ondansetron injection 4 mg  4 mg Intravenous Q8H PRN Andre Perkins MD   4 mg at 08/07/24 2210    pantoprazole EC tablet 40 mg  40 mg Oral Daily Andre Perkins MD   40 mg at 08/08/24 1012    prochlorperazine injection Soln 5 mg  5 mg Intravenous Q6H PRN Andre Perkins MD   5 mg at 08/03/24 0820    sertraline tablet 50 mg  50 mg Oral Daily Jonathan Martinez, DO   50 mg at 08/08/24 1012    sevelamer carbonate tablet 1,600 mg  1,600 mg Oral TID WM Andre Perkins MD   1,600 mg at 08/08/24 1012    sodium chloride 0.9% flush 10 mL  10 mL Intravenous PRN Andre Perkins MD        tamsulosin 24 hr capsule 0.4 mg  0.4 mg Oral Daily Andre Perkins MD   0.4 mg at 08/08/24 1012    vitamin renal formula (B-complex-vitamin c-folic acid) 1 mg per capsule 1 capsule  1 capsule Oral Daily Andre Perkins MD   1 capsule at 08/08/24 0900        Medication List        START taking these medications      sertraline 50 MG tablet  Commonly known as: ZOLOFT  Take 1 tablet (50 mg total) by mouth once daily.  Start taking on: August 9, 2024            CONTINUE taking these medications      allopurinoL 100 MG tablet  Commonly known as: ZYLOPRIM  Take 100 mg by mouth once  daily.     amantadine  mg capsule  Commonly known as: SYMMETREL  Take 1 capsule by mouth every other day.     amiodarone 200 MG Tab  Commonly known as: PACERONE  Take 200 mg by mouth once daily.     amLODIPine 10 MG tablet  Commonly known as: NORVASC  Take 10 mg by mouth once daily.     apixaban 2.5 mg Tab  Commonly known as: ELIQUIS  Take 1 tablet by mouth.     atorvastatin 80 MG tablet  Commonly known as: LIPITOR  Take 80 mg by mouth once daily.     metoprolol succinate 50 MG 24 hr tablet  Commonly known as: TOPROL-XL  Take 1 tablet (50 mg total) by mouth once daily.     pantoprazole 40 MG tablet  Commonly known as: PROTONIX  Take 40 mg by mouth once daily.     RENAL CAPS 1 mg Cap  Generic drug: vitamin renal formula (B-complex-vitamin c-folic acid)  Take 1 capsule by mouth once daily at 6am.     sevelamer carbonate 800 mg Tab  Commonly known as: RENVELA  Take 2 tablets (1,600 mg total) by mouth 3 (three) times daily with meals.     tamsulosin 0.4 mg Cap  Commonly known as: FLOMAX  Take 0.4 mg by mouth once daily.            STOP taking these medications      amoxicillin-clavulanate 875-125mg 875-125 mg per tablet  Commonly known as: AUGMENTIN                I have seen and examined this patient within the last 30 days. My clinical findings that support the need for the home health skilled services and home bound status are the following:no   Weakness/numbness causing balance and gait disturbance due to Weakness/Debility making it taxing to leave home.     Diet:   cardiac diet, diabetic diet 2000 calorie, and renal diet        Referrals/ Consults  Physical Therapy to evaluate and treat. Evaluate for home safety and equipment needs; Establish/upgrade home exercise program. Perform / instruct on therapeutic exercises, gait training, transfer training, and Range of Motion.  Occupational Therapy to evaluate and treat. Evaluate home environment for safety and equipment needs. Perform/Instruct on transfers, ADL  training, ROM, and therapeutic exercises.    Activities:   activity as tolerated    Nursing:   Agency to admit patient within 24 hours of hospital discharge unless specified on physician order or at patient request    SN to complete comprehensive assessment including routine vital signs. Instruct on disease process and s/s of complications to report to MD. Review/verify medication list sent home with the patient at time of discharge  and instruct patient/caregiver as needed. Frequency may be adjusted depending on start of care date.     Skilled nurse to perform up to 3 visits PRN for symptoms related to diagnosis    Notify MD if SBP > 160 or < 90; DBP > 90 or < 50; HR > 120 or < 50; Temp > 101; O2 < 88%;     Ok to schedule additional visits based on staff availability and patient request on consecutive days within the home health episode.    When multiple disciplines ordered:    Start of Care occurs on Sunday - Wednesday schedule remaining discipline evaluations as ordered on separate consecutive days following the start of care.    Thursday SOC -schedule subsequent evaluations Friday and Monday the following week.     Friday - Saturday SOC - schedule subsequent discipline evaluations on consecutive days starting Monday of the following week.    For all post-discharge communication and subsequent orders please contact patient's primary care physician.         Home Health Aide:  Nursing Three times weekly, Physical Therapy Three times weekly, and Occupational Therapy Three times weekly    Wound Care Orders  yes:  Pressure Ulcer(s) Stage IV:  Location: sacrum  Consult ET nurse      Assessment:   Sacrum- Stage 4 pressure injury jeremy and granulating with opening 5 mm(L) 3 mm(W) 3 mm(D). Much improved. Patient on right side avoiding pressure on sacrum  Treatment/Plan:  Local wound care to sacrum as per WOGs for Stage 4 pressure injury with PIP bundle.              I certify that this patient is confined to his  home and needs intermittent skilled nursing care, physical therapy, and occupational therapy.

## 2024-08-08 NOTE — NURSING
Pt sitting on side of bed, free from fall/injury. Scheduled medications given. IV saline lock. Tele #[id number]. Safety measures maintained. Will cont to monitor

## 2024-08-08 NOTE — PLAN OF CARE
Recommendations  Recommendation:   1. Continue Renal Diet   2. Add diabetic diet restriction 2000 kcals   3. Add Novasource Renal TID   4. Encourage PO intake as tolerated   5. Monitor weight and labs  6. Consider appetite stimulant if pt's intake to remain low     Goals: Pt will consume 50-75% of meals by RD follow up    Nutrition Goal Status: new  Communication of RD Recs:  (POC)

## 2024-08-08 NOTE — PLAN OF CARE
Problem: Adult Inpatient Plan of Care  Goal: Plan of Care Review  Outcome: Progressing  Flowsheets (Taken 8/8/2024 0924)  Plan of Care Reviewed With: patient  Goal: Absence of Hospital-Acquired Illness or Injury  Outcome: Progressing  Intervention: Identify and Manage Fall Risk  Flowsheets (Taken 8/8/2024 0924)  Safety Promotion/Fall Prevention:   assistive device/personal item within reach   room near unit station   side rails raised x 2   instructed to call staff for mobility  Intervention: Prevent Skin Injury  Flowsheets (Taken 8/8/2024 0924)  Body Position: position changed independently  Device Skin Pressure Protection: adhesive use limited  Goal: Optimal Comfort and Wellbeing  Outcome: Progressing  Intervention: Monitor Pain and Promote Comfort  Flowsheets (Taken 8/8/2024 0924)  Pain Management Interventions:   care clustered   pain management plan reviewed with patient/caregiver   quiet environment facilitated   relaxation techniques promoted   pillow support provided

## 2024-08-08 NOTE — PT/OT/SLP PROGRESS
Physical Therapy Treatment    Patient Name:  Mahesh Cespedes   MRN:  [id number]    Recommendations:     Discharge Recommendations: Moderate Intensity Therapy  Discharge Equipment Recommendations: bath bench  Barriers to discharge: None    Assessment:     Mahesh Cespedes is a 59 y.o. male admitted with a medical diagnosis of Hyperkalemia.  He presents with the following impairments/functional limitations: weakness, impaired endurance, impaired functional mobility, gait instability, decreased lower extremity function, decreased safety awareness, impaired balance, decreased ROM, decreased coordination, decreased upper extremity function .    Rehab Prognosis: Good; patient would benefit from acute skilled PT services to address these deficits and reach maximum level of function.    Recent Surgery: * No surgery found *      Plan:     During this hospitalization, patient to be seen 3 x/week to address the identified rehab impairments via gait training, therapeutic activities, therapeutic exercises, neuromuscular re-education and progress toward the following goals:    Plan of Care Expires:  08/11/24    Subjective     Chief Complaint: weakness   Patient/Family Comments/goals: pt is pleasant and agreeable to therapy   Pain/Comfort:  Pain Rating 1: 0/10  Pain Rating Post-Intervention 1: 0/10      Objective:     Communicated with nurse  prior to session.  Patient found HOB elevated with telemetry, bed alarm, peripheral IV upon PT entry to room.     General Precautions: Standard, fall  Orthopedic Precautions: N/A  Braces: N/A  Respiratory Status: Room air     Functional Mobility:  Bed Mobility:     Rolling Left:  stand by assistance  Supine to Sit: stand by assistance  Transfers: V/T cues for safety , proper technique and walker management     Sit to Stand:  contact guard assistance with rolling walker  Bed to Chair: contact guard assistance with  rolling walker  using  Step Transfer  Gait:  Patient ambulated  50 ft  on level tile  with Rolling Walker with Minimal Assistance /CGA (chair followed) .  Pt with decreased eileen, increased time in double stance, decreased velocity of limb motion, decreased step length, decreased swing-to-stance ratio, decreased toe-to-floor clearance and decreased weight-shifting ability.Impairments contributing to gait deviations include impaired balance, decreased flexibility, pain, impaired postural control, decreased ROM and decreased strength. V/T cues for safety technique and walker management.   Balance:  good in sitting, fair in standing       AM-PAC 6 CLICK MOBILITY  Turning over in bed (including adjusting bedclothes, sheets and blankets)?: 3  Sitting down on and standing up from a chair with arms (e.g., wheelchair, bedside commode, etc.): 3  Moving from lying on back to sitting on the side of the bed?: 3  Moving to and from a bed to a chair (including a wheelchair)?: 3  Need to walk in hospital room?: 3  Climbing 3-5 steps with a railing?: 2  Basic Mobility Total Score: 17       Treatment & Education:  Lower Extremity Exercises.    Patient educated on: Purpose and Benefits of Therapeutic Exercise(s).    Patient verbalized acceptance/understanding of instruction(s), expectation(s), and limitation(s) (for safety).  Patient performed: 2 sets of 10 reps (each) of B LE Ther Ex: AP, LAQ, Hip abd/add, Hip flexion while sitting up on EOB.       Patient required  verbal cues/tactile cues to ensure correct sequence, to maintain proper form, and to allow for self-correction.  Pt reported no complaints or problems with exercise activity.     Patient required increase Reaction Time to initiate movements and a Sitting Rest Break between set(s) to recover.      Patient left up in reclined chair with BLE elevated, heels offloading  all lines intact, call button in reach, and nurse notified..    GOALS:   Multidisciplinary Problems       Physical Therapy Goals          Problem: Physical Therapy    Goal Priority  Disciplines Outcome Goal Variances Interventions   Physical Therapy Goal     PT, PT/OT Progressing     Description: Goals to be met by: 24     Patient will increase functional independence with mobility by performin. Supine to sit with Modified Ness  2. Sit to supine with Modified Ness  3. Sit to stand transfer with Modified Ness  4. Bed to chair transfer with Modified Ness using Rolling Walker  5. Gait  x 50 feet with Modified Ness using Rolling Walker.   6. Lower extremity exercise program x30 reps per handout, with independence                         Time Tracking:     PT Received On: 24  PT Start Time: 938     PT Stop Time: 1002  PT Total Time (min): 24 min     Billable Minutes: Gait Training 12 and Therapeutic Exercise 12    Treatment Type: Treatment  PT/PTA: PTA     Number of PTA visits since last PT visit: 2024
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
## 2024-08-08 NOTE — PT/OT/SLP PROGRESS
Occupational Therapy      Patient Name:  Mahesh Cespedes   MRN:  [id number]    Patient not seen today secondary to Patient fatigue (Pt reports dizzy and fatigued. Encouragement and education offered on benefit of repositioning, movement during day. RN notified.). Will follow-up later today as able.    8/8/2024

## 2024-08-08 NOTE — DISCHARGE SUMMARY
[facility] Medicine  Discharge Summary      Patient Name: Mahesh Cespedes  MRN: [id number]  [facility]: [id number]  Patient Class: IP- Inpatient  Admission Date: 7/31/2024  Hospital Length of Stay: 7 days  Discharge Date and Time:  08/08/2024 12:44 PM  Attending Physician: Jonathan Martinez DO   Discharging Provider: Jonathan Martinez DO  Primary Care Provider: Cruz Hernandez MD    Primary Care Team: Networked reference to record PCT     HPI:   This is a 59-year-old male with a past medical history of ESRD on HD, AFib/flutter (on Eliquis), type 2 diabetes, hyperlipidemia, hypertension, CVA, who presents with altered mental status.      Patient presents for evaluation of altered mental status in the setting of missing dialysis for 2 weeks.  Per the patient's family, he has been bedridden, more confused and developed fecal incontinence. On arrival to the ER, the patient was covered in stool.  Patient is unable to contribute to the history.     In the ED, the patient was I hypertensive go toxic (SpO2:  88%), requiring 2 L O2. Labs were remarkable for hyperkalemia (7.4), elevated BUN (182) , normocytic anemia (10.2).  CT head showed no acute process.  CT chest showed loculated moderate left pleural effusion, left lower lobe atelectasis/consolidation, a large pericardial effusion measuring 3.6 cm, findings suggestive of pulmonary edema. Nephrology was consulted, with plans to dialyze the patient.  Patient was given albuterol, calcium gluconate, insulin/D10, Lasix 80 mg IV, sodium bicarb 100 mEq, Lokelma 10 g, vancomycin, Zosyn, Zofran 4 mg IV. He was admitted for further management.     * No surgery found *      Hospital Course:   58y/o M pt with hx of ESRD on HD, AFib/flutter, T2DM, HTN, CVA, who presented with altered mental status after missing HD for 2 weeks. He was admitted for hyperkalemia with potassium of 7.4. Nephrology consulted- pt requiring HD on 7/31. Patient was also hypoxic with O2 sats of 88%,  requiring NC 2 L O2.  Labs remarkable for no leukocytosis. CT head showed no acute process.  CT chest showed loculated moderate left pleural effusion, left lower lobe atelectasis/consolidation, a large pericardial effusion measuring 3.6 cm, findings suggestive of pulmonary edema. Pt started on antibiotics. Echo with small to moderate posterior effusion. No indication of cardiac tamponade. Pulmunology consulted- possible that fluid collection represent some degree of chronic fluid from chronic volume overload related to relative non adherence to hemodialysis, rather than nidus of infection. ID consulted- recommends sampling pleural fluid. IR consulted for thoracentisis, however family (Daughter) would like to defer procedure as she feels risks outweigh benefit. BCx 8/1 growing CONS. Repeat BCx NGTD. continue empiric vanc pending repeat bcx. PT/OT consulted and recommends moderate intensity therapy.  Patient and family agreeable.  /case management consulted to assist with placement. Patient expressed suicidal ideation on 08/07/2024. Psych consulted-started on sertraline. Plan for SNF but after discussion with daughter on 08/08/2024, they have decided to take him home with home health.  Extensively discussed with the patient and his daughter that the patient would benefit from acute skilled OT and PT services to address his deficits and reach maximum level of function.  They both verbalized understanding but currently declined.  Would like to go home with home health    Admits back pain and fatigued, at baseline.  Pt denies any fever, headaches, chest pain, shortness of breath, palpitations, abdominal pain, nausea, vomiting, or any new weaknesses. Feels ready to go home. Patient's exam on discharge was as follow: Patient is alert and oriented, appears in no acute distress, heart with regular rate and rhythm, lungs with diminished breath sounds on the the left side, currently on room air, abdomen soft and  nondistended and nontender, and Bilateral lower extremities without any edema or calf tenderness.  Patient with generalized weakness.  Patient with stage IV pressure injury on the sacrum.  Does not appear acutely infected at this time.    Patient and family (daughter-Rima) was counseled regarding any abnormal labs, differential diagnosis, treatment options, risk-benefit, lifestyle changes, prognosis, current condition, and medications. Patient was interactive and attentive.  Patient and family (daughter-Rima) questions were answered in a respectful and timely manner. Patient was instructed to follow-up with PCP within 1 week and to continue taking medications as prescribed.  Instructed to also follow up with dialysis as scheduled. Also, extensively discussed the risks, benefits, and side effects of patient's medications. Discussed with Patient and family (daughter-Rima) about any medication changes. Patient and family (daughter-Rima) verbalized understanding and agrees to treatment plan.  Patient is stable for discharge.  Patient and family (daughter-Rima) has no other questions or concerns at this time.  ED precautions discussed with the patient.    Vital signs are stable. Ambulating without any difficulty. Tolerating p.o. intake without any nausea or vomiting. Afebrile for over 24 hours. Patient is in stable condition and has no questions or concerns. Patient will be discharge to home with home health once transportation secured . Prescriptions sent to pharmacy.  CM/SW to assist with discharge planning.     Vitals:    08/08/24 0731 08/08/24 0740 08/08/24 1124 08/08/24 1136   BP:  (!) 153/72 139/72    BP Location:       Patient Position:       Pulse: 74 74 75 75   Resp:  17 17    Temp:  98.7 °F (37.1 °C) 97.9 °F (36.6 °C)    TempSrc:       SpO2:  95% 95%    Weight:       Height:                 Goals of Care Treatment Preferences:  Code Status: Full Code                 Consults:   Consults (From  admission, onward)          Status Ordering Provider     Inpatient consult to Telemedicine - Psych  Once        Provider:  Lukas Mojica MD    Completed CARRIE SANCHEZ T.     Inpatient consult to Interventional Radiology  Once        Provider:  Rachael Valencia NP    Completed CARRIE SANCHEZ T.     Inpatient consult to Pulmonology  Once        Provider:  Duane Gil MD    Completed CARRIE SANCHEZ T.     Inpatient consult to Social Work  Once        Provider:  (Not yet assigned)    Completed AKHONDZADEH, ABDOLAZIM     Inpatient consult to Infectious Diseases  Once        Provider:  (Not yet assigned)    Completed AKHONDZADEH, ABDOLAZIM     Inpatient consult to Registered Dietitian/Nutritionist  Once        Provider:  (Not yet assigned)    Completed AKHONDZADEH, ABDOLAZIM     Inpatient consult to Palliative Care  Once        Provider:  Maida Zhong MD    Completed AKHONDZADEH, ABDOLAZIM     Inpatient consult to Spiritual Care  Once        Provider:  (Not yet assigned)    Completed AKHONDZADEH, ABDOLAZIM     Inpatient consult to Nephrology  Once        Provider:  David Scott MD    Acknowledged AKHOBRIIH, ABDOLAZIM     Inpatient consult to Nephrology  Once        Provider:  David Scott MD    Completed KRYSTAL MOY            No new Assessment & Plan notes have been filed under this hospital service since the last note was generated.  Service: Hospital Medicine    Final Active Diagnoses:    Diagnosis Date Noted POA    PRINCIPAL PROBLEM:  Hyperkalemia [E87.5] 01/06/2024 Yes    ESRD needing dialysis [N18.6, Z99.2] 02/10/2017 Yes    Positive blood culture [R78.81] 08/05/2024 Yes    Bacteremia due to Staphylococcus [R78.81, B95.8] 02/07/2024 Yes    Sacral decubitus ulcer [L89.159] 02/06/2024 Yes    Left loculated pleural effusion [J90] 08/06/2024 Yes    Pericardial effusion [I31.39] 08/02/2024 Yes    Anemia in ESRD (end-stage renal disease) [N18.6, D63.1] 05/20/2019 Yes    Essential hypertension [I10]  05/20/2019 Yes     Chronic    Paroxysmal atrial fibrillation [I48.0] 01/06/2024 Yes    Controlled type 2 diabetes mellitus with chronic kidney disease on chronic dialysis, without long-term current use of insulin [E11.22, N18.6, Z99.2] 02/09/2017 Not Applicable    History of CVA (cerebrovascular accident) [Z86.73] 05/20/2019 Not Applicable     Chronic    Debility [R53.81] 08/06/2024 Yes    Depression [F32.A] 08/07/2024 Yes    ACP (advance care planning) [Z71.89] 08/02/2024 Not Applicable      Problems Resolved During this Admission:       Discharged Condition: stable    Disposition: Home or Self Care    Follow Up:   Follow-up Information       Cruz Hernandez MD Follow up in 1 week(s).    Specialty: Internal Medicine  Contact information:  [address] [place] [address]  Ho ROONEY [id number] [phone]                           Patient Instructions:      Hepatitis B Surface Ab, Qualitative   Standing Status: Future Standing Exp. Date: 01/28/25     Hepatitis B surface antigen   Standing Status: Future Standing Exp. Date: 01/28/25     Diet Cardiac     Diet renal     Diet diabetic     Notify your health care provider if you experience any of the following:  temperature >100.4     Notify your health care provider if you experience any of the following:  persistent nausea and vomiting or diarrhea     Notify your health care provider if you experience any of the following:  severe uncontrolled pain     Notify your health care provider if you experience any of the following:  difficulty breathing or increased cough     Notify your health care provider if you experience any of the following:  increased confusion or weakness     Activity as tolerated       Significant Diagnostic Studies: Labs: All labs within the past 24 hours have been reviewed      Recent Results (from the past 100 hour(s))   Blood culture    Collection Time: 08/04/24  6:21 PM    Specimen: Blood   Result Value Ref Range    Blood Culture, Routine No Growth to date      Blood Culture, Routine No Growth to date     Blood Culture, Routine No Growth to date     Blood Culture, Routine No Growth to date    Blood culture    Collection Time: 08/04/24  6:22 PM    Specimen: Blood   Result Value Ref Range    Blood Culture, Routine No Growth to date     Blood Culture, Routine No Growth to date     Blood Culture, Routine No Growth to date     Blood Culture, Routine No Growth to date    Basic Metabolic Panel    Collection Time: 08/05/24  4:21 AM   Result Value Ref Range    Sodium 132 (L) 136 - 145 mmol/L    Potassium 4.6 3.5 - 5.1 mmol/L    Chloride 95 95 - 110 mmol/L    CO2 23 23 - 29 mmol/L    Glucose 133 (H) 70 - 110 mg/dL    BUN 76 (H) 8 - 23 mg/dL    Creatinine 10.2 (H) 0.5 - 1.4 mg/dL    Calcium 7.8 (L) 8.7 - 10.5 mg/dL    Anion Gap 14 8 - 16 mmol/L    eGFR 5 (A) >60 mL/min/1.73 m^2   CBC Auto Differential    Collection Time: 08/05/24  4:21 AM   Result Value Ref Range    WBC 7.17 3.90 - 12.70 K/uL    RBC 3.47 (L) 4.60 - 6.20 M/uL    Hemoglobin 9.4 (L) 14.0 - 18.0 g/dL    Hematocrit 30.7 (L) 40.0 - 54.0 %    MCV 89 82 - 98 fL    MCH 27.1 27.0 - 31.0 pg    MCHC 30.6 (L) 32.0 - 36.0 g/dL    RDW 16.1 (H) 11.5 - 14.5 %    Platelets 145 (L) 150 - 450 K/uL    MPV 9.8 9.2 - 12.9 fL    Immature Granulocytes 0.4 0.0 - 0.5 %    Gran # (ANC) 5.6 1.8 - 7.7 K/uL    Immature Grans (Abs) 0.03 0.00 - 0.04 K/uL    Lymph # 0.8 (L) 1.0 - 4.8 K/uL    Mono # 0.6 0.3 - 1.0 K/uL    Eos # 0.2 0.0 - 0.5 K/uL    Baso # 0.03 0.00 - 0.20 K/uL    nRBC 0 0 /100 WBC    Gran % 77.6 (H) 38.0 - 73.0 %    Lymph % 10.6 (L) 18.0 - 48.0 %    Mono % 8.4 4.0 - 15.0 %    Eosinophil % 2.6 0.0 - 8.0 %    Basophil % 0.4 0.0 - 1.9 %    Differential Method Automated    Vancomycin, Random    Collection Time: 08/05/24  4:21 AM   Result Value Ref Range    Vancomycin, Random 13.3 Not established ug/mL   Hepatitis B Surface Antibody, Qual/Quant    Collection Time: 08/05/24  9:14 AM   Result Value Ref Range    Hep. B Surf Ab, Qual Negative      Hep. B Surf Ab, Quant. 5 mIU/mL   POCT glucose    Collection Time: 08/05/24  8:09 PM   Result Value Ref Range    POCT Glucose 190 (H) 70 - 110 mg/dL   Basic Metabolic Panel    Collection Time: 08/06/24  4:07 AM   Result Value Ref Range    Sodium 140 136 - 145 mmol/L    Potassium 4.4 3.5 - 5.1 mmol/L    Chloride 103 95 - 110 mmol/L    CO2 25 23 - 29 mmol/L    Glucose 117 (H) 70 - 110 mg/dL    BUN 39 (H) 8 - 23 mg/dL    Creatinine 6.7 (H) 0.5 - 1.4 mg/dL    Calcium 8.0 (L) 8.7 - 10.5 mg/dL    Anion Gap 12 8 - 16 mmol/L    eGFR 9 (A) >60 mL/min/1.73 m^2   HIV 1/2 Ag/Ab (4th Gen)    Collection Time: 08/06/24  4:07 AM   Result Value Ref Range    HIV 1/2 Ag/Ab Non-reactive Non-reactive   Treponema Pallidium Antibodies IgG, IgM    Collection Time: 08/06/24  4:07 AM   Result Value Ref Range    Treponema Pallidum Antibodies (IgG, IgM) Nonreactive Nonreactive   POCT glucose    Collection Time: 08/06/24  7:25 AM   Result Value Ref Range    POCT Glucose 140 (H) 70 - 110 mg/dL   POCT glucose    Collection Time: 08/06/24  4:33 PM   Result Value Ref Range    POCT Glucose 150 (H) 70 - 110 mg/dL   Basic Metabolic Panel    Collection Time: 08/07/24  4:05 AM   Result Value Ref Range    Sodium 135 (L) 136 - 145 mmol/L    Potassium 4.4 3.5 - 5.1 mmol/L    Chloride 100 95 - 110 mmol/L    CO2 23 23 - 29 mmol/L    Glucose 65 (L) 70 - 110 mg/dL    BUN 51 (H) 8 - 23 mg/dL    Creatinine 8.5 (H) 0.5 - 1.4 mg/dL    Calcium 7.9 (L) 8.7 - 10.5 mg/dL    Anion Gap 12 8 - 16 mmol/L    eGFR 7 (A) >60 mL/min/1.73 m^2   Vancomycin, Random    Collection Time: 08/07/24  4:05 AM   Result Value Ref Range    Vancomycin, Random 16.6 Not established ug/mL       Microbiology Results (last 7 days)       Procedure Component Value Units Date/Time    Blood culture [[id number]] Collected: 08/04/24 1821    Order Status: Completed Specimen: Blood Updated: 08/07/24 2303     Blood Culture, Routine No Growth to date      No Growth to date      No Growth to date       No Growth to date    Blood culture [[id number]] Collected: 08/04/24 1822    Order Status: Completed Specimen: Blood Updated: 08/07/24 2303     Blood Culture, Routine No Growth to date      No Growth to date      No Growth to date      No Growth to date    Culture, Anaerobic [[id number]]     Order Status: No result Specimen: Pleural Fluid     AFB culture [[id number]]     Order Status: No result Specimen: Pleural Fluid     AFB stain [[id number]]     Order Status: No result Specimen: Pleural Fluid     Fungus culture [[id number]]     Order Status: No result Specimen: Pleural Fluid     Culture, Fluid with Gram [[id number]]     Order Status: No result Specimen: Body Fluid from Thoracentesis Fluid     Culture, Body Fluid (Aerobic) w/ GS [[id number]]     Order Status: No result Specimen: Body Fluid from Pleural Fluid     Blood culture x two cultures. Draw prior to antibiotics. [[id number]]  (Abnormal) Collected: 08/01/24 0104    Order Status: Completed Specimen: Blood from Peripheral, Forearm, Right Updated: 08/06/24 0838     Blood Culture, Routine Gram stain aer bottle: Gram positive cocci in clusters resembling Staph      Results called to and read back by: Amanda BLANCAS RN 08/04/2024  17:41      COAGULASE-NEGATIVE STAPHYLOCOCCUS SPECIES  Organism is a probable contaminant      Narrative:      Aerobic and anaerobic    Blood culture x two cultures. Draw prior to antibiotics. [[id number]] Collected: 08/01/24 0103    Order Status: Completed Specimen: Blood from Peripheral, Forearm, Left Updated: 08/05/24 0303     Blood Culture, Routine No Growth after 4 days.    Narrative:      Aerobic and anaerobic            Imaging Results              CT Head Without Contrast (Final result)  Result time 08/01/24 09:30:21      Final result by RADIOLOGISTRITESH (08/01/24 09:30:21)                   Impression:      No acute intracranial finding      Electronically signed by: Virtual Radiologist  Date:    08/01/2024  Time:    09:30                Narrative:    EXAMINATION:  CT Head Without Contrast    CLINICAL HISTORY:  Pain; Headache    TECHNIQUE:  Imaging protocol: Computed tomography of the head without contrast. Radiation optimization: All CT scans at this facility use at least one of these dose optimization techniques: automated exposure control; mA and/or kV adjustment per patient size (includes targeted exams where dose is matched to clinical indication); or iterative reconstruction.    COMPARISON:  No relevant prior studies available.    FINDINGS:  Brain: There is mild age related parenchymal atrophy with prominence of the cortical sulci. Periventricular and deep white matter hypodensities are consistent with sequela of chronic microvascular ischemic disease. No midline shift or herniation. No acute intracranial hemorrhage. Cerebral ventricles: Mild ex vacuo dilation of the ventricles, likely secondary to age related volume loss. Paranasal sinuses: Imaged paranasal sinuses appropriately aerated without air-fluid levels. Mastoid air cells: No mastoid effusion. Bones: Unremarkable. No acute fracture. Soft tissues: No focal soft tissue abnormality. Vasculature: Vascular calcifications within the vertebral and carotid arteries are present                                       CT Chest Without Contrast (Final result)  Result time 08/01/24 09:30:17      Final result by RADIOLOGIST, VIRTUAL (08/01/24 09:30:17)                   Impression:      1. Loculated moderate left pleural effusion. 2. Left lower lobe atelectasis or other consolidation. 3. Large pericardial effusion measuring 3.6 cm. 4. Mild interlobular septal thickening. Findings may be seen with pulmonary edema. 5. Stones noted in the gallbladder. 6. Heterogeneous appearance of the liver. Correlate with LFTs. 7. Nonspecific low-density fluid collection along the lesser curvature stomach measuring 4.9 x 2.5      Electronically signed by: Virtual Radiologist  Date:    08/01/2024  Time:    09:30                Narrative:    EXAMINATION:  CT Chest Without Contrast; Diagnostic    CLINICAL HISTORY:  Pain    TECHNIQUE:  Imaging protocol: Diagnostic computed tomography of the chest without contrast. Radiation optimization: All CT scans at this facility use at least one of these dose optimization techniques: automated exposure control; mA and/or kV adjustment per patient size (includes targeted exams where dose is matched to clinical indication); or iterative reconstruction.    COMPARISON:  No relevant prior studies available    FINDINGS:  Lungs: Mild interlobular septal thickening. Findings may be seen with pulmonary edema. Left lower lobe atelectasis or other consolidation. Pleural spaces: Loculated moderate left pleural effusion. Heart: Severe cardiomegaly. Large pericardial effusion measuring 3.6 cm. Coronary arteries: There are coronary artery calcifications. Lymph nodes: Unremarkable. No enlarged lymph nodes. Vasculature: Atherosclerotic changes of the aorta. Liver: Heterogeneous appearance of the liver. Correlate with LFTs. Gallbladder and biliary ducts: Stones noted in the gallbladder. Stomach: Nonspecific low-density fluid collection along the lesser curvature stomach measuring 4.9 x 2.5 cm. Bones/joints: Unremarkable. No acute fracture. Soft tissues: Unremarkable                                       X-Ray Chest 1 View (Final result)  Result time 08/01/24 00:18:57      Final result by Sandie Martinez MD (08/01/24 00:18:57)                   Impression:      As above described.  No significant change.      Electronically signed by: Sandie Martinez  Date:    08/01/2024  Time:    00:18               Narrative:    EXAMINATION:  CHEST ONE VIEW    CLINICAL HISTORY:  Altered mental status, unspecified    TECHNIQUE:  One view of the chest.    COMPARISON:  05/19/2024    FINDINGS:  There is a left central venous catheter with its tip in the superior vena cava.  The cardiac silhouette is markedly large.  There  is pulmonary vascular congestion or haziness of the visualized.  There is obscuration of both diaphragms medially suggesting small or moderate pleural effusions.  There is shrapnel projecting over the right and mid chest.                                          Pending Diagnostic Studies:       None           Medications:  Reconciled Home Medications:      Medication List        START taking these medications      sertraline 50 MG tablet  Commonly known as: ZOLOFT  Take 1 tablet (50 mg total) by mouth once daily.  Start taking on: August 9, 2024            CONTINUE taking these medications      allopurinoL 100 MG tablet  Commonly known as: ZYLOPRIM  Take 100 mg by mouth once daily.     amantadine  mg capsule  Commonly known as: SYMMETREL  Take 1 capsule by mouth every other day.     amiodarone 200 MG Tab  Commonly known as: PACERONE  Take 200 mg by mouth once daily.     amLODIPine 10 MG tablet  Commonly known as: NORVASC  Take 10 mg by mouth once daily.     apixaban 2.5 mg Tab  Commonly known as: ELIQUIS  Take 1 tablet by mouth.     atorvastatin 80 MG tablet  Commonly known as: LIPITOR  Take 80 mg by mouth once daily.     metoprolol succinate 50 MG 24 hr tablet  Commonly known as: TOPROL-XL  Take 1 tablet (50 mg total) by mouth once daily.     pantoprazole 40 MG tablet  Commonly known as: PROTONIX  Take 40 mg by mouth once daily.     RENAL CAPS 1 mg Cap  Generic drug: vitamin renal formula (B-complex-vitamin c-folic acid)  Take 1 capsule by mouth once daily at 6am.     sevelamer carbonate 800 mg Tab  Commonly known as: RENVELA  Take 2 tablets (1,600 mg total) by mouth 3 (three) times daily with meals.     tamsulosin 0.4 mg Cap  Commonly known as: FLOMAX  Take 0.4 mg by mouth once daily.            STOP taking these medications      amoxicillin-clavulanate 875-125mg 875-125 mg per tablet  Commonly known as: AUGMENTIN              Indwelling Lines/Drains at time of discharge:   Lines/Drains/Airways        Central Venous Catheter Line  Duration                  Hemodialysis Catheter 02/20/24 1642 left internal jugular 169 days              Drain  Duration                  Hemodialysis AV Fistula Left upper arm -- days    Male External Urinary Catheter 08/03/24 1900 4 days                    Time spent on the discharge of patient: Greater than 35 minutes         Jonathan Martinez DO  Department of Hospital Medicine  [facility] [place] [facility] Surg

## 2024-08-08 NOTE — NURSING
[facility]  Nurses Note -- 4 Eyes      8/8/2024       Skin assessed on: Q Shift      [] No Pressure Injuries Present    [x]Prevention Measures Documented    [x] Yes LDA  for Pressure Injury Previously documented     [] Yes New Pressure Injury Discovered   [] LDA for New Pressure Injury Added      Attending RN:  Rubi Gomes RN     Second RN:  JAVIER Newell

## 2024-08-08 NOTE — PLAN OF CARE
Case Management Final Discharge Note    Discharge Disposition: [facility]    New DME ordered / company name: [employer]- not covered by insurance.    Relevant SDOH / Transition of Care Barriers:  non- compliance with dialysis    Primary Caretaker and contact information: Colette daughter [phone]    Scheduled followup appointment: PCP scheduled.    Referrals placed: None    CM informed Keely, clinical manager with [facility] that pt is discharging and will resume dialysis on MWF at 2:45 pm. MILLA informed Keely, clinical manager that pt is discharging and will resume dialysis.    Pt declined SNF.    I provided the patient a choice of post acute providers and offered a list of CMS rated home health.     Patient/family chose the following as their preferred providers:  Resume HH with [facility]     Transportation: Pt's daughter will provide transportation home when discharged.    Patient and family educated on discharge services and updated on DC plan. Bedside RN notified, patient clear to discharge from Case Management Perspective.       08/08/24 1418   Final Note   Assessment Type Final Discharge Note   Anticipated Discharge Disposition Home-Health   What phone number can be called within the next 1-3 days to see how you are doing after discharge? [phone]   Hospital Resources/Appts/Education Provided Appointments scheduled and added to AVS   Post-Acute Status   Post-Acute Authorization Home Health   Home Health Status Set-up Complete/Auth obtained   Patient choice form signed by patient/caregiver List from System Post-Acute Care   Discharge Delays None known at this time

## 2024-08-08 NOTE — ASSESSMENT & PLAN NOTE
PT/OT consulted:  Recommends moderate intensity therapy   Family initially agreeable but now have changed their mind.  Would like patient to go home with home health  /case management consulted for placement and to assist with discharge planning

## 2024-08-08 NOTE — SUBJECTIVE & OBJECTIVE
Interval History:  No acute overnight events.  Patient remained afebrile.  Currently on room air, denies any cough or shortness a breath.  Wants to go home.  States that he does not want to go to skilled nursing facility.  Called and spoke with the patient daughter today.  She stated that after discussing with her family and with the patient, they have decided that they do not want to take patient to rehab anymore and wants to have patient go home with home health.  Extensively discussed with daughter and patient that he would likely benefit from acute skilled care with PT and OT.  They verbalized understanding and still wants to go home with home health    Review of Systems   Constitutional:  Negative for chills and fever.   Eyes:  Negative for visual disturbance.   Respiratory:  Negative for cough, shortness of breath and wheezing.    Cardiovascular:  Negative for chest pain.   Genitourinary:  Negative for difficulty urinating and dysuria.   Musculoskeletal:  Positive for back pain and gait problem.   Skin:  Positive for wound.   Neurological:  Positive for weakness. Negative for dizziness, light-headedness and headaches.   Psychiatric/Behavioral:  Positive for suicidal ideas. Negative for behavioral problems and hallucinations.      Objective:     Vital Signs (Most Recent):  Temp: 97.9 °F (36.6 °C) (08/08/24 1124)  Pulse: 75 (08/08/24 1136)  Resp: 17 (08/08/24 1124)  BP: 139/72 (08/08/24 1124)  SpO2: 95 % (08/08/24 1124) Vital Signs (24h Range):  Temp:  [97.9 °F (36.6 °C)-98.7 °F (37.1 °C)] 97.9 °F (36.6 °C)  Pulse:  [73-76] 75  Resp:  [17-19] 17  SpO2:  [93 %-98 %] 95 %  BP: (129-153)/(62-78) 139/72     Weight: 59.7 kg (131 lb 9.8 oz)  Body mass index is 20.61 kg/m².    Intake/Output Summary (Last 24 hours) at 8/8/2024 1229  Last data filed at 8/8/2024 0800  Gross per 24 hour   Intake 860 ml   Output 3000 ml   Net -2140 ml         Physical Exam  Vitals and nursing note reviewed.   Constitutional:       General:  He is not in acute distress.     Appearance: He is ill-appearing (chronically).   HENT:      Head: Atraumatic.      Nose: Nose normal.      Mouth/Throat:      Mouth: Mucous membranes are moist.   Eyes:      Extraocular Movements: Extraocular movements intact.      Conjunctiva/sclera: Conjunctivae normal.   Cardiovascular:      Rate and Rhythm: Normal rate and regular rhythm.      Comments: Lt chest tunneled line in place  Pulmonary:      Effort: Pulmonary effort is normal. No respiratory distress.      Breath sounds: No wheezing.      Comments: On room air.  Diminished breath sounds in left lower lung fields  Abdominal:      General: Abdomen is flat. There is no distension.      Palpations: Abdomen is soft.      Tenderness: There is no abdominal tenderness.   Musculoskeletal:         General: No swelling.      Cervical back: Normal range of motion and neck supple.      Right lower leg: No edema.      Left lower leg: No edema.      Comments: TTP on spine (difficult pin point)   Skin:     General: Skin is warm and dry.   Neurological:      Mental Status: He is alert and oriented to person, place, and time.      Motor: Weakness (Generalized weakness) present.   Psychiatric:         Mood and Affect: Mood is depressed.         Thought Content: Thought content includes suicidal ideation.      Comments: Expressed thoughts of killing himself.              Significant Labs: All pertinent labs within the past 24 hours have been reviewed.    Significant Imaging: I have reviewed all pertinent imaging results/findings within the past 24 hours.

## 2024-08-08 NOTE — PLAN OF CARE
08/08/24 1243   Medicare Message   Important Message from Medicare regarding Discharge Appeal Rights Explained to patient/caregiver  (CM explained IMM. Pt's daughterColette verbally expressed understanding. Pt's daughter requested to leave a copy in pt's room.)   Date IMM was signed 08/08/24   Time IMM was signed 1243

## 2024-08-09 ENCOUNTER — PATIENT OUTREACH (OUTPATIENT)
Dept: ADMINISTRATIVE | Facility: CLINIC | Age: 59
End: 2024-08-09
Payer: MEDICARE

## 2024-08-09 NOTE — NURSING
Pt discharged home with daughter and son. IVs removed. Tele monitor removed. Discharged instructions explained and given to son. Safety measures maintained.

## 2024-09-03 ENCOUNTER — HOSPITAL ENCOUNTER (INPATIENT)
Facility: HOSPITAL | Age: 59
LOS: 16 days | Discharge: HOME HEALTH | DRG: 640 | End: 2024-09-20
Attending: STUDENT IN AN ORGANIZED HEALTH CARE EDUCATION/TRAINING PROGRAM | Admitting: INTERNAL MEDICINE
Payer: MEDICARE

## 2024-09-03 DIAGNOSIS — I31.39 PERICARDIAL EFFUSION: ICD-10-CM

## 2024-09-03 DIAGNOSIS — R41.82 ALTERED MENTAL STATUS, UNSPECIFIED ALTERED MENTAL STATUS TYPE: ICD-10-CM

## 2024-09-03 DIAGNOSIS — I48.0 PAF (PAROXYSMAL ATRIAL FIBRILLATION): ICD-10-CM

## 2024-09-03 DIAGNOSIS — R53.81 PHYSICAL DEBILITY: ICD-10-CM

## 2024-09-03 DIAGNOSIS — D63.1 ANEMIA IN ESRD (END-STAGE RENAL DISEASE): ICD-10-CM

## 2024-09-03 DIAGNOSIS — N18.6 ESRD NEEDING DIALYSIS: ICD-10-CM

## 2024-09-03 DIAGNOSIS — G93.49 UREMIC ENCEPHALOPATHY: Primary | ICD-10-CM

## 2024-09-03 DIAGNOSIS — F32.A DEPRESSION, UNSPECIFIED DEPRESSION TYPE: ICD-10-CM

## 2024-09-03 DIAGNOSIS — E87.5 HYPERKALEMIA: ICD-10-CM

## 2024-09-03 DIAGNOSIS — I48.3 TYPICAL ATRIAL FLUTTER: ICD-10-CM

## 2024-09-03 DIAGNOSIS — N19 UREMIC ENCEPHALOPATHY: Primary | ICD-10-CM

## 2024-09-03 DIAGNOSIS — Z99.2 ESRD NEEDING DIALYSIS: ICD-10-CM

## 2024-09-03 DIAGNOSIS — N18.6 ANEMIA IN ESRD (END-STAGE RENAL DISEASE): ICD-10-CM

## 2024-09-03 DIAGNOSIS — E44.0 MODERATE PROTEIN MALNUTRITION: ICD-10-CM

## 2024-09-03 DIAGNOSIS — N18.6 ESRD (END STAGE RENAL DISEASE): ICD-10-CM

## 2024-09-03 DIAGNOSIS — Z71.89 ACP (ADVANCE CARE PLANNING): ICD-10-CM

## 2024-09-03 DIAGNOSIS — Z86.73 HISTORY OF CVA (CEREBROVASCULAR ACCIDENT): Chronic | ICD-10-CM

## 2024-09-03 DIAGNOSIS — I10 ESSENTIAL HYPERTENSION: Chronic | ICD-10-CM

## 2024-09-03 DIAGNOSIS — R07.9 CHEST PAIN: ICD-10-CM

## 2024-09-03 DIAGNOSIS — L89.154 PRESSURE INJURY OF SACRAL REGION, STAGE 4: ICD-10-CM

## 2024-09-03 LAB
ALLENS TEST: ABNORMAL
BASOPHILS # BLD AUTO: 0.02 K/UL (ref 0–0.2)
BASOPHILS NFR BLD: 0.2 % (ref 0–1.9)
DELSYS: ABNORMAL
DIFFERENTIAL METHOD BLD: ABNORMAL
EOSINOPHIL # BLD AUTO: 0 K/UL (ref 0–0.5)
EOSINOPHIL NFR BLD: 0.1 % (ref 0–8)
ERYTHROCYTE [DISTWIDTH] IN BLOOD BY AUTOMATED COUNT: 16.9 % (ref 11.5–14.5)
FIO2: 32
FLOW: 3
HCO3 UR-SCNC: 16.7 MMOL/L (ref 24–28)
HCT VFR BLD AUTO: 26.1 % (ref 40–54)
HGB BLD-MCNC: 8.6 G/DL (ref 14–18)
IMM GRANULOCYTES # BLD AUTO: 0.1 K/UL (ref 0–0.04)
IMM GRANULOCYTES NFR BLD AUTO: 1.1 % (ref 0–0.5)
LYMPHOCYTES # BLD AUTO: 0.2 K/UL (ref 1–4.8)
LYMPHOCYTES NFR BLD: 2.7 % (ref 18–48)
MCH RBC QN AUTO: 27.7 PG (ref 27–31)
MCHC RBC AUTO-ENTMCNC: 33 G/DL (ref 32–36)
MCV RBC AUTO: 84 FL (ref 82–98)
MODE: ABNORMAL
MONOCYTES # BLD AUTO: 0.7 K/UL (ref 0.3–1)
MONOCYTES NFR BLD: 8 % (ref 4–15)
NEUTROPHILS # BLD AUTO: 7.7 K/UL (ref 1.8–7.7)
NEUTROPHILS NFR BLD: 87.9 % (ref 38–73)
NRBC BLD-RTO: 0 /100 WBC
PCO2 BLDA: 39.5 MMHG (ref 35–45)
PH SMN: 7.23 [PH] (ref 7.35–7.45)
PLATELET # BLD AUTO: 134 K/UL (ref 150–450)
PMV BLD AUTO: 10.8 FL (ref 9.2–12.9)
PO2 BLDA: 35 MMHG (ref 40–60)
POC BE: -10 MMOL/L
POC SATURATED O2: 57 % (ref 95–100)
POC TCO2: 18 MMOL/L (ref 24–29)
RBC # BLD AUTO: 3.1 M/UL (ref 4.6–6.2)
SAMPLE: ABNORMAL
SITE: ABNORMAL
WBC # BLD AUTO: 8.74 K/UL (ref 3.9–12.7)

## 2024-09-03 PROCEDURE — 80053 COMPREHEN METABOLIC PANEL: CPT | Performed by: STUDENT IN AN ORGANIZED HEALTH CARE EDUCATION/TRAINING PROGRAM

## 2024-09-03 PROCEDURE — 85610 PROTHROMBIN TIME: CPT | Performed by: STUDENT IN AN ORGANIZED HEALTH CARE EDUCATION/TRAINING PROGRAM

## 2024-09-03 PROCEDURE — 87040 BLOOD CULTURE FOR BACTERIA: CPT | Mod: 59 | Performed by: STUDENT IN AN ORGANIZED HEALTH CARE EDUCATION/TRAINING PROGRAM

## 2024-09-03 PROCEDURE — 84100 ASSAY OF PHOSPHORUS: CPT | Performed by: STUDENT IN AN ORGANIZED HEALTH CARE EDUCATION/TRAINING PROGRAM

## 2024-09-03 PROCEDURE — 93005 ELECTROCARDIOGRAM TRACING: CPT

## 2024-09-03 PROCEDURE — 93010 ELECTROCARDIOGRAM REPORT: CPT | Mod: ,,, | Performed by: INTERNAL MEDICINE

## 2024-09-03 PROCEDURE — 83880 ASSAY OF NATRIURETIC PEPTIDE: CPT | Performed by: STUDENT IN AN ORGANIZED HEALTH CARE EDUCATION/TRAINING PROGRAM

## 2024-09-03 PROCEDURE — 82803 BLOOD GASES ANY COMBINATION: CPT

## 2024-09-03 PROCEDURE — 85730 THROMBOPLASTIN TIME PARTIAL: CPT | Performed by: STUDENT IN AN ORGANIZED HEALTH CARE EDUCATION/TRAINING PROGRAM

## 2024-09-03 PROCEDURE — 83605 ASSAY OF LACTIC ACID: CPT | Performed by: STUDENT IN AN ORGANIZED HEALTH CARE EDUCATION/TRAINING PROGRAM

## 2024-09-03 PROCEDURE — 99900035 HC TECH TIME PER 15 MIN (STAT)

## 2024-09-03 PROCEDURE — 83735 ASSAY OF MAGNESIUM: CPT | Performed by: STUDENT IN AN ORGANIZED HEALTH CARE EDUCATION/TRAINING PROGRAM

## 2024-09-03 PROCEDURE — 84484 ASSAY OF TROPONIN QUANT: CPT | Performed by: STUDENT IN AN ORGANIZED HEALTH CARE EDUCATION/TRAINING PROGRAM

## 2024-09-03 PROCEDURE — 85025 COMPLETE CBC W/AUTO DIFF WBC: CPT | Performed by: STUDENT IN AN ORGANIZED HEALTH CARE EDUCATION/TRAINING PROGRAM

## 2024-09-03 PROCEDURE — 84145 PROCALCITONIN (PCT): CPT | Performed by: STUDENT IN AN ORGANIZED HEALTH CARE EDUCATION/TRAINING PROGRAM

## 2024-09-04 PROBLEM — R78.81 POSITIVE BLOOD CULTURE: Status: RESOLVED | Noted: 2024-08-05 | Resolved: 2024-09-04

## 2024-09-04 PROBLEM — E87.70 FLUID OVERLOAD: Status: RESOLVED | Noted: 2024-05-20 | Resolved: 2024-09-04

## 2024-09-04 PROBLEM — N25.89 UREMIC ACIDOSIS: Status: ACTIVE | Noted: 2024-09-04

## 2024-09-04 PROBLEM — E87.5 HYPERKALEMIA: Status: RESOLVED | Noted: 2024-01-06 | Resolved: 2024-09-04

## 2024-09-04 PROBLEM — B95.8 BACTEREMIA DUE TO STAPHYLOCOCCUS: Status: RESOLVED | Noted: 2024-02-07 | Resolved: 2024-09-04

## 2024-09-04 PROBLEM — E83.39 HYPERPHOSPHATEMIA: Status: RESOLVED | Noted: 2024-02-05 | Resolved: 2024-09-04

## 2024-09-04 PROBLEM — G93.49 UREMIC ENCEPHALOPATHY: Status: ACTIVE | Noted: 2024-09-04

## 2024-09-04 PROBLEM — K31.89 GASTRIC WALL THICKENING: Status: ACTIVE | Noted: 2024-09-04

## 2024-09-04 PROBLEM — L89.159 SACRAL DECUBITUS ULCER: Status: RESOLVED | Noted: 2024-02-06 | Resolved: 2024-09-04

## 2024-09-04 PROBLEM — R78.81 BACTEREMIA DUE TO STAPHYLOCOCCUS: Status: RESOLVED | Noted: 2024-02-07 | Resolved: 2024-09-04

## 2024-09-04 PROBLEM — N19 UREMIC ENCEPHALOPATHY: Status: ACTIVE | Noted: 2024-09-04

## 2024-09-04 PROBLEM — L02.31 GLUTEAL ABSCESS: Status: RESOLVED | Noted: 2024-02-06 | Resolved: 2024-09-04

## 2024-09-04 PROBLEM — R14.0 ABDOMINAL DISTENSION: Status: RESOLVED | Noted: 2024-01-09 | Resolved: 2024-09-04

## 2024-09-04 LAB
ALBUMIN SERPL BCP-MCNC: 3.2 G/DL (ref 3.5–5.2)
ALBUMIN SERPL BCP-MCNC: 3.4 G/DL (ref 3.5–5.2)
ALP SERPL-CCNC: 164 U/L (ref 55–135)
ALP SERPL-CCNC: 170 U/L (ref 55–135)
ALT SERPL W/O P-5'-P-CCNC: 35 U/L (ref 10–44)
ALT SERPL W/O P-5'-P-CCNC: 35 U/L (ref 10–44)
ANION GAP SERPL CALC-SCNC: 24 MMOL/L (ref 8–16)
ANION GAP SERPL CALC-SCNC: 30 MMOL/L (ref 8–16)
APTT PPP: 35 SEC (ref 21–32)
AST SERPL-CCNC: 31 U/L (ref 10–40)
AST SERPL-CCNC: 33 U/L (ref 10–40)
BASOPHILS # BLD AUTO: 0.01 K/UL (ref 0–0.2)
BASOPHILS NFR BLD: 0.1 % (ref 0–1.9)
BILIRUB SERPL-MCNC: 0.9 MG/DL (ref 0.1–1)
BILIRUB SERPL-MCNC: 0.9 MG/DL (ref 0.1–1)
BNP SERPL-MCNC: 4007 PG/ML (ref 0–99)
BUN SERPL-MCNC: 134 MG/DL (ref 8–23)
BUN SERPL-MCNC: 245 MG/DL (ref 8–23)
CALCIUM SERPL-MCNC: 8.5 MG/DL (ref 8.7–10.5)
CALCIUM SERPL-MCNC: 8.7 MG/DL (ref 8.7–10.5)
CHLORIDE SERPL-SCNC: 93 MMOL/L (ref 95–110)
CHLORIDE SERPL-SCNC: 98 MMOL/L (ref 95–110)
CO2 SERPL-SCNC: 12 MMOL/L (ref 23–29)
CO2 SERPL-SCNC: 21 MMOL/L (ref 23–29)
CREAT SERPL-MCNC: 16.6 MG/DL (ref 0.5–1.4)
CREAT SERPL-MCNC: 25.8 MG/DL (ref 0.5–1.4)
DIFFERENTIAL METHOD BLD: ABNORMAL
EOSINOPHIL # BLD AUTO: 0.1 K/UL (ref 0–0.5)
EOSINOPHIL NFR BLD: 0.8 % (ref 0–8)
ERYTHROCYTE [DISTWIDTH] IN BLOOD BY AUTOMATED COUNT: 16.5 % (ref 11.5–14.5)
EST. GFR  (NO RACE VARIABLE): 2 ML/MIN/1.73 M^2
EST. GFR  (NO RACE VARIABLE): 3 ML/MIN/1.73 M^2
ESTIMATED AVG GLUCOSE: 131 MG/DL (ref 68–131)
GLUCOSE SERPL-MCNC: 115 MG/DL (ref 70–110)
GLUCOSE SERPL-MCNC: 116 MG/DL (ref 70–110)
HBA1C MFR BLD: 6.2 % (ref 4–5.6)
HBV SURFACE AG SERPL QL IA: NORMAL
HCT VFR BLD AUTO: 26.3 % (ref 40–54)
HGB BLD-MCNC: 9 G/DL (ref 14–18)
IMM GRANULOCYTES # BLD AUTO: 0.09 K/UL (ref 0–0.04)
IMM GRANULOCYTES NFR BLD AUTO: 1 % (ref 0–0.5)
INR PPP: 1.2 (ref 0.8–1.2)
LACTATE SERPL-SCNC: 1 MMOL/L (ref 0.5–2.2)
LYMPHOCYTES # BLD AUTO: 0.3 K/UL (ref 1–4.8)
LYMPHOCYTES NFR BLD: 3.2 % (ref 18–48)
MAGNESIUM SERPL-MCNC: 2.3 MG/DL (ref 1.6–2.6)
MAGNESIUM SERPL-MCNC: 2.9 MG/DL (ref 1.6–2.6)
MCH RBC QN AUTO: 28.1 PG (ref 27–31)
MCHC RBC AUTO-ENTMCNC: 34.2 G/DL (ref 32–36)
MCV RBC AUTO: 82 FL (ref 82–98)
MONOCYTES # BLD AUTO: 0.7 K/UL (ref 0.3–1)
MONOCYTES NFR BLD: 7.6 % (ref 4–15)
NEUTROPHILS # BLD AUTO: 7.9 K/UL (ref 1.8–7.7)
NEUTROPHILS NFR BLD: 87.3 % (ref 38–73)
NRBC BLD-RTO: 0 /100 WBC
OHS QRS DURATION: 90 MS
OHS QTC CALCULATION: 472 MS
PHOSPHATE SERPL-MCNC: 5.9 MG/DL (ref 2.7–4.5)
PHOSPHATE SERPL-MCNC: 9.4 MG/DL (ref 2.7–4.5)
PLATELET # BLD AUTO: 119 K/UL (ref 150–450)
PMV BLD AUTO: 10 FL (ref 9.2–12.9)
POCT GLUCOSE: 111 MG/DL (ref 70–110)
POCT GLUCOSE: 113 MG/DL (ref 70–110)
POCT GLUCOSE: 134 MG/DL (ref 70–110)
POCT GLUCOSE: 222 MG/DL (ref 70–110)
POCT GLUCOSE: 90 MG/DL (ref 70–110)
POTASSIUM SERPL-SCNC: 4.6 MMOL/L (ref 3.5–5.1)
POTASSIUM SERPL-SCNC: 7.2 MMOL/L (ref 3.5–5.1)
PROCALCITONIN SERPL IA-MCNC: 3.89 NG/ML
PROT SERPL-MCNC: 8.1 G/DL (ref 6–8.4)
PROT SERPL-MCNC: 8.5 G/DL (ref 6–8.4)
PROTHROMBIN TIME: 13.1 SEC (ref 9–12.5)
RBC # BLD AUTO: 3.2 M/UL (ref 4.6–6.2)
SODIUM SERPL-SCNC: 138 MMOL/L (ref 136–145)
SODIUM SERPL-SCNC: 140 MMOL/L (ref 136–145)
TROPONIN I SERPL DL<=0.01 NG/ML-MCNC: 0.15 NG/ML (ref 0–0.03)
WBC # BLD AUTO: 9.08 K/UL (ref 3.9–12.7)

## 2024-09-04 PROCEDURE — 80053 COMPREHEN METABOLIC PANEL: CPT | Performed by: INTERNAL MEDICINE

## 2024-09-04 PROCEDURE — 36415 COLL VENOUS BLD VENIPUNCTURE: CPT | Performed by: INTERNAL MEDICINE

## 2024-09-04 PROCEDURE — 25000003 PHARM REV CODE 250: Performed by: INTERNAL MEDICINE

## 2024-09-04 PROCEDURE — 87340 HEPATITIS B SURFACE AG IA: CPT | Performed by: INTERNAL MEDICINE

## 2024-09-04 PROCEDURE — 82962 GLUCOSE BLOOD TEST: CPT

## 2024-09-04 PROCEDURE — 84100 ASSAY OF PHOSPHORUS: CPT | Performed by: INTERNAL MEDICINE

## 2024-09-04 PROCEDURE — 94761 N-INVAS EAR/PLS OXIMETRY MLT: CPT

## 2024-09-04 PROCEDURE — 63600175 PHARM REV CODE 636 W HCPCS: Performed by: HOSPITALIST

## 2024-09-04 PROCEDURE — 99285 EMERGENCY DEPT VISIT HI MDM: CPT | Mod: 25

## 2024-09-04 PROCEDURE — 99223 1ST HOSP IP/OBS HIGH 75: CPT | Mod: ,,, | Performed by: INTERNAL MEDICINE

## 2024-09-04 PROCEDURE — 83036 HEMOGLOBIN GLYCOSYLATED A1C: CPT | Performed by: INTERNAL MEDICINE

## 2024-09-04 PROCEDURE — 63600175 PHARM REV CODE 636 W HCPCS: Performed by: INTERNAL MEDICINE

## 2024-09-04 PROCEDURE — 25000003 PHARM REV CODE 250: Performed by: HOSPITALIST

## 2024-09-04 PROCEDURE — 86706 HEP B SURFACE ANTIBODY: CPT | Performed by: INTERNAL MEDICINE

## 2024-09-04 PROCEDURE — 94644 CONT INHLJ TX 1ST HOUR: CPT

## 2024-09-04 PROCEDURE — 85025 COMPLETE CBC W/AUTO DIFF WBC: CPT | Performed by: INTERNAL MEDICINE

## 2024-09-04 PROCEDURE — 20000000 HC ICU ROOM

## 2024-09-04 PROCEDURE — 83735 ASSAY OF MAGNESIUM: CPT | Performed by: INTERNAL MEDICINE

## 2024-09-04 PROCEDURE — 99498 ADVNCD CARE PLAN ADDL 30 MIN: CPT | Mod: ,,, | Performed by: INTERNAL MEDICINE

## 2024-09-04 PROCEDURE — 99497 ADVNCD CARE PLAN 30 MIN: CPT | Mod: 25,,, | Performed by: INTERNAL MEDICINE

## 2024-09-04 PROCEDURE — 25000003 PHARM REV CODE 250: Mod: JZ,JG | Performed by: STUDENT IN AN ORGANIZED HEALTH CARE EDUCATION/TRAINING PROGRAM

## 2024-09-04 PROCEDURE — 25500020 PHARM REV CODE 255: Performed by: HOSPITALIST

## 2024-09-04 PROCEDURE — 27000221 HC OXYGEN, UP TO 24 HOURS

## 2024-09-04 PROCEDURE — 80100014 HC HEMODIALYSIS 1:1

## 2024-09-04 PROCEDURE — 25000242 PHARM REV CODE 250 ALT 637 W/ HCPCS: Performed by: STUDENT IN AN ORGANIZED HEALTH CARE EDUCATION/TRAINING PROGRAM

## 2024-09-04 PROCEDURE — 63600175 PHARM REV CODE 636 W HCPCS: Performed by: STUDENT IN AN ORGANIZED HEALTH CARE EDUCATION/TRAINING PROGRAM

## 2024-09-04 PROCEDURE — 5A1D70Z PERFORMANCE OF URINARY FILTRATION, INTERMITTENT, LESS THAN 6 HOURS PER DAY: ICD-10-PCS | Performed by: INTERNAL MEDICINE

## 2024-09-04 PROCEDURE — 96375 TX/PRO/DX INJ NEW DRUG ADDON: CPT

## 2024-09-04 PROCEDURE — 96365 THER/PROPH/DIAG IV INF INIT: CPT

## 2024-09-04 RX ORDER — CALCIUM GLUCONATE 20 MG/ML
1 INJECTION, SOLUTION INTRAVENOUS
Status: COMPLETED | OUTPATIENT
Start: 2024-09-04 | End: 2024-09-04

## 2024-09-04 RX ORDER — SODIUM CHLORIDE 9 MG/ML
INJECTION, SOLUTION INTRAVENOUS
Status: DISCONTINUED | OUTPATIENT
Start: 2024-09-04 | End: 2024-09-20 | Stop reason: HOSPADM

## 2024-09-04 RX ORDER — SERTRALINE HYDROCHLORIDE 50 MG/1
50 TABLET, FILM COATED ORAL DAILY
Status: DISCONTINUED | OUTPATIENT
Start: 2024-09-04 | End: 2024-09-16

## 2024-09-04 RX ORDER — HYDRALAZINE HYDROCHLORIDE 20 MG/ML
10 INJECTION INTRAMUSCULAR; INTRAVENOUS EVERY 6 HOURS PRN
Status: DISCONTINUED | OUTPATIENT
Start: 2024-09-04 | End: 2024-09-20 | Stop reason: HOSPADM

## 2024-09-04 RX ORDER — SEVELAMER CARBONATE 800 MG/1
1600 TABLET, FILM COATED ORAL
Status: DISCONTINUED | OUTPATIENT
Start: 2024-09-04 | End: 2024-09-14

## 2024-09-04 RX ORDER — AMIODARONE HYDROCHLORIDE 200 MG/1
200 TABLET ORAL DAILY
Status: DISCONTINUED | OUTPATIENT
Start: 2024-09-04 | End: 2024-09-08

## 2024-09-04 RX ORDER — PANTOPRAZOLE SODIUM 40 MG/10ML
40 INJECTION, POWDER, LYOPHILIZED, FOR SOLUTION INTRAVENOUS DAILY
Status: DISCONTINUED | OUTPATIENT
Start: 2024-09-04 | End: 2024-09-20 | Stop reason: HOSPADM

## 2024-09-04 RX ORDER — LABETALOL HYDROCHLORIDE 5 MG/ML
10 INJECTION, SOLUTION INTRAVENOUS EVERY 6 HOURS PRN
Status: DISCONTINUED | OUTPATIENT
Start: 2024-09-04 | End: 2024-09-20 | Stop reason: HOSPADM

## 2024-09-04 RX ORDER — MUPIROCIN 20 MG/G
OINTMENT TOPICAL 2 TIMES DAILY
Status: COMPLETED | OUTPATIENT
Start: 2024-09-04 | End: 2024-09-09

## 2024-09-04 RX ORDER — METOPROLOL SUCCINATE 50 MG/1
50 TABLET, EXTENDED RELEASE ORAL DAILY
Status: DISCONTINUED | OUTPATIENT
Start: 2024-09-04 | End: 2024-09-10

## 2024-09-04 RX ORDER — SODIUM CHLORIDE 0.9 % (FLUSH) 0.9 %
10 SYRINGE (ML) INJECTION EVERY 12 HOURS PRN
Status: DISCONTINUED | OUTPATIENT
Start: 2024-09-04 | End: 2024-09-20 | Stop reason: HOSPADM

## 2024-09-04 RX ORDER — TALC
6 POWDER (GRAM) TOPICAL NIGHTLY PRN
Status: DISCONTINUED | OUTPATIENT
Start: 2024-09-04 | End: 2024-09-20 | Stop reason: HOSPADM

## 2024-09-04 RX ORDER — ALBUTEROL SULFATE 2.5 MG/.5ML
10 SOLUTION RESPIRATORY (INHALATION)
Status: COMPLETED | OUTPATIENT
Start: 2024-09-04 | End: 2024-09-04

## 2024-09-04 RX ORDER — ALLOPURINOL 100 MG/1
100 TABLET ORAL DAILY
Status: DISCONTINUED | OUTPATIENT
Start: 2024-09-04 | End: 2024-09-20 | Stop reason: HOSPADM

## 2024-09-04 RX ORDER — INDOMETHACIN 25 MG/1
50 CAPSULE ORAL ONCE
Status: DISCONTINUED | OUTPATIENT
Start: 2024-09-04 | End: 2024-09-06

## 2024-09-04 RX ORDER — ACETAMINOPHEN 325 MG/1
650 TABLET ORAL EVERY 4 HOURS PRN
Status: DISCONTINUED | OUTPATIENT
Start: 2024-09-04 | End: 2024-09-20 | Stop reason: HOSPADM

## 2024-09-04 RX ORDER — CALCIUM GLUCONATE 20 MG/ML
1 INJECTION, SOLUTION INTRAVENOUS EVERY 10 MIN PRN
Status: DISCONTINUED | OUTPATIENT
Start: 2024-09-04 | End: 2024-09-04

## 2024-09-04 RX ORDER — HEPARIN SODIUM 5000 [USP'U]/ML
5000 INJECTION, SOLUTION INTRAVENOUS; SUBCUTANEOUS EVERY 8 HOURS
Status: DISCONTINUED | OUTPATIENT
Start: 2024-09-04 | End: 2024-09-04

## 2024-09-04 RX ORDER — ONDANSETRON HYDROCHLORIDE 2 MG/ML
4 INJECTION, SOLUTION INTRAVENOUS EVERY 6 HOURS PRN
Status: DISCONTINUED | OUTPATIENT
Start: 2024-09-04 | End: 2024-09-20 | Stop reason: HOSPADM

## 2024-09-04 RX ORDER — GLUCAGON 1 MG
1 KIT INJECTION
Status: DISCONTINUED | OUTPATIENT
Start: 2024-09-04 | End: 2024-09-20 | Stop reason: HOSPADM

## 2024-09-04 RX ORDER — NICARDIPINE HYDROCHLORIDE 0.2 MG/ML
0-15 INJECTION INTRAVENOUS CONTINUOUS
Status: DISCONTINUED | OUTPATIENT
Start: 2024-09-04 | End: 2024-09-05

## 2024-09-04 RX ORDER — MORPHINE SULFATE 4 MG/ML
2 INJECTION, SOLUTION INTRAMUSCULAR; INTRAVENOUS ONCE
Status: COMPLETED | OUTPATIENT
Start: 2024-09-04 | End: 2024-09-04

## 2024-09-04 RX ORDER — POLYETHYLENE GLYCOL 3350 17 G/17G
17 POWDER, FOR SOLUTION ORAL DAILY
Status: DISCONTINUED | OUTPATIENT
Start: 2024-09-04 | End: 2024-09-20 | Stop reason: HOSPADM

## 2024-09-04 RX ORDER — TAMSULOSIN HYDROCHLORIDE 0.4 MG/1
0.4 CAPSULE ORAL NIGHTLY
Status: DISCONTINUED | OUTPATIENT
Start: 2024-09-04 | End: 2024-09-20 | Stop reason: HOSPADM

## 2024-09-04 RX ORDER — FUROSEMIDE 10 MG/ML
80 INJECTION INTRAMUSCULAR; INTRAVENOUS
Status: COMPLETED | OUTPATIENT
Start: 2024-09-04 | End: 2024-09-04

## 2024-09-04 RX ORDER — PANTOPRAZOLE SODIUM 40 MG/10ML
40 INJECTION, POWDER, LYOPHILIZED, FOR SOLUTION INTRAVENOUS DAILY
Status: DISCONTINUED | OUTPATIENT
Start: 2024-09-04 | End: 2024-09-04 | Stop reason: SDUPTHER

## 2024-09-04 RX ORDER — SODIUM CHLORIDE 9 MG/ML
INJECTION, SOLUTION INTRAVENOUS ONCE
Status: DISCONTINUED | OUTPATIENT
Start: 2024-09-04 | End: 2024-09-06

## 2024-09-04 RX ADMIN — SODIUM ZIRCONIUM CYCLOSILICATE 10 G: 10 POWDER, FOR SUSPENSION ORAL at 12:09

## 2024-09-04 RX ADMIN — PIPERACILLIN SODIUM AND TAZOBACTAM SODIUM 3.38 G: 3; .375 INJECTION, POWDER, FOR SOLUTION INTRAVENOUS at 04:09

## 2024-09-04 RX ADMIN — NICARDIPINE HYDROCHLORIDE 2.5 MG/HR: 0.2 INJECTION, SOLUTION INTRAVENOUS at 04:09

## 2024-09-04 RX ADMIN — INSULIN HUMAN 6.12 UNITS: 100 INJECTION, SOLUTION PARENTERAL at 12:09

## 2024-09-04 RX ADMIN — FUROSEMIDE 80 MG: 10 INJECTION, SOLUTION INTRAMUSCULAR; INTRAVENOUS at 12:09

## 2024-09-04 RX ADMIN — CALCIUM GLUCONATE 1 G: 20 INJECTION, SOLUTION INTRAVENOUS at 12:09

## 2024-09-04 RX ADMIN — MORPHINE SULFATE 2 MG: 4 INJECTION, SOLUTION INTRAMUSCULAR; INTRAVENOUS at 09:09

## 2024-09-04 RX ADMIN — MUPIROCIN: 20 OINTMENT TOPICAL at 09:09

## 2024-09-04 RX ADMIN — APIXABAN 2.5 MG: 2.5 TABLET, FILM COATED ORAL at 09:09

## 2024-09-04 RX ADMIN — IOHEXOL 50 ML: 350 INJECTION, SOLUTION INTRAVENOUS at 01:09

## 2024-09-04 RX ADMIN — ACETAMINOPHEN 650 MG: 325 TABLET ORAL at 03:09

## 2024-09-04 RX ADMIN — DEXTROSE MONOHYDRATE 500 ML: 100 INJECTION, SOLUTION INTRAVENOUS at 12:09

## 2024-09-04 RX ADMIN — ALBUTEROL SULFATE 10 MG: 2.5 SOLUTION RESPIRATORY (INHALATION) at 12:09

## 2024-09-04 RX ADMIN — TAMSULOSIN HYDROCHLORIDE 0.4 MG: 0.4 CAPSULE ORAL at 09:09

## 2024-09-04 RX ADMIN — HYDRALAZINE HYDROCHLORIDE 10 MG: 20 INJECTION INTRAMUSCULAR; INTRAVENOUS at 04:09

## 2024-09-04 RX ADMIN — PIPERACILLIN SODIUM AND TAZOBACTAM SODIUM 3.38 G: 3; .375 INJECTION, POWDER, FOR SOLUTION INTRAVENOUS at 05:09
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
## 2024-09-04 NOTE — ASSESSMENT & PLAN NOTE
- Nephrology consulted; pt with long pattern of HD non-adherence, despite involving family, providing transportation resources, and offering SNF placement at discharge.   - Illness trajectory education provided to family; family would like to continue with HD for now, though we discussed hospice as another option
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
## 2024-09-04 NOTE — PROGRESS NOTES
[facility] Medicine  Progress Note    Patient Name: Mahesh Cespedes  MRN: [id number]  Patient Class: IP- Inpatient   Admission Date: 9/3/2024  Length of Stay: 0 days  Attending Physician: Sury Mondragon MD  Primary Care Provider: Cruz Hernandez MD        Subjective:     Principal Problem:Uremic encephalopathy        HPI:  59 y.o. AAM with h/o CVA, ESRD (MWF via left tunneled HD catheter), Afib (on amiodarone and eliquis), NIDDM type2, HLD, Essential HTN ,depression with h/o suicidal ideations presents to the ER via EMS with family concerns for constipation and abdominal pain. Pt. Unable to give me history due to AMS due to uremia (). Reports that he missed 3 weeks of HD. Presented to the ER altered covered in feces and urine.    Apparently he was admitted here from - for AMS and again was noted to have missed 2 weeks of HD. CT head was negative for mass/bleed.  CT chest at that time noted loculated moderate left pleural effusion/consolidation with a large pericardial effusion 3.6cm and pulmonary edema. Was tx with abx and echo showed only a small/mod pericardial effusion with no cardiac tamponade. Nephro/pulm/ID consulted. Recommended IR to sample fluid but family deferred due to risk/benefit. He was cont. On abx VANC with NGT repeat cultures. Plan was to transfer to [facility] but daughter (javier)  refused and wanted to take him home with her on  per the discharge summary.     Today no family with with him in the ER and pt. Noted to be malnourished, desheveled and confused.     Labs noted for potassium of 7.2 and /Creatinin 25.8.  Bicarb 12. VB.23/39.5/35/16.7.  WBC 8.7 and H/H 8.6/26.      CT abd/pelvis with IV contrast:   1. Image quality degraded by technical factors discussed above.   2. Moderate/large volume of loculated left pleural fluid noting probable pleural thickening and heterogeneity of pleural fluid.  Correlation for underlying infectious process  advised.  Left basilar atelectatic/consolidative change.   3. Right lower lobe patchy ground-glass attenuation with interlobular septal thickening which could be seen with edema, infection or non infectious inflammatory process.   4. Solid and ground-glass right lower lobe pulmonary nodules measuring 6 mm and 8 mm respectively.  Repeat evaluation with chest CT in 3 months is advised to evaluate stability and/or resolution of these findings.   5. Cardiomegaly and large pericardial effusion.   6. Cholelithiasis.   7. Gastric wall thickening which could relate to nondistention although correlation for symptoms of gastritis advised.   8. Regions of large and small bowel wall thickening which could relate to nondistention and/or technical factors discussed above.  However, correlation for symptoms of enterocolitis advised.   9. Bladder wall thickening.  Correlation with urinalysis advised.   10. Multiple additional findings as above.     ED spoke with Nephrology and will plan for ICU admission and emergent HD.   We have been consulted for further management and admission to the ICU.     Because this patient's clinical condition is critically guarded and requires care in the ICU with emergent HD, care in an alternative location isn't clinically appropriate for the reasons stated above.         Overview/Hospital Course:  Mr Mahesh Cespedes is a 59 y.o. man with ESRD who presents with encephalopathy. He was noted to have severe uremia ( on admission), hyperkalemia. He was also caked with urine and stool. He was admitted to ICU and received emergent dialysis on 9/4/24. He remains confused.     Social work notes he has outpatient HD chair but has not been to outpatient HD since 7/12/24. His last inpatient HD treatment was 8/7/24.     Interval History: Lying in bed moaning at times. Does not try to speak even with .     Review of Systems   Unable to perform ROS: Mental status change     Objective:     Vital  Signs (Most Recent):  Temp: 97.5 °F (36.4 °C) (09/04/24 0800)  Pulse: 108 (09/04/24 1000)  Resp: 19 (09/04/24 1000)  BP: (!) 195/101 (09/04/24 1000)  SpO2: 98 % (09/04/24 1000) Vital Signs (24h Range):  Temp:  [97 °F (36.1 °C)-97.5 °F (36.4 °C)] 97.5 °F (36.4 °C)  Pulse:  [] 108  Resp:  [11-29] 19  SpO2:  [94 %-100 %] 98 %  BP: (137-207)/() 195/101     Weight: 65.4 kg (144 lb 2.9 oz)  Body mass index is 22.58 kg/m².    Intake/Output Summary (Last 24 hours) at 9/4/2024 1300  Last data filed at 9/4/2024 0630  Gross per 24 hour   Intake 330 ml   Output 2500 ml   Net -2170 ml         Physical Exam  Vitals and nursing note reviewed.   Constitutional:       Appearance: He is ill-appearing and toxic-appearing. He is not diaphoretic.   HENT:      Head:      Comments: Uremic frost     Nose: Nose normal.      Mouth/Throat:      Mouth: Mucous membranes are dry.      Comments: Uremic fetor  Neck:      Comments: Retained sutures R chest wall  Cardiovascular:      Rate and Rhythm: Regular rhythm. Tachycardia present.   Pulmonary:      Effort: Pulmonary effort is normal.      Breath sounds: Normal breath sounds.   Abdominal:      General: Bowel sounds are normal.      Palpations: Abdomen is soft.   Musculoskeletal:      Right lower leg: No edema.      Left lower leg: No edema.   Skin:     Comments: L sided THDC   Neurological:      Mental Status: He is disoriented.             Significant Labs: All pertinent labs within the past 24 hours have been reviewed.    Significant Imaging: I have reviewed all pertinent imaging results/findings within the past 24 hours.    Assessment/Plan:      * Uremic encephalopathy  -  on admission, last HD session about a month ago  - Nephrology consulted for HD  - continue to monitor mental status       Gastric wall thickening  Noted on CT  - PPI IV ordered      Depression  Resume home antidepressant when able to swallow    Left loculated pleural effusion  - this has been present on  prior hospitalizations  - discussed on last stay and family declined thoracentesis at that time  - will continue antibiotics for now, can discuss thoracentesis when he is more stable from renal standpoint    ACP (advance care planning)  Advance Care Planning    Date: 09/04/2024  Palliative consulted. Continue full aggressive care. Time spent 5 minutes            Physical debility  Was advised to got to [facility] with daily PT/OT but family declined. Social work for discharge planning and PT eval. Fall risk. Nutrition consult for his malnutrition.   - palliative consulted  - family wants to continue full aggressive treatment  - when he is able to participate, will reconsult PT, OT    Paroxysmal atrial fibrillation  Patient with Paroxysmal (<7 days) atrial fibrillation which is controlled currently with Beta Blocker and Amiodarone. Patient is currently in sinus rhythm.[id number] Score: 1. anticoagulation indicated. Anticoagulation done with eliquis 2.5mg PO BID . CT head negative for mass or bleed. Fall risk in place.  - currently not able to swallow PO medications     History of CVA (cerebrovascular accident)  Noted. Unable to current swallow eliquis, BB safely      Anemia in ESRD (end-stage renal disease)  Anemia is likely due to chronic disease due to ESRD. Most recent hemoglobin and hematocrit are listed below.  Recent Labs     09/03/24  2341 09/04/24  1051   HGB 8.6* 9.0*   HCT 26.1* 26.3*       Plan  - Monitor serial CBC: Daily  - Transfuse PRBC if patient becomes hemodynamically unstable, symptomatic or H/H drops below 7/21.      Essential hypertension  Chronic, controlled. Latest blood pressure and vitals reviewed-     Temp:  [97 °F (36.1 °C)-97.5 °F (36.4 °C)]   Pulse:  []   Resp:  [11-29]   BP: (137-207)/()   SpO2:  [94 %-100 %] .   Home meds for hypertension were reviewed and noted below.   Hypertension Medications               metoprolol succinate (TOPROL-XL) 50 MG 24 hr tablet Take 1 tablet (50  mg total) by mouth once daily.            While in the hospital, will manage blood pressure as follows; Continue home antihypertensive regimen if he can swallow it    Will utilize p.r.n. blood pressure medication only if patient's blood pressure greater than  180/90  and he develops symptoms such as worsening chest pain or shortness of breath.    ESRD needing dialysis  - dialyzes via L THDC  - last outpatient HD session was 7/12/24 and last inpatient session was 8/7/24-- missed almost a month of dialysis because he refused to go  - he was uremic on presentation- - and hyperkalemic- K 7.2  - Nephrology [facility] consulted  - received emergent dialysis on 9/4/24   - hyperK resolved   - BUN now 134  - Nephrology continues to follow along     Controlled type 2 diabetes mellitus with chronic kidney disease on chronic dialysis, without long-term current use of insulin  Lab Results   Component Value Date    HGBA1C 5.5 03/15/2022   - Repeat A1c pending.   - Q6 accuchecks with hypoglycemic protocol       VTE Risk Mitigation (From admission, onward)           Ordered     apixaban tablet 2.5 mg  2 times daily         09/04/24 0525     IP VTE LOW RISK PATIENT  Once         09/04/24 0116     Place sequential compression device  Until discontinued         09/04/24 0116                    Discharge Planning   JAIME:      Code Status: Full Code   Is the patient medically ready for discharge?:     Reason for patient still in hospital (select all that apply): Patient trending condition               Critical care time spent on the evaluation and treatment of severe organ dysfunction, review of pertinent labs and imaging studies, discussions with consulting providers and discussions with patient/family: 30 minutes.      Sury Mondragon MD  Department of Hospital Medicine   [facility] - Intensive Care

## 2024-09-04 NOTE — PLAN OF CARE
POC reviewed with ICU team, patient unable to participate and family has not been in contact. ICU staff verbalized understanding.     Patient remains in 2L NC and tolerating. Patient has been hypertensive most of shift, nurse has been medication accordingly. Other VSS. Patient has not interacted with ICU staff or , nurse has tried multiple times. Patient received HD this morning and tolerated well. Patient had minimal UOP but no BM. For further assessment please see MAR and Flowsheets.     Problem: Hemodialysis  Goal: Safe, Effective Therapy Delivery  Outcome: Progressing  Goal: Effective Tissue Perfusion  Outcome: Progressing  Goal: Absence of Infection Signs and Symptoms  Outcome: Progressing     Problem: Adult Inpatient Plan of Care  Goal: Plan of Care Review  Outcome: Progressing  Goal: Patient-Specific Goal (Individualized)  Outcome: Progressing  Goal: Absence of Hospital-Acquired Illness or Injury  Outcome: Progressing  Goal: Optimal Comfort and Wellbeing  Outcome: Progressing  Goal: Readiness for Transition of Care  Outcome: Progressing     Problem: Coping Ineffective  Goal: Effective Coping  Outcome: Progressing     Problem: Diabetes Comorbidity  Goal: Blood Glucose Level Within Targeted Range  Outcome: Progressing     Problem: Infection  Goal: Absence of Infection Signs and Symptoms  Outcome: Progressing     Problem: Wound  Goal: Optimal Coping  Outcome: Progressing  Goal: Optimal Functional Ability  Outcome: Progressing  Goal: Absence of Infection Signs and Symptoms  Outcome: Progressing  Goal: Improved Oral Intake  Outcome: Progressing  Goal: Optimal Pain Control and Function  Outcome: Progressing  Goal: Skin Health and Integrity  Outcome: Progressing  Goal: Optimal Wound Healing  Outcome: Progressing     Problem: Skin Injury Risk Increased  Goal: Skin Health and Integrity  Outcome: Progressing

## 2024-09-04 NOTE — ASSESSMENT & PLAN NOTE
Lab Results   Component Value Date    HGBA1C 5.5 03/15/2022   Repeat A1c pending. Will adjust diet when able. No SSI for now given past A1c and sugars.
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
## 2024-09-04 NOTE — ASSESSMENT & PLAN NOTE
With continued missing of HD will consult palliative care to address goal of care with family. Resume home antidepressant as able.

## 2024-09-04 NOTE — ASSESSMENT & PLAN NOTE
Was advised to got to [facility]illed with daily PT/OT but family declined. Social work for discharge planning and PT eval. Fall risk. Nutrition consult for his malnutrition.   - palliative consulted  - family wants to continue full aggressive treatment  - when he is able to participate, will reconsult PT, OT

## 2024-09-04 NOTE — PLAN OF CARE
Recommendations  Recommendation:   1. When medically able, initiate renal diet with Novasource Renal TID   2. Monitor weight and labs   3. Encourage PO intake as tolerated   4. Monitor need for nutrition support    Goals: Pt will be on diet by RD follow up    Nutrition Goal Status: new  Communication of RD Recs:  (POC)

## 2024-09-04 NOTE — PLAN OF CARE
[facility] - Intensive Care  Initial Discharge Assessment       Primary Care Provider: Cruz Hernandez MD  Case Management Assessment     PCP: See above  Pharmacy:   [id number] [place] - [address]  [address] [place] [address]  Phone: [phone] Fax: [phone]    [facility] [address] [place] [address] - [address]  [facility] [place] [address]  Phone: [phone] Fax: [phone]    [facility]  [address]  [address]   [place] [address]  Phone: [phone] Fax: [phone]        Patient Arrived From: home with family  Existing Help at Home: daughter (Rima) and son    Barriers to Discharge: Transportation    Discharge Plan:    A. Home Health   B. Home with family    Resume dialysis at [facility] on MWF.  Resume [facility].              Admission Diagnosis: Hyperkalemia [E87.5]  Pericardial effusion [I31.39]  Uremic encephalopathy [G93.49, N19]  ESRD (end stage renal disease) [N18.6]  Chest pain [R07.9]  Altered mental status, unspecified altered mental status type [R41.82]    Admission Date: 9/3/2024  Expected Discharge Date:     Transition of Care Barriers: Transportation    Payor: MEDICARE / Plan: MEDICARE PART A & B / Product Type: Government /     Extended Emergency Contact Information  Primary Emergency Contact: Rima Cespedes  Address: [address] [place] [address] E           [place] [id number] [place] [address]  Home Phone: [phone]  Mobile Phone: [phone]  Relation: Daughter    Discharge Plan A: Home Health  Discharge Plan B: Home with family      [id number] [place] - [address]  [address] [place] [address]  Phone: [phone] Fax: [phone]    [facility] [address] [place] [address], [place] - [address]  [address] [facility] [place] [address]  Phone: [phone] Fax: [phone]    [facility] [address]  [address]   [place] [address]  Phone:  [phone] Fax: [phone]      Initial Assessment (most recent)       Adult Discharge Assessment - 09/04/24 1443          Discharge Assessment    Assessment Type Discharge Planning Assessment     Source of Information health record     Reason For Admission Abdominal Pain (Pt arrived via ems, pt chief complaint is abdominal pain. Pt family states has not had a bowel movement or urinated in a couple days.Pt also missed dialysis the last 2 weeks. )     People in Home child(macarena), adult     Facility Arrived From: home with children     Do you expect to return to your current living situation? Yes     Do you have help at home or someone to help you manage your care at home? Yes     Who are your caregiver(s) and their phone number(s)? Rima Cespedes (Daughter)  [phone] (Mobile)     Prior to hospitilization cognitive status: Unable to Assess     Current cognitive status: Unable to Assess     Walking or Climbing Stairs Difficulty yes     Walking or Climbing Stairs ambulation difficulty, requires equipment     Mobility Management Rolling Walker and Wheelchair     Dressing/Bathing Difficulty yes     Dressing/Bathing bathing difficulty, assistance 1 person     Equipment Currently Used at Home walker, rolling;wheelchair     Readmission within 30 days? Yes   Hyperkalemia 1 month ago    Patient currently being followed by outpatient case management? No     Do you currently have service(s) that help you manage your care at home? Yes     Name and Contact number of agency [facility]     Is the pt/caregiver preference to resume services with current agency Yes     Do you take prescription medications? Yes     Do you have prescription coverage? Yes     Coverage Medicaid     Do you have any problems affording any of your prescribed medications? No     Who is going to help you get home at discharge? family     How do you get to doctors appointments? family or friend will provide     Are you on dialysis? Yes     Dialysis  Name and Scheduled days [facility]  [phone]     Do you take coumadin? No     Discharge Plan A Home Health     Discharge Plan B Home with family     DME Needed Upon Discharge  none     Transition of Care Barriers Transportation

## 2024-09-04 NOTE — SUBJECTIVE & OBJECTIVE
Interval History: Lying in bed moaning at times. Does not try to speak even with .     Review of Systems   Unable to perform ROS: Mental status change     Objective:     Vital Signs (Most Recent):  Temp: 97.5 °F (36.4 °C) (09/04/24 0800)  Pulse: 108 (09/04/24 1000)  Resp: 19 (09/04/24 1000)  BP: (!) 195/101 (09/04/24 1000)  SpO2: 98 % (09/04/24 1000) Vital Signs (24h Range):  Temp:  [97 °F (36.1 °C)-97.5 °F (36.4 °C)] 97.5 °F (36.4 °C)  Pulse:  [] 108  Resp:  [11-29] 19  SpO2:  [94 %-100 %] 98 %  BP: (137-207)/() 195/101     Weight: 65.4 kg (144 lb 2.9 oz)  Body mass index is 22.58 kg/m².    Intake/Output Summary (Last 24 hours) at 9/4/2024 1300  Last data filed at 9/4/2024 0630  Gross per 24 hour   Intake 330 ml   Output 2500 ml   Net -2170 ml         Physical Exam  Vitals and nursing note reviewed.   Constitutional:       Appearance: He is ill-appearing and toxic-appearing. He is not diaphoretic.   HENT:      Head:      Comments: Uremic frost     Nose: Nose normal.      Mouth/Throat:      Mouth: Mucous membranes are dry.      Comments: Uremic fetor  Neck:      Comments: Retained sutures R chest wall  Cardiovascular:      Rate and Rhythm: Regular rhythm. Tachycardia present.   Pulmonary:      Effort: Pulmonary effort is normal.      Breath sounds: Normal breath sounds.   Abdominal:      General: Bowel sounds are normal.      Palpations: Abdomen is soft.   Musculoskeletal:      Right lower leg: No edema.      Left lower leg: No edema.   Skin:     Comments: L sided THDC   Neurological:      Mental Status: He is disoriented.             Significant Labs: All pertinent labs within the past 24 hours have been reviewed.    Significant Imaging: I have reviewed all pertinent imaging results/findings within the past 24 hours.

## 2024-09-04 NOTE — ASSESSMENT & PLAN NOTE
- Longstanding issue in patient due to loss of his wife approximately 10 years ago and worsening functional status; was seen by psychiatry during last stay and started on Zoloft. Mgmt per primary team.

## 2024-09-04 NOTE — NURSING
Nurses Note -- 4 Eyes      9/4/2024   08:30 AM       Skin assessed during: Q Shift Change      [] No Altered Skin Integrity Present    []Prevention Measures Documented      [x] Yes- Altered Skin Integrity Present or Discovered   [x] LDA Added if Not in Epic (Describe Wound)   [] New Altered Skin Integrity was Present on Admit and Documented in LDA   [x] Wound Image Taken    Wound Care Consulted? Yes    Attending Nurse:  Fang Sanchez RN/Staff Member:   ANUJ Allan RN

## 2024-09-04 NOTE — PROGRESS NOTES
Latest Reference Range & Units 09/03/24 23:36   POC PH 7.35 - 7.45  7.233 (LL)   POC PCO2 35 - 45 mmHg 39.5   POC PO2 40 - 60 mmHg 35 (LL)   POC HCO3 24 - 28 mmol/L 16.7 (L)   POC SATURATED O2 95 - 100 % 57   Sample  VENOUS   POC TCO2 24 - 29 mmol/L 18 (L)   POC BE -2 to 2 mmol/L -10 (L)   FiO2  32   Flow  3   DelSys  Nasal Can   Site  Other   Mode  SPONT   (LL): Data is critically low  (L): Data is abnormally low

## 2024-09-04 NOTE — PROGRESS NOTES
Ok to proceed w/ contrast due to poor kidney function per Dr. Moreland. Pt will receive dialysis upon admission per ordering physician

## 2024-09-04 NOTE — ASSESSMENT & PLAN NOTE
- this has been present on prior hospitalizations  - discussed on last stay and family declined thoracentesis at that time  - will continue antibiotics for now, can discuss thoracentesis when he is more stable from renal standpoint

## 2024-09-04 NOTE — PLAN OF CARE
KARLY f/u with Keely at [facility] to determine if patient still has a chair time.  Per Keely, patient is still on schedule for MWF @ 12:15 p.m.  Patient's last visit to the center was 7/12/2024.  There have been some concerns regarding patient's care at home which resulted in requests for Wellness Checks through local law enforcement.  Per Keely, patient will remain on the schedule until he gives notice that he no longer wants dialysis.

## 2024-09-04 NOTE — ASSESSMENT & PLAN NOTE
Advance Care Planning     Date: 09/04/2024  Palliative consulted. Continue full aggressive care. Time spent 5 minutes

## 2024-09-04 NOTE — ED NOTES
Dialysis nurse called out, aware pt still in ED and has a bed in ICU will be going to ICU shortly. Pt does have a port and shunt, not known which site is used.
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
## 2024-09-04 NOTE — HPI
59 y.o. AAM with h/o CVA, ESRD (MWF via left tunneled HD catheter), Afib (on amiodarone and eliquis), NIDDM type2, HLD, Essential HTN ,depression with h/o suicidal ideations presents to the ER via EMS with family concerns for constipation and abdominal pain. Pt. Unable to give me history due to AMS due to uremia (). Reports that he missed 3 weeks of HD. Presented to the ER altered covered in feces and urine.    Apparently he was admitted here from - for AMS and again was noted to have missed 2 weeks of HD. CT head was negative for mass/bleed.  CT chest at that time noted loculated moderate left pleural effusion/consolidation with a large pericardial effusion 3.6cm and pulmonary edema. Was tx with abx and echo showed only a small/mod pericardial effusion with no cardiac tamponade. Nephro/pulm/ID consulted. Recommended IR to sample fluid but family deferred due to risk/benefit. He was cont. On abx VANC with NGT repeat cultures. Plan was to transfer to [facility] but daughter (javier)  refused and wanted to take him home with her on  per the discharge summary.     Today no family with with him in the ER and pt. Noted to be malnourished, desheveled and confused.     Labs noted for potassium of 7.2 and /Creatinin 25.8.  Bicarb 12. VB.23/39.5/35/16.7.  WBC 8.7 and H/H 8.6/26.      CT abd/pelvis with IV contrast:   1. Image quality degraded by technical factors discussed above.   2. Moderate/large volume of loculated left pleural fluid noting probable pleural thickening and heterogeneity of pleural fluid.  Correlation for underlying infectious process advised.  Left basilar atelectatic/consolidative change.   3. Right lower lobe patchy ground-glass attenuation with interlobular septal thickening which could be seen with edema, infection or non infectious inflammatory process.   4. Solid and ground-glass right lower lobe pulmonary nodules measuring 6 mm and 8 mm respectively.  Repeat evaluation with  chest CT in 3 months is advised to evaluate stability and/or resolution of these findings.   5. Cardiomegaly and large pericardial effusion.   6. Cholelithiasis.   7. Gastric wall thickening which could relate to nondistention although correlation for symptoms of gastritis advised.   8. Regions of large and small bowel wall thickening which could relate to nondistention and/or technical factors discussed above.  However, correlation for symptoms of enterocolitis advised.   9. Bladder wall thickening.  Correlation with urinalysis advised.   10. Multiple additional findings as above.     ED spoke with Nephrology and will plan for ICU admission and emergent HD.   We have been consulted for further management and admission to the ICU.     Because this patient's clinical condition is critically guarded and requires care in the ICU with emergent HD, care in an alternative location isn't clinically appropriate for the reasons stated above.

## 2024-09-04 NOTE — ASSESSMENT & PLAN NOTE
F/u with nephrology. Again overall poor prognosis if he continues to miss HD. Palliative care consult placed.

## 2024-09-04 NOTE — ASSESSMENT & PLAN NOTE
Due to ESRD, missed HD with resulting poor clearance and metabolic acidosis. Tx in the ER and plans for emergent HD. No arrhythmias noted on tele. F/u on repeat labs after HD.
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
## 2024-09-04 NOTE — EICU
EICU BRIEF ADMIT NOTE:        HISTORY: Please refer to H/P and ER notes for detail. No new symptoms    CAMERA ASSESSMENT: Two way audiovisual assessment was done. Hypertensive, saturation 99%.  Heart rate 80/min.  No distress    Telemetry was reviewed. Medical records including notes, labs and imaging were reviewed.    DISCUSSED with bedside nurse.    ASSESSMENT AND PLAN:    --Acute renal failure with hyperkalemia and acidemia.  Hyperkalemia treated medically.  Getting hemodialysis.  Follow labs.  --Elevated pro-calcitonin level.  No fever or leukocytosis.  CT abdomen with possible left-sided pneumonia with parapneumonic effusion.  Obtain cultures.  Empiric antibiotics.  --Pericardial effusion on CT scan.  No evidence of temporal not.  Patient is hypertensive, heart rate 80/min.  Echocardiogram in am.  May be related to uremia  --Hypertension.  Continue current treatment  --? Gastritis on CT scan.  Empiric PPI    BEST PRACTICES REVIEW    STRESS ULCER PROPHYLAXIS: PPI  DVT PROPHYLAXIS:   SCD    Thank You for allowing EICU to participate in the care of the patient. Please call as needed      Speedy Quinones MD  [facility]  [phone]

## 2024-09-04 NOTE — ASSESSMENT & PLAN NOTE
Chronic, controlled. Latest blood pressure and vitals reviewed-     Temp:  [97 °F (36.1 °C)-97.5 °F (36.4 °C)]   Pulse:  []   Resp:  [11-29]   BP: (137-207)/()   SpO2:  [94 %-100 %] .   Home meds for hypertension were reviewed and noted below.   Hypertension Medications               metoprolol succinate (TOPROL-XL) 50 MG 24 hr tablet Take 1 tablet (50 mg total) by mouth once daily.            While in the hospital, will manage blood pressure as follows; Continue home antihypertensive regimen if he can swallow it    Will utilize p.r.n. blood pressure medication only if patient's blood pressure greater than  180/90  and he develops symptoms such as worsening chest pain or shortness of breath.

## 2024-09-04 NOTE — ASSESSMENT & PLAN NOTE
Anemia is likely due to chronic disease due to ESRD. Most recent hemoglobin and hematocrit are listed below.  Recent Labs     09/03/24  2341 09/04/24  1051   HGB 8.6* 9.0*   HCT 26.1* 26.3*       Plan  - Monitor serial CBC: Daily  - Transfuse PRBC if patient becomes hemodynamically unstable, symptomatic or H/H drops below 7/21.
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
## 2024-09-04 NOTE — ASSESSMENT & PLAN NOTE
-  on admission, last HD session about a month ago  - Nephrology consulted for HD  - continue to monitor mental status

## 2024-09-04 NOTE — ASSESSMENT & PLAN NOTE
Patient with Paroxysmal (<7 days) atrial fibrillation which is controlled currently with Beta Blocker and Amiodarone. Patient is currently in sinus rhythm.[id number] Score: 1. anticoagulation indicated. Anticoagulation done with eliquis 2.5mg PO BID . CT head negative for mass or bleed. Fall risk in place.  - currently not able to swallow PO medications

## 2024-09-04 NOTE — ASSESSMENT & PLAN NOTE
Was advised to got to [facility] with daily PT/TO but family refused. Social work for discharge planning and PT eval. Fall risk. Nutrition consult for his malnutrition.

## 2024-09-04 NOTE — ASSESSMENT & PLAN NOTE
Chronic, controlled. Latest blood pressure and vitals reviewed-     Temp:  [97 °F (36.1 °C)-97.4 °F (36.3 °C)]   Pulse:  [79-87]   Resp:  [11-29]   BP: (178-207)/()   SpO2:  [94 %-100 %] .   Home meds for hypertension were reviewed and noted below.   Hypertension Medications               metoprolol succinate (TOPROL-XL) 50 MG 24 hr tablet Take 1 tablet (50 mg total) by mouth once daily.            While in the hospital, will manage blood pressure as follows; Continue home antihypertensive regimen    Will utilize p.r.n. blood pressure medication only if patient's blood pressure greater than  180/90  and he develops symptoms such as worsening chest pain or shortness of breath.
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
## 2024-09-04 NOTE — ASSESSMENT & PLAN NOTE
- dialyzes via L [facility]  - last outpatient HD session was 7/12/24 and last inpatient session was 8/7/24-- missed almost a month of dialysis because he refused to go  - he was uremic on presentation- - and hyperkalemic- K 7.2  - Nephrology [facility] consulted  - received emergent dialysis on 9/4/24   - hyperK resolved   - BUN now 134  - Nephrology continues to follow along

## 2024-09-04 NOTE — ASSESSMENT & PLAN NOTE
Anemia is likely due to chronic disease due to ESRD. Most recent hemoglobin and hematocrit are listed below.  Recent Labs     09/03/24  2341   HGB 8.6*   HCT 26.1*     Plan  - Monitor serial CBC: Daily  - Transfuse PRBC if patient becomes hemodynamically unstable, symptomatic or H/H drops below 7/21.

## 2024-09-04 NOTE — PLAN OF CARE
ICU DAILY GOALS     Family/Goals of care/Code Status   Code Status: Full Code    24H Vital Sign Range  Temp:  [97 °F (36.1 °C)-97.4 °F (36.3 °C)]   Pulse:  [79-87]   Resp:  [11-29]   BP: (178-207)/()   SpO2:  [94 %-100 %]      Shift Events (include procedures and significant events)   No acute events throughout shift    AWAKE RASS: Goal -    Actual -      Restraint necessity: Not necessary   BREATHE SBT: Not intubated    Coordinate A & B, analgesics/sedatives Pain: uncontrolled   SAT: Not intubated   Delirium CAM-ICU:     Early(intubated/ Progressive (non-intubated) Mobility MOVE Screen (INTUBATED ONLY): Not intubated    Activity: Activity Management: Rolling - L1   Feeding/Nutrition Diet order: Diet/Nutrition Received: NPO,     Thrombus DVT prophylaxis: VTE Required Core Measure: (SCDs) Sequential compression device initiated/maintained   HOB Elevation Head of Bed (HOB) Positioning: HOB elevated   Ulcer Prophylaxis GI: yes   Glucose control managed Glycemic Management: blood glucose monitored   Skin Skin assessed during: Admit    Sacrum intact/not altered? No  Heels intact/not altered? No  Surgical wound? Yes    CHECK ONE!   (no altered skin or altered skin) and sub boxes:  [] No Altered Skin Integrity Present    []Prevention Measures Documented    [x] Altered Skin Integrity Present or Discovered   [x] LDA present in EPIC, daily doc completed              [x] LDA added if not in EPIC (describe wound).                    When describing wound, do not stage, use descriptive words only.    [] Wound Image Taken (required on admit,                   transfer/discharge and every Tuesday)    Wound Care Consulted? Yes    4 EYES:  Attending Nurse (1st set of eyes): Kristi Schmitt    Second RN/Staff Member (2nd set of eyes): Jahaira   Bowel Function constipation    Indwelling Catheter Necessity       N/A     De-escalation Antibiotics No        VS and assessment per flow sheet, patient progressing towards goals as  tolerated, plan of care reviewed with Mr Cespedes, all concerns addressed, will continue to monitor.    Problem: Hemodialysis  Goal: Safe, Effective Therapy Delivery  Outcome: Progressing     Problem: Adult Inpatient Plan of Care  Goal: Plan of Care Review  Outcome: Progressing     Problem: Skin Injury Risk Increased  Goal: Skin Health and Integrity  Outcome: Progressing

## 2024-09-04 NOTE — ASSESSMENT & PLAN NOTE
Lab Results   Component Value Date    HGBA1C 5.5 03/15/2022   - Repeat A1c pending.   - Q6 accuchecks with hypoglycemic protocol    Physical Therapy Visit    Referred by: Ena Vergara MD; Medical Diagnosis (from order):    Diagnosis Information      Diagnosis    723.5 (ICD-9-CM) - M43.6 (ICD-10-CM) - Torticollis              Visit: 3    Visit Type: Daily Treatment Note    SUBJECTIVE                                                                                                             Present and reporting subjective information: mother  Mom reports things are going well. She is starting to roll more so it is hard to keep her in one place at night. She is rolling to her side. She fights the side bend stretch and screams.     OBJECTIVE                                                                                                                    Observation:   Skin: ; Redness noted in right neck skin folds              TREATMENT                                                                                                                  Therapeutic Exercise:  Passive neck lateral flexor stretch, to the left supine, semi reclined, and in sidelying  Passive neck rotation stretch, to the right, supine and supported sitting  Cervical AROM in supine, prone, and supported sitting position using visual/auditory stimuli  Side play on right   Football hold for stretch of right lateral flexors    Time spent demonstrating football hold for stretch of right lateral flexors with mom     Neuromuscular Re-Education:  Facilitation of prone on plinth tucking arms under chest  Facilitated rolling supine to/from prone  Facilitation of midline play     Skilled input: verbal instruction/cues, tactile instruction/cues and as detailed above    Home Exercise Program: 1. Passive neck rotation stretch, to the right, aim for 30 sec hold; progress to 5+ times a day, or with every diaper change for a total of 5 minutes per day - can perform in supine or supported sitting  2. Cervical AROM in supine, prone, and supported sitting position using visual/auditory  stimuli; Set up the child's environment (i.e. orientation of toys, crib and play mat) to promote exploration toward the baby's non-preferred side.  3. Tummy time throughout the day for play with duration per tolerance, recommendation to work up to 60 minutes total each day.  4. Side bend stretch to the left hold for at least 30 seconds for a total of 5 minutes per day - can perform in football hold        ASSESSMENT                                                                                                               Patient was very tired and struggled with handling. She became upset with side bend stretches. Time spent educating mom on a variety of stretching techniques.   Education:   - Results of above outlined education: Verbalizes understanding, Demonstrates understanding and Needs reinforcement      PLAN                                                                                                                           Suggestions for next session as indicated: Progress per plan of care, review stretching            Therapy procedure time and total treatment time can be found documented on the Time Entry flowsheet

## 2024-09-04 NOTE — ED NOTES
Patient arrived via EMS, pt not able to communicate, pt just moans,  Per EMS pt is coming from home and family called 911 because pt has not had dialysis in 2 weeks, unknown reason why, also per EMS pt has not had a BM as well. No family present. Pt not able to communicate. Pt is altered. Pt has a dialysis port to left chest. Pt does arrived with- multiple bed sheets, pt has small bowel to buttocks. Pt cleaned and brief placed on pt. Pt is not able to move much. Pt's BLE  appear to be contracted. Pt not able to straighten legs out. Pt not able to move and roll over on his own, pt has small wound noted to buttocks (see media). Pt's dialysis port is dirty, buttocks is dirty. Not able to make sense of what pt is trying to say, it appears pt is not comfortable on back, pt turn to side, pillow under head. Nasal cannula placed on pt at 2/L pt's moutha nd lips are dry and small amount of blood noted from the dryness

## 2024-09-04 NOTE — HOSPITAL COURSE
Mr Mahesh Cespedes is a 59 y.o. man with ESRD who was admitted with encephalopathy. He was noted to have severe uremia ( on admission), hyperkalemia. He was also caked with urine and stool. He was admitted to ICU, Nephrology Dr [facility]en group consulted, and received emergent dialysis on 9/4/24. Social work notes he has outpatient HD chair but has not been to outpatient HD since 7/12/24. His last inpatient HD treatment was at [facility] on 8/7/24. His mental status has slightly improved. Palliative consulted due to patient's ESRD and HD nonadherence. Family wants to continue full code and full care; not interested in hospice care despite patient's unwillingness to adhere to dialysis. Family now not answering the phone. He is receiving low dose dialysis to avoid dialysis disequilibrium syndrome. He developed Aflutter RVR; Cardiology cardioverted to NSR on 9/9/24. PT, OT, ST following. Recommends SNF. Patient continues to refuse dialysis as of 09/16/2024.  Multiple attempts made to family, unable to contact.  Psych consulted for formed evaluation of capacity- patient does not have the capacity to refuse necessary medical care at this time.  Patient with agitation and delirium overnight on 09/18 and again on 09/19.  Family continues to be difficult to be reached.  Wellness check attempted by [facility] on 09/18, but family did not allow deputy inside the house.  EPS has been contacted on 09/18 as well.  Palliative care team following- code status changed to DNR on 09/19/24.      Mental status improving, likely at baseline.  Was able to continue dialysis while inpatient during hospitalization though he refuses and say no.  Again, he continues to not have the capacity to refuse medical care.   Although he says no to dialysis, he is not combative and not pulling out lines.  Was able to be hooked up to dialysis to be run on day of discharge.  This may be an issue outpatient.  Family would need to be present for the entirety  of dialysis treatment outpatient in order for Nephrology to run him.  Otherwise, he would not be a candidate for dialysis.  Has called and spoke with the patient's son Guevara, who is very aware of this.  Patient and family is prepared for patient to discharge home with home health.  Plan to discharge home once dialysis is completed and patient remained stable.    Patient is alert and oriented to self, appears in no acute distress, heart with regular rate and rhythm, lungs  with non-labored breathing on NC, abdomen soft, and nontender. Has generalized weakness. Has some confusion. Retained sutures R chest wall. L sided THDC. Bilateral lower extremities without any edema or calf tenderness.     Patient's caitlin Akers was counseled regarding any abnormal labs, differential diagnosis, treatment options, risk-benefit, lifestyle changes, prognosis, current condition, and medications. Patient's caitlin Akers was interactive and attentive.  Patient's caitlin Akers's questions were answered in a respectful and timely manner. Patient's caitlin Akers was instructed to have patient follow-up with PCP within 1 week and to continue taking medications as prescribed.  Instructed to also follow up with dialysis. Also, extensively discussed the risks, benefits, and side effects of patient's medications. Discussed with Patient's caitlin Akers about any medication changes. Patient's caitlin Akers verbalized understanding and agrees to treatment plan.  Patient is stable for discharge.  Patient's caitlin Akers has no other questions or concerns at this time.  ED precautions discussed with the patient and his sons    Vital signs are stable.  Tolerating p.o. intake without any nausea or vomiting. Afebrile for over 24 hours. Patient is in stable condition and has no questions or concerns. Patient will be discharge to home with home health once that is set up and once patient completes dialysis.  CM/SW to assist with discharge planning.    Vitals:    09/20/24 1100 09/20/24 1500 09/20/24 1542 09/20/24 1637   BP: 135/77 (!) 104/58 (!) 104/58 (!) 143/62   BP Location: Right arm Right arm Right arm Right arm   Patient Position: Lying Lying Lying Lying   Pulse: 61 81 65 60   Resp: 16 18 18 18   Temp:   97.9 °F (36.6 °C) 97.5 °F (36.4 °C)   TempSrc:    Oral   SpO2:   99% 98%   Weight:       Height:

## 2024-09-04 NOTE — ASSESSMENT & PLAN NOTE
- Consult for support and continuity of care in pt with multiple recent hospital stays for prolonged periods of missing HD sessions despite involving family, providing transportation resources, and offering SNF placement. EMS was called by family when pt was reportedly complaining of abdominal pain; he was found lying in his own excrement at home. Chart reviewed in depth; extensively discussed pt during MDT rounds.   - During visit to bedside, pt was sleeping deeply and did not wake to voice. Given that his BUN is over 240, I do not expect him to be able to make any medical decisions at this time.  - Multiple attempts to reach his daughter Rima at cell phone number, though this was not accepting calls. Following this, called home phone number and was able to speak to his son Jose Miguel. In discussing how things have been going at home, he stated that his father told him he doesn't want to HD, and this is why he hasn't gone for last 2 weeks.   - Medical update provided; shared transparent concern that pt's condition is life threatening due to skipping HD, which is a form of life support. Given that pt has now had two recent hospital stays for same issue, suggested that we consider hospice care at this time. I asked for him to come in to the hospital to further discuss, though he said he was at work and likely not able to come in until last this evening.   - He provided me with an alternate phone number for Rima, so I called her after conversation with Jose Miguel. Similar conversation as above; recommended she come in to the hospital to have in-person discussion given pt's critical illness and need for reasonable plan moving forward. She said she would do so, though it would take a few hours due to transportation issues.   - Later in the day, family still had not arrived in hospital. Along with Dr Mayen (nephrology staff), called and spoke with pt's daughter Rima ([phone] was number used to contact her).  Discussed options in care moving forward, and recommended family make decisions aligned with what pt himself would verbalize. While comfort-focused/hospice care was one option presented, she made it clear they would like to continue with maximal medical therapy including continued dialysis. We explained that due to his non-adherence, outpatient HD would likely only be an option if patient is moved into a nursing home, to which she agreed.   - Of note, we explained that pt is high risk for unexpected events during/after HD due to severity of his condition and abnormal labs; she verbalized understanding.   - Will follow up with pt and family throughout hospital stay for further conversations as pt's clinical course evolves

## 2024-09-04 NOTE — ASSESSMENT & PLAN NOTE
Patient with Paroxysmal (<7 days) atrial fibrillation which is controlled currently with Beta Blocker and Amiodarone. Patient is currently in sinus rhythm.[id number] Score: 1. anticoagulation indicated. Anticoagulation done with eliquis 5mg PO BID . CT head negative for mass or bleed. Fall risk in place.

## 2024-09-05 LAB
ANION GAP SERPL CALC-SCNC: 23 MMOL/L (ref 8–16)
BASOPHILS # BLD AUTO: 0.01 K/UL (ref 0–0.2)
BASOPHILS NFR BLD: 0.1 % (ref 0–1.9)
BUN SERPL-MCNC: 144 MG/DL (ref 8–23)
CALCIUM SERPL-MCNC: 8.6 MG/DL (ref 8.7–10.5)
CHLORIDE SERPL-SCNC: 96 MMOL/L (ref 95–110)
CO2 SERPL-SCNC: 19 MMOL/L (ref 23–29)
CREAT SERPL-MCNC: 17.9 MG/DL (ref 0.5–1.4)
DIFFERENTIAL METHOD BLD: ABNORMAL
EOSINOPHIL # BLD AUTO: 0 K/UL (ref 0–0.5)
EOSINOPHIL NFR BLD: 0.1 % (ref 0–8)
ERYTHROCYTE [DISTWIDTH] IN BLOOD BY AUTOMATED COUNT: 16.7 % (ref 11.5–14.5)
EST. GFR  (NO RACE VARIABLE): 3 ML/MIN/1.73 M^2
GLUCOSE SERPL-MCNC: 87 MG/DL (ref 70–110)
HCT VFR BLD AUTO: 24.6 % (ref 40–54)
HGB BLD-MCNC: 8.2 G/DL (ref 14–18)
IMM GRANULOCYTES # BLD AUTO: 0.12 K/UL (ref 0–0.04)
IMM GRANULOCYTES NFR BLD AUTO: 1.6 % (ref 0–0.5)
LYMPHOCYTES # BLD AUTO: 0.2 K/UL (ref 1–4.8)
LYMPHOCYTES NFR BLD: 2.9 % (ref 18–48)
MCH RBC QN AUTO: 27.7 PG (ref 27–31)
MCHC RBC AUTO-ENTMCNC: 33.3 G/DL (ref 32–36)
MCV RBC AUTO: 83 FL (ref 82–98)
MONOCYTES # BLD AUTO: 0.5 K/UL (ref 0.3–1)
MONOCYTES NFR BLD: 6.6 % (ref 4–15)
NEUTROPHILS # BLD AUTO: 6.7 K/UL (ref 1.8–7.7)
NEUTROPHILS NFR BLD: 88.7 % (ref 38–73)
NRBC BLD-RTO: 1 /100 WBC
PHOSPHATE SERPL-MCNC: 7.7 MG/DL (ref 2.7–4.5)
PLATELET # BLD AUTO: 91 K/UL (ref 150–450)
PMV BLD AUTO: 11.6 FL (ref 9.2–12.9)
POCT GLUCOSE: 100 MG/DL (ref 70–110)
POCT GLUCOSE: 88 MG/DL (ref 70–110)
POCT GLUCOSE: 90 MG/DL (ref 70–110)
POCT GLUCOSE: 98 MG/DL (ref 70–110)
POTASSIUM SERPL-SCNC: 5.2 MMOL/L (ref 3.5–5.1)
RBC # BLD AUTO: 2.96 M/UL (ref 4.6–6.2)
SODIUM SERPL-SCNC: 138 MMOL/L (ref 136–145)
WBC # BLD AUTO: 7.59 K/UL (ref 3.9–12.7)

## 2024-09-05 PROCEDURE — 84100 ASSAY OF PHOSPHORUS: CPT | Performed by: HOSPITALIST

## 2024-09-05 PROCEDURE — 20000000 HC ICU ROOM

## 2024-09-05 PROCEDURE — 92610 EVALUATE SWALLOWING FUNCTION: CPT

## 2024-09-05 PROCEDURE — 97530 THERAPEUTIC ACTIVITIES: CPT

## 2024-09-05 PROCEDURE — 97165 OT EVAL LOW COMPLEX 30 MIN: CPT

## 2024-09-05 PROCEDURE — 85025 COMPLETE CBC W/AUTO DIFF WBC: CPT | Performed by: HOSPITALIST

## 2024-09-05 PROCEDURE — 25000003 PHARM REV CODE 250: Performed by: INTERNAL MEDICINE

## 2024-09-05 PROCEDURE — 80048 BASIC METABOLIC PNL TOTAL CA: CPT | Performed by: HOSPITALIST

## 2024-09-05 PROCEDURE — 80100014 HC HEMODIALYSIS 1:1

## 2024-09-05 PROCEDURE — 63600175 PHARM REV CODE 636 W HCPCS: Performed by: INTERNAL MEDICINE

## 2024-09-05 PROCEDURE — 36415 COLL VENOUS BLD VENIPUNCTURE: CPT | Performed by: HOSPITALIST

## 2024-09-05 PROCEDURE — 97161 PT EVAL LOW COMPLEX 20 MIN: CPT

## 2024-09-05 PROCEDURE — 25000003 PHARM REV CODE 250: Performed by: HOSPITALIST

## 2024-09-05 PROCEDURE — 94761 N-INVAS EAR/PLS OXIMETRY MLT: CPT

## 2024-09-05 RX ORDER — AMLODIPINE BESYLATE 5 MG/1
10 TABLET ORAL DAILY
Status: DISCONTINUED | OUTPATIENT
Start: 2024-09-05 | End: 2024-09-11

## 2024-09-05 RX ADMIN — APIXABAN 2.5 MG: 2.5 TABLET, FILM COATED ORAL at 08:09

## 2024-09-05 RX ADMIN — TAMSULOSIN HYDROCHLORIDE 0.4 MG: 0.4 CAPSULE ORAL at 09:09

## 2024-09-05 RX ADMIN — AMLODIPINE BESYLATE 10 MG: 5 TABLET ORAL at 08:09

## 2024-09-05 RX ADMIN — ALLOPURINOL 100 MG: 100 TABLET ORAL at 08:09

## 2024-09-05 RX ADMIN — MUPIROCIN: 20 OINTMENT TOPICAL at 09:09

## 2024-09-05 RX ADMIN — SEVELAMER CARBONATE 1600 MG: 800 TABLET, FILM COATED ORAL at 08:09

## 2024-09-05 RX ADMIN — PIPERACILLIN SODIUM AND TAZOBACTAM SODIUM 3.38 G: 3; .375 INJECTION, POWDER, FOR SOLUTION INTRAVENOUS at 04:09

## 2024-09-05 RX ADMIN — METOPROLOL SUCCINATE 50 MG: 50 TABLET, EXTENDED RELEASE ORAL at 08:09

## 2024-09-05 RX ADMIN — POLYETHYLENE GLYCOL 3350 17 G: 17 POWDER, FOR SOLUTION ORAL at 08:09

## 2024-09-05 RX ADMIN — PANTOPRAZOLE SODIUM 40 MG: 40 INJECTION, POWDER, FOR SOLUTION INTRAVENOUS at 08:09

## 2024-09-05 RX ADMIN — SERTRALINE HYDROCHLORIDE 50 MG: 50 TABLET ORAL at 08:09

## 2024-09-05 RX ADMIN — AMIODARONE HYDROCHLORIDE 200 MG: 200 TABLET ORAL at 08:09

## 2024-09-05 RX ADMIN — APIXABAN 2.5 MG: 2.5 TABLET, FILM COATED ORAL at 09:09

## 2024-09-05 RX ADMIN — MUPIROCIN: 20 OINTMENT TOPICAL at 08:09

## 2024-09-05 NOTE — ASSESSMENT & PLAN NOTE
- this has been present on prior hospitalizations  - discussed on last stay and family declined thoracentesis at that time  - continue zosyn for today. Doubt infectious at this point- likely will stop tomorrow if still improving

## 2024-09-05 NOTE — ASSESSMENT & PLAN NOTE
-  on admission, last HD session about a month ago  - Nephrology consulted for HD  - continue to monitor mental status - improving
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
## 2024-09-05 NOTE — PROGRESS NOTES
[facility] Medicine  Progress Note    Patient Name: Mahesh Cespedes  MRN: [id number]  Patient Class: IP- Inpatient   Admission Date: 9/3/2024  Length of Stay: 1 days  Attending Physician: Sury Mondragon MD  Primary Care Provider: Cruz Hernandez MD        Subjective:     Principal Problem:Uremic encephalopathy        HPI:  59 y.o. AAM with h/o CVA, ESRD (MWF via left tunneled HD catheter), Afib (on amiodarone and eliquis), NIDDM type2, HLD, Essential HTN ,depression with h/o suicidal ideations presents to the ER via EMS with family concerns for constipation and abdominal pain. Pt. Unable to give me history due to AMS due to uremia (). Reports that he missed 3 weeks of HD. Presented to the ER altered covered in feces and urine.    Apparently he was admitted here from - for AMS and again was noted to have missed 2 weeks of HD. CT head was negative for mass/bleed.  CT chest at that time noted loculated moderate left pleural effusion/consolidation with a large pericardial effusion 3.6cm and pulmonary edema. Was tx with abx and echo showed only a small/mod pericardial effusion with no cardiac tamponade. Nephro/pulm/ID consulted. Recommended IR to sample fluid but family deferred due to risk/benefit. He was cont. On abx VANC with NGT repeat cultures. Plan was to transfer to [facility] but daughter (javier)  refused and wanted to take him home with her on  per the discharge summary.     Today no family with with him in the ER and pt. Noted to be malnourished, desheveled and confused.     Labs noted for potassium of 7.2 and /Creatinin 25.8.  Bicarb 12. VB.23/39.5/35/16.7.  WBC 8.7 and H/H 8.6/26.      CT abd/pelvis with IV contrast:   1. Image quality degraded by technical factors discussed above.   2. Moderate/large volume of loculated left pleural fluid noting probable pleural thickening and heterogeneity of pleural fluid.  Correlation for underlying infectious process  advised.  Left basilar atelectatic/consolidative change.   3. Right lower lobe patchy ground-glass attenuation with interlobular septal thickening which could be seen with edema, infection or non infectious inflammatory process.   4. Solid and ground-glass right lower lobe pulmonary nodules measuring 6 mm and 8 mm respectively.  Repeat evaluation with chest CT in 3 months is advised to evaluate stability and/or resolution of these findings.   5. Cardiomegaly and large pericardial effusion.   6. Cholelithiasis.   7. Gastric wall thickening which could relate to nondistention although correlation for symptoms of gastritis advised.   8. Regions of large and small bowel wall thickening which could relate to nondistention and/or technical factors discussed above.  However, correlation for symptoms of enterocolitis advised.   9. Bladder wall thickening.  Correlation with urinalysis advised.   10. Multiple additional findings as above.     ED spoke with Nephrology and will plan for ICU admission and emergent HD.   We have been consulted for further management and admission to the ICU.     Because this patient's clinical condition is critically guarded and requires care in the ICU with emergent HD, care in an alternative location isn't clinically appropriate for the reasons stated above.         Overview/Hospital Course:  Mr Mahesh Cespedes is a 59 y.o. man with ESRD who was admitted with encephalopathy. He was noted to have severe uremia ( on admission), hyperkalemia. He was also caked with urine and stool. He was admitted to ICU and received emergent dialysis on 9/4/24. Social work notes he has outpatient HD chair but has not been to outpatient HD since 7/12/24. His last inpatient HD treatment was 8/7/24. Also started antibiotics upon admission with concerns for potential aspiration/infectious process. His mental status has improved. Palliative consulted due to patient's ESRD and HD nonadherence. Family wants to  continue full code and full care; not interested in hospice care despite patient's unwillingness to adhere to dialysis.     Interval History: Seen while on HD. He is more awake today. He denies pain. He is hungry.     Review of Systems   Respiratory:  Negative for shortness of breath.    Cardiovascular:  Negative for chest pain.   Gastrointestinal:  Negative for abdominal pain.   Psychiatric/Behavioral:  Positive for confusion.      Objective:     Vital Signs (Most Recent):  Temp: 98.1 °F (36.7 °C) (09/05/24 0730)  Pulse: (!) 115 (09/05/24 0730)  Resp: 12 (09/05/24 0730)  BP: (!) 151/100 (09/05/24 0730)  SpO2: 97 % (09/05/24 0820) Vital Signs (24h Range):  Temp:  [97.7 °F (36.5 °C)-98.6 °F (37 °C)] 98.1 °F (36.7 °C)  Pulse:  [109-116] 115  Resp:  [10-26] 12  SpO2:  [89 %-99 %] 97 %  BP: (135-216)/() 151/100     Weight: 65.4 kg (144 lb 2.9 oz)  Body mass index is 22.58 kg/m².    Intake/Output Summary (Last 24 hours) at 9/5/2024 1053  Last data filed at 9/5/2024 0600  Gross per 24 hour   Intake 295.7 ml   Output --   Net 295.7 ml         Physical Exam  Vitals and nursing note reviewed.   Constitutional:       Appearance: He is ill-appearing and toxic-appearing. He is not diaphoretic.   HENT:      Nose: Nose normal.      Mouth/Throat:      Mouth: Mucous membranes are dry.   Neck:      Comments: Retained sutures R chest wall  Cardiovascular:      Rate and Rhythm: Regular rhythm. Tachycardia present.   Pulmonary:      Effort: Pulmonary effort is normal.      Breath sounds: Normal breath sounds.      Comments: Room air  Abdominal:      General: Bowel sounds are normal.      Palpations: Abdomen is soft.   Musculoskeletal:      Right lower leg: No edema.      Left lower leg: No edema.   Skin:     Comments: L sided THDC   Neurological:      Mental Status: He is alert. He is disoriented.             Significant Labs: All pertinent labs within the past 24 hours have been reviewed.    Significant Imaging: I have reviewed  all pertinent imaging results/findings within the past 24 hours.    Assessment/Plan:      * Uremic encephalopathy  -  on admission, last HD session about a month ago  - Nephrology consulted for HD  - continue to monitor mental status - improving       Gastric wall thickening  Noted on CT  - PPI IV ordered      Depression  Resume home antidepressant    Left loculated pleural effusion  - this has been present on prior hospitalizations  - discussed on last stay and family declined thoracentesis at that time  - continue zosyn for today. Doubt infectious at this point- likely will stop tomorrow if still improving     ACP (advance care planning)  Advance Care Planning    Date: 09/04/2024  Palliative consulted. Continue full aggressive care. Time spent 5 minutes           Physical debility  Was advised to got to Skilled with daily PT/OT but family declined. Social work for discharge planning and PT eval. Fall risk. Nutrition consult for his malnutrition.   - palliative consulted  - family wants to continue full aggressive treatment  - PT, OT, ST consulted- previously suggested SNF, so suspect that will be recommendation again  - case management aware     Paroxysmal atrial fibrillation  Patient with Paroxysmal (<7 days) atrial fibrillation which is controlled currently with Beta Blocker and Amiodarone. Patient is currently in sinus rhythm.[id number] Score: 1. anticoagulation indicated. Anticoagulation done with eliquis 2.5mg PO BID . CT head negative for mass or bleed. Fall risk in place.    History of CVA (cerebrovascular accident)  Noted.   - continue home eliquis, metoprolol     Anemia in ESRD (end-stage renal disease)  Anemia is likely due to chronic disease due to ESRD. Most recent hemoglobin and hematocrit are listed below.  Recent Labs     09/03/24  2341 09/04/24  1051 09/05/24  0353   HGB 8.6* 9.0* 8.2*   HCT 26.1* 26.3* 24.6*       Plan  - Monitor serial CBC: Daily  - Transfuse PRBC if patient becomes  hemodynamically unstable, symptomatic or H/H drops below 7/21.      Essential hypertension  Chronic, controlled. Latest blood pressure and vitals reviewed-     Temp:  [97.7 °F (36.5 °C)-98.6 °F (37 °C)]   Pulse:  [109-116]   Resp:  [10-26]   BP: (135-216)/()   SpO2:  [89 %-99 %] .   Home meds for hypertension were reviewed and noted below.   Hypertension Medications               metoprolol succinate (TOPROL-XL) 50 MG 24 hr tablet Take 1 tablet (50 mg total) by mouth once daily.            While in the hospital, will manage blood pressure as follows; Continue home antihypertensive regimen   - continue metoprolol  - add amlodipine for poorly controlled BP    Will utilize p.r.n. blood pressure medication only if patient's blood pressure greater than  180/90  and he develops symptoms such as worsening chest pain or shortness of breath.    ESRD needing dialysis  - dialyzes via L [facility]  - last outpatient HD session was 7/12/24 and last inpatient session was 8/7/24-- missed almost a month of dialysis because he refused to go  - he was uremic on presentation- - and hyperkalemic- K 7.2  - Nephrology [facility] group consulted  - received emergent dialysis on 9/4/24   - hyperK resolved, BUN improved  - Nephrology continues to follow along   - HD again 9/5/24  - Palliative consulted as he has ESRD and refuses outpatient dialysis. Despite this, continue full code full care.     Controlled type 2 diabetes mellitus with chronic kidney disease on chronic dialysis, without long-term current use of insulin  Lab Results   Component Value Date    HGBA1C 6.2 (H) 09/04/2024     - Q6 accuchecks with hypoglycemic protocol until diet started      VTE Risk Mitigation (From admission, onward)           Ordered     apixaban tablet 2.5 mg  2 times daily         09/04/24 0525     IP VTE LOW RISK PATIENT  Once         09/04/24 0116     Place sequential compression device  Until discontinued         09/04/24 0116                     Discharge Planning   JAIME:      Code Status: Full Code   Is the patient medically ready for discharge?:     Reason for patient still in hospital (select all that apply): Patient trending condition  Discharge Plan A: Home Health            Critical care time spent on the evaluation and treatment of severe organ dysfunction, review of pertinent labs and imaging studies, discussions with consulting providers and discussions with patient/family: 30 minutes.      Sury Mondragon MD  Department of Hospital Medicine   [facility] - Intensive Care

## 2024-09-05 NOTE — PLAN OF CARE
Problem: Occupational Therapy  Goal: Occupational Therapy Goal  Description: Goals to be met by: 9/19/24     Patient will increase functional independence with ADLs by performing:    Feeding with Minimal Assistance.  UE Dressing with Minimal Assistance.  Grooming while EOB with Minimal Assistance.  Sitting at edge of bed x30 minutes with Supervision.  Rolling to Bilateral with Minimal Assistance.   Supine to sit with Minimal Assistance.    Functional transfers: To be assessed     Outcome: Progressing

## 2024-09-05 NOTE — ASSESSMENT & PLAN NOTE
Chronic, controlled. Latest blood pressure and vitals reviewed-     Temp:  [97.7 °F (36.5 °C)-98.6 °F (37 °C)]   Pulse:  [109-116]   Resp:  [10-26]   BP: (135-216)/()   SpO2:  [89 %-99 %] .   Home meds for hypertension were reviewed and noted below.   Hypertension Medications               metoprolol succinate (TOPROL-XL) 50 MG 24 hr tablet Take 1 tablet (50 mg total) by mouth once daily.            While in the hospital, will manage blood pressure as follows; Continue home antihypertensive regimen   - continue metoprolol  - add amlodipine for poorly controlled BP    Will utilize p.r.n. blood pressure medication only if patient's blood pressure greater than  180/90  and he develops symptoms such as worsening chest pain or shortness of breath.
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
## 2024-09-05 NOTE — PLAN OF CARE
Problem: Hemodialysis  Goal: Safe, Effective Therapy Delivery  Outcome: Progressing  Goal: Effective Tissue Perfusion  Outcome: Progressing  Goal: Absence of Infection Signs and Symptoms  Outcome: Progressing     Problem: Adult Inpatient Plan of Care  Goal: Plan of Care Review  Outcome: Progressing  Goal: Patient-Specific Goal (Individualized)  Outcome: Progressing  Goal: Absence of Hospital-Acquired Illness or Injury  Outcome: Progressing  Goal: Optimal Comfort and Wellbeing  Outcome: Progressing  Goal: Readiness for Transition of Care  Outcome: Progressing     Problem: Coping Ineffective  Goal: Effective Coping  Outcome: Progressing     Problem: Diabetes Comorbidity  Goal: Blood Glucose Level Within Targeted Range  Outcome: Progressing     Problem: Infection  Goal: Absence of Infection Signs and Symptoms  Outcome: Progressing     Problem: Wound  Goal: Optimal Coping  Outcome: Progressing  Goal: Optimal Functional Ability  Outcome: Progressing  Goal: Absence of Infection Signs and Symptoms  Outcome: Progressing  Goal: Improved Oral Intake  Outcome: Progressing  Goal: Optimal Pain Control and Function  Outcome: Progressing  Goal: Skin Health and Integrity  Outcome: Progressing  Goal: Optimal Wound Healing  Outcome: Progressing     Problem: Skin Injury Risk Increased  Goal: Skin Health and Integrity  Outcome: Progressing

## 2024-09-05 NOTE — ASSESSMENT & PLAN NOTE
Patient with Paroxysmal (<7 days) atrial fibrillation which is controlled currently with Beta Blocker and Amiodarone. Patient is currently in sinus rhythm.[id number] Score: 1. anticoagulation indicated. Anticoagulation done with eliquis 2.5mg PO BID . CT head negative for mass or bleed. Fall risk in place.
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
## 2024-09-05 NOTE — ASSESSMENT & PLAN NOTE
- dialyzes via L [facility]  - last outpatient HD session was 7/12/24 and last inpatient session was 8/7/24-- missed almost a month of dialysis because he refused to go  - he was uremic on presentation- - and hyperkalemic- K 7.2  - Nephrology [facility] consulted  - received emergent dialysis on 9/4/24   - hyperK resolved, BUN improved  - Nephrology continues to follow along   - HD again 9/5/24  - Palliative consulted as he has ESRD and refuses outpatient dialysis. Despite this, continue full code full care.

## 2024-09-05 NOTE — ASSESSMENT & PLAN NOTE
Anemia is likely due to chronic disease due to ESRD. Most recent hemoglobin and hematocrit are listed below.  Recent Labs     09/03/24  2341 09/04/24  1051 09/05/24  0353   HGB 8.6* 9.0* 8.2*   HCT 26.1* 26.3* 24.6*       Plan  - Monitor serial CBC: Daily  - Transfuse PRBC if patient becomes hemodynamically unstable, symptomatic or H/H drops below 7/21.

## 2024-09-05 NOTE — ASSESSMENT & PLAN NOTE
Was advised to got to Skilled with daily PT/OT but family declined. Social work for discharge planning and PT eval. Fall risk. Nutrition consult for his malnutrition.   - palliative consulted  - family wants to continue full aggressive treatment  - PT, OT, ST consulted- previously suggested SNF, so suspect that will be recommendation again  - case management aware

## 2024-09-05 NOTE — PLAN OF CARE
Problem: SLP  Goal: SLP Goal  Description: Goals:  1. Pt will participate in on-going assessment of swallowing function.    Outcome: Progressing    B/s swallow eval completed. Pt w/ fluctuating JR following HD session. Pt w/ mild-mod oral holding of pureed boluses, negatively impacted by JR and dcr comprehension. ST recs full liquid diet w/ thin liquids. Meds crushed in puree or non-oral.

## 2024-09-05 NOTE — CONSULTS
Recommendations  Recommendation:   1. When medically able, initiate renal/diabetic 2000 kcal diet with Novasource Renal TID   2. Once diet initiated, add Hemal BID   3. Monitor weight and labs   4. Encourage PO intake as tolerated   5. Monitor need for nutrition support     Goals: Pt will be on diet by RD follow up     Nutrition Goal Status: new  Communication of RD Recs:  (POC)    For additional information, see note from 9/4/24.

## 2024-09-05 NOTE — ASSESSMENT & PLAN NOTE
Lab Results   Component Value Date    HGBA1C 6.2 (H) 09/04/2024     - Q6 accuchecks with hypoglycemic protocol until diet started

## 2024-09-05 NOTE — PLAN OF CARE
Patient Arrived From: home with family  Existing Help at Home: daughter (Rima) and son     Barriers to Discharge: Transportation    Changes in medical condition or discharge plan: Family does not want NH placement but would agree to SNF    Patient / family has declined to select a preferred provider and elects placement with the first accepting in network provider that is available to provide services as ordered by the physician.   Referral sent via careport to:  [facility], [facility], [facility], [facility], [facility] and [facility]    Does patient need new DME? no    Follow up appts needed: tbd    Medically stable: Not at this time    Estimated Discharge Date:  tbd       09/05/24 1542   Discharge Reassessment   Assessment Type Discharge Planning Reassessment   Did the patient's condition or plan change since previous assessment? Yes   Discharge Plan discussed with: Adult children   Communicated JAIME with patient/caregiver Yes   Discharge Plan A Skilled Nursing Facility   Discharge Plan B Home Health   Transition of Care Barriers None   Why the patient remains in the hospital Requires continued medical care   Post-Acute Status   Post-Acute Authorization Placement   Post-Acute Placement Status Referrals Sent

## 2024-09-05 NOTE — NURSING
[facility], [facility] [place]  Nurses Note -- 4 Eyes      9/5/2024       Skin assessed on: Q Shift      [] No Pressure Injuries Present    []Prevention Measures Documented    [x] Yes LDA  for Pressure Injury Previously documented     [] Yes New Pressure Injury Discovered   [] LDA for New Pressure Injury Added      Attending RN:  Casandra Allan, RN     Second RN:  Tee

## 2024-09-05 NOTE — NURSING
[facility], [facility]  Nurses Note -- 4 Eyes      9/4/2024       Skin assessed on: Q Shift      [] No Pressure Injuries Present    []Prevention Measures Documented    [x] Yes LDA  for Pressure Injury Previously documented     [] Yes New Pressure Injury Discovered   [] LDA for New Pressure Injury Added      Attending RN:  Cat Schaeffer RN     Second RN:  BETO Headley

## 2024-09-05 NOTE — PLAN OF CARE
Problem: Physical Therapy  Goal: Physical Therapy Goal  Description: Goals to be met by: 24     Patient will increase functional independence with mobility by performin. Supine to sit with MInimal Assistance  2. Sit to stand transfer with Minimal Assistance  3. Bed to chair transfer with Minimal Assistance    4. Gait  x 10-15 feet with Minimal Assistance using Rolling Walker.   5. Lower extremity exercise program x10 reps per handout, with assistance as needed    Outcome: Progressing   Initial PT evaluation performed today.  Pt could benefit from skilled PT services 3x/wk in order to maximize function prior to D/C.  Moderate Intensity Therapy recommended

## 2024-09-05 NOTE — SUBJECTIVE & OBJECTIVE
Interval History: Seen while on HD. He is more awake today. He denies pain. He is hungry.     Review of Systems   Respiratory:  Negative for shortness of breath.    Cardiovascular:  Negative for chest pain.   Gastrointestinal:  Negative for abdominal pain.   Psychiatric/Behavioral:  Positive for confusion.      Objective:     Vital Signs (Most Recent):  Temp: 98.1 °F (36.7 °C) (09/05/24 0730)  Pulse: (!) 115 (09/05/24 0730)  Resp: 12 (09/05/24 0730)  BP: (!) 151/100 (09/05/24 0730)  SpO2: 97 % (09/05/24 0820) Vital Signs (24h Range):  Temp:  [97.7 °F (36.5 °C)-98.6 °F (37 °C)] 98.1 °F (36.7 °C)  Pulse:  [109-116] 115  Resp:  [10-26] 12  SpO2:  [89 %-99 %] 97 %  BP: (135-216)/() 151/100     Weight: 65.4 kg (144 lb 2.9 oz)  Body mass index is 22.58 kg/m².    Intake/Output Summary (Last 24 hours) at 9/5/2024 1053  Last data filed at 9/5/2024 0600  Gross per 24 hour   Intake 295.7 ml   Output --   Net 295.7 ml         Physical Exam  Vitals and nursing note reviewed.   Constitutional:       Appearance: He is ill-appearing and toxic-appearing. He is not diaphoretic.   HENT:      Nose: Nose normal.      Mouth/Throat:      Mouth: Mucous membranes are dry.   Neck:      Comments: Retained sutures R chest wall  Cardiovascular:      Rate and Rhythm: Regular rhythm. Tachycardia present.   Pulmonary:      Effort: Pulmonary effort is normal.      Breath sounds: Normal breath sounds.      Comments: Room air  Abdominal:      General: Bowel sounds are normal.      Palpations: Abdomen is soft.   Musculoskeletal:      Right lower leg: No edema.      Left lower leg: No edema.   Skin:     Comments: L sided [facility]DC   Neurological:      Mental Status: He is alert. He is disoriented.             Significant Labs: All pertinent labs within the past 24 hours have been reviewed.    Significant Imaging: I have reviewed all pertinent imaging results/findings within the past 24 hours.

## 2024-09-06 ENCOUNTER — DOCUMENT SCAN (OUTPATIENT)
Dept: HOME HEALTH SERVICES | Facility: HOSPITAL | Age: 59
End: 2024-09-06
Payer: MEDICARE

## 2024-09-06 PROBLEM — L89.154 PRESSURE INJURY OF SACRAL REGION, STAGE 4: Status: ACTIVE | Noted: 2024-02-06

## 2024-09-06 PROBLEM — I48.3 TYPICAL ATRIAL FLUTTER: Status: ACTIVE | Noted: 2024-09-06

## 2024-09-06 PROBLEM — B37.0 THRUSH: Status: ACTIVE | Noted: 2024-09-06

## 2024-09-06 PROBLEM — D69.6 THROMBOCYTOPENIA: Status: ACTIVE | Noted: 2024-09-06

## 2024-09-06 LAB
ANION GAP SERPL CALC-SCNC: 21 MMOL/L (ref 8–16)
ANISOCYTOSIS BLD QL SMEAR: SLIGHT
BASOPHILS # BLD AUTO: 0.02 K/UL (ref 0–0.2)
BASOPHILS NFR BLD: 0.3 % (ref 0–1.9)
BUN SERPL-MCNC: 113 MG/DL (ref 8–23)
CALCIUM SERPL-MCNC: 8.7 MG/DL (ref 8.7–10.5)
CHLORIDE SERPL-SCNC: 95 MMOL/L (ref 95–110)
CO2 SERPL-SCNC: 23 MMOL/L (ref 23–29)
CREAT SERPL-MCNC: 12.7 MG/DL (ref 0.5–1.4)
DIFFERENTIAL METHOD BLD: ABNORMAL
EOSINOPHIL # BLD AUTO: 0 K/UL (ref 0–0.5)
EOSINOPHIL NFR BLD: 0.3 % (ref 0–8)
ERYTHROCYTE [DISTWIDTH] IN BLOOD BY AUTOMATED COUNT: 16.6 % (ref 11.5–14.5)
EST. GFR  (NO RACE VARIABLE): 4 ML/MIN/1.73 M^2
GLUCOSE SERPL-MCNC: 96 MG/DL (ref 70–110)
HBV SURFACE AB SER QL IA: NEGATIVE
HBV SURFACE AB SERPL IA-ACNC: 4 MIU/ML
HCT VFR BLD AUTO: 24.8 % (ref 40–54)
HGB BLD-MCNC: 8.2 G/DL (ref 14–18)
IMM GRANULOCYTES # BLD AUTO: 0.07 K/UL (ref 0–0.04)
IMM GRANULOCYTES NFR BLD AUTO: 1.2 % (ref 0–0.5)
LYMPHOCYTES # BLD AUTO: 0.3 K/UL (ref 1–4.8)
LYMPHOCYTES NFR BLD: 5.9 % (ref 18–48)
MAGNESIUM SERPL-MCNC: 2.2 MG/DL (ref 1.6–2.6)
MCH RBC QN AUTO: 27.1 PG (ref 27–31)
MCHC RBC AUTO-ENTMCNC: 33.1 G/DL (ref 32–36)
MCV RBC AUTO: 82 FL (ref 82–98)
MONOCYTES # BLD AUTO: 0.3 K/UL (ref 0.3–1)
MONOCYTES NFR BLD: 5.4 % (ref 4–15)
NEUTROPHILS # BLD AUTO: 5 K/UL (ref 1.8–7.7)
NEUTROPHILS NFR BLD: 86.9 % (ref 38–73)
NRBC BLD-RTO: 0 /100 WBC
PHOSPHATE SERPL-MCNC: 8 MG/DL (ref 2.7–4.5)
PLATELET # BLD AUTO: 71 K/UL (ref 150–450)
PLATELET BLD QL SMEAR: ABNORMAL
PMV BLD AUTO: 11.3 FL (ref 9.2–12.9)
POCT GLUCOSE: 111 MG/DL (ref 70–110)
POCT GLUCOSE: 132 MG/DL (ref 70–110)
POCT GLUCOSE: 94 MG/DL (ref 70–110)
POCT GLUCOSE: 95 MG/DL (ref 70–110)
POIKILOCYTOSIS BLD QL SMEAR: SLIGHT
POTASSIUM SERPL-SCNC: 4.7 MMOL/L (ref 3.5–5.1)
RBC # BLD AUTO: 3.03 M/UL (ref 4.6–6.2)
SODIUM SERPL-SCNC: 139 MMOL/L (ref 136–145)
TARGETS BLD QL SMEAR: ABNORMAL
WBC # BLD AUTO: 5.73 K/UL (ref 3.9–12.7)

## 2024-09-06 PROCEDURE — 85025 COMPLETE CBC W/AUTO DIFF WBC: CPT | Performed by: HOSPITALIST

## 2024-09-06 PROCEDURE — 99223 1ST HOSP IP/OBS HIGH 75: CPT | Mod: ,,,

## 2024-09-06 PROCEDURE — 80100014 HC HEMODIALYSIS 1:1

## 2024-09-06 PROCEDURE — 97530 THERAPEUTIC ACTIVITIES: CPT

## 2024-09-06 PROCEDURE — 94761 N-INVAS EAR/PLS OXIMETRY MLT: CPT

## 2024-09-06 PROCEDURE — 63600175 PHARM REV CODE 636 W HCPCS: Performed by: HOSPITALIST

## 2024-09-06 PROCEDURE — 63600175 PHARM REV CODE 636 W HCPCS: Mod: JZ,JG | Performed by: INTERNAL MEDICINE

## 2024-09-06 PROCEDURE — 63600175 PHARM REV CODE 636 W HCPCS: Performed by: INTERNAL MEDICINE

## 2024-09-06 PROCEDURE — 27000221 HC OXYGEN, UP TO 24 HOURS

## 2024-09-06 PROCEDURE — 25000003 PHARM REV CODE 250: Performed by: HOSPITALIST

## 2024-09-06 PROCEDURE — 25000003 PHARM REV CODE 250: Performed by: INTERNAL MEDICINE

## 2024-09-06 PROCEDURE — 36415 COLL VENOUS BLD VENIPUNCTURE: CPT | Performed by: HOSPITALIST

## 2024-09-06 PROCEDURE — 92526 ORAL FUNCTION THERAPY: CPT

## 2024-09-06 PROCEDURE — 97535 SELF CARE MNGMENT TRAINING: CPT

## 2024-09-06 PROCEDURE — 80048 BASIC METABOLIC PNL TOTAL CA: CPT | Performed by: HOSPITALIST

## 2024-09-06 PROCEDURE — 97110 THERAPEUTIC EXERCISES: CPT

## 2024-09-06 PROCEDURE — 20000000 HC ICU ROOM

## 2024-09-06 PROCEDURE — 84100 ASSAY OF PHOSPHORUS: CPT

## 2024-09-06 PROCEDURE — 83735 ASSAY OF MAGNESIUM: CPT

## 2024-09-06 RX ORDER — DILTIAZEM HYDROCHLORIDE 5 MG/ML
10 INJECTION INTRAVENOUS ONCE
Status: COMPLETED | OUTPATIENT
Start: 2024-09-06 | End: 2024-09-06

## 2024-09-06 RX ORDER — FLUCONAZOLE 2 MG/ML
200 INJECTION, SOLUTION INTRAVENOUS ONCE
Status: COMPLETED | OUTPATIENT
Start: 2024-09-06 | End: 2024-09-06

## 2024-09-06 RX ORDER — BISACODYL 10 MG/1
10 SUPPOSITORY RECTAL ONCE
Status: COMPLETED | OUTPATIENT
Start: 2024-09-06 | End: 2024-09-06

## 2024-09-06 RX ADMIN — POLYETHYLENE GLYCOL 3350 17 G: 17 POWDER, FOR SOLUTION ORAL at 08:09

## 2024-09-06 RX ADMIN — METOPROLOL SUCCINATE 50 MG: 50 TABLET, EXTENDED RELEASE ORAL at 08:09

## 2024-09-06 RX ADMIN — DILTIAZEM HYDROCHLORIDE 10 MG: 5 INJECTION, SOLUTION INTRAVENOUS at 03:09

## 2024-09-06 RX ADMIN — PANTOPRAZOLE SODIUM 40 MG: 40 INJECTION, POWDER, FOR SOLUTION INTRAVENOUS at 08:09

## 2024-09-06 RX ADMIN — BISACODYL 10 MG: 10 SUPPOSITORY RECTAL at 11:09

## 2024-09-06 RX ADMIN — ACETAMINOPHEN 650 MG: 325 TABLET ORAL at 02:09

## 2024-09-06 RX ADMIN — SERTRALINE HYDROCHLORIDE 50 MG: 50 TABLET ORAL at 08:09

## 2024-09-06 RX ADMIN — FLUCONAZOLE 200 MG: 2 INJECTION, SOLUTION INTRAVENOUS at 12:09

## 2024-09-06 RX ADMIN — ALLOPURINOL 100 MG: 100 TABLET ORAL at 08:09

## 2024-09-06 RX ADMIN — MUPIROCIN: 20 OINTMENT TOPICAL at 09:09

## 2024-09-06 RX ADMIN — PIPERACILLIN SODIUM AND TAZOBACTAM SODIUM 3.38 G: 3; .375 INJECTION, POWDER, FOR SOLUTION INTRAVENOUS at 05:09

## 2024-09-06 RX ADMIN — MUPIROCIN: 20 OINTMENT TOPICAL at 08:09

## 2024-09-06 RX ADMIN — EPOETIN ALFA-EPBX 20000 UNITS: 10000 INJECTION, SOLUTION INTRAVENOUS; SUBCUTANEOUS at 08:09

## 2024-09-06 RX ADMIN — ONDANSETRON 4 MG: 2 INJECTION INTRAMUSCULAR; INTRAVENOUS at 08:09

## 2024-09-06 RX ADMIN — AMLODIPINE BESYLATE 10 MG: 5 TABLET ORAL at 08:09

## 2024-09-06 RX ADMIN — APIXABAN 2.5 MG: 2.5 TABLET, FILM COATED ORAL at 08:09

## 2024-09-06 RX ADMIN — AMIODARONE HYDROCHLORIDE 200 MG: 200 TABLET ORAL at 08:09

## 2024-09-06 NOTE — PT/OT/SLP PROGRESS
Speech Language Pathology Treatment    Patient Name:  Mahesh Cespedes   MRN:  [id number]  Admitting Diagnosis: Uremic encephalopathy    Recommendations:                 General Recommendations:  Dysphagia therapy and Cognitive-linguistic evaluation  Diet recommendations:  Puree Diet - IDDSI Level 4, Liquid Diet Level: Thin liquids - IDDSI Level 0   Aspiration Precautions: 1 bite/sip at a time, Alternating bites/sips, Assistance with meals, HOB to 90 degrees, Meds crushed in puree, and Small bites/sips   General Precautions: Standard,    Communication strategies:  provide increased time to answer    Assessment:     Mahesh Cespedes is a 59 y.o. male with a dx of Uremic encephalopathy, who presents w/ improvement in alertness today, however, cont mod cognitive deficits w/ more complex direction following. Pt was unable to comprehend commands to masticate all solid items presented, w/ crackers remaining at the anterior oral cavity. ST recs advance to pureed diet w/ thin liquids. Meds crushed in puree. ST cont to follow.     Subjective     Pt seated EOB, w/ PT and OT at b/s upon ST's entrance. PT/OT A the pt back to bed and ensured pt was seated in and upright position for po trials for ST tx session. PT/OT stating pt following simple commands and verbally communicating some today.     Patient goals: Pt did not state     Pain/Comfort:  Pain Rating 1:  (KELLEY 2/2 poor comprehension)    Respiratory Status: Nasal cannula, flow 4 L/min    Objective:     Has the patient been evaluated by SLP for swallowing?   Yes  Keep patient NPO? No   Current Respiratory Status:        Swallowing: Pt required total A to consume the following po items: straw sips of water, x4 tsp bites of pudding, as well as x2 attempts to consume small pieces of cracker. Pt w/ minimal opening of the oral cavity for all presentation of po items. Pt did not attempt to masticate solid trials, despite max ST tactile and verbal cues. Pt unable to comprehend commands to  masticate solids, w/ crackers remaining in the anterior oral cavity. Remainder of trials d/c 2/2 dcr safety w/ solids and pt deferring remaining pureed trials. No overt s/s of aspiration across thin liquid and pureed boluses.     Communication/ Cognition: Pt communicate via non-verbal communication for entirety of tx session, mainly via head nods. Pt w/ dcr attention to eating task, requiring max verbal cues for attempted solid boluses. Pt was able to follow basic oral commands, however, unable to comprehend more complex commands to masticate crackers.     Goals:   Multidisciplinary Problems       SLP Goals          Problem: SLP    Goal Priority Disciplines Outcome   SLP Goal    Medium SLP Progressing   Description: Goals:  1. Pt will participate in on-going assessment of swallowing function. -Ongoing  2. Pt will tolerate a pureed diet with thin liquids w/o any overt s/s of aspiration.                            Plan:     Patient to be seen:  2 x/week, 3 x/week   Plan of Care expires:     Plan of Care reviewed with:  patient   SLP Follow-Up:  Yes       Discharge recommendations:  Moderate Intensity Therapy   Barriers to Discharge:  None    Time Tracking:     SLP Treatment Date:   09/06/24  Speech Start Time:  1029  Speech Stop Time:  1050     Speech Total Time (min):  21 min    Billable Minutes: Treatment Swallowing Dysfunction 21 09/06/2024
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
## 2024-09-06 NOTE — ASSESSMENT & PLAN NOTE
The likely etiology of thrombocytopenia is  unknown- potentially occult liver disease . The patients 3 most recent labs are listed below.  Recent Labs     09/04/24  1051 09/05/24  0353 09/06/24  0354   * 91* 71*     Plan  - Will transfuse if platelet count is <50k (if undergoing surgical procedure or have active bleeding).  - monitor daily  - continue eliquis for now

## 2024-09-06 NOTE — PROGRESS NOTES
[facility] Medicine  Progress Note    Patient Name: Mahesh Cespedes  MRN: [id number]  Patient Class: IP- Inpatient   Admission Date: 9/3/2024  Length of Stay: 2 days  Attending Physician: Sury Mondragon MD  Primary Care Provider: Cruz Hernandez MD        Subjective:     Principal Problem:Uremic encephalopathy        HPI:  59 y.o. AAM with h/o CVA, ESRD (MWF via left tunneled HD catheter), Afib (on amiodarone and eliquis), NIDDM type2, HLD, Essential HTN ,depression with h/o suicidal ideations presents to the ER via EMS with family concerns for constipation and abdominal pain. Pt. Unable to give me history due to AMS due to uremia (). Reports that he missed 3 weeks of HD. Presented to the ER altered covered in feces and urine.    Apparently he was admitted here from - for AMS and again was noted to have missed 2 weeks of HD. CT head was negative for mass/bleed.  CT chest at that time noted loculated moderate left pleural effusion/consolidation with a large pericardial effusion 3.6cm and pulmonary edema. Was tx with abx and echo showed only a small/mod pericardial effusion with no cardiac tamponade. Nephro/pulm/ID consulted. Recommended IR to sample fluid but family deferred due to risk/benefit. He was cont. On abx VANC with NGT repeat cultures. Plan was to transfer to [facility] but daughter (javier)  refused and wanted to take him home with her on  per the discharge summary.     Today no family with with him in the ER and pt. Noted to be malnourished, desheveled and confused.     Labs noted for potassium of 7.2 and /Creatinin 25.8.  Bicarb 12. VB.23/39.5/35/16.7.  WBC 8.7 and H/H 8.6/26.      CT abd/pelvis with IV contrast:   1. Image quality degraded by technical factors discussed above.   2. Moderate/large volume of loculated left pleural fluid noting probable pleural thickening and heterogeneity of pleural fluid.  Correlation for underlying infectious process  advised.  Left basilar atelectatic/consolidative change.   3. Right lower lobe patchy ground-glass attenuation with interlobular septal thickening which could be seen with edema, infection or non infectious inflammatory process.   4. Solid and ground-glass right lower lobe pulmonary nodules measuring 6 mm and 8 mm respectively.  Repeat evaluation with chest CT in 3 months is advised to evaluate stability and/or resolution of these findings.   5. Cardiomegaly and large pericardial effusion.   6. Cholelithiasis.   7. Gastric wall thickening which could relate to nondistention although correlation for symptoms of gastritis advised.   8. Regions of large and small bowel wall thickening which could relate to nondistention and/or technical factors discussed above.  However, correlation for symptoms of enterocolitis advised.   9. Bladder wall thickening.  Correlation with urinalysis advised.   10. Multiple additional findings as above.     ED spoke with Nephrology and will plan for ICU admission and emergent HD.   We have been consulted for further management and admission to the ICU.     Because this patient's clinical condition is critically guarded and requires care in the ICU with emergent HD, care in an alternative location isn't clinically appropriate for the reasons stated above.         Overview/Hospital Course:  Mr Mahesh Cespedes is a 59 y.o. man with ESRD who was admitted with encephalopathy. He was noted to have severe uremia ( on admission), hyperkalemia. He was also caked with urine and stool. He was admitted to ICU, Nephrology Dr Mayen group consulted, and received emergent dialysis on 9/4/24. Social work notes he has outpatient HD chair but has not been to outpatient HD since 7/12/24. His last inpatient HD treatment was at [facility] on 8/7/24. His mental status has improved. Palliative consulted due to patient's ESRD and HD nonadherence. Family wants to continue full code and full care; not  interested in hospice care despite patient's unwillingness to adhere to dialysis.     Interval History: Nauseated and vomiting after PO Rx this morning. Denies pain.     Review of Systems   Respiratory:  Negative for shortness of breath.    Cardiovascular:  Negative for chest pain.   Gastrointestinal:  Positive for nausea and vomiting. Negative for abdominal pain.   Psychiatric/Behavioral:  Positive for confusion.      Objective:     Vital Signs (Most Recent):  Temp: 97.7 °F (36.5 °C) (09/06/24 0715)  Pulse: 76 (09/06/24 0950)  Resp: 12 (09/06/24 0950)  BP: 128/67 (09/06/24 0900)  SpO2: 98 % (09/06/24 0950) Vital Signs (24h Range):  Temp:  [97.5 °F (36.4 °C)-98.3 °F (36.8 °C)] 97.7 °F (36.5 °C)  Pulse:  [] 76  Resp:  [9-32] 12  SpO2:  [27 %-100 %] 98 %  BP: ()/(56-91) 128/67     Weight: 61.2 kg (134 lb 14.7 oz)  Body mass index is 21.13 kg/m².    Intake/Output Summary (Last 24 hours) at 9/6/2024 1032  Last data filed at 9/6/2024 0837  Gross per 24 hour   Intake 636.04 ml   Output 1600 ml   Net -963.96 ml         Physical Exam  Vitals and nursing note reviewed.   Constitutional:       Appearance: He is ill-appearing. He is not toxic-appearing or diaphoretic.   HENT:      Nose: Nose normal.      Mouth/Throat:      Mouth: Mucous membranes are dry.   Neck:      Comments: Retained sutures R chest wall  Cardiovascular:      Rate and Rhythm: Normal rate and regular rhythm.   Pulmonary:      Effort: Pulmonary effort is normal.      Breath sounds: Normal breath sounds.      Comments: Room air  Abdominal:      General: Bowel sounds are normal.      Palpations: Abdomen is soft.      Tenderness: There is no abdominal tenderness.   Musculoskeletal:      Right lower leg: No edema.      Left lower leg: No edema.   Skin:     Comments: L sided THDC   Neurological:      Mental Status: He is alert. He is disoriented.             Significant Labs: All pertinent labs within the past 24 hours have been  reviewed.    Significant Imaging: I have reviewed all pertinent imaging results/findings within the past 24 hours.    Assessment/Plan:      * Uremic encephalopathy  -  on admission, last HD session about a month ago  - Nephrology consulted for HD  - continue to monitor mental status - improving       Thrombocytopenia  The likely etiology of thrombocytopenia is  unknown- potentially occult liver disease . The patients 3 most recent labs are listed below.  Recent Labs     09/04/24  1051 09/05/24  0353 09/06/24  0354   * 91* 71*     Plan  - Will transfuse if platelet count is <50k (if undergoing surgical procedure or have active bleeding).  - monitor daily  - continue eliquis for now       Gastric wall thickening  Noted on CT  - PPI IV ordered      Depression  Resume home antidepressant    Left loculated pleural effusion  - this has been present on prior hospitalizations  - discussed on last stay and family declined thoracentesis at that time  - no signs of infection. Stopped antibiotics     ACP (advance care planning)  Advance Care Planning    Date: 09/04/2024  Palliative consulted. Continue full aggressive care. Time spent 5 minutes           Physical debility  Was advised to got to Skilled with daily PT/OT but family declined. Social work for discharge planning and PT eval. Fall risk. Nutrition consult for his malnutrition.   - palliative consulted  - family wants to continue full aggressive treatment  - PT, OT, ST consulted- previously suggested SNF, so suspect that will be recommendation again  - case management aware     Paroxysmal atrial fibrillation  Patient with Paroxysmal (<7 days) atrial fibrillation which is controlled currently with Beta Blocker and Amiodarone. Patient is currently in sinus rhythm.[id number] Score: 1. anticoagulation indicated. Anticoagulation done with eliquis 2.5mg PO BID . CT head negative for mass or bleed. Fall risk in place.    History of CVA (cerebrovascular  accident)  Noted.   - continue home eliquis, metoprolol     Anemia in ESRD (end-stage renal disease)  Anemia is likely due to chronic disease due to ESRD. Most recent hemoglobin and hematocrit are listed below.  Recent Labs     09/04/24  1051 09/05/24  0353 09/06/24  0354   HGB 9.0* 8.2* 8.2*   HCT 26.3* 24.6* 24.8*       Plan  - Monitor serial CBC: Daily  - Transfuse PRBC if patient becomes hemodynamically unstable, symptomatic or H/H drops below 7/21.      Essential hypertension  Chronic, controlled. Latest blood pressure and vitals reviewed-     Temp:  [97.5 °F (36.4 °C)-98.3 °F (36.8 °C)]   Pulse:  []   Resp:  [9-32]   BP: ()/(56-91)   SpO2:  [27 %-100 %] .   Home meds for hypertension were reviewed and noted below.   Hypertension Medications               metoprolol succinate (TOPROL-XL) 50 MG 24 hr tablet Take 1 tablet (50 mg total) by mouth once daily.            While in the hospital, will manage blood pressure as follows; Continue home antihypertensive regimen   - continue metoprolol  - added amlodipine for poorly controlled BP    Will utilize p.r.n. blood pressure medication only if patient's blood pressure greater than  180/90  and he develops symptoms such as worsening chest pain or shortness of breath.    ESRD needing dialysis  - dialyzes via L THDC  - last outpatient HD session was 7/12/24 and last inpatient session was 8/7/24-- missed almost a month of dialysis because he refused to go  - he was uremic on presentation- - and hyperkalemic- K 7.2  - Nephrology [facility] group consulted  - received emergent dialysis on 9/4/24- hyperK resolved, BUN improved  - Nephrology continues to follow along   - HD again 9/5/24  - Palliative consulted as he has ESRD and refuses outpatient dialysis. Despite this, continue full code full care.     Controlled type 2 diabetes mellitus with chronic kidney disease on chronic dialysis, without long-term current use of insulin  Lab Results   Component Value  Date    HGBA1C 6.2 (H) 09/04/2024     - ACHS with hypoglycemic protocol   - diabetic liquid diet      VTE Risk Mitigation (From admission, onward)           Ordered     apixaban tablet 2.5 mg  2 times daily         09/04/24 0525     IP VTE LOW RISK PATIENT  Once         09/04/24 0116     Place sequential compression device  Until discontinued         09/04/24 0116                    Discharge Planning   JAIME:      Code Status: Full Code   Is the patient medically ready for discharge?:     Reason for patient still in hospital (select all that apply): Patient trending condition  Discharge Plan A: Skilled Nursing Facility          3:42 PM  Patient now in Aflutter controlled rate. He is on PO amiodarone. BP appropriate. Will try ditiazem push. If does not work, will need to reload with amio gtt.     Critical care time spent on the evaluation and treatment of severe organ dysfunction, review of pertinent labs and imaging studies, discussions with consulting providers and discussions with patient/family: 40 minutes.      Sury Mondragon MD  Department of Hospital Medicine   [facility] - Intensive Care

## 2024-09-06 NOTE — PLAN OF CARE
Problem: Hemodialysis  Goal: Absence of Infection Signs and Symptoms  Outcome: Progressing     Problem: Adult Inpatient Plan of Care  Goal: Plan of Care Review  Outcome: Progressing  Goal: Optimal Comfort and Wellbeing  Outcome: Progressing  Goal: Readiness for Transition of Care  Outcome: Progressing

## 2024-09-06 NOTE — ASSESSMENT & PLAN NOTE
- this has been present on prior hospitalizations  - discussed on last stay and family declined thoracentesis at that time  - no signs of infection. Stopped antibiotics
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
## 2024-09-06 NOTE — NURSING
[facility] [place]  Nurses Note -- 4 Eyes      9/5/2024       Skin assessed on: Q Shift      [] No Pressure Injuries Present    []Prevention Measures Documented    [x] Yes LDA  for Pressure Injury Previously documented     [] Yes New Pressure Injury Discovered   [] LDA for New Pressure Injury Added      Attending RN:  Mehnaz Cr RN     Second RN:  Casandra Allan RN

## 2024-09-06 NOTE — PT/OT/SLP PROGRESS
"Physical Therapy Treatment    Patient Name:  Mahesh Cespedes   MRN:  [id number]    Recommendations:     Discharge Recommendations: Moderate Intensity Therapy  Discharge Equipment Recommendations: to be determined by next level of care  Barriers to discharge:  ICU, pt not functioning at prior level     Assessment:     Mahesh Cespedes is a 59 y.o. male admitted with a medical diagnosis of Uremic encephalopathy.  He presents with the following impairments/functional limitations: weakness, impaired endurance, decreased coordination, impaired self care skills, impaired functional mobility, gait instability, impaired balance, decreased safety awareness, impaired cardiopulmonary response to activity .    Rehab Prognosis: Good; patient would benefit from acute skilled PT services to address these deficits and reach maximum level of function.    Recent Surgery: * No surgery found *      Plan:     During this hospitalization, patient to be seen 3 x/week to address the identified rehab impairments via gait training, therapeutic activities, therapeutic exercises, neuromuscular re-education and progress toward the following goals:    Plan of Care Expires:  09/19/24    Subjective     Chief Complaint: no c/o  Patient/Family Comments/goals: "Fine" when asked how he felt  Pain/Comfort:  Pain Rating 1: 0/10      Objective:     Communicated with nsg prior to session.  Patient found right sidelying with blood pressure cuff, pulse ox (continuous), telemetry, pressure relief boots, oxygen upon PT entry to room.     General Precautions: Standard, fall  Orthopedic Precautions: N/A  Braces: N/A  Respiratory Status: Nasal cannula, flow 4 L/min     Functional Mobility:  Bed Mobility:     Rolling Right: maximal assistance and of 2 persons  Scooting: maximal assistance and of 2 persons  Supine to Sit: maximal assistance and of 2 persons  Sit to Supine: maximal assistance and of 2 persons  Transfers:     Sit to Stand:  maximal assistance and of 2 " "persons with rolling walker  Gait: Max A x 2 persons x 4 sidesteps  Balance: FAir sit, Fair-Poor+ stand      AM-PAC 6 CLICK MOBILITY  Turning over in bed (including adjusting bedclothes, sheets and blankets)?: 2  Sitting down on and standing up from a chair with arms (e.g., wheelchair, bedside commode, etc.): 2  Moving from lying on back to sitting on the side of the bed?: 2  Moving to and from a bed to a chair (including a wheelchair)?: 2  Need to walk in hospital room?: 1  Climbing 3-5 steps with a railing?: 1  Basic Mobility Total Score: 10       Treatment & Education:  Dangle protocol: pt sat at EOB with CGA/Min A for balance, /80, SPO2 99%, and HR 79.  Pt dependently repostioned to HOB and left in "chair position for SLP    Patient left  as above  with all lines intact, call button in reach, nsg notified, and SLP present..    GOALS:   Multidisciplinary Problems       Physical Therapy Goals          Problem: Physical Therapy    Goal Priority Disciplines Outcome Goal Variances Interventions   Physical Therapy Goal     PT, PT/OT Progressing     Description: Goals to be met by: 24     Patient will increase functional independence with mobility by performin. Supine to sit with MInimal Assistance  2. Sit to stand transfer with Minimal Assistance  3. Bed to chair transfer with Minimal Assistance    4. Gait  x 10-15 feet with Minimal Assistance using Rolling Walker.   5. Lower extremity exercise program x10 reps per handout, with assistance as needed                         Time Tracking:     PT Received On: 24  PT Start Time: 1010     PT Stop Time: 1035  PT Total Time (min): 25 min     Billable Minutes: Therapeutic Activity 25 Co-treat with OT    Treatment Type: Treatment  PT/PTA: PT     Number of PTA visits since last PT visit: 0     2024  "

## 2024-09-06 NOTE — ASSESSMENT & PLAN NOTE
Anemia is likely due to chronic disease due to ESRD. Most recent hemoglobin and hematocrit are listed below.  Recent Labs     09/04/24  1051 09/05/24  0353 09/06/24  0354   HGB 9.0* 8.2* 8.2*   HCT 26.3* 24.6* 24.8*       Plan  - Monitor serial CBC: Daily  - Transfuse PRBC if patient becomes hemodynamically unstable, symptomatic or H/H drops below 7/21.

## 2024-09-06 NOTE — NURSING
[facility], [facility] [place]  Nurses Note -- 4 Eyes      9/6/2024       Skin assessed on: Q Shift      [] No Pressure Injuries Present    []Prevention Measures Documented    [x] Yes LDA  for Pressure Injury Previously documented     [] Yes New Pressure Injury Discovered   [] LDA for New Pressure Injury Added      Attending RN:  Casandra Allan, RN     Second RN:  Mehnaz

## 2024-09-06 NOTE — ASSESSMENT & PLAN NOTE
Patient with Paroxysmal (<7 days) atrial fibrillation which is controlled currently with Beta Blocker and Amiodarone. Patient is currently in sinus rhythm.[id number] Score: 1. anticoagulation indicated. Anticoagulation done with eliquis 2.5mg PO BID . CT head negative for mass or bleed. Fall risk in place.

## 2024-09-06 NOTE — ASSESSMENT & PLAN NOTE
Lab Results   Component Value Date    HGBA1C 6.2 (H) 09/04/2024     - ACHS with hypoglycemic protocol   - diabetic liquid diet

## 2024-09-06 NOTE — ASSESSMENT & PLAN NOTE
- dialyzes via L [facility]DC  - last outpatient HD session was 7/12/24 and last inpatient session was 8/7/24-- missed almost a month of dialysis because he refused to go  - he was uremic on presentation- - and hyperkalemic- K 7.2  - [facility]ph[facility] consulted  - received emergent dialysis on 9/4/24- hyperK resolved, BUN improved  - Nephrology continues to follow along   - HD again 9/5/24  - Palliative consulted as he has ESRD and refuses outpatient dialysis. Despite this, continue full code full care.

## 2024-09-06 NOTE — PLAN OF CARE
Problem: Physical Therapy  Goal: Physical Therapy Goal  Description: Goals to be met by: 24     Patient will increase functional independence with mobility by performin. Supine to sit with MInimal Assistance  2. Sit to stand transfer with Minimal Assistance  3. Bed to chair transfer with Minimal Assistance    4. Gait  x 10-15 feet with Minimal Assistance using Rolling Walker.   5. Lower extremity exercise program x10 reps per handout, with assistance as needed    Outcome: Progressing

## 2024-09-06 NOTE — SUBJECTIVE & OBJECTIVE
Interval History: Nauseated and vomiting after PO Rx this morning. Denies pain.     Review of Systems   Respiratory:  Negative for shortness of breath.    Cardiovascular:  Negative for chest pain.   Gastrointestinal:  Positive for nausea and vomiting. Negative for abdominal pain.   Psychiatric/Behavioral:  Positive for confusion.      Objective:     Vital Signs (Most Recent):  Temp: 97.7 °F (36.5 °C) (09/06/24 0715)  Pulse: 76 (09/06/24 0950)  Resp: 12 (09/06/24 0950)  BP: 128/67 (09/06/24 0900)  SpO2: 98 % (09/06/24 0950) Vital Signs (24h Range):  Temp:  [97.5 °F (36.4 °C)-98.3 °F (36.8 °C)] 97.7 °F (36.5 °C)  Pulse:  [] 76  Resp:  [9-32] 12  SpO2:  [27 %-100 %] 98 %  BP: ()/(56-91) 128/67     Weight: 61.2 kg (134 lb 14.7 oz)  Body mass index is 21.13 kg/m².    Intake/Output Summary (Last 24 hours) at 9/6/2024 1032  Last data filed at 9/6/2024 0837  Gross per 24 hour   Intake 636.04 ml   Output 1600 ml   Net -963.96 ml         Physical Exam  Vitals and nursing note reviewed.   Constitutional:       Appearance: He is ill-appearing. He is not toxic-appearing or diaphoretic.   HENT:      Nose: Nose normal.      Mouth/Throat:      Mouth: Mucous membranes are dry.   Neck:      Comments: Retained sutures R chest wall  Cardiovascular:      Rate and Rhythm: Normal rate and regular rhythm.   Pulmonary:      Effort: Pulmonary effort is normal.      Breath sounds: Normal breath sounds.      Comments: Room air  Abdominal:      General: Bowel sounds are normal.      Palpations: Abdomen is soft.      Tenderness: There is no abdominal tenderness.   Musculoskeletal:      Right lower leg: No edema.      Left lower leg: No edema.   Skin:     Comments: L sided THDC   Neurological:      Mental Status: He is alert. He is disoriented.             Significant Labs: All pertinent labs within the past 24 hours have been reviewed.    Significant Imaging: I have reviewed all pertinent imaging results/findings within the past 24  hours.

## 2024-09-06 NOTE — PLAN OF CARE
Attempted to f/u with daughter regarding accepting provider for SNF.  Contact number listed for Rima ([phone] (Mobile) not working, additional number(s) [phone] not working, and [phone] person answering says it is not the number for [facility] and is not son of patient.

## 2024-09-06 NOTE — PT/OT/SLP PROGRESS
"Occupational Therapy   Treatment    Name: Mahesh Cespedes  MRN: [id number]  Admitting Diagnosis:  Uremic encephalopathy       Recommendations:     Discharge Recommendations: Moderate Intensity Therapy  Discharge Equipment Recommendations:  to be determined by next level of care  Barriers to discharge:   (Pt is at high risk for falls and readmission if d/c home; pt requires increased level of assistance for ADLs and functional mobility.)    Assessment:     Mahesh Cespedes is a 59 y.o. male with a medical diagnosis of Uremic encephalopathy.Performance deficits affecting function are weakness, impaired endurance, impaired self care skills, impaired functional mobility, gait instability, impaired balance, impaired cognition, decreased upper extremity function, decreased lower extremity function, decreased safety awareness, impaired skin.     Pt w/ notable progress this date as he was able to follow ~50% of commands (w /mod cueing) and verbally express needs this date as compared to previous sessions. Pt was able to sit EOB ~15 min for participating in self-feeding, requiring hand-over-hand assist to RUE for facilitating hand-mouth movement. Pt w/ jerkiness and weakness but w/ fair effort/attempt. Pt was able to stand from EOB x q trial for laterally stepping to HOB. Pt will continue to benefit from skilled acute OT services to maximize functional capacity.     Rehab Prognosis:  Good; patient would benefit from acute skilled OT services to address these deficits and reach maximum level of function.       Plan:     Patient to be seen  (3-5x/wk) to address the above listed problems via self-care/home management, therapeutic activities, therapeutic exercises  Plan of Care Expires: 09/19/24  Plan of Care Reviewed with: patient    Subjective     Chief Complaint: "I need to pee."   Patient/Family Comments/goals: agreeable to eat though minimal amount of breakfast was consumed  Pain/Comfort:  Pain Rating 1:  (None stated though " "moaning occasionally; complained of being "cold")  Pain Addressed 1: Reposition, Distraction, Cessation of Activity    Objective:     Communicated with: Nurse prior to session.  Patient found HOB elevated with blood pressure cuff, peripheral IV, SCD, telemetry, pressure relief boots, oxygen, pulse ox (continuous) upon OT entry to room.    General Precautions: Standard, fall  Orthopedic Precautions:N/A  Braces: N/A  Respiratory Status: Nasal cannula, flow 4 L/min w/ spO2 >90%      Occupational Performance:     Bed Mobility: Pt w/ posterior lean, requiring cueing for keeping B feet on floor and shifting weight anteriorly. Pt moaning but denied pain, stating that he was "cold."    Patient completed Scooting/Bridging with maximal assistance and 2 persons  Patient completed Supine to Sit with maximal assistance and 2 persons  Patient completed Sit to Supine with maximal assistance and 2 persons     Functional Mobility/Transfers:  Patient completed Sit <> Stand Transfer with maximal assistance and of 2 persons  with  hand-held assist   Functional Mobility: Pt was able to laterally step to HOB w/ max A x 2 persons; pt able to clear each foot for taking efficient steps. Refer to PT note for further detail.     Activities of Daily Living:  Feeding:  maximal assistance and hand-over-hand support for placing spoon on hand to bring it to mouth   Upper Body Dressing: maximal assistance for donning gown over back     [facility] 6 Click ADL: 12    Treatment & Education:  -Pt performed ADLs and functional mobility as noted above.   -No family present at time of tx session.   -Pt provided w/ hand-over-hand assistance as needed for ADL performance due to UE weakness/jerkiness.   -Questions and concerns addressed within scope.     Patient left HOB elevated with all lines intact, call button in reach, bed alarm on, and BLE pressure relief boots in place w/ SCDs.     GOALS:   Multidisciplinary Problems       Occupational Therapy Goals       "    Problem: Occupational Therapy    Goal Priority Disciplines Outcome Interventions   Occupational Therapy Goal     OT, PT/OT Progressing    Description: Goals to be met by: 9/19/24     Patient will increase functional independence with ADLs by performing:    Feeding with Minimal Assistance.  UE Dressing with Minimal Assistance.  Grooming while EOB with Minimal Assistance.  Sitting at edge of bed x30 minutes with Supervision.  Rolling to Bilateral with Minimal Assistance.   Supine to sit with Minimal Assistance.    Functional transfers: To be assessed                          Time Tracking:     OT Date of Treatment: 09/06/24  OT Start Time: 1011  OT Stop Time: 1035  OT Total Time (min): 24 min    Billable Minutes:Self Care/Home Management 10  Therapeutic Activity 14  Total Time 24 (co-tx w/ PT)     OT/FERNANDO: OT          9/6/2024

## 2024-09-06 NOTE — PLAN OF CARE
Problem: SLP  Goal: SLP Goal  Description: Goals:  1. Pt will participate in on-going assessment of swallowing function. -Ongoing  2. Pt will tolerate a pureed diet with thin liquids w/o any overt s/s of aspiration.       Outcome: Progressing    Pt w/ improvement in alertness today, however, cont mod cognitive deficits w/ more complex direction following. Pt was unable to comprehend commands to masticate all solid items presented, w/ crackers remaining at the anterior oral cavity. ST recs advance to pureed diet w/ thin liquids. Meds crushed in puree. ST cont to follow.

## 2024-09-06 NOTE — ASSESSMENT & PLAN NOTE
Chronic, controlled. Latest blood pressure and vitals reviewed-     Temp:  [97.5 °F (36.4 °C)-98.3 °F (36.8 °C)]   Pulse:  []   Resp:  [9-32]   BP: ()/(56-91)   SpO2:  [27 %-100 %] .   Home meds for hypertension were reviewed and noted below.   Hypertension Medications               metoprolol succinate (TOPROL-XL) 50 MG 24 hr tablet Take 1 tablet (50 mg total) by mouth once daily.            While in the hospital, will manage blood pressure as follows; Continue home antihypertensive regimen   - continue metoprolol  - added amlodipine for poorly controlled BP    Will utilize p.r.n. blood pressure medication only if patient's blood pressure greater than  180/90  and he develops symptoms such as worsening chest pain or shortness of breath.

## 2024-09-06 NOTE — PLAN OF CARE
Elan completed.  [facility] called in to hospitals @ [phone].  PASSR faxed to hospitals at:  [phone].   Awaiting 142.

## 2024-09-07 LAB
ANION GAP SERPL CALC-SCNC: 17 MMOL/L (ref 8–16)
BASOPHILS # BLD AUTO: 0.01 K/UL (ref 0–0.2)
BASOPHILS NFR BLD: 0.2 % (ref 0–1.9)
BUN SERPL-MCNC: 74 MG/DL (ref 8–23)
CALCIUM SERPL-MCNC: 8.4 MG/DL (ref 8.7–10.5)
CHLORIDE SERPL-SCNC: 94 MMOL/L (ref 95–110)
CO2 SERPL-SCNC: 26 MMOL/L (ref 23–29)
CREAT SERPL-MCNC: 9.9 MG/DL (ref 0.5–1.4)
DIFFERENTIAL METHOD BLD: ABNORMAL
EOSINOPHIL # BLD AUTO: 0.1 K/UL (ref 0–0.5)
EOSINOPHIL NFR BLD: 1.1 % (ref 0–8)
ERYTHROCYTE [DISTWIDTH] IN BLOOD BY AUTOMATED COUNT: 17.2 % (ref 11.5–14.5)
EST. GFR  (NO RACE VARIABLE): 5 ML/MIN/1.73 M^2
GLUCOSE SERPL-MCNC: 81 MG/DL (ref 70–110)
HCT VFR BLD AUTO: 28.9 % (ref 40–54)
HGB BLD-MCNC: 9.6 G/DL (ref 14–18)
IMM GRANULOCYTES # BLD AUTO: 0.05 K/UL (ref 0–0.04)
IMM GRANULOCYTES NFR BLD AUTO: 0.8 % (ref 0–0.5)
LYMPHOCYTES # BLD AUTO: 0.4 K/UL (ref 1–4.8)
LYMPHOCYTES NFR BLD: 5.6 % (ref 18–48)
MAGNESIUM SERPL-MCNC: 2.1 MG/DL (ref 1.6–2.6)
MCH RBC QN AUTO: 27.6 PG (ref 27–31)
MCHC RBC AUTO-ENTMCNC: 33.2 G/DL (ref 32–36)
MCV RBC AUTO: 83 FL (ref 82–98)
MONOCYTES # BLD AUTO: 0.3 K/UL (ref 0.3–1)
MONOCYTES NFR BLD: 3.8 % (ref 4–15)
NEUTROPHILS # BLD AUTO: 5.9 K/UL (ref 1.8–7.7)
NEUTROPHILS NFR BLD: 88.5 % (ref 38–73)
NRBC BLD-RTO: 1 /100 WBC
PHOSPHATE SERPL-MCNC: 6.3 MG/DL (ref 2.7–4.5)
PLATELET # BLD AUTO: 62 K/UL (ref 150–450)
PMV BLD AUTO: ABNORMAL FL (ref 9.2–12.9)
POCT GLUCOSE: 105 MG/DL (ref 70–110)
POCT GLUCOSE: 109 MG/DL (ref 70–110)
POCT GLUCOSE: 82 MG/DL (ref 70–110)
POCT GLUCOSE: 87 MG/DL (ref 70–110)
POTASSIUM SERPL-SCNC: 4.3 MMOL/L (ref 3.5–5.1)
RBC # BLD AUTO: 3.48 M/UL (ref 4.6–6.2)
SODIUM SERPL-SCNC: 137 MMOL/L (ref 136–145)
WBC # BLD AUTO: 6.64 K/UL (ref 3.9–12.7)

## 2024-09-07 PROCEDURE — 83735 ASSAY OF MAGNESIUM: CPT

## 2024-09-07 PROCEDURE — 36415 COLL VENOUS BLD VENIPUNCTURE: CPT | Performed by: HOSPITALIST

## 2024-09-07 PROCEDURE — 80048 BASIC METABOLIC PNL TOTAL CA: CPT | Performed by: HOSPITALIST

## 2024-09-07 PROCEDURE — 80100014 HC HEMODIALYSIS 1:1

## 2024-09-07 PROCEDURE — 63600175 PHARM REV CODE 636 W HCPCS: Performed by: HOSPITALIST

## 2024-09-07 PROCEDURE — 84100 ASSAY OF PHOSPHORUS: CPT

## 2024-09-07 PROCEDURE — 25000003 PHARM REV CODE 250: Performed by: INTERNAL MEDICINE

## 2024-09-07 PROCEDURE — 20000000 HC ICU ROOM

## 2024-09-07 PROCEDURE — 99900035 HC TECH TIME PER 15 MIN (STAT)

## 2024-09-07 PROCEDURE — 85025 COMPLETE CBC W/AUTO DIFF WBC: CPT | Performed by: HOSPITALIST

## 2024-09-07 PROCEDURE — 94761 N-INVAS EAR/PLS OXIMETRY MLT: CPT

## 2024-09-07 PROCEDURE — 63600175 PHARM REV CODE 636 W HCPCS: Performed by: INTERNAL MEDICINE

## 2024-09-07 PROCEDURE — 25000003 PHARM REV CODE 250: Performed by: HOSPITALIST

## 2024-09-07 PROCEDURE — 27000221 HC OXYGEN, UP TO 24 HOURS

## 2024-09-07 RX ADMIN — SERTRALINE HYDROCHLORIDE 50 MG: 50 TABLET ORAL at 10:09

## 2024-09-07 RX ADMIN — FLUCONAZOLE 100 MG: 2 INJECTION, SOLUTION INTRAVENOUS at 01:09

## 2024-09-07 RX ADMIN — ALLOPURINOL 100 MG: 100 TABLET ORAL at 10:09

## 2024-09-07 RX ADMIN — APIXABAN 2.5 MG: 2.5 TABLET, FILM COATED ORAL at 08:09

## 2024-09-07 RX ADMIN — POLYETHYLENE GLYCOL 3350 17 G: 17 POWDER, FOR SOLUTION ORAL at 10:09

## 2024-09-07 RX ADMIN — SEVELAMER CARBONATE 1600 MG: 800 TABLET, FILM COATED ORAL at 11:09

## 2024-09-07 RX ADMIN — SEVELAMER CARBONATE 1600 MG: 800 TABLET, FILM COATED ORAL at 05:09

## 2024-09-07 RX ADMIN — MUPIROCIN: 20 OINTMENT TOPICAL at 10:09

## 2024-09-07 RX ADMIN — TAMSULOSIN HYDROCHLORIDE 0.4 MG: 0.4 CAPSULE ORAL at 08:09

## 2024-09-07 RX ADMIN — AMIODARONE HYDROCHLORIDE 200 MG: 200 TABLET ORAL at 10:09

## 2024-09-07 RX ADMIN — APIXABAN 2.5 MG: 2.5 TABLET, FILM COATED ORAL at 10:09

## 2024-09-07 RX ADMIN — METOPROLOL SUCCINATE 50 MG: 50 TABLET, EXTENDED RELEASE ORAL at 10:09

## 2024-09-07 RX ADMIN — PANTOPRAZOLE SODIUM 40 MG: 40 INJECTION, POWDER, FOR SOLUTION INTRAVENOUS at 10:09

## 2024-09-07 RX ADMIN — AMLODIPINE BESYLATE 10 MG: 5 TABLET ORAL at 10:09

## 2024-09-07 RX ADMIN — ACETAMINOPHEN 650 MG: 325 TABLET ORAL at 08:09

## 2024-09-07 RX ADMIN — MUPIROCIN: 20 OINTMENT TOPICAL at 08:09

## 2024-09-07 NOTE — NURSING
[facility], [facility] - [place]  Nurses Note -- 4 Eyes      9/7/2024       Skin assessed on: Q Shift      [] No Pressure Injuries Present    []Prevention Measures Documented    [x] Yes LDA  for Pressure Injury Previously documented     [] Yes New Pressure Injury Discovered   [] LDA for New Pressure Injury Added      Attending RN:  Angelita Sanchez RN:  Carmelo Farrell

## 2024-09-07 NOTE — NURSING
Patient lying in bed with eyes closed, but easily aroused. VSS. O2 2L, sats high 90's. He  is much more alert and oriented. Still with mumbled speech, but some does shake head yes or mo to answers when asked. Dialysis today and tolerated. No BM or new skin breakdowns noted. Comfort and safety measure maintained.

## 2024-09-07 NOTE — NURSING
[facility], [facility] - [place]  Nurses Note -- 4 Eyes      9/7/2024       Skin assessed on: Q Shift      [] No Pressure Injuries Present    []Prevention Measures Documented    [] Yes LDA  for Pressure Injury Previously documented     [x] Yes New Pressure Injury Discovered   [x] LDA for New Pressure Injury Added      Attending RN: BETO Rhodes    Second RN:  BETO Guajardo

## 2024-09-07 NOTE — ASSESSMENT & PLAN NOTE
Lab Results   Component Value Date    HGBA1C 6.2 (H) 09/04/2024     - ACHS with hypoglycemic protocol   - diabetic puree diet per speech

## 2024-09-07 NOTE — ASSESSMENT & PLAN NOTE
Patient with Paroxysmal (<7 days) atrial fibrillation which is controlled currently with Beta Blocker and Amiodarone. Patient is currently in sinus rhythm.[id number] Score: 1. anticoagulation indicated. Anticoagulation done with eliquis 2.5mg PO BID . CT head negative for mass or bleed. Fall risk in place.

## 2024-09-07 NOTE — PLAN OF CARE
SW attempted contact with patient's daughter at [phone].  Voice message left requesting return call to  at [phone].

## 2024-09-07 NOTE — ASSESSMENT & PLAN NOTE
Anemia is likely due to chronic disease due to ESRD. Most recent hemoglobin and hematocrit are listed below.  Recent Labs     09/05/24  0353 09/06/24  0354 09/07/24  0411   HGB 8.2* 8.2* 9.6*   HCT 24.6* 24.8* 28.9*       Plan  - Monitor serial CBC: Daily  - Transfuse PRBC if patient becomes hemodynamically unstable, symptomatic or H/H drops below 7/21.  - EPO MWF

## 2024-09-07 NOTE — PLAN OF CARE
Problem: Hemodialysis  Goal: Safe, Effective Therapy Delivery  Outcome: Progressing  Goal: Effective Tissue Perfusion  Outcome: Progressing  Goal: Absence of Infection Signs and Symptoms  Outcome: Progressing     Problem: Adult Inpatient Plan of Care  Goal: Plan of Care Review  Outcome: Progressing  Goal: Absence of Hospital-Acquired Illness or Injury  Outcome: Progressing  Goal: Optimal Comfort and Wellbeing  Outcome: Progressing  Goal: Readiness for Transition of Care  Outcome: Progressing     Problem: Coping Ineffective  Goal: Effective Coping  Outcome: Progressing     Problem: Diabetes Comorbidity  Goal: Blood Glucose Level Within Targeted Range  Outcome: Progressing     Problem: Infection  Goal: Absence of Infection Signs and Symptoms  Outcome: Progressing     Problem: Wound  Goal: Optimal Coping  Outcome: Progressing  Goal: Optimal Functional Ability  Outcome: Progressing  Goal: Absence of Infection Signs and Symptoms  Outcome: Progressing  Goal: Improved Oral Intake  Outcome: Progressing  Goal: Optimal Pain Control and Function  Outcome: Progressing  Goal: Skin Health and Integrity  Outcome: Progressing  Goal: Optimal Wound Healing  Outcome: Progressing     Problem: Skin Injury Risk Increased  Goal: Skin Health and Integrity  Outcome: Progressing

## 2024-09-07 NOTE — ASSESSMENT & PLAN NOTE
- this has been present on prior hospitalizations  - discussed on last stay and family declined thoracentesis at that time  - no signs of infection. Stopped antibiotics    Patient placed on tele, TTX 0048. Final check completed with centralized telemetry unit.
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
## 2024-09-07 NOTE — ASSESSMENT & PLAN NOTE
Chronic, controlled. Latest blood pressure and vitals reviewed-     Temp:  [97.6 °F (36.4 °C)-99 °F (37.2 °C)]   Pulse:  [57-84]   Resp:  [0-22]   BP: ()/(50-96)   SpO2:  [93 %-100 %] .   Home meds for hypertension were reviewed and noted below.   Hypertension Medications               metoprolol succinate (TOPROL-XL) 50 MG 24 hr tablet Take 1 tablet (50 mg total) by mouth once daily.            While in the hospital, will manage blood pressure as follows; Continue home antihypertensive regimen   - continue metoprolol  - added amlodipine for poorly controlled BP    Will utilize p.r.n. blood pressure medication only if patient's blood pressure greater than  180/90  and he develops symptoms such as worsening chest pain or shortness of breath.

## 2024-09-07 NOTE — ASSESSMENT & PLAN NOTE
- dialyzes via L THDC  - last outpatient HD session was 7/12/24 and last inpatient session was 8/7/24-- missed almost a month of dialysis because he refused to go  - he was uremic on presentation- - and hyperkalemic- K 7.2  - Nephrology [facility] consulted  - received emergent dialysis on 9/4/24- hyperK resolved, BUN improved.  - Nephrology continues to follow along. Receiving low dose dialysis to avoid dialysis disequilibrium syndrome   - Palliative consulted as he has ESRD and refuses outpatient dialysis. Despite this, continue full code full care.

## 2024-09-07 NOTE — ASSESSMENT & PLAN NOTE
-  on admission, last HD session about a month ago  - Nephrology consulted for HD  - continue to monitor mental status - improving   - low dose dialysis to avoid dialysis disequilibrium syndrome

## 2024-09-07 NOTE — SUBJECTIVE & OBJECTIVE
Interval History: He is receiving dialysis this morning. He is awake but does not say much. Nursing states that he took all his pills without nausea/vomiting today.     Review of Systems   Constitutional:  Positive for fatigue.   Respiratory:  Negative for shortness of breath.    Cardiovascular:  Negative for chest pain.   Gastrointestinal:  Negative for abdominal pain, nausea and vomiting.   Psychiatric/Behavioral:  Positive for confusion.      Objective:     Vital Signs (Most Recent):  Temp: 99 °F (37.2 °C) (09/07/24 0705)  Pulse: 83 (09/07/24 1000)  Resp: 15 (09/07/24 1000)  BP: (!) 142/64 (09/07/24 1000)  SpO2: 96 % (09/07/24 1000) Vital Signs (24h Range):  Temp:  [97.6 °F (36.4 °C)-99 °F (37.2 °C)] 99 °F (37.2 °C)  Pulse:  [57-84] 83  Resp:  [0-22] 15  SpO2:  [93 %-100 %] 96 %  BP: ()/(50-96) 142/64     Weight: 61.2 kg (134 lb 14.7 oz)  Body mass index is 21.13 kg/m².    Intake/Output Summary (Last 24 hours) at 9/7/2024 1011  Last data filed at 9/6/2024 2315  Gross per 24 hour   Intake 599.33 ml   Output 2000 ml   Net -1400.67 ml         Physical Exam  Vitals and nursing note reviewed.   Constitutional:       Appearance: He is ill-appearing. He is not toxic-appearing or diaphoretic.   HENT:      Nose: Nose normal.      Mouth/Throat:      Mouth: Mucous membranes are moist.   Neck:      Comments: Retained sutures R chest wall  Cardiovascular:      Rate and Rhythm: Normal rate and regular rhythm.   Pulmonary:      Effort: Pulmonary effort is normal.      Breath sounds: Normal breath sounds.      Comments: On 4L NC with SpO2 100%  Abdominal:      General: Bowel sounds are normal.      Palpations: Abdomen is soft.      Tenderness: There is no abdominal tenderness.   Musculoskeletal:      Right lower leg: No edema.      Left lower leg: No edema.   Skin:     Comments: L sided THDC   Neurological:      Mental Status: He is alert. He is disoriented.             Significant Labs: All pertinent labs within the past  24 hours have been reviewed.    Significant Imaging: I have reviewed all pertinent imaging results/findings within the past 24 hours.

## 2024-09-07 NOTE — ASSESSMENT & PLAN NOTE
The likely etiology of thrombocytopenia is  unknown- potentially occult liver disease? . The patients 3 most recent labs are listed below.  Recent Labs     09/05/24  0353 09/06/24  0354 09/07/24  0411   PLT 91* 71* 62*       Plan  - Will transfuse if platelet count is <50k (if undergoing surgical procedure or have active bleeding).  - monitor daily  - continue eliquis for now

## 2024-09-07 NOTE — PROGRESS NOTES
HD completed per order  UF 1 Liter net     09/07/24 1030   Vital Signs   Pulse 84   Resp (!) 32   SpO2 100 %   BP (!) 165/83   MAP (mmHg) 114        Hemodialysis Catheter 02/20/24 1642 left internal jugular   Placement Date/Time: 02/20/24 1642   Present Prior to Hospital Arrival?: Yes  Hand Hygiene: Performed  Barrier Precautions: Performed  Skin Antisepsis: ChloraPrep  Hemodialysis Catheter Type: Tunneled catheter  Location: left internal jugular  Cathete...   Site Assessment No drainage;No redness;No swelling;No warmth   Dressing Type CHG impregnated dressing/sponge   Dressing Status Clean;Dry;Intact   Dressing Intervention Integrity maintained   Venous Patency/Care deaccessed;flushed w/o difficulty   Arterial Patency/Care deaccessed;flushed w/o difficulty   Post-Hemodialysis Assessment   Rinseback Volume (mL) 250 mL   Blood Volume Processed (Liters) 36 L   Dialyzer Clearance Clear   Duration of Treatment 120 minutes   Additional Fluid Intake (mL) 500 mL   Total UF (mL) 1500 mL   Net Fluid Removal 1000   Patient Response to Treatment kira well   Post-Hemodialysis Comments Tx completed as ordered

## 2024-09-07 NOTE — ASSESSMENT & PLAN NOTE
- present on admission  - healing   - wound care consulted: Local wound care to sacral wound with hydrocolloid dressing to promote closure of chronic wound and prevent soiling from incontinence.

## 2024-09-07 NOTE — PROGRESS NOTES
[facility] Medicine  Progress Note    Patient Name: Mahesh Cespedes  MRN: [id number]  Patient Class: IP- Inpatient   Admission Date: 9/3/2024  Length of Stay: 3 days  Attending Physician: Sury Mondragon MD  Primary Care Provider: Cruz Hernandez MD        Subjective:     Principal Problem:Uremic encephalopathy        HPI:  59 y.o. AAM with h/o CVA, ESRD (MWF via left tunneled HD catheter), Afib (on amiodarone and eliquis), NIDDM type2, HLD, Essential HTN ,depression with h/o suicidal ideations presents to the ER via EMS with family concerns for constipation and abdominal pain. Pt. Unable to give me history due to AMS due to uremia (). Reports that he missed 3 weeks of HD. Presented to the ER altered covered in feces and urine.    Apparently he was admitted here from - for AMS and again was noted to have missed 2 weeks of HD. CT head was negative for mass/bleed.  CT chest at that time noted loculated moderate left pleural effusion/consolidation with a large pericardial effusion 3.6cm and pulmonary edema. Was tx with abx and echo showed only a small/mod pericardial effusion with no cardiac tamponade. Nephro/pulm/ID consulted. Recommended IR to sample fluid but family deferred due to risk/benefit. He was cont. On abx VANC with NGT repeat cultures. Plan was to transfer to [facility] but daughter (javier)  refused and wanted to take him home with her on  per the discharge summary.     Today no family with with him in the ER and pt. Noted to be malnourished, desheveled and confused.     Labs noted for potassium of 7.2 and /Creatinin 25.8.  Bicarb 12. VB.23/39.5/35/16.7.  WBC 8.7 and H/H 8.6/26.      CT abd/pelvis with IV contrast:   1. Image quality degraded by technical factors discussed above.   2. Moderate/large volume of loculated left pleural fluid noting probable pleural thickening and heterogeneity of pleural fluid.  Correlation for underlying infectious process  advised.  Left basilar atelectatic/consolidative change.   3. Right lower lobe patchy ground-glass attenuation with interlobular septal thickening which could be seen with edema, infection or non infectious inflammatory process.   4. Solid and ground-glass right lower lobe pulmonary nodules measuring 6 mm and 8 mm respectively.  Repeat evaluation with chest CT in 3 months is advised to evaluate stability and/or resolution of these findings.   5. Cardiomegaly and large pericardial effusion.   6. Cholelithiasis.   7. Gastric wall thickening which could relate to nondistention although correlation for symptoms of gastritis advised.   8. Regions of large and small bowel wall thickening which could relate to nondistention and/or technical factors discussed above.  However, correlation for symptoms of enterocolitis advised.   9. Bladder wall thickening.  Correlation with urinalysis advised.   10. Multiple additional findings as above.     ED spoke with Nephrology and will plan for ICU admission and emergent HD.   We have been consulted for further management and admission to the ICU.     Because this patient's clinical condition is critically guarded and requires care in the ICU with emergent HD, care in an alternative location isn't clinically appropriate for the reasons stated above.         Overview/Hospital Course:  Mr Mahesh Cespedes is a 59 y.o. man with ESRD who was admitted with encephalopathy. He was noted to have severe uremia ( on admission), hyperkalemia. He was also caked with urine and stool. He was admitted to ICU, Nephrology Dr Mayen group consulted, and received emergent dialysis on 9/4/24. Social work notes he has outpatient HD chair but has not been to outpatient HD since 7/12/24. His last inpatient HD treatment was at [facility] on 8/7/24. His mental status has slightly improved. Palliative consulted due to patient's ESRD and HD nonadherence. Family wants to continue full code and full care; not  interested in hospice care despite patient's unwillingness to adhere to dialysis. He is receiving low dose dialysis to avoid dialysis disequilibrium syndrome. He developed Aflutter RVR on 9/6/24 but then converted back to sinus rhythm; continues on home amiodarone and eliquis.     Interval History: He is receiving dialysis this morning. He is awake but does not say much. Nursing states that he took all his pills without nausea/vomiting today.     Review of Systems   Constitutional:  Positive for fatigue.   Respiratory:  Negative for shortness of breath.    Cardiovascular:  Negative for chest pain.   Gastrointestinal:  Negative for abdominal pain, nausea and vomiting.   Psychiatric/Behavioral:  Positive for confusion.      Objective:     Vital Signs (Most Recent):  Temp: 99 °F (37.2 °C) (09/07/24 0705)  Pulse: 83 (09/07/24 1000)  Resp: 15 (09/07/24 1000)  BP: (!) 142/64 (09/07/24 1000)  SpO2: 96 % (09/07/24 1000) Vital Signs (24h Range):  Temp:  [97.6 °F (36.4 °C)-99 °F (37.2 °C)] 99 °F (37.2 °C)  Pulse:  [57-84] 83  Resp:  [0-22] 15  SpO2:  [93 %-100 %] 96 %  BP: ()/(50-96) 142/64     Weight: 61.2 kg (134 lb 14.7 oz)  Body mass index is 21.13 kg/m².    Intake/Output Summary (Last 24 hours) at 9/7/2024 1011  Last data filed at 9/6/2024 2315  Gross per 24 hour   Intake 599.33 ml   Output 2000 ml   Net -1400.67 ml         Physical Exam  Vitals and nursing note reviewed.   Constitutional:       Appearance: He is ill-appearing. He is not toxic-appearing or diaphoretic.   HENT:      Nose: Nose normal.      Mouth/Throat:      Mouth: Mucous membranes are moist.   Neck:      Comments: Retained sutures R chest wall  Cardiovascular:      Rate and Rhythm: Normal rate and regular rhythm.   Pulmonary:      Effort: Pulmonary effort is normal.      Breath sounds: Normal breath sounds.      Comments: On 4L NC with SpO2 100%  Abdominal:      General: Bowel sounds are normal.      Palpations: Abdomen is soft.      Tenderness:  There is no abdominal tenderness.   Musculoskeletal:      Right lower leg: No edema.      Left lower leg: No edema.   Skin:     Comments: L sided [facility]   Neurological:      Mental Status: He is alert. He is disoriented.             Significant Labs: All pertinent labs within the past 24 hours have been reviewed.    Significant Imaging: I have reviewed all pertinent imaging results/findings within the past 24 hours.    Assessment/Plan:      * Uremic encephalopathy  -  on admission, last HD session about a month ago  - Nephrology consulted for HD  - continue to monitor mental status - improving   - low dose dialysis to avoid dialysis disequilibrium syndrome       Typical atrial flutter  Noted on 9/6/24. Converted back to NSR.   - continue amio, eliquis      Thrush  - noted 9/6/24  - started IV fluconazole as he does not have the mental awareness to swish and swallow nystatin at this point and is not reliably tolerating pills    Thrombocytopenia  The likely etiology of thrombocytopenia is  unknown- potentially occult liver disease? . The patients 3 most recent labs are listed below.  Recent Labs     09/05/24  0353 09/06/24  0354 09/07/24  0411   PLT 91* 71* 62*       Plan  - Will transfuse if platelet count is <50k (if undergoing surgical procedure or have active bleeding).  - monitor daily  - continue eliquis for now       Gastric wall thickening  Noted on CT  - PPI IV ordered      Depression  Resume home antidepressant    Left loculated pleural effusion  - this has been present on prior hospitalizations  - discussed on last stay and family declined thoracentesis at that time  - no signs of infection. Stopped antibiotics     ACP (advance care planning)  Advance Care Planning    Date: 09/04/2024  Palliative consulted. Continue full aggressive care. Time spent 5 minutes           Physical debility  Was advised to got to Skilled with daily PT/OT but family declined. Social work for discharge planning and PT eval.  Fall risk. Nutrition consult for his malnutrition.   - palliative consulted  - family wants to continue full aggressive treatment  - PT, OT, ST consulted- previously suggested SNF, so suspect that will be recommendation again  - case management aware     Pressure injury of sacral region, stage 4  - present on admission  - healing   - wound care consulted: Local wound care to sacral wound with hydrocolloid dressing to promote closure of chronic wound and prevent soiling from incontinence.       Paroxysmal atrial fibrillation  Patient with Paroxysmal (<7 days) atrial fibrillation which is controlled currently with Beta Blocker and Amiodarone. Patient is currently in sinus rhythm.[id number] Score: 1. anticoagulation indicated. Anticoagulation done with eliquis 2.5mg PO BID . CT head negative for mass or bleed. Fall risk in place.    History of CVA (cerebrovascular accident)  Noted.   - continue home eliquis, BP control    Anemia in ESRD (end-stage renal disease)  Anemia is likely due to chronic disease due to ESRD. Most recent hemoglobin and hematocrit are listed below.  Recent Labs     09/05/24  0353 09/06/24  0354 09/07/24  0411   HGB 8.2* 8.2* 9.6*   HCT 24.6* 24.8* 28.9*       Plan  - Monitor serial CBC: Daily  - Transfuse PRBC if patient becomes hemodynamically unstable, symptomatic or H/H drops below 7/21.  - EPO MWF      Essential hypertension  Chronic, controlled. Latest blood pressure and vitals reviewed-     Temp:  [97.6 °F (36.4 °C)-99 °F (37.2 °C)]   Pulse:  [57-84]   Resp:  [0-22]   BP: ()/(50-96)   SpO2:  [93 %-100 %] .   Home meds for hypertension were reviewed and noted below.   Hypertension Medications               metoprolol succinate (TOPROL-XL) 50 MG 24 hr tablet Take 1 tablet (50 mg total) by mouth once daily.            While in the hospital, will manage blood pressure as follows; Continue home antihypertensive regimen   - continue metoprolol  - added amlodipine for poorly controlled  BP    Will utilize p.r.n. blood pressure medication only if patient's blood pressure greater than  180/90  and he develops symptoms such as worsening chest pain or shortness of breath.    ESRD needing dialysis  - dialyzes via [facility]  - last outpatient HD session was 7/12/24 and last inpatient session was 8/7/24-- missed almost a month of dialysis because he refused to go  - he was uremic on presentation- - and hyperkalemic- K 7.2  - Nephrology [facility] group consulted  - received emergent dialysis on 9/4/24- hyperK resolved, BUN improved.  - Nephrology continues to follow along. Receiving low dose dialysis to avoid dialysis disequilibrium syndrome   - Palliative consulted as he has ESRD and refuses outpatient dialysis. Despite this, continue full code full care.     Controlled type 2 diabetes mellitus with chronic kidney disease on chronic dialysis, without long-term current use of insulin  Lab Results   Component Value Date    HGBA1C 6.2 (H) 09/04/2024     - ACHS with hypoglycemic protocol   - diabetic puree diet per speech      VTE Risk Mitigation (From admission, onward)           Ordered     apixaban tablet 2.5 mg  2 times daily         09/04/24 0525     IP VTE LOW RISK PATIENT  Once         09/04/24 0116     Place sequential compression device  Until discontinued         09/04/24 0116                    Discharge Planning   JAIME:      Code Status: Full Code   Is the patient medically ready for discharge?:     Reason for patient still in hospital (select all that apply): Patient trending condition  Discharge Plan A: Skilled Nursing Facility            Critical care time spent on the evaluation and treatment of severe organ dysfunction, review of pertinent labs and imaging studies, discussions with consulting providers and discussions with patient/family: 30 minutes.      Sury Mondragon MD  Department of Hospital Medicine   [facility] - Intensive Care

## 2024-09-07 NOTE — ASSESSMENT & PLAN NOTE
- noted 9/6/24  - started IV fluconazole as he does not have the mental awareness to swish and swallow nystatin at this point and is not reliably tolerating pills

## 2024-09-08 LAB
ANION GAP SERPL CALC-SCNC: 16 MMOL/L (ref 8–16)
ANISOCYTOSIS BLD QL SMEAR: SLIGHT
BACTERIA BLD CULT: NORMAL
BACTERIA BLD CULT: NORMAL
BASOPHILS # BLD AUTO: 0.02 K/UL (ref 0–0.2)
BASOPHILS NFR BLD: 0.3 % (ref 0–1.9)
BUN SERPL-MCNC: 58 MG/DL (ref 8–23)
CALCIUM SERPL-MCNC: 8.5 MG/DL (ref 8.7–10.5)
CHLORIDE SERPL-SCNC: 96 MMOL/L (ref 95–110)
CO2 SERPL-SCNC: 25 MMOL/L (ref 23–29)
CREAT SERPL-MCNC: 8.5 MG/DL (ref 0.5–1.4)
DIFFERENTIAL METHOD BLD: ABNORMAL
EOSINOPHIL # BLD AUTO: 0.1 K/UL (ref 0–0.5)
EOSINOPHIL NFR BLD: 0.8 % (ref 0–8)
ERYTHROCYTE [DISTWIDTH] IN BLOOD BY AUTOMATED COUNT: 17 % (ref 11.5–14.5)
EST. GFR  (NO RACE VARIABLE): 7 ML/MIN/1.73 M^2
GLUCOSE SERPL-MCNC: 78 MG/DL (ref 70–110)
HCT VFR BLD AUTO: 29.9 % (ref 40–54)
HGB BLD-MCNC: 9.3 G/DL (ref 14–18)
IMM GRANULOCYTES # BLD AUTO: 0.09 K/UL (ref 0–0.04)
IMM GRANULOCYTES NFR BLD AUTO: 1.4 % (ref 0–0.5)
LYMPHOCYTES # BLD AUTO: 0.7 K/UL (ref 1–4.8)
LYMPHOCYTES NFR BLD: 11 % (ref 18–48)
MAGNESIUM SERPL-MCNC: 2.1 MG/DL (ref 1.6–2.6)
MCH RBC QN AUTO: 26.1 PG (ref 27–31)
MCHC RBC AUTO-ENTMCNC: 31.1 G/DL (ref 32–36)
MCV RBC AUTO: 84 FL (ref 82–98)
MONOCYTES # BLD AUTO: 0.2 K/UL (ref 0.3–1)
MONOCYTES NFR BLD: 2.3 % (ref 4–15)
NEUTROPHILS # BLD AUTO: 5.4 K/UL (ref 1.8–7.7)
NEUTROPHILS NFR BLD: 84.2 % (ref 38–73)
NRBC BLD-RTO: 1 /100 WBC
PHOSPHATE SERPL-MCNC: 5.3 MG/DL (ref 2.7–4.5)
PLATELET # BLD AUTO: 66 K/UL (ref 150–450)
PLATELET BLD QL SMEAR: ABNORMAL
PMV BLD AUTO: ABNORMAL FL (ref 9.2–12.9)
POCT GLUCOSE: 118 MG/DL (ref 70–110)
POCT GLUCOSE: 121 MG/DL (ref 70–110)
POCT GLUCOSE: 89 MG/DL (ref 70–110)
POCT GLUCOSE: 93 MG/DL (ref 70–110)
POIKILOCYTOSIS BLD QL SMEAR: SLIGHT
POLYCHROMASIA BLD QL SMEAR: ABNORMAL
POTASSIUM SERPL-SCNC: 4.8 MMOL/L (ref 3.5–5.1)
RBC # BLD AUTO: 3.57 M/UL (ref 4.6–6.2)
SODIUM SERPL-SCNC: 137 MMOL/L (ref 136–145)
TARGETS BLD QL SMEAR: ABNORMAL
TOXIC GRANULES BLD QL SMEAR: PRESENT
WBC # BLD AUTO: 6.45 K/UL (ref 3.9–12.7)

## 2024-09-08 PROCEDURE — 20000000 HC ICU ROOM

## 2024-09-08 PROCEDURE — 36415 COLL VENOUS BLD VENIPUNCTURE: CPT | Performed by: INTERNAL MEDICINE

## 2024-09-08 PROCEDURE — 36415 COLL VENOUS BLD VENIPUNCTURE: CPT | Performed by: HOSPITALIST

## 2024-09-08 PROCEDURE — 83735 ASSAY OF MAGNESIUM: CPT

## 2024-09-08 PROCEDURE — 27000221 HC OXYGEN, UP TO 24 HOURS

## 2024-09-08 PROCEDURE — 87040 BLOOD CULTURE FOR BACTERIA: CPT | Performed by: INTERNAL MEDICINE

## 2024-09-08 PROCEDURE — 25000003 PHARM REV CODE 250: Performed by: HOSPITALIST

## 2024-09-08 PROCEDURE — 25000003 PHARM REV CODE 250: Performed by: INTERNAL MEDICINE

## 2024-09-08 PROCEDURE — 63600175 PHARM REV CODE 636 W HCPCS: Performed by: HOSPITALIST

## 2024-09-08 PROCEDURE — 80048 BASIC METABOLIC PNL TOTAL CA: CPT | Performed by: HOSPITALIST

## 2024-09-08 PROCEDURE — 85025 COMPLETE CBC W/AUTO DIFF WBC: CPT | Performed by: HOSPITALIST

## 2024-09-08 PROCEDURE — 63600175 PHARM REV CODE 636 W HCPCS: Performed by: INTERNAL MEDICINE

## 2024-09-08 PROCEDURE — 94761 N-INVAS EAR/PLS OXIMETRY MLT: CPT

## 2024-09-08 PROCEDURE — 84100 ASSAY OF PHOSPHORUS: CPT

## 2024-09-08 RX ORDER — DILTIAZEM HYDROCHLORIDE 5 MG/ML
10 INJECTION INTRAVENOUS ONCE
Status: COMPLETED | OUTPATIENT
Start: 2024-09-08 | End: 2024-09-08

## 2024-09-08 RX ADMIN — ACETAMINOPHEN 650 MG: 325 TABLET ORAL at 09:09

## 2024-09-08 RX ADMIN — POLYETHYLENE GLYCOL 3350 17 G: 17 POWDER, FOR SOLUTION ORAL at 08:09

## 2024-09-08 RX ADMIN — APIXABAN 2.5 MG: 2.5 TABLET, FILM COATED ORAL at 08:09

## 2024-09-08 RX ADMIN — MUPIROCIN: 20 OINTMENT TOPICAL at 08:09

## 2024-09-08 RX ADMIN — AMIODARONE HYDROCHLORIDE 1 MG/MIN: 1.8 INJECTION, SOLUTION INTRAVENOUS at 11:09

## 2024-09-08 RX ADMIN — Medication 6 MG: at 09:09

## 2024-09-08 RX ADMIN — SERTRALINE HYDROCHLORIDE 50 MG: 50 TABLET ORAL at 08:09

## 2024-09-08 RX ADMIN — ALLOPURINOL 100 MG: 100 TABLET ORAL at 08:09

## 2024-09-08 RX ADMIN — APIXABAN 2.5 MG: 2.5 TABLET, FILM COATED ORAL at 09:09

## 2024-09-08 RX ADMIN — AMIODARONE HYDROCHLORIDE 0.5 MG/MIN: 1.8 INJECTION, SOLUTION INTRAVENOUS at 05:09

## 2024-09-08 RX ADMIN — FLUCONAZOLE 100 MG: 2 INJECTION, SOLUTION INTRAVENOUS at 01:09

## 2024-09-08 RX ADMIN — PANTOPRAZOLE SODIUM 40 MG: 40 INJECTION, POWDER, FOR SOLUTION INTRAVENOUS at 08:09

## 2024-09-08 RX ADMIN — AMLODIPINE BESYLATE 10 MG: 5 TABLET ORAL at 08:09

## 2024-09-08 RX ADMIN — ACETAMINOPHEN 650 MG: 325 TABLET ORAL at 01:09

## 2024-09-08 RX ADMIN — AMIODARONE HYDROCHLORIDE 150 MG: 1.5 INJECTION, SOLUTION INTRAVENOUS at 10:09

## 2024-09-08 RX ADMIN — DILTIAZEM HYDROCHLORIDE 10 MG: 5 INJECTION INTRAVENOUS at 09:09

## 2024-09-08 RX ADMIN — MUPIROCIN: 20 OINTMENT TOPICAL at 09:09

## 2024-09-08 RX ADMIN — SEVELAMER CARBONATE 1600 MG: 800 TABLET, FILM COATED ORAL at 08:09

## 2024-09-08 RX ADMIN — AMIODARONE HYDROCHLORIDE 200 MG: 200 TABLET ORAL at 08:09

## 2024-09-08 RX ADMIN — METOPROLOL SUCCINATE 50 MG: 50 TABLET, EXTENDED RELEASE ORAL at 08:09

## 2024-09-08 NOTE — ASSESSMENT & PLAN NOTE
The likely etiology of thrombocytopenia is  unknown- potentially occult liver disease? . The patients 3 most recent labs are listed below.  Recent Labs     09/06/24  0354 09/07/24  0411 09/08/24  0431   PLT 71* 62* 66*       Plan  - Will transfuse if platelet count is <50k (if undergoing surgical procedure or have active bleeding).  - monitor daily  - continue eliquis for now

## 2024-09-08 NOTE — PLAN OF CARE
Pt started on Amio gtt today for atrial flutter. NPO at 0000. Low grade temp, blood culture pending. Pt still with few words, mumbles. PRN Tylenol given for pain. Poor appetite.     Problem: Adult Inpatient Plan of Care  Goal: Absence of Hospital-Acquired Illness or Injury  Outcome: Progressing     Problem: Adult Inpatient Plan of Care  Goal: Plan of Care Review  Outcome: Not Progressing  Goal: Patient-Specific Goal (Individualized)  Outcome: Not Progressing  Goal: Optimal Comfort and Wellbeing  Outcome: Not Progressing  Goal: Readiness for Transition of Care  Outcome: Not Progressing
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
## 2024-09-08 NOTE — ASSESSMENT & PLAN NOTE
- this has been present on prior hospitalizations  - discussed on last stay and family declined thoracentesis at that time  - developed fever on 9/8/24. If persists, will need to discuss workup again

## 2024-09-08 NOTE — ASSESSMENT & PLAN NOTE
Anemia is likely due to chronic disease due to ESRD. Most recent hemoglobin and hematocrit are listed below.  Recent Labs     09/06/24  0354 09/07/24  0411 09/08/24  0431   HGB 8.2* 9.6* 9.3*   HCT 24.8* 28.9* 29.9*       Plan  - Monitor serial CBC: Daily  - Transfuse PRBC if patient becomes hemodynamically unstable, symptomatic or H/H drops below 7/21.  - EPO MWF

## 2024-09-08 NOTE — ASSESSMENT & PLAN NOTE
Patient with Paroxysmal (<7 days) atrial fibrillation which is controlled currently with Beta Blocker and Amiodarone. [id number] Score: 1. anticoagulation indicated. Anticoagulation done with eliquis 2.5mg PO BID . CT head negative for mass or bleed. Fall risk in place.

## 2024-09-08 NOTE — PROGRESS NOTES
[facility]  Progress Note    Patient Name: Mahesh Cespedes  MRN: [id number]  Patient Class: IP- Inpatient   Admission Date: 9/3/2024  Length of Stay: 4 days  Attending Physician: Sury Mondragon MD  Primary Care Provider: Cruz Hernandez MD        Subjective:     Principal Problem:Uremic encephalopathy        HPI:  59 y.o. AAM with h/o CVA, ESRD (MWF via left tunneled HD catheter), Afib (on amiodarone and eliquis), NIDDM type2, HLD, Essential HTN ,depression with h/o suicidal ideations presents to the ER via EMS with family concerns for constipation and abdominal pain. Pt. Unable to give me history due to AMS due to uremia (). Reports that he missed 3 weeks of HD. Presented to the ER altered covered in feces and urine.    Apparently he was admitted here from - for AMS and again was noted to have missed 2 weeks of HD. CT head was negative for mass/bleed.  CT chest at that time noted loculated moderate left pleural effusion/consolidation with a large pericardial effusion 3.6cm and pulmonary edema. Was tx with abx and echo showed only a small/mod pericardial effusion with no cardiac tamponade. Nephro/pulm/ID consulted. Recommended IR to sample fluid but family deferred due to risk/benefit. He was cont. On abx VANC with NGT repeat cultures. Plan was to transfer to [facility] but daughter (javier)  refused and wanted to take him home with her on  per the discharge summary.     Today no family with with him in the ER and pt. Noted to be malnourished, desheveled and confused.     Labs noted for potassium of 7.2 and /Creatinin 25.8.  Bicarb 12. VB.23/39.5/35/16.7.  WBC 8.7 and H/H 8.6/26.      CT abd/pelvis with IV contrast:   1. Image quality degraded by technical factors discussed above.   2. Moderate/large volume of loculated left pleural fluid noting probable pleural thickening and heterogeneity of pleural fluid.  Correlation for underlying infectious process  advised.  Left basilar atelectatic/consolidative change.   3. Right lower lobe patchy ground-glass attenuation with interlobular septal thickening which could be seen with edema, infection or non infectious inflammatory process.   4. Solid and ground-glass right lower lobe pulmonary nodules measuring 6 mm and 8 mm respectively.  Repeat evaluation with chest CT in 3 months is advised to evaluate stability and/or resolution of these findings.   5. Cardiomegaly and large pericardial effusion.   6. Cholelithiasis.   7. Gastric wall thickening which could relate to nondistention although correlation for symptoms of gastritis advised.   8. Regions of large and small bowel wall thickening which could relate to nondistention and/or technical factors discussed above.  However, correlation for symptoms of enterocolitis advised.   9. Bladder wall thickening.  Correlation with urinalysis advised.   10. Multiple additional findings as above.     ED spoke with Nephrology and will plan for ICU admission and emergent HD.   We have been consulted for further management and admission to the ICU.     Because this patient's clinical condition is critically guarded and requires care in the ICU with emergent HD, care in an alternative location isn't clinically appropriate for the reasons stated above.         Overview/Hospital Course:  Mr Mahesh Cespedes is a 59 y.o. man with ESRD who was admitted with encephalopathy. He was noted to have severe uremia ( on admission), hyperkalemia. He was also caked with urine and stool. He was admitted to ICU, Nephrology Dr Mayen group consulted, and received emergent dialysis on 9/4/24. Social work notes he has outpatient HD chair but has not been to outpatient HD since 7/12/24. His last inpatient HD treatment was at [facility] on 8/7/24. His mental status has slightly improved. Palliative consulted due to patient's ESRD and HD nonadherence. Family wants to continue full code and full care; not  interested in hospice care despite patient's unwillingness to adhere to dialysis. Family now not answering the phone. He is receiving low dose dialysis to avoid dialysis disequilibrium syndrome. He developed Aflutter RVR; did not convert with diltiazem so started amio gtt and continued home eliquis.     Interval History: Lying in bed, awake, fatigued, did not eat much. Noted temp 100.7F.     Review of Systems   Constitutional:  Positive for fatigue and fever.   Respiratory:  Negative for shortness of breath.    Cardiovascular:  Negative for chest pain.   Gastrointestinal:  Negative for abdominal pain, nausea and vomiting.   Psychiatric/Behavioral:  Positive for confusion.      Objective:     Vital Signs (Most Recent):  Temp: (!) 100.7 °F (38.2 °C) (09/08/24 1100)  Pulse: 83 (09/08/24 1100)  Resp: 14 (09/08/24 1100)  BP: 129/89 (09/08/24 1100)  SpO2: 96 % (09/08/24 1100) Vital Signs (24h Range):  Temp:  [98.5 °F (36.9 °C)-100.7 °F (38.2 °C)] 100.7 °F (38.2 °C)  Pulse:  [80-84] 83  Resp:  [9-26] 14  SpO2:  [89 %-100 %] 96 %  BP: ()/(55-90) 129/89     Weight: 61.2 kg (134 lb 14.7 oz)  Body mass index is 21.13 kg/m².    Intake/Output Summary (Last 24 hours) at 9/8/2024 1142  Last data filed at 9/8/2024 0700  Gross per 24 hour   Intake 169.64 ml   Output --   Net 169.64 ml         Physical Exam  Vitals and nursing note reviewed.   Constitutional:       Appearance: He is ill-appearing. He is not toxic-appearing or diaphoretic.   HENT:      Nose: Nose normal.      Mouth/Throat:      Mouth: Mucous membranes are moist.   Neck:      Comments: Retained sutures R chest wall  Cardiovascular:      Rate and Rhythm: Normal rate and regular rhythm.   Pulmonary:      Effort: Pulmonary effort is normal.      Breath sounds: Normal breath sounds.      Comments: O2 by NC  Abdominal:      General: Bowel sounds are normal.      Palpations: Abdomen is soft.      Tenderness: There is no abdominal tenderness.   Musculoskeletal:       Right lower leg: No edema.      Left lower leg: No edema.   Skin:     Comments: L sided [facility]   Neurological:      Mental Status: He is alert. He is disoriented.      Comments: Answers yes/no questions             Significant Labs: All pertinent labs within the past 24 hours have been reviewed.    Significant Imaging: I have reviewed all pertinent imaging results/findings within the past 24 hours.    Assessment/Plan:      * Uremic encephalopathy  -  on admission, last HD session about a month ago  - Nephrology consulted for HD  - continue to monitor mental status - improving   - low dose dialysis to avoid dialysis disequilibrium syndrome       Typical atrial flutter  Noted on 9/6/24. Converted back to NSR, then back to aflutter. No change with diltiazem  - started amio gtt on 9/8/24  - continue eliquis  - Cardiology consulted  - he cannot consent for his ROYCE/DCCV and family not answering the phone       Thrush  - noted 9/6/24  - started IV fluconazole as he does not have the mental awareness to swish and swallow nystatin at this point and is not reliably tolerating pills    Thrombocytopenia  The likely etiology of thrombocytopenia is  unknown- potentially occult liver disease? . The patients 3 most recent labs are listed below.  Recent Labs     09/06/24  0354 09/07/24  0411 09/08/24  0431   PLT 71* 62* 66*       Plan  - Will transfuse if platelet count is <50k (if undergoing surgical procedure or have active bleeding).  - monitor daily  - continue eliquis for now       Gastric wall thickening  Noted on CT  - PPI IV ordered      Depression  Resume home antidepressant    Left loculated pleural effusion  - this has been present on prior hospitalizations  - discussed on last stay and family declined thoracentesis at that time  - developed fever on 9/8/24. If persists, will need to discuss workup again    ACP (advance care planning)  Advance Care Planning    Date: 09/04/2024  Palliative consulted. Continue full  aggressive care. Time spent 5 minutes           Physical debility  Was advised to got to Skilled with daily PT/OT but family declined. Social work for discharge planning and PT eval. Fall risk. Nutrition consult for his malnutrition.   - palliative consulted  - family wants to continue full aggressive treatment  - PT, OT, ST consulted- previously suggested SNF, so suspect that will be recommendation again  - case management aware     Pressure injury of sacral region, stage 4  - present on admission  - healing   - wound care consulted: Local wound care to sacral wound with hydrocolloid dressing to promote closure of chronic wound and prevent soiling from incontinence.       Paroxysmal atrial fibrillation  Patient with Paroxysmal (<7 days) atrial fibrillation which is controlled currently with Beta Blocker and Amiodarone. [id number] Score: 1. anticoagulation indicated. Anticoagulation done with eliquis 2.5mg PO BID . CT head negative for mass or bleed. Fall risk in place.    History of CVA (cerebrovascular accident)  Noted.   - continue home eliquis, BP control    Anemia in ESRD (end-stage renal disease)  Anemia is likely due to chronic disease due to ESRD. Most recent hemoglobin and hematocrit are listed below.  Recent Labs     09/06/24  0354 09/07/24  0411 09/08/24  0431   HGB 8.2* 9.6* 9.3*   HCT 24.8* 28.9* 29.9*       Plan  - Monitor serial CBC: Daily  - Transfuse PRBC if patient becomes hemodynamically unstable, symptomatic or H/H drops below 7/21.  - EPO MWF      Essential hypertension  Chronic, controlled. Latest blood pressure and vitals reviewed-     Temp:  [98.5 °F (36.9 °C)-100.7 °F (38.2 °C)]   Pulse:  [79-84]   Resp:  [9-26]   BP: ()/(55-90)   SpO2:  [89 %-100 %] .   Home meds for hypertension were reviewed and noted below.   Hypertension Medications               metoprolol succinate (TOPROL-XL) 50 MG 24 hr tablet Take 1 tablet (50 mg total) by mouth once daily.            While in the hospital,  will manage blood pressure as follows; Continue home antihypertensive regimen   - continue metoprolol  - added amlodipine for poorly controlled BP    Will utilize p.r.n. blood pressure medication only if patient's blood pressure greater than  180/90  and he develops symptoms such as worsening chest pain or shortness of breath.    ESRD needing dialysis  - dialyzes via [facility]  - last outpatient HD session was 7/12/24 and last inpatient session was 8/7/24-- missed almost a month of dialysis because he refused to go  - he was uremic on presentation- - and hyperkalemic- K 7.2  - Nephrology [facility] group consulted  - received emergent dialysis on 9/4/24- hyperK resolved, BUN improved.  - Nephrology continues to follow along. Receiving low dose dialysis to avoid dialysis disequilibrium syndrome   - Palliative consulted as he has ESRD and refuses outpatient dialysis. Despite this, continue full code full care.     Controlled type 2 diabetes mellitus with chronic kidney disease on chronic dialysis, without long-term current use of insulin  Lab Results   Component Value Date    HGBA1C 6.2 (H) 09/04/2024     - ACHS with hypoglycemic protocol   - diabetic puree diet per speech      VTE Risk Mitigation (From admission, onward)           Ordered     apixaban tablet 2.5 mg  2 times daily         09/04/24 0525     IP VTE LOW RISK PATIENT  Once         09/04/24 0116     Place sequential compression device  Until discontinued         09/04/24 0116                    Discharge Planning   JAIME:      Code Status: Full Code   Is the patient medically ready for discharge?:     Reason for patient still in hospital (select all that apply): Patient trending condition  Discharge Plan A: Skilled Nursing Facility            Critical care time spent on the evaluation and treatment of severe organ dysfunction, review of pertinent labs and imaging studies, discussions with consulting providers and discussions with patient/family: 30  minutes.      Sury Mondragon MD  Department of Hospital Medicine   [facility] - Intensive Care

## 2024-09-08 NOTE — NURSING
[facility] [place]  Nurses Note -- 4 Eyes      9/8/2024       Skin assessed on: Q Shift      [] No Pressure Injuries Present    []Prevention Measures Documented    [x] Yes LDA  for Pressure Injury Previously documented     [] Yes New Pressure Injury Discovered   [] LDA for New Pressure Injury Added      Attending RN:  Ja Sales RN     Second RN:  FAVIAN Ricardo RN

## 2024-09-08 NOTE — ASSESSMENT & PLAN NOTE
Chronic, controlled. Latest blood pressure and vitals reviewed-     Temp:  [98.5 °F (36.9 °C)-100.7 °F (38.2 °C)]   Pulse:  [79-84]   Resp:  [9-26]   BP: ()/(55-90)   SpO2:  [89 %-100 %] .   Home meds for hypertension were reviewed and noted below.   Hypertension Medications               metoprolol succinate (TOPROL-XL) 50 MG 24 hr tablet Take 1 tablet (50 mg total) by mouth once daily.            While in the hospital, will manage blood pressure as follows; Continue home antihypertensive regimen   - continue metoprolol  - added amlodipine for poorly controlled BP    Will utilize p.r.n. blood pressure medication only if patient's blood pressure greater than  180/90  and he develops symptoms such as worsening chest pain or shortness of breath.

## 2024-09-08 NOTE — PLAN OF CARE
No acute events overnight    Problem: Hemodialysis  Goal: Safe, Effective Therapy Delivery  Outcome: Progressing  Goal: Effective Tissue Perfusion  Outcome: Progressing  Goal: Absence of Infection Signs and Symptoms  Outcome: Progressing     Problem: Adult Inpatient Plan of Care  Goal: Plan of Care Review  Outcome: Progressing  Goal: Patient-Specific Goal (Individualized)  Outcome: Progressing  Goal: Absence of Hospital-Acquired Illness or Injury  Outcome: Progressing  Goal: Optimal Comfort and Wellbeing  Outcome: Progressing  Goal: Readiness for Transition of Care  Outcome: Progressing

## 2024-09-08 NOTE — NURSING
[facility]  Nurses Note -- 4 Eyes      9/7/2024       Skin assessed on: Q Shift      [] No Pressure Injuries Present    []Prevention Measures Documented    [x] Yes LDA  for Pressure Injury Previously documented     [] Yes New Pressure Injury Discovered   [] LDA for New Pressure Injury Added      Attending RN:  Sara Ricardo RN     Second RN:  BETO Goldstein

## 2024-09-08 NOTE — ASSESSMENT & PLAN NOTE
Noted on 9/6/24. Converted back to NSR, then back to aflutter. No change with diltiazem  - started amio gtt on 9/8/24  - continue eliquis  - Cardiology consulted  - he cannot consent for his ROYCE/DCCV and family not answering the phone
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
## 2024-09-08 NOTE — SUBJECTIVE & OBJECTIVE
Interval History: Lying in bed, awake, fatigued, did not eat much. Noted temp 100.7F.     Review of Systems   Constitutional:  Positive for fatigue and fever.   Respiratory:  Negative for shortness of breath.    Cardiovascular:  Negative for chest pain.   Gastrointestinal:  Negative for abdominal pain, nausea and vomiting.   Psychiatric/Behavioral:  Positive for confusion.      Objective:     Vital Signs (Most Recent):  Temp: (!) 100.7 °F (38.2 °C) (09/08/24 1100)  Pulse: 83 (09/08/24 1100)  Resp: 14 (09/08/24 1100)  BP: 129/89 (09/08/24 1100)  SpO2: 96 % (09/08/24 1100) Vital Signs (24h Range):  Temp:  [98.5 °F (36.9 °C)-100.7 °F (38.2 °C)] 100.7 °F (38.2 °C)  Pulse:  [80-84] 83  Resp:  [9-26] 14  SpO2:  [89 %-100 %] 96 %  BP: ()/(55-90) 129/89     Weight: 61.2 kg (134 lb 14.7 oz)  Body mass index is 21.13 kg/m².    Intake/Output Summary (Last 24 hours) at 9/8/2024 1142  Last data filed at 9/8/2024 0700  Gross per 24 hour   Intake 169.64 ml   Output --   Net 169.64 ml         Physical Exam  Vitals and nursing note reviewed.   Constitutional:       Appearance: He is ill-appearing. He is not toxic-appearing or diaphoretic.   HENT:      Nose: Nose normal.      Mouth/Throat:      Mouth: Mucous membranes are moist.   Neck:      Comments: Retained sutures R chest wall  Cardiovascular:      Rate and Rhythm: Normal rate and regular rhythm.   Pulmonary:      Effort: Pulmonary effort is normal.      Breath sounds: Normal breath sounds.      Comments: O2 by NC  Abdominal:      General: Bowel sounds are normal.      Palpations: Abdomen is soft.      Tenderness: There is no abdominal tenderness.   Musculoskeletal:      Right lower leg: No edema.      Left lower leg: No edema.   Skin:     Comments: L sided THDC   Neurological:      Mental Status: He is alert. He is disoriented.      Comments: Answers yes/no questions             Significant Labs: All pertinent labs within the past 24 hours have been  reviewed.    Significant Imaging: I have reviewed all pertinent imaging results/findings within the past 24 hours.

## 2024-09-09 ENCOUNTER — ANESTHESIA (OUTPATIENT)
Dept: CARDIOLOGY | Facility: HOSPITAL | Age: 59
End: 2024-09-09
Payer: MEDICARE

## 2024-09-09 ENCOUNTER — ANESTHESIA EVENT (OUTPATIENT)
Dept: CARDIOLOGY | Facility: HOSPITAL | Age: 59
End: 2024-09-09
Payer: MEDICARE

## 2024-09-09 LAB
ANION GAP SERPL CALC-SCNC: 21 MMOL/L (ref 8–16)
BASOPHILS # BLD AUTO: 0.01 K/UL (ref 0–0.2)
BASOPHILS NFR BLD: 0.2 % (ref 0–1.9)
BUN SERPL-MCNC: 69 MG/DL (ref 8–23)
CALCIUM SERPL-MCNC: 7.7 MG/DL (ref 8.7–10.5)
CHLORIDE SERPL-SCNC: 86 MMOL/L (ref 95–110)
CO2 SERPL-SCNC: 25 MMOL/L (ref 23–29)
CREAT SERPL-MCNC: 9.3 MG/DL (ref 0.5–1.4)
DIFFERENTIAL METHOD BLD: ABNORMAL
EOSINOPHIL # BLD AUTO: 0 K/UL (ref 0–0.5)
EOSINOPHIL NFR BLD: 0.4 % (ref 0–8)
ERYTHROCYTE [DISTWIDTH] IN BLOOD BY AUTOMATED COUNT: 16.8 % (ref 11.5–14.5)
EST. GFR  (NO RACE VARIABLE): 6 ML/MIN/1.73 M^2
GLUCOSE SERPL-MCNC: 412 MG/DL (ref 70–110)
HCT VFR BLD AUTO: 26.9 % (ref 40–54)
HGB BLD-MCNC: 8.8 G/DL (ref 14–18)
IMM GRANULOCYTES # BLD AUTO: 0.06 K/UL (ref 0–0.04)
IMM GRANULOCYTES NFR BLD AUTO: 1.2 % (ref 0–0.5)
LYMPHOCYTES # BLD AUTO: 0.6 K/UL (ref 1–4.8)
LYMPHOCYTES NFR BLD: 11.9 % (ref 18–48)
MCH RBC QN AUTO: 27.1 PG (ref 27–31)
MCHC RBC AUTO-ENTMCNC: 32.7 G/DL (ref 32–36)
MCV RBC AUTO: 83 FL (ref 82–98)
MONOCYTES # BLD AUTO: 0.3 K/UL (ref 0.3–1)
MONOCYTES NFR BLD: 5.1 % (ref 4–15)
NEUTROPHILS # BLD AUTO: 4 K/UL (ref 1.8–7.7)
NEUTROPHILS NFR BLD: 81.2 % (ref 38–73)
NRBC BLD-RTO: 1 /100 WBC
PHOSPHATE SERPL-MCNC: 5 MG/DL (ref 2.7–4.5)
PLATELET # BLD AUTO: 72 K/UL (ref 150–450)
PMV BLD AUTO: ABNORMAL FL (ref 9.2–12.9)
POCT GLUCOSE: 103 MG/DL (ref 70–110)
POCT GLUCOSE: 121 MG/DL (ref 70–110)
POCT GLUCOSE: 89 MG/DL (ref 70–110)
POCT GLUCOSE: 97 MG/DL (ref 70–110)
POTASSIUM SERPL-SCNC: 4.6 MMOL/L (ref 3.5–5.1)
RBC # BLD AUTO: 3.25 M/UL (ref 4.6–6.2)
SODIUM SERPL-SCNC: 132 MMOL/L (ref 136–145)
TOXIC GRANULES BLD QL SMEAR: PRESENT
WBC # BLD AUTO: 4.94 K/UL (ref 3.9–12.7)

## 2024-09-09 PROCEDURE — 27000221 HC OXYGEN, UP TO 24 HOURS

## 2024-09-09 PROCEDURE — 84100 ASSAY OF PHOSPHORUS: CPT

## 2024-09-09 PROCEDURE — 63600175 PHARM REV CODE 636 W HCPCS: Performed by: INTERNAL MEDICINE

## 2024-09-09 PROCEDURE — 94761 N-INVAS EAR/PLS OXIMETRY MLT: CPT

## 2024-09-09 PROCEDURE — 80100014 HC HEMODIALYSIS 1:1

## 2024-09-09 PROCEDURE — 80048 BASIC METABOLIC PNL TOTAL CA: CPT | Performed by: HOSPITALIST

## 2024-09-09 PROCEDURE — 92960 CARDIOVERSION ELECTRIC EXT: CPT | Performed by: INTERNAL MEDICINE

## 2024-09-09 PROCEDURE — 37000009 HC ANESTHESIA EA ADD 15 MINS: Performed by: INTERNAL MEDICINE

## 2024-09-09 PROCEDURE — 25000003 PHARM REV CODE 250: Performed by: INTERNAL MEDICINE

## 2024-09-09 PROCEDURE — 99291 CRITICAL CARE FIRST HOUR: CPT | Mod: 25,,, | Performed by: INTERNAL MEDICINE

## 2024-09-09 PROCEDURE — 25000003 PHARM REV CODE 250: Performed by: STUDENT IN AN ORGANIZED HEALTH CARE EDUCATION/TRAINING PROGRAM

## 2024-09-09 PROCEDURE — 85025 COMPLETE CBC W/AUTO DIFF WBC: CPT | Performed by: HOSPITALIST

## 2024-09-09 PROCEDURE — 25000003 PHARM REV CODE 250: Performed by: HOSPITALIST

## 2024-09-09 PROCEDURE — 92960 CARDIOVERSION ELECTRIC EXT: CPT | Mod: 59,,, | Performed by: INTERNAL MEDICINE

## 2024-09-09 PROCEDURE — 93005 ELECTROCARDIOGRAM TRACING: CPT

## 2024-09-09 PROCEDURE — 63600175 PHARM REV CODE 636 W HCPCS: Performed by: HOSPITALIST

## 2024-09-09 PROCEDURE — 36415 COLL VENOUS BLD VENIPUNCTURE: CPT | Performed by: HOSPITALIST

## 2024-09-09 PROCEDURE — 93010 ELECTROCARDIOGRAM REPORT: CPT | Mod: ,,, | Performed by: INTERNAL MEDICINE

## 2024-09-09 PROCEDURE — 63600175 PHARM REV CODE 636 W HCPCS: Performed by: STUDENT IN AN ORGANIZED HEALTH CARE EDUCATION/TRAINING PROGRAM

## 2024-09-09 PROCEDURE — 63600175 PHARM REV CODE 636 W HCPCS: Mod: JZ,JG | Performed by: INTERNAL MEDICINE

## 2024-09-09 PROCEDURE — 5A2204Z RESTORATION OF CARDIAC RHYTHM, SINGLE: ICD-10-PCS | Performed by: INTERNAL MEDICINE

## 2024-09-09 PROCEDURE — 37000008 HC ANESTHESIA 1ST 15 MINUTES: Performed by: INTERNAL MEDICINE

## 2024-09-09 PROCEDURE — 12000002 HC ACUTE/MED SURGE SEMI-PRIVATE ROOM

## 2024-09-09 RX ORDER — GLUCAGON 1 MG
1 KIT INJECTION
Status: CANCELLED | OUTPATIENT
Start: 2024-09-09

## 2024-09-09 RX ORDER — PROPOFOL 10 MG/ML
VIAL (ML) INTRAVENOUS
Status: DISCONTINUED | OUTPATIENT
Start: 2024-09-09 | End: 2024-09-09

## 2024-09-09 RX ORDER — AMIODARONE HYDROCHLORIDE 200 MG/1
200 TABLET ORAL 2 TIMES DAILY
Status: DISCONTINUED | OUTPATIENT
Start: 2024-09-09 | End: 2024-09-16

## 2024-09-09 RX ORDER — LIDOCAINE HYDROCHLORIDE 20 MG/ML
INJECTION INTRAVENOUS
Status: DISCONTINUED | OUTPATIENT
Start: 2024-09-09 | End: 2024-09-09

## 2024-09-09 RX ORDER — SODIUM CHLORIDE 0.9 % (FLUSH) 0.9 %
10 SYRINGE (ML) INJECTION
Status: DISCONTINUED | OUTPATIENT
Start: 2024-09-09 | End: 2024-09-20 | Stop reason: HOSPADM

## 2024-09-09 RX ORDER — SODIUM CHLORIDE 0.9 % (FLUSH) 0.9 %
10 SYRINGE (ML) INJECTION
Status: CANCELLED | OUTPATIENT
Start: 2024-09-09

## 2024-09-09 RX ADMIN — SERTRALINE HYDROCHLORIDE 50 MG: 50 TABLET ORAL at 08:09

## 2024-09-09 RX ADMIN — APIXABAN 2.5 MG: 2.5 TABLET, FILM COATED ORAL at 08:09

## 2024-09-09 RX ADMIN — PANTOPRAZOLE SODIUM 40 MG: 40 INJECTION, POWDER, FOR SOLUTION INTRAVENOUS at 08:09

## 2024-09-09 RX ADMIN — PROPOFOL 10 MG: 10 INJECTION, EMULSION INTRAVENOUS at 11:09

## 2024-09-09 RX ADMIN — EPOETIN ALFA-EPBX 20000 UNITS: 10000 INJECTION, SOLUTION INTRAVENOUS; SUBCUTANEOUS at 08:09

## 2024-09-09 RX ADMIN — AMIODARONE HYDROCHLORIDE 0.5 MG/MIN: 1.8 INJECTION, SOLUTION INTRAVENOUS at 04:09

## 2024-09-09 RX ADMIN — MUPIROCIN: 20 OINTMENT TOPICAL at 08:09

## 2024-09-09 RX ADMIN — FLUCONAZOLE 100 MG: 2 INJECTION, SOLUTION INTRAVENOUS at 12:09

## 2024-09-09 RX ADMIN — PROPOFOL 40 MG: 10 INJECTION, EMULSION INTRAVENOUS at 11:09

## 2024-09-09 RX ADMIN — METOPROLOL SUCCINATE 50 MG: 50 TABLET, EXTENDED RELEASE ORAL at 08:09

## 2024-09-09 RX ADMIN — AMLODIPINE BESYLATE 10 MG: 5 TABLET ORAL at 08:09

## 2024-09-09 RX ADMIN — LIDOCAINE HYDROCHLORIDE 40 MG: 20 INJECTION, SOLUTION INTRAVENOUS at 11:09

## 2024-09-09 RX ADMIN — SODIUM CHLORIDE, SODIUM LACTATE, POTASSIUM CHLORIDE, AND CALCIUM CHLORIDE: .6; .31; .03; .02 INJECTION, SOLUTION INTRAVENOUS at 11:09

## 2024-09-09 RX ADMIN — AMIODARONE HYDROCHLORIDE 200 MG: 200 TABLET ORAL at 12:09

## 2024-09-09 RX ADMIN — ALLOPURINOL 100 MG: 100 TABLET ORAL at 08:09

## 2024-09-09 NOTE — SUBJECTIVE & OBJECTIVE
Interval History: Lying in bed, awake, fatigued.     Review of Systems   Constitutional:  Positive for fatigue. Negative for fever.   Respiratory:  Negative for shortness of breath.    Cardiovascular:  Negative for chest pain.   Gastrointestinal:  Negative for abdominal pain, nausea and vomiting.   Psychiatric/Behavioral:  Positive for confusion.      Objective:     Vital Signs (Most Recent):  Temp: 98.2 °F (36.8 °C) (09/09/24 0715)  Pulse: 74 (09/09/24 0900)  Resp: 13 (09/09/24 0900)  BP: (!) 151/70 (09/09/24 0900)  SpO2: 96 % (09/09/24 0900) Vital Signs (24h Range):  Temp:  [98.2 °F (36.8 °C)-100.7 °F (38.2 °C)] 98.2 °F (36.8 °C)  Pulse:  [74-83] 74  Resp:  [11-27] 13  SpO2:  [88 %-99 %] 96 %  BP: ()/(50-99) 151/70     Weight: 61.2 kg (134 lb 14.7 oz)  Body mass index is 21.13 kg/m².    Intake/Output Summary (Last 24 hours) at 9/9/2024 1001  Last data filed at 9/9/2024 0512  Gross per 24 hour   Intake 487 ml   Output --   Net 487 ml         Physical Exam  Vitals and nursing note reviewed.   Constitutional:       Appearance: He is ill-appearing. He is not toxic-appearing or diaphoretic.   HENT:      Nose: Nose normal.      Mouth/Throat:      Mouth: Mucous membranes are moist.   Neck:      Comments: Retained sutures R chest wall  Cardiovascular:      Rate and Rhythm: Normal rate. Rhythm irregular.      Comments: aflutter  Pulmonary:      Effort: Pulmonary effort is normal.      Breath sounds: Normal breath sounds.      Comments: O2 by NC  Abdominal:      General: Bowel sounds are normal.      Palpations: Abdomen is soft.      Tenderness: There is no abdominal tenderness.   Musculoskeletal:      Right lower leg: No edema.      Left lower leg: No edema.   Skin:     Comments: L sided THDC   Neurological:      Mental Status: He is alert. He is disoriented.      Comments: Answers yes/no questions             Significant Labs: All pertinent labs within the past 24 hours have been reviewed.    Significant Imaging: I  have reviewed all pertinent imaging results/findings within the past 24 hours.

## 2024-09-09 NOTE — PROCEDURES
"Mahesh Cespedes is a 59 y.o. male patient.    Temp: 98.2 °F (36.8 °C) (09/09/24 0715)  Pulse: 74 (09/09/24 0900)  Resp: 13 (09/09/24 0900)  BP: (!) 151/70 (09/09/24 0900)  SpO2: 96 % (09/09/24 0900)  Weight: 61.2 kg (134 lb 14.7 oz) (09/06/24 0600)  Height: 5' 7" (170.2 cm) (09/04/24 1253)       Procedures    CV   Dr Pascual  Pre-op Dx A-flutter  Post-op Dx same  Speciemn none  EBL < 50 cc    9/9/24 200J shock converted A-flutter to NSR    Change amiodarone to po  Will f/u PRN    9/9/2024    "

## 2024-09-09 NOTE — ASSESSMENT & PLAN NOTE
Noted on 9/6/24. Converted back to NSR, then back to aflutter. No change with diltiazem  - started amio gtt on 9/8/24  - continue eliquis  - Cardiology consulted  - he cannot consent for his ROYCE/DCCV and family not answering the phone - son may arrive today

## 2024-09-09 NOTE — PT/OT/SLP PROGRESS
Occupational Therapy      Patient Name:  Mahesh Cespedes   MRN:  [id number]    Patient not seen today secondary to pt JENNIFER for procedure  . Will follow-up as able.    9/9/2024

## 2024-09-09 NOTE — ASSESSMENT & PLAN NOTE
Hx A-flutter on eliquis. Had recurrent A-flutter yesterday. Rates improved with amiodarone but still with A-flutter. Discussed with son who is in agreement for CV today - no need for ROYCE on eliquis out patient.

## 2024-09-09 NOTE — PLAN OF CARE
Recommendations  Recommendation:   1. When medically able, initiate diabetic/renal diet 2000 kcals with Novasource Renal TID   2. If diet not tolerated, consider PEG tube placement if pt's intake remains poor.   3. Monitor weight and labs    Goals: Pt will meet 85% of EEN/EPN by RD follow up    Nutrition Goal Status: new  Communication of RD Recs:  (POC)

## 2024-09-09 NOTE — PROGRESS NOTES
[facility] Medicine  Progress Note    Patient Name: Mahesh Cespedes  MRN: [id number]  Patient Class: IP- Inpatient   Admission Date: 9/3/2024  Length of Stay: 5 days  Attending Physician: Sury Mondragon MD  Primary Care Provider: Cruz Hernandez MD        Subjective:     Principal Problem:Uremic encephalopathy        HPI:  59 y.o. AAM with h/o CVA, ESRD (MWF via left tunneled HD catheter), Afib (on amiodarone and eliquis), NIDDM type2, HLD, Essential HTN ,depression with h/o suicidal ideations presents to the ER via EMS with family concerns for constipation and abdominal pain. Pt. Unable to give me history due to AMS due to uremia (). Reports that he missed 3 weeks of HD. Presented to the ER altered covered in feces and urine.    Apparently he was admitted here from - for AMS and again was noted to have missed 2 weeks of HD. CT head was negative for mass/bleed.  CT chest at that time noted loculated moderate left pleural effusion/consolidation with a large pericardial effusion 3.6cm and pulmonary edema. Was tx with abx and echo showed only a small/mod pericardial effusion with no cardiac tamponade. Nephro/pulm/ID consulted. Recommended IR to sample fluid but family deferred due to risk/benefit. He was cont. On abx VANC with NGT repeat cultures. Plan was to transfer to [facility] but daughter (javier)  refused and wanted to take him home with her on  per the discharge summary.     Today no family with with him in the ER and pt. Noted to be malnourished, desheveled and confused.     Labs noted for potassium of 7.2 and /Creatinin 25.8.  Bicarb 12. VB.23/39.5/35/16.7.  WBC 8.7 and H/H 8.6/26.      CT abd/pelvis with IV contrast:   1. Image quality degraded by technical factors discussed above.   2. Moderate/large volume of loculated left pleural fluid noting probable pleural thickening and heterogeneity of pleural fluid.  Correlation for underlying infectious process  advised.  Left basilar atelectatic/consolidative change.   3. Right lower lobe patchy ground-glass attenuation with interlobular septal thickening which could be seen with edema, infection or non infectious inflammatory process.   4. Solid and ground-glass right lower lobe pulmonary nodules measuring 6 mm and 8 mm respectively.  Repeat evaluation with chest CT in 3 months is advised to evaluate stability and/or resolution of these findings.   5. Cardiomegaly and large pericardial effusion.   6. Cholelithiasis.   7. Gastric wall thickening which could relate to nondistention although correlation for symptoms of gastritis advised.   8. Regions of large and small bowel wall thickening which could relate to nondistention and/or technical factors discussed above.  However, correlation for symptoms of enterocolitis advised.   9. Bladder wall thickening.  Correlation with urinalysis advised.   10. Multiple additional findings as above.     ED spoke with Nephrology and will plan for ICU admission and emergent HD.   We have been consulted for further management and admission to the ICU.     Because this patient's clinical condition is critically guarded and requires care in the ICU with emergent HD, care in an alternative location isn't clinically appropriate for the reasons stated above.         Overview/Hospital Course:  Mr Mahesh Cespedes is a 59 y.o. man with ESRD who was admitted with encephalopathy. He was noted to have severe uremia ( on admission), hyperkalemia. He was also caked with urine and stool. He was admitted to ICU, Nephrology Dr Mayen group consulted, and received emergent dialysis on 9/4/24. Social work notes he has outpatient HD chair but has not been to outpatient HD since 7/12/24. His last inpatient HD treatment was at [facility] on 8/7/24. His mental status has slightly improved. Palliative consulted due to patient's ESRD and HD nonadherence. Family wants to continue full code and full care; not  interested in hospice care despite patient's unwillingness to adhere to dialysis. Family now not answering the phone. He is receiving low dose dialysis to avoid dialysis disequilibrium syndrome. He developed Aflutter RVR; did not convert with diltiazem so started amio gtt and continued home eliquis. Cardiology following.     Interval History: Lying in bed, awake, fatigued.     Review of Systems   Constitutional:  Positive for fatigue. Negative for fever.   Respiratory:  Negative for shortness of breath.    Cardiovascular:  Negative for chest pain.   Gastrointestinal:  Negative for abdominal pain, nausea and vomiting.   Psychiatric/Behavioral:  Positive for confusion.      Objective:     Vital Signs (Most Recent):  Temp: 98.2 °F (36.8 °C) (09/09/24 0715)  Pulse: 74 (09/09/24 0900)  Resp: 13 (09/09/24 0900)  BP: (!) 151/70 (09/09/24 0900)  SpO2: 96 % (09/09/24 0900) Vital Signs (24h Range):  Temp:  [98.2 °F (36.8 °C)-100.7 °F (38.2 °C)] 98.2 °F (36.8 °C)  Pulse:  [74-83] 74  Resp:  [11-27] 13  SpO2:  [88 %-99 %] 96 %  BP: ()/(50-99) 151/70     Weight: 61.2 kg (134 lb 14.7 oz)  Body mass index is 21.13 kg/m².    Intake/Output Summary (Last 24 hours) at 9/9/2024 1001  Last data filed at 9/9/2024 0512  Gross per 24 hour   Intake 487 ml   Output --   Net 487 ml         Physical Exam  Vitals and nursing note reviewed.   Constitutional:       Appearance: He is ill-appearing. He is not toxic-appearing or diaphoretic.   HENT:      Nose: Nose normal.      Mouth/Throat:      Mouth: Mucous membranes are moist.   Neck:      Comments: Retained sutures R chest wall  Cardiovascular:      Rate and Rhythm: Normal rate. Rhythm irregular.      Comments: aflutter  Pulmonary:      Effort: Pulmonary effort is normal.      Breath sounds: Normal breath sounds.      Comments: O2 by NC  Abdominal:      General: Bowel sounds are normal.      Palpations: Abdomen is soft.      Tenderness: There is no abdominal tenderness.   Musculoskeletal:       Right lower leg: No edema.      Left lower leg: No edema.   Skin:     Comments: L sided [facility]   Neurological:      Mental Status: He is alert. He is disoriented.      Comments: Answers yes/no questions             Significant Labs: All pertinent labs within the past 24 hours have been reviewed.    Significant Imaging: I have reviewed all pertinent imaging results/findings within the past 24 hours.    Assessment/Plan:      * Uremic encephalopathy  -  on admission, last HD session about a month ago  - Nephrology consulted for HD  - continue to monitor mental status - improving   - low dose dialysis to avoid dialysis disequilibrium syndrome       Typical atrial flutter  Noted on 9/6/24. Converted back to NSR, then back to aflutter. No change with diltiazem  - started amio gtt on 9/8/24  - continue eliquis  - Cardiology consulted  - he cannot consent for his ROYCE/DCCV and family not answering the phone - son may arrive today       Thrush  - noted 9/6/24  - started IV fluconazole as he does not have the mental awareness to swish and swallow nystatin at this point and is not reliably tolerating pills    Thrombocytopenia  The likely etiology of thrombocytopenia is  unknown- potentially occult liver disease? . The patients 3 most recent labs are listed below.  Recent Labs     09/07/24  0411 09/08/24  0431 09/09/24  0449   PLT 62* 66* 72*       Plan  - Will transfuse if platelet count is <50k (if undergoing surgical procedure or have active bleeding).  - monitor daily  - continue eliquis for now       Gastric wall thickening  Noted on CT  - PPI IV ordered      Depression  Resume home antidepressant    Left loculated pleural effusion  - this has been present on prior hospitalizations  - discussed on last stay and family declined thoracentesis at that time  - developed fever on 9/8/24. No further fevers.     ACP (advance care planning)  Advance Care Planning    Date: 09/04/2024  Palliative consulted. Continue full  aggressive care. Time spent 5 minutes           Physical debility  Was advised to got to Skilled with daily PT/OT but family declined. Social work for discharge planning and PT eval. Fall risk. Nutrition consult for his malnutrition.   - palliative consulted  - family wants to continue full aggressive treatment  - PT, OT, ST consulted- previously suggested SNF, so suspect that will be recommendation again  - case management aware     Pressure injury of sacral region, stage 4  - present on admission  - healing   - wound care consulted: Local wound care to sacral wound with hydrocolloid dressing to promote closure of chronic wound and prevent soiling from incontinence.       Paroxysmal atrial fibrillation  Patient with Paroxysmal (<7 days) atrial fibrillation which is controlled currently with Beta Blocker and Amiodarone. [id number] Score: 1. anticoagulation indicated. Anticoagulation done with eliquis 2.5mg PO BID . CT head negative for mass or bleed. Fall risk in place.    History of CVA (cerebrovascular accident)  Noted.   - continue home eliquis, BP control    Anemia in ESRD (end-stage renal disease)  Anemia is likely due to chronic disease due to ESRD. Most recent hemoglobin and hematocrit are listed below.  Recent Labs     09/07/24  0411 09/08/24  0431 09/09/24  0449   HGB 9.6* 9.3* 8.8*   HCT 28.9* 29.9* 26.9*       Plan  - Monitor serial CBC: Daily  - Transfuse PRBC if patient becomes hemodynamically unstable, symptomatic or H/H drops below 7/21.  - EPO MWF      Essential hypertension  Chronic, controlled. Latest blood pressure and vitals reviewed-     Temp:  [98.2 °F (36.8 °C)-100.7 °F (38.2 °C)]   Pulse:  [74-83]   Resp:  [11-27]   BP: ()/(50-99)   SpO2:  [88 %-99 %] .   Home meds for hypertension were reviewed and noted below.   Hypertension Medications               metoprolol succinate (TOPROL-XL) 50 MG 24 hr tablet Take 1 tablet (50 mg total) by mouth once daily.            While in the hospital,  will manage blood pressure as follows; Continue home antihypertensive regimen   - continue metoprolol  - added amlodipine for poorly controlled BP    Will utilize p.r.n. blood pressure medication only if patient's blood pressure greater than  180/90  and he develops symptoms such as worsening chest pain or shortness of breath.    ESRD needing dialysis  - dialyzes via L THDC  - last outpatient HD session was 7/12/24 and last inpatient session was 8/7/24-- missed almost a month of dialysis because he refused to go  - he was uremic on presentation- - and hyperkalemic- K 7.2  - Nephrology [facility] group consulted  - received emergent dialysis on 9/4/24- hyperK resolved, BUN improved.  - Nephrology continues to follow along. Receiving low dose dialysis to avoid dialysis disequilibrium syndrome   - Palliative consulted as he has ESRD and refuses outpatient dialysis. Despite this, continue full code full care.     Controlled type 2 diabetes mellitus with chronic kidney disease on chronic dialysis, without long-term current use of insulin  Lab Results   Component Value Date    HGBA1C 6.2 (H) 09/04/2024     - ACHS with hypoglycemic protocol   - diabetic puree diet per speech      VTE Risk Mitigation (From admission, onward)           Ordered     apixaban tablet 2.5 mg  2 times daily         09/04/24 0525     IP VTE LOW RISK PATIENT  Once         09/04/24 0116     Place sequential compression device  Until discontinued         09/04/24 0116                    Discharge Planning   JAIME:      Code Status: Full Code   Is the patient medically ready for discharge?:     Reason for patient still in hospital (select all that apply): Patient trending condition  Discharge Plan A: Skilled Nursing Facility            Critical care time spent on the evaluation and treatment of severe organ dysfunction, review of pertinent labs and imaging studies, discussions with consulting providers and discussions with patient/family: 30  minutes.      Sury Mondragon MD  Department of Hospital Medicine   [facility] - Intensive Care

## 2024-09-09 NOTE — NURSING
[facility]  Nurses Note -- 4 Eyes      9/9/2024       Skin assessed on: Q Shift      [] No Pressure Injuries Present    []Prevention Measures Documented    [x] Yes LDA  for Pressure Injury Previously documented     [] Yes New Pressure Injury Discovered   [] LDA for New Pressure Injury Added      Attending RN:  Sara Ricardo RN     Second RN:  BETO Peres

## 2024-09-09 NOTE — PROVIDER TRANSFER
Mr Mahesh Cespedes is a 59 y.o. man with ESRD who was admitted with encephalopathy. He was noted to have severe uremia ( on admission), hyperkalemia. He was also caked with urine and stool. He was admitted to ICU, Nephrology Dr Mayen group consulted, and received emergent dialysis on 9/4/24. Social work notes he has outpatient HD chair but has not been to outpatient HD since 7/12/24. His last inpatient HD treatment was at [facility] on 8/7/24. His mental status has slightly improved. Palliative consulted due to patient's ESRD and HD nonadherence. Family wants to continue full code and full care; not interested in hospice care despite patient's unwillingness to adhere to dialysis. He is now on MWF HD. He developed Aflutter RVR; Cardiology cardioverted to NSR on 9/9/24. PT, OT, ST following. Anticipate SNF.    - monitor mental status- still quite fatigued  - PT, OT, ST recs  - SNF at [place]    Sury Mondragon MD  09/09/2024 12:24 PM      -No change in handoff  Segun Dinero MD

## 2024-09-09 NOTE — PLAN OF CARE
Changes in medical condition or discharge plan: Per attending, plan for cardioversion today.     CM spoke with pt's son, Guevara regarding SNF vs HH. Informed pt has been medically accepted to [facility] for snf.  Pt's son declined [facility], and would be agreeable for snf on the [facility]. CM explained placement is based on pt acceptance to facility and bed availability, pt's son verbalized understanding. Pt's son stated plan for pt discharge with Home health if a [facility] is not accepting.     Per SW note 9/4/24Keely, with [facility] patient is still on schedule for MWF @ 12:15 p.m. Patient's last visit to the center was 7/12/2024.  Patient will remain on the schedule until he gives notice that he no longer wants dialysis.      Pt's son confirm pt to [facility] at [facility].     Does patient need new DME? Per PT/OT to be determined by next level of care.     Follow up appts needed: PCP, cardiology     Medically stable: no    Estimated Discharge Date: unknown       09/09/24 1155   Discharge Reassessment   Assessment Type Discharge Planning Reassessment   Did the patient's condition or plan change since previous assessment? Yes   Discharge Plan discussed with: Adult children   Communicated JAIME with patient/caregiver Date not available/Unable to determine   Discharge Plan A Skilled Nursing Facility   Discharge Plan B Home Health;Home with family   Transition of Care Barriers None   Why the patient remains in the hospital Requires continued medical care   Post-Acute Status   Post-Acute Authorization Placement   Post-Acute Placement Status Pending medical clearance/testing   Discharge Delays None known at this time

## 2024-09-09 NOTE — ACP (ADVANCE CARE PLANNING)
Advance Care Planning     Date: 09/09/2024  Discussed Mr Cespedes's current status with his son Guevara and daughter Rima at bedside. Discussed that he must attend dialysis or he will not survive. They state that he did not go because he felt weak and fatigued. Discussed that this is from untreated renal failure. They state he was previously visited by a hospice nurse and was scared of hospice care and does not want it. They state he wants to continue dialysis. Thus he continues full code, full care, and need to confirm that outpatient dialysis has been arranged.     Time spent 16 minutes  Sury Mondragon MD  09/09/2024 10:44 AM
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
## 2024-09-09 NOTE — PT/OT/SLP PROGRESS
Speech Language Pathology      Mahesh Cespedes  MRN: [id number]    Patient not seen today secondary to NPO for procedure. Will follow as appropriate. Autumn Blackburn CCC-SLP

## 2024-09-09 NOTE — ASSESSMENT & PLAN NOTE
- this has been present on prior hospitalizations  - discussed on last stay and family declined thoracentesis at that time  - developed fever on 9/8/24. No further fevers.
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
## 2024-09-09 NOTE — ASSESSMENT & PLAN NOTE
Patient with Paroxysmal (<7 days) atrial fibrillation which is controlled currently with Beta Blocker and Amiodarone. [id number] Score: 1. anticoagulation indicated. Anticoagulation done with eliquis 2.5mg PO BID . CT head negative for mass or bleed. Fall risk in place.

## 2024-09-09 NOTE — TRANSFER OF CARE
"Anesthesia Transfer of Care Note    Patient: Mahesh Cespedes    Procedure(s) Performed: Procedure(s) (LRB):  Cardioversion or Defibrillation (N/A)    Patient location: Cath Lab    Anesthesia Type: MAC    Transport from OR: Transported from OR on 2-3 L/min O2 by NC with adequate spontaneous ventilation. Continuous ECG monitoring in transport. Continuous SpO2 monitoring in transport    Post pain: adequate analgesia    Post assessment: no apparent anesthetic complications and tolerated procedure well    Post vital signs: stable    Level of consciousness: awake    Nausea/Vomiting: no nausea/vomiting    Complications: none    Transfer of care protocol was followedComments: Recovery in OR 2 with cath lab nurse. Patient awake on 2 L nasal cannula. Vital signs stable.      Last vitals: Visit Vitals  BP (!) 91/55 (BP Location: Right arm, Patient Position: Lying)   Pulse 71   Temp 36.1 °C (97 °F) (Skin)   Resp 16   Ht 5' 7" (1.702 m)   Wt 61.2 kg (134 lb 14.7 oz)   SpO2 98%   BMI 21.13 kg/m²     "

## 2024-09-09 NOTE — ASSESSMENT & PLAN NOTE
The likely etiology of thrombocytopenia is  unknown- potentially occult liver disease? . The patients 3 most recent labs are listed below.  Recent Labs     09/07/24  0411 09/08/24  0431 09/09/24  0449   PLT 62* 66* 72*       Plan  - Will transfuse if platelet count is <50k (if undergoing surgical procedure or have active bleeding).  - monitor daily  - continue eliquis for now

## 2024-09-09 NOTE — NURSING
[facility]  Nurses Note -- 4 Eyes      9/9/2024       Skin assessed on: Q Shift      [] No Pressure Injuries Present    []Prevention Measures Documented    [x] Yes LDA  for Pressure Injury Previously documented     [] Yes New Pressure Injury Discovered   [] LDA for New Pressure Injury Added      Attending RN:  Casandra Allan, RN     Second RN:  Sara

## 2024-09-09 NOTE — HPI
HPI:  59 y.o. AAM with h/o CVA, ESRD (MWF via left tunneled HD catheter), Afib (on amiodarone and eliquis), NIDDM type2, HLD, Essential HTN ,depression with h/o suicidal ideations presents to the ER via EMS with family concerns for constipation and abdominal pain. Pt. Unable to give me history due to AMS due to uremia (). Reports that he missed 3 weeks of HD. Presented to the ER altered covered in feces and urine.     Apparently he was admitted here from - for AMS and again was noted to have missed 2 weeks of HD. CT head was negative for mass/bleed.  CT chest at that time noted loculated moderate left pleural effusion/consolidation with a large pericardial effusion 3.6cm and pulmonary edema. Was tx with abx and echo showed only a small/mod pericardial effusion with no cardiac tamponade. Nephro/pulm/ID consulted. Recommended IR to sample fluid but family deferred due to risk/benefit. He was cont. On abx VANC with NGT repeat cultures. Plan was to transfer to [facility] but daughter (javier)  refused and wanted to take him home with her on  per the discharge summary.      Today no family with with him in the ER and pt. Noted to be malnourished, desheveled and confused.      Labs noted for potassium of 7.2 and /Creatinin 25.8.  Bicarb 12. VB.23/39.5/35/16.7.  WBC 8.7 and H/H 8.6/26.       CT abd/pelvis with IV contrast:   1. Image quality degraded by technical factors discussed above.   2. Moderate/large volume of loculated left pleural fluid noting probable pleural thickening and heterogeneity of pleural fluid.  Correlation for underlying infectious process advised.  Left basilar atelectatic/consolidative change.   3. Right lower lobe patchy ground-glass attenuation with interlobular septal thickening which could be seen with edema, infection or non infectious inflammatory process.   4. Solid and ground-glass right lower lobe pulmonary nodules measuring 6 mm and 8 mm respectively.  Repeat  evaluation with chest CT in 3 months is advised to evaluate stability and/or resolution of these findings.   5. Cardiomegaly and large pericardial effusion.   6. Cholelithiasis.   7. Gastric wall thickening which could relate to nondistention although correlation for symptoms of gastritis advised.   8. Regions of large and small bowel wall thickening which could relate to nondistention and/or technical factors discussed above.  However, correlation for symptoms of enterocolitis advised.   9. Bladder wall thickening.  Correlation with urinalysis advised.   10. Multiple additional findings as above.      ED spoke with Nephrology and will plan for ICU admission and emergent HD.   We have been consulted for further management and admission to the ICU.      Because this patient's clinical condition is critically guarded and requires care in the ICU with emergent HD, care in an alternative location isn't clinically appropriate for the reasons stated above.           Overview/Hospital Course:  Mr Mahesh Cespedes is a 59 y.o. man with ESRD who was admitted with encephalopathy. He was noted to have severe uremia ( on admission), hyperkalemia. He was also caked with urine and stool. He was admitted to ICU, Nephrology Dr Mayen group consulted, and received emergent dialysis on 9/4/24. Social work notes he has outpatient HD chair but has not been to outpatient HD since 7/12/24. His last inpatient HD treatment was at [facility] on 8/7/24. His mental status has slightly improved. Palliative consulted due to patient's ESRD and HD nonadherence. Family wants to continue full code and full care; not interested in hospice care despite patient's unwillingness to adhere to dialysis. Family now not answering the phone. He is receiving low dose dialysis to avoid dialysis disequilibrium syndrome. He developed Aflutter RVR; did not convert with diltiazem so started amio gtt and continued home eliquis.      Interval History: Lying in  bed, awake, fatigued, did not eat much. Noted temp 100.7F.        Hx obtained from son - reports compliance with meds including eliquis  Patient mildly confused  A-flutter 80s on IV amiodarone  EKG A-flutter NSSTT changes - was NSR earlier this admission    Echo 8/2/24    Left Ventricle: The left ventricle is normal in size. There is moderate concentric hypertrophy. There is low normal systolic function with a visually estimated ejection fraction of 50 - 55%.    Right Ventricle: Mild right ventricular enlargement. Systolic function is normal.    Left Atrium: Left atrium is moderately dilated.    Right Atrium: Right atrium is moderately dilated.    Aortic Valve: There is mild aortic valve sclerosis.    Mitral Valve: There is mild regurgitation.    Tricuspid Valve: There is mild to moderate regurgitation.    Pulmonary Artery: The estimated pulmonary artery systolic pressure is 55 mmHg.    IVC/SVC: Intermediate venous pressure at 8 mmHg.    Pericardium: There is a small to moderate posterior effusion. No indication of cardiac tamponade.

## 2024-09-09 NOTE — PT/OT/SLP PROGRESS
Physical Therapy      Patient Name:  Mahesh Cespedes   MRN:  [id number]    Patient not seen today secondary to Off the floor for procedure( cardioversion) . Will follow-up as able later hour/day .

## 2024-09-09 NOTE — ASSESSMENT & PLAN NOTE
Chronic, controlled. Latest blood pressure and vitals reviewed-     Temp:  [98.2 °F (36.8 °C)-100.7 °F (38.2 °C)]   Pulse:  [74-83]   Resp:  [11-27]   BP: ()/(50-99)   SpO2:  [88 %-99 %] .   Home meds for hypertension were reviewed and noted below.   Hypertension Medications               metoprolol succinate (TOPROL-XL) 50 MG 24 hr tablet Take 1 tablet (50 mg total) by mouth once daily.            While in the hospital, will manage blood pressure as follows; Continue home antihypertensive regimen   - continue metoprolol  - added amlodipine for poorly controlled BP    Will utilize p.r.n. blood pressure medication only if patient's blood pressure greater than  180/90  and he develops symptoms such as worsening chest pain or shortness of breath.

## 2024-09-09 NOTE — PLAN OF CARE
SNF updates:    Accepted:  [facility]- pt's son declined placement     Denied:   [facility]    Pending Review:  [facility] Cano-referral emailed to Claudio ([email or web address])
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
## 2024-09-09 NOTE — ASSESSMENT & PLAN NOTE
Anemia is likely due to chronic disease due to ESRD. Most recent hemoglobin and hematocrit are listed below.  Recent Labs     09/07/24  0411 09/08/24  0431 09/09/24  0449   HGB 9.6* 9.3* 8.8*   HCT 28.9* 29.9* 26.9*       Plan  - Monitor serial CBC: Daily  - Transfuse PRBC if patient becomes hemodynamically unstable, symptomatic or H/H drops below 7/21.  - EPO MWF

## 2024-09-10 LAB
POCT GLUCOSE: 74 MG/DL (ref 70–110)
POCT GLUCOSE: 74 MG/DL (ref 70–110)

## 2024-09-10 PROCEDURE — 27000221 HC OXYGEN, UP TO 24 HOURS

## 2024-09-10 PROCEDURE — 63600175 PHARM REV CODE 636 W HCPCS: Performed by: HOSPITALIST

## 2024-09-10 PROCEDURE — 92526 ORAL FUNCTION THERAPY: CPT

## 2024-09-10 PROCEDURE — 25000003 PHARM REV CODE 250: Performed by: STUDENT IN AN ORGANIZED HEALTH CARE EDUCATION/TRAINING PROGRAM

## 2024-09-10 PROCEDURE — 63600175 PHARM REV CODE 636 W HCPCS: Performed by: INTERNAL MEDICINE

## 2024-09-10 PROCEDURE — 80100014 HC HEMODIALYSIS 1:1

## 2024-09-10 PROCEDURE — 94761 N-INVAS EAR/PLS OXIMETRY MLT: CPT

## 2024-09-10 PROCEDURE — 12000002 HC ACUTE/MED SURGE SEMI-PRIVATE ROOM

## 2024-09-10 PROCEDURE — 25000003 PHARM REV CODE 250: Performed by: HOSPITALIST

## 2024-09-10 PROCEDURE — 25000003 PHARM REV CODE 250: Performed by: INTERNAL MEDICINE

## 2024-09-10 RX ORDER — METOPROLOL SUCCINATE 50 MG/1
100 TABLET, EXTENDED RELEASE ORAL DAILY
Status: DISCONTINUED | OUTPATIENT
Start: 2024-09-10 | End: 2024-09-10 | Stop reason: SDUPTHER

## 2024-09-10 RX ORDER — METOPROLOL SUCCINATE 50 MG/1
100 TABLET, EXTENDED RELEASE ORAL DAILY
Status: DISCONTINUED | OUTPATIENT
Start: 2024-09-10 | End: 2024-09-20 | Stop reason: HOSPADM

## 2024-09-10 RX ADMIN — SEVELAMER CARBONATE 1600 MG: 800 TABLET, FILM COATED ORAL at 08:09

## 2024-09-10 RX ADMIN — AMIODARONE HYDROCHLORIDE 200 MG: 200 TABLET ORAL at 08:09

## 2024-09-10 RX ADMIN — PANTOPRAZOLE SODIUM 40 MG: 40 INJECTION, POWDER, FOR SOLUTION INTRAVENOUS at 08:09

## 2024-09-10 RX ADMIN — APIXABAN 2.5 MG: 2.5 TABLET, FILM COATED ORAL at 08:09

## 2024-09-10 RX ADMIN — METOPROLOL SUCCINATE 100 MG: 50 TABLET, EXTENDED RELEASE ORAL at 09:09

## 2024-09-10 RX ADMIN — ONDANSETRON 4 MG: 2 INJECTION INTRAMUSCULAR; INTRAVENOUS at 01:09

## 2024-09-10 RX ADMIN — SERTRALINE HYDROCHLORIDE 50 MG: 50 TABLET ORAL at 08:09

## 2024-09-10 RX ADMIN — POLYETHYLENE GLYCOL 3350 17 G: 17 POWDER, FOR SOLUTION ORAL at 08:09

## 2024-09-10 RX ADMIN — SEVELAMER CARBONATE 1600 MG: 800 TABLET, FILM COATED ORAL at 12:09

## 2024-09-10 RX ADMIN — FLUCONAZOLE 100 MG: 2 INJECTION, SOLUTION INTRAVENOUS at 12:09

## 2024-09-10 RX ADMIN — ALLOPURINOL 100 MG: 100 TABLET ORAL at 08:09

## 2024-09-10 NOTE — PLAN OF CARE
Referral packet for SNF sent to [facility]. KARLY attempted f/u with Ruthann @ [facility].  She was not available -- CM will f/u.  KARLY to send updates to [facility].

## 2024-09-10 NOTE — PT/OT/SLP PROGRESS
Speech Language Pathology Treatment    Patient Name:  Mahesh Cespedes   MRN:  [id number]  Admitting Diagnosis: Uremic encephalopathy    Recommendations:                 General Recommendations:  Follow-up not indicated  Diet recommendations:  Puree Diet - IDDSI Level 4, Liquid Diet Level: Thin liquids - IDDSI Level 0   Aspiration Precautions: 1 bite/sip at a time   General Precautions: Standard,    Communication strategies:  none    Assessment:     Mahesh Cespedes is a 59 y.o. male with dx of Uremic encephalopathy he presents with oral dysphagia impacted by variable JR and decreased cognition.     Subjective   Pt refusing puree trials at this time. Nursing reporting he vomited about an hour after intake of boost.   Patient goals: unable to report    Pain/Comfort:   0    Respiratory Status: Nasal cannula, flow 2 L/min    Objective:     Has the patient been evaluated by SLP for swallowing?   Yes  Keep patient NPO? No     Pt accepted minimal traisl of thin liquids, no overt s/s of aspiration, he refused puree triasl however nursing reports he tolerates puree with thin liquids without overt s/s of aspiration. Secondary to variable lethargy and weakness puree with thin liquids is likely least restrictive diet, he is unable to participate in meaningful dysphagia therapy at this time.     Goals:   Multidisciplinary Problems       SLP Goals       Not on file              Multidisciplinary Problems (Resolved)          Problem: SLP    Goal Priority Disciplines Outcome   SLP Goal   (Resolved)    Medium SLP Met   Description: Goals:  1. Pt will participate in on-going assessment of swallowing function. -Ongoing  2. Pt will tolerate a pureed diet with thin liquids w/o any overt s/s of aspiration.                            Plan:     Plan of Care expires:   9/10  Plan of Care reviewed with:  patient   SLP Follow-Up:  no     Discharge recommendations:  Moderate Intensity Therapy   Barriers to Discharge:  None    Time Tracking:     SLP  Treatment Date:   09/10/24  Speech Start Time:  1422  Speech Stop Time:  1430     Speech Total Time (min):  8 min    Billable Minutes: Treatment Swallowing Dysfunction 8    09/10/2024

## 2024-09-10 NOTE — PT/OT/SLP PROGRESS
Occupational Therapy      Patient Name:  Mahesh Cespedes   MRN:  [id number]    Patient not seen today secondary to Dialysis. Will follow up tomorrow.    9/10/2024
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
## 2024-09-10 NOTE — NURSING
[facility] [place] 4 [place]      9/10/2024       Skin assessed on: Q Shift      [x] No Pressure Injuries Present    [x]Prevention Measures Documented    [] Yes LDA  for Pressure Injury Previously documented     [] Yes New Pressure Injury Discovered   [] LDA for New Pressure Injury Added      Attending RN:  Leslie Bird RN     Second RN:  Sara PÉREZ

## 2024-09-10 NOTE — ANESTHESIA POSTPROCEDURE EVALUATION
Anesthesia Post Evaluation    Patient: Mahesh Cespedes    Procedure(s) Performed: Procedure(s) (LRB):  Cardioversion or Defibrillation (N/A)    Final Anesthesia Type: MAC      Patient location during evaluation: PACU  Patient participation: No - Unable to Participate, Other Reason (see comments) (Encephalopathy)  Level of consciousness: confused, responds to stimulation and obtunded/minimal responses  Post-procedure vital signs: reviewed and stable  Pain management: adequate  Airway patency: patent    PONV status at discharge: No PONV  Anesthetic complications: no      Cardiovascular status: blood pressure returned to baseline  Respiratory status: unassisted  Hydration status: euvolemic  Follow-up not needed.              Vitals Value Taken Time   /72 09/10/24 0831   Temp 37.1 °C (98.8 °F) 09/10/24 0810   Pulse 82 09/10/24 0840   Resp 14 09/10/24 0840   SpO2 99 % 09/10/24 0840   Vitals shown include unfiled device data.      No case tracking events are documented in the log.      Pain/Meet Score: Pain Rating Prior to Med Admin: 2 (9/9/2024  9:46 PM)
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
## 2024-09-10 NOTE — PT/OT/SLP PROGRESS
Physical Therapy      Patient Name:  Mahesh Cespedes   MRN:  [id number]    Patient not seen today secondary to Dialysis. Will follow-up .

## 2024-09-10 NOTE — NURSING
[facility], [facility]  Nurses Note -- 4 Eyes      9/9/2024       Skin assessed on: Q Shift      [] No Pressure Injuries Present    []Prevention Measures Documented    [x] Yes LDA  for Pressure Injury Previously documented     [] Yes New Pressure Injury Discovered   [] LDA for New Pressure Injury Added      Attending RN:  Sara Ricardo RN     Second RN:  BETO Guajardo

## 2024-09-10 NOTE — PLAN OF CARE
Per nursing, patient tolerating current diet without overt s/s of aspiration, some earlier emesis. Will sign off for now as this is least restrictive diet until medical status improves. Autumn Blackburn, CCC-SLP

## 2024-09-10 NOTE — PROGRESS NOTES
Hd completed  UF 1.5 liters net       09/10/24 1145   Vital Signs   Pulse 63   Resp 14   BP (!) 90/52   MAP (mmHg) 69        Hemodialysis Catheter 02/20/24 1642 left internal jugular   Placement Date/Time: 02/20/24 1642   Present Prior to Hospital Arrival?: Yes  Hand Hygiene: Performed  Barrier Precautions: Performed  Skin Antisepsis: ChloraPrep  Hemodialysis Catheter Type: Tunneled catheter  Location: left internal jugular  Cathete...   Dressing Status Clean;Dry;Intact   Venous Patency/Care deaccessed;flushed w/o difficulty   Arterial Patency/Care deaccessed;flushed w/o difficulty   During Hemodialysis Assessment   Blood Flow Rate (mL/min) 350 mL/min   Dialysate Flow Rate (mL/min) 700 ml/min   Ultrafiltration Rate (mL/Hr) 722 mL/Hr   Arteriovenous Lines Secure Yes   Arterial Pressure (mmHg) -140 mmHg   Venous Pressure (mmHg) 140   UF Removed (mL) 2000 mL   Venous Line in Air Detector Yes   Intake (mL) 250 mL   Transducer Dry Yes   Access Visible Yes    notified of access issue? N/A   Heparin given? N/A   Intra-Hemodialysis Comments nadn   Post-Hemodialysis Assessment   Rinseback Volume (mL) 250 mL   Blood Volume Processed (Liters) 72.3 L   Dialyzer Clearance Clear   Duration of Treatment 210 minutes   Additional Fluid Intake (mL) 500 mL   Total UF (mL) 2000 mL   Net Fluid Removal 1500   Patient Response to Treatment kira well   Post-Hemodialysis Comments Hd completed

## 2024-09-10 NOTE — PLAN OF CARE
Pt remains in Icu as border. Pt had dialysis today on 2 L NC. Pt with 1 BM this shift. No falls, injuries, or skin breakdown.     Problem: Hemodialysis  Goal: Safe, Effective Therapy Delivery  Outcome: Not Progressing  Goal: Effective Tissue Perfusion  Outcome: Not Progressing  Goal: Absence of Infection Signs and Symptoms  Outcome: Not Progressing     Problem: Adult Inpatient Plan of Care  Goal: Plan of Care Review  Outcome: Not Progressing  Goal: Patient-Specific Goal (Individualized)  Outcome: Not Progressing  Goal: Absence of Hospital-Acquired Illness or Injury  Outcome: Not Progressing  Goal: Optimal Comfort and Wellbeing  Outcome: Not Progressing  Goal: Readiness for Transition of Care  Outcome: Not Progressing     Problem: Coping Ineffective  Goal: Effective Coping  Outcome: Not Progressing     Problem: Diabetes Comorbidity  Goal: Blood Glucose Level Within Targeted Range  Outcome: Not Progressing     Problem: Infection  Goal: Absence of Infection Signs and Symptoms  Outcome: Not Progressing     Problem: Wound  Goal: Optimal Coping  Outcome: Not Progressing  Goal: Optimal Functional Ability  Outcome: Not Progressing  Goal: Absence of Infection Signs and Symptoms  Outcome: Not Progressing  Goal: Improved Oral Intake  Outcome: Not Progressing  Goal: Optimal Pain Control and Function  Outcome: Not Progressing  Goal: Skin Health and Integrity  Outcome: Not Progressing  Goal: Optimal Wound Healing  Outcome: Not Progressing     Problem: Skin Injury Risk Increased  Goal: Skin Health and Integrity  Outcome: Not Progressing

## 2024-09-10 NOTE — PROGRESS NOTES
[facility] Medicine  Progress Note    Patient Name: Mahesh Cespedes  MRN: [id number]  Patient Class: IP- Inpatient   Admission Date: 9/3/2024  Length of Stay: 6 days  Attending Physician: Segun Dinero MD  Primary Care Provider: Cruz Hernandez MD        Subjective:     Principal Problem:Uremic encephalopathy        HPI:  59 y.o. AAM with h/o CVA, ESRD (MWF via left tunneled HD catheter), Afib (on amiodarone and eliquis), NIDDM type2, HLD, Essential HTN ,depression with h/o suicidal ideations presents to the ER via EMS with family concerns for constipation and abdominal pain. Pt. Unable to give me history due to AMS due to uremia (). Reports that he missed 3 weeks of HD. Presented to the ER altered covered in feces and urine.    Apparently he was admitted here from - for AMS and again was noted to have missed 2 weeks of HD. CT head was negative for mass/bleed.  CT chest at that time noted loculated moderate left pleural effusion/consolidation with a large pericardial effusion 3.6cm and pulmonary edema. Was tx with abx and echo showed only a small/mod pericardial effusion with no cardiac tamponade. Nephro/pulm/ID consulted. Recommended IR to sample fluid but family deferred due to risk/benefit. He was cont. On abx VANC with NGT repeat cultures. Plan was to transfer to [facility] but daughter (javier)  refused and wanted to take him home with her on  per the discharge summary.     Today no family with with him in the ER and pt. Noted to be malnourished, desheveled and confused.     Labs noted for potassium of 7.2 and /Creatinin 25.8.  Bicarb 12. VB.23/39.5/35/16.7.  WBC 8.7 and H/H 8.6/26.      CT abd/pelvis with IV contrast:   1. Image quality degraded by technical factors discussed above.   2. Moderate/large volume of loculated left pleural fluid noting probable pleural thickening and heterogeneity of pleural fluid.  Correlation for underlying infectious process advised.   Left basilar atelectatic/consolidative change.   3. Right lower lobe patchy ground-glass attenuation with interlobular septal thickening which could be seen with edema, infection or non infectious inflammatory process.   4. Solid and ground-glass right lower lobe pulmonary nodules measuring 6 mm and 8 mm respectively.  Repeat evaluation with chest CT in 3 months is advised to evaluate stability and/or resolution of these findings.   5. Cardiomegaly and large pericardial effusion.   6. Cholelithiasis.   7. Gastric wall thickening which could relate to nondistention although correlation for symptoms of gastritis advised.   8. Regions of large and small bowel wall thickening which could relate to nondistention and/or technical factors discussed above.  However, correlation for symptoms of enterocolitis advised.   9. Bladder wall thickening.  Correlation with urinalysis advised.   10. Multiple additional findings as above.     ED spoke with Nephrology and will plan for ICU admission and emergent HD.   We have been consulted for further management and admission to the ICU.     Because this patient's clinical condition is critically guarded and requires care in the ICU with emergent HD, care in an alternative location isn't clinically appropriate for the reasons stated above.         Overview/Hospital Course:  Mr Mahesh Cespedes is a 59 y.o. man with ESRD who was admitted with encephalopathy. He was noted to have severe uremia ( on admission), hyperkalemia. He was also caked with urine and stool. He was admitted to ICU, Nephrology Dr Mayen group consulted, and received emergent dialysis on 9/4/24. Social work notes he has outpatient HD chair but has not been to outpatient HD since 7/12/24. His last inpatient HD treatment was at [facility] on 8/7/24. His mental status has slightly improved. Palliative consulted due to patient's ESRD and HD nonadherence. Family wants to continue full code and full care; not interested  in hospice care despite patient's unwillingness to adhere to dialysis. Family now not answering the phone. He is receiving low dose dialysis to avoid dialysis disequilibrium syndrome. He developed Aflutter RVR; Cardiology cardioverted to NSR on 9/9/24. PT, OT, ST following. Anticipate SNF.    Interval History:  NAEON.  No new issues.   CC- Gen Fatigue  All questions answered and updates on care given.       ROS:  General: Negative for fevers   Cardiac: Negative for chest pain   Pulmonary: Negative for wheezing  GI: Negative for abdominal distention      Vitals:    09/10/24 0830 09/10/24 0845 09/10/24 0900 09/10/24 0915   BP: (!) 166/90 (!) 150/72 119/62 (!) 132/54   Pulse: 82 76 69 66   Resp: (!) 21 16 20 20   Temp:       TempSrc:       SpO2: (!) 74% 98% 97% 99%   Weight:       Height:              Body mass index is 21.13 kg/m².      PHYSICAL EXAM:  GENERAL APPEARANCE: alert and cooperative.     HEAD: NC/AT  CARDIAC: There is no cyanosis or pallor.   LUNGS: No apparent wheezing or stridor.  ABDOMEN: Non-distended. No guarding.  MSK: No joint erythema or tenderness.   EXTREMITIES: No significant new deformity or new joint abnormality.   NEUROLOGICAL: CN II-XII grossly intact.   SKIN: No lesions or eruptions.  PSYCHIATRIC: No tangential speech. No Hyperactive features.        Recent Results (from the past 24 hour(s))   POCT glucose    Collection Time: 09/09/24 12:17 PM   Result Value Ref Range    POCT Glucose 89 70 - 110 mg/dL   POCT glucose    Collection Time: 09/09/24  5:26 PM   Result Value Ref Range    POCT Glucose 97 70 - 110 mg/dL   POCT glucose    Collection Time: 09/10/24  6:07 AM   Result Value Ref Range    POCT Glucose 74 70 - 110 mg/dL       Microbiology Results (last 7 days)       Procedure Component Value Units Date/Time    Blood culture [[id number]] Collected: 09/08/24 1509    Order Status: Completed Specimen: Blood from Peripheral, Hand, Left Updated: 09/09/24 1709     Blood Culture, Routine No Growth  to date      No Growth to date    Blood culture #1 **CANNOT BE ORDERED STAT** [[id number]] Collected: 09/03/24 2353    Order Status: Completed Specimen: Blood from Peripheral, Forearm, Left Updated: 09/08/24 0303     Blood Culture, Routine No Growth after 4 days.    Blood culture #2 **CANNOT BE ORDERED STAT** [[id number]] Collected: 09/03/24 2355    Order Status: Completed Specimen: Blood from Peripheral, Upper Arm, Left Updated: 09/08/24 0303     Blood Culture, Routine No Growth after 4 days.             Imaging Results              CT Abdomen Pelvis With IV Contrast NO Oral Contrast (Final result)  Result time 09/04/24 02:20:32      Final result by Caron Fuchs MD (09/04/24 02:20:32)                   Impression:      1. Image quality degraded by technical factors discussed above.  2. Moderate/large volume of loculated left pleural fluid noting probable pleural thickening and heterogeneity of pleural fluid.  Correlation for underlying infectious process advised.  Left basilar atelectatic/consolidative change.  3. Right lower lobe patchy ground-glass attenuation with interlobular septal thickening which could be seen with edema, infection or non infectious inflammatory process.  4. Solid and ground-glass right lower lobe pulmonary nodules measuring 6 mm and 8 mm respectively.  Repeat evaluation with chest CT in 3 months is advised to evaluate stability and/or resolution of these findings.  5. Cardiomegaly and large pericardial effusion.  6. Cholelithiasis.  7. Gastric wall thickening which could relate to nondistention although correlation for symptoms of gastritis advised.  8. Regions of large and small bowel wall thickening which could relate to nondistention and/or technical factors discussed above.  However, correlation for symptoms of enterocolitis advised.  9. Bladder wall thickening.  Correlation with urinalysis advised.  10. Multiple additional findings as above.      Electronically signed by: Caron  MD Jef  Date:    09/04/2024  Time:    02:20               Narrative:    EXAMINATION:  CT ABDOMEN PELVIS WITH IV CONTRAST    CLINICAL HISTORY:  Sepsis;    TECHNIQUE:  Low dose axial images, sagittal and coronal reformations were obtained from the lung bases to the pubic symphysis following the IV administration of 50 mL of Omnipaque 350 .  No oral contrast.    COMPARISON:  CT chest, abdomen and pelvis 05/19/2024, chest CT 08/01/2024    FINDINGS:  Please note image quality is degraded by streak artifact from the patient's upper extremities, patient positioning and paucity of intra-abdominal fat.    There is redemonstration of a moderate/large volume of loculated left pleural fluid with apparent pleural thickening noting the fluid is heterogeneous.  Correlation for underlying infectious process advised.  There is adjacent left basilar atelectatic/consolidative change.  There is trace right pleural fluid.  There is right patchy ground-glass opacities/attenuation.  There are right lobe pulmonary nodules present including a 6 mm solid pulmonary nodule and a 8 mm ground-glass pulmonary nodule.  The heart is markedly enlarged.  There is a large pericardial effusion, similar to prior study.  There is reflux of contrast into the IVC which can be seen with elevated right heart pressures.    Evaluation of solid organ parenchyma is limited due to technical factors discussed above.  The liver demonstrates no focal abnormality.  There are multiple calcified stones in the gallbladder lumen.  The spleen, pancreas and adrenal glands are within normal limits.    There is high-density material within the stomach.  There is gastric wall thickening which could relate to nondistention or gastritis.  Previously identified low attenuating collection along the lesser curvature of the stomach is not as well delineated on current study.  The visualized loops of large and small bowel demonstrate no definite evidence of obstruction.   Questionable regions of small and large bowel wall thickening which could relate to nondistention and aforementioned artifact/technical factors although correlation for symptoms of neuro colitis advised.  There is no free intraperitoneal air.  Nonspecific diffuse mesenteric haziness.    The kidneys are atrophic noting prominent renal vascular calcifications.  No definite evidence of hydronephrosis.  The urinary bladder demonstrates circumferential wall thickening.  Correlation with urinalysis advised.  The prostate is slightly prominent in size.    The abdominal aorta is nonaneurysmal.  There is significant calcific atherosclerosis of the abdominal aorta and visceral abdominal vasculature.  The visualized osseous structures appear unchanged from prior study.                                       CT Head Without Contrast (Final result)  Result time 09/04/24 01:54:27      Final result by Caron Fuchs MD (09/04/24 01:54:27)                   Impression:      Image quality is degraded by patient motion artifact/positioning.  Allowing for this, no CT evidence of acute intracranial abnormality. Clinical correlation and further evaluation as warranted.      Electronically signed by: Caron Fuchs MD  Date:    09/04/2024  Time:    01:54               Narrative:    EXAMINATION:  CT HEAD WITHOUT CONTRAST    CLINICAL HISTORY:  AMS;    TECHNIQUE:  Low dose axial images were obtained through the head.  Coronal and sagittal reformations were also performed. Contrast was not administered.    COMPARISON:  Head CT 08/01/2024    FINDINGS:  Please note image quality is degraded by patient motion artifact.  Allowing for this, there is no definite evidence of acute intracranial hemorrhage.  There is mild generalized cerebral volume loss.  The ventricular system is unchanged in size and configuration without evidence of hydrocephalus or midline shift.  There is hypoattenuation throughout the supratentorial white matter, similar to  prior study.  Findings are nonspecific but likely reflect sequela of chronic microvascular ischemic change.  There is atherosclerotic plaquing of intracranial vasculature.  The basal cisterns are patent.  The visualized mastoid air cells and paranasal sinuses are clear of acute process.  There is a partially visualized periapical lucency involving a right maxillary molar, unchanged.  Correlation with dental exam advised.  The visualized bones of the calvarium demonstrate no acute osseous abnormality.                                       X-Ray Chest AP Portable (Final result)  Result time 09/04/24 00:39:00      Final result by Marilyn Butterfield MD (09/04/24 00:39:00)                   Impression:      As above.      Electronically signed by: Marilyn Butterfield MD  Date:    09/04/2024  Time:    00:39               Narrative:    EXAMINATION:  XR CHEST AP PORTABLE    CLINICAL HISTORY:  End stage renal disease    TECHNIQUE:  Single frontal view of the chest was performed.    COMPARISON:  08/01/2024.    FINDINGS:  Cardiac silhouette is markedly enlarged but stable in size.  There is possible mild pulmonary edema.  No pneumothorax or large pleural effusion seen.  Left-sided central venous catheter is in stable position.  No pneumothorax.                                                 Assessment/Plan:      * Uremic encephalopathy  -  on admission, last HD session about a month ago  - Nephrology consulted for HD  - continue to monitor mental status - improving   - low dose dialysis to avoid dialysis disequilibrium syndrome       Typical atrial flutter  Noted on 9/6/24. Converted back to NSR, then back to aflutter. No change with diltiazem  - started amio gtt on 9/8/24  - continue eliquis  - Cardiology consulted  - he cannot consent for his ROYCE/DCCV and family not answering the phone - son may arrive today       Thrush  - noted 9/6/24  - started IV fluconazole as he does not have the mental awareness to swish and  swallow nystatin at this point and is not reliably tolerating pills    Thrombocytopenia  The likely etiology of thrombocytopenia is  unknown- potentially occult liver disease? . The patients 3 most recent labs are listed below.  Recent Labs     09/07/24  0411 09/08/24  0431 09/09/24  0449   PLT 62* 66* 72*       Plan  - Will transfuse if platelet count is <50k (if undergoing surgical procedure or have active bleeding).  - monitor daily  - continue eliquis for now       Gastric wall thickening  Noted on CT  - PPI IV ordered      Depression  Resume home antidepressant    Left loculated pleural effusion  - this has been present on prior hospitalizations  - discussed on last stay and family declined thoracentesis at that time  - developed fever on 9/8/24. No further fevers.     ACP (advance care planning)  Advance Care Planning    Date: 09/04/2024  Palliative consulted. Continue full aggressive care. Time spent 5 minutes           Physical debility  Was advised to got to Skilled with daily PT/OT but family declined. Social work for discharge planning and PT eval. Fall risk. Nutrition consult for his malnutrition.   - palliative consulted  - family wants to continue full aggressive treatment  - PT, OT, ST consulted- previously suggested SNF, so suspect that will be recommendation again  - case management aware     Pressure injury of sacral region, stage 4  - present on admission  - healing   - wound care consulted: Local wound care to sacral wound with hydrocolloid dressing to promote closure of chronic wound and prevent soiling from incontinence.       Paroxysmal atrial fibrillation  Patient with Paroxysmal (<7 days) atrial fibrillation which is controlled currently with Beta Blocker and Amiodarone. [id number] Score: 1. anticoagulation indicated. Anticoagulation done with eliquis 2.5mg PO BID . CT head negative for mass or bleed. Fall risk in place.    History of CVA (cerebrovascular accident)  Noted.   - continue home  eliquis, BP control    Anemia in ESRD (end-stage renal disease)  Anemia is likely due to chronic disease due to ESRD. Most recent hemoglobin and hematocrit are listed below.  Recent Labs     09/07/24  0411 09/08/24  0431 09/09/24  0449   HGB 9.6* 9.3* 8.8*   HCT 28.9* 29.9* 26.9*       Plan  - Monitor serial CBC: Daily  - Transfuse PRBC if patient becomes hemodynamically unstable, symptomatic or H/H drops below 7/21.  - EPO MWF      Essential hypertension  Chronic, controlled. Latest blood pressure and vitals reviewed-     Temp:  [98.2 °F (36.8 °C)-100.7 °F (38.2 °C)]   Pulse:  [74-83]   Resp:  [11-27]   BP: ()/(50-99)   SpO2:  [88 %-99 %] .   Home meds for hypertension were reviewed and noted below.   Hypertension Medications               metoprolol succinate (TOPROL-XL) 50 MG 24 hr tablet Take 1 tablet (50 mg total) by mouth once daily.            While in the hospital, will manage blood pressure as follows; Continue home antihypertensive regimen   - continue metoprolol  - added amlodipine for poorly controlled BP    Will utilize p.r.n. blood pressure medication only if patient's blood pressure greater than  180/90  and he develops symptoms such as worsening chest pain or shortness of breath.    ESRD needing dialysis  - dialyzes via [facility]  - last outpatient HD session was 7/12/24 and last inpatient session was 8/7/24-- missed almost a month of dialysis because he refused to go  - he was uremic on presentation- - and hyperkalemic- K 7.2  - Nephrology [facility] group consulted  - received emergent dialysis on 9/4/24- hyperK resolved, BUN improved.  - Nephrology continues to follow along. Receiving low dose dialysis to avoid dialysis disequilibrium syndrome   - Palliative consulted as he has ESRD and refuses outpatient dialysis. Despite this, continue full code full care.     Controlled type 2 diabetes mellitus with chronic kidney disease on chronic dialysis, without long-term current use of insulin  Lab  Results   Component Value Date    HGBA1C 6.2 (H) 09/04/2024     - ACHS with hypoglycemic protocol   - diabetic puree diet per speech      VTE Risk Mitigation (From admission, onward)           Ordered     apixaban tablet 2.5 mg  2 times daily         09/04/24 0525     IP VTE LOW RISK PATIENT  Once         09/04/24 0116     Place sequential compression device  Until discontinued         09/04/24 0116                    Discharge Planning   JAIME:      Code Status: Full Code   Is the patient medically ready for discharge?:     Reason for patient still in hospital (select all that apply): Patient trending condition  Discharge Plan A: Skilled Nursing Facility   Discharge Delays: None known at this time        Critical care time spent on the evaluation and treatment of severe organ dysfunction, review of pertinent labs and imaging studies, discussions with consulting providers and discussions with patient/family: 30 minutes.      Segun Dinero MD  Department of Hospital Medicine   [facility] - [place] - Intensive Care

## 2024-09-11 LAB
ANION GAP SERPL CALC-SCNC: 13 MMOL/L (ref 8–16)
ANISOCYTOSIS BLD QL SMEAR: SLIGHT
BASOPHILS # BLD AUTO: 0.02 K/UL (ref 0–0.2)
BASOPHILS NFR BLD: 0.4 % (ref 0–1.9)
BUN SERPL-MCNC: 29 MG/DL (ref 8–23)
CALCIUM SERPL-MCNC: 7.9 MG/DL (ref 8.7–10.5)
CHLORIDE SERPL-SCNC: 96 MMOL/L (ref 95–110)
CO2 SERPL-SCNC: 23 MMOL/L (ref 23–29)
CREAT SERPL-MCNC: 4.2 MG/DL (ref 0.5–1.4)
DIFFERENTIAL METHOD BLD: ABNORMAL
EOSINOPHIL # BLD AUTO: 0.1 K/UL (ref 0–0.5)
EOSINOPHIL NFR BLD: 1.1 % (ref 0–8)
ERYTHROCYTE [DISTWIDTH] IN BLOOD BY AUTOMATED COUNT: 17.6 % (ref 11.5–14.5)
EST. GFR  (NO RACE VARIABLE): 15 ML/MIN/1.73 M^2
GLUCOSE SERPL-MCNC: 88 MG/DL (ref 70–110)
HCT VFR BLD AUTO: 27.4 % (ref 40–54)
HGB BLD-MCNC: 8.9 G/DL (ref 14–18)
IMM GRANULOCYTES # BLD AUTO: 0.05 K/UL (ref 0–0.04)
IMM GRANULOCYTES NFR BLD AUTO: 1.1 % (ref 0–0.5)
LYMPHOCYTES # BLD AUTO: 0.8 K/UL (ref 1–4.8)
LYMPHOCYTES NFR BLD: 16.3 % (ref 18–48)
MCH RBC QN AUTO: 27.1 PG (ref 27–31)
MCHC RBC AUTO-ENTMCNC: 32.5 G/DL (ref 32–36)
MCV RBC AUTO: 84 FL (ref 82–98)
MONOCYTES # BLD AUTO: 0.2 K/UL (ref 0.3–1)
MONOCYTES NFR BLD: 5 % (ref 4–15)
NEUTROPHILS # BLD AUTO: 3.5 K/UL (ref 1.8–7.7)
NEUTROPHILS NFR BLD: 76.1 % (ref 38–73)
NRBC BLD-RTO: 2 /100 WBC
OHS QRS DURATION: 82 MS
OHS QTC CALCULATION: 443 MS
PHOSPHATE SERPL-MCNC: 3 MG/DL (ref 2.7–4.5)
PLATELET # BLD AUTO: 52 K/UL (ref 150–450)
PLATELET BLD QL SMEAR: ABNORMAL
PMV BLD AUTO: ABNORMAL FL (ref 9.2–12.9)
POIKILOCYTOSIS BLD QL SMEAR: SLIGHT
POTASSIUM SERPL-SCNC: 3.9 MMOL/L (ref 3.5–5.1)
RBC # BLD AUTO: 3.28 M/UL (ref 4.6–6.2)
SODIUM SERPL-SCNC: 132 MMOL/L (ref 136–145)
TARGETS BLD QL SMEAR: ABNORMAL
TOXIC GRANULES BLD QL SMEAR: PRESENT
WBC # BLD AUTO: 4.61 K/UL (ref 3.9–12.7)

## 2024-09-11 PROCEDURE — 63600175 PHARM REV CODE 636 W HCPCS: Mod: JZ,JG | Performed by: HOSPITALIST

## 2024-09-11 PROCEDURE — 25000003 PHARM REV CODE 250: Performed by: HOSPITALIST

## 2024-09-11 PROCEDURE — 63600175 PHARM REV CODE 636 W HCPCS: Performed by: INTERNAL MEDICINE

## 2024-09-11 PROCEDURE — 27000221 HC OXYGEN, UP TO 24 HOURS

## 2024-09-11 PROCEDURE — 99900035 HC TECH TIME PER 15 MIN (STAT)

## 2024-09-11 PROCEDURE — 12000002 HC ACUTE/MED SURGE SEMI-PRIVATE ROOM

## 2024-09-11 PROCEDURE — 36415 COLL VENOUS BLD VENIPUNCTURE: CPT | Performed by: HOSPITALIST

## 2024-09-11 PROCEDURE — 85025 COMPLETE CBC W/AUTO DIFF WBC: CPT | Performed by: HOSPITALIST

## 2024-09-11 PROCEDURE — 80048 BASIC METABOLIC PNL TOTAL CA: CPT | Performed by: HOSPITALIST

## 2024-09-11 PROCEDURE — 84100 ASSAY OF PHOSPHORUS: CPT | Performed by: HOSPITALIST

## 2024-09-11 PROCEDURE — 25000003 PHARM REV CODE 250: Performed by: INTERNAL MEDICINE

## 2024-09-11 PROCEDURE — 63600175 PHARM REV CODE 636 W HCPCS: Performed by: HOSPITALIST

## 2024-09-11 RX ADMIN — PANTOPRAZOLE SODIUM 40 MG: 40 INJECTION, POWDER, FOR SOLUTION INTRAVENOUS at 08:09

## 2024-09-11 RX ADMIN — EPOETIN ALFA-EPBX 20000 UNITS: 10000 INJECTION, SOLUTION INTRAVENOUS; SUBCUTANEOUS at 09:09

## 2024-09-11 RX ADMIN — APIXABAN 2.5 MG: 2.5 TABLET, FILM COATED ORAL at 08:09

## 2024-09-11 RX ADMIN — POLYETHYLENE GLYCOL 3350 17 G: 17 POWDER, FOR SOLUTION ORAL at 08:09

## 2024-09-11 RX ADMIN — FLUCONAZOLE 100 MG: 2 INJECTION, SOLUTION INTRAVENOUS at 12:09

## 2024-09-11 NOTE — PROGRESS NOTES
[facility]  Progress Note    Patient Name: Mahesh Cespedes  MRN: [id number]  Patient Class: IP- Inpatient   Admission Date: 9/3/2024  Length of Stay: 7 days  Attending Physician: Nolberto Zhong MD  Primary Care Provider: Cruz Hernandez MD        Subjective:     Principal Problem:Uremic encephalopathy        HPI:  59 y.o. AAM with h/o CVA, ESRD (MWF via left tunneled HD catheter), Afib (on amiodarone and eliquis), NIDDM type2, HLD, Essential HTN ,depression with h/o suicidal ideations presents to the ER via EMS with family concerns for constipation and abdominal pain. Pt. Unable to give me history due to AMS due to uremia (). Reports that he missed 3 weeks of HD. Presented to the ER altered covered in feces and urine.    Apparently he was admitted here from - for AMS and again was noted to have missed 2 weeks of HD. CT head was negative for mass/bleed.  CT chest at that time noted loculated moderate left pleural effusion/consolidation with a large pericardial effusion 3.6cm and pulmonary edema. Was tx with abx and echo showed only a small/mod pericardial effusion with no cardiac tamponade. Nephro/pulm/ID consulted. Recommended IR to sample fluid but family deferred due to risk/benefit. He was cont. On abx VANC with NGT repeat cultures. Plan was to transfer to [facility] but daughter (javier)  refused and wanted to take him home with her on  per the discharge summary.     Today no family with with him in the ER and pt. Noted to be malnourished, desheveled and confused.     Labs noted for potassium of 7.2 and /Creatinin 25.8.  Bicarb 12. VB.23/39.5/35/16.7.  WBC 8.7 and H/H 8.6/26.      CT abd/pelvis with IV contrast:   1. Image quality degraded by technical factors discussed above.   2. Moderate/large volume of loculated left pleural fluid noting probable pleural thickening and heterogeneity of pleural fluid.  Correlation for underlying infectious process  advised.  Left basilar atelectatic/consolidative change.   3. Right lower lobe patchy ground-glass attenuation with interlobular septal thickening which could be seen with edema, infection or non infectious inflammatory process.   4. Solid and ground-glass right lower lobe pulmonary nodules measuring 6 mm and 8 mm respectively.  Repeat evaluation with chest CT in 3 months is advised to evaluate stability and/or resolution of these findings.   5. Cardiomegaly and large pericardial effusion.   6. Cholelithiasis.   7. Gastric wall thickening which could relate to nondistention although correlation for symptoms of gastritis advised.   8. Regions of large and small bowel wall thickening which could relate to nondistention and/or technical factors discussed above.  However, correlation for symptoms of enterocolitis advised.   9. Bladder wall thickening.  Correlation with urinalysis advised.   10. Multiple additional findings as above.     ED spoke with Nephrology and will plan for ICU admission and emergent HD.   We have been consulted for further management and admission to the ICU.     Because this patient's clinical condition is critically guarded and requires care in the ICU with emergent HD, care in an alternative location isn't clinically appropriate for the reasons stated above.         Overview/Hospital Course:  Mr Mahesh Cespedes is a 59 y.o. man with ESRD who was admitted with encephalopathy. He was noted to have severe uremia ( on admission), hyperkalemia. He was also caked with urine and stool. He was admitted to ICU, Nephrology Dr Mayen group consulted, and received emergent dialysis on 9/4/24. Social work notes he has outpatient HD chair but has not been to outpatient HD since 7/12/24. His last inpatient HD treatment was at [facility] on 8/7/24. His mental status has slightly improved. Palliative consulted due to patient's ESRD and HD nonadherence. Family wants to continue full code and full care; not  interested in hospice care despite patient's unwillingness to adhere to dialysis. Family now not answering the phone. He is receiving low dose dialysis to avoid dialysis disequilibrium syndrome. He developed Aflutter RVR; Cardiology cardioverted to NSR on 9/9/24. PT, OT, ST following. Anticipate SNF.    Interval History: no acute issues, he is doing okay. He only took eliquis this am. Platelets continue to trend down, will hold for tonight and recheck in AM.    Review of Systems  Objective:     Vital Signs (Most Recent):  Temp: 98.3 °F (36.8 °C) (09/11/24 0400)  Pulse: 60 (09/11/24 1000)  Resp: (!) 27 (09/11/24 1000)  BP: (!) 144/67 (09/11/24 1000)  SpO2: (!) 77 % (09/11/24 1000) Vital Signs (24h Range):  Temp:  [97.8 °F (36.6 °C)-98.3 °F (36.8 °C)] 98.3 °F (36.8 °C)  Pulse:  [50-91] 60  Resp:  [10-27] 27  SpO2:  [77 %-100 %] 77 %  BP: ()/(45-70) 144/67     Weight: 61.2 kg (134 lb 14.7 oz)  Body mass index is 21.13 kg/m².    Intake/Output Summary (Last 24 hours) at 9/11/2024 1016  Last data filed at 9/11/2024 0900  Gross per 24 hour   Intake 798.2 ml   Output 2000 ml   Net -1201.8 ml         Physical Exam  Vitals and nursing note reviewed.   Constitutional:       Appearance: He is ill-appearing. He is not toxic-appearing or diaphoretic.   HENT:      Nose: Nose normal.      Mouth/Throat:      Mouth: Mucous membranes are moist.   Neck:      Comments: Retained sutures R chest wall  Cardiovascular:      Rate and Rhythm: Normal rate. Rhythm irregular.      Comments: aflutter  Pulmonary:      Effort: Pulmonary effort is normal.      Breath sounds: Normal breath sounds.      Comments: O2 by NC  Abdominal:      General: Bowel sounds are normal.      Palpations: Abdomen is soft.      Tenderness: There is no abdominal tenderness.   Musculoskeletal:      Right lower leg: No edema.      Left lower leg: No edema.   Skin:     Comments: L sided THDC   Neurological:      Mental Status: He is alert. He is disoriented.       Comments: Answers yes/no questions             Significant Labs: All pertinent labs within the past 24 hours have been reviewed.    Significant Imaging: I have reviewed all pertinent imaging results/findings within the past 24 hours.    Assessment/Plan:      * Uremic encephalopathy  -  on admission, last HD session about a month ago  - Nephrology consulted for HD  - continue to monitor mental status - improving   - low dose dialysis to avoid dialysis disequilibrium syndrome       Typical atrial flutter  Noted on 9/6/24. Converted back to NSR, then back to aflutter. No change with diltiazem  - started amio gtt on 9/8/24  - continue eliquis  - Cardiology consulted  - he cannot consent for his ROYCE/DCCV and family not answering the phone      Thrush  - noted 9/6/24  - started IV fluconazole as he does not have the mental awareness to swish and swallow nystatin at this point and is not reliably tolerating pills    Thrombocytopenia  The likely etiology of thrombocytopenia is  unknown- potentially occult liver disease? . The patients 3 most recent labs are listed below.  Recent Labs     09/09/24  0449 09/11/24  0130   PLT 72* 52*       Plan  - Will transfuse if platelet count is <50k (if undergoing surgical procedure or have active bleeding).  - monitor daily  - continue eliquis for now - hold eliquis now that it's 52, if improves in AM will resume      Gastric wall thickening  Noted on CT  - PPI IV ordered      Depression  Resume home antidepressant    Left loculated pleural effusion  - this has been present on prior hospitalizations  - discussed on last stay and family declined thoracentesis at that time  - developed fever on 9/8/24. No further fevers.     ACP (advance care planning)  Advance Care Planning    Date: 09/04/2024  Palliative consulted. Continue full aggressive care. Time spent 5 minutes           Physical debility  Was advised to got to Skilled with daily PT/OT but family declined. Social work for  discharge planning and PT eval. Fall risk. Nutrition consult for his malnutrition.   - palliative consulted  - family wants to continue full aggressive treatment  - PT, OT, ST consulted- previously suggested SNF, so suspect that will be recommendation again  - case management aware     Pressure injury of sacral region, stage 4  - present on admission  - healing   - wound care consulted: Local wound care to sacral wound with hydrocolloid dressing to promote closure of chronic wound and prevent soiling from incontinence.       Paroxysmal atrial fibrillation  Patient with Paroxysmal (<7 days) atrial fibrillation which is controlled currently with Beta Blocker and Amiodarone. [id number] Score: 1. anticoagulation indicated. Anticoagulation done with eliquis 2.5mg PO BID . CT head negative for mass or bleed. Fall risk in place.    History of CVA (cerebrovascular accident)  Noted.   - continue home eliquis, BP control  - hold eliquis for now    Anemia in ESRD (end-stage renal disease)  Anemia is likely due to chronic disease due to ESRD. Most recent hemoglobin and hematocrit are listed below.  Recent Labs     09/09/24  0449 09/11/24  0130   HGB 8.8* 8.9*   HCT 26.9* 27.4*       Plan  - Monitor serial CBC: Daily  - Transfuse PRBC if patient becomes hemodynamically unstable, symptomatic or H/H drops below 7/21.  - EPO MWF      Essential hypertension  Chronic, controlled. Latest blood pressure and vitals reviewed-     Temp:  [98.2 °F (36.8 °C)-100.7 °F (38.2 °C)]   Pulse:  [74-83]   Resp:  [11-27]   BP: ()/(50-99)   SpO2:  [88 %-99 %] .   Home meds for hypertension were reviewed and noted below.   Hypertension Medications               metoprolol succinate (TOPROL-XL) 50 MG 24 hr tablet Take 1 tablet (50 mg total) by mouth once daily.            While in the hospital, will manage blood pressure as follows; Continue home antihypertensive regimen   - continue metoprolol  - added amlodipine for poorly controlled BP    Will  utilize p.r.n. blood pressure medication only if patient's blood pressure greater than  180/90  and he develops symptoms such as worsening chest pain or shortness of breath.    ESRD needing dialysis  - dialyzes via L THDC  - last outpatient HD session was 7/12/24 and last inpatient session was 8/7/24-- missed almost a month of dialysis because he refused to go  - he was uremic on presentation- - and hyperkalemic- K 7.2  - Nephrology [facility] group consulted  - received emergent dialysis on 9/4/24- hyperK resolved, BUN improved.  - Nephrology continues to follow along. Receiving low dose dialysis to avoid dialysis disequilibrium syndrome   - Palliative consulted as he has ESRD and refuses outpatient dialysis. Despite this, continue full code full care.     Controlled type 2 diabetes mellitus with chronic kidney disease on chronic dialysis, without long-term current use of insulin  Lab Results   Component Value Date    HGBA1C 6.2 (H) 09/04/2024     - ACHS with hypoglycemic protocol   - diabetic puree diet per speech      VTE Risk Mitigation (From admission, onward)           Ordered     IP VTE LOW RISK PATIENT  Once         09/04/24 0116     Place sequential compression device  Until discontinued         09/04/24 0116                    Discharge Planning   JAIME:      Code Status: Full Code   Is the patient medically ready for discharge?:     Reason for patient still in hospital (select all that apply): Treatment  Discharge Plan A: Skilled Nursing Facility   Discharge Delays: None known at this time          Nolberto Zhong MD  Department of Hospital Medicine   [facility] - Intensive Care
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
## 2024-09-11 NOTE — PROGRESS NOTES
[facility] [place]es [place] -- 4 [place]      9/11/2024       Skin assessed on: Q Shift      [] No Pressure Injuries Present    []Prevention Measures Documented    [x] Yes LDA  for Pressure Injury Previously documented     [] Yes New Pressure Injury Discovered   [] LDA for New Pressure Injury Added      Attending RN:  Heidy Leon RN     Second RN:  Sara PÉREZ

## 2024-09-11 NOTE — ASSESSMENT & PLAN NOTE
The likely etiology of thrombocytopenia is  unknown- potentially occult liver disease? . The patients 3 most recent labs are listed below.  Recent Labs     09/09/24  0449 09/11/24  0130   PLT 72* 52*       Plan  - Will transfuse if platelet count is <50k (if undergoing surgical procedure or have active bleeding).  - monitor daily  - continue eliquis for now - hold eliquis now that it's 52, if improves in AM will resume

## 2024-09-11 NOTE — SUBJECTIVE & OBJECTIVE
Interval History: no acute issues, he is doing okay. He only took eliquis this am. Platelets continue to trend down, will hold for tonight and recheck in AM.    Review of Systems  Objective:     Vital Signs (Most Recent):  Temp: 98.3 °F (36.8 °C) (09/11/24 0400)  Pulse: 60 (09/11/24 1000)  Resp: (!) 27 (09/11/24 1000)  BP: (!) 144/67 (09/11/24 1000)  SpO2: (!) 77 % (09/11/24 1000) Vital Signs (24h Range):  Temp:  [97.8 °F (36.6 °C)-98.3 °F (36.8 °C)] 98.3 °F (36.8 °C)  Pulse:  [50-91] 60  Resp:  [10-27] 27  SpO2:  [77 %-100 %] 77 %  BP: ()/(45-70) 144/67     Weight: 61.2 kg (134 lb 14.7 oz)  Body mass index is 21.13 kg/m².    Intake/Output Summary (Last 24 hours) at 9/11/2024 1016  Last data filed at 9/11/2024 0900  Gross per 24 hour   Intake 798.2 ml   Output 2000 ml   Net -1201.8 ml         Physical Exam  Vitals and nursing note reviewed.   Constitutional:       Appearance: He is ill-appearing. He is not toxic-appearing or diaphoretic.   HENT:      Nose: Nose normal.      Mouth/Throat:      Mouth: Mucous membranes are moist.   Neck:      Comments: Retained sutures R chest wall  Cardiovascular:      Rate and Rhythm: Normal rate. Rhythm irregular.      Comments: aflutter  Pulmonary:      Effort: Pulmonary effort is normal.      Breath sounds: Normal breath sounds.      Comments: O2 by NC  Abdominal:      General: Bowel sounds are normal.      Palpations: Abdomen is soft.      Tenderness: There is no abdominal tenderness.   Musculoskeletal:      Right lower leg: No edema.      Left lower leg: No edema.   Skin:     Comments: L sided THDC   Neurological:      Mental Status: He is alert. He is disoriented.      Comments: Answers yes/no questions             Significant Labs: All pertinent labs within the past 24 hours have been reviewed.    Significant Imaging: I have reviewed all pertinent imaging results/findings within the past 24 hours.

## 2024-09-11 NOTE — ASSESSMENT & PLAN NOTE
Anemia is likely due to chronic disease due to ESRD. Most recent hemoglobin and hematocrit are listed below.  Recent Labs     09/09/24  0449 09/11/24  0130   HGB 8.8* 8.9*   HCT 26.9* 27.4*       Plan  - Monitor serial CBC: Daily  - Transfuse PRBC if patient becomes hemodynamically unstable, symptomatic or H/H drops below 7/21.  - EPO MWF

## 2024-09-11 NOTE — NURSING
[facility], [facility]  Nurses Note -- 4 Eyes      9/10/2024       Skin assessed on: Q Shift      [] No Pressure Injuries Present    []Prevention Measures Documented    [x] Yes LDA  for Pressure Injury Previously documented     [] Yes New Pressure Injury Discovered   [] LDA for New Pressure Injury Added      Attending RN:  Sara Ricardo RN     Second RN:  BETO Nelson

## 2024-09-11 NOTE — PLAN OF CARE
No acute events overnight. NSR. Still lethargic but nods/gestures appropriately.     Problem: Adult Inpatient Plan of Care  Goal: Plan of Care Review  Outcome: Progressing  Goal: Patient-Specific Goal (Individualized)  Outcome: Progressing  Goal: Absence of Hospital-Acquired Illness or Injury  Outcome: Progressing  Goal: Optimal Comfort and Wellbeing  Outcome: Progressing  Goal: Readiness for Transition of Care  Outcome: Progressing     Problem: Hemodialysis  Goal: Safe, Effective Therapy Delivery  Outcome: Progressing  Goal: Effective Tissue Perfusion  Outcome: Progressing  Goal: Absence of Infection Signs and Symptoms  Outcome: Progressing
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
## 2024-09-12 LAB — BACTERIA BLD CULT: NORMAL

## 2024-09-12 PROCEDURE — 97530 THERAPEUTIC ACTIVITIES: CPT

## 2024-09-12 PROCEDURE — 99497 ADVNCD CARE PLAN 30 MIN: CPT | Mod: 25,,, | Performed by: INTERNAL MEDICINE

## 2024-09-12 PROCEDURE — 94761 N-INVAS EAR/PLS OXIMETRY MLT: CPT

## 2024-09-12 PROCEDURE — 27000221 HC OXYGEN, UP TO 24 HOURS

## 2024-09-12 PROCEDURE — 63600175 PHARM REV CODE 636 W HCPCS: Performed by: HOSPITALIST

## 2024-09-12 PROCEDURE — 63600175 PHARM REV CODE 636 W HCPCS: Performed by: INTERNAL MEDICINE

## 2024-09-12 PROCEDURE — 12000002 HC ACUTE/MED SURGE SEMI-PRIVATE ROOM

## 2024-09-12 PROCEDURE — 25000003 PHARM REV CODE 250: Performed by: INTERNAL MEDICINE

## 2024-09-12 PROCEDURE — 99233 SBSQ HOSP IP/OBS HIGH 50: CPT | Mod: ,,, | Performed by: INTERNAL MEDICINE

## 2024-09-12 PROCEDURE — 25000003 PHARM REV CODE 250: Performed by: HOSPITALIST

## 2024-09-12 RX ADMIN — FLUCONAZOLE 100 MG: 2 INJECTION, SOLUTION INTRAVENOUS at 01:09

## 2024-09-12 RX ADMIN — PANTOPRAZOLE SODIUM 40 MG: 40 INJECTION, POWDER, FOR SOLUTION INTRAVENOUS at 08:09

## 2024-09-12 RX ADMIN — ONDANSETRON 4 MG: 2 INJECTION INTRAMUSCULAR; INTRAVENOUS at 03:09

## 2024-09-12 RX ADMIN — SEVELAMER CARBONATE 1600 MG: 800 TABLET, FILM COATED ORAL at 05:09

## 2024-09-12 NOTE — ASSESSMENT & PLAN NOTE
The likely etiology of thrombocytopenia is  unknown- potentially occult liver disease? . The patients 3 most recent labs are listed below.  Recent Labs     09/11/24  0130   PLT 52*       Plan  - Will transfuse if platelet count is <50k (if undergoing surgical procedure or have active bleeding).  - monitor daily  - continue eliquis for now - hold eliquis now that it's 52, if improves in AM will resume

## 2024-09-12 NOTE — PLAN OF CARE
Pt remains in ICU with transfer orders. Pt answering questions. Pt drank novasource renal then had an episode of emesis an hour later, Zofran given. Pt drinking water and apple juice, tolerating small sips. Full bed bath given, linen & gown changed, incontinence care provided. Free of falls, injury, and skin breakdown. Plan of care reviewed.    Problem: Hemodialysis  Goal: Safe, Effective Therapy Delivery  Outcome: Progressing  Goal: Effective Tissue Perfusion  Outcome: Progressing  Goal: Absence of Infection Signs and Symptoms  Outcome: Progressing     Problem: Adult Inpatient Plan of Care  Goal: Plan of Care Review  Outcome: Progressing  Goal: Patient-Specific Goal (Individualized)  Outcome: Progressing  Goal: Absence of Hospital-Acquired Illness or Injury  Outcome: Progressing  Goal: Optimal Comfort and Wellbeing  Outcome: Progressing  Goal: Readiness for Transition of Care  Outcome: Progressing     Problem: Coping Ineffective  Goal: Effective Coping  Outcome: Progressing     Problem: Diabetes Comorbidity  Goal: Blood Glucose Level Within Targeted Range  Outcome: Progressing     Problem: Infection  Goal: Absence of Infection Signs and Symptoms  Outcome: Progressing     Problem: Wound  Goal: Optimal Coping  Outcome: Progressing  Goal: Optimal Functional Ability  Outcome: Progressing  Goal: Absence of Infection Signs and Symptoms  Outcome: Progressing  Goal: Improved Oral Intake  Outcome: Progressing  Goal: Optimal Pain Control and Function  Outcome: Progressing  Goal: Skin Health and Integrity  Outcome: Progressing  Goal: Optimal Wound Healing  Outcome: Progressing     Problem: Skin Injury Risk Increased  Goal: Skin Health and Integrity  Outcome: Progressing

## 2024-09-12 NOTE — SUBJECTIVE & OBJECTIVE
Interval History: spoke with pt and then his son; see ACP section of plan     Medications:  Continuous Infusions:  Scheduled Meds:   allopurinoL  100 mg Oral Daily    amiodarone  200 mg Oral BID    epoetin luli-epbx  20,000 Units Intravenous Every Mon, Wed, Fri    fluconazole (DIFLUCAN) IV (PEDS and ADULTS)  100 mg Intravenous Q24H    metoprolol succinate  100 mg Oral Daily    pantoprazole  40 mg Intravenous Daily    polyethylene glycol  17 g Oral Daily    sertraline  50 mg Oral Daily    sevelamer carbonate  1,600 mg Oral TID WM    tamsulosin  0.4 mg Oral Nightly     PRN Meds:  Current Facility-Administered Medications:     0.9% NaCl, , Intravenous, PRN    acetaminophen, 650 mg, Oral, Q4H PRN    dextrose 10%, 12.5 g, Intravenous, PRN    dextrose 10%, 12.5 g, Intravenous, PRN    dextrose 10%, 25 g, Intravenous, PRN    dextrose 10%, 25 g, Intravenous, PRN    glucagon (human recombinant), 1 mg, Intramuscular, PRN    hydrALAZINE, 10 mg, Intravenous, Q6H PRN    labetalol, 10 mg, Intravenous, Q6H PRN    melatonin, 6 mg, Oral, Nightly PRN    ondansetron, 4 mg, Intravenous, Q6H PRN    sodium chloride 0.9%, 250 mL, Intravenous, PRN    sodium chloride 0.9%, 10 mL, Intravenous, Q12H PRN    sodium chloride 0.9%, 10 mL, Intravenous, PRN    Objective:     Vital Signs (Most Recent):  Temp: 97.6 °F (36.4 °C) (09/12/24 1200)  Pulse: 67 (09/12/24 1200)  Resp: (!) 23 (09/12/24 1200)  BP: (!) 105/54 (09/12/24 1200)  SpO2: 100 % (09/12/24 1200) Vital Signs (24h Range):  Temp:  [96.8 °F (36 °C)-99.1 °F (37.3 °C)] 97.6 °F (36.4 °C)  Pulse:  [52-76] 67  Resp:  [9-38] 23  SpO2:  [92 %-100 %] 100 %  BP: ()/(50-69) 105/54     Weight: 61.2 kg (134 lb 14.7 oz)  Body mass index is 21.13 kg/m².       Physical Exam  Constitutional:       Comments: Chronically ill-appearing, alert, able to participate in discussion    Neurological:      Mental Status: He is alert.       Significant Labs: All pertinent labs within the past 24 hours have been  "reviewed.  CBC:   Recent Labs   Lab 09/11/24  0130   WBC 4.61   HGB 8.9*   HCT 27.4*   MCV 84   PLT 52*     BMP:  No results for input(s): "GLU", "NA", "K", "CL", "CO2", "BUN", "CREATININE", "CALCIUM", "MG" in the last 24 hours.  LFT:  Lab Results   Component Value Date    AST 33 09/04/2024    ALKPHOS 164 (H) 09/04/2024    BILITOT 0.9 09/04/2024     Albumin:   Albumin   Date Value Ref Range Status   09/04/2024 3.2 (L) 3.5 - 5.2 g/dL Final     Protein:   Total Protein   Date Value Ref Range Status   09/04/2024 8.1 6.0 - 8.4 g/dL Final     Lactic acid:   Lab Results   Component Value Date    LACTATE 1.0 09/03/2024    LACTATE 1.1 08/01/2024       Significant Imaging: I have reviewed all pertinent imaging results/findings within the past 24 hours.  " no

## 2024-09-12 NOTE — NURSING
[facility] [facility] [place]      9/12/2024       Skin assessed on: Q Shift      [x] No Pressure Injuries Present    [x]Prevention Measures Documented    [] Yes LDA  for Pressure Injury Previously documented     [] Yes New Pressure Injury Discovered   [] LDA for New Pressure Injury Added      Attending RN:  Gabriella Colvin RN     Second RN:  BETO Rubio

## 2024-09-12 NOTE — ASSESSMENT & PLAN NOTE
- Nephrology consulted; pt with long pattern of HD non-adherence, despite involving family, providing transportation resources, and offering SNF placement at discharge.   - Illness trajectory education provided to family; they would like for pt to continue with HD for now, though pt has improvement in his mental status and told Dr Nolberto Zhong and myself today that he no longer wants to do HD

## 2024-09-12 NOTE — NURSING
[facility] [place]es [place] -- 4 Eyes      9/11/2024       Skin assessed on: Q Shift      [] No Pressure Injuries Present    []Prevention Measures Documented    [x] Yes LDA  for Pressure Injury Previously documented     [] Yes New Pressure Injury Discovered   [] LDA for New Pressure Injury Added      Attending RN:  Joanne Allan RN     Second RN:  BETO Toscano
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
## 2024-09-12 NOTE — ASSESSMENT & PLAN NOTE
Was advised to got to [facility] with daily PT/OT but family declined. Social work for discharge planning and PT eval. Fall risk. Nutrition consult for his malnutrition.   - palliative consulted  - family wants to continue full aggressive treatment  - PT, OT, ST consulted- SNF
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
## 2024-09-12 NOTE — PLAN OF CARE
[facility] - Intensive Care  Discharge Reassessment    Primary Care Provider: Cruz Hernandez MD    Expected Discharge Date: pending    Changes in medical condition or discharge plan: Discharge plan is for SNF, however, the only provider willing to accept patient is on the [place] and the family wants to remain on the [facility].  Last review by a provider was 9/10/2024 when additional information was sent to [facility].  A packet was also faxed to [facility] same date with no response.    Does patient need new DME? None    Follow up appts needed: PCP    Medically stable:  Patient's treatment in ICU is ongoing.  He is only oriented to self.  PT/OT eval completed with SNF recommendation.  Palliative Medicine team has spoken with family regarding goals of care.  Family requests full treatment vs. hospice.  Patient's dialysis chair remains available.  Past hx indicates that family has not ensured that patient attends treatment consistently.  Also, treatment team providers have expressed concern regarding patient's home care as it appears to be lacking in the area of  basic care based on patient's presentation upon at admit.     Estimated Discharge Date: TBD      Reassessment (most recent)       Discharge Reassessment - 09/12/24 1702          Discharge Reassessment    Assessment Type Discharge Planning Reassessment     Did the patient's condition or plan change since previous assessment? No     Discharge Plan discussed with: --   Chart review    Communicated JAIME with patient/caregiver Other (see comments)     Discharge Plan A Skilled Nursing Facility     Discharge Plan B Home Health     DME Needed Upon Discharge  none     Transition of Care Barriers Does not adhere to care plan;Previous protective services     Why the patient remains in the hospital Requires continued medical care        Post-Acute Status    Coverage Financial Class: Medicare  Payor: MEDICARE - MEDICARE PART A & B -     Discharge Delays None  known at this time

## 2024-09-12 NOTE — PROGRESS NOTES
[facility] Medicine  Progress Note    Patient Name: Mahesh Cespedes  MRN: [id number]  Patient Class: IP- Inpatient   Admission Date: 9/3/2024  Length of Stay: 8 days  Attending Physician: Nolberto Zhong MD  Primary Care Provider: Cruz Hernandez MD        Subjective:     Principal Problem:Uremic encephalopathy        HPI:  59 y.o. AAM with h/o CVA, ESRD (MWF via left tunneled HD catheter), Afib (on amiodarone and eliquis), NIDDM type2, HLD, Essential HTN ,depression with h/o suicidal ideations presents to the ER via EMS with family concerns for constipation and abdominal pain. Pt. Unable to give me history due to AMS due to uremia (). Reports that he missed 3 weeks of HD. Presented to the ER altered covered in feces and urine.    Apparently he was admitted here from - for AMS and again was noted to have missed 2 weeks of HD. CT head was negative for mass/bleed.  CT chest at that time noted loculated moderate left pleural effusion/consolidation with a large pericardial effusion 3.6cm and pulmonary edema. Was tx with abx and echo showed only a small/mod pericardial effusion with no cardiac tamponade. Nephro/pulm/ID consulted. Recommended IR to sample fluid but family deferred due to risk/benefit. He was cont. On abx VANC with NGT repeat cultures. Plan was to transfer to [facility] but daughter (javier)  refused and wanted to take him home with her on  per the discharge summary.     Today no family with with him in the ER and pt. Noted to be malnourished, desheveled and confused.     Labs noted for potassium of 7.2 and /Creatinin 25.8.  Bicarb 12. VB.23/39.5/35/16.7.  WBC 8.7 and H/H 8.6/26.      CT abd/pelvis with IV contrast:   1. Image quality degraded by technical factors discussed above.   2. Moderate/large volume of loculated left pleural fluid noting probable pleural thickening and heterogeneity of pleural fluid.  Correlation for underlying infectious process  advised.  Left basilar atelectatic/consolidative change.   3. Right lower lobe patchy ground-glass attenuation with interlobular septal thickening which could be seen with edema, infection or non infectious inflammatory process.   4. Solid and ground-glass right lower lobe pulmonary nodules measuring 6 mm and 8 mm respectively.  Repeat evaluation with chest CT in 3 months is advised to evaluate stability and/or resolution of these findings.   5. Cardiomegaly and large pericardial effusion.   6. Cholelithiasis.   7. Gastric wall thickening which could relate to nondistention although correlation for symptoms of gastritis advised.   8. Regions of large and small bowel wall thickening which could relate to nondistention and/or technical factors discussed above.  However, correlation for symptoms of enterocolitis advised.   9. Bladder wall thickening.  Correlation with urinalysis advised.   10. Multiple additional findings as above.     ED spoke with Nephrology and will plan for ICU admission and emergent HD.   We have been consulted for further management and admission to the ICU.     Because this patient's clinical condition is critically guarded and requires care in the ICU with emergent HD, care in an alternative location isn't clinically appropriate for the reasons stated above.         Overview/Hospital Course:  Mr Mahesh Cespedes is a 59 y.o. man with ESRD who was admitted with encephalopathy. He was noted to have severe uremia ( on admission), hyperkalemia. He was also caked with urine and stool. He was admitted to ICU, Nephrology Dr Mayen group consulted, and received emergent dialysis on 9/4/24. Social work notes he has outpatient HD chair but has not been to outpatient HD since 7/12/24. His last inpatient HD treatment was at [facility] on 8/7/24. His mental status has slightly improved. Palliative consulted due to patient's ESRD and HD nonadherence. Family wants to continue full code and full care; not  interested in hospice care despite patient's unwillingness to adhere to dialysis. Family now not answering the phone. He is receiving low dose dialysis to avoid dialysis disequilibrium syndrome. He developed Aflutter RVR; Cardiology cardioverted to NSR on 9/9/24. PT, OT, ST following. Anticipate SNF.    Interval History: no acute issues, did not want to take his medications this morning. He says that the medicine makes him sick.     Review of Systems  Objective:     Vital Signs (Most Recent):  Temp: 98.8 °F (37.1 °C) (09/12/24 0400)  Pulse: 61 (09/12/24 0600)  Resp: 17 (09/12/24 0600)  BP: (!) 103/59 (09/12/24 0600)  SpO2: 99 % (09/12/24 0600) Vital Signs (24h Range):  Temp:  [96.8 °F (36 °C)-99.1 °F (37.3 °C)] 98.8 °F (37.1 °C)  Pulse:  [57-68] 61  Resp:  [9-34] 17  SpO2:  [94 %-100 %] 99 %  BP: ()/(50-69) 103/59     Weight: 61.2 kg (134 lb 14.7 oz)  Body mass index is 21.13 kg/m².    Intake/Output Summary (Last 24 hours) at 9/12/2024 0752  Last data filed at 9/12/2024 0300  Gross per 24 hour   Intake 352.74 ml   Output 200 ml   Net 152.74 ml         Physical Exam  Vitals and nursing note reviewed.   Constitutional:       Appearance: He is ill-appearing. He is not toxic-appearing or diaphoretic.   HENT:      Nose: Nose normal.      Mouth/Throat:      Mouth: Mucous membranes are moist.   Neck:      Comments: Retained sutures R chest wall  Cardiovascular:      Rate and Rhythm: Normal rate and regular rhythm.      Comments: Sinus with PVCs  Pulmonary:      Effort: Pulmonary effort is normal.      Breath sounds: Normal breath sounds.      Comments: O2 by NC  Abdominal:      General: Bowel sounds are normal.      Palpations: Abdomen is soft.      Tenderness: There is no abdominal tenderness.   Musculoskeletal:      Right lower leg: No edema.      Left lower leg: No edema.   Skin:     Comments: L sided THDC   Neurological:      Mental Status: He is alert. He is disoriented.             Significant Labs: All  pertinent labs within the past 24 hours have been reviewed.    Significant Imaging: I have reviewed all pertinent imaging results/findings within the past 24 hours.    Assessment/Plan:      * Uremic encephalopathy  -  on admission, last HD session about a month ago  - Nephrology consulted for HD  - continue to monitor mental status - improving   - low dose dialysis to avoid dialysis disequilibrium syndrome       Typical atrial flutter  Noted on 9/6/24. Converted back to NSR, then back to aflutter. No change with diltiazem  - started amio gtt on 9/8/24  - continue eliquis  - Cardiology consulted  - he cannot consent for his ROYCE/DCCV and family not answering the phone      Thrush  - noted 9/6/24  - started IV fluconazole as he does not have the mental awareness to swish and swallow nystatin at this point and is not reliably tolerating pills    Thrombocytopenia  The likely etiology of thrombocytopenia is  unknown- potentially occult liver disease? . The patients 3 most recent labs are listed below.  Recent Labs     09/11/24  0130   PLT 52*       Plan  - Will transfuse if platelet count is <50k (if undergoing surgical procedure or have active bleeding).  - monitor daily  - continue eliquis for now - hold eliquis now that it's 52, if improves in AM will resume      Gastric wall thickening  Noted on CT  - PPI IV ordered      Depression  Resume home antidepressant    Left loculated pleural effusion  - this has been present on prior hospitalizations  - discussed on last stay and family declined thoracentesis at that time  - developed fever on 9/8/24. No further fevers.     ACP (advance care planning)  Advance Care Planning    Date: 09/04/2024  Palliative consulted. Continue full aggressive care. Time spent 5 minutes           Physical debility  Was advised to got to Skilled with daily PT/OT but family declined. Social work for discharge planning and PT eval. Fall risk. Nutrition consult for his malnutrition.   -  palliative consulted  - family wants to continue full aggressive treatment  - PT, OT, ST consulted- SNF    Pressure injury of sacral region, stage 4  - present on admission  - healing   - wound care consulted: Local wound care to sacral wound with hydrocolloid dressing to promote closure of chronic wound and prevent soiling from incontinence.       Paroxysmal atrial fibrillation  Patient with Paroxysmal (<7 days) atrial fibrillation which is controlled currently with Beta Blocker and Amiodarone. [id number] Score: 1. anticoagulation indicated. Anticoagulation done with eliquis 2.5mg PO BID . CT head negative for mass or bleed. Fall risk in place.    History of CVA (cerebrovascular accident)  Noted.   - continue home eliquis, BP control  - hold eliquis for now    Anemia in ESRD (end-stage renal disease)  Anemia is likely due to chronic disease due to ESRD. Most recent hemoglobin and hematocrit are listed below.  Recent Labs     09/09/24  0449 09/11/24  0130   HGB 8.8* 8.9*   HCT 26.9* 27.4*       Plan  - Monitor serial CBC: Daily  - Transfuse PRBC if patient becomes hemodynamically unstable, symptomatic or H/H drops below 7/21.  - EPO MWF      Essential hypertension  Chronic, controlled. Latest blood pressure and vitals reviewed-     Temp:  [98.2 °F (36.8 °C)-100.7 °F (38.2 °C)]   Pulse:  [74-83]   Resp:  [11-27]   BP: ()/(50-99)   SpO2:  [88 %-99 %] .   Home meds for hypertension were reviewed and noted below.   Hypertension Medications               metoprolol succinate (TOPROL-XL) 50 MG 24 hr tablet Take 1 tablet (50 mg total) by mouth once daily.            While in the hospital, will manage blood pressure as follows; Continue home antihypertensive regimen   - continue metoprolol  - added amlodipine for poorly controlled BP    Will utilize p.r.n. blood pressure medication only if patient's blood pressure greater than  180/90  and he develops symptoms such as worsening chest pain or shortness of  breath.    ESRD needing dialysis  - dialyzes via L THDC  - last outpatient HD session was 7/12/24 and last inpatient session was 8/7/24-- missed almost a month of dialysis because he refused to go  - he was uremic on presentation- - and hyperkalemic- K 7.2  - Nephrology [facility] group consulted  - received emergent dialysis on 9/4/24- hyperK resolved, BUN improved.  - Nephrology continues to follow along. Receiving low dose dialysis to avoid dialysis disequilibrium syndrome   - Palliative consulted as he has ESRD and refuses outpatient dialysis. Despite this, continue full code full care.     Controlled type 2 diabetes mellitus with chronic kidney disease on chronic dialysis, without long-term current use of insulin  Lab Results   Component Value Date    HGBA1C 6.2 (H) 09/04/2024     - ACHS with hypoglycemic protocol   - diabetic puree diet per speech      VTE Risk Mitigation (From admission, onward)           Ordered     IP VTE LOW RISK PATIENT  Once         09/04/24 0116     Place sequential compression device  Until discontinued         09/04/24 0116                    Discharge Planning   JAIME:      Code Status: Full Code   Is the patient medically ready for discharge?:     Reason for patient still in hospital (select all that apply): Treatment  Discharge Plan A: Skilled Nursing Facility   Discharge Delays: None known at this time          Nolberto Zhong MD  Department of Hospital Medicine   [facility] - [place] - Intensive Care

## 2024-09-12 NOTE — PT/OT/SLP PROGRESS
Physical Therapy Treatment    Patient Name:  Mahesh Cespedes   MRN:  [id number]    Recommendations:     Discharge Recommendations: Moderate Intensity Therapy  Discharge Equipment Recommendations: to be determined by next level of care  Barriers to discharge: Other (Comment) (Pt is at high risk for falls and readmission if d/c home; pt requires increased level of assistance for ADLs and functional mobility.)     Assessment:     Mahesh Cespedes is a 59 y.o. male admitted with a medical diagnosis of Uremic encephalopathy.  He presents with the following impairments/functional limitations: weakness, impaired endurance, impaired self care skills, impaired functional mobility, gait instability, impaired balance, decreased lower extremity function, decreased safety awareness, pain, impaired cardiopulmonary response to activity.Occupational therapist help with communication in Maldivian.     Rehab Prognosis: Fair; patient would benefit from acute skilled PT services to address these deficits and reach maximum level of function.    Recent Surgery: Procedure(s) (LRB):  Cardioversion or Defibrillation (N/A) 3 Days Post-Op    Plan:     During this hospitalization, patient to be seen 3 x/week to address the identified rehab impairments via gait training, therapeutic activities, therapeutic groups and progress toward the following goals:    Plan of Care Expires:  09/19/24    Subjective     Chief Complaint: having pain  Patient/Family Comments/goals: agreed to participate in therapy  Pain/Comfort:  Pain Rating 1: 0/10      Objective:     Communicated with nurse prior to session.  Patient found supine with blood pressure cuff, oxygen, pulse ox (continuous), telemetry upon PT entry to room.     General Precautions: Standard, fall  Orthopedic Precautions: N/A  Braces: N/A  Respiratory Status: Nasal cannula, flow 1 L/min     Functional Mobility:  Bed Mobility:     Rolling Left:  moderate assistance and of 2 persons  Rolling Right: moderate  assistance and of 2 persons  Supine to Sit: moderate assistance and of 2 persons  Sit to Supine: moderate assistance and of 2 persons  Transfers:     Sit to Stand:  moderate assistance and of 2 persons with rolling walker  Balance: Seated static balance:fair. Standing static balance with RW: Fair minus       AM-PAC 6 CLICK MOBILITY  Turning over in bed (including adjusting bedclothes, sheets and blankets)?: 2  Sitting down on and standing up from a chair with arms (e.g., wheelchair, bedside commode, etc.): 2  Moving from lying on back to sitting on the side of the bed?: 2  Moving to and from a bed to a chair (including a wheelchair)?: 2  Need to walk in hospital room?: 1  Climbing 3-5 steps with a railing?: 1  Basic Mobility Total Score: 10       Treatment & Education:  Transfers: Required Mod Ax2 and heavy v/c's to perform all transfers.     Patient left supine with all lines intact, call button in reach, bed alarm on, and nurse notified..    GOALS:   Multidisciplinary Problems       Physical Therapy Goals          Problem: Physical Therapy    Goal Priority Disciplines Outcome Goal Variances Interventions   Physical Therapy Goal     PT, PT/OT Progressing     Description: Goals to be met by: 24     Patient will increase functional independence with mobility by performin. Supine to sit with MInimal Assistance  2. Sit to stand transfer with Minimal Assistance  3. Bed to chair transfer with Minimal Assistance    4. Gait  x 10-15 feet with Minimal Assistance using Rolling Walker.   5. Lower extremity exercise program x10 reps per handout, with assistance as needed                         Time Tracking:     PT Received On: 24  PT Start Time: 1521     PT Stop Time: 1542  PT Total Time (min): 21 min     Billable Minutes: Therapeutic Activity 21 min    Treatment Type: Treatment  PT/PTA: PT     Number of PTA visits since last PT visit: 0     2024

## 2024-09-12 NOTE — SUBJECTIVE & OBJECTIVE
Interval History: no acute issues, did not want to take his medications this morning. He says that the medicine makes him sick.     Review of Systems  Objective:     Vital Signs (Most Recent):  Temp: 98.8 °F (37.1 °C) (09/12/24 0400)  Pulse: 61 (09/12/24 0600)  Resp: 17 (09/12/24 0600)  BP: (!) 103/59 (09/12/24 0600)  SpO2: 99 % (09/12/24 0600) Vital Signs (24h Range):  Temp:  [96.8 °F (36 °C)-99.1 °F (37.3 °C)] 98.8 °F (37.1 °C)  Pulse:  [57-68] 61  Resp:  [9-34] 17  SpO2:  [94 %-100 %] 99 %  BP: ()/(50-69) 103/59     Weight: 61.2 kg (134 lb 14.7 oz)  Body mass index is 21.13 kg/m².    Intake/Output Summary (Last 24 hours) at 9/12/2024 0752  Last data filed at 9/12/2024 0300  Gross per 24 hour   Intake 352.74 ml   Output 200 ml   Net 152.74 ml         Physical Exam  Vitals and nursing note reviewed.   Constitutional:       Appearance: He is ill-appearing. He is not toxic-appearing or diaphoretic.   HENT:      Nose: Nose normal.      Mouth/Throat:      Mouth: Mucous membranes are moist.   Neck:      Comments: Retained sutures R chest wall  Cardiovascular:      Rate and Rhythm: Normal rate and regular rhythm.      Comments: Sinus with PVCs  Pulmonary:      Effort: Pulmonary effort is normal.      Breath sounds: Normal breath sounds.      Comments: O2 by NC  Abdominal:      General: Bowel sounds are normal.      Palpations: Abdomen is soft.      Tenderness: There is no abdominal tenderness.   Musculoskeletal:      Right lower leg: No edema.      Left lower leg: No edema.   Skin:     Comments: L sided THDC   Neurological:      Mental Status: He is alert. He is disoriented.             Significant Labs: All pertinent labs within the past 24 hours have been reviewed.    Significant Imaging: I have reviewed all pertinent imaging results/findings within the past 24 hours.

## 2024-09-12 NOTE — ASSESSMENT & PLAN NOTE
"9/12/24  - Have been chart reviewing pt daily; pt with improvement in renal labs and mental status following several sessions of HD.   - Visited with pt at bedside; he was alert and able to participate in discussion. Reminded him of role of palliative medicine in his care, and that I have met him during a previous hospital stay for near identical reasons.   - Discussed options in care moving forward (continued HD in hopes of having more life ahead of him, vs transition to comfort-focused/hospice care and foregoing further HD). Pt stated that he would prefer to stop HD, and start hospice care. I asked him if he is depressed about his worsening functional status and loss of his wife (both of which he mentioned previously as main causes of his depression) and he said yes. Emotional support provided.   - As he has not extensively discussed his preferences with his children, encouraged him to do so.   - Following visit with pt, Dr Nolberto Zhong (hospitalist) and I initially tried to call his daughter Rima (no answer x multiple phone numbers), though we were able to reach his son Guevara. Medical update provided, and shared with him that pt has told us he no longer wants to continue HD. Guevara said it is likely that pt is "not in his right mind" which is why he is verbalizing this. His preference is for his father to continue HD, rather than start hospice care.   - Given discrepancy between what patient is requesting (to stop HD and enroll in hospice care) and what his two children are requesting (ongoing HD/maximal medical therapy), have asked them to come in to the hospital to further discuss this with pt.   - Will follow up with pt and family     9/4/24  - Consult for support and continuity of care in pt with multiple recent hospital stays for prolonged periods of missing HD sessions despite involving family, providing transportation resources, and offering SNF placement. EMS was called by family when pt was reportedly " complaining of abdominal pain; he was found lying in his own excrement at home. Chart reviewed in depth; extensively discussed pt during MDT rounds.   - During visit to bedside, pt was sleeping deeply and did not wake to voice. Given that his BUN is over 240, I do not expect him to be able to make any medical decisions at this time.  - Multiple attempts to reach his daughter Rima at cell phone number, though this was not accepting calls. Following this, called home phone number and was able to speak to his son Jose Miguel. In discussing how things have been going at home, he stated that his father told him he doesn't want to HD, and this is why he hasn't gone for last 2 weeks.   - Medical update provided; shared transparent concern that pt's condition is life threatening due to skipping HD, which is a form of life support. Given that pt has now had two recent hospital stays for same issue, suggested that we consider hospice care at this time. I asked for him to come in to the hospital to further discuss, though he said he was at work and likely not able to come in until last this evening.   - He provided me with an alternate phone number for Rima, so I called her after conversation with Jose Miguel. Similar conversation as above; recommended she come in to the hospital to have in-person discussion given pt's critical illness and need for reasonable plan moving forward. She said she would do so, though it would take a few hours due to transportation issues.   - Later in the day, family still had not arrived in hospital. Along with Dr Mayen (nephrology staff), called and spoke with pt's daughter Rima ([phone] was number used to contact her). Discussed options in care moving forward, and recommended family make decisions aligned with what pt himself would verbalize. While comfort-focused/hospice care was one option presented, she made it clear they would like to continue with maximal medical therapy including  continued dialysis. We explained that due to his non-adherence, outpatient HD would likely only be an option if patient is moved into a nursing home, to which she agreed.   - Of note, we explained that pt is high risk for unexpected events during/after HD due to severity of his condition and abnormal labs; she verbalized understanding.   - Will follow up with pt and family throughout hospital stay for further conversations as pt's clinical course evolves

## 2024-09-12 NOTE — PT/OT/SLP PROGRESS
"Occupational Therapy   Treatment    Name: Mahesh Cespedes  MRN: [id number]  Admitting Diagnosis:  Uremic encephalopathy  3 Days Post-Op    Recommendations:     Discharge Recommendations: Moderate Intensity Therapy  Discharge Equipment Recommendations:  to be determined by next level of care  Barriers to discharge:  Other (Comment) (Pt is at high risk for falls and readmission if d/c home; pt requires increased level of assistance for ADLs and functional mobility.)    Assessment:     Mahesh Cespedes is a 59 y.o. male with a medical diagnosis of Uremic encephalopathy.  He presents with HOB elevated and on left side to prevent more pressure off sacral area per RN. Performance deficits affecting function are weakness, impaired self care skills, impaired functional mobility, gait instability, impaired balance, decreased lower extremity function, decreased safety awareness, pain, impaired cardiopulmonary response to activity. Patient can understand conversational Lao, so occupational therapy asked him if he wanted to sit up and he said, "Yes."     Rehab Prognosis:  Fair; patient would benefit from acute skilled OT services to address these deficits and reach maximum level of function.       Plan:     Patient to be seen 3 x/week to address the above listed problems via self-care/home management, therapeutic activities, therapeutic exercises  Plan of Care Expires: 09/19/24  Plan of Care Reviewed with: patient    Subjective     Chief Complaint: he is having pain on bottom   Patient/Family Comments/goals: pt. Agreed to treatment, but is having moaning pains when moving legs and body (may be due to stiffness)  Pain/Comfort:  Pain Rating 1: 0/10  Pain Addressed 1: Reposition, Distraction, Cessation of Activity    Objective:     Communicated with: RNIrasema, prior to session.  Patient found HOB elevated with blood pressure cuff, pulse ox (continuous), telemetry, oxygen upon OT entry to room.    General Precautions: Standard, fall "    Orthopedic Precautions:N/A  Braces: N/A  Respiratory Status: Nasal cannula, flow 1  L/min at mid 90s when at rest and then dropped temporarily to 84% when seated, but increased to 90% when standing.      Occupational Performance:     Bed Mobility:    Patient completed Rolling/Turning to Right with moderate assistance and 2  persons  Patient completed Scooting/Bridging with moderate assistance and 2  persons  Patient completed Supine to Sit with moderate assistance and 2  persons  Patient completed Sit to Supine with moderate assistance and 2  persons     Functional Mobility/Transfers:  Patient completed Sit <> Stand Transfer with moderate assistance and of 2  persons  with  hand-held assist   Functional Mobility: Patient took 2-3 steps from left side to right side with RW with PT and occupational therapy.     Activities of Daily Living:  Upper Body Dressing: maximal assistance to don and doff outer gown       [facility] 6 Click ADL: 7    Treatment & Education:  Patient sat EOB and could hold himself upright while seated and standing, but having stiffness when lying down      Patient left right sidelying with all lines intact, call button in reach, RN  notified, and RN  present    GOALS:   Multidisciplinary Problems       Occupational Therapy Goals          Problem: Occupational Therapy    Goal Priority Disciplines Outcome Interventions   Occupational Therapy Goal     OT, PT/OT Progressing    Description: Goals to be met by: 9/19/24     Patient will increase functional independence with ADLs by performing:    Feeding with Minimal Assistance.  UE Dressing with Minimal Assistance.  Grooming while EOB with Minimal Assistance.  Sitting at edge of bed x30 minutes with Supervision.  Rolling to Bilateral with Minimal Assistance.   Supine to sit with Minimal Assistance.    Functional transfers: To be assessed                          Time Tracking:     OT Date of Treatment: 09/12/24  OT Start Time: 1521  OT Stop Time: 1542  OT  Total Time (min): 21 min    Billable Minutes:Therapeutic Activity 21 co-treat with PT     OT/FERNANDO: OT          9/12/2024

## 2024-09-12 NOTE — PLAN OF CARE
Patient was able to take several steps with PT. Appetite remains marginal and was able to eat 25% of evening meal. Awaiting availability of floor bed for transfer.

## 2024-09-13 LAB
ANION GAP SERPL CALC-SCNC: 15 MMOL/L (ref 8–16)
BASOPHILS # BLD AUTO: 0.02 K/UL (ref 0–0.2)
BASOPHILS NFR BLD: 0.3 % (ref 0–1.9)
BUN SERPL-MCNC: 60 MG/DL (ref 8–23)
CALCIUM SERPL-MCNC: 7.5 MG/DL (ref 8.7–10.5)
CHLORIDE SERPL-SCNC: 95 MMOL/L (ref 95–110)
CO2 SERPL-SCNC: 24 MMOL/L (ref 23–29)
CREAT SERPL-MCNC: 7.3 MG/DL (ref 0.5–1.4)
DIFFERENTIAL METHOD BLD: ABNORMAL
EOSINOPHIL # BLD AUTO: 0.1 K/UL (ref 0–0.5)
EOSINOPHIL NFR BLD: 1.5 % (ref 0–8)
ERYTHROCYTE [DISTWIDTH] IN BLOOD BY AUTOMATED COUNT: 18.4 % (ref 11.5–14.5)
EST. GFR  (NO RACE VARIABLE): 8 ML/MIN/1.73 M^2
GLUCOSE SERPL-MCNC: 83 MG/DL (ref 70–110)
HCT VFR BLD AUTO: 29.2 % (ref 40–54)
HGB BLD-MCNC: 9.6 G/DL (ref 14–18)
IMM GRANULOCYTES # BLD AUTO: 0.07 K/UL (ref 0–0.04)
IMM GRANULOCYTES NFR BLD AUTO: 1.2 % (ref 0–0.5)
LYMPHOCYTES # BLD AUTO: 1.1 K/UL (ref 1–4.8)
LYMPHOCYTES NFR BLD: 18.5 % (ref 18–48)
MCH RBC QN AUTO: 27 PG (ref 27–31)
MCHC RBC AUTO-ENTMCNC: 32.9 G/DL (ref 32–36)
MCV RBC AUTO: 82 FL (ref 82–98)
MONOCYTES # BLD AUTO: 0.4 K/UL (ref 0.3–1)
MONOCYTES NFR BLD: 6.8 % (ref 4–15)
NEUTROPHILS # BLD AUTO: 4.2 K/UL (ref 1.8–7.7)
NEUTROPHILS NFR BLD: 71.7 % (ref 38–73)
NRBC BLD-RTO: 2 /100 WBC
PHOSPHATE SERPL-MCNC: 2 MG/DL (ref 2.7–4.5)
PLATELET # BLD AUTO: 192 K/UL (ref 150–450)
PMV BLD AUTO: 10.8 FL (ref 9.2–12.9)
POCT GLUCOSE: 124 MG/DL (ref 70–110)
POTASSIUM SERPL-SCNC: 3.9 MMOL/L (ref 3.5–5.1)
RBC # BLD AUTO: 3.56 M/UL (ref 4.6–6.2)
SODIUM SERPL-SCNC: 134 MMOL/L (ref 136–145)
WBC # BLD AUTO: 5.85 K/UL (ref 3.9–12.7)

## 2024-09-13 PROCEDURE — 84100 ASSAY OF PHOSPHORUS: CPT | Performed by: HOSPITALIST

## 2024-09-13 PROCEDURE — 63600175 PHARM REV CODE 636 W HCPCS: Performed by: INTERNAL MEDICINE

## 2024-09-13 PROCEDURE — 63600175 PHARM REV CODE 636 W HCPCS: Mod: JZ,JG | Performed by: HOSPITALIST

## 2024-09-13 PROCEDURE — 12000002 HC ACUTE/MED SURGE SEMI-PRIVATE ROOM

## 2024-09-13 PROCEDURE — 25000003 PHARM REV CODE 250: Performed by: INTERNAL MEDICINE

## 2024-09-13 PROCEDURE — 25000003 PHARM REV CODE 250: Performed by: STUDENT IN AN ORGANIZED HEALTH CARE EDUCATION/TRAINING PROGRAM

## 2024-09-13 PROCEDURE — 80048 BASIC METABOLIC PNL TOTAL CA: CPT | Performed by: HOSPITALIST

## 2024-09-13 PROCEDURE — 25000003 PHARM REV CODE 250: Performed by: HOSPITALIST

## 2024-09-13 PROCEDURE — 36415 COLL VENOUS BLD VENIPUNCTURE: CPT | Performed by: HOSPITALIST

## 2024-09-13 PROCEDURE — 94761 N-INVAS EAR/PLS OXIMETRY MLT: CPT

## 2024-09-13 PROCEDURE — 27000221 HC OXYGEN, UP TO 24 HOURS

## 2024-09-13 PROCEDURE — 85025 COMPLETE CBC W/AUTO DIFF WBC: CPT | Performed by: HOSPITALIST

## 2024-09-13 PROCEDURE — 80100014 HC HEMODIALYSIS 1:1

## 2024-09-13 RX ADMIN — APIXABAN 2.5 MG: 2.5 TABLET, FILM COATED ORAL at 08:09

## 2024-09-13 RX ADMIN — TAMSULOSIN HYDROCHLORIDE 0.4 MG: 0.4 CAPSULE ORAL at 09:09

## 2024-09-13 RX ADMIN — SERTRALINE HYDROCHLORIDE 50 MG: 50 TABLET ORAL at 08:09

## 2024-09-13 RX ADMIN — AMIODARONE HYDROCHLORIDE 200 MG: 200 TABLET ORAL at 08:09

## 2024-09-13 RX ADMIN — AMIODARONE HYDROCHLORIDE 200 MG: 200 TABLET ORAL at 09:09

## 2024-09-13 RX ADMIN — ALLOPURINOL 100 MG: 100 TABLET ORAL at 08:09

## 2024-09-13 RX ADMIN — APIXABAN 2.5 MG: 2.5 TABLET, FILM COATED ORAL at 09:09

## 2024-09-13 RX ADMIN — SEVELAMER CARBONATE 1600 MG: 800 TABLET, FILM COATED ORAL at 08:09

## 2024-09-13 RX ADMIN — SEVELAMER CARBONATE 1600 MG: 800 TABLET, FILM COATED ORAL at 01:09

## 2024-09-13 RX ADMIN — SODIUM PHOSPHATE, MONOBASIC, MONOHYDRATE AND SODIUM PHOSPHATE, DIBASIC, ANHYDROUS 15 MMOL: 142; 276 INJECTION, SOLUTION INTRAVENOUS at 01:09

## 2024-09-13 RX ADMIN — PANTOPRAZOLE SODIUM 40 MG: 40 INJECTION, POWDER, FOR SOLUTION INTRAVENOUS at 08:09

## 2024-09-13 RX ADMIN — EPOETIN ALFA-EPBX 20000 UNITS: 10000 INJECTION, SOLUTION INTRAVENOUS; SUBCUTANEOUS at 08:09

## 2024-09-13 RX ADMIN — METOPROLOL SUCCINATE 100 MG: 50 TABLET, EXTENDED RELEASE ORAL at 08:09

## 2024-09-13 NOTE — ASSESSMENT & PLAN NOTE
24  - Chart and interval history reviewed; extensively discussed pt in person with Dr Martinez. Pt with increased agitation this morning, and was reportedly calling out for his  wife. During brief visit to pt in HD as this was getting started, he was calm, but not able to tell me where he was. I let him know I would call his family.   - Dr Martinez and I called and I had a long phone call with his son Nacho, and ultimately were able to arrange a meeting in person with him and his sister Rima, though their brother Guevara had to go to work.   - At scheduled meeting time, Sudha Castillo (/), and myself met with Nacho and Rima. Thorough medical update provided, and discussed plan moving forward. As we expressed transparent concern for the condition that pt arrived in (disheveled, covered in waste, having missed HD for several weeks), family acknowledged that his care needs have been a lot to manage, despite their best efforts. They do not have a car at home, and an additional family member has been hospitalized at [facility]. Support provided, as we acknowledged this sounds like a lot to deal with. They let us know that culturally it is important for them to care for their father at home, rather than place him in a facility. In hearing this, we discussed several forms of additional support at home (home health, home sitters, or home hospice care).   - Based on our conversation, plan is to hopefully discharge patient home with home health, private sitters, and continued outpatient HD. Family is aware of the importance of attending every scheduled HD session. However, when we brought them up to say hi to patient from doorway of HD unit (pt was closest to door), dialysis RN let us know that pt was hypotensive for part of his treatment. Family is aware that if his hypotension during treatment persists, this means he would no longer be a candidate for HD, and hospice would be recommended path.   - We discussed code  "status during family meeting; strongly recommended DNR/DNI, as pt would likely have poor outcome with CPR and/or intubation. This was before we had new information about pt's intradialytic hypotension, so will address this again during follow-up conversation.   - Significant emotional support provided during above conversation   - Will follow up with pt and family likely tomorrow; updated nephrology PA in person after family visit     9/17/24  - See plan of care note    9/16/24  - See ACP note    9/12/24  - Have been chart reviewing pt daily; pt with improvement in renal labs and mental status following several sessions of HD.   - Visited with pt at bedside; he was alert and able to participate in discussion. Reminded him of role of palliative medicine in his care, and that I have met him during a previous hospital stay for near identical reasons.   - Discussed options in care moving forward (continued HD in hopes of having more life ahead of him, vs transition to comfort-focused/hospice care and foregoing further HD). Pt stated that he would prefer to stop HD, and start hospice care. I asked him if he is depressed about his worsening functional status and loss of his wife (both of which he mentioned previously as main causes of his depression) and he said yes. Emotional support provided.   - As he has not extensively discussed his preferences with his children, encouraged him to do so.   - Following visit with pt, Dr Nolberto Zhong (hospitalist) and I initially tried to call his daughter Rima (no answer x multiple phone numbers), though we were able to reach his son Guevara. Medical update provided, and shared with him that pt has told us he no longer wants to continue HD. Guevara said it is likely that pt is "not in his right mind" which is why he is verbalizing this. His preference is for his father to continue HD, rather than start hospice care.   - Given discrepancy between what patient is requesting (to stop HD and " enroll in hospice care) and what his two children are requesting (ongoing HD/maximal medical therapy), have asked them to come in to the hospital to further discuss this with pt.   - Will follow up with pt and family     9/4/24  - Consult for support and continuity of care in pt with multiple recent hospital stays for prolonged periods of missing HD sessions despite involving family, providing transportation resources, and offering SNF placement. EMS was called by family when pt was reportedly complaining of abdominal pain; he was found lying in his own excrement at home. Chart reviewed in depth; extensively discussed pt during MDT rounds.   - During visit to bedside, pt was sleeping deeply and did not wake to voice. Given that his BUN is over 240, I do not expect him to be able to make any medical decisions at this time.  - Multiple attempts to reach his daughter Rima at cell phone number, though this was not accepting calls. Following this, called home phone number and was able to speak to his son Jose Miguel. In discussing how things have been going at home, he stated that his father told him he doesn't want to HD, and this is why he hasn't gone for last 2 weeks.   - Medical update provided; shared transparent concern that pt's condition is life threatening due to skipping HD, which is a form of life support. Given that pt has now had two recent hospital stays for same issue, suggested that we consider hospice care at this time. I asked for him to come in to the hospital to further discuss, though he said he was at work and likely not able to come in until last this evening.   - He provided me with an alternate phone number for Rima, so I called her after conversation with Jose Miguel. Similar conversation as above; recommended she come in to the hospital to have in-person discussion given pt's critical illness and need for reasonable plan moving forward. She said she would do so, though it would take a few hours due  to transportation issues.   - Later in the day, family still had not arrived in hospital. Along with Dr Mayen (nephrology staff), called and spoke with pt's daughter Rima ([phone] was number used to contact her). Discussed options in care moving forward, and recommended family make decisions aligned with what pt himself would verbalize. While comfort-focused/hospice care was one option presented, she made it clear they would like to continue with maximal medical therapy including continued dialysis. We explained that due to his non-adherence, outpatient HD would likely only be an option if patient is moved into a nursing home, to which she agreed.   - Of note, we explained that pt is high risk for unexpected events during/after HD due to severity of his condition and abnormal labs; she verbalized understanding.   - Will follow up with pt and family throughout hospital stay for further conversations as pt's clinical course evolves    [facility] Division of Hospital Medicine  Claudia Mariee DO  Pager (SANDEE-F, 0W-5P): [phone]    Patient is a 60y old  Female who presents with a chief complaint of intoxication (02 Oct 2021 13:25)    SUBJECTIVE / OVERNIGHT EVENTS:  Pt reports coughing/wheezing, is a current active smoker.  ASking if she can go home.  She admits to feeling guilty for drinking so heavily but is not currently suicidal.  Had extensive conversation with sister Shirely yesterday who expresses concern for suicidal behavior given ETOH and prescription medication misuse.     MEDICATIONS  (STANDING):  albuterol/ipratropium for Nebulization 3 milliLiter(s) Nebulizer every 6 hours  benzonatate 100 milliGRAM(s) Oral every 8 hours  enoxaparin Injectable 40 milliGRAM(s) SubCutaneous daily  influenza   Vaccine 0.5 milliLiter(s) IntraMuscular once  LORazepam   Injectable 1.5 milliGRAM(s) IV Push every 4 hours  LORazepam   Injectable   IV Push   nicotine -  14 mG/24Hr(s) Patch 1 patch Transdermal daily    MEDICATIONS  (PRN):  acetaminophen   Tablet .. 650 milliGRAM(s) Oral every 6 hours PRN Temp greater or equal to 38C (100.4F), Mild Pain (1 - 3)  aluminum hydroxide/magnesium hydroxide/simethicone Suspension 30 milliLiter(s) Oral every 4 hours PRN Dyspepsia  LORazepam     Tablet 2 milliGRAM(s) Oral every 2 hours PRN Symptom-triggered 2 point increase in CIWA-Ar  LORazepam   Injectable 2 milliGRAM(s) IV Push every 1 hour PRN Symptom-triggered: each CIWA -Ar score 8 or GREATER  melatonin 3 milliGRAM(s) Oral at bedtime PRN Insomnia  ondansetron Injectable 4 milliGRAM(s) IV Push every 8 hours PRN Nausea and/or Vomiting    PHYSICAL EXAM:  Vital Signs Last 24 Hrs  T(C): 37.9 (03 Oct 2021 07:40), Max: 37.9 (03 Oct 2021 07:40)  T(F): 100.2 (03 Oct 2021 07:40), Max: 100.2 (03 Oct 2021 07:40)  HR: 98 (03 Oct 2021 07:40) (89 - 128)  BP: 130/79 (03 Oct 2021 07:40) (121/79 - 146/78)  BP(mean): --  RR: 20 (03 Oct 2021 07:40) (17 - 20)  SpO2: 97% (03 Oct 2021 07:40) (91% - 97%)    CONSTITUTIONAL: NAD, well-developed, well-groomed  EYES: PERRLA; conjunctiva and sclera clear  RESPIRATORY: Diffuse wheezing in all lung fields   CARDIOVASCULAR: Regular rate and rhythm, normal S1 and S2, no murmur/rub/gallop; No lower extremity edema  ABDOMEN: Nontender to palpation, normoactive bowel sounds, no rebound/guarding  MUSCLOSKELETAL:  No clubbing or cyanosis of digits; no joint swelling or tenderness to palpation  PSYCH: A+O to person, place, and time; affect appropriate  NEUROLOGY: CN 2-12 are intact and symmetric; no gross sensory deficits; +tremors with arms extended, +tongue fasciculations  SKIN: No rashes; no palpable lesions    LABS:                        16.2   14.07 )-----------( 321      ( 01 Oct 2021 21:14 )             44.2     10-01    139  |  95<L>  |  9   ----------------------------<  73  3.6   |  22  |  0.51    Ca    7.5<L>      01 Oct 2021 21:14  Phos  4.1     10-01  Mg     2.10     10-01    TPro  5.6<L>  /  Alb  3.4  /  TBili  0.8  /  DBili  x   /  AST  103<H>  /  ALT  65<H>  /  AlkPhos  92  10-01

## 2024-09-13 NOTE — NURSING
[facility], [facility]  Nurses Note -- 4 Eyes      9/13/2024       Skin assessed on: Q Shift      [] No Pressure Injuries Present    []Prevention Measures Documented    [x] Yes LDA  for Pressure Injury Previously documented     [] Yes New Pressure Injury Discovered   [] LDA for New Pressure Injury Added      Attending RN:  Shilpa Atkins RN     Second RN:  Vicenta PÉREZ

## 2024-09-13 NOTE — NURSING
Nursing Significant Event :     Fall - Onset of Restraint - Change in Level of Care - Decompensation of Patient Status - NEW        Pressure Ulcer - Disposition of Patient - Disposition of Patient Belongings -       Date:     Time:      Subject:  ( one of the above ) or significant communication:    __________________________________________________      Whom: Family / Loved One / Significant other:    Name: ____________________________________________      Notified :        Response:        Questions Answered:         Satisfactory Resolution:

## 2024-09-13 NOTE — PROGRESS NOTES
[facility] Medicine  Progress Note    Patient Name: Mahesh Cespedes  MRN: [id number]  Patient Class: IP- Inpatient   Admission Date: 9/3/2024  Length of Stay: 9 days  Attending Physician: Jonathan Martinez DO  Primary Care Provider: Cruz Hernandez MD        Subjective:     Principal Problem:Uremic encephalopathy        HPI:  59 y.o. AAM with h/o CVA, ESRD (MWF via left tunneled HD catheter), Afib (on amiodarone and eliquis), NIDDM type2, HLD, Essential HTN ,depression with h/o suicidal ideations presents to the ER via EMS with family concerns for constipation and abdominal pain. Pt. Unable to give me history due to AMS due to uremia (). Reports that he missed 3 weeks of HD. Presented to the ER altered covered in feces and urine.    Apparently he was admitted here from - for AMS and again was noted to have missed 2 weeks of HD. CT head was negative for mass/bleed.  CT chest at that time noted loculated moderate left pleural effusion/consolidation with a large pericardial effusion 3.6cm and pulmonary edema. Was tx with abx and echo showed only a small/mod pericardial effusion with no cardiac tamponade. Nephro/pulm/ID consulted. Recommended IR to sample fluid but family deferred due to risk/benefit. He was cont. On abx VANC with NGT repeat cultures. Plan was to transfer to [facility] but daughter (javier)  refused and wanted to take him home with her on  per the discharge summary.     Today no family with with him in the ER and pt. Noted to be malnourished, desheveled and confused.     Labs noted for potassium of 7.2 and /Creatinin 25.8.  Bicarb 12. VB.23/39.5/35/16.7.  WBC 8.7 and H/H 8.6/26.      CT abd/pelvis with IV contrast:   1. Image quality degraded by technical factors discussed above.   2. Moderate/large volume of loculated left pleural fluid noting probable pleural thickening and heterogeneity of pleural fluid.  Correlation for underlying infectious process  advised.  Left basilar atelectatic/consolidative change.   3. Right lower lobe patchy ground-glass attenuation with interlobular septal thickening which could be seen with edema, infection or non infectious inflammatory process.   4. Solid and ground-glass right lower lobe pulmonary nodules measuring 6 mm and 8 mm respectively.  Repeat evaluation with chest CT in 3 months is advised to evaluate stability and/or resolution of these findings.   5. Cardiomegaly and large pericardial effusion.   6. Cholelithiasis.   7. Gastric wall thickening which could relate to nondistention although correlation for symptoms of gastritis advised.   8. Regions of large and small bowel wall thickening which could relate to nondistention and/or technical factors discussed above.  However, correlation for symptoms of enterocolitis advised.   9. Bladder wall thickening.  Correlation with urinalysis advised.   10. Multiple additional findings as above.     ED spoke with Nephrology and will plan for ICU admission and emergent HD.   We have been consulted for further management and admission to the ICU.     Because this patient's clinical condition is critically guarded and requires care in the ICU with emergent HD, care in an alternative location isn't clinically appropriate for the reasons stated above.         Overview/Hospital Course:  Mr Mahesh Cespedes is a 59 y.o. man with ESRD who was admitted with encephalopathy. He was noted to have severe uremia ( on admission), hyperkalemia. He was also caked with urine and stool. He was admitted to ICU, Nephrology Dr Mayen group consulted, and received emergent dialysis on 9/4/24. Social work notes he has outpatient HD chair but has not been to outpatient HD since 7/12/24. His last inpatient HD treatment was at [facility] on 8/7/24. His mental status has slightly improved. Palliative consulted due to patient's ESRD and HD nonadherence. Family wants to continue full code and full care; not  interested in hospice care despite patient's unwillingness to adhere to dialysis. Family now not answering the phone. He is receiving low dose dialysis to avoid dialysis disequilibrium syndrome. He developed Aflutter RVR; Cardiology cardioverted to NSR on 9/9/24. PT, OT, ST following. Recommends SNF.    Interval History:  No acute overnight events.  Patient currently receiving dialysis.  Able to tell me his name, but can not tell me his date of birth.  Unable to tell me that he is in the hospital.  Nurse reported that patient is intermittently confused.  Attempted to call patient family multiple times, no answer.    Review of Systems   Constitutional:  Positive for fatigue.   Respiratory:  Negative for cough and shortness of breath.    Cardiovascular:  Negative for chest pain.   Neurological:  Positive for weakness.   Psychiatric/Behavioral:  Positive for confusion (intermittent confusion) and decreased concentration.      Objective:     Vital Signs (Most Recent):  Temp: 98.7 °F (37.1 °C) (09/13/24 1101)  Pulse: 70 (09/13/24 1300)  Resp: (!) 22 (09/13/24 1300)  BP: 133/61 (09/13/24 1300)  SpO2: (!) 70 % (09/13/24 1300) Vital Signs (24h Range):  Temp:  [96.3 °F (35.7 °C)-98.7 °F (37.1 °C)] 98.7 °F (37.1 °C)  Pulse:  [] 70  Resp:  [12-56] 22  SpO2:  [54 %-100 %] 70 %  BP: ()/(51-90) 133/61     Weight: 55 kg (121 lb 4.1 oz)  Body mass index is 18.99 kg/m².    Intake/Output Summary (Last 24 hours) at 9/13/2024 1416  Last data filed at 9/13/2024 1300  Gross per 24 hour   Intake 777.59 ml   Output 2000 ml   Net -1222.41 ml         Physical Exam  Vitals and nursing note reviewed.   Constitutional:       Appearance: He is ill-appearing. He is not toxic-appearing or diaphoretic.   HENT:      Nose: Nose normal.      Mouth/Throat:      Mouth: Mucous membranes are moist.   Neck:      Comments: Retained sutures R chest wall  Cardiovascular:      Rate and Rhythm: Normal rate and regular rhythm.      Comments: Sinus  with PVCs  Pulmonary:      Effort: Pulmonary effort is normal.      Breath sounds: Normal breath sounds. No wheezing.      Comments: O2 by NC  Abdominal:      General: There is no distension.      Palpations: Abdomen is soft.      Tenderness: There is no abdominal tenderness.   Musculoskeletal:      Right lower leg: No edema.      Left lower leg: No edema.   Skin:     Comments: L [facility]   Neurological:      Mental Status: He is alert. He is disoriented.             Significant Labs: All pertinent labs within the past 24 hours have been reviewed.    Significant Imaging: I have reviewed all pertinent imaging results/findings within the past 24 hours.    Assessment/Plan:      * Uremic encephalopathy  -  on admission, last HD session about a month ago  - Nephrology consulted for HD  - continue to monitor mental status - improving   - low dose dialysis to avoid dialysis disequilibrium syndrome       ESRD needing dialysis  - dialyzes via [facility]  - last outpatient HD session was 7/12/24 and last inpatient session was 8/7/24-- missed almost a month of dialysis because he refused to go  - he was uremic on presentation- - and hyperkalemic- K 7.2  - Nephrology [facility] group consulted  - received emergent dialysis on 9/4/24- hyperK resolved, BUN improved.  - Nephrology continues to follow along. Receiving low dose dialysis to avoid dialysis disequilibrium syndrome   - Palliative consulted as he has ESRD and refuses outpatient dialysis. Despite this, continue full code full care.     Pressure injury of sacral region, stage 4  - present on admission  - healing   - wound care consulted: Local wound care to sacral wound with hydrocolloid dressing to promote closure of chronic wound and prevent soiling from incontinence.       Left loculated pleural effusion  - this has been present on prior hospitalizations  - discussed on last stay and family declined thoracentesis at that time  - developed fever on 9/8/24. No  further fevers.     Anemia in ESRD (end-stage renal disease)  Anemia is likely due to chronic disease due to ESRD. Most recent hemoglobin and hematocrit are listed below.  Recent Labs     09/11/24  0130 09/13/24  0405   HGB 8.9* 9.6*   HCT 27.4* 29.2*       Plan  - Monitor serial CBC: Daily  - Transfuse PRBC if patient becomes hemodynamically unstable, symptomatic or H/H drops below 7/21.  - EPO MWF      Essential hypertension  Chronic, controlled. Latest blood pressure and vitals reviewed-     Temp:  [96.3 °F (35.7 °C)-98.7 °F (37.1 °C)]   Pulse:  []   Resp:  [12-56]   BP: ()/(51-90)   SpO2:  [54 %-100 %] .   Home meds for hypertension were reviewed and noted below.   Hypertension Medications               metoprolol succinate (TOPROL-XL) 50 MG 24 hr tablet Take 1 tablet (50 mg total) by mouth once daily.            While in the hospital, will manage blood pressure as follows; Continue home antihypertensive regimen   - continue metoprolol      Will utilize p.r.n. blood pressure medication only if patient's blood pressure greater than  180/90  and he develops symptoms such as worsening chest pain or shortness of breath.    Paroxysmal atrial fibrillation  Patient with Paroxysmal (<7 days) atrial fibrillation which is controlled currently with Beta Blocker and Amiodarone. [id number] Score: 1. anticoagulation indicated. Anticoagulation done with eliquis 2.5mg PO BID . CT head negative for mass or bleed. Fall risk in place.    -continue metoprolol, amiodarone, Eliquis    Controlled type 2 diabetes mellitus with chronic kidney disease on chronic dialysis, without long-term current use of insulin  Lab Results   Component Value Date    HGBA1C 6.2 (H) 09/04/2024     - ACHS with hypoglycemic protocol   - diabetic puree diet per speech    History of CVA (cerebrovascular accident)  Noted.   - continue home eliquis, BP control    Depression  Resume home antidepressant    ACP (advance care planning)  Advance Care  Planning    Date: 09/04/2024  Palliative consulted. Continue full aggressive care. Time spent 5 minutes           Physical debility  Was advised to got to Skilled with daily PT/OT but family declined. Social work for discharge planning and PT eval. Fall risk. Nutrition consult for his malnutrition.   - palliative consulted  - family wants to continue full aggressive treatment  - PT, OT, ST consulted- SNF    Gastric wall thickening  Noted on CT  - PPI IV ordered      Thrombocytopenia  The likely etiology of thrombocytopenia is  unknown- potentially occult liver disease? . The patients 3 most recent labs are listed below.  Recent Labs     09/11/24  0130 09/13/24  0405   PLT 52* 192       Plan  - Will transfuse if platelet count is <50k (if undergoing surgical procedure or have active bleeding).  - monitor daily  - improving, resume home Eliquis 9/13      Thrush  - noted 9/6/24  - started IV fluconazole as he does not have the mental awareness to swish and swallow nystatin at this point and is not reliably tolerating pills    Typical atrial flutter  Noted on 9/6/24. Converted back to NSR, then back to aflutter. No change with diltiazem  - started amio gtt on 9/8/24  - continue eliquis  - Cardiology consulted  - he cannot consent for his ROYCE/DCCV and family not answering the phone  -continue p.o. amiodarone and metoprolol        VTE Risk Mitigation (From admission, onward)           Ordered     apixaban tablet 2.5 mg  2 times daily         09/13/24 0610     IP VTE LOW RISK PATIENT  Once         09/04/24 0116     Place sequential compression device  Until discontinued         09/04/24 0116                    Discharge Planning   JAIME:      Code Status: Full Code   Is the patient medically ready for discharge?:     Reason for patient still in hospital (select all that apply): Patient trending condition, Treatment, Consult recommendations, and PT / OT recommendations  Discharge Plan A: Skilled Nursing Facility   Discharge  Delays: None known at this time            Jonathan Martinez DO  Department of Hospital Medicine   [facility] [place] - Intensive Care

## 2024-09-13 NOTE — ASSESSMENT & PLAN NOTE
The likely etiology of thrombocytopenia is  unknown- potentially occult liver disease? . The patients 3 most recent labs are listed below.  Recent Labs     09/11/24  0130 09/13/24  0405   PLT 52* 192       Plan  - Will transfuse if platelet count is <50k (if undergoing surgical procedure or have active bleeding).  - monitor daily  - improving, resume home Eliquis 9/13

## 2024-09-13 NOTE — ASSESSMENT & PLAN NOTE
Noted on 9/6/24. Converted back to NSR, then back to aflutter. No change with diltiazem  - started amio gtt on 9/8/24  - continue eliquis  - Cardiology consulted  - he cannot consent for his ROYCE/DCCV and family not answering the phone  -continue p.o. amiodarone and metoprolol

## 2024-09-13 NOTE — PLAN OF CARE
Patient awake most of morning, more drowsy after HD done this am,    oriented off and on.   Sleeps most of afternoon   Tolerates diet without difficulty

## 2024-09-13 NOTE — SUBJECTIVE & OBJECTIVE
Interval History:  No acute overnight events.  Patient currently receiving dialysis.  Able to tell me his name, but can not tell me his date of birth.  Unable to tell me that he is in the hospital.  Nurse reported that patient is intermittently confused.  Attempted to call patient family multiple times, no answer.    Review of Systems   Constitutional:  Positive for fatigue.   Respiratory:  Negative for cough and shortness of breath.    Cardiovascular:  Negative for chest pain.   Neurological:  Positive for weakness.   Psychiatric/Behavioral:  Positive for confusion (intermittent confusion) and decreased concentration.      Objective:     Vital Signs (Most Recent):  Temp: 98.7 °F (37.1 °C) (09/13/24 1101)  Pulse: 70 (09/13/24 1300)  Resp: (!) 22 (09/13/24 1300)  BP: 133/61 (09/13/24 1300)  SpO2: (!) 70 % (09/13/24 1300) Vital Signs (24h Range):  Temp:  [96.3 °F (35.7 °C)-98.7 °F (37.1 °C)] 98.7 °F (37.1 °C)  Pulse:  [] 70  Resp:  [12-56] 22  SpO2:  [54 %-100 %] 70 %  BP: ()/(51-90) 133/61     Weight: 55 kg (121 lb 4.1 oz)  Body mass index is 18.99 kg/m².    Intake/Output Summary (Last 24 hours) at 9/13/2024 1416  Last data filed at 9/13/2024 1300  Gross per 24 hour   Intake 777.59 ml   Output 2000 ml   Net -1222.41 ml         Physical Exam  Vitals and nursing note reviewed.   Constitutional:       Appearance: He is ill-appearing. He is not toxic-appearing or diaphoretic.   HENT:      Nose: Nose normal.      Mouth/Throat:      Mouth: Mucous membranes are moist.   Neck:      Comments: Retained sutures R chest wall  Cardiovascular:      Rate and Rhythm: Normal rate and regular rhythm.      Comments: Sinus with PVCs  Pulmonary:      Effort: Pulmonary effort is normal.      Breath sounds: Normal breath sounds. No wheezing.      Comments: O2 by NC  Abdominal:      General: There is no distension.      Palpations: Abdomen is soft.      Tenderness: There is no abdominal tenderness.   Musculoskeletal:      Right  lower leg: No edema.      Left lower leg: No edema.   Skin:     Comments: L sided THDC   Neurological:      Mental Status: He is alert. He is disoriented.             Significant Labs: All pertinent labs within the past 24 hours have been reviewed.    Significant Imaging: I have reviewed all pertinent imaging results/findings within the past 24 hours.

## 2024-09-13 NOTE — PT/OT/SLP PROGRESS
Occupational Therapy      Patient Name:  Mahesh Cespedes   MRN:  [id number]    Patient not seen today secondary to Dialysis. Will follow-up later if able.    9/13/2024

## 2024-09-13 NOTE — ASSESSMENT & PLAN NOTE
Anemia is likely due to chronic disease due to ESRD. Most recent hemoglobin and hematocrit are listed below.  Recent Labs     09/11/24  0130 09/13/24  0405   HGB 8.9* 9.6*   HCT 27.4* 29.2*       Plan  - Monitor serial CBC: Daily  - Transfuse PRBC if patient becomes hemodynamically unstable, symptomatic or H/H drops below 7/21.  - EPO MWF

## 2024-09-13 NOTE — CARE UPDATE
Attempted to call patient's son Guevara and patient's daughter Rima multiple times. There was no answer. Family very difficult to contact. Palliative care team present.

## 2024-09-13 NOTE — ASSESSMENT & PLAN NOTE
Chronic, controlled. Latest blood pressure and vitals reviewed-     Temp:  [96.3 °F (35.7 °C)-98.7 °F (37.1 °C)]   Pulse:  []   Resp:  [12-56]   BP: ()/(51-90)   SpO2:  [54 %-100 %] .   Home meds for hypertension were reviewed and noted below.   Hypertension Medications               metoprolol succinate (TOPROL-XL) 50 MG 24 hr tablet Take 1 tablet (50 mg total) by mouth once daily.            While in the hospital, will manage blood pressure as follows; Continue home antihypertensive regimen   - continue metoprolol      Will utilize p.r.n. blood pressure medication only if patient's blood pressure greater than  180/90  and he develops symptoms such as worsening chest pain or shortness of breath.

## 2024-09-13 NOTE — PLAN OF CARE
Remains in ICU as med surg boarder.  Pt refused bedtime medication.  Denies pain/discomfort.  No acute distress noted at this time.  POC reviewed with pt; expressed understanding.     Problem: Hemodialysis  Goal: Safe, Effective Therapy Delivery  Outcome: Progressing  Goal: Effective Tissue Perfusion  Outcome: Progressing  Goal: Absence of Infection Signs and Symptoms  Outcome: Progressing     Problem: Adult Inpatient Plan of Care  Goal: Plan of Care Review  Outcome: Progressing  Goal: Patient-Specific Goal (Individualized)  Outcome: Progressing  Goal: Absence of Hospital-Acquired Illness or Injury  Outcome: Progressing  Goal: Optimal Comfort and Wellbeing  Outcome: Progressing  Goal: Readiness for Transition of Care  Outcome: Progressing     Problem: Coping Ineffective  Goal: Effective Coping  Outcome: Progressing     Problem: Diabetes Comorbidity  Goal: Blood Glucose Level Within Targeted Range  Outcome: Progressing     Problem: Infection  Goal: Absence of Infection Signs and Symptoms  Outcome: Progressing     Problem: Wound  Goal: Optimal Coping  Outcome: Progressing  Goal: Optimal Functional Ability  Outcome: Progressing  Goal: Absence of Infection Signs and Symptoms  Outcome: Progressing  Goal: Improved Oral Intake  Outcome: Progressing  Goal: Optimal Pain Control and Function  Outcome: Progressing  Goal: Skin Health and Integrity  Outcome: Progressing  Goal: Optimal Wound Healing  Outcome: Progressing     Problem: Skin Injury Risk Increased  Goal: Skin Health and Integrity  Outcome: Progressing

## 2024-09-13 NOTE — NURSING
[facility] [place]  Nurses Note -- 4 Eyes      9/12/2024       Skin assessed on: Q Shift      [] No Pressure Injuries Present    []Prevention Measures Documented    [x] Yes LDA  for Pressure Injury Previously documented     [] Yes New Pressure Injury Discovered   [] LDA for New Pressure Injury Added      Attending RN:  Vicenta Jones RN     Second RN:  Claudio Rothman RN

## 2024-09-14 LAB
POCT GLUCOSE: 176 MG/DL (ref 70–110)
POCT GLUCOSE: 71 MG/DL (ref 70–110)

## 2024-09-14 PROCEDURE — 63600175 PHARM REV CODE 636 W HCPCS: Performed by: INTERNAL MEDICINE

## 2024-09-14 PROCEDURE — 25000003 PHARM REV CODE 250: Performed by: INTERNAL MEDICINE

## 2024-09-14 PROCEDURE — 27000221 HC OXYGEN, UP TO 24 HOURS

## 2024-09-14 PROCEDURE — 94761 N-INVAS EAR/PLS OXIMETRY MLT: CPT

## 2024-09-14 PROCEDURE — 12000002 HC ACUTE/MED SURGE SEMI-PRIVATE ROOM

## 2024-09-14 PROCEDURE — 25000003 PHARM REV CODE 250: Performed by: HOSPITALIST

## 2024-09-14 PROCEDURE — 25000003 PHARM REV CODE 250: Performed by: STUDENT IN AN ORGANIZED HEALTH CARE EDUCATION/TRAINING PROGRAM

## 2024-09-14 RX ADMIN — SEVELAMER CARBONATE 1600 MG: 800 TABLET, FILM COATED ORAL at 08:09

## 2024-09-14 RX ADMIN — APIXABAN 2.5 MG: 2.5 TABLET, FILM COATED ORAL at 08:09

## 2024-09-14 RX ADMIN — AMIODARONE HYDROCHLORIDE 200 MG: 200 TABLET ORAL at 08:09

## 2024-09-14 RX ADMIN — PANTOPRAZOLE SODIUM 40 MG: 40 INJECTION, POWDER, FOR SOLUTION INTRAVENOUS at 08:09

## 2024-09-14 RX ADMIN — ALLOPURINOL 100 MG: 100 TABLET ORAL at 08:09

## 2024-09-14 RX ADMIN — TAMSULOSIN HYDROCHLORIDE 0.4 MG: 0.4 CAPSULE ORAL at 08:09

## 2024-09-14 RX ADMIN — SERTRALINE HYDROCHLORIDE 50 MG: 50 TABLET ORAL at 08:09

## 2024-09-14 NOTE — PLAN OF CARE
Problem: Adult Inpatient Plan of Care  Goal: Plan of Care Review  Outcome: Progressing    Patient is awake alert and confused. Able to follow commands and is cooperative. Vital signs stable and within limits. No fall or injury during the shift. Still awaiting bed to go to the floor.
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
## 2024-09-14 NOTE — ASSESSMENT & PLAN NOTE
Anemia is likely due to chronic disease due to ESRD. Most recent hemoglobin and hematocrit are listed below.  Recent Labs     09/13/24  0405   HGB 9.6*   HCT 29.2*       Plan  - Monitor serial CBC: Daily  - Transfuse PRBC if patient becomes hemodynamically unstable, symptomatic or H/H drops below 7/21.  - EPO MWF

## 2024-09-14 NOTE — PLAN OF CARE
More awake today, but seems depressed.    Requesting food throughout day, tolerating diet without problem, drinks supplemental drinks with meals.   Patient confused to situation, place most of day

## 2024-09-14 NOTE — ASSESSMENT & PLAN NOTE
Patient with Paroxysmal (<7 days) atrial fibrillation which is controlled currently with Beta Blocker and Amiodarone. [id number] Score: 1. anticoagulation indicated. Anticoagulation done with eliquis 2.5mg PO BID . CT head negative for mass or bleed. Fall risk in place.    -continue metoprolol, amiodarone, Eliquis
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
## 2024-09-14 NOTE — CARE UPDATE
[facility]  Nurses Note -- 4 Eyes      9/13/2024       Skin assessed on: Q Shift      [] No Pressure Injuries Present    [x]Prevention Measures Documented    [x] Yes LDA  for Pressure Injury Previously documented     [] Yes New Pressure Injury Discovered   [] LDA for New Pressure Injury Added      Attending RN:  Kristian Whitman Jr. RN     Second RN:  LATIA Atkins RN

## 2024-09-14 NOTE — NURSING
[facility] - [place]  Nurses Note -- 4 Eyes      9/14/2024       Skin assessed on: Q Shift      [] No Pressure Injuries Present    []Prevention Measures Documented    [x] Yes LDA  for Pressure Injury Previously documented     [] Yes New Pressure Injury Discovered   [] LDA for New Pressure Injury Added      Attending RN:  Shilpa Atkins RN     Second RN:  Kristian Whitman RN

## 2024-09-14 NOTE — ASSESSMENT & PLAN NOTE
-  on admission, last HD session about a month ago  - Nephrology consulted for HD: he has poor prognosis w/ frequency of missed treatments evidently d/t patient refusal  - continue to monitor mental status - improving but still confuse  - low dose dialysis to avoid dialysis disequilibrium syndrome   - Palliative team consulted

## 2024-09-14 NOTE — SUBJECTIVE & OBJECTIVE
Interval History:  No acute overnight events.  Pt sleepy on exam but easily awaken. Attempted to call daughter and son again today, no answer. Family difficult to contact.  Pt Able to tell me his name, but can not tell me his date of birth or where he currently is. Seems like he is still confuse. Able to follow commands.    Review of Systems   Constitutional:  Positive for fatigue.   Respiratory:  Negative for cough and shortness of breath.    Cardiovascular:  Negative for chest pain.   Neurological:  Positive for weakness.   Psychiatric/Behavioral:  Positive for confusion (intermittent confusion) and decreased concentration.      Objective:     Vital Signs (Most Recent):  Temp: 98.2 °F (36.8 °C) (09/14/24 1101)  Pulse: (!) 58 (09/14/24 1200)  Resp: (!) 35 (09/14/24 1200)  BP: (!) 103/56 (09/14/24 1200)  SpO2: 98 % (09/14/24 1200) Vital Signs (24h Range):  Temp:  [98.1 °F (36.7 °C)-98.4 °F (36.9 °C)] 98.2 °F (36.8 °C)  Pulse:  [] 58  Resp:  [11-52] 35  SpO2:  [70 %-100 %] 98 %  BP: ()/(50-69) 103/56     Weight: 55 kg (121 lb 4.1 oz)  Body mass index is 18.99 kg/m².    Intake/Output Summary (Last 24 hours) at 9/14/2024 1247  Last data filed at 9/13/2024 1800  Gross per 24 hour   Intake 989.43 ml   Output 2000 ml   Net -1010.57 ml         Physical Exam  Vitals and nursing note reviewed.   Constitutional:       Appearance: He is ill-appearing. He is not toxic-appearing or diaphoretic.   HENT:      Nose: Nose normal.      Mouth/Throat:      Mouth: Mucous membranes are moist.   Neck:      Comments: Retained sutures R chest wall  Cardiovascular:      Rate and Rhythm: Normal rate and regular rhythm.      Comments: Sinus with PVCs  Pulmonary:      Effort: Pulmonary effort is normal.      Breath sounds: Normal breath sounds. No wheezing.      Comments: O2 by NC  Abdominal:      General: There is no distension.      Palpations: Abdomen is soft.      Tenderness: There is no abdominal tenderness.   Musculoskeletal:       Right lower leg: No edema.      Left lower leg: No edema.   Skin:     Comments: L sided THDC   Neurological:      Mental Status: He is alert. He is disoriented.             Significant Labs: All pertinent labs within the past 24 hours have been reviewed.    Significant Imaging: I have reviewed all pertinent imaging results/findings within the past 24 hours.

## 2024-09-14 NOTE — PROGRESS NOTES
[facility] Medicine  Progress Note    Patient Name: Mahesh Cespedes  MRN: [id number]  Patient Class: IP- Inpatient   Admission Date: 9/3/2024  Length of Stay: 10 days  Attending Physician: Jonathan Martinez DO  Primary Care Provider: Cruz Hernandez MD        Subjective:     Principal Problem:Uremic encephalopathy        HPI:  59 y.o. AAM with h/o CVA, ESRD (MWF via left tunneled HD catheter), Afib (on amiodarone and eliquis), NIDDM type2, HLD, Essential HTN ,depression with h/o suicidal ideations presents to the ER via EMS with family concerns for constipation and abdominal pain. Pt. Unable to give me history due to AMS due to uremia (). Reports that he missed 3 weeks of HD. Presented to the ER altered covered in feces and urine.    Apparently he was admitted here from - for AMS and again was noted to have missed 2 weeks of HD. CT head was negative for mass/bleed.  CT chest at that time noted loculated moderate left pleural effusion/consolidation with a large pericardial effusion 3.6cm and pulmonary edema. Was tx with abx and echo showed only a small/mod pericardial effusion with no cardiac tamponade. Nephro/pulm/ID consulted. Recommended IR to sample fluid but family deferred due to risk/benefit. He was cont. On abx VANC with NGT repeat cultures. Plan was to transfer to [facility] but daughter (javier)  refused and wanted to take him home with her on  per the discharge summary.     Today no family with with him in the ER and pt. Noted to be malnourished, desheveled and confused.     Labs noted for potassium of 7.2 and /Creatinin 25.8.  Bicarb 12. VB.23/39.5/35/16.7.  WBC 8.7 and H/H 8.6/26.      CT abd/pelvis with IV contrast:   1. Image quality degraded by technical factors discussed above.   2. Moderate/large volume of loculated left pleural fluid noting probable pleural thickening and heterogeneity of pleural fluid.  Correlation for underlying infectious process  advised.  Left basilar atelectatic/consolidative change.   3. Right lower lobe patchy ground-glass attenuation with interlobular septal thickening which could be seen with edema, infection or non infectious inflammatory process.   4. Solid and ground-glass right lower lobe pulmonary nodules measuring 6 mm and 8 mm respectively.  Repeat evaluation with chest CT in 3 months is advised to evaluate stability and/or resolution of these findings.   5. Cardiomegaly and large pericardial effusion.   6. Cholelithiasis.   7. Gastric wall thickening which could relate to nondistention although correlation for symptoms of gastritis advised.   8. Regions of large and small bowel wall thickening which could relate to nondistention and/or technical factors discussed above.  However, correlation for symptoms of enterocolitis advised.   9. Bladder wall thickening.  Correlation with urinalysis advised.   10. Multiple additional findings as above.     ED spoke with Nephrology and will plan for ICU admission and emergent HD.   We have been consulted for further management and admission to the ICU.     Because this patient's clinical condition is critically guarded and requires care in the ICU with emergent HD, care in an alternative location isn't clinically appropriate for the reasons stated above.         Overview/Hospital Course:  Mr Mahesh Cespedes is a 59 y.o. man with ESRD who was admitted with encephalopathy. He was noted to have severe uremia ( on admission), hyperkalemia. He was also caked with urine and stool. He was admitted to ICU, Nephrology Dr Mayen group consulted, and received emergent dialysis on 9/4/24. Social work notes he has outpatient HD chair but has not been to outpatient HD since 7/12/24. His last inpatient HD treatment was at [facility] on 8/7/24. His mental status has slightly improved. Palliative consulted due to patient's ESRD and HD nonadherence. Family wants to continue full code and full care; not  interested in hospice care despite patient's unwillingness to adhere to dialysis. Family now not answering the phone. He is receiving low dose dialysis to avoid dialysis disequilibrium syndrome. He developed Aflutter RVR; Cardiology cardioverted to NSR on 9/9/24. PT, OT, ST following. Recommends SNF.    Interval History:  No acute overnight events.  Pt sleepy on exam but easily awaken. Attempted to call daughter and son again today, no answer. Family difficult to contact.  Pt Able to tell me his name, but can not tell me his date of birth or where he currently is. Seems like he is still confuse. Able to follow commands.    Review of Systems   Constitutional:  Positive for fatigue.   Respiratory:  Negative for cough and shortness of breath.    Cardiovascular:  Negative for chest pain.   Neurological:  Positive for weakness.   Psychiatric/Behavioral:  Positive for confusion (intermittent confusion) and decreased concentration.      Objective:     Vital Signs (Most Recent):  Temp: 98.2 °F (36.8 °C) (09/14/24 1101)  Pulse: (!) 58 (09/14/24 1200)  Resp: (!) 35 (09/14/24 1200)  BP: (!) 103/56 (09/14/24 1200)  SpO2: 98 % (09/14/24 1200) Vital Signs (24h Range):  Temp:  [98.1 °F (36.7 °C)-98.4 °F (36.9 °C)] 98.2 °F (36.8 °C)  Pulse:  [] 58  Resp:  [11-52] 35  SpO2:  [70 %-100 %] 98 %  BP: ()/(50-69) 103/56     Weight: 55 kg (121 lb 4.1 oz)  Body mass index is 18.99 kg/m².    Intake/Output Summary (Last 24 hours) at 9/14/2024 1247  Last data filed at 9/13/2024 1800  Gross per 24 hour   Intake 989.43 ml   Output 2000 ml   Net -1010.57 ml         Physical Exam  Vitals and nursing note reviewed.   Constitutional:       Appearance: He is ill-appearing. He is not toxic-appearing or diaphoretic.   HENT:      Nose: Nose normal.      Mouth/Throat:      Mouth: Mucous membranes are moist.   Neck:      Comments: Retained sutures R chest wall  Cardiovascular:      Rate and Rhythm: Normal rate and regular rhythm.       Comments: Sinus with PVCs  Pulmonary:      Effort: Pulmonary effort is normal.      Breath sounds: Normal breath sounds. No wheezing.      Comments: O2 by NC  Abdominal:      General: There is no distension.      Palpations: Abdomen is soft.      Tenderness: There is no abdominal tenderness.   Musculoskeletal:      Right lower leg: No edema.      Left lower leg: No edema.   Skin:     Comments: L [facility]   Neurological:      Mental Status: He is alert. He is disoriented.             Significant Labs: All pertinent labs within the past 24 hours have been reviewed.    Significant Imaging: I have reviewed all pertinent imaging results/findings within the past 24 hours.    Assessment/Plan:      * Uremic encephalopathy  -  on admission, last HD session about a month ago  - Nephrology consulted for HD: he has poor prognosis w/ frequency of missed treatments evidently d/t patient refusal  - continue to monitor mental status - improving but still confuse  - low dose dialysis to avoid dialysis disequilibrium syndrome   - Palliative team consulted      ESRD needing dialysis  - dialyzes via [facility]  - last outpatient HD session was 7/12/24 and last inpatient session was 8/7/24-- missed almost a month of dialysis because he refused to go  - he was uremic on presentation- - and hyperkalemic- K 7.2  - Nephrology [facility] group consulted  - received emergent dialysis on 9/4/24- hyperK resolved, BUN improved.  - Nephrology continues to follow along. Receiving low dose dialysis to avoid dialysis disequilibrium syndrome   - Palliative consulted as he has ESRD and refuses outpatient dialysis. Despite this, continue full code full care.     Pressure injury of sacral region, stage 4  - present on admission  - healing   - wound care consulted: Local wound care to sacral wound with hydrocolloid dressing to promote closure of chronic wound and prevent soiling from incontinence.       Left loculated pleural effusion  - this has  been present on prior hospitalizations  - discussed on last stay and family declined thoracentesis at that time  - developed fever on 9/8/24. No further fevers.     Anemia in ESRD (end-stage renal disease)  Anemia is likely due to chronic disease due to ESRD. Most recent hemoglobin and hematocrit are listed below.  Recent Labs     09/13/24  0405   HGB 9.6*   HCT 29.2*       Plan  - Monitor serial CBC: Daily  - Transfuse PRBC if patient becomes hemodynamically unstable, symptomatic or H/H drops below 7/21.  - EPO MWF      Essential hypertension  Chronic, controlled. Latest blood pressure and vitals reviewed-     Temp:  [98.1 °F (36.7 °C)-98.4 °F (36.9 °C)]   Pulse:  []   Resp:  [11-52]   BP: ()/(50-69)   SpO2:  [70 %-100 %] .   Home meds for hypertension were reviewed and noted below.   Hypertension Medications               metoprolol succinate (TOPROL-XL) 50 MG 24 hr tablet Take 1 tablet (50 mg total) by mouth once daily.            While in the hospital, will manage blood pressure as follows; Continue home antihypertensive regimen   - continue metoprolol      Will utilize p.r.n. blood pressure medication only if patient's blood pressure greater than  180/90  and he develops symptoms such as worsening chest pain or shortness of breath.    Paroxysmal atrial fibrillation  Patient with Paroxysmal (<7 days) atrial fibrillation which is controlled currently with Beta Blocker and Amiodarone. [id number] Score: 1. anticoagulation indicated. Anticoagulation done with eliquis 2.5mg PO BID . CT head negative for mass or bleed. Fall risk in place.    -continue metoprolol, amiodarone, Eliquis    Controlled type 2 diabetes mellitus with chronic kidney disease on chronic dialysis, without long-term current use of insulin  Lab Results   Component Value Date    HGBA1C 6.2 (H) 09/04/2024     - ACHS with hypoglycemic protocol   - diabetic puree diet per speech    History of CVA (cerebrovascular accident)  Noted.   -  continue home eliquis, BP control    Depression  Resume home antidepressant    ACP (advance care planning)  Advance Care Planning    Date: 09/04/2024  Palliative consulted. Continue full aggressive care. Time spent 5 minutes           Physical debility  Was advised to got to Skilled with daily PT/OT but family declined. Social work for discharge planning and PT eval. Fall risk. Nutrition consult for his malnutrition.   - palliative consulted  - family wants to continue full aggressive treatment  - PT, OT, ST consulted- SNF    Gastric wall thickening  Noted on CT  - PPI IV ordered      Thrombocytopenia  The likely etiology of thrombocytopenia is  unknown- potentially occult liver disease? . The patients 3 most recent labs are listed below.  Recent Labs     09/13/24  0405          Plan  - Will transfuse if platelet count is <50k (if undergoing surgical procedure or have active bleeding).  - monitor daily  - improving, resume home Eliquis 9/13      Thrush  - noted 9/6/24  - started IV fluconazole as he does not have the mental awareness to swish and swallow nystatin at this point and is not reliably tolerating pills    Typical atrial flutter  Noted on 9/6/24. Converted back to NSR, then back to aflutter. No change with diltiazem  - started amio gtt on 9/8/24  - continue eliquis  - Cardiology consulted  - he cannot consent for his ROYCE/DCCV and family not answering the phone  -continue p.o. amiodarone and metoprolol        VTE Risk Mitigation (From admission, onward)           Ordered     apixaban tablet 2.5 mg  2 times daily         09/13/24 0610     IP VTE LOW RISK PATIENT  Once         09/04/24 0116     Place sequential compression device  Until discontinued         09/04/24 0116                    Discharge Planning   JAIME:      Code Status: Full Code   Is the patient medically ready for discharge?:     Reason for patient still in hospital (select all that apply): Patient trending condition, Treatment, Consult  recommendations, and PT / OT recommendations  Discharge Plan A: Skilled Nursing Facility   Discharge Delays: None known at this time            Jonathan Martinez DO  Department of Hospital Medicine   [facility] - Intensive Care

## 2024-09-14 NOTE — ASSESSMENT & PLAN NOTE
The likely etiology of thrombocytopenia is  unknown- potentially occult liver disease? . The patients 3 most recent labs are listed below.  Recent Labs     09/13/24  0405          Plan  - Will transfuse if platelet count is <50k (if undergoing surgical procedure or have active bleeding).  - monitor daily  - improving, resume home Eliquis 9/13

## 2024-09-14 NOTE — ASSESSMENT & PLAN NOTE
Chronic, controlled. Latest blood pressure and vitals reviewed-     Temp:  [98.1 °F (36.7 °C)-98.4 °F (36.9 °C)]   Pulse:  []   Resp:  [11-52]   BP: ()/(50-69)   SpO2:  [70 %-100 %] .   Home meds for hypertension were reviewed and noted below.   Hypertension Medications               metoprolol succinate (TOPROL-XL) 50 MG 24 hr tablet Take 1 tablet (50 mg total) by mouth once daily.            While in the hospital, will manage blood pressure as follows; Continue home antihypertensive regimen   - continue metoprolol      Will utilize p.r.n. blood pressure medication only if patient's blood pressure greater than  180/90  and he develops symptoms such as worsening chest pain or shortness of breath.

## 2024-09-15 PROCEDURE — 25000003 PHARM REV CODE 250: Performed by: STUDENT IN AN ORGANIZED HEALTH CARE EDUCATION/TRAINING PROGRAM

## 2024-09-15 PROCEDURE — 11000001 HC ACUTE MED/SURG PRIVATE ROOM

## 2024-09-15 PROCEDURE — 25000003 PHARM REV CODE 250: Performed by: HOSPITALIST

## 2024-09-15 PROCEDURE — 25000003 PHARM REV CODE 250: Performed by: INTERNAL MEDICINE

## 2024-09-15 PROCEDURE — 63600175 PHARM REV CODE 636 W HCPCS: Performed by: INTERNAL MEDICINE

## 2024-09-15 PROCEDURE — 63600175 PHARM REV CODE 636 W HCPCS: Performed by: HOSPITALIST

## 2024-09-15 RX ORDER — OLANZAPINE 10 MG/2ML
5 INJECTION, POWDER, FOR SOLUTION INTRAMUSCULAR ONCE AS NEEDED
Status: COMPLETED | OUTPATIENT
Start: 2024-09-15 | End: 2024-09-19

## 2024-09-15 RX ADMIN — APIXABAN 2.5 MG: 2.5 TABLET, FILM COATED ORAL at 09:09

## 2024-09-15 RX ADMIN — ONDANSETRON 4 MG: 2 INJECTION INTRAMUSCULAR; INTRAVENOUS at 01:09

## 2024-09-15 RX ADMIN — PANTOPRAZOLE SODIUM 40 MG: 40 INJECTION, POWDER, FOR SOLUTION INTRAVENOUS at 09:09

## 2024-09-15 RX ADMIN — Medication 6 MG: at 09:09

## 2024-09-15 RX ADMIN — METOPROLOL SUCCINATE 100 MG: 50 TABLET, EXTENDED RELEASE ORAL at 09:09

## 2024-09-15 RX ADMIN — AMIODARONE HYDROCHLORIDE 200 MG: 200 TABLET ORAL at 10:09

## 2024-09-15 RX ADMIN — SERTRALINE HYDROCHLORIDE 50 MG: 50 TABLET ORAL at 09:09

## 2024-09-15 RX ADMIN — POLYETHYLENE GLYCOL 3350 17 G: 17 POWDER, FOR SOLUTION ORAL at 10:09

## 2024-09-15 RX ADMIN — TAMSULOSIN HYDROCHLORIDE 0.4 MG: 0.4 CAPSULE ORAL at 09:09

## 2024-09-15 RX ADMIN — AMIODARONE HYDROCHLORIDE 200 MG: 200 TABLET ORAL at 09:09

## 2024-09-15 RX ADMIN — ALLOPURINOL 100 MG: 100 TABLET ORAL at 09:09
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
## 2024-09-15 NOTE — CARE UPDATE
Attempted to call the patient's son and the patient's daughter again today at 9:58AM. Again, there was no answer. Informed the nurse to let me know if patient has any visitors

## 2024-09-15 NOTE — PROGRESS NOTES
[facility] Medicine  Progress Note    Patient Name: Mahesh Cespedes  MRN: [id number]  Patient Class: IP- Inpatient   Admission Date: 9/3/2024  Length of Stay: 11 days  Attending Physician: Jonathan Martinez DO  Primary Care Provider: Cruz Hernandez MD        Subjective:     Principal Problem:Uremic encephalopathy        HPI:  59 y.o. AAM with h/o CVA, ESRD (MWF via left tunneled HD catheter), Afib (on amiodarone and eliquis), NIDDM type2, HLD, Essential HTN ,depression with h/o suicidal ideations presents to the ER via EMS with family concerns for constipation and abdominal pain. Pt. Unable to give me history due to AMS due to uremia (). Reports that he missed 3 weeks of HD. Presented to the ER altered covered in feces and urine.    Apparently he was admitted here from - for AMS and again was noted to have missed 2 weeks of HD. CT head was negative for mass/bleed.  CT chest at that time noted loculated moderate left pleural effusion/consolidation with a large pericardial effusion 3.6cm and pulmonary edema. Was tx with abx and echo showed only a small/mod pericardial effusion with no cardiac tamponade. Nephro/pulm/ID consulted. Recommended IR to sample fluid but family deferred due to risk/benefit. He was cont. On abx VANC with NGT repeat cultures. Plan was to transfer to [facility] but daughter (javier)  refused and wanted to take him home with her on  per the discharge summary.     Today no family with with him in the ER and pt. Noted to be malnourished, desheveled and confused.     Labs noted for potassium of 7.2 and /Creatinin 25.8.  Bicarb 12. VB.23/39.5/35/16.7.  WBC 8.7 and H/H 8.6/26.      CT abd/pelvis with IV contrast:   1. Image quality degraded by technical factors discussed above.   2. Moderate/large volume of loculated left pleural fluid noting probable pleural thickening and heterogeneity of pleural fluid.  Correlation for underlying infectious process advised.   Left basilar atelectatic/consolidative change.   3. Right lower lobe patchy ground-glass attenuation with interlobular septal thickening which could be seen with edema, infection or non infectious inflammatory process.   4. Solid and ground-glass right lower lobe pulmonary nodules measuring 6 mm and 8 mm respectively.  Repeat evaluation with chest CT in 3 months is advised to evaluate stability and/or resolution of these findings.   5. Cardiomegaly and large pericardial effusion.   6. Cholelithiasis.   7. Gastric wall thickening which could relate to nondistention although correlation for symptoms of gastritis advised.   8. Regions of large and small bowel wall thickening which could relate to nondistention and/or technical factors discussed above.  However, correlation for symptoms of enterocolitis advised.   9. Bladder wall thickening.  Correlation with urinalysis advised.   10. Multiple additional findings as above.     ED spoke with Nephrology and will plan for ICU admission and emergent HD.   We have been consulted for further management and admission to the ICU.     Because this patient's clinical condition is critically guarded and requires care in the ICU with emergent HD, care in an alternative location isn't clinically appropriate for the reasons stated above.         Overview/Hospital Course:  Mr Mahesh Cespedes is a 59 y.o. man with ESRD who was admitted with encephalopathy. He was noted to have severe uremia ( on admission), hyperkalemia. He was also caked with urine and stool. He was admitted to ICU, Nephrology Dr Mayen group consulted, and received emergent dialysis on 9/4/24. Social work notes he has outpatient HD chair but has not been to outpatient HD since 7/12/24. His last inpatient HD treatment was at [facility] on 8/7/24. His mental status has slightly improved. Palliative consulted due to patient's ESRD and HD nonadherence. Family wants to continue full code and full care; not interested  in hospice care despite patient's unwillingness to adhere to dialysis. Family now not answering the phone. He is receiving low dose dialysis to avoid dialysis disequilibrium syndrome. He developed Aflutter RVR; Cardiology cardioverted to NSR on 9/9/24. PT, OT, ST following. Recommends SNF.    Interval History:  No acute overnight events.  Remains afebrile. Seems more alert today, able to tell me name but still confuse. Does not know he's in the hospital, thinks he's at home. Answers yes and no to questions. Able to follow some commands. Attempted to call daughter and son again today, no answer. Family difficult to contact.      Review of Systems   Constitutional:  Positive for fatigue.   Respiratory:  Negative for cough and shortness of breath.    Cardiovascular:  Negative for chest pain.   Neurological:  Positive for weakness.   Psychiatric/Behavioral:  Positive for confusion (intermittent confusion) and decreased concentration.      Objective:     Vital Signs (Most Recent):  Temp: 97.6 °F (36.4 °C) (09/15/24 0715)  Pulse: (!) 57 (09/15/24 0747)  Resp: 18 (09/15/24 0715)  BP: 134/66 (09/15/24 0715)  SpO2: (!) 94 % (09/15/24 0715) Vital Signs (24h Range):  Temp:  [97.6 °F (36.4 °C)-98.6 °F (37 °C)] 97.6 °F (36.4 °C)  Pulse:  [54-85] 57  Resp:  [13-57] 18  SpO2:  [94 %-100 %] 94 %  BP: ()/(47-69) 134/66     Weight: 58.3 kg (128 lb 8.5 oz)  Body mass index is 20.13 kg/m².    Intake/Output Summary (Last 24 hours) at 9/15/2024 1010  Last data filed at 9/14/2024 1806  Gross per 24 hour   Intake 480 ml   Output 0 ml   Net 480 ml         Physical Exam  Vitals and nursing note reviewed.   Constitutional:       Appearance: He is ill-appearing. He is not toxic-appearing or diaphoretic.   HENT:      Nose: Nose normal.      Mouth/Throat:      Mouth: Mucous membranes are moist.   Neck:      Comments: Retained sutures R chest wall  Cardiovascular:      Rate and Rhythm: Normal rate and regular rhythm.      Comments: Sinus  with PVCs  Pulmonary:      Effort: Pulmonary effort is normal.      Breath sounds: Normal breath sounds. No wheezing.      Comments: O2 by NC  Abdominal:      General: There is no distension.      Palpations: Abdomen is soft.      Tenderness: There is no abdominal tenderness.   Musculoskeletal:      Right lower leg: No edema.      Left lower leg: No edema.   Skin:     Comments: L [facility]   Neurological:      Mental Status: He is alert. He is disoriented.             Significant Labs: All pertinent labs within the past 24 hours have been reviewed.    Significant Imaging: I have reviewed all pertinent imaging results/findings within the past 24 hours.    Assessment/Plan:      * Uremic encephalopathy  -  on admission, last HD session about a month ago  - Nephrology consulted for HD: he has poor prognosis w/ frequency of missed treatments evidently d/t patient refusal  - continue to monitor mental status - improving but still confuse. May be new baseline given freq of missed HD  - low dose dialysis to avoid dialysis disequilibrium syndrome   - Palliative team consulted      ESRD needing dialysis  - dialyzes via [facility]  - last outpatient HD session was 7/12/24 and last inpatient session was 8/7/24-- missed almost a month of dialysis because he refused to go  - he was uremic on presentation- - and hyperkalemic- K 7.2  - Nephrology [facility] group consulted  - received emergent dialysis on 9/4/24- hyperK resolved, BUN improved.  - Nephrology continues to follow along. Receiving low dose dialysis to avoid dialysis disequilibrium syndrome   - Palliative consulted as he has ESRD and refuses outpatient dialysis. Despite this, continue full code full care.     Pressure injury of sacral region, stage 4  - present on admission  - healing   - wound care consulted: Local wound care to sacral wound with hydrocolloid dressing to promote closure of chronic wound and prevent soiling from incontinence.       Left loculated  pleural effusion  - this has been present on prior hospitalizations  - discussed on last stay and family declined thoracentesis at that time  - developed fever on 9/8/24. No further fevers.     Anemia in ESRD (end-stage renal disease)  Anemia is likely due to chronic disease due to ESRD. Most recent hemoglobin and hematocrit are listed below.  Recent Labs     09/13/24  0405   HGB 9.6*   HCT 29.2*       Plan  - Monitor serial CBC: Daily  - Transfuse PRBC if patient becomes hemodynamically unstable, symptomatic or H/H drops below 7/21.  - EPO MWF      Essential hypertension  Chronic, controlled. Latest blood pressure and vitals reviewed-     Temp:  [97.6 °F (36.4 °C)-98.6 °F (37 °C)]   Pulse:  [54-85]   Resp:  [13-57]   BP: ()/(47-69)   SpO2:  [94 %-100 %] .   Home meds for hypertension were reviewed and noted below.   Hypertension Medications               metoprolol succinate (TOPROL-XL) 50 MG 24 hr tablet Take 1 tablet (50 mg total) by mouth once daily.            While in the hospital, will manage blood pressure as follows; Continue home antihypertensive regimen   - continue metoprolol      Will utilize p.r.n. blood pressure medication only if patient's blood pressure greater than  180/90  and he develops symptoms such as worsening chest pain or shortness of breath.    Paroxysmal atrial fibrillation  Patient with Paroxysmal (<7 days) atrial fibrillation which is controlled currently with Beta Blocker and Amiodarone. [id number] Score: 1. anticoagulation indicated. Anticoagulation done with eliquis 2.5mg PO BID . CT head negative for mass or bleed. Fall risk in place.    -continue metoprolol, amiodarone, Eliquis    Controlled type 2 diabetes mellitus with chronic kidney disease on chronic dialysis, without long-term current use of insulin  Lab Results   Component Value Date    HGBA1C 6.2 (H) 09/04/2024     - ACHS with hypoglycemic protocol   - diabetic puree diet per speech    History of CVA (cerebrovascular  accident)  Noted.   - continue home eliquis, BP control    Depression  Resume home antidepressant-sertraline 50 mg daily    ACP (advance care planning)  Advance Care Planning    Date: 09/04/2024  Palliative consulted. Continue full aggressive care. Time spent 5 minutes           Physical debility  Was advised to got to Skilled with daily PT/OT but family declined. Social work for discharge planning and PT eval. Fall risk. Nutrition consult for his malnutrition.   - palliative consulted  - family wants to continue full aggressive treatment  - PT, OT, ST consulted- SNF    Gastric wall thickening  Noted on CT  - PPI IV ordered      Thrombocytopenia  The likely etiology of thrombocytopenia is  unknown- potentially occult liver disease? . The patients 3 most recent labs are listed below.  Recent Labs     09/13/24  0405          Plan  - Will transfuse if platelet count is <50k (if undergoing surgical procedure or have active bleeding).  - monitor daily  - improving, resume home Eliquis 9/13      Thrush  - noted 9/6/24  - started IV fluconazole as he does not have the mental awareness to swish and swallow nystatin at this point and is not reliably tolerating pills    Typical atrial flutter  Noted on 9/6/24. Converted back to NSR, then back to aflutter. No change with diltiazem  - started amio gtt on 9/8/24  - continue eliquis  - Cardiology consulted  - he cannot consent for his ROYCE/DCCV and family not answering the phone  -continue p.o. amiodarone and metoprolol        VTE Risk Mitigation (From admission, onward)           Ordered     apixaban tablet 2.5 mg  2 times daily         09/13/24 0610     IP VTE LOW RISK PATIENT  Once         09/04/24 0116     Place sequential compression device  Until discontinued         09/04/24 0116                    Discharge Planning   JAIME:      Code Status: Full Code   Is the patient medically ready for discharge?:     Reason for patient still in hospital (select all that apply):  Patient trending condition, Treatment, Consult recommendations, PT / OT recommendations, and Pending disposition  Discharge Plan A: Skilled Nursing Facility   Discharge Delays: None known at this time              Jonathan Martinez DO  Department of Hospital Medicine   [facility]

## 2024-09-15 NOTE — ASSESSMENT & PLAN NOTE
Chronic, controlled. Latest blood pressure and vitals reviewed-     Temp:  [97.6 °F (36.4 °C)-98.6 °F (37 °C)]   Pulse:  [54-85]   Resp:  [13-57]   BP: ()/(47-69)   SpO2:  [94 %-100 %] .   Home meds for hypertension were reviewed and noted below.   Hypertension Medications               metoprolol succinate (TOPROL-XL) 50 MG 24 hr tablet Take 1 tablet (50 mg total) by mouth once daily.            While in the hospital, will manage blood pressure as follows; Continue home antihypertensive regimen   - continue metoprolol      Will utilize p.r.n. blood pressure medication only if patient's blood pressure greater than  180/90  and he develops symptoms such as worsening chest pain or shortness of breath.

## 2024-09-15 NOTE — ASSESSMENT & PLAN NOTE
-  on admission, last HD session about a month ago  - Nephrology consulted for HD: he has poor prognosis w/ frequency of missed treatments evidently d/t patient refusal  - continue to monitor mental status - improving but still confuse. May be new baseline given freq of missed HD  - low dose dialysis to avoid dialysis disequilibrium syndrome   - Palliative team consulted

## 2024-09-15 NOTE — ASSESSMENT & PLAN NOTE
Patient with Paroxysmal (<7 days) atrial fibrillation which is controlled currently with Beta Blocker and Amiodarone. [id number] Score: 1. anticoagulation indicated. Anticoagulation done with eliquis 2.5mg PO BID . CT head negative for mass or bleed. Fall risk in place.    -continue metoprolol, amiodarone, Eliquis

## 2024-09-15 NOTE — NURSING
Nurses Note -- 4 Eyes      9/15/2024   11:53 AM      Skin assessed during: Daily Assessment      [] No Altered Skin Integrity Present    []Prevention Measures Documented      [x] Yes- Altered Skin Integrity Present or Discovered   [x] LDA Added if Not in Epic (Describe Wound)   [] New Altered Skin Integrity was Present on Admit and Documented in LDA   [x] Wound Image Taken    Wound Care Consulted? Yes.    Attending Nurse:  Marcia Sanchez RN/Staff Member:  BETO Patel

## 2024-09-15 NOTE — NURSING
[facility] [place]  Nurses Note -- 4 Eyes      9/15/2024       Skin assessed on: Q Shift      [] No Pressure Injuries Present    []Prevention Measures Documented    [x] Yes LDA  for Pressure Injury Previously documented     [] Yes New Pressure Injury Discovered   [] LDA for New Pressure Injury Added      Attending RN:  Marcia Vitale LPN     Second RN:  Sheyla PÉREZ

## 2024-09-15 NOTE — PLAN OF CARE
Problem: Adult Inpatient Plan of Care  Goal: Plan of Care Review  Outcome: Progressing    Patient remain confused. Oriented to self only. Able to follow commands and make short conversations. Vital signs stable and within limits No fall or injury . Scheduled for Hemodialysis this coming Monday.

## 2024-09-15 NOTE — CARE UPDATE
[facility]  Nurses Note -- 4 Eyes      9/14/2024       Skin assessed on: Q Shift      [] No Pressure Injuries Present    [x]Prevention Measures Documented    [x] Yes LDA  for Pressure Injury Previously documented     [] Yes New Pressure Injury Discovered   [] LDA for New Pressure Injury Added      Attending RN:  Kristian Whitman Jr., RN     Second RN:  LATIA Atkins RN

## 2024-09-15 NOTE — SUBJECTIVE & OBJECTIVE
Interval History:  No acute overnight events.  Remains afebrile. Seems more alert today, able to tell me name but still confuse. Does not know he's in the hospital, thinks he's at home. Answers yes and no to questions. Able to follow some commands. Attempted to call daughter and son again today, no answer. Family difficult to contact.      Review of Systems   Constitutional:  Positive for fatigue.   Respiratory:  Negative for cough and shortness of breath.    Cardiovascular:  Negative for chest pain.   Neurological:  Positive for weakness.   Psychiatric/Behavioral:  Positive for confusion (intermittent confusion) and decreased concentration.      Objective:     Vital Signs (Most Recent):  Temp: 97.6 °F (36.4 °C) (09/15/24 0715)  Pulse: (!) 57 (09/15/24 0747)  Resp: 18 (09/15/24 0715)  BP: 134/66 (09/15/24 0715)  SpO2: (!) 94 % (09/15/24 0715) Vital Signs (24h Range):  Temp:  [97.6 °F (36.4 °C)-98.6 °F (37 °C)] 97.6 °F (36.4 °C)  Pulse:  [54-85] 57  Resp:  [13-57] 18  SpO2:  [94 %-100 %] 94 %  BP: ()/(47-69) 134/66     Weight: 58.3 kg (128 lb 8.5 oz)  Body mass index is 20.13 kg/m².    Intake/Output Summary (Last 24 hours) at 9/15/2024 1010  Last data filed at 9/14/2024 1806  Gross per 24 hour   Intake 480 ml   Output 0 ml   Net 480 ml         Physical Exam  Vitals and nursing note reviewed.   Constitutional:       Appearance: He is ill-appearing. He is not toxic-appearing or diaphoretic.   HENT:      Nose: Nose normal.      Mouth/Throat:      Mouth: Mucous membranes are moist.   Neck:      Comments: Retained sutures R chest wall  Cardiovascular:      Rate and Rhythm: Normal rate and regular rhythm.      Comments: Sinus with PVCs  Pulmonary:      Effort: Pulmonary effort is normal.      Breath sounds: Normal breath sounds. No wheezing.      Comments: O2 by NC  Abdominal:      General: There is no distension.      Palpations: Abdomen is soft.      Tenderness: There is no abdominal tenderness.   Musculoskeletal:       Right lower leg: No edema.      Left lower leg: No edema.   Skin:     Comments: L sided [facility]   Neurological:      Mental Status: He is alert. He is disoriented.             Significant Labs: All pertinent labs within the past 24 hours have been reviewed.    Significant Imaging: I have reviewed all pertinent imaging results/findings within the past 24 hours.

## 2024-09-15 NOTE — NURSING
"Patient is agitated, constantly hopping up out of bed, removing 02, and yelling. Refuses to use  or explain what's wrong. Patient spoke with tech who speaks the same and language and told her that He was having "all over body pain." Tylenol was pulled and attempted to give to patient. Patient refused med. He them told the tech "he was not in pain" Patient continues to yell, and asked for someone to call his Brother. His brother is not listed in the computer and he cannot provide his number. Patient placed back in bed, Bed alarm set. Fall risk Camera placed. Call light in reach. Offered patient his dinner tray, but patient is refusing    "

## 2024-09-15 NOTE — ASSESSMENT & PLAN NOTE
- noted 9/6/24  - started IV fluconazole as he does not have the mental awareness to swish and swallow nystatin at this point and is not reliably tolerating pills  -completed treatment with fluconazole

## 2024-09-16 LAB
ANION GAP SERPL CALC-SCNC: 14 MMOL/L (ref 8–16)
BASOPHILS # BLD AUTO: 0.03 K/UL (ref 0–0.2)
BASOPHILS NFR BLD: 0.5 % (ref 0–1.9)
BUN SERPL-MCNC: 69 MG/DL (ref 8–23)
CALCIUM SERPL-MCNC: 7.3 MG/DL (ref 8.7–10.5)
CHLORIDE SERPL-SCNC: 96 MMOL/L (ref 95–110)
CO2 SERPL-SCNC: 23 MMOL/L (ref 23–29)
CREAT SERPL-MCNC: 8.1 MG/DL (ref 0.5–1.4)
DIFFERENTIAL METHOD BLD: ABNORMAL
EOSINOPHIL # BLD AUTO: 0.1 K/UL (ref 0–0.5)
EOSINOPHIL NFR BLD: 1.5 % (ref 0–8)
ERYTHROCYTE [DISTWIDTH] IN BLOOD BY AUTOMATED COUNT: 20.6 % (ref 11.5–14.5)
EST. GFR  (NO RACE VARIABLE): 7 ML/MIN/1.73 M^2
GLUCOSE SERPL-MCNC: 70 MG/DL (ref 70–110)
HCT VFR BLD AUTO: 28.5 % (ref 40–54)
HGB BLD-MCNC: 9.2 G/DL (ref 14–18)
IMM GRANULOCYTES # BLD AUTO: 0.06 K/UL (ref 0–0.04)
IMM GRANULOCYTES NFR BLD AUTO: 1.1 % (ref 0–0.5)
LYMPHOCYTES # BLD AUTO: 1 K/UL (ref 1–4.8)
LYMPHOCYTES NFR BLD: 18.1 % (ref 18–48)
MCH RBC QN AUTO: 27.2 PG (ref 27–31)
MCHC RBC AUTO-ENTMCNC: 32.3 G/DL (ref 32–36)
MCV RBC AUTO: 84 FL (ref 82–98)
MONOCYTES # BLD AUTO: 0.8 K/UL (ref 0.3–1)
MONOCYTES NFR BLD: 13.9 % (ref 4–15)
NEUTROPHILS # BLD AUTO: 3.6 K/UL (ref 1.8–7.7)
NEUTROPHILS NFR BLD: 64.9 % (ref 38–73)
NRBC BLD-RTO: 1 /100 WBC
PHOSPHATE SERPL-MCNC: 3.8 MG/DL (ref 2.7–4.5)
PLATELET # BLD AUTO: 234 K/UL (ref 150–450)
PMV BLD AUTO: 10.7 FL (ref 9.2–12.9)
POTASSIUM SERPL-SCNC: 4 MMOL/L (ref 3.5–5.1)
RBC # BLD AUTO: 3.38 M/UL (ref 4.6–6.2)
SODIUM SERPL-SCNC: 133 MMOL/L (ref 136–145)
WBC # BLD AUTO: 5.48 K/UL (ref 3.9–12.7)

## 2024-09-16 PROCEDURE — 25000003 PHARM REV CODE 250: Performed by: STUDENT IN AN ORGANIZED HEALTH CARE EDUCATION/TRAINING PROGRAM

## 2024-09-16 PROCEDURE — 84100 ASSAY OF PHOSPHORUS: CPT | Performed by: HOSPITALIST

## 2024-09-16 PROCEDURE — 85025 COMPLETE CBC W/AUTO DIFF WBC: CPT | Performed by: HOSPITALIST

## 2024-09-16 PROCEDURE — 25000003 PHARM REV CODE 250: Performed by: INTERNAL MEDICINE

## 2024-09-16 PROCEDURE — 80048 BASIC METABOLIC PNL TOTAL CA: CPT | Performed by: HOSPITALIST

## 2024-09-16 PROCEDURE — 36415 COLL VENOUS BLD VENIPUNCTURE: CPT | Performed by: HOSPITALIST

## 2024-09-16 PROCEDURE — G0427 INPT/ED TELECONSULT70: HCPCS | Mod: 95,GC,, | Performed by: PSYCHIATRY & NEUROLOGY

## 2024-09-16 PROCEDURE — 63600175 PHARM REV CODE 636 W HCPCS: Performed by: INTERNAL MEDICINE

## 2024-09-16 PROCEDURE — 99497 ADVNCD CARE PLAN 30 MIN: CPT | Mod: ,,, | Performed by: INTERNAL MEDICINE

## 2024-09-16 PROCEDURE — 11000001 HC ACUTE MED/SURG PRIVATE ROOM

## 2024-09-16 PROCEDURE — 25000003 PHARM REV CODE 250: Performed by: HOSPITALIST

## 2024-09-16 PROCEDURE — 99498 ADVNCD CARE PLAN ADDL 30 MIN: CPT | Mod: ,,, | Performed by: INTERNAL MEDICINE

## 2024-09-16 PROCEDURE — 63600175 PHARM REV CODE 636 W HCPCS: Mod: JZ,JG | Performed by: HOSPITALIST

## 2024-09-16 RX ADMIN — PANTOPRAZOLE SODIUM 40 MG: 40 INJECTION, POWDER, FOR SOLUTION INTRAVENOUS at 10:09

## 2024-09-16 RX ADMIN — APIXABAN 2.5 MG: 2.5 TABLET, FILM COATED ORAL at 10:09

## 2024-09-16 RX ADMIN — POLYETHYLENE GLYCOL 3350 17 G: 17 POWDER, FOR SOLUTION ORAL at 10:09

## 2024-09-16 RX ADMIN — ALLOPURINOL 100 MG: 100 TABLET ORAL at 10:09

## 2024-09-16 RX ADMIN — SERTRALINE HYDROCHLORIDE 50 MG: 50 TABLET ORAL at 10:09

## 2024-09-16 RX ADMIN — EPOETIN ALFA-EPBX 20000 UNITS: 10000 INJECTION, SOLUTION INTRAVENOUS; SUBCUTANEOUS at 10:09

## 2024-09-16 NOTE — NURSING
[facility] [place]  Nurses Note -- 4 Eyes      9/16/2024       Skin assessed on: Q Shift      [] No Pressure Injuries Present    []Prevention Measures Documented    [x] Yes LDA  for Pressure Injury Previously documented     [] Yes New Pressure Injury Discovered   [] LDA for New Pressure Injury Added      Attending RN:  Marcia Vitale LPN     Second RN:  Carlos PÉREZ

## 2024-09-16 NOTE — ASSESSMENT & PLAN NOTE
-  on admission, last HD session about a month ago  - Nephrology consulted for HD: he has poor prognosis w/ frequency of missed treatments evidently d/t patient refusal  - continue to monitor mental status - improving but still intermittently confuse. May be new baseline given freq of missed HD  - low dose dialysis to avoid dialysis disequilibrium syndrome   - Palliative team consulted  - Pt continues to refuse HD  - Psych consulted for formal  evaluation of capacity     TACHYCARDIC
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
## 2024-09-16 NOTE — NURSING
Patient has left the unit on 2L nc, going to dialysis,  no distress noted.   Son (Nacho) and Daughter in law have arrived at bedside  Patients son states his number listed in the computer is accurate for contact

## 2024-09-16 NOTE — ACP (ADVANCE CARE PLANNING)
"Advance Care Planning     Date: 09/16/2024    Today a voluntary meeting took place: bedside    Patient Participation: Patient is able to participate     Attendees (Name and  Relationship to patient): Patient    Staff attendees (Name and  Role): Yudy Salvador (Nephrology PA), Nereyda ([employer] Video Interpretor)     ACP Conversation (General): Chart and interval history reviewed; extensively discussed pt in person with primary staff, Dr Martinez. Pt has consistently been stating throughout hospital he does not want further HD. Along with Yudy Salvador (nephrology PA), met with pt at bedside utilizing video [employer] interpretor. During entirety of visit, pt was alert and readily able to participate in discussion. Extensive discussion with pt about his care preferences, and plan moving forward. While at first he stated he would like to forgo further HD - and start end of life/hospice care - he then changed his mind and said he wanted to talk to his brother Mauricio (does not have his phone number memorized) and children to ensure they are supportive of his wishes. I was very transparent that if stopping HD, his time remaining would likely be days to weeks, though hospice care would ensure he is comfortable during this period. He stated "that would be ok." Based on our conversation, he was agreeable to proceeding with HD today (to ensure he was as cognitively clear as possible for discussion with his family) while he discussed continued HD vs hospice care with his family today. However, it seems he changed his mind up in HD unit. Given his inconsistencies with his preferences, recommend formal psych evaluation. Additionally, his family needs to physically present to hospital to speak to patient - this already was requested of them last week. Updated primary team and SW/CM following visit; will follow up with pt.      Code Status: Full code for now, but explained that if forgoing further HD, DNR/DNI would be only " appropriate code status     Recommendations/  Follow-up tasks:   1) Recommend psychiatry consult for formal capacity evaluation to evaluate for pt's possible refusal of further HD   2) Would again recommend that patient's family come in to hospital to speak to patient, though reaching them over last week has been challenging   3) If patient ultimately decides to continue with HD, I do not believe any location other than a NH is safe to facilitate this. I am concerned for patient's care and well-being at home, given the state he arrived in hospital.       Length of ACP   conversation in minutes: A total of 76 min was spent on advance care planning, goals of care discussion, emotional support, formulating and communicating prognosis and exploring burden/benefit of various approaches of treatment. This discussion occurred on a fully voluntary basis with the verbal consent of the patient and/or family.
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
## 2024-09-16 NOTE — NURSING
"Patient refused dialysis this morning, States he's no longer going. When asked if he understood the risks of missing dialysis, he stated that "He no longer cared, & he wanted food," Patients breakfast was placed in front of him. I asked him if we tried again after he ate would he go? He said "Maybe"  "

## 2024-09-16 NOTE — ASSESSMENT & PLAN NOTE
- dialyzes via [facility]  - last outpatient HD session was 7/12/24 and last inpatient session was 8/7/24-- missed almost a month of dialysis because he refused to go  - he was uremic on presentation- - and hyperkalemic- K 7.2  - [facility]  - received emergent dialysis on 9/4/24- hyperK resolved, BUN improved.  - Nephrology continues to follow along. Receiving low dose dialysis to avoid dialysis disequilibrium syndrome   - Palliative consulted as he has ESRD and refuses outpatient dialysis. Despite this, continue full code full care.   - patient continues to refuse dialysis as of 09/16/2024

## 2024-09-16 NOTE — ASSESSMENT & PLAN NOTE
Anemia is likely due to chronic disease due to ESRD. Most recent hemoglobin and hematocrit are listed below.  Recent Labs     09/16/24  0417   HGB 9.2*   HCT 28.5*       Plan  - Monitor serial CBC: Daily  - Transfuse PRBC if patient becomes hemodynamically unstable, symptomatic or H/H drops below 7/21.  - EPO MWF
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
## 2024-09-16 NOTE — SUBJECTIVE & OBJECTIVE
Interval History:  No acute overnight events.  Remains afebrile.  Mental status appears much better today compared to the weekend.  Able to tell me his name and birthday today.  Still can not tell me where he is at.  But much more alert and talkative today.  States he does not want any further dialysis.  Knows he can die if he does not have a dialysis, asked to  let him die if that's the case.     Review of Systems   Constitutional:  Positive for fatigue.   Respiratory:  Negative for cough and shortness of breath.    Cardiovascular:  Negative for chest pain.   Neurological:  Positive for weakness.   Psychiatric/Behavioral:  Positive for confusion (intermittent confusion) and decreased concentration.      Objective:     Vital Signs (Most Recent):  Temp:  (patient Refused vital sign) (09/16/24 1230)  Pulse: (!) 51 (09/16/24 1106)  Resp: 19 (09/16/24 0605)  BP: 123/67 (09/16/24 0712)  SpO2: 100 % (09/16/24 0712) Vital Signs (24h Range):  Temp:  [97.5 °F (36.4 °C)-97.9 °F (36.6 °C)] 97.5 °F (36.4 °C)  Pulse:  [46-64] 51  Resp:  [18-20] 19  SpO2:  [99 %-100 %] 100 %  BP: (100-131)/(51-80) 123/67     Weight: 58.3 kg (128 lb 8.5 oz)  Body mass index is 20.13 kg/m².    Intake/Output Summary (Last 24 hours) at 9/16/2024 1329  Last data filed at 9/16/2024 0949  Gross per 24 hour   Intake 120 ml   Output --   Net 120 ml         Physical Exam  Vitals and nursing note reviewed.   Constitutional:       Appearance: He is ill-appearing. He is not toxic-appearing or diaphoretic.   HENT:      Nose: Nose normal.      Mouth/Throat:      Mouth: Mucous membranes are moist.   Neck:      Comments: Retained sutures R chest wall  Cardiovascular:      Rate and Rhythm: Normal rate and regular rhythm.      Comments: Sinus with PVCs  Pulmonary:      Effort: Pulmonary effort is normal.      Breath sounds: Normal breath sounds. No wheezing.      Comments: O2 by NC  Abdominal:      General: There is no distension.      Palpations: Abdomen is soft.       Tenderness: There is no abdominal tenderness.   Musculoskeletal:      Right lower leg: No edema.      Left lower leg: No edema.   Skin:     Comments: L sided THDC   Neurological:      Mental Status: He is alert. He is disoriented.   Psychiatric:      Comments: Appears depressed             Significant Labs: All pertinent labs within the past 24 hours have been reviewed.    Significant Imaging: I have reviewed all pertinent imaging results/findings within the past 24 hours.

## 2024-09-16 NOTE — ASSESSMENT & PLAN NOTE
Was advised to got to Skilled with daily PT/OT but family declined. Social work for discharge planning and PT eval. Fall risk. Nutrition consult for his malnutrition.   - palliative consulted  - family wants to continue full aggressive treatment  - PT, OT, ST consulted- SNF  - Per , Son decline the only accepting facility for SNF.  States he wants his father home with home health  -I am concerned about discharging home given patient continues to refused dialysis.  -psych consulted for formal evaluation for capacity

## 2024-09-16 NOTE — ASSESSMENT & PLAN NOTE
Patient with Paroxysmal (<7 days) atrial fibrillation which is controlled currently with Beta Blocker and Amiodarone. [id number] Score: 1. anticoagulation indicated. Anticoagulation done with eliquis 2.5mg PO BID . CT head negative for mass or bleed. Fall risk in place.    -continue metoprolol, amiodarone, Eliquis

## 2024-09-16 NOTE — PLAN OF CARE
Changes in medical condition or discharge plan: No    Does patient need new DME? TBD    Follow up appts needed: TBD    Medically stable: No    Estimated Discharge Date: TBD    Per attending, patient not medically stable for discharge; patient refusing dialysis treatments and medical team having difficulty contacting family.  CM spoke with patient's son regarding next steps of care; he declined SNF due to only accepting facility being too far.  Son stated he is open to home health for patient.  Medical team to contact patient's other children to further discuss discharge planning; psych consulted.  CM to continue to assess for and until discharge.   09/16/24 1303   Discharge Reassessment   Assessment Type Discharge Planning Reassessment   Did the patient's condition or plan change since previous assessment? No   Discharge Plan discussed with: Adult children   Discharge Plan A Skilled Nursing Facility   Discharge Plan B Home Health   DME Needed Upon Discharge  other (see comments)  (TBD)   Transition of Care Barriers Does not adhere to care plan   Why the patient remains in the hospital Requires continued medical care   Post-Acute Status   Coverage MEDICARE - MEDICARE PART A & B -   Hospital Resources/Appts/Education Provided Provided patient/caregiver with written discharge plan information   Discharge Delays None known at this time     11:40am: Spoke with patient's son Guevara Cespedes, [phone], via telephone.  He confirmed that he is aware that [facility] is the only accepting facility for patient but declined due to it being too far.  Son stated he would rather take patient home with home health.  CM to continue to assess for and until discharge.     11:53am: Spoke with patient's son Guevara via telephone.  Informed him that medical team is requesting for him to come to the hospital to discuss plan of care in person.  He stated he is at work and will not be off until 4/5pm, but will send one of his  siblings, Rima Cespedes (Daughter) [phone]; Ncaho Cespedes, (Son) [phone].

## 2024-09-16 NOTE — NURSING
[facility] [place]  Nurses Note -- 4 Eyes      9/16/2024       Skin assessed on: Q Shift      [] No Pressure Injuries Present    [x]Prevention Measures Documented    [x] Yes LDA  for Pressure Injury Previously documented     [] Yes New Pressure Injury Discovered   [] LDA for New Pressure Injury Added      Attending RN:  Carlos Villalpando RN     Second RN:  JAVIER Kennedy

## 2024-09-16 NOTE — ASSESSMENT & PLAN NOTE
Chronic, controlled. Latest blood pressure and vitals reviewed-     Temp:  [97.5 °F (36.4 °C)-97.9 °F (36.6 °C)]   Pulse:  [46-64]   Resp:  [18-20]   BP: (100-131)/(51-80)   SpO2:  [99 %-100 %] .   Home meds for hypertension were reviewed and noted below.   Hypertension Medications               metoprolol succinate (TOPROL-XL) 50 MG 24 hr tablet Take 1 tablet (50 mg total) by mouth once daily.            While in the hospital, will manage blood pressure as follows; Continue home antihypertensive regimen   - continue metoprolol      Will utilize p.r.n. blood pressure medication only if patient's blood pressure greater than  180/90  and he develops symptoms such as worsening chest pain or shortness of breath.

## 2024-09-16 NOTE — PROGRESS NOTES
[facility] Medicine  Progress Note    Patient Name: Mahesh Cespedes  MRN: [id number]  Patient Class: IP- Inpatient   Admission Date: 9/3/2024  Length of Stay: 12 days  Attending Physician: Jonathan Martinez DO  Primary Care Provider: Cruz Hernandez MD        Subjective:     Principal Problem:Uremic encephalopathy        HPI:  59 y.o. AAM with h/o CVA, ESRD (MWF via left tunneled HD catheter), Afib (on amiodarone and eliquis), NIDDM type2, HLD, Essential HTN ,depression with h/o suicidal ideations presents to the ER via EMS with family concerns for constipation and abdominal pain. Pt. Unable to give me history due to AMS due to uremia (). Reports that he missed 3 weeks of HD. Presented to the ER altered covered in feces and urine.    Apparently he was admitted here from - for AMS and again was noted to have missed 2 weeks of HD. CT head was negative for mass/bleed.  CT chest at that time noted loculated moderate left pleural effusion/consolidation with a large pericardial effusion 3.6cm and pulmonary edema. Was tx with abx and echo showed only a small/mod pericardial effusion with no cardiac tamponade. Nephro/pulm/ID consulted. Recommended IR to sample fluid but family deferred due to risk/benefit. He was cont. On abx VANC with NGT repeat cultures. Plan was to transfer to [facility] but daughter (javier)  refused and wanted to take him home with her on  per the discharge summary.     Today no family with with him in the ER and pt. Noted to be malnourished, desheveled and confused.     Labs noted for potassium of 7.2 and /Creatinin 25.8.  Bicarb 12. VB.23/39.5/35/16.7.  WBC 8.7 and H/H 8.6/26.      CT abd/pelvis with IV contrast:   1. Image quality degraded by technical factors discussed above.   2. Moderate/large volume of loculated left pleural fluid noting probable pleural thickening and heterogeneity of pleural fluid.  Correlation for underlying infectious process advised.   Left basilar atelectatic/consolidative change.   3. Right lower lobe patchy ground-glass attenuation with interlobular septal thickening which could be seen with edema, infection or non infectious inflammatory process.   4. Solid and ground-glass right lower lobe pulmonary nodules measuring 6 mm and 8 mm respectively.  Repeat evaluation with chest CT in 3 months is advised to evaluate stability and/or resolution of these findings.   5. Cardiomegaly and large pericardial effusion.   6. Cholelithiasis.   7. Gastric wall thickening which could relate to nondistention although correlation for symptoms of gastritis advised.   8. Regions of large and small bowel wall thickening which could relate to nondistention and/or technical factors discussed above.  However, correlation for symptoms of enterocolitis advised.   9. Bladder wall thickening.  Correlation with urinalysis advised.   10. Multiple additional findings as above.     ED spoke with Nephrology and will plan for ICU admission and emergent HD.   We have been consulted for further management and admission to the ICU.     Because this patient's clinical condition is critically guarded and requires care in the ICU with emergent HD, care in an alternative location isn't clinically appropriate for the reasons stated above.         Overview/Hospital Course:  Mr Mahesh Cespedes is a 59 y.o. man with ESRD who was admitted with encephalopathy. He was noted to have severe uremia ( on admission), hyperkalemia. He was also caked with urine and stool. He was admitted to ICU, Nephrology Dr Mayen group consulted, and received emergent dialysis on 9/4/24. Social work notes he has outpatient HD chair but has not been to outpatient HD since 7/12/24. His last inpatient HD treatment was at [facility] on 8/7/24. His mental status has slightly improved. Palliative consulted due to patient's ESRD and HD nonadherence. Family wants to continue full code and full care; not interested  in hospice care despite patient's unwillingness to adhere to dialysis. Family now not answering the phone. He is receiving low dose dialysis to avoid dialysis disequilibrium syndrome. He developed Aflutter RVR; Cardiology cardioverted to NSR on 9/9/24. PT, OT, ST following. Recommends SNF. Patient continues to refuse dialysis as of 09/16/2024.  Multiple attempts made to family, unable to contact.  Psych consulted for formed evaluation of capacity.    Interval History:  No acute overnight events.  Remains afebrile.  Mental status appears much better today compared to the weekend.  Able to tell me his name and birthday today.  Still can not tell me where he is at.  But much more alert and talkative today.  States he does not want any further dialysis.  Knows he can die if he does not have a dialysis, asked to  let him die if that's the case.     Review of Systems   Constitutional:  Positive for fatigue.   Respiratory:  Negative for cough and shortness of breath.    Cardiovascular:  Negative for chest pain.   Neurological:  Positive for weakness.   Psychiatric/Behavioral:  Positive for confusion (intermittent confusion) and decreased concentration.      Objective:     Vital Signs (Most Recent):  Temp:  (patient Refused vital sign) (09/16/24 1230)  Pulse: (!) 51 (09/16/24 1106)  Resp: 19 (09/16/24 0605)  BP: 123/67 (09/16/24 0712)  SpO2: 100 % (09/16/24 0712) Vital Signs (24h Range):  Temp:  [97.5 °F (36.4 °C)-97.9 °F (36.6 °C)] 97.5 °F (36.4 °C)  Pulse:  [46-64] 51  Resp:  [18-20] 19  SpO2:  [99 %-100 %] 100 %  BP: (100-131)/(51-80) 123/67     Weight: 58.3 kg (128 lb 8.5 oz)  Body mass index is 20.13 kg/m².    Intake/Output Summary (Last 24 hours) at 9/16/2024 1329  Last data filed at 9/16/2024 0949  Gross per 24 hour   Intake 120 ml   Output --   Net 120 ml         Physical Exam  Vitals and nursing note reviewed.   Constitutional:       Appearance: He is ill-appearing. He is not toxic-appearing or diaphoretic.   HENT:       Nose: Nose normal.      Mouth/Throat:      Mouth: Mucous membranes are moist.   Neck:      Comments: Retained sutures R chest wall  Cardiovascular:      Rate and Rhythm: Normal rate and regular rhythm.      Comments: Sinus with PVCs  Pulmonary:      Effort: Pulmonary effort is normal.      Breath sounds: Normal breath sounds. No wheezing.      Comments: O2 by NC  Abdominal:      General: There is no distension.      Palpations: Abdomen is soft.      Tenderness: There is no abdominal tenderness.   Musculoskeletal:      Right lower leg: No edema.      Left lower leg: No edema.   Skin:     Comments: [facility]   Neurological:      Mental Status: He is alert. He is disoriented.   Psychiatric:      Comments: Appears depressed             Significant Labs: All pertinent labs within the past 24 hours have been reviewed.    Significant Imaging: I have reviewed all pertinent imaging results/findings within the past 24 hours.    Assessment/Plan:      * Uremic encephalopathy  -  on admission, last HD session about a month ago  - Nephrology consulted for HD: he has poor prognosis w/ frequency of missed treatments evidently d/t patient refusal  - continue to monitor mental status - improving but still intermittently confuse. May be new baseline given freq of missed HD  - low dose dialysis to avoid dialysis disequilibrium syndrome   - Palliative team consulted  - Pt continues to refuse HD  - Psych consulted for formal  evaluation of capacity      ESRD needing dialysis  - dialyzes via [facility]  - last outpatient HD session was 7/12/24 and last inpatient session was 8/7/24-- missed almost a month of dialysis because he refused to go  - he was uremic on presentation- - and hyperkalemic- K 7.2  - Nephrology [facility] group consulted  - received emergent dialysis on 9/4/24- hyperK resolved, BUN improved.  - Nephrology continues to follow along. Receiving low dose dialysis to avoid dialysis disequilibrium syndrome   -  Palliative consulted as he has ESRD and refuses outpatient dialysis. Despite this, continue full code full care.   - patient continues to refuse dialysis as of 09/16/2024    Pressure injury of sacral region, stage 4  - present on admission  - healing   - wound care consulted: Local wound care to sacral wound with hydrocolloid dressing to promote closure of chronic wound and prevent soiling from incontinence.       Left loculated pleural effusion  - this has been present on prior hospitalizations  - discussed on last stay and family declined thoracentesis at that time  - developed fever on 9/8/24. No further fevers.     Anemia in ESRD (end-stage renal disease)  Anemia is likely due to chronic disease due to ESRD. Most recent hemoglobin and hematocrit are listed below.  Recent Labs     09/16/24  0417   HGB 9.2*   HCT 28.5*       Plan  - Monitor serial CBC: Daily  - Transfuse PRBC if patient becomes hemodynamically unstable, symptomatic or H/H drops below 7/21.  - EPO MWF      Essential hypertension  Chronic, controlled. Latest blood pressure and vitals reviewed-     Temp:  [97.5 °F (36.4 °C)-97.9 °F (36.6 °C)]   Pulse:  [46-64]   Resp:  [18-20]   BP: (100-131)/(51-80)   SpO2:  [99 %-100 %] .   Home meds for hypertension were reviewed and noted below.   Hypertension Medications               metoprolol succinate (TOPROL-XL) 50 MG 24 hr tablet Take 1 tablet (50 mg total) by mouth once daily.            While in the hospital, will manage blood pressure as follows; Continue home antihypertensive regimen   - continue metoprolol      Will utilize p.r.n. blood pressure medication only if patient's blood pressure greater than  180/90  and he develops symptoms such as worsening chest pain or shortness of breath.    Paroxysmal atrial fibrillation  Patient with Paroxysmal (<7 days) atrial fibrillation which is controlled currently with Beta Blocker and Amiodarone. [id number] Score: 1. anticoagulation indicated. Anticoagulation  done with eliquis 2.5mg PO BID . CT head negative for mass or bleed. Fall risk in place.    -continue metoprolol, amiodarone, Eliquis    Controlled type 2 diabetes mellitus with chronic kidney disease on chronic dialysis, without long-term current use of insulin  Lab Results   Component Value Date    HGBA1C 6.2 (H) 09/04/2024     - ACHS with hypoglycemic protocol   - diabetic puree diet per speech    History of CVA (cerebrovascular accident)  Noted.   - continue home eliquis, BP control    Depression  Resume home antidepressant-sertraline 50 mg daily    ACP (advance care planning)  Advance Care Planning    Date: 09/04/2024  Palliative consulted. Continue full aggressive care. Time spent 5 minutes           Physical debility  Was advised to got to Skilled with daily PT/OT but family declined. Social work for discharge planning and PT eval. Fall risk. Nutrition consult for his malnutrition.   - palliative consulted  - family wants to continue full aggressive treatment  - PT, OT, ST consulted- SNF  - Per , Son decline the only accepting facility for SNF.  States he wants his father home with home health  -I am concerned about discharging home given patient continues to refused dialysis.  -psych consulted for formal evaluation for capacity    Gastric wall thickening  Noted on CT  - PPI IV ordered      Thrombocytopenia  The likely etiology of thrombocytopenia is  unknown- potentially occult liver disease? . The patients 3 most recent labs are listed below.  Recent Labs     09/13/24  0405          Plan  - Will transfuse if platelet count is <50k (if undergoing surgical procedure or have active bleeding).  - monitor daily  - improving, resume home Eliquis 9/13      Thrush  - noted 9/6/24  - started IV fluconazole as he does not have the mental awareness to swish and swallow nystatin at this point and is not reliably tolerating pills  -completed treatment with fluconazole     Typical atrial  flutter  Noted on 9/6/24. Converted back to NSR, then back to aflutter. No change with diltiazem  - started amio gtt on 9/8/24  - continue eliquis  - Cardiology consulted  - he cannot consent for his ROYCE/DCCV and family not answering the phone  -continue p.o. amiodarone and metoprolol        VTE Risk Mitigation (From admission, onward)           Ordered     apixaban tablet 2.5 mg  2 times daily         09/13/24 0610     IP VTE LOW RISK PATIENT  Once         09/04/24 0116     Place sequential compression device  Until discontinued         09/04/24 0116                    Discharge Planning   JAIME:      Code Status: Full Code   Is the patient medically ready for discharge?:     Reason for patient still in hospital (select all that apply): Patient trending condition, Treatment, Consult recommendations, PT / OT recommendations, and Pending disposition  Discharge Plan A: Skilled Nursing Facility   Discharge Delays: None known at this time              Jonathan Martinez DO  Department of Hospital Medicine   [facility] - Telemetry

## 2024-09-17 PROCEDURE — 94761 N-INVAS EAR/PLS OXIMETRY MLT: CPT

## 2024-09-17 PROCEDURE — 63600175 PHARM REV CODE 636 W HCPCS: Performed by: INTERNAL MEDICINE

## 2024-09-17 PROCEDURE — 97110 THERAPEUTIC EXERCISES: CPT | Mod: CQ

## 2024-09-17 PROCEDURE — 97530 THERAPEUTIC ACTIVITIES: CPT | Mod: CQ

## 2024-09-17 PROCEDURE — 27000221 HC OXYGEN, UP TO 24 HOURS

## 2024-09-17 PROCEDURE — 25000003 PHARM REV CODE 250: Performed by: STUDENT IN AN ORGANIZED HEALTH CARE EDUCATION/TRAINING PROGRAM

## 2024-09-17 PROCEDURE — 11000001 HC ACUTE MED/SURG PRIVATE ROOM

## 2024-09-17 PROCEDURE — 25000003 PHARM REV CODE 250: Performed by: INTERNAL MEDICINE

## 2024-09-17 RX ADMIN — APIXABAN 2.5 MG: 2.5 TABLET, FILM COATED ORAL at 10:09

## 2024-09-17 RX ADMIN — METOPROLOL SUCCINATE 100 MG: 50 TABLET, EXTENDED RELEASE ORAL at 10:09

## 2024-09-17 RX ADMIN — ALLOPURINOL 100 MG: 100 TABLET ORAL at 10:09

## 2024-09-17 RX ADMIN — PANTOPRAZOLE SODIUM 40 MG: 40 INJECTION, POWDER, FOR SOLUTION INTRAVENOUS at 10:09

## 2024-09-17 NOTE — PLAN OF CARE
Recommendations  Recommendation:   1. Continue diabetic/renal pureed diet as tolerated   2. Continue Novasource Renal TID   3. Monitor weight and labs labs   4. Consider appetite stimulant if pt continues to lose weight   5. Encourage PO intake as tolerated    Goals: Pt will meet 85% of EEN/EPN by RD follow up  Nutrition Goal Status: progressing towards goal    Communication of RD Recs:  (POC)

## 2024-09-17 NOTE — ASSESSMENT & PLAN NOTE
- dialyzes via [facility]  - last outpatient HD session was 7/12/24 and last inpatient session was 8/7/24-- missed almost a month of dialysis because he refused to go  - he was uremic on presentation- - and hyperkalemic- K 7.2  - [facility]  - received emergent dialysis on 9/4/24- hyperK resolved, BUN improved.  - Nephrology continues to follow along. Receiving low dose dialysis to avoid dialysis disequilibrium syndrome   - Palliative consulted as he has ESRD and refuses outpatient dialysis. Despite this, continue full code full care.   - patient continues to refuse dialysis as of 09/16/2024

## 2024-09-17 NOTE — SUBJECTIVE & OBJECTIVE
Interval History:  No acute overnight events.  Remains afebrile.  Screaming out for his mom this morning. Then started speaking in Cuban. Redirectable. Able to tell me his name and follow commands but keep screaming out for his sons.  Able to tell me his name and birthday today.  Still can not tell me where he is at.      Had a long discussion with son Nacho yesterday on the phone. See previous note. Nacho will try to get a family meeting together by tomorrow so see what's our next step.     Review of Systems   Constitutional:  Positive for fatigue.   Respiratory:  Negative for cough and shortness of breath.    Cardiovascular:  Negative for chest pain.   Neurological:  Positive for weakness.   Psychiatric/Behavioral:  Positive for confusion (intermittent confusion) and decreased concentration.      Objective:     Vital Signs (Most Recent):  Temp: 98.3 °F (36.8 °C) (09/17/24 0724)  Pulse: (!) 59 (09/17/24 0729)  Resp: 17 (09/17/24 0729)  BP: 134/68 (09/17/24 0724)  SpO2: 98 % (09/17/24 0729) Vital Signs (24h Range):  Temp:  [97.8 °F (36.6 °C)-98.5 °F (36.9 °C)] 98.3 °F (36.8 °C)  Pulse:  [50-67] 59  Resp:  [17-19] 17  SpO2:  [98 %-100 %] 98 %  BP: (114-147)/(54-68) 134/68     Weight: 58.3 kg (128 lb 8.5 oz)  Body mass index is 20.13 kg/m².    Intake/Output Summary (Last 24 hours) at 9/17/2024 1024  Last data filed at 9/16/2024 2005  Gross per 24 hour   Intake 240 ml   Output 2000 ml   Net -1760 ml         Physical Exam  Vitals and nursing note reviewed.   Constitutional:       Appearance: He is ill-appearing. He is not toxic-appearing or diaphoretic.   HENT:      Nose: Nose normal.      Mouth/Throat:      Mouth: Mucous membranes are moist.   Neck:      Comments: Retained sutures R chest wall  Cardiovascular:      Rate and Rhythm: Normal rate and regular rhythm.      Comments: Sinus with PVCs  Pulmonary:      Effort: Pulmonary effort is normal.      Breath sounds: Normal breath sounds. No wheezing.      Comments: O2  by NC  Abdominal:      General: There is no distension.      Palpations: Abdomen is soft.      Tenderness: There is no abdominal tenderness.   Musculoskeletal:      Right lower leg: No edema.      Left lower leg: No edema.   Skin:     Comments: L sided [facility]   Neurological:      Mental Status: He is alert. He is disoriented.   Psychiatric:      Comments: Appears depressed             Significant Labs: All pertinent labs within the past 24 hours have been reviewed.    Significant Imaging: I have reviewed all pertinent imaging results/findings within the past 24 hours.

## 2024-09-17 NOTE — NURSING
"Pt keeps taking off his clothes, cardiac monitoring and oxygen. Nurse tries to redirect pt, but pt says "he will not answer questions." Via interrupter.   "

## 2024-09-17 NOTE — PLAN OF CARE
Problem: Hemodialysis  Goal: Safe, Effective Therapy Delivery  Outcome: Progressing  Goal: Effective Tissue Perfusion  Outcome: Progressing  Goal: Absence of Infection Signs and Symptoms  Outcome: Progressing     Problem: Coping Ineffective  Goal: Effective Coping  Outcome: Progressing     Problem: Skin Injury Risk Increased  Goal: Skin Health and Integrity  Outcome: Progressing

## 2024-09-17 NOTE — PT/OT/SLP PROGRESS
"Physical Therapy Treatment    Patient Name:  Mahesh Cespedes   MRN:  [id number]    Recommendations:     Discharge Recommendations: Moderate Intensity Therapy  Discharge Equipment Recommendations: to be determined by next level of care  Barriers to discharge: Pt is at high risk for falls and readmission if d/c home; pt requires increased level of assistance for ADLs and functional mobility.)     Assessment:     Mahesh Cespedes is a 59 y.o. male admitted with a medical diagnosis of Uremic encephalopathy.  He presents with the following impairments/functional limitations: weakness, impaired endurance, impaired self care skills, impaired functional mobility, impaired balance, gait instability, impaired cognition, decreased coordination, decreased upper extremity function, decreased lower extremity function, decreased safety awareness, impaired coordination, impaired skin, impaired cardiopulmonary response to activity. Pt needed motivation to participate. Pt sat EOB with SBA. Pt followed command for proper technique of BLE TE. Pt's right leg very weak needing min A for FROM wi LAQ. Pt unable to lift leg very high with hip flexion d/t weakness. Pt declined sit to stand even with encouragement, stating that he's very sleepy. Pt was returned to right side lying with needs at hand.     Rehab Prognosis: Fair; patient would benefit from acute skilled PT services to address these deficits and reach maximum level of function.    Recent Surgery: Procedure(s) (LRB):  Cardioversion or Defibrillation (N/A) 8 Days Post-Op    Plan:     During this hospitalization, patient to be seen 3 x/week to address the identified rehab impairments via gait training, therapeutic activities, therapeutic groups and progress toward the following goals:    Plan of Care Expires:  09/19/24    Subjective     Chief Complaint: "I'm very sleepy  Patient/Family Comments/goals: Pt agreed to sitting up EOB after encouragement.  Pain/Comfort:  Pain Rating 1: " 0/10  Pain Rating Post-Intervention 1: 0/10      Objective:     Communicated with nsg prior to session.  Patient found right sidelying with peripheral IV, oxygen, bed alarm upon PT entry to room.     General Precautions: Standard, fall  Orthopedic Precautions: N/A  Braces: N/A  Respiratory Status: Nasal cannula, flow 2 L/min     Functional Mobility:  Bed Mobility:     Scooting: minimum assistance  Supine to Sit: minimum assistance  Sit to Supine: minimum assistance  Balance: sat EOB with F+       AM-PAC 6 CLICK MOBILITY  Turning over in bed (including adjusting bedclothes, sheets and blankets)?: 3  Sitting down on and standing up from a chair with arms (e.g., wheelchair, bedside commode, etc.): 2  Moving from lying on back to sitting on the side of the bed?: 3  Moving to and from a bed to a chair (including a wheelchair)?: 2  Need to walk in hospital room?: 1  Climbing 3-5 steps with a railing?: 1  Basic Mobility Total Score: 12       Treatment & Education:  Sat EOB with S/SBA x 20 mins. Scooted to EOB using side rail for assistance.    Lower Extremity Exercises.    Patient educated on: Purpose and Benefits of Therapeutic Exercise(s).    Patient verbalized acceptance/understanding of instruction(s), expectation(s), and limitation(s) (for safety).  Patient performed: 2 sets of 10 reps (each) of B LE Ther Ex: AP, LAQ, Hip abd/add, Hip flexion while sitting up on EOB.       Patient required still requires verbal cues/tactile cues to ensure correct sequence, to maintain proper form, and to allow for self-correction.  Pt reported no complaints or problems with exercise activity.     Patient required increase Reaction Time to initiate movements and a Sitting Rest Break between set(s) to recover.      Patient left right sidelying with all lines intact, call button in reach, bed alarm on, and nsg notified..    GOALS:   Multidisciplinary Problems       Physical Therapy Goals          Problem: Physical Therapy    Goal  Priority Disciplines Outcome Goal Variances Interventions   Physical Therapy Goal     PT, PT/OT Progressing     Description: Goals to be met by: 24     Patient will increase functional independence with mobility by performin. Supine to sit with MInimal Assistance  2. Sit to stand transfer with Minimal Assistance  3. Bed to chair transfer with Minimal Assistance    4. Gait  x 10-15 feet with Minimal Assistance using Rolling Walker.   5. Lower extremity exercise program x10 reps per handout, with assistance as needed                         Time Tracking:     PT Received On: 24  PT Start Time: 1433     PT Stop Time: 1500  PT Total Time (min): 27 min     Billable Minutes: Therapeutic Activity 10 and Therapeutic Exercise 17    Treatment Type: Treatment  PT/PTA: PTA     Number of PTA visits since last PT visit: 2024
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
## 2024-09-17 NOTE — PLAN OF CARE
Problem: Coping Ineffective  Goal: Effective Coping  9/17/2024 0430 by Shanique Whitney RN  Outcome: Not Progressing  9/17/2024 0429 by Shanique Whitney RN  Outcome: Progressing     Problem: Wound  Goal: Optimal Wound Healing  9/17/2024 0430 by Shanique Whitney RN  Outcome: Progressing  9/17/2024 0429 by Shanique Whitney RN  Outcome: Progressing

## 2024-09-17 NOTE — ASSESSMENT & PLAN NOTE
-  on admission, last HD session about a month ago  - Nephrology consulted for HD: he has poor prognosis w/ frequency of missed treatments evidently d/t patient refusal  - continue to monitor mental status - improving but still intermittently confuse. May be new baseline given freq of missed HD  - low dose dialysis to avoid dialysis disequilibrium syndrome   - Palliative team consulted  - Pt continues to refuse HD  - Psych consulted for formal  evaluation of capacity: patient does not have the capacity to refuse necessary medical care at this time.

## 2024-09-17 NOTE — PROGRESS NOTES
[facility] Medicine  Progress Note    Patient Name: Mahesh Cespedes  MRN: [id number]  Patient Class: IP- Inpatient   Admission Date: 9/3/2024  Length of Stay: 13 days  Attending Physician: Jonathan Martinez DO  Primary Care Provider: Cruz Hernandez MD        Subjective:     Principal Problem:Uremic encephalopathy        HPI:  59 y.o. AAM with h/o CVA, ESRD (MWF via left tunneled HD catheter), Afib (on amiodarone and eliquis), NIDDM type2, HLD, Essential HTN ,depression with h/o suicidal ideations presents to the ER via EMS with family concerns for constipation and abdominal pain. Pt. Unable to give me history due to AMS due to uremia (). Reports that he missed 3 weeks of HD. Presented to the ER altered covered in feces and urine.    Apparently he was admitted here from - for AMS and again was noted to have missed 2 weeks of HD. CT head was negative for mass/bleed.  CT chest at that time noted loculated moderate left pleural effusion/consolidation with a large pericardial effusion 3.6cm and pulmonary edema. Was tx with abx and echo showed only a small/mod pericardial effusion with no cardiac tamponade. Nephro/pulm/ID consulted. Recommended IR to sample fluid but family deferred due to risk/benefit. He was cont. On abx VANC with NGT repeat cultures. Plan was to transfer to [facility] but daughter (javier)  refused and wanted to take him home with her on  per the discharge summary.     Today no family with with him in the ER and pt. Noted to be malnourished, desheveled and confused.     Labs noted for potassium of 7.2 and /Creatinin 25.8.  Bicarb 12. VB.23/39.5/35/16.7.  WBC 8.7 and H/H 8.6/26.      CT abd/pelvis with IV contrast:   1. Image quality degraded by technical factors discussed above.   2. Moderate/large volume of loculated left pleural fluid noting probable pleural thickening and heterogeneity of pleural fluid.  Correlation for underlying infectious process advised.   Left basilar atelectatic/consolidative change.   3. Right lower lobe patchy ground-glass attenuation with interlobular septal thickening which could be seen with edema, infection or non infectious inflammatory process.   4. Solid and ground-glass right lower lobe pulmonary nodules measuring 6 mm and 8 mm respectively.  Repeat evaluation with chest CT in 3 months is advised to evaluate stability and/or resolution of these findings.   5. Cardiomegaly and large pericardial effusion.   6. Cholelithiasis.   7. Gastric wall thickening which could relate to nondistention although correlation for symptoms of gastritis advised.   8. Regions of large and small bowel wall thickening which could relate to nondistention and/or technical factors discussed above.  However, correlation for symptoms of enterocolitis advised.   9. Bladder wall thickening.  Correlation with urinalysis advised.   10. Multiple additional findings as above.     ED spoke with Nephrology and will plan for ICU admission and emergent HD.   We have been consulted for further management and admission to the ICU.     Because this patient's clinical condition is critically guarded and requires care in the ICU with emergent HD, care in an alternative location isn't clinically appropriate for the reasons stated above.         Overview/Hospital Course:  Mr Mahesh Cespedes is a 59 y.o. man with ESRD who was admitted with encephalopathy. He was noted to have severe uremia ( on admission), hyperkalemia. He was also caked with urine and stool. He was admitted to ICU, Nephrology Dr Mayen group consulted, and received emergent dialysis on 9/4/24. Social work notes he has outpatient HD chair but has not been to outpatient HD since 7/12/24. His last inpatient HD treatment was at [facility] on 8/7/24. His mental status has slightly improved. Palliative consulted due to patient's ESRD and HD nonadherence. Family wants to continue full code and full care; not interested  in hospice care despite patient's unwillingness to adhere to dialysis. Family now not answering the phone. He is receiving low dose dialysis to avoid dialysis disequilibrium syndrome. He developed Aflutter RVR; Cardiology cardioverted to NSR on 9/9/24. PT, OT, ST following. Recommends SNF. Patient continues to refuse dialysis as of 09/16/2024.  Multiple attempts made to family, unable to contact.  Psych consulted for formed evaluation of capacity.    Interval History:  No acute overnight events.  Remains afebrile.  Screaming out for his mom this morning. Then started speaking in Panamanian. Redirectable. Able to tell me his name and follow commands but keep screaming out for his sons.  Able to tell me his name and birthday today.  Still can not tell me where he is at.      Had a long discussion with son Nacho yesterday on the phone. See previous note. Nacho will try to get a family meeting together by tomorrow so see what's our next step.     Review of Systems   Constitutional:  Positive for fatigue.   Respiratory:  Negative for cough and shortness of breath.    Cardiovascular:  Negative for chest pain.   Neurological:  Positive for weakness.   Psychiatric/Behavioral:  Positive for confusion (intermittent confusion) and decreased concentration.      Objective:     Vital Signs (Most Recent):  Temp: 98.3 °F (36.8 °C) (09/17/24 0724)  Pulse: (!) 59 (09/17/24 0729)  Resp: 17 (09/17/24 0729)  BP: 134/68 (09/17/24 0724)  SpO2: 98 % (09/17/24 0729) Vital Signs (24h Range):  Temp:  [97.8 °F (36.6 °C)-98.5 °F (36.9 °C)] 98.3 °F (36.8 °C)  Pulse:  [50-67] 59  Resp:  [17-19] 17  SpO2:  [98 %-100 %] 98 %  BP: (114-147)/(54-68) 134/68     Weight: 58.3 kg (128 lb 8.5 oz)  Body mass index is 20.13 kg/m².    Intake/Output Summary (Last 24 hours) at 9/17/2024 1024  Last data filed at 9/16/2024 2005  Gross per 24 hour   Intake 240 ml   Output 2000 ml   Net -1760 ml         Physical Exam  Vitals and nursing note reviewed.   Constitutional:        Appearance: He is ill-appearing. He is not toxic-appearing or diaphoretic.   HENT:      Nose: Nose normal.      Mouth/Throat:      Mouth: Mucous membranes are moist.   Neck:      Comments: Retained sutures R chest wall  Cardiovascular:      Rate and Rhythm: Normal rate and regular rhythm.      Comments: Sinus with PVCs  Pulmonary:      Effort: Pulmonary effort is normal.      Breath sounds: Normal breath sounds. No wheezing.      Comments: O2 by NC  Abdominal:      General: There is no distension.      Palpations: Abdomen is soft.      Tenderness: There is no abdominal tenderness.   Musculoskeletal:      Right lower leg: No edema.      Left lower leg: No edema.   Skin:     Comments: L [facility]   Neurological:      Mental Status: He is alert. He is disoriented.   Psychiatric:      Comments: Appears depressed             Significant Labs: All pertinent labs within the past 24 hours have been reviewed.    Significant Imaging: I have reviewed all pertinent imaging results/findings within the past 24 hours.    Assessment/Plan:      * Uremic encephalopathy  -  on admission, last HD session about a month ago  - Nephrology consulted for HD: he has poor prognosis w/ frequency of missed treatments evidently d/t patient refusal  - continue to monitor mental status - improving but still intermittently confuse. May be new baseline given freq of missed HD  - low dose dialysis to avoid dialysis disequilibrium syndrome   - Palliative team consulted  - Pt continues to refuse HD  - Psych consulted for formal  evaluation of capacity: patient does not have the capacity to refuse necessary medical care at this time.       ESRD needing dialysis  - dialyzes via [facility]  - last outpatient HD session was 7/12/24 and last inpatient session was 8/7/24-- missed almost a month of dialysis because he refused to go  - he was uremic on presentation- - and hyperkalemic- K 7.2  - Nephrology [facility] group consulted  - received  emergent dialysis on 9/4/24- hyperK resolved, BUN improved.  - Nephrology continues to follow along. Receiving low dose dialysis to avoid dialysis disequilibrium syndrome   - Palliative consulted as he has ESRD and refuses outpatient dialysis. Despite this, continue full code full care.   - patient continues to refuse dialysis as of 09/16/2024    Pressure injury of sacral region, stage 4  - present on admission  - healing   - wound care consulted: Local wound care to sacral wound with hydrocolloid dressing to promote closure of chronic wound and prevent soiling from incontinence.       Left loculated pleural effusion  - this has been present on prior hospitalizations  - discussed on last stay and family declined thoracentesis at that time  - developed fever on 9/8/24. No further fevers.     Anemia in ESRD (end-stage renal disease)  Anemia is likely due to chronic disease due to ESRD. Most recent hemoglobin and hematocrit are listed below.  Recent Labs     09/16/24  0417   HGB 9.2*   HCT 28.5*       Plan  - Monitor serial CBC: Daily  - Transfuse PRBC if patient becomes hemodynamically unstable, symptomatic or H/H drops below 7/21.  - EPO MWF      Essential hypertension  Chronic, controlled. Latest blood pressure and vitals reviewed-     Temp:  [97.8 °F (36.6 °C)-98.5 °F (36.9 °C)]   Pulse:  [50-67]   Resp:  [17-19]   BP: (114-147)/(54-68)   SpO2:  [98 %-100 %] .   Home meds for hypertension were reviewed and noted below.   Hypertension Medications               metoprolol succinate (TOPROL-XL) 50 MG 24 hr tablet Take 1 tablet (50 mg total) by mouth once daily.            While in the hospital, will manage blood pressure as follows; Continue home antihypertensive regimen   - continue metoprolol      Will utilize p.r.n. blood pressure medication only if patient's blood pressure greater than  180/90  and he develops symptoms such as worsening chest pain or shortness of breath.    Paroxysmal atrial fibrillation  Patient  with Paroxysmal (<7 days) atrial fibrillation which is controlled currently with Beta Blocker and Amiodarone. [id number] Score: 1. anticoagulation indicated. Anticoagulation done with eliquis 2.5mg PO BID . CT head negative for mass or bleed. Fall risk in place.    -continue metoprolol, amiodarone, Eliquis    Controlled type 2 diabetes mellitus with chronic kidney disease on chronic dialysis, without long-term current use of insulin  Lab Results   Component Value Date    HGBA1C 6.2 (H) 09/04/2024     - ACHS with hypoglycemic protocol   - diabetic puree diet per speech    History of CVA (cerebrovascular accident)  Noted.   - continue home eliquis, BP control    Depression  Psych consulted-rec dc sertraline 50 mg daily since its not effective       ACP (advance care planning)  Advance Care Planning    Date: 09/04/2024  Palliative consulted. Continue full aggressive care. Time spent 5 minutes           Physical debility  Was advised to got to Skilled with daily PT/OT but family declined. Social work for discharge planning and PT eval. Fall risk. Nutrition consult for his malnutrition.   - palliative consulted  - family wants to continue full aggressive treatment  - PT, OT, ST consulted- SNF  - Per , Son decline the only accepting facility for SNF.  States he wants his father home with home health  -I am concerned about discharging home given patient continues to refused dialysis.  -psych consulted for formal evaluation for capacity    Gastric wall thickening  Noted on CT  - PPI IV ordered      Thrombocytopenia  The likely etiology of thrombocytopenia is  unknown- potentially occult liver disease? . The patients 3 most recent labs are listed below.  Recent Labs     09/16/24  0417          Plan  - Will transfuse if platelet count is <50k (if undergoing surgical procedure or have active bleeding).  - monitor daily  - improving, resume home Eliquis 9/13      Thrush  - noted 9/6/24  - started IV  fluconazole as he does not have the mental awareness to swish and swallow nystatin at this point and is not reliably tolerating pills  -completed treatment with fluconazole     Typical atrial flutter  Noted on 9/6/24. Converted back to NSR, then back to aflutter. No change with diltiazem  - started amio gtt on 9/8/24  - continue eliquis  - Cardiology consulted  - he cannot consent for his ROYCE/DCCV and family not answering the phone  -continue p.o. amiodarone and metoprolol        VTE Risk Mitigation (From admission, onward)           Ordered     apixaban tablet 2.5 mg  2 times daily         09/13/24 0610     IP VTE LOW RISK PATIENT  Once         09/04/24 0116     Place sequential compression device  Until discontinued         09/04/24 0116                    Discharge Planning   JAIME:      Code Status: Full Code   Is the patient medically ready for discharge?:     Reason for patient still in hospital (select all that apply): Patient trending condition, Treatment, Consult recommendations, PT / OT recommendations, and Pending disposition  Discharge Plan A: Skilled Nursing Facility   Discharge Delays: None known at this time              Jonathan Martinez DO  Department of Hospital Medicine   [facility] - Telemetry

## 2024-09-17 NOTE — NURSING
[facility] [place]  Nurses Note -- 4 Eyes      9/17/2024       Skin assessed on: Q Shift      [] No Pressure Injuries Present    [x]Prevention Measures Documented    [x] Yes LDA  for Pressure Injury Previously documented     [] Yes New Pressure Injury Discovered   [] LDA for New Pressure Injury Added      Attending RN:  Shanique Whitney, RN     Second RN:  Marcia Chandler LPN

## 2024-09-17 NOTE — PLAN OF CARE
Chart reviewed and discussed pt in person with Dr Martinez; she had an extensive conversation with pt's son Nacho yesterday, who requested a follow-up discussion on continued HD (in NH setting) vs home with hospice. Attempted to call Nacho twice; [facility]. Pt was sleeping when I went by room. Will follow up with pt and family tomorrow.     LAUREN Jaime  Palliative Medicine Staff   [phone]

## 2024-09-17 NOTE — NURSING
[facility] [place]  Nurses Note -- 4 Eyes      9/17/2024       Skin assessed on: Q Shift      [] No Pressure Injuries Present    []Prevention Measures Documented    [x] Yes LDA  for Pressure Injury Previously documented     [] Yes New Pressure Injury Discovered   [] LDA for New Pressure Injury Added      Attending RN:  Luis Phipps LPN     Second RN:  Shanique PÉREZ

## 2024-09-17 NOTE — ASSESSMENT & PLAN NOTE
Chronic, controlled. Latest blood pressure and vitals reviewed-     Temp:  [97.8 °F (36.6 °C)-98.5 °F (36.9 °C)]   Pulse:  [50-67]   Resp:  [17-19]   BP: (114-147)/(54-68)   SpO2:  [98 %-100 %] .   Home meds for hypertension were reviewed and noted below.   Hypertension Medications               metoprolol succinate (TOPROL-XL) 50 MG 24 hr tablet Take 1 tablet (50 mg total) by mouth once daily.            While in the hospital, will manage blood pressure as follows; Continue home antihypertensive regimen   - continue metoprolol      Will utilize p.r.n. blood pressure medication only if patient's blood pressure greater than  180/90  and he develops symptoms such as worsening chest pain or shortness of breath.

## 2024-09-17 NOTE — PLAN OF CARE
Problem: Physical Therapy  Goal: Physical Therapy Goal  Description: Goals to be met by: 24     Patient will increase functional independence with mobility by performin. Supine to sit with MInimal Assistance met 24  2. Sit to stand transfer with Minimal Assistance  3. Bed to chair transfer with Minimal Assistance    4. Gait  x 10-15 feet with Minimal Assistance using Rolling Walker.   5. Lower extremity exercise program x10 reps per handout, with assistance as needed    Outcome: Progressing
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
## 2024-09-17 NOTE — ASSESSMENT & PLAN NOTE
The likely etiology of thrombocytopenia is  unknown- potentially occult liver disease? . The patients 3 most recent labs are listed below.  Recent Labs     09/16/24  0417          Plan  - Will transfuse if platelet count is <50k (if undergoing surgical procedure or have active bleeding).  - monitor daily  - improving, resume home Eliquis 9/13

## 2024-09-17 NOTE — ASSESSMENT & PLAN NOTE
Patient with Paroxysmal (<7 days) atrial fibrillation which is controlled currently with Beta Blocker and Amiodarone. [id number] Score: 1. anticoagulation indicated. Anticoagulation done with eliquis 2.5mg PO BID . CT head negative for mass or bleed. Fall risk in place.    -continue metoprolol, amiodarone, Eliquis

## 2024-09-18 PROBLEM — E44.0 MODERATE PROTEIN MALNUTRITION: Status: ACTIVE | Noted: 2024-09-18

## 2024-09-18 LAB
ANION GAP SERPL CALC-SCNC: 13 MMOL/L (ref 8–16)
BASOPHILS # BLD AUTO: 0.04 K/UL (ref 0–0.2)
BASOPHILS NFR BLD: 0.6 % (ref 0–1.9)
BUN SERPL-MCNC: 56 MG/DL (ref 8–23)
CALCIUM SERPL-MCNC: 7.7 MG/DL (ref 8.7–10.5)
CHLORIDE SERPL-SCNC: 97 MMOL/L (ref 95–110)
CO2 SERPL-SCNC: 24 MMOL/L (ref 23–29)
CREAT SERPL-MCNC: 7.6 MG/DL (ref 0.5–1.4)
DIFFERENTIAL METHOD BLD: ABNORMAL
EOSINOPHIL # BLD AUTO: 0 K/UL (ref 0–0.5)
EOSINOPHIL NFR BLD: 0.5 % (ref 0–8)
ERYTHROCYTE [DISTWIDTH] IN BLOOD BY AUTOMATED COUNT: 21.5 % (ref 11.5–14.5)
EST. GFR  (NO RACE VARIABLE): 8 ML/MIN/1.73 M^2
GLUCOSE SERPL-MCNC: 75 MG/DL (ref 70–110)
HCT VFR BLD AUTO: 30.3 % (ref 40–54)
HGB BLD-MCNC: 9.4 G/DL (ref 14–18)
IMM GRANULOCYTES # BLD AUTO: 0.04 K/UL (ref 0–0.04)
IMM GRANULOCYTES NFR BLD AUTO: 0.6 % (ref 0–0.5)
LYMPHOCYTES # BLD AUTO: 0.8 K/UL (ref 1–4.8)
LYMPHOCYTES NFR BLD: 13 % (ref 18–48)
MCH RBC QN AUTO: 26.6 PG (ref 27–31)
MCHC RBC AUTO-ENTMCNC: 31 G/DL (ref 32–36)
MCV RBC AUTO: 86 FL (ref 82–98)
MONOCYTES # BLD AUTO: 1 K/UL (ref 0.3–1)
MONOCYTES NFR BLD: 16.4 % (ref 4–15)
NEUTROPHILS # BLD AUTO: 4.4 K/UL (ref 1.8–7.7)
NEUTROPHILS NFR BLD: 68.9 % (ref 38–73)
NRBC BLD-RTO: 0 /100 WBC
PHOSPHATE SERPL-MCNC: 4.1 MG/DL (ref 2.7–4.5)
PLATELET # BLD AUTO: 281 K/UL (ref 150–450)
PMV BLD AUTO: 10.4 FL (ref 9.2–12.9)
POTASSIUM SERPL-SCNC: 3.9 MMOL/L (ref 3.5–5.1)
RBC # BLD AUTO: 3.54 M/UL (ref 4.6–6.2)
SODIUM SERPL-SCNC: 134 MMOL/L (ref 136–145)
WBC # BLD AUTO: 6.33 K/UL (ref 3.9–12.7)

## 2024-09-18 PROCEDURE — 99233 SBSQ HOSP IP/OBS HIGH 50: CPT | Mod: ,,, | Performed by: INTERNAL MEDICINE

## 2024-09-18 PROCEDURE — 63600175 PHARM REV CODE 636 W HCPCS: Mod: JZ,JG | Performed by: HOSPITALIST

## 2024-09-18 PROCEDURE — 80048 BASIC METABOLIC PNL TOTAL CA: CPT | Performed by: HOSPITALIST

## 2024-09-18 PROCEDURE — 84100 ASSAY OF PHOSPHORUS: CPT | Performed by: HOSPITALIST

## 2024-09-18 PROCEDURE — 36415 COLL VENOUS BLD VENIPUNCTURE: CPT | Performed by: HOSPITALIST

## 2024-09-18 PROCEDURE — 25000003 PHARM REV CODE 250: Performed by: STUDENT IN AN ORGANIZED HEALTH CARE EDUCATION/TRAINING PROGRAM

## 2024-09-18 PROCEDURE — 99498 ADVNCD CARE PLAN ADDL 30 MIN: CPT | Mod: ,,, | Performed by: INTERNAL MEDICINE

## 2024-09-18 PROCEDURE — 25000003 PHARM REV CODE 250: Performed by: INTERNAL MEDICINE

## 2024-09-18 PROCEDURE — 80100016 HC MAINTENANCE HEMODIALYSIS

## 2024-09-18 PROCEDURE — 63600175 PHARM REV CODE 636 W HCPCS: Performed by: INTERNAL MEDICINE

## 2024-09-18 PROCEDURE — 99497 ADVNCD CARE PLAN 30 MIN: CPT | Mod: ,,, | Performed by: INTERNAL MEDICINE

## 2024-09-18 PROCEDURE — 11000001 HC ACUTE MED/SURG PRIVATE ROOM

## 2024-09-18 PROCEDURE — 85025 COMPLETE CBC W/AUTO DIFF WBC: CPT | Performed by: HOSPITALIST

## 2024-09-18 RX ADMIN — PANTOPRAZOLE SODIUM 40 MG: 40 INJECTION, POWDER, FOR SOLUTION INTRAVENOUS at 07:09

## 2024-09-18 RX ADMIN — EPOETIN ALFA-EPBX 20000 UNITS: 10000 INJECTION, SOLUTION INTRAVENOUS; SUBCUTANEOUS at 11:09

## 2024-09-18 NOTE — ASSESSMENT & PLAN NOTE
- Nephrology consulted; pt with long pattern of HD non-adherence.   - For now, family would like to continue with HD, though is aware of recent hypotension during HD session today. If this persists, he is unlikely to be a further candidate for HD, and would need hospice care.
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
## 2024-09-18 NOTE — ASSESSMENT & PLAN NOTE
Anemia is likely due to chronic disease due to ESRD. Most recent hemoglobin and hematocrit are listed below.  Recent Labs     09/16/24  0417 09/18/24  0441   HGB 9.2* 9.4*   HCT 28.5* 30.3*       Plan  - Monitor serial CBC: Daily  - Transfuse PRBC if patient becomes hemodynamically unstable, symptomatic or H/H drops below 7/21.  - EPO MWF

## 2024-09-18 NOTE — NURSING
[facility] [place]  Nurses Note -- 4 Eyes      9/18/2024       Skin assessed on: Q Shift      [] No Pressure Injuries Present    [x]Prevention Measures Documented    [x] Yes LDA  for Pressure Injury Previously documented     [] Yes New Pressure Injury Discovered   [] LDA for New Pressure Injury Added      Attending RN:  Shanique Whitney, RN     Second RN:  Luis Phipps LPN
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
## 2024-09-18 NOTE — CARE UPDATE
Had a family meeting with the patient's son Nacho and daughter Rima at bedside.  Palliative care team and  present.  After extensive discussion, family again declined skilled nursing facility placement. Wants to take patient home with home health.  Sudha able to provide resources with the family in regards to private sitters to assist with taking care of the patient. Son Nacho states he does not work and he is at home with the patient 24/7.  Family have difficulty with transportation and finances. Discussed NH palcement and hospice. They are aware of the options and may consider in the future. At this time, they want home health. They are aware [facility] has been contacted as well.     [facility] returned call at about 11:45am. Spoke with Arthur. States investigator will be in touch. Case is opened.

## 2024-09-18 NOTE — SUBJECTIVE & OBJECTIVE
Interval History:  Patient is refusing medications last night and this morning.  He is calling out for his  wife.  Called out for different family members.  Appears confused this morning.  Able to tell me his name, but when asked about his birthday aware he is at, he said he does not have time for this.    Review of Systems   Neurological:  Positive for weakness.   Psychiatric/Behavioral:  Positive for confusion (intermittent confusion) and decreased concentration.      Objective:     Vital Signs (Most Recent):  Temp: 97.6 °F (36.4 °C) (24)  Pulse: 67 (24 0733)  Resp: 18 (24)  BP: (!) 140/96 (24)  SpO2: 100 % (24) Vital Signs (24h Range):  Temp:  [97.6 °F (36.4 °C)-98.3 °F (36.8 °C)] 97.6 °F (36.4 °C)  Pulse:  [55-67] 67  Resp:  [16-19] 18  SpO2:  [97 %-100 %] 100 %  BP: (116-146)/(58-96) 140/96     Weight: 58.3 kg (128 lb 8.5 oz)  Body mass index is 20.13 kg/m².    Intake/Output Summary (Last 24 hours) at 2024 1016  Last data filed at 2024 0823  Gross per 24 hour   Intake 120 ml   Output --   Net 120 ml         Physical Exam  Vitals and nursing note reviewed.   Constitutional:       Appearance: He is ill-appearing.   Neck:      Comments: Retained sutures R chest wall  Cardiovascular:      Rate and Rhythm: Normal rate and regular rhythm.   Pulmonary:      Effort: Pulmonary effort is normal.      Breath sounds: Normal breath sounds.      Comments: O2 by NC  Abdominal:      General: There is no distension.      Palpations: Abdomen is soft.      Tenderness: There is no abdominal tenderness.   Musculoskeletal:      Right lower leg: No edema.      Left lower leg: No edema.   Skin:     Comments: L sided THDC   Neurological:      Mental Status: He is alert. He is disoriented.      Motor: Weakness present.      Comments: Patient is confused.  Calling out family members names, including his  wife   Psychiatric:      Comments: Appears depressed              Significant Labs: All pertinent labs within the past 24 hours have been reviewed.    Significant Imaging: I have reviewed all pertinent imaging results/findings within the past 24 hours.

## 2024-09-18 NOTE — PROGRESS NOTES
Patient is laying quietly for the time being. He went to dialysis today and was cooperate. Telemetry was discontinue because patient would not keep on.Bed is in the lowest position and call light is within reach.

## 2024-09-18 NOTE — PROGRESS NOTES
[facility] Medicine  Progress Note    Patient Name: Mahesh Cespedes  MRN: [id number]  Patient Class: IP- Inpatient   Admission Date: 9/3/2024  Length of Stay: 14 days  Attending Physician: Jonathan Martinez DO  Primary Care Provider: Cruz Hernandez MD        Subjective:     Principal Problem:Uremic encephalopathy        HPI:  59 y.o. AAM with h/o CVA, ESRD (MWF via left tunneled HD catheter), Afib (on amiodarone and eliquis), NIDDM type2, HLD, Essential HTN ,depression with h/o suicidal ideations presents to the ER via EMS with family concerns for constipation and abdominal pain. Pt. Unable to give me history due to AMS due to uremia (). Reports that he missed 3 weeks of HD. Presented to the ER altered covered in feces and urine.    Apparently he was admitted here from - for AMS and again was noted to have missed 2 weeks of HD. CT head was negative for mass/bleed.  CT chest at that time noted loculated moderate left pleural effusion/consolidation with a large pericardial effusion 3.6cm and pulmonary edema. Was tx with abx and echo showed only a small/mod pericardial effusion with no cardiac tamponade. Nephro/pulm/ID consulted. Recommended IR to sample fluid but family deferred due to risk/benefit. He was cont. On abx VANC with NGT repeat cultures. Plan was to transfer to [facility] but daughter (javier)  refused and wanted to take him home with her on  per the discharge summary.     Today no family with with him in the ER and pt. Noted to be malnourished, desheveled and confused.     Labs noted for potassium of 7.2 and /Creatinin 25.8.  Bicarb 12. VB.23/39.5/35/16.7.  WBC 8.7 and H/H 8.6/26.      CT abd/pelvis with IV contrast:   1. Image quality degraded by technical factors discussed above.   2. Moderate/large volume of loculated left pleural fluid noting probable pleural thickening and heterogeneity of pleural fluid.  Correlation for underlying infectious process advised.   Left basilar atelectatic/consolidative change.   3. Right lower lobe patchy ground-glass attenuation with interlobular septal thickening which could be seen with edema, infection or non infectious inflammatory process.   4. Solid and ground-glass right lower lobe pulmonary nodules measuring 6 mm and 8 mm respectively.  Repeat evaluation with chest CT in 3 months is advised to evaluate stability and/or resolution of these findings.   5. Cardiomegaly and large pericardial effusion.   6. Cholelithiasis.   7. Gastric wall thickening which could relate to nondistention although correlation for symptoms of gastritis advised.   8. Regions of large and small bowel wall thickening which could relate to nondistention and/or technical factors discussed above.  However, correlation for symptoms of enterocolitis advised.   9. Bladder wall thickening.  Correlation with urinalysis advised.   10. Multiple additional findings as above.     ED spoke with Nephrology and will plan for ICU admission and emergent HD.   We have been consulted for further management and admission to the ICU.     Because this patient's clinical condition is critically guarded and requires care in the ICU with emergent HD, care in an alternative location isn't clinically appropriate for the reasons stated above.         Overview/Hospital Course:  Mr Mahesh Cespedes is a 59 y.o. man with ESRD who was admitted with encephalopathy. He was noted to have severe uremia ( on admission), hyperkalemia. He was also caked with urine and stool. He was admitted to ICU, Nephrology Dr Mayen group consulted, and received emergent dialysis on 9/4/24. Social work notes he has outpatient HD chair but has not been to outpatient HD since 7/12/24. His last inpatient HD treatment was at [facility] on 8/7/24. His mental status has slightly improved. Palliative consulted due to patient's ESRD and HD nonadherence. Family wants to continue full code and full care; not interested  in hospice care despite patient's unwillingness to adhere to dialysis. Family now not answering the phone. He is receiving low dose dialysis to avoid dialysis disequilibrium syndrome. He developed Aflutter RVR; Cardiology cardioverted to NSR on 24. PT, OT, ST following. Recommends SNF. Patient continues to refuse dialysis as of 2024.  Multiple attempts made to family, unable to contact.  Psych consulted for formed evaluation of capacity- patient does not have the capacity to refuse necessary medical care at this time.     Interval History:  Patient is refusing medications last night and this morning.  He is calling out for his  wife.  Called out for different family members.  Appears confused this morning.  Able to tell me his name, but when asked about his birthday aware he is at, he said he does not have time for this.    Review of Systems   Neurological:  Positive for weakness.   Psychiatric/Behavioral:  Positive for confusion (intermittent confusion) and decreased concentration.      Objective:     Vital Signs (Most Recent):  Temp: 97.6 °F (36.4 °C) (24)  Pulse: 67 (24 0733)  Resp: 18 (24)  BP: (!) 140/96 (24)  SpO2: 100 % (24) Vital Signs (24h Range):  Temp:  [97.6 °F (36.4 °C)-98.3 °F (36.8 °C)] 97.6 °F (36.4 °C)  Pulse:  [55-67] 67  Resp:  [16-19] 18  SpO2:  [97 %-100 %] 100 %  BP: (116-146)/(58-96) 140/96     Weight: 58.3 kg (128 lb 8.5 oz)  Body mass index is 20.13 kg/m².    Intake/Output Summary (Last 24 hours) at 2024 1016  Last data filed at 2024 0823  Gross per 24 hour   Intake 120 ml   Output --   Net 120 ml         Physical Exam  Vitals and nursing note reviewed.   Constitutional:       Appearance: He is ill-appearing.   Neck:      Comments: Retained sutures R chest wall  Cardiovascular:      Rate and Rhythm: Normal rate and regular rhythm.   Pulmonary:      Effort: Pulmonary effort is normal.      Breath sounds: Normal  breath sounds.      Comments: O2 by NC  Abdominal:      General: There is no distension.      Palpations: Abdomen is soft.      Tenderness: There is no abdominal tenderness.   Musculoskeletal:      Right lower leg: No edema.      Left lower leg: No edema.   Skin:     Comments: [facility]   Neurological:      Mental Status: He is alert. He is disoriented.      Motor: Weakness present.      Comments: Patient is confused.  Calling out family members names, including his  wife   Psychiatric:      Comments: Appears depressed             Significant Labs: All pertinent labs within the past 24 hours have been reviewed.    Significant Imaging: I have reviewed all pertinent imaging results/findings within the past 24 hours.    Assessment/Plan:      * Uremic encephalopathy  -  on admission, last HD session about a month ago  - Nephrology consulted for HD: he has poor prognosis w/ frequency of missed treatments evidently d/t patient refusal  - continue to monitor mental status - improving but still intermittently confuse. May be new baseline given freq of missed HD  - low dose dialysis to avoid dialysis disequilibrium syndrome   - Palliative team consulted  - Pt continues to refuse HD  - Psych consulted for formal  evaluation of capacity: patient does not have the capacity to refuse necessary medical care at this time.       ESRD needing dialysis  - dialyzes via [facility]  - last outpatient HD session was 24 and last inpatient session was 24-- missed almost a month of dialysis because he refused to go  - he was uremic on presentation- - and hyperkalemic- K 7.2  - Nephrology [facility] group consulted  - received emergent dialysis on 24- hyperK resolved, BUN improved.  - Nephrology continues to follow along. Receiving low dose dialysis to avoid dialysis disequilibrium syndrome   - Palliative consulted as he has ESRD and refuses outpatient dialysis. Despite this, continue full code full care.   -  patient continues to refuse dialysis as of 09/16/2024.   - Psych evaluated, patient does not have the capacity to refuse necessary medical care at this time.     Pressure injury of sacral region, stage 4  - present on admission  - healing   - wound care consulted: Local wound care to sacral wound with hydrocolloid dressing to promote closure of chronic wound and prevent soiling from incontinence.       Left loculated pleural effusion  - this has been present on prior hospitalizations  - discussed on last stay and family declined thoracentesis at that time  - developed fever on 9/8/24. No further fevers.     Anemia in ESRD (end-stage renal disease)  Anemia is likely due to chronic disease due to ESRD. Most recent hemoglobin and hematocrit are listed below.  Recent Labs     09/16/24  0417 09/18/24  0441   HGB 9.2* 9.4*   HCT 28.5* 30.3*       Plan  - Monitor serial CBC: Daily  - Transfuse PRBC if patient becomes hemodynamically unstable, symptomatic or H/H drops below 7/21.  - EPO MWF      Essential hypertension  Chronic, controlled. Latest blood pressure and vitals reviewed-     Temp:  [97.8 °F (36.6 °C)-98.5 °F (36.9 °C)]   Pulse:  [50-67]   Resp:  [17-19]   BP: (114-147)/(54-68)   SpO2:  [98 %-100 %] .   Home meds for hypertension were reviewed and noted below.   Hypertension Medications               metoprolol succinate (TOPROL-XL) 50 MG 24 hr tablet Take 1 tablet (50 mg total) by mouth once daily.            While in the hospital, will manage blood pressure as follows; Continue home antihypertensive regimen   - continue metoprolol      Will utilize p.r.n. blood pressure medication only if patient's blood pressure greater than  180/90  and he develops symptoms such as worsening chest pain or shortness of breath.    Paroxysmal atrial fibrillation  Patient with Paroxysmal (<7 days) atrial fibrillation which is controlled currently with Beta Blocker and Amiodarone. [id number] Score: 1. anticoagulation indicated.  Anticoagulation done with eliquis 2.5mg PO BID . CT head negative for mass or bleed. Fall risk in place.    -continue metoprolol, amiodarone, Eliquis    Controlled type 2 diabetes mellitus with chronic kidney disease on chronic dialysis, without long-term current use of insulin  Lab Results   Component Value Date    HGBA1C 6.2 (H) 09/04/2024     - ACHS with hypoglycemic protocol   - diabetic puree diet per speech    History of CVA (cerebrovascular accident)  Noted.   - continue home eliquis, BP control    Depression  Psych consulted-rec dc sertraline 50 mg daily since its not effective       ACP (advance care planning)  Advance Care Planning    Date: 09/04/2024  Palliative consulted. Continue full aggressive care. Time spent 5 minutes           Physical debility  Was advised to got to Skilled with daily PT/OT but family declined. Social work for discharge planning and PT eval. Fall risk. Nutrition consult for his malnutrition.   - palliative consulted  - family wants to continue full aggressive treatment  - PT, OT, ST consulted- SNF  - Per , Son decline the only accepting facility for SNF.  States he wants his father home with home health  -I am concerned about discharging home given patient continues to refused dialysis.  -psych consulted for formal evaluation for capacity    Gastric wall thickening  Noted on CT  - PPI IV ordered      Thrombocytopenia  The likely etiology of thrombocytopenia is  unknown- potentially occult liver disease? . The patients 3 most recent labs are listed below.  Recent Labs     09/16/24  0417 09/18/24  0441    281       Plan  - Will transfuse if platelet count is <50k (if undergoing surgical procedure or have active bleeding).  - monitor daily  - improving, resume home Eliquis 9/13      Thrush  - noted 9/6/24  - started IV fluconazole as he does not have the mental awareness to swish and swallow nystatin at this point and is not reliably tolerating pills  -completed  treatment with fluconazole     Typical atrial flutter  Noted on 9/6/24. Converted back to NSR, then back to aflutter. No change with diltiazem  - started amio gtt on 9/8/24  - continue eliquis  - Cardiology consulted  - he cannot consent for his ROYCE/DCCV and family not answering the phone  -continue p.o. amiodarone and metoprolol        VTE Risk Mitigation (From admission, onward)           Ordered     apixaban tablet 2.5 mg  2 times daily         09/13/24 0610     IP VTE LOW RISK PATIENT  Once         09/04/24 0116     Place sequential compression device  Until discontinued         09/04/24 0116                    Discharge Planning   JAIME:      Code Status: Full Code   Is the patient medically ready for discharge?:     Reason for patient still in hospital (select all that apply): Patient trending condition, Treatment, Consult recommendations, and PT / OT recommendations  Discharge Plan A: Skilled Nursing Facility   Discharge Delays: None known at this time              Jonathan Martinez DO  Department of Hospital Medicine   [facility] - Telemetry

## 2024-09-18 NOTE — ASSESSMENT & PLAN NOTE
The likely etiology of thrombocytopenia is  unknown- potentially occult liver disease? . The patients 3 most recent labs are listed below.  Recent Labs     09/16/24  0417 09/18/24  0441    281       Plan  - Will transfuse if platelet count is <50k (if undergoing surgical procedure or have active bleeding).  - monitor daily  - improving, resume home Eliquis 9/13

## 2024-09-18 NOTE — ASSESSMENT & PLAN NOTE
- This has significantly affected pt's mobility and overall quality of life; suspect he has component of vascular dementia contributing to his overall decline   - illness trajectory education provided to pt's family

## 2024-09-18 NOTE — PROGRESS NOTES
Nurses Note -- 4 Eyes      9/18/2024   7:46 AM      Skin assessed during: Q Shift Change      [] No Altered Skin Integrity Present    []Prevention Measures Documented      [x] Yes- Altered Skin Integrity Present or Discovered   [] LDA Added if Not in Epic (Describe Wound)   [] New Altered Skin Integrity was Present on Admit and Documented in LDA   [] Wound Image Taken    Wound Care Consulted? Yes    Attending Nurse:  Bria Sanchez RN/Staff Member:  Shanique
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
## 2024-09-18 NOTE — ASSESSMENT & PLAN NOTE
- dialyzes via [facility]  - last outpatient HD session was 7/12/24 and last inpatient session was 8/7/24-- missed almost a month of dialysis because he refused to go  - he was uremic on presentation- - and hyperkalemic- K 7.2  - [facility]  - received emergent dialysis on 9/4/24- hyperK resolved, BUN improved.  - Nephrology continues to follow along. Receiving low dose dialysis to avoid dialysis disequilibrium syndrome   - Palliative consulted as he has ESRD and refuses outpatient dialysis. Despite this, continue full code full care.   - patient continues to refuse dialysis as of 09/16/2024.   - Psych evaluated, patient does not have the capacity to refuse necessary medical care at this time.

## 2024-09-18 NOTE — PT/OT/SLP PROGRESS
Physical Therapy      Patient Name:  Mahesh Cespedes   MRN:  [id number]    Patient not seen today secondary to Dialysis. Will follow-up as able.

## 2024-09-18 NOTE — SUBJECTIVE & OBJECTIVE
Interval History: family meeting today; see ACP section of plan.     Medications:  Continuous Infusions:  Scheduled Meds:   allopurinoL  100 mg Oral Daily    apixaban  2.5 mg Oral BID    epoetin luli-epbx  20,000 Units Intravenous Every Mon, Wed, Fri    metoprolol succinate  100 mg Oral Daily    pantoprazole  40 mg Intravenous Daily    polyethylene glycol  17 g Oral Daily    tamsulosin  0.4 mg Oral Nightly     PRN Meds:  Current Facility-Administered Medications:     0.9% NaCl, , Intravenous, PRN    acetaminophen, 650 mg, Oral, Q4H PRN    dextrose 10%, 12.5 g, Intravenous, PRN    dextrose 10%, 12.5 g, Intravenous, PRN    dextrose 10%, 25 g, Intravenous, PRN    dextrose 10%, 25 g, Intravenous, PRN    glucagon (human recombinant), 1 mg, Intramuscular, PRN    hydrALAZINE, 10 mg, Intravenous, Q6H PRN    labetalol, 10 mg, Intravenous, Q6H PRN    melatonin, 6 mg, Oral, Nightly PRN    OLANZapine, 5 mg, Intramuscular, Once PRN    ondansetron, 4 mg, Intravenous, Q6H PRN    sodium chloride 0.9%, 250 mL, Intravenous, PRN    sodium chloride 0.9%, 10 mL, Intravenous, Q12H PRN    sodium chloride 0.9%, 10 mL, Intravenous, PRN    Objective:     Vital Signs (Most Recent):  Temp: 97.6 °F (36.4 °C) (09/18/24 0724)  Pulse: 65 (09/18/24 1105)  Resp: 18 (09/18/24 0724)  BP: (!) 140/96 (09/18/24 0724)  SpO2: 100 % (09/18/24 0724) Vital Signs (24h Range):  Temp:  [97.6 °F (36.4 °C)-98.3 °F (36.8 °C)] 97.6 °F (36.4 °C)  Pulse:  [55-67] 65  Resp:  [16-18] 18  SpO2:  [97 %-100 %] 100 %  BP: (116-140)/(58-96) 140/96     Weight: 58.3 kg (128 lb 8.5 oz)  Body mass index is 20.13 kg/m².       Physical Exam  Constitutional:       Appearance: He is ill-appearing.      Comments: Awake, but confused in HD unit, in no distress   HENT:      Head:      Comments: Trace temporal wasting       Significant Labs: All pertinent labs within the past 24 hours have been reviewed.  CBC:   Recent Labs   Lab 09/18/24  0441   WBC 6.33   HGB 9.4*   HCT 30.3*   MCV 86         BMP:  Recent Labs   Lab 09/18/24  0441   GLU 75   *   K 3.9   CL 97   CO2 24   BUN 56*   CREATININE 7.6*   CALCIUM 7.7*     LFT:  Lab Results   Component Value Date    AST 33 09/04/2024    ALKPHOS 164 (H) 09/04/2024    BILITOT 0.9 09/04/2024     Albumin:   Albumin   Date Value Ref Range Status   09/04/2024 3.2 (L) 3.5 - 5.2 g/dL Final     Protein:   Total Protein   Date Value Ref Range Status   09/04/2024 8.1 6.0 - 8.4 g/dL Final     Lactic acid:   Lab Results   Component Value Date    LACTATE 1.0 09/03/2024    LACTATE 1.1 08/01/2024       Significant Imaging: I have reviewed all pertinent imaging results/findings within the past 24 hours.

## 2024-09-18 NOTE — PLAN OF CARE
11:41am: CM spoke to patient's son, Guevara Cespedes, [phone]; he stated he was unable to get off from work today, but his siblings, Nacho Cespedes (Son) [phone]; Rima Cespedes, (Daughter) will be at hospital today.  He stated they all agreed to bring patient home and someone will be home with patient.  CM to provide information for sitters and caregivers to patient's children.      A scheduled meeting with patient's children, Nacho and Rima, Dr. Martinez and Dr. Zhong completed while patient in dialysis.  Family stated they wish to take patient home and are committed to taking care of him - pt's son Nacho lives at home.  Dr. Zhong explained in detail, that patient will need 24-hour care and the importance of completing dialysis treatments. Family verbalized understanding and explained they previously had trouble with transportation and means of contact.  CM provided family with resources for private caregivers and encouraged them to contact patient's insurance regarding transportation benefits.  Pt's son Nacho stated family is not neglecting patient and reiterated their commitment to his care. Per chart review, a report was made to EPS by Dr. Martinez; CM to follow up on report.

## 2024-09-18 NOTE — PROGRESS NOTES
[facility] [place]  Palliative Medicine  Progress Note    Patient Name: Mahesh Cespedes  MRN: [id number]  Admission Date: 9/3/2024  Hospital Length of Stay: 14 days  Code Status: Full Code   Attending Provider: Jonathan Martinez DO  Consulting Provider: Maida Zhong MD  Primary Care Physician: Cruz Hernandez MD  Principal Problem:Uremic encephalopathy    Assessment/Plan:     Advance Care Planning    24  - Chart and interval history reviewed; extensively discussed pt in person with Dr Martinez. Pt with increased agitation this morning, and was reportedly calling out for his  wife. During brief visit to pt in HD as this was getting started, he was calm, but not able to tell me where he was. I let him know I would call his family.   - Dr Martinez and I called and I had a long phone call with his son Nacho, and ultimately were able to arrange a meeting in person with him and his sister Rima, though their brother Guevara had to go to work.   - At scheduled meeting time, Sudha Castillo (/), and myself met with Nacho and Rima. Thorough medical update provided, and discussed plan moving forward. As we expressed transparent concern for the condition that pt arrived in (disheveled, covered in waste, having missed HD for several weeks), family acknowledged that his care needs have been a lot to manage, despite their best efforts. They do not have a car at home, and an additional family member has been hospitalized at [facility]. Support provided, as we acknowledged this sounds like a lot to deal with. They let us know that culturally it is important for them to care for their father at home, rather than place him in a facility. In hearing this, we discussed several forms of additional support at home (home health, home sitters, or home hospice care).   - Based on our conversation, plan is to hopefully discharge patient home with home health, private sitters, and continued outpatient HD. Family is aware of the  importance of attending every scheduled HD session. However, when we brought them up to say hi to patient from doorway of HD unit (pt was closest to door), dialysis RN let us know that pt was hypotensive for part of his treatment. Family is aware that if his hypotension during treatment persists, this means he would no longer be a candidate for HD, and hospice would be recommended path.   - We discussed code status during family meeting; strongly recommended DNR/DNI, as pt would likely have poor outcome with CPR and/or intubation. This was before we had new information about pt's intradialytic hypotension, so will address this again during follow-up conversation.   - Significant emotional support provided during above conversation   - Will follow up with pt and family likely tomorrow; updated nephrology PA in person after family visit     9/17/24  - See plan of care note    9/16/24  - See ACP note    9/12/24  - See progress note    9/4/24  - See initial consult    Neuro  History of CVA (cerebrovascular accident)  - This has significantly affected pt's mobility and overall quality of life; suspect he has component of vascular dementia contributing to his overall decline   - illness trajectory education provided to pt's family     Renal/  ESRD needing dialysis  - Nephrology consulted; pt with long pattern of HD non-adherence.   - For now, family would like to continue with HD, though is aware of recent hypotension during HD session today. If this persists, he is unlikely to be a further candidate for HD, and would need hospice care.     Endocrine  Moderate protein malnutrition  - Trace temporal wasting on exam  - RD consult     Orthopedic  Pressure injury of sacral region, stage 4  - Noted; pt now bedbound. Contributes to hospice qualification if goals become comfort-focused.     I will follow-up with patient. Please contact us if you have any additional questions.    LAUREN Jaime  Palliative Medicine Staff    [phone]    Total visit time: 111 minutes    > 50% of 35 min visit spent in chart review, face to face discussion of symptom assessment, coordination of care with other specialists, and discharge planning.    76 min ACP time spent: goals of care, emotional support, formulating and communicating prognosis, exploring burden/ benefit of various approaches of treatment.      Subjective:     Interval History: family meeting today; see ACP section of plan.     Medications:  Continuous Infusions:  Scheduled Meds:   allopurinoL  100 mg Oral Daily    apixaban  2.5 mg Oral BID    epoetin luli-epbx  20,000 Units Intravenous Every Mon, Wed, Fri    metoprolol succinate  100 mg Oral Daily    pantoprazole  40 mg Intravenous Daily    polyethylene glycol  17 g Oral Daily    tamsulosin  0.4 mg Oral Nightly     PRN Meds:  Current Facility-Administered Medications:     0.9% NaCl, , Intravenous, PRN    acetaminophen, 650 mg, Oral, Q4H PRN    dextrose 10%, 12.5 g, Intravenous, PRN    dextrose 10%, 12.5 g, Intravenous, PRN    dextrose 10%, 25 g, Intravenous, PRN    dextrose 10%, 25 g, Intravenous, PRN    glucagon (human recombinant), 1 mg, Intramuscular, PRN    hydrALAZINE, 10 mg, Intravenous, Q6H PRN    labetalol, 10 mg, Intravenous, Q6H PRN    melatonin, 6 mg, Oral, Nightly PRN    OLANZapine, 5 mg, Intramuscular, Once PRN    ondansetron, 4 mg, Intravenous, Q6H PRN    sodium chloride 0.9%, 250 mL, Intravenous, PRN    sodium chloride 0.9%, 10 mL, Intravenous, Q12H PRN    sodium chloride 0.9%, 10 mL, Intravenous, PRN    Objective:     Vital Signs (Most Recent):  Temp: 97.6 °F (36.4 °C) (09/18/24 0724)  Pulse: 65 (09/18/24 1105)  Resp: 18 (09/18/24 0724)  BP: (!) 140/96 (09/18/24 0724)  SpO2: 100 % (09/18/24 0724) Vital Signs (24h Range):  Temp:  [97.6 °F (36.4 °C)-98.3 °F (36.8 °C)] 97.6 °F (36.4 °C)  Pulse:  [55-67] 65  Resp:  [16-18] 18  SpO2:  [97 %-100 %] 100 %  BP: (116-140)/(58-96) 140/96     Weight: 58.3 kg (128 lb 8.5  oz)  Body mass index is 20.13 kg/m².       Physical Exam  Constitutional:       Appearance: He is ill-appearing.      Comments: Awake, but confused in HD unit, in no distress   HENT:      Head:      Comments: Trace temporal wasting       Significant Labs: All pertinent labs within the past 24 hours have been reviewed.  CBC:   Recent Labs   Lab 09/18/24  0441   WBC 6.33   HGB 9.4*   HCT 30.3*   MCV 86        BMP:  Recent Labs   Lab 09/18/24  0441   GLU 75   *   K 3.9   CL 97   CO2 24   BUN 56*   CREATININE 7.6*   CALCIUM 7.7*     LFT:  Lab Results   Component Value Date    AST 33 09/04/2024    ALKPHOS 164 (H) 09/04/2024    BILITOT 0.9 09/04/2024     Albumin:   Albumin   Date Value Ref Range Status   09/04/2024 3.2 (L) 3.5 - 5.2 g/dL Final     Protein:   Total Protein   Date Value Ref Range Status   09/04/2024 8.1 6.0 - 8.4 g/dL Final     Lactic acid:   Lab Results   Component Value Date    LACTATE 1.0 09/03/2024    LACTATE 1.1 08/01/2024       Significant Imaging: I have reviewed all pertinent imaging results/findings within the past 24 hours.

## 2024-09-19 PROCEDURE — 27000221 HC OXYGEN, UP TO 24 HOURS

## 2024-09-19 PROCEDURE — 94761 N-INVAS EAR/PLS OXIMETRY MLT: CPT

## 2024-09-19 PROCEDURE — 63600175 PHARM REV CODE 636 W HCPCS: Mod: JZ,JG | Performed by: STUDENT IN AN ORGANIZED HEALTH CARE EDUCATION/TRAINING PROGRAM

## 2024-09-19 PROCEDURE — 93010 ELECTROCARDIOGRAM REPORT: CPT | Mod: ,,, | Performed by: INTERNAL MEDICINE

## 2024-09-19 PROCEDURE — 25000003 PHARM REV CODE 250: Performed by: INTERNAL MEDICINE

## 2024-09-19 PROCEDURE — 11000001 HC ACUTE MED/SURG PRIVATE ROOM

## 2024-09-19 PROCEDURE — 99497 ADVNCD CARE PLAN 30 MIN: CPT | Mod: ,,, | Performed by: INTERNAL MEDICINE

## 2024-09-19 PROCEDURE — 25000003 PHARM REV CODE 250: Performed by: STUDENT IN AN ORGANIZED HEALTH CARE EDUCATION/TRAINING PROGRAM

## 2024-09-19 PROCEDURE — 93005 ELECTROCARDIOGRAM TRACING: CPT

## 2024-09-19 PROCEDURE — 25000003 PHARM REV CODE 250: Performed by: HOSPITALIST

## 2024-09-19 PROCEDURE — 99233 SBSQ HOSP IP/OBS HIGH 50: CPT | Mod: ,,, | Performed by: INTERNAL MEDICINE

## 2024-09-19 RX ORDER — HALOPERIDOL 5 MG/ML
2 INJECTION INTRAMUSCULAR EVERY 8 HOURS PRN
Status: DISCONTINUED | OUTPATIENT
Start: 2024-09-19 | End: 2024-09-20 | Stop reason: HOSPADM

## 2024-09-19 RX ADMIN — HALOPERIDOL LACTATE 2 MG: 5 INJECTION, SOLUTION INTRAMUSCULAR at 11:09

## 2024-09-19 RX ADMIN — Medication 6 MG: at 09:09

## 2024-09-19 RX ADMIN — TAMSULOSIN HYDROCHLORIDE 0.4 MG: 0.4 CAPSULE ORAL at 09:09

## 2024-09-19 RX ADMIN — APIXABAN 2.5 MG: 2.5 TABLET, FILM COATED ORAL at 09:09

## 2024-09-19 RX ADMIN — ALLOPURINOL 100 MG: 100 TABLET ORAL at 08:09

## 2024-09-19 RX ADMIN — APIXABAN 2.5 MG: 2.5 TABLET, FILM COATED ORAL at 08:09

## 2024-09-19 RX ADMIN — OLANZAPINE 5 MG: 10 INJECTION, POWDER, FOR SOLUTION INTRAMUSCULAR at 01:09

## 2024-09-19 RX ADMIN — METOPROLOL SUCCINATE 100 MG: 50 TABLET, EXTENDED RELEASE ORAL at 07:09

## 2024-09-19 NOTE — SUBJECTIVE & OBJECTIVE
"Interval History: increasingly agitated; screaming out for his family.     Medications:  Continuous Infusions:  Scheduled Meds:   allopurinoL  100 mg Oral Daily    apixaban  2.5 mg Oral BID    epoetin luli-epbx  20,000 Units Intravenous Every Mon, Wed, Fri    metoprolol succinate  100 mg Oral Daily    pantoprazole  40 mg Intravenous Daily    polyethylene glycol  17 g Oral Daily    tamsulosin  0.4 mg Oral Nightly     PRN Meds:  Current Facility-Administered Medications:     0.9% NaCl, , Intravenous, PRN    acetaminophen, 650 mg, Oral, Q4H PRN    dextrose 10%, 12.5 g, Intravenous, PRN    dextrose 10%, 12.5 g, Intravenous, PRN    dextrose 10%, 25 g, Intravenous, PRN    dextrose 10%, 25 g, Intravenous, PRN    glucagon (human recombinant), 1 mg, Intramuscular, PRN    hydrALAZINE, 10 mg, Intravenous, Q6H PRN    labetalol, 10 mg, Intravenous, Q6H PRN    melatonin, 6 mg, Oral, Nightly PRN    ondansetron, 4 mg, Intravenous, Q6H PRN    sodium chloride 0.9%, 250 mL, Intravenous, PRN    sodium chloride 0.9%, 10 mL, Intravenous, Q12H PRN    sodium chloride 0.9%, 10 mL, Intravenous, PRN    Objective:     Vital Signs (Most Recent):  Temp: 98 °F (36.7 °C) (09/19/24 0724)  Pulse: (!) 59 (09/19/24 0828)  Resp: 18 (09/19/24 0828)  BP: (!) 182/79 (09/19/24 0724)  SpO2: (!) 94 % (09/19/24 0828) Vital Signs (24h Range):  Temp:  [97.5 °F (36.4 °C)-98.2 °F (36.8 °C)] 98 °F (36.7 °C)  Pulse:  [56-73] 59  Resp:  [18-20] 18  SpO2:  [92 %-97 %] 94 %  BP: (137-182)/(65-79) 182/79     Weight: 58.3 kg (128 lb 8.5 oz)  Body mass index is 20.13 kg/m².       Physical Exam  Constitutional:       Comments: Uncomfortable appearing; screaming out for family       Significant Labs: All pertinent labs within the past 24 hours have been reviewed.  CBC:   Recent Labs   Lab 09/18/24  0441   WBC 6.33   HGB 9.4*   HCT 30.3*   MCV 86        BMP:  No results for input(s): "GLU", "NA", "K", "CL", "CO2", "BUN", "CREATININE", "CALCIUM", "MG" in the last 24 " hours.  LFT:  Lab Results   Component Value Date    AST 33 09/04/2024    ALKPHOS 164 (H) 09/04/2024    BILITOT 0.9 09/04/2024     Albumin:   Albumin   Date Value Ref Range Status   09/04/2024 3.2 (L) 3.5 - 5.2 g/dL Final     Protein:   Total Protein   Date Value Ref Range Status   09/04/2024 8.1 6.0 - 8.4 g/dL Final     Lactic acid:   Lab Results   Component Value Date    LACTATE 1.0 09/03/2024    LACTATE 1.1 08/01/2024       Significant Imaging: I have reviewed all pertinent imaging results/findings within the past 24 hours.

## 2024-09-19 NOTE — ASSESSMENT & PLAN NOTE
The likely etiology of thrombocytopenia is  unknown- potentially occult liver disease? . The patients 3 most recent labs are listed below.  Recent Labs     09/18/24  0441          Plan  - Will transfuse if platelet count is <50k (if undergoing surgical procedure or have active bleeding).  - monitor daily  - resumed, resume home Eliquis 9/13

## 2024-09-19 NOTE — ASSESSMENT & PLAN NOTE
Nutrition consulted. Most recent weight and BMI monitored-     Measurements:  Wt Readings from Last 1 Encounters:   09/15/24 58.3 kg (128 lb 8.5 oz)   Body mass index is 20.13 kg/m².    Patient has been screened and assessed by RD.    Malnutrition Type:  Context:    Level:      Malnutrition Characteristic Summary:       Interventions/Recommendations (treatment strategy):  1. Continue diabetic/renal pureed diet as tolerated 2. Continue Novasource Renal TID 3. Monitor weight and labs labs 4. Consider  appetite stimulant if pt continues to loss weight 4. Encourage PO intake as tolerated

## 2024-09-19 NOTE — PLAN OF CARE
Changes in medical condition or discharge plan: No    Does patient need new DME? TBD    Follow up appts needed: PCP/[facility]    Medically stable: No    Estimated Discharge Date: 9/20/2024    Per attending, patient not medically stable for discharge. Patient displaying aggression and signs of hallucination this morning.  CM, Dr. Martinez, and Dr. Zhong with Palliative Care had extensive conversation with patient's children yesterday regarding next steps of care.  Family adamant they do not want nursing home placement or SNF for patient and would prefer to take him home; family open to home health.  Family verbalized understanding that patient will need 24-hour care and stated their commitment to his health.  Discussed with family hiring private caregivers to provide additional layer of care as well as contacting patient's insurance and HD clinic for transportation assistance to dialysis treatments.  Dr. Martinez called in report to EPS; a  to assess patient.  CM to continue to follow up and assess for and until discharge.   09/19/24 1203   Discharge Reassessment   Assessment Type Discharge Planning Reassessment   Did the patient's condition or plan change since previous assessment? No   Discharge Plan discussed with: Adult children   Discharge Plan A Home Health   DME Needed Upon Discharge  other (see comments)  (TBD)   Transition of Care Barriers Does not adhere to care plan   Why the patient remains in the hospital Requires continued medical care   Post-Acute Status   Post-Acute Authorization Home Health   Home Health Status Referrals Sent   Coverage MEDICARE - MEDICARE PART A & B   Hospital Resources/Appts/Education Provided Provided patient/caregiver with written discharge plan information   Discharge Delays (!) Patient and Family Barriers     9:17am: CM requested a wellness checkup at patient's address to assess living conditions.      9:30am: CM received a call from Deputy RUDDY Naik; he stated  he was unable to complete check of patient's home living conditions due to family not letting him inside residence.  [place] stated patient's son and nephew were and that he has been called to complete wellness checks at patient's resident before, by pt's HD clinic.   reported patient has told him multiple times in the past that he does not want to go to dialysis, nor does he have a desire to come out of his room.     12:03pm:  CM sent referrals for home health; I provided the patient a choice of post acute providers and offered a list of CMS rated home health agencies.  Patient has declined to select a preferred provider and elects placement with the first accepting in network provider that is available to provide services as ordered by the physician.      Patient accepted to [facility]: [phone]

## 2024-09-19 NOTE — ASSESSMENT & PLAN NOTE
- dialyzes via [facility]  - last outpatient HD session was 7/12/24 and last inpatient session was 8/7/24-- missed almost a month of dialysis because he refused to go  - he was uremic on presentation- - and hyperkalemic- K 7.2  - Nephrology [facility] consulted  - received emergent dialysis on 9/4/24- hyperK resolved, BUN improved.  - Nephrology continues to follow along. Receiving low dose dialysis to avoid dialysis disequilibrium syndrome   - Palliative consulted as he has ESRD and refuses outpatient dialysis. Despite this, continue full code full care.   - patient continues to refuse dialysis as of 09/16/2024.   - Psych evaluated, patient does not have the capacity to refuse necessary medical care at this time.   - continue HD per nephrology

## 2024-09-19 NOTE — PROGRESS NOTES
[facility] Medicine  Progress Note    Patient Name: Mahesh Cespedes  MRN: [id number]  Patient Class: IP- Inpatient   Admission Date: 9/3/2024  Length of Stay: 15 days  Attending Physician: Jonathan Martinez DO  Primary Care Provider: Cruz Hernandez MD        Subjective:     Principal Problem:Uremic encephalopathy        HPI:  59 y.o. AAM with h/o CVA, ESRD (MWF via left tunneled HD catheter), Afib (on amiodarone and eliquis), NIDDM type2, HLD, Essential HTN ,depression with h/o suicidal ideations presents to the ER via EMS with family concerns for constipation and abdominal pain. Pt. Unable to give me history due to AMS due to uremia (). Reports that he missed 3 weeks of HD. Presented to the ER altered covered in feces and urine.    Apparently he was admitted here from - for AMS and again was noted to have missed 2 weeks of HD. CT head was negative for mass/bleed.  CT chest at that time noted loculated moderate left pleural effusion/consolidation with a large pericardial effusion 3.6cm and pulmonary edema. Was tx with abx and echo showed only a small/mod pericardial effusion with no cardiac tamponade. Nephro/pulm/ID consulted. Recommended IR to sample fluid but family deferred due to risk/benefit. He was cont. On abx VANC with NGT repeat cultures. Plan was to transfer to [facility] but daughter (javier)  refused and wanted to take him home with her on  per the discharge summary.     Today no family with with him in the ER and pt. Noted to be malnourished, desheveled and confused.     Labs noted for potassium of 7.2 and /Creatinin 25.8.  Bicarb 12. VB.23/39.5/35/16.7.  WBC 8.7 and H/H 8.6/26.      CT abd/pelvis with IV contrast:   1. Image quality degraded by technical factors discussed above.   2. Moderate/large volume of loculated left pleural fluid noting probable pleural thickening and heterogeneity of pleural fluid.  Correlation for underlying infectious process advised.   Left basilar atelectatic/consolidative change.   3. Right lower lobe patchy ground-glass attenuation with interlobular septal thickening which could be seen with edema, infection or non infectious inflammatory process.   4. Solid and ground-glass right lower lobe pulmonary nodules measuring 6 mm and 8 mm respectively.  Repeat evaluation with chest CT in 3 months is advised to evaluate stability and/or resolution of these findings.   5. Cardiomegaly and large pericardial effusion.   6. Cholelithiasis.   7. Gastric wall thickening which could relate to nondistention although correlation for symptoms of gastritis advised.   8. Regions of large and small bowel wall thickening which could relate to nondistention and/or technical factors discussed above.  However, correlation for symptoms of enterocolitis advised.   9. Bladder wall thickening.  Correlation with urinalysis advised.   10. Multiple additional findings as above.     ED spoke with Nephrology and will plan for ICU admission and emergent HD.   We have been consulted for further management and admission to the ICU.     Because this patient's clinical condition is critically guarded and requires care in the ICU with emergent HD, care in an alternative location isn't clinically appropriate for the reasons stated above.         Overview/Hospital Course:  Mr Mahesh Cespedes is a 59 y.o. man with ESRD who was admitted with encephalopathy. He was noted to have severe uremia ( on admission), hyperkalemia. He was also caked with urine and stool. He was admitted to ICU, Nephrology Dr [facility] group consulted, and received emergent dialysis on 9/4/24. Social work notes he has outpatient HD chair but has not been to outpatient HD since 7/12/24. His last inpatient HD treatment was at [facility] on 8/7/24. His mental status has slightly improved. Palliative consulted due to patient's ESRD and HD nonadherence. Family wants to continue full code and full care; not interested  in hospice care despite patient's unwillingness to adhere to dialysis. Family now not answering the phone. He is receiving low dose dialysis to avoid dialysis disequilibrium syndrome. He developed Aflutter RVR; Cardiology cardioverted to NSR on 24. PT, OT, ST following. Recommends SNF. Patient continues to refuse dialysis as of 2024.  Multiple attempts made to family, unable to contact.  Psych consulted for formed evaluation of capacity- patient does not have the capacity to refuse necessary medical care at this time.  Patient with agitation and delirium overnight on  and again on .  Family continues to be difficult to be reached.  Wellness check attempted by [place] on , but family did not allow deputy inside the house.  EPS has been contacted on  as well.  Palliative care team following- code status changed to DNR on 24.     Interval History:  Overnight, patient was agitated and confused.  Calling out his  wife's name.  This morning, he was screaming and yelling that someone in [facility] is kidnapping his daughter.  Attempting to get out bed.  Require Haldol later in the day.  Attempted to reach patient's son this morning, unable to contact.    Palliative care team was able to reach patient's son Guevara later in the day.  After extensive discussion, patient's son was in agreement for DNR/DNI status. Per palliaitve, son Guevara remains hopeful that pt is a candidate for further HD      Review of Systems   Neurological:  Positive for weakness.   Psychiatric/Behavioral:  Positive for confusion, decreased concentration and hallucinations.      Objective:     Vital Signs (Most Recent):  Temp: 97.9 °F (36.6 °C) (24 1043)  Pulse: (!) 58 (24 1043)  Resp: 18 (24 1043)  BP: 132/62 (24 1043)  SpO2: (!) 94 % (24 1043) Vital Signs (24h Range):  Temp:  [97.9 °F (36.6 °C)-98.2 °F (36.8 °C)] 97.9 °F (36.6 °C)  Pulse:  [56-73] 58  Resp:  [18-20] 18  SpO2:  [92 %-97  %] 94 %  BP: (132-182)/(62-79) 132/62     Weight: 58.3 kg (128 lb 8.5 oz)  Body mass index is 20.13 kg/m².  No intake or output data in the 24 hours ending 24 1500        Physical Exam  Vitals and nursing note reviewed.   Constitutional:       Appearance: He is ill-appearing.   Neck:      Comments: Retained sutures R chest wall  Cardiovascular:      Rate and Rhythm: Normal rate and regular rhythm.   Pulmonary:      Effort: Pulmonary effort is normal.      Breath sounds: Normal breath sounds.      Comments: O2 by NC  Abdominal:      General: There is no distension.      Palpations: Abdomen is soft.      Tenderness: There is no abdominal tenderness.   Musculoskeletal:      Right lower leg: No edema.      Left lower leg: No edema.   Skin:     Comments: L sided THDC   Neurological:      Mental Status: He is alert. He is disoriented.      Motor: Weakness present.      Comments: Patient is confused.  Calling out family members names, including his  wife   Psychiatric:      Comments: Appears depressed             Significant Labs: All pertinent labs within the past 24 hours have been reviewed.    Significant Imaging: I have reviewed all pertinent imaging results/findings within the past 24 hours.    Assessment/Plan:      * Uremic encephalopathy  -  on admission, last HD session about a month ago  - Nephrology consulted for HD: he has poor prognosis w/ frequency of missed treatments evidently d/t patient refusal  - continue to monitor mental status - improving but still intermittently confuse. May be new baseline given freq of missed HD  - low dose dialysis to avoid dialysis disequilibrium syndrome   - Palliative team consulted  - Pt continues to refuse HD  - Psych consulted for formal  evaluation of capacity: patient does not have the capacity to refuse necessary medical care at this time.   - has been receiving dialysis while hospitalized     ESRD needing dialysis  - dialyzes via L DC  - last  outpatient HD session was 7/12/24 and last inpatient session was 8/7/24-- missed almost a month of dialysis because he refused to go  - he was uremic on presentation- - and hyperkalemic- K 7.2  - Nephrology [facility] group consulted  - received emergent dialysis on 9/4/24- hyperK resolved, BUN improved.  - Nephrology continues to follow along. Receiving low dose dialysis to avoid dialysis disequilibrium syndrome   - Palliative consulted as he has ESRD and refuses outpatient dialysis. Despite this, continue full code full care.   - patient continues to refuse dialysis as of 09/16/2024.   - Psych evaluated, patient does not have the capacity to refuse necessary medical care at this time.   - continue HD per nephrology     Pressure injury of sacral region, stage 4  - present on admission  - healing   - wound care consulted: Local wound care to sacral wound with hydrocolloid dressing to promote closure of chronic wound and prevent soiling from incontinence.       Left loculated pleural effusion  - this has been present on prior hospitalizations  - discussed on last stay and family declined thoracentesis at that time  - developed fever on 9/8/24. No further fevers.     Anemia in ESRD (end-stage renal disease)  Anemia is likely due to chronic disease due to ESRD. Most recent hemoglobin and hematocrit are listed below.  Recent Labs     09/18/24  0441   HGB 9.4*   HCT 30.3*       Plan  - Monitor serial CBC: Daily  - Transfuse PRBC if patient becomes hemodynamically unstable, symptomatic or H/H drops below 7/21.  - EPO MWF      Essential hypertension  Chronic, controlled. Latest blood pressure and vitals reviewed-     Temp:  [97.9 °F (36.6 °C)-98.2 °F (36.8 °C)]   Pulse:  [56-73]   Resp:  [18-20]   BP: (132-182)/(62-79)   SpO2:  [92 %-97 %] .   Home meds for hypertension were reviewed and noted below.   Hypertension Medications               metoprolol succinate (TOPROL-XL) 50 MG 24 hr tablet Take 1 tablet (50 mg total)  by mouth once daily.            While in the hospital, will manage blood pressure as follows; Continue home antihypertensive regimen   - continue metoprolol      Will utilize p.r.n. blood pressure medication only if patient's blood pressure greater than  180/90  and he develops symptoms such as worsening chest pain or shortness of breath.    Paroxysmal atrial fibrillation  Patient with Paroxysmal (<7 days) atrial fibrillation which is controlled currently with Beta Blocker and Amiodarone. [id number] Score: 1. anticoagulation indicated. Anticoagulation done with eliquis 2.5mg PO BID . CT head negative for mass or bleed. Fall risk in place.    -continue metoprolol, amiodarone, Eliquis    Controlled type 2 diabetes mellitus with chronic kidney disease on chronic dialysis, without long-term current use of insulin  Lab Results   Component Value Date    HGBA1C 6.2 (H) 09/04/2024     - ACHS with hypoglycemic protocol   - diabetic puree diet per speech    History of CVA (cerebrovascular accident)  Noted.   - continue home eliquis, BP control    Depression  Psych consulted-rec dc sertraline 50 mg daily since its not effective       ACP (advance care planning)  Advance Care Planning    Date: 09/04/2024  Palliative consulted. Continue full aggressive care. Time spent 5 minutes        Advance Care Planning    Date: 09/19/2024    Code Status  In light of the patients advanced and life limiting illness, palliative care team and I engaged the the family in a voluntary conversation about the patient's preferences for care  at the very end of life. Famly understands The patient's son Guevara wishes patient to have a natural, peaceful death.  Along those lines, the patient's son Guevara does not wish to have CPR or other invasive treatments performed when his heart and/or breathing stops.  Palliative care team communicated to the family that a DNR order would be placed in his medical record to reflect this preference.    A total of 16  min was spent on advance care planning, goals of care discussion, emotional support, formulating and communicating prognosis and exploring burden/benefit of various approaches of treatment. This discussion occurred on a fully voluntary basis with the verbal consent of the patient and/or family.      Code status changed to DNR/DNI on 09/19/2024 by palliative care team after discussion with patient's son Guevara    Physical debility  Was advised to got to [facility] with daily PT/OT but family declined. Social work for discharge planning and PT eval. Fall risk. Nutrition consult for his malnutrition.   - palliative consulted  - family wants to continue full aggressive treatment  - PT, OT, ST consulted- SNF  - Per , Son decline the only accepting facility for SNF.  States he wants his father home with home health  -I am concerned about discharging home given patient continues to refused dialysis.  -psych consulted for formal evaluation for capacity    Gastric wall thickening  Noted on CT  - PPI IV ordered      Thrombocytopenia  The likely etiology of thrombocytopenia is  unknown- potentially occult liver disease? . The patients 3 most recent labs are listed below.  Recent Labs     09/18/24  0441          Plan  - Will transfuse if platelet count is <50k (if undergoing surgical procedure or have active bleeding).  - monitor daily  - resumed, resume home Eliquis 9/13      Thrush  - noted 9/6/24  - started IV fluconazole as he does not have the mental awareness to swish and swallow nystatin at this point and is not reliably tolerating pills  -completed treatment with fluconazole     Typical atrial flutter  Noted on 9/6/24. Converted back to NSR, then back to aflutter. No change with diltiazem  - started amio gtt on 9/8/24  - continue eliquis  - Cardiology consulted  - he cannot consent for his ROYCE/DCCV and family not answering the phone  -continue p.o. amiodarone and metoprolol      Moderate protein  malnutrition  Nutrition consulted. Most recent weight and BMI monitored-     Measurements:  Wt Readings from Last 1 Encounters:   09/15/24 58.3 kg (128 lb 8.5 oz)   Body mass index is 20.13 kg/m².    Patient has been screened and assessed by RD.    Malnutrition Type:  Context:    Level:      Malnutrition Characteristic Summary:       Interventions/Recommendations (treatment strategy):  1. Continue diabetic/renal pureed diet as tolerated 2. Continue Novasource Renal TID 3. Monitor weight and labs labs 4. Consider  appetite stimulant if pt continues to loss weight 4. Encourage PO intake as tolerated        VTE Risk Mitigation (From admission, onward)           Ordered     apixaban tablet 2.5 mg  2 times daily         09/13/24 0610     IP VTE LOW RISK PATIENT  Once         09/04/24 0116     Place sequential compression device  Until discontinued         09/04/24 0116                    Discharge Planning   JAIME: 9/20/2024     Code Status: DNR   Is the patient medically ready for discharge?:     Reason for patient still in hospital (select all that apply): Patient trending condition, Treatment, Consult recommendations, PT / OT recommendations, and Pending disposition  Discharge Plan A: Home Health   Discharge Delays: (!) Patient and Family Barriers              Jonathan Martinez DO  Department of Hospital Medicine   [facility] [place] - Telemetry

## 2024-09-19 NOTE — ASSESSMENT & PLAN NOTE
Chronic, controlled. Latest blood pressure and vitals reviewed-     Temp:  [97.9 °F (36.6 °C)-98.2 °F (36.8 °C)]   Pulse:  [56-73]   Resp:  [18-20]   BP: (132-182)/(62-79)   SpO2:  [92 %-97 %] .   Home meds for hypertension were reviewed and noted below.   Hypertension Medications               metoprolol succinate (TOPROL-XL) 50 MG 24 hr tablet Take 1 tablet (50 mg total) by mouth once daily.            While in the hospital, will manage blood pressure as follows; Continue home antihypertensive regimen   - continue metoprolol      Will utilize p.r.n. blood pressure medication only if patient's blood pressure greater than  180/90  and he develops symptoms such as worsening chest pain or shortness of breath.

## 2024-09-19 NOTE — ASSESSMENT & PLAN NOTE
24  - Chart and interval history reviewed; pt continues to have significant agitation at times. During visit to bedside, pt turned around in his bed, repeatedly screaming out for his family.   - Along with Dr Martinez, two attempts to reach son Nacho and then two attempts to reach son Guevara; there was no answer, unfortunately.   - Later in the day, again attempted to reach Nacho; no answer. I was able to reach pt's son Guevara. Extensive update provided; discussed that pt is increasingly agitated, trying to climb out of bed at times, and screaming out for family members. Explained that pt seems very uncomfortable, and recommended that Dr Martinez and I attempt to give him some medications to calm him down; he was receptive to this.   - In discussing plan moving forward, he remains hopeful that pt is a candidate for further HD, though I said that ultimately it is up to nephrology team to decide if this is still a feasible option, given his hypotension yesterday during HD session and his worsening agitation today. We discussed code status; he is in agreement for DNR/DNI status, as he recognizes that CPR/intubation on this father would not provide any benefit at this point in patient's life, and would likely just cause unnecessary suffering.   - I recommended that he come in to visit with his father, and he said he was actually hoping to be in the hospital soon - encouraged this, and let him know that Dr Martinez or myself would try to speak to him in person.   - Will follow up with pt and family     24  - Chart and interval history reviewed; extensively discussed pt in person with Dr Martinez. Pt with increased agitation this morning, and was reportedly calling out for his  wife. During brief visit to pt in HD as this was getting started, he was calm, but not able to tell me where he was. I let him know I would call his family.   - Dr Martinez and I called and I had a long phone call with his son Nacho, and ultimately were  able to arrange a meeting in person with him and his sister Rima, though their brother Guevara had to go to work.   - At scheduled meeting time, Sudha Castillo (SW/CM), and myself met with Nacho and Rima. Thorough medical update provided, and discussed plan moving forward. As we expressed transparent concern for the condition that pt arrived in (disheveled, covered in waste, having missed HD for several weeks), family acknowledged that his care needs have been a lot to manage, despite their best efforts. They do not have a car at home, and an additional family member has been hospitalized at [facility]. Support provided, as we acknowledged this sounds like a lot to deal with. They let us know that culturally it is important for them to care for their father at home, rather than place him in a facility. In hearing this, we discussed several forms of additional support at home (home health, home sitters, or home hospice care).   - Based on our conversation, plan is to hopefully discharge patient home with home health, private sitters, and continued outpatient HD. Family is aware of the importance of attending every scheduled HD session. However, when we brought them up to say hi to patient from doorway of HD unit (pt was closest to door), dialysis RN let us know that pt was hypotensive for part of his treatment. Family is aware that if his hypotension during treatment persists, this means he would no longer be a candidate for HD, and hospice would be recommended path.   - We discussed code status during family meeting; strongly recommended DNR/DNI, as pt would likely have poor outcome with CPR and/or intubation. This was before we had new information about pt's intradialytic hypotension, so will address this again during follow-up conversation.   - Significant emotional support provided during above conversation   - Will follow up with pt and family likely tomorrow; updated nephrology PA in person after family visit  "    9/17/24  - See plan of care note    9/16/24  - See ACP note    9/12/24  - Have been chart reviewing pt daily; pt with improvement in renal labs and mental status following several sessions of HD.   - Visited with pt at bedside; he was alert and able to participate in discussion. Reminded him of role of palliative medicine in his care, and that I have met him during a previous hospital stay for near identical reasons.   - Discussed options in care moving forward (continued HD in hopes of having more life ahead of him, vs transition to comfort-focused/hospice care and foregoing further HD). Pt stated that he would prefer to stop HD, and start hospice care. I asked him if he is depressed about his worsening functional status and loss of his wife (both of which he mentioned previously as main causes of his depression) and he said yes. Emotional support provided.   - As he has not extensively discussed his preferences with his children, encouraged him to do so.   - Following visit with pt, Dr Nolberto Zhong (hospitalist) and I initially tried to call his daughter Rima (no answer x multiple phone numbers), though we were able to reach his son Guevara. Medical update provided, and shared with him that pt has told us he no longer wants to continue HD. Guevara said it is likely that pt is "not in his right mind" which is why he is verbalizing this. His preference is for his father to continue HD, rather than start hospice care.   - Given discrepancy between what patient is requesting (to stop HD and enroll in hospice care) and what his two children are requesting (ongoing HD/maximal medical therapy), have asked them to come in to the hospital to further discuss this with pt.   - Will follow up with pt and family     9/4/24  - Consult for support and continuity of care in pt with multiple recent hospital stays for prolonged periods of missing HD sessions despite involving family, providing transportation resources, and " offering SNF placement. EMS was called by family when pt was reportedly complaining of abdominal pain; he was found lying in his own excrement at home. Chart reviewed in depth; extensively discussed pt during MDT rounds.   - During visit to bedside, pt was sleeping deeply and did not wake to voice. Given that his BUN is over 240, I do not expect him to be able to make any medical decisions at this time.  - Multiple attempts to reach his daughter Rima at cell phone number, though this was not accepting calls. Following this, called home phone number and was able to speak to his son Jose Miguel. In discussing how things have been going at home, he stated that his father told him he doesn't want to HD, and this is why he hasn't gone for last 2 weeks.   - Medical update provided; shared transparent concern that pt's condition is life threatening due to skipping HD, which is a form of life support. Given that pt has now had two recent hospital stays for same issue, suggested that we consider hospice care at this time. I asked for him to come in to the hospital to further discuss, though he said he was at work and likely not able to come in until last this evening.   - He provided me with an alternate phone number for Rima, so I called her after conversation with Jose Miguel. Similar conversation as above; recommended she come in to the hospital to have in-person discussion given pt's critical illness and need for reasonable plan moving forward. She said she would do so, though it would take a few hours due to transportation issues.   - Later in the day, family still had not arrived in hospital. Along with Dr Mayen (nephrology staff), called and spoke with pt's daughter Rima ([phone] was number used to contact her). Discussed options in care moving forward, and recommended family make decisions aligned with what pt himself would verbalize. While comfort-focused/hospice care was one option presented, she made it clear  they would like to continue with maximal medical therapy including continued dialysis. We explained that due to his non-adherence, outpatient HD would likely only be an option if patient is moved into a nursing home, to which she agreed.   - Of note, we explained that pt is high risk for unexpected events during/after HD due to severity of his condition and abnormal labs; she verbalized understanding.   - Will follow up with pt and family throughout hospital stay for further conversations as pt's clinical course evolves

## 2024-09-19 NOTE — PT/OT/SLP PROGRESS
Occupational Therapy      Patient Name:  Mahesh Cespedes   MRN:  [id number]    Patient not seen today secondary to Other (Comment) (patient having too much agitation for treatment per RN). Will follow-up tomorrow if able.    9/19/2024

## 2024-09-19 NOTE — PT/OT/SLP PROGRESS
Physical Therapy      Patient Name:  Mahesh Cespedes   MRN:  [id number]    Patient not seen today secondary to Increased agitation. Will follow-up as able later hour/day .

## 2024-09-19 NOTE — ASSESSMENT & PLAN NOTE
Advance Care Planning     Date: 09/04/2024  Palliative consulted. Continue full aggressive care. Time spent 5 minutes        Advance Care Planning     Date: 09/19/2024    Code Status  In light of the patients advanced and life limiting illness, palliative care team and I engaged the the family in a voluntary conversation about the patient's preferences for care  at the very end of life. Famly understands The patient's son Guevara wishes patient to have a natural, peaceful death.  Along those lines, the patient's son Guevara does not wish to have CPR or other invasive treatments performed when his heart and/or breathing stops.  Palliative care team communicated to the family that a DNR order would be placed in his medical record to reflect this preference.    A total of 16 min was spent on advance care planning, goals of care discussion, emotional support, formulating and communicating prognosis and exploring burden/benefit of various approaches of treatment. This discussion occurred on a fully voluntary basis with the verbal consent of the patient and/or family.      Code status changed to DNR/DNI on 09/19/2024 by palliative care team after discussion with patient's son Guevara

## 2024-09-19 NOTE — NURSING
[facility] [place]  Nurses Note -- 4 Eyes      9/18/2024       Skin assessed on: Q Shift      [] No Pressure Injuries Present    []Prevention Measures Documented    [x] Yes LDA  for Pressure Injury Previously documented     [] Yes New Pressure Injury Discovered   [] LDA for New Pressure Injury Added      Attending RN:  Dahiana Bell RN     Second RN:  JAVIER Fraser

## 2024-09-19 NOTE — PLAN OF CARE
Problem: Hemodialysis  Goal: Safe, Effective Therapy Delivery  Outcome: Progressing     Problem: Adult Inpatient Plan of Care  Goal: Plan of Care Review  Outcome: Progressing     Problem: Wound  Goal: Optimal Coping  Outcome: Progressing     Problem: Skin Injury Risk Increased  Goal: Skin Health and Integrity  Outcome: Progressing     Problem: Fall Injury Risk  Goal: Absence of Fall and Fall-Related Injury  Outcome: Progressing

## 2024-09-19 NOTE — ASSESSMENT & PLAN NOTE
Anemia is likely due to chronic disease due to ESRD. Most recent hemoglobin and hematocrit are listed below.  Recent Labs     09/18/24  0441   HGB 9.4*   HCT 30.3*       Plan  - Monitor serial CBC: Daily  - Transfuse PRBC if patient becomes hemodynamically unstable, symptomatic or H/H drops below 7/21.  - EPO MWF

## 2024-09-19 NOTE — NURSING
Pt yelling out, per  is asking for his son. Call placed to Nacho. Son did not answer. Awaiting return call from son.

## 2024-09-19 NOTE — NURSING
Pt is too hostile for me to complete head to toe assessment.  was used ( Florecita [id number])     Will continue to monitor the pt.

## 2024-09-19 NOTE — PROGRESS NOTES
[facility]  Palliative Medicine  Progress Note    Patient Name: Mahesh Cespedes  MRN: [id number]  Admission Date: 9/3/2024  Hospital Length of Stay: 15 days  Code Status: Full Code   Attending Provider: Jonathan Martinez DO  Consulting Provider: Maida Zhong MD  Primary Care Physician: Cruz Hernandez MD  Principal Problem:Uremic encephalopathy    Assessment/Plan:     Advance Care Planning    9/19/24  - Chart and interval history reviewed; pt continues to have significant agitation at times. During visit to bedside, pt turned around in his bed, repeatedly screaming out for his family. Dr Martinez ordered PRN medications to address this.   - Along with Dr Martinez, two attempts to reach son Nacho and then two attempts to reach son Guevara; there was no answer, unfortunately. Dr Martinez separately tried to reach Rima, though her phone was still not in service (she told us yesterday she needs a new one).   - Later in the day, again attempted to reach Nacho; no answer. I was able to reach pt's son Guevara. Extensive update provided; discussed that pt is increasingly agitated, trying to climb out of bed at times, and screaming out for family members. Explained that pt seems very uncomfortable, and recommended that Dr Martinez and I attempt to give him some medications to calm him down; he was receptive to this.   - In discussing plan moving forward, he remains hopeful that pt is a candidate for further HD, though I said that ultimately it is up to nephrology team to decide if this is still a feasible option, given his hypotension yesterday during HD session and his worsening agitation today. We discussed code status; he is in agreement for DNR/DNI status, as he recognizes that CPR/intubation on this father would not provide any benefit at this point in patient's life (which we suspect is approaching the end), and would likely just cause unnecessary suffering.   - I recommended that he come in to visit with his father, and he  said he was actually hoping to be in the hospital soon - encouraged this, and let him know that Dr Martinez or myself would try to speak to him in person.   - Will follow up with pt and family; updated Dr Martinez and nephrology team     24  - Chart and interval history reviewed; extensively discussed pt in person with Dr Martinez. Pt with increased agitation this morning, and was reportedly calling out for his  wife. During brief visit to pt in HD as this was getting started, he was calm, but not able to tell me where he was. I let him know I would call his family.   - Dr Martinez and I called and I had a long phone call with his son Nacho, and ultimately were able to arrange a meeting in person with him and his sister Rima, though their brother Guevara had to go to work.   - At scheduled meeting time, Sudha Castillo (/), and myself met with Nacho and Rima. Thorough medical update provided, and discussed plan moving forward. As we expressed transparent concern for the condition that pt arrived in (disheveled, covered in waste, having missed HD for several weeks), family acknowledged that his care needs have been a lot to manage, despite their best efforts. They do not have a car at home, and an additional family member has been hospitalized at [facility]. Support provided, as we acknowledged this sounds like a lot to deal with. They let us know that culturally it is important for them to care for their father at home, rather than place him in a facility. In hearing this, we discussed several forms of additional support at home (home health, home sitters, or home hospice care).   - Based on our conversation, plan is to hopefully discharge patient home with home health, private sitters, and continued outpatient HD. Family is aware of the importance of attending every scheduled HD session. However, when we brought them up to say hi to patient from doorway of HD unit (pt was closest to door), dialysis RN let us know that  pt was hypotensive for part of his treatment. Family is aware that if his hypotension during treatment persists, this means he would no longer be a candidate for HD, and hospice would be recommended path.   - We discussed code status during family meeting; strongly recommended DNR/DNI, as pt would likely have poor outcome with CPR and/or intubation. This was before we had new information about pt's intradialytic hypotension, so will address this again during follow-up conversation.   - Significant emotional support provided during above conversation   - Will follow up with pt and family likely tomorrow; updated nephrology PA in person after family visit     9/17/24  - See plan of care note    9/16/24  - See ACP note    9/12/24  - See progress note    9/4/24  - See initial consult     Neuro  History of CVA (cerebrovascular accident)  - This has significantly affected pt's mobility and overall quality of life; suspect he has component of vascular dementia contributing to his overall decline   - illness trajectory education provided to pt's family     Renal/  ESRD needing dialysis  - Nephrology consulted; pt with long pattern of HD non-adherence.   - For now, family would like to continue with HD, though is aware of recent hypotension during HD session today. If this persists, he is unlikely to be a further candidate for HD, and would need hospice care.     Orthopedic  Pressure injury of sacral region, stage 4  - Noted; pt now bedbound. Contributes to hospice qualification if goals become comfort-focused.     I will follow-up with patient. Please contact us if you have any additional questions.    LAUREN Jaime  Palliative Medicine Staff   [phone]    Total visit time: 51 minutes    > 50% of 35 min visit spent in chart review, face to face discussion of symptom assessment, coordination of care with other specialists, and discharge planning.    16 min ACP time spent: goals of care, emotional support,  formulating and communicating prognosis, exploring burden/ benefit of various approaches of treatment.      Subjective:     Interval History: increasingly agitated; screaming out for his family.     Medications:  Continuous Infusions:  Scheduled Meds:   allopurinoL  100 mg Oral Daily    apixaban  2.5 mg Oral BID    epoetin luli-epbx  20,000 Units Intravenous Every Mon, Wed, Fri    metoprolol succinate  100 mg Oral Daily    pantoprazole  40 mg Intravenous Daily    polyethylene glycol  17 g Oral Daily    tamsulosin  0.4 mg Oral Nightly     PRN Meds:  Current Facility-Administered Medications:     0.9% NaCl, , Intravenous, PRN    acetaminophen, 650 mg, Oral, Q4H PRN    dextrose 10%, 12.5 g, Intravenous, PRN    dextrose 10%, 12.5 g, Intravenous, PRN    dextrose 10%, 25 g, Intravenous, PRN    dextrose 10%, 25 g, Intravenous, PRN    glucagon (human recombinant), 1 mg, Intramuscular, PRN    hydrALAZINE, 10 mg, Intravenous, Q6H PRN    labetalol, 10 mg, Intravenous, Q6H PRN    melatonin, 6 mg, Oral, Nightly PRN    ondansetron, 4 mg, Intravenous, Q6H PRN    sodium chloride 0.9%, 250 mL, Intravenous, PRN    sodium chloride 0.9%, 10 mL, Intravenous, Q12H PRN    sodium chloride 0.9%, 10 mL, Intravenous, PRN    Objective:     Vital Signs (Most Recent):  Temp: 98 °F (36.7 °C) (09/19/24 0724)  Pulse: (!) 59 (09/19/24 0828)  Resp: 18 (09/19/24 0828)  BP: (!) 182/79 (09/19/24 0724)  SpO2: (!) 94 % (09/19/24 0828) Vital Signs (24h Range):  Temp:  [97.5 °F (36.4 °C)-98.2 °F (36.8 °C)] 98 °F (36.7 °C)  Pulse:  [56-73] 59  Resp:  [18-20] 18  SpO2:  [92 %-97 %] 94 %  BP: (137-182)/(65-79) 182/79     Weight: 58.3 kg (128 lb 8.5 oz)  Body mass index is 20.13 kg/m².       Physical Exam  Constitutional:       Comments: Uncomfortable appearing; screaming out for family       Significant Labs: All pertinent labs within the past 24 hours have been reviewed.  CBC:   Recent Labs   Lab 09/18/24  0441   WBC 6.33   HGB 9.4*   HCT 30.3*   MCV 86   PLT  "281     BMP:  No results for input(s): "GLU", "NA", "K", "CL", "CO2", "BUN", "CREATININE", "CALCIUM", "MG" in the last 24 hours.  LFT:  Lab Results   Component Value Date    AST 33 09/04/2024    ALKPHOS 164 (H) 09/04/2024    BILITOT 0.9 09/04/2024     Albumin:   Albumin   Date Value Ref Range Status   09/04/2024 3.2 (L) 3.5 - 5.2 g/dL Final     Protein:   Total Protein   Date Value Ref Range Status   09/04/2024 8.1 6.0 - 8.4 g/dL Final     Lactic acid:   Lab Results   Component Value Date    LACTATE 1.0 09/03/2024    LACTATE 1.1 08/01/2024       Significant Imaging: I have reviewed all pertinent imaging results/findings within the past 24 hours.                "

## 2024-09-19 NOTE — ASSESSMENT & PLAN NOTE
Patient with Paroxysmal (<7 days) atrial fibrillation which is controlled currently with Beta Blocker and Amiodarone. [id number] Score: 1. anticoagulation indicated. Anticoagulation done with eliquis 2.5mg PO BID . CT head negative for mass or bleed. Fall risk in place.    -continue metoprolol, amiodarone, Eliquis

## 2024-09-19 NOTE — NURSING
Pt yelling out.  Josr # [id number] utilized. Pt yelling out oh my. Pt is also asking for his mother. Explained to pt family is not available right now. Call number listed for sandra Abarca able to speak to Guevara.

## 2024-09-19 NOTE — NURSING
[facility] [place]  Nurses Note -- 4 Eyes      9/19/2024       Skin assessed on: Q Shift      [] No Pressure Injuries Present    []Prevention Measures Documented    [x] Yes LDA  for Pressure Injury Previously documented     [] Yes New Pressure Injury Discovered   [] LDA for New Pressure Injury Added      Attending RN:  Cassie Jimenez LPN     Second RN:  BETO Otto

## 2024-09-19 NOTE — ASSESSMENT & PLAN NOTE
-  on admission, last HD session about a month ago  - Nephrology consulted for HD: he has poor prognosis w/ frequency of missed treatments evidently d/t patient refusal  - continue to monitor mental status - improving but still intermittently confuse. May be new baseline given freq of missed HD  - low dose dialysis to avoid dialysis disequilibrium syndrome   - Palliative team consulted  - Pt continues to refuse HD  - Psych consulted for formal  evaluation of capacity: patient does not have the capacity to refuse necessary medical care at this time.   - has been receiving dialysis while hospitalized

## 2024-09-19 NOTE — CARE UPDATE
Attempted to call patient's son Oswald multiple times at 9:01 a.m. given patient is agitated again and screaming now  wife's name and son's name.  There was no answer.  Palliative care team was present.

## 2024-09-19 NOTE — SUBJECTIVE & OBJECTIVE
Interval History:  Overnight, patient was agitated and confused.  Calling out his  wife's name.  This morning, he was screaming and yelling that someone in [place] is kidnapping his daughter.  Attempting to get out bed.  Require Haldol later in the day.  Attempted to reach patient's son this morning, unable to contact.    Palliative care team was able to reach patient's son Guevara later in the day.  After extensive discussion, patient's son was in agreement for DNR/DNI status. Per palliaitve, son Guevara remains hopeful that pt is a candidate for further HD      Review of Systems   Neurological:  Positive for weakness.   Psychiatric/Behavioral:  Positive for confusion, decreased concentration and hallucinations.      Objective:     Vital Signs (Most Recent):  Temp: 97.9 °F (36.6 °C) (24 1043)  Pulse: (!) 58 (24 1043)  Resp: 18 (24 1043)  BP: 132/62 (24 1043)  SpO2: (!) 94 % (24 1043) Vital Signs (24h Range):  Temp:  [97.9 °F (36.6 °C)-98.2 °F (36.8 °C)] 97.9 °F (36.6 °C)  Pulse:  [56-73] 58  Resp:  [18-20] 18  SpO2:  [92 %-97 %] 94 %  BP: (132-182)/(62-79) 132/62     Weight: 58.3 kg (128 lb 8.5 oz)  Body mass index is 20.13 kg/m².  No intake or output data in the 24 hours ending 24 1500        Physical Exam  Vitals and nursing note reviewed.   Constitutional:       Appearance: He is ill-appearing.   Neck:      Comments: Retained sutures R chest wall  Cardiovascular:      Rate and Rhythm: Normal rate and regular rhythm.   Pulmonary:      Effort: Pulmonary effort is normal.      Breath sounds: Normal breath sounds.      Comments: O2 by NC  Abdominal:      General: There is no distension.      Palpations: Abdomen is soft.      Tenderness: There is no abdominal tenderness.   Musculoskeletal:      Right lower leg: No edema.      Left lower leg: No edema.   Skin:     Comments: L sided THDC   Neurological:      Mental Status: He is alert. He is disoriented.      Motor: Weakness  present.      Comments: Patient is confused.  Calling out family members names, including his  wife   Psychiatric:      Comments: Appears depressed             Significant Labs: All pertinent labs within the past 24 hours have been reviewed.    Significant Imaging: I have reviewed all pertinent imaging results/findings within the past 24 hours.

## 2024-09-20 VITALS
DIASTOLIC BLOOD PRESSURE: 62 MMHG | HEIGHT: 67 IN | SYSTOLIC BLOOD PRESSURE: 143 MMHG | OXYGEN SATURATION: 98 % | TEMPERATURE: 98 F | BODY MASS INDEX: 20.17 KG/M2 | HEART RATE: 60 BPM | RESPIRATION RATE: 18 BRPM | WEIGHT: 128.5 LBS

## 2024-09-20 LAB
ANION GAP SERPL CALC-SCNC: 16 MMOL/L (ref 8–16)
BASOPHILS # BLD AUTO: 0.06 K/UL (ref 0–0.2)
BASOPHILS NFR BLD: 1.1 % (ref 0–1.9)
BUN SERPL-MCNC: 54 MG/DL (ref 8–23)
CALCIUM SERPL-MCNC: 8.2 MG/DL (ref 8.7–10.5)
CHLORIDE SERPL-SCNC: 97 MMOL/L (ref 95–110)
CO2 SERPL-SCNC: 22 MMOL/L (ref 23–29)
CREAT SERPL-MCNC: 7.9 MG/DL (ref 0.5–1.4)
DIFFERENTIAL METHOD BLD: ABNORMAL
EOSINOPHIL # BLD AUTO: 0.1 K/UL (ref 0–0.5)
EOSINOPHIL NFR BLD: 1.1 % (ref 0–8)
ERYTHROCYTE [DISTWIDTH] IN BLOOD BY AUTOMATED COUNT: 22.7 % (ref 11.5–14.5)
EST. GFR  (NO RACE VARIABLE): 7 ML/MIN/1.73 M^2
GLUCOSE SERPL-MCNC: 83 MG/DL (ref 70–110)
HCT VFR BLD AUTO: 33.9 % (ref 40–54)
HGB BLD-MCNC: 10.3 G/DL (ref 14–18)
IMM GRANULOCYTES # BLD AUTO: 0.03 K/UL (ref 0–0.04)
IMM GRANULOCYTES NFR BLD AUTO: 0.5 % (ref 0–0.5)
LYMPHOCYTES # BLD AUTO: 1.1 K/UL (ref 1–4.8)
LYMPHOCYTES NFR BLD: 19.5 % (ref 18–48)
MAGNESIUM SERPL-MCNC: 2.5 MG/DL (ref 1.6–2.6)
MCH RBC QN AUTO: 27.2 PG (ref 27–31)
MCHC RBC AUTO-ENTMCNC: 30.4 G/DL (ref 32–36)
MCV RBC AUTO: 89 FL (ref 82–98)
MONOCYTES # BLD AUTO: 1.1 K/UL (ref 0.3–1)
MONOCYTES NFR BLD: 20.4 % (ref 4–15)
NEUTROPHILS # BLD AUTO: 3.2 K/UL (ref 1.8–7.7)
NEUTROPHILS NFR BLD: 57.4 % (ref 38–73)
NRBC BLD-RTO: 1 /100 WBC
PHOSPHATE SERPL-MCNC: 4.8 MG/DL (ref 2.7–4.5)
PLATELET # BLD AUTO: 350 K/UL (ref 150–450)
PMV BLD AUTO: 10.3 FL (ref 9.2–12.9)
POCT GLUCOSE: 131 MG/DL (ref 70–110)
POTASSIUM SERPL-SCNC: 4.1 MMOL/L (ref 3.5–5.1)
RBC # BLD AUTO: 3.79 M/UL (ref 4.6–6.2)
SODIUM SERPL-SCNC: 135 MMOL/L (ref 136–145)
WBC # BLD AUTO: 5.49 K/UL (ref 3.9–12.7)

## 2024-09-20 PROCEDURE — 25000003 PHARM REV CODE 250: Performed by: STUDENT IN AN ORGANIZED HEALTH CARE EDUCATION/TRAINING PROGRAM

## 2024-09-20 PROCEDURE — 94761 N-INVAS EAR/PLS OXIMETRY MLT: CPT

## 2024-09-20 PROCEDURE — 99497 ADVNCD CARE PLAN 30 MIN: CPT | Mod: ,,, | Performed by: INTERNAL MEDICINE

## 2024-09-20 PROCEDURE — 85025 COMPLETE CBC W/AUTO DIFF WBC: CPT | Performed by: HOSPITALIST

## 2024-09-20 PROCEDURE — 97530 THERAPEUTIC ACTIVITIES: CPT | Mod: CQ

## 2024-09-20 PROCEDURE — 99900031 HC PATIENT EDUCATION (STAT)

## 2024-09-20 PROCEDURE — 84100 ASSAY OF PHOSPHORUS: CPT | Performed by: HOSPITALIST

## 2024-09-20 PROCEDURE — 83735 ASSAY OF MAGNESIUM: CPT

## 2024-09-20 PROCEDURE — 97530 THERAPEUTIC ACTIVITIES: CPT

## 2024-09-20 PROCEDURE — 99233 SBSQ HOSP IP/OBS HIGH 50: CPT | Mod: ,,, | Performed by: INTERNAL MEDICINE

## 2024-09-20 PROCEDURE — 25000003 PHARM REV CODE 250: Performed by: INTERNAL MEDICINE

## 2024-09-20 PROCEDURE — 25000003 PHARM REV CODE 250: Performed by: HOSPITALIST

## 2024-09-20 PROCEDURE — 36415 COLL VENOUS BLD VENIPUNCTURE: CPT | Performed by: HOSPITALIST

## 2024-09-20 PROCEDURE — 27000221 HC OXYGEN, UP TO 24 HOURS

## 2024-09-20 PROCEDURE — 63600175 PHARM REV CODE 636 W HCPCS: Mod: JZ,JG | Performed by: HOSPITALIST

## 2024-09-20 PROCEDURE — 90935 HEMODIALYSIS ONE EVALUATION: CPT

## 2024-09-20 PROCEDURE — 80048 BASIC METABOLIC PNL TOTAL CA: CPT | Performed by: HOSPITALIST

## 2024-09-20 PROCEDURE — 63600175 PHARM REV CODE 636 W HCPCS: Performed by: INTERNAL MEDICINE

## 2024-09-20 PROCEDURE — 97110 THERAPEUTIC EXERCISES: CPT | Mod: CQ

## 2024-09-20 RX ORDER — METOPROLOL SUCCINATE 100 MG/1
100 TABLET, EXTENDED RELEASE ORAL DAILY
Qty: 30 TABLET | Refills: 1 | Status: SHIPPED | OUTPATIENT
Start: 2024-09-20 | End: 2024-11-19

## 2024-09-20 RX ADMIN — POLYETHYLENE GLYCOL 3350 17 G: 17 POWDER, FOR SOLUTION ORAL at 09:09

## 2024-09-20 RX ADMIN — EPOETIN ALFA-EPBX 20000 UNITS: 10000 INJECTION, SOLUTION INTRAVENOUS; SUBCUTANEOUS at 09:09

## 2024-09-20 RX ADMIN — PANTOPRAZOLE SODIUM 40 MG: 40 INJECTION, POWDER, FOR SOLUTION INTRAVENOUS at 09:09

## 2024-09-20 NOTE — PLAN OF CARE
Case Management Final Discharge Note      Discharge Disposition: home with home health ([facility])    New DME ordered / company name: none    Relevant SDOH / Transition of Care Barriers:  none    Primary Caretaker and contact information: Nacho Cespedes (Son)  [phone] (Mobile)     Scheduled followup appointment: PCP    Referrals placed: [facility] at home     Transportation: family    Patient and family educated on discharge services and updated on DC plan. Bedside RN notified, patient clear to discharge from Case Management Perspective.      09/20/24 1317   Final Note   Assessment Type Final Discharge Note   Anticipated Discharge Disposition Home-Health  ([facility] )   What phone number can be called within the next 1-3 days to see how you are doing after discharge? [phone]   Hospital Resources/Appts/Education Provided Post-Acute resouces added to AVS;Appointments scheduled and added to AVS   Post-Acute Status   Post-Acute Authorization Home Health   Post-Acute Placement Status Set-up Complete/Auth obtained   Home Health Status Set-up Complete/Auth obtained   Coverage Medicare   Patient choice form signed by patient/caregiver List with quality metrics by geographic area provided   Discharge Delays None known at this time

## 2024-09-20 NOTE — PLAN OF CARE
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]  Yes     Priority: 1 - High    ESRD needing dialysis [N18.6, Z99.2]  Yes     Priority: 2     Pressure injury of sacral region, 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]  Yes     Priority: 4     Left loculated pleural effusion [J90]  Yes     Priority: 5     Anemia in ESRD (end-stage renal disease) [N18.6, D63.1]  Yes     Priority: 7     Essential hypertension [I10]  Yes     Priority: 8      Chronic    Paroxysmal atrial fibrillation [I48.0]  Yes     Priority: 9     Controlled type 2 diabetes mellitus with chronic kidney disease on chronic dialysis, without long-term current use of insulin [E11.22, N18.6, Z99.2]  Not Applicable     Priority: 10     History of CVA (cerebrovascular accident) [Z86.73]  Not Applicable     Priority: 11      Chronic    Depression [F32.A]  Yes     Priority: 13     ACP (advance care planning) [Z71.89]  Not Applicable     Priority: 14     Physical debility [R53.81]  Yes     Priority: 15     Gastric wall thickening [K31.89]  Yes     Priority: 16     Thrombocytopenia [D69.6]  Yes     Priority: 17     Thrush [B37.0]  Yes     Priority: 18     Typical atrial flutter [I48.3]  No     Priority: 19     Moderate protein malnutrition [E44.0]  Yes      Resolved Hospital Problems    Diagnosis Date Resolved POA    Hyperkalemia [E87.5] 09/04/2024 Yes     Priority: 1 - High       Follow Up Appointments:  No future appointments.    Allergies:Review of patient's allergies indicates:  No Known Allergies    Medications: Review discharge medications with patient and family and provide education.    Current Facility-Administered  Medications   Medication Dose Route Frequency Provider Last Rate Last Admin    0.9%  NaCl infusion   Intravenous PRN Sury Mondragon MD        acetaminophen tablet 650 mg  650 mg Oral Q4H PRN Sury Mondragon MD   650 mg at 09/08/24 2124    allopurinoL tablet 100 mg  100 mg Oral Daily Alicia Nava MD   100 mg at 09/20/24 0946    apixaban tablet 2.5 mg  2.5 mg Oral BID Michelle Mamie-My T., DO   2.5 mg at 09/20/24 0946    dextrose 10% bolus 125 mL 125 mL  12.5 g Intravenous PRN Sury Mondragon MD        dextrose 10% bolus 125 mL 125 mL  12.5 g Intravenous PRN Sury Mondragon MD        dextrose 10% bolus 250 mL 250 mL  25 g Intravenous PRN Sury Mondragon MD        dextrose 10% bolus 250 mL 250 mL  25 g Intravenous PRN Sury Mondragon MD        epoetin luli-epbx injection 20,000 Units  20,000 Units Intravenous Every Mon, Wed, Fri Sury Mondragon MD   20,000 Units at 09/20/24 0946    glucagon (human recombinant) injection 1 mg  1 mg Intramuscular PRN Sury Mondragon MD        haloperidol lactate injection 2 mg  2 mg Intravenous Q8H PRN Jonathan Martinez T., DO   2 mg at 09/19/24 1154    hydrALAZINE injection 10 mg  10 mg Intravenous Q6H PRN Sury Mondragon MD   10 mg at 09/04/24 1629    labetaloL injection 10 mg  10 mg Intravenous Q6H PRN Sury Mondragon MD        melatonin tablet 6 mg  6 mg Oral Nightly PRN Sury Mondragon MD   6 mg at 09/19/24 2148    metoprolol succinate (TOPROL-XL) 24 hr tablet 100 mg  100 mg Oral Daily Segun Dinero MD   100 mg at 09/19/24 0759    ondansetron injection 4 mg  4 mg Intravenous Q6H PRN Sury Mondragon MD   4 mg at 09/15/24 1309    pantoprazole injection 40 mg  40 mg Intravenous Daily Alicia Nava MD   40 mg at 09/20/24 0946    polyethylene glycol packet 17 g  17 g Oral Daily Sury Mondragon MD   17 g at 09/20/24 0945    sodium chloride 0.9% bolus 250 mL 250 mL  250 mL Intravenous PRN Sury Mondragon MD        sodium chloride 0.9% flush  10 mL  10 mL Intravenous Q12H PRN Sury Mondragon MD        sodium chloride 0.9% flush 10 mL  10 mL Intravenous PRN Sury Mondragon MD        tamsulosin 24 hr capsule 0.4 mg  0.4 mg Oral Nightly Alicia Nava MD   0.4 mg at 09/19/24 8304        Medication List        CHANGE how you take these medications      metoprolol succinate 100 MG 24 hr tablet  Commonly known as: TOPROL-XL  Take 1 tablet (100 mg total) by mouth once daily.  What changed:   medication strength  how much to take            CONTINUE taking these medications      allopurinoL 100 MG tablet  Commonly known as: ZYLOPRIM  Take 100 mg by mouth once daily.     apixaban 2.5 mg Tab  Commonly known as: ELIQUIS  Take 1 tablet by mouth.     atorvastatin 80 MG tablet  Commonly known as: LIPITOR  Take 80 mg by mouth once daily.     pantoprazole 40 MG tablet  Commonly known as: PROTONIX  Take 40 mg by mouth once daily.     RENAL CAPS 1 mg Cap  Generic drug: vitamin renal formula (B-complex-vitamin c-folic acid)  Take 1 capsule by mouth once daily at 6am.     sevelamer carbonate 800 mg Tab  Commonly known as: RENVELA  Take 2 tablets (1,600 mg total) by mouth 3 (three) times daily with meals.     tamsulosin 0.4 mg Cap  Commonly known as: FLOMAX  Take 0.4 mg by mouth once daily.            STOP taking these medications      amantadine  mg capsule  Commonly known as: SYMMETREL     amiodarone 200 MG Tab  Commonly known as: PACERONE     sertraline 50 MG tablet  Commonly known as: ZOLOFT                I have seen and examined this patient within the last 30 days. My clinical findings that support the need for the home health skilled services and home bound status are the following:no   Weakness/numbness causing balance and gait disturbance due to Weakness/Debility making it taxing to leave home.     Diet:   cardiac diet and renal diet        Referrals/ Consults  Physical Therapy to evaluate and treat. Evaluate for home safety and equipment needs;  Establish/upgrade home exercise program. Perform / instruct on therapeutic exercises, gait training, transfer training, and Range of Motion.  Occupational Therapy to evaluate and treat. Evaluate home environment for safety and equipment needs. Perform/Instruct on transfers, ADL training, ROM, and therapeutic exercises.   to evaluate for community resources/long-range planning.  Aide to provide assistance with personal care, ADLs, and vital signs.    Activities:   activity as tolerated    Nursing:   Agency to admit patient within 24 hours of hospital discharge unless specified on physician order or at patient request    SN to complete comprehensive assessment including routine vital signs. Instruct on disease process and s/s of complications to report to MD. Review/verify medication list sent home with the patient at time of discharge  and instruct patient/caregiver as needed. Frequency may be adjusted depending on start of care date.     Skilled nurse to perform up to 3 visits PRN for symptoms related to diagnosis    Notify MD if SBP > 160 or < 90; DBP > 90 or < 50; HR > 120 or < 50; Temp > 101; O2 < 88%;     Ok to schedule additional visits based on staff availability and patient request on consecutive days within the home health episode.    When multiple disciplines ordered:    Start of Care occurs on Sunday - Wednesday schedule remaining discipline evaluations as ordered on separate consecutive days following the start of care.    Thursday SOC -schedule subsequent evaluations Friday and Monday the following week.     Friday - Saturday SOC - schedule subsequent discipline evaluations on consecutive days starting Monday of the following week.    For all post-discharge communication and subsequent orders please contact patient's primary care physician.     Miscellaneous   Routine Skin for Bedridden Patients: Instruct patient/caregiver to apply moisture barrier cream to all skin folds and wet areas in  perineal area daily and after baths and all bowel movements.    Home Health Aide:  Physical Therapy Three times weekly, Occupational Therapy Three times weekly, and Home Health Aide Three times weekly    Wound Care Orders  yes:  Sacrum- Chronic Stage 4 pressure injury granulating and jeremy with opening 8 mm(L) 3 mm(W)   Right knee- 5 mm partial thickness wound with dry red base. No surrounding erythema.    Treatment/Plan:  Local wound care to sacral wound with hydrocolloid dressing to promote closure of chronic wound and prevent soiling from incontinence.     I certify that this patient is confined to his home and needs physical therapy and occupational therapy.

## 2024-09-20 NOTE — ASSESSMENT & PLAN NOTE
- Nephrology consulted; pt with long pattern of HD non-adherence.   - For now, family would like to continue with HD, though is aware of recent hypotension during HD session today. If this persists - or he is agitated during sessions - he is unlikely to be a further candidate for HD, and would need hospice care.  - Significant illness trajectory education provided to family

## 2024-09-20 NOTE — NURSING
Patient sent to dialysis but refused. Nurse called and stated patient refused. Will attempt again later.

## 2024-09-20 NOTE — PLAN OF CARE
Problem: Hemodialysis  Goal: Safe, Effective Therapy Delivery  Outcome: Met  Goal: Effective Tissue Perfusion  Outcome: Met  Goal: Absence of Infection Signs and Symptoms  Outcome: Met     Problem: Adult Inpatient Plan of Care  Goal: Plan of Care Review  Outcome: Met  Goal: Patient-Specific Goal (Individualized)  Outcome: Met  Goal: Absence of Hospital-Acquired Illness or Injury  Outcome: Met  Goal: Optimal Comfort and Wellbeing  Outcome: Met  Goal: Readiness for Transition of Care  Outcome: Met     Problem: Coping Ineffective  Goal: Effective Coping  Outcome: Met     Problem: Diabetes Comorbidity  Goal: Blood Glucose Level Within Targeted Range  Outcome: Met     Problem: Infection  Goal: Absence of Infection Signs and Symptoms  Outcome: Met     Problem: Wound  Goal: Optimal Coping  Outcome: Met  Goal: Optimal Functional Ability  Outcome: Met  Goal: Absence of Infection Signs and Symptoms  Outcome: Met  Goal: Improved Oral Intake  Outcome: Met  Goal: Optimal Pain Control and Function  Outcome: Met  Goal: Skin Health and Integrity  Outcome: Met  Goal: Optimal Wound Healing  Outcome: Met     Problem: Skin Injury Risk Increased  Goal: Skin Health and Integrity  Outcome: Met     Problem: Fall Injury Risk  Goal: Absence of Fall and Fall-Related Injury  Outcome: Met

## 2024-09-20 NOTE — NURSING
Report received from alea Terrazas RN. Patient resting quietly, no apparent distress noted at this time. Wheels locked in lowest position, call light within reach.    [facility]  Nurses Note -- 4 Eyes      9/20/2024       Skin assessed on: Q Shift      [] No Pressure Injuries Present    []Prevention Measures Documented    [x] Yes LDA  for Pressure Injury Previously documented     [] Yes New Pressure Injury Discovered   [] LDA for New Pressure Injury Added      Attending RN:  Aurelia Colbert LPN     Second RN:  BETO Terrazas

## 2024-09-20 NOTE — PROGRESS NOTES
"[facility]  Palliative Medicine  Progress Note    Patient Name: Mahesh Cespedes  MRN: [id number]  Admission Date: 9/3/2024  Hospital Length of Stay: 16 days  Code Status: DNR   Attending Provider: Mamie Martinez-Dariana GOTTI DO  Consulting Provider: Maida Zhong MD  Primary Care Physician: Cruz Hernandez MD  Principal Problem:Uremic encephalopathy    Assessment/Plan:     Advance Care Planning    9/20/24  - Chart and interval history reviewed; extensively discussed pt in person with primary team. As pt was being wheeled to HD this morning, he was calm and said "good morning" when I said the same. Shortly after this, he told the HD nurse "no" when he tried to start the dialysis (while remaining calm), but during later attempt was able to be run.   - Called and spoke with pt's son Guevara; he is receptive to pt going home with home health. I explained that if at any point moving forward, pt does not tolerate HD or is no longer candidate for outpatient HD (ie, if he is agitated or pulling at lines during sessions), hospice will be recommended path. Provided an extensive overview of this philosophy of care, and how it will differ from the home health he will be receiving.   - Based on our discussion, he is receptive to patient discharge home with home health (likely today), with continued outpatient HD as long as he is a candidate. He is aware that once pt is no longer HD candidate, this means he is at the end of his life and will need hospice care. He said his siblings were asking yesterday when patient was being discharged, so they seem ready to have patient back at home.   - Dr Martinez and Sudha (/) were nearby during this conversation and will arrange home health, though I also recommended providing Guevara with some hospice options for when they are ready  - Will follow up with pt during any future admissions     9/19/24  - Chart and interval history reviewed; pt continues to have significant agitation at times. " During visit to bedside, pt turned around in his bed, repeatedly screaming out for his family.   - Along with Dr Martinez, two attempts to reach son Nacho and then two attempts to reach son Guevara; there was no answer, unfortunately.   - Later in the day, again attempted to reach Nacho; no answer. I was able to reach pt's son Guevara. Extensive update provided; discussed that pt is increasingly agitated, trying to climb out of bed at times, and screaming out for family members. Explained that pt seems very uncomfortable, and recommended that Dr Martinez and I attempt to give him some medications to calm him down; he was receptive to this.   - In discussing plan moving forward, he remains hopeful that pt is a candidate for further HD, though I said that ultimately it is up to nephrology team to decide if this is still a feasible option, given his hypotension yesterday during HD session and his worsening agitation today. We discussed code status; he is in agreement for DNR/DNI status, as he recognizes that CPR/intubation on this father would not provide any benefit at this point in patient's life, and would likely just cause unnecessary suffering.   - I recommended that he come in to visit with his father, and he said he was actually hoping to be in the hospital soon - encouraged this, and let him know that Dr Martinez or myself would try to speak to him in person.   - Will follow up with pt and family     24  - Chart and interval history reviewed; extensively discussed pt in person with Dr Martinez. Pt with increased agitation this morning, and was reportedly calling out for his  wife. During brief visit to pt in HD as this was getting started, he was calm, but not able to tell me where he was. I let him know I would call his family.   - Dr Martinez and I called and I had a long phone call with his son Nacho, and ultimately were able to arrange a meeting in person with him and his sister Rima, though their brother Guevara had to  go to work.   - At scheduled meeting time, Sudha Castillo (SW/CM), and myself met with Nacho and Rima. Thorough medical update provided, and discussed plan moving forward. As we expressed transparent concern for the condition that pt arrived in (disheveled, covered in waste, having missed HD for several weeks), family acknowledged that his care needs have been a lot to manage, despite their best efforts. They do not have a car at home, and an additional family member has been hospitalized at [facility]. Support provided, as we acknowledged this sounds like a lot to deal with. They let us know that culturally it is important for them to care for their father at home, rather than place him in a facility. In hearing this, we discussed several forms of additional support at home (home health, home sitters, or home hospice care).   - Based on our conversation, plan is to hopefully discharge patient home with home health, private sitters, and continued outpatient HD. Family is aware of the importance of attending every scheduled HD session. However, when we brought them up to say hi to patient from doorway of HD unit (pt was closest to door), dialysis RN let us know that pt was hypotensive for part of his treatment. Family is aware that if his hypotension during treatment persists, this means he would no longer be a candidate for HD, and hospice would be recommended path.   - We discussed code status during family meeting; strongly recommended DNR/DNI, as pt would likely have poor outcome with CPR and/or intubation. This was before we had new information about pt's intradialytic hypotension, so will address this again during follow-up conversation.   - Significant emotional support provided during above conversation   - Will follow up with pt and family likely tomorrow; updated nephrology PA in person after family visit     See other ACP notes on file for details of previous discussions.     Renal/  ESRD needing  dialysis  - Nephrology consulted; pt with long pattern of HD non-adherence.   - For now, family would like to continue with HD, though is aware of recent hypotension during HD session today. If this persists - or he is agitated during sessions - he is unlikely to be a further candidate for HD, and would need hospice care.  - Significant illness trajectory education provided to family      I will follow-up with patient. Please contact us if you have any additional questions.    LAUREN Jaime  Palliative Medicine Staff   [phone]    Total visit time: 51 minutes    > 50% of 35 min visit spent in chart review, face to face discussion of symptom assessment, coordination of care with other specialists, and discharge planning.    16 min ACP time spent: goals of care, emotional support, formulating and communicating prognosis, exploring burden/ benefit of various approaches of treatment.      Subjective:     Interval History: was calm during my visit this morning     Medications:  Continuous Infusions:  Scheduled Meds:   allopurinoL  100 mg Oral Daily    apixaban  2.5 mg Oral BID    epoetin luli-epbx  20,000 Units Intravenous Every Mon, Wed, Fri    metoprolol succinate  100 mg Oral Daily    pantoprazole  40 mg Intravenous Daily    polyethylene glycol  17 g Oral Daily    tamsulosin  0.4 mg Oral Nightly     PRN Meds:  Current Facility-Administered Medications:     0.9% NaCl, , Intravenous, PRN    acetaminophen, 650 mg, Oral, Q4H PRN    dextrose 10%, 12.5 g, Intravenous, PRN    dextrose 10%, 12.5 g, Intravenous, PRN    dextrose 10%, 25 g, Intravenous, PRN    dextrose 10%, 25 g, Intravenous, PRN    glucagon (human recombinant), 1 mg, Intramuscular, PRN    haloperidol lactate, 2 mg, Intravenous, Q8H PRN    hydrALAZINE, 10 mg, Intravenous, Q6H PRN    labetalol, 10 mg, Intravenous, Q6H PRN    melatonin, 6 mg, Oral, Nightly PRN    ondansetron, 4 mg, Intravenous, Q6H PRN    sodium chloride 0.9%, 250 mL, Intravenous, PRN    " sodium chloride 0.9%, 10 mL, Intravenous, Q12H PRN    sodium chloride 0.9%, 10 mL, Intravenous, PRN    Objective:     Vital Signs (Most Recent):  Temp: 97.9 °F (36.6 °C) (09/20/24 1040)  Pulse: 65 (09/20/24 1040)  Resp: 18 (09/20/24 1040)  BP: 131/65 (09/20/24 1040)  SpO2: 100 % (09/20/24 1040) Vital Signs (24h Range):  Temp:  [94.2 °F (34.6 °C)-97.9 °F (36.6 °C)] 97.9 °F (36.6 °C)  Pulse:  [] 65  Resp:  [18] 18  SpO2:  [75 %-100 %] 100 %  BP: (131-150)/(65-67) 131/65     Weight: 58.3 kg (128 lb 8.5 oz)  Body mass index is 20.13 kg/m².       Physical Exam  Constitutional:       Comments: Lying in bed, said "good morning" when I stated this, frail appearing    HENT:      Head:      Comments: Temporal wasting       Significant Labs: All pertinent labs within the past 24 hours have been reviewed.  CBC:   Recent Labs   Lab 09/20/24  0540   WBC 5.49   HGB 10.3*   HCT 33.9*   MCV 89        BMP:  Recent Labs   Lab 09/20/24  0540   GLU 83   *   K 4.1   CL 97   CO2 22*   BUN 54*   CREATININE 7.9*   CALCIUM 8.2*   MG 2.5     LFT:  Lab Results   Component Value Date    AST 33 09/04/2024    ALKPHOS 164 (H) 09/04/2024    BILITOT 0.9 09/04/2024     Albumin:   Albumin   Date Value Ref Range Status   09/04/2024 3.2 (L) 3.5 - 5.2 g/dL Final     Protein:   Total Protein   Date Value Ref Range Status   09/04/2024 8.1 6.0 - 8.4 g/dL Final     Lactic acid:   Lab Results   Component Value Date    LACTATE 1.0 09/03/2024    LACTATE 1.1 08/01/2024       Significant Imaging: I have reviewed all pertinent imaging results/findings within the past 24 hours.                "

## 2024-09-20 NOTE — PT/OT/SLP PROGRESS
"Occupational Therapy   Treatment    Name: Mahesh Cespedes  MRN: [id number]  Admitting Diagnosis:  Uremic encephalopathy  11 Days Post-Op    Recommendations:     Discharge Recommendations: Moderate Intensity Therapy  Discharge Equipment Recommendations:  none  Barriers to discharge:  Other (Comment) (pt. previously had not attended HD)    Assessment:     Mahesh Cespedes is a 59 y.o. male with a medical diagnosis of Uremic encephalopathy.  He presents with HOB elevated at foot end of bed. Performance deficits affecting function are weakness, impaired endurance, impaired self care skills, impaired functional mobility, gait instability, impaired balance, impaired cognition, decreased coordination, decreased upper extremity function, decreased lower extremity function, decreased safety awareness, pain, decreased ROM, impaired cardiopulmonary response to activity, impaired joint extensibility, impaired muscle length. Patient spoke in Jordanian, English, and in Bahamian Creole throughout session. He repeated "Pas de moyen" which means "Do not touch me." In Bahamian-Creole language, but he agreed to treatment initially at beginning of session.     Rehab Prognosis:  Poor; patient would benefit from acute skilled OT services to address these deficits and reach maximum level of function.       Plan:     Patient to be seen 3 x/week to address the above listed problems via self-care/home management, therapeutic activities, therapeutic exercises  Plan of Care Expires: 09/20/24  Plan of Care Reviewed with: patient    Subjective     Chief Complaint: patient c/o left knee pain   Patient/Family Comments/goals: patient wants to return home with family   Pain/Comfort:  Pain Rating 1: other (see comments) (L knee pain not specified)  Pain Addressed 1: Reposition, Distraction    Objective:     Communicated with: RNAurelia, prior to session.  Patient found HOB elevated with telemetry, bed alarm, peripheral IV upon OT entry to room.    General " Precautions: Standard, fall    Orthopedic Precautions:N/A  Braces: N/A  Respiratory Status: Room air     Occupational Performance:     Bed Mobility:    Patient completed Rolling/Turning to Left with  maximal assistance  Patient completed Scooting/Bridging with maximal assistance  Patient completed Supine to Sit with maximal assistance  Patient completed Sit to Supine with total assistance and 2  persons     Functional Mobility/Transfers:  Patient did not want to stand or take steps     Activities of Daily Living:  Upper Body Dressing: total assistance to don outer gown       AMPA 6 Click ADL: 7    Treatment & Education:  Patient participated in UB stretches while supine due to increasing tightness of Both arms  He agreed to sit EOB, but not to standing.   He wanted to lie down at HOB after sitting approx. 15 min. EOB     Patient left HOB elevated with all lines intact, call button in reach, bed alarm on, and RN  notified    GOALS:   Multidisciplinary Problems       Occupational Therapy Goals          Problem: Occupational Therapy    Goal Priority Disciplines Outcome Interventions   Occupational Therapy Goal     OT, PT/OT Progressing    Description: Goals to be met by: 9/19/24     Patient will increase functional independence with ADLs by performing:    Feeding with Minimal Assistance.  UE Dressing with Minimal Assistance.  Grooming while EOB with Minimal Assistance.  Sitting at edge of bed x30 minutes with Supervision.  Rolling to Bilateral with Minimal Assistance.   Supine to sit with Minimal Assistance.    Functional transfers: To be assessed                          Time Tracking:     OT Date of Treatment: 09/20/24  OT Start Time: 1021  OT Stop Time: 1037  OT Total Time (min): 16 min    Billable Minutes:Therapeutic Activity 16 co-treat with PTA     OT/FERNANDO: OT          9/20/2024

## 2024-09-20 NOTE — CARE UPDATE
Patient a lot calmer this morning.  Although he says no to dialysis, he is not combative and not pulling out lines.  Was able to be hooked up to dialysis to be run.  Of note, psychiatry has evaluated the patient and deemed he does not have the capacity to refuse medical care.  However, this may be an issue outpatient.  Family would need to be present for the entirety of dialysis treatment outpatient in order for Nephrology to run him.  Otherwise, he would not be a candidate for dialysis.  Has called and spoke with the patient's son Guevara, who is very aware of this.  Patient and family is prepared for patient to discharge home with home health.  Plan to discharge home once dialysis is completed and patient remained stable.
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
## 2024-09-20 NOTE — ASSESSMENT & PLAN NOTE
The likely etiology of thrombocytopenia is  unknown- potentially occult liver disease? . The patients 3 most recent labs are listed below.  Recent Labs     09/18/24  0441 09/20/24  0540    350       Plan  - Will transfuse if platelet count is <50k (if undergoing surgical procedure or have active bleeding).  - monitor daily  - resumed, resume home Eliquis 9/13

## 2024-09-20 NOTE — ASSESSMENT & PLAN NOTE
- dialyzes via [facility]  - last outpatient HD session was 7/12/24 and last inpatient session was 8/7/24-- missed almost a month of dialysis because he refused to go  - he was uremic on presentation- - and hyperkalemic- K 7.2  - Nephrology [facility] consulted  - received emergent dialysis on 9/4/24- hyperK resolved, BUN improved.  - Nephrology continues to follow along. Receiving low dose dialysis to avoid dialysis disequilibrium syndrome   - Palliative consulted as he has ESRD and refuses outpatient dialysis. Despite this, continue full code full care.   - patient continues to refuse dialysis as of 09/16/2024.   - Psych evaluated, patient does not have the capacity to refuse necessary medical care at this time.   - continue HD per nephrology   - of note, patient continues to refuse and say no to HD as of 09/20/24. However, he is not combative or pulling at lines and was able to be run while hospitalized. This may be difficult outpatient. Family would need to be present for the entirety of dialysis treatment outpatient in order for Nephrology to run him. Otherwise, he would not be a candidate for dialysis. Has called and spoke with the patient's son Guevara, who is very aware of this.

## 2024-09-20 NOTE — PT/OT/SLP PROGRESS
Physical Therapy Treatment    Patient Name:  Mahesh Cespedes   MRN:  [id number]    Recommendations:     Discharge Recommendations: Moderate Intensity Therapy  Discharge Equipment Recommendations: to be determined by next level of care  Barriers to discharge: None    Assessment:     Mahesh Cespedes is a 59 y.o. male admitted with a medical diagnosis of Uremic encephalopathy.  He presents with the following impairments/functional limitations: weakness, impaired endurance, impaired self care skills, impaired functional mobility, decreased lower extremity function, gait instability, decreased upper extremity function, impaired cognition, impaired balance, decreased safety awareness, pain, impaired skin, decreased coordination, decreased ROM .    Rehab Prognosis: Fair and Poor; patient would benefit from acute skilled PT services to address these deficits and reach maximum level of function.    Recent Surgery: Procedure(s) (LRB):  Cardioversion or Defibrillation (N/A) 11 Days Post-Op    Plan:     During this hospitalization, patient to be seen 3 x/week to address the identified rehab impairments via gait training, therapeutic activities, therapeutic groups and progress toward the following goals:    Plan of Care Expires:  09/19/24    Subjective     Chief Complaint: tired   Patient/Family Comments/goals: pt is agreeable to therapy however became agitated with EOB therapy activities.   Pain/Comfort:  Pain Rating 1: 0/10  Pain Rating Post-Intervention 1: 0/10      Objective:     Communicated with nurse prior to session.  Patient found HOB elevated with telemetry, bed alarm, peripheral IV upon PT entry to room.     General Precautions: Standard, fall  Orthopedic Precautions: N/A  Braces: N/A  Respiratory Status: Room air     Functional Mobility:  Bed Mobility:     Rolling Left:  maximal assistance  Rolling Right: maximal assistance  Scooting: maximal assistance and of 2 persons  Supine to Sit: maximal assistance and of 2  persons  Sit to Supine: total assistance and of 2 persons  Transfers:     Sit to Stand:  pt with increased agitation , refused to perform .   Balance:  fair -  / poor in sitting       AM-PAC 6 CLICK MOBILITY  Turning over in bed (including adjusting bedclothes, sheets and blankets)?: 2  Sitting down on and standing up from a chair with arms (e.g., wheelchair, bedside commode, etc.): 2  Moving from lying on back to sitting on the side of the bed?: 2  Moving to and from a bed to a chair (including a wheelchair)?: 1  Need to walk in hospital room?: 1  Climbing 3-5 steps with a railing?: 1  Basic Mobility Total Score: 9       Treatment & Education:  Instructed pt to perform seated BLE x 10 reps ( AAROM BLE) x 10 reps : AP, LAQ, HSC , hip flexion while sitting EOB . Pt required from CGA to mod A from OT to maintain sitting balance    Patient left HOB elevated with heels offloading  all lines intact, call button in reach, bed alarm on, nurse notified, and PCT  present..    GOALS:   Multidisciplinary Problems       Physical Therapy Goals          Problem: Physical Therapy    Goal Priority Disciplines Outcome Goal Variances Interventions   Physical Therapy Goal     PT, PT/OT Progressing     Description: Goals to be met by: 24     Patient will increase functional independence with mobility by performin. Supine to sit with MInimal Assistance  2. Sit to stand transfer with Minimal Assistance  3. Bed to chair transfer with Minimal Assistance    4. Gait  x 10-15 feet with Minimal Assistance using Rolling Walker.   5. Lower extremity exercise program x10 reps per handout, with assistance as needed                         Time Tracking:     PT Received On: 24  PT Start Time: 1021     PT Stop Time: 1037  PT Total Time (min): 16 min     Billable Minutes: Therapeutic Activity 8, Therapeutic Exercise 8 with OT     Treatment Type: Treatment  PT/PTA: PTA     Number of PTA visits since last PT visit: 2     2024

## 2024-09-20 NOTE — PROGRESS NOTES
[facility] Medicine  Progress Note    Patient Name: Mahesh Cespedes  MRN: [id number]  Patient Class: IP- Inpatient   Admission Date: 9/3/2024  Length of Stay: 16 days  Attending Physician: Jonathan Martinez DO  Primary Care Provider: Cruz Hernandez MD        Subjective:     Principal Problem:Uremic encephalopathy        HPI:  59 y.o. AAM with h/o CVA, ESRD (MWF via left tunneled HD catheter), Afib (on amiodarone and eliquis), NIDDM type2, HLD, Essential HTN ,depression with h/o suicidal ideations presents to the ER via EMS with family concerns for constipation and abdominal pain. Pt. Unable to give me history due to AMS due to uremia (). Reports that he missed 3 weeks of HD. Presented to the ER altered covered in feces and urine.    Apparently he was admitted here from - for AMS and again was noted to have missed 2 weeks of HD. CT head was negative for mass/bleed.  CT chest at that time noted loculated moderate left pleural effusion/consolidation with a large pericardial effusion 3.6cm and pulmonary edema. Was tx with abx and echo showed only a small/mod pericardial effusion with no cardiac tamponade. Nephro/pulm/ID consulted. Recommended IR to sample fluid but family deferred due to risk/benefit. He was cont. On abx VANC with NGT repeat cultures. Plan was to transfer to [facility] but daughter (javier)  refused and wanted to take him home with her on  per the discharge summary.     Today no family with with him in the ER and pt. Noted to be malnourished, desheveled and confused.     Labs noted for potassium of 7.2 and /Creatinin 25.8.  Bicarb 12. VB.23/39.5/35/16.7.  WBC 8.7 and H/H 8.6/26.      CT abd/pelvis with IV contrast:   1. Image quality degraded by technical factors discussed above.   2. Moderate/large volume of loculated left pleural fluid noting probable pleural thickening and heterogeneity of pleural fluid.  Correlation for underlying infectious process advised.   Left basilar atelectatic/consolidative change.   3. Right lower lobe patchy ground-glass attenuation with interlobular septal thickening which could be seen with edema, infection or non infectious inflammatory process.   4. Solid and ground-glass right lower lobe pulmonary nodules measuring 6 mm and 8 mm respectively.  Repeat evaluation with chest CT in 3 months is advised to evaluate stability and/or resolution of these findings.   5. Cardiomegaly and large pericardial effusion.   6. Cholelithiasis.   7. Gastric wall thickening which could relate to nondistention although correlation for symptoms of gastritis advised.   8. Regions of large and small bowel wall thickening which could relate to nondistention and/or technical factors discussed above.  However, correlation for symptoms of enterocolitis advised.   9. Bladder wall thickening.  Correlation with urinalysis advised.   10. Multiple additional findings as above.     ED spoke with Nephrology and will plan for ICU admission and emergent HD.   We have been consulted for further management and admission to the ICU.     Because this patient's clinical condition is critically guarded and requires care in the ICU with emergent HD, care in an alternative location isn't clinically appropriate for the reasons stated above.         Overview/Hospital Course:  Mr Mahesh Cespedes is a 59 y.o. man with ESRD who was admitted with encephalopathy. He was noted to have severe uremia ( on admission), hyperkalemia. He was also caked with urine and stool. He was admitted to ICU, Nephrology Dr [facility]en group consulted, and received emergent dialysis on 9/4/24. Social work notes he has outpatient HD chair but has not been to outpatient HD since 7/12/24. His last inpatient HD treatment was at [facility] on 8/7/24. His mental status has slightly improved. Palliative consulted due to patient's ESRD and HD nonadherence. Family wants to continue full code and full care; not interested  in hospice care despite patient's unwillingness to adhere to dialysis. Family now not answering the phone. He is receiving low dose dialysis to avoid dialysis disequilibrium syndrome. He developed Aflutter RVR; Cardiology cardioverted to NSR on 9/9/24. PT, OT, ST following. Recommends SNF. Patient continues to refuse dialysis as of 09/16/2024.  Multiple attempts made to family, unable to contact.  Psych consulted for formed evaluation of capacity- patient does not have the capacity to refuse necessary medical care at this time.  Patient with agitation and delirium overnight on 09/18 and again on 09/19.  Family continues to be difficult to be reached.  Wellness check attempted by [place] on 09/18, but family did not allow deputy inside the house.  EPS has been contacted on 09/18 as well.  Palliative care team following- code status changed to DNR on 09/19/24.     Interval History:  No acute overnight events.  Patient is significantly calmer this morning.  No agitation on exam.  Able to tell me his name in his birthday, still unable to tell me that he is currently in the hospital.  Still intermittently confused.  No family at bedside.  Patient is not combative      Review of Systems   Neurological:  Positive for weakness.   Psychiatric/Behavioral:  Positive for confusion, decreased concentration and hallucinations.      Objective:     Vital Signs (Most Recent):  Temp: 97.9 °F (36.6 °C) (09/20/24 1040)  Pulse: 65 (09/20/24 1040)  Resp: 18 (09/20/24 1040)  BP: 131/65 (09/20/24 1040)  SpO2: 100 % (09/20/24 1040) Vital Signs (24h Range):  Temp:  [94.2 °F (34.6 °C)-97.9 °F (36.6 °C)] 97.9 °F (36.6 °C)  Pulse:  [] 65  Resp:  [18] 18  SpO2:  [75 %-100 %] 100 %  BP: (131-150)/(65-67) 131/65     Weight: 58.3 kg (128 lb 8.5 oz)  Body mass index is 20.13 kg/m².  No intake or output data in the 24 hours ending 09/20/24 1239        Physical Exam  Vitals and nursing note reviewed.   Constitutional:       Appearance: He is  ill-appearing (Chronically).   Neck:      Comments: Retained sutures R chest wall  Cardiovascular:      Rate and Rhythm: Normal rate and regular rhythm.   Pulmonary:      Effort: Pulmonary effort is normal. No respiratory distress.      Breath sounds: Normal breath sounds.      Comments: O2 by NC  Abdominal:      General: There is no distension.      Palpations: Abdomen is soft.      Tenderness: There is no abdominal tenderness.   Musculoskeletal:      Right lower leg: No edema.      Left lower leg: No edema.   Skin:     Comments: L [facility]   Neurological:      Mental Status: He is alert. He is disoriented.      Motor: Weakness present.      Comments: Patient is confused but appears to be improving   Psychiatric:      Comments: Appears depressed             Significant Labs: All pertinent labs within the past 24 hours have been reviewed.    Significant Imaging: I have reviewed all pertinent imaging results/findings within the past 24 hours.    Assessment/Plan:      * Uremic encephalopathy  -  on admission, last HD session about a month ago  - Nephrology consulted for HD: he has poor prognosis w/ frequency of missed treatments evidently d/t patient refusal  - continue to monitor mental status - improving but still intermittently confuse. May be new baseline given freq of missed HD  - low dose dialysis to avoid dialysis disequilibrium syndrome   - Palliative team consulted  - Pt continues to refuse HD  - Psych consulted for formal  evaluation of capacity: patient does not have the capacity to refuse necessary medical care at this time.   - has been receiving dialysis while hospitalized   - improving    ESRD needing dialysis  - dialyzes via [facility]  - last outpatient HD session was 7/12/24 and last inpatient session was 8/7/24-- missed almost a month of dialysis because he refused to go  - he was uremic on presentation- - and hyperkalemic- K 7.2  - Nephrology [facility] group consulted  - received emergent  dialysis on 9/4/24- hyperK resolved, BUN improved.  - Nephrology continues to follow along. Receiving low dose dialysis to avoid dialysis disequilibrium syndrome   - Palliative consulted as he has ESRD and refuses outpatient dialysis. Despite this, continue full code full care.   - patient continues to refuse dialysis as of 09/16/2024.   - Psych evaluated, patient does not have the capacity to refuse necessary medical care at this time.   - continue HD per nephrology   - of note, patient continues to refuse and say no to HD as of 09/20/24. However, he is not combative or pulling at lines and was able to be run while hospitalized. This may be difficult outpatient. Family would need to be present for the entirety of dialysis treatment outpatient in order for Nephrology to run him. Otherwise, he would not be a candidate for dialysis. Has called and spoke with the patient's son Guevara, who is very aware of this.     Pressure injury of sacral region, stage 4  - present on admission  - healing   - wound care consulted: Local wound care to sacral wound with hydrocolloid dressing to promote closure of chronic wound and prevent soiling from incontinence.       Left loculated pleural effusion  - this has been present on prior hospitalizations  - discussed on last stay and family declined thoracentesis at that time  - developed fever on 9/8/24. No further fevers.     Anemia in ESRD (end-stage renal disease)  Anemia is likely due to chronic disease due to ESRD. Most recent hemoglobin and hematocrit are listed below.  Recent Labs     09/18/24  0441 09/20/24  0540   HGB 9.4* 10.3*   HCT 30.3* 33.9*       Plan  - Monitor serial CBC: Daily  - Transfuse PRBC if patient becomes hemodynamically unstable, symptomatic or H/H drops below 7/21.  - EPO MWF      Essential hypertension  Chronic, controlled. Latest blood pressure and vitals reviewed-     Temp:  [97.9 °F (36.6 °C)-98.2 °F (36.8 °C)]   Pulse:  [56-73]   Resp:  [18-20]   BP:  (132-182)/(62-79)   SpO2:  [92 %-97 %] .   Home meds for hypertension were reviewed and noted below.   Hypertension Medications               metoprolol succinate (TOPROL-XL) 50 MG 24 hr tablet Take 1 tablet (50 mg total) by mouth once daily.            While in the hospital, will manage blood pressure as follows; Continue home antihypertensive regimen   - continue metoprolol      Will utilize p.r.n. blood pressure medication only if patient's blood pressure greater than  180/90  and he develops symptoms such as worsening chest pain or shortness of breath.    Paroxysmal atrial fibrillation  Patient with Paroxysmal (<7 days) atrial fibrillation which is controlled currently with Beta Blocker and Amiodarone. [id number] Score: 1. anticoagulation indicated. Anticoagulation done with eliquis 2.5mg PO BID . CT head negative for mass or bleed. Fall risk in place.    -continue metoprolol, amiodarone, Eliquis    Controlled type 2 diabetes mellitus with chronic kidney disease on chronic dialysis, without long-term current use of insulin  Lab Results   Component Value Date    HGBA1C 6.2 (H) 09/04/2024     - ACHS with hypoglycemic protocol   - diabetic puree diet per speech    History of CVA (cerebrovascular accident)  Noted.   - continue home eliquis, BP control    Depression  Psych consulted-rec dc sertraline 50 mg daily since its not effective       ACP (advance care planning)  Advance Care Planning    Date: 09/04/2024  Palliative consulted. Continue full aggressive care. Time spent 5 minutes        Advance Care Planning    Date: 09/19/2024    Code Status  In light of the patients advanced and life limiting illness, palliative care team and I engaged the the family in a voluntary conversation about the patient's preferences for care  at the very end of life. Famly understands The patient's son Guevara wishes patient to have a natural, peaceful death.  Along those lines, the patient's son Guevara does not wish to have CPR or  other invasive treatments performed when his heart and/or breathing stops.  Palliative care team communicated to the family that a DNR order would be placed in his medical record to reflect this preference.    A total of 16 min was spent on advance care planning, goals of care discussion, emotional support, formulating and communicating prognosis and exploring burden/benefit of various approaches of treatment. This discussion occurred on a fully voluntary basis with the verbal consent of the patient and/or family.      Code status changed to DNR/DNI on 09/19/2024 by palliative care team after discussion with patient's son Guevara    Physical debility  Was advised to got to [facility] with daily PT/OT but family declined. Social work for discharge planning and PT eval. Fall risk. Nutrition consult for his malnutrition.   - palliative consulted  - family wants to continue full aggressive treatment  - PT, OT, ST consulted- SNF  - Per , Son decline the only accepting facility for SNF.  States he wants his father home with home health  -I am concerned about discharging home given patient continues to refused dialysis.  -psych consulted for formal evaluation for capacity    Gastric wall thickening  Noted on CT  - PPI IV ordered      Thrombocytopenia  The likely etiology of thrombocytopenia is  unknown- potentially occult liver disease? . The patients 3 most recent labs are listed below.  Recent Labs     09/18/24  0441 09/20/24  0540    350       Plan  - Will transfuse if platelet count is <50k (if undergoing surgical procedure or have active bleeding).  - monitor daily  - resumed, resume home Eliquis 9/13      Thrush  - noted 9/6/24  - started IV fluconazole as he does not have the mental awareness to swish and swallow nystatin at this point and is not reliably tolerating pills  -completed treatment with fluconazole     Typical atrial flutter  Noted on 9/6/24. Converted back to NSR, then back to aflutter.  No change with diltiazem  - started amio gtt on 9/8/24  - continue eliquis  - Cardiology consulted  - he cannot consent for his ROYCE/DCCV and family not answering the phone  -continue p.o. amiodarone and metoprolol      Moderate protein malnutrition  Nutrition consulted. Most recent weight and BMI monitored-     Measurements:  Wt Readings from Last 1 Encounters:   09/15/24 58.3 kg (128 lb 8.5 oz)   Body mass index is 20.13 kg/m².    Patient has been screened and assessed by RD.    Malnutrition Type:  Context:    Level:      Malnutrition Characteristic Summary:       Interventions/Recommendations (treatment strategy):  1. Continue diabetic/renal pureed diet as tolerated 2. Continue Novasource Renal TID 3. Monitor weight and labs labs 4. Consider  appetite stimulant if pt continues to loss weight 4. Encourage PO intake as tolerated        VTE Risk Mitigation (From admission, onward)           Ordered     apixaban tablet 2.5 mg  2 times daily         09/13/24 0610     IP VTE LOW RISK PATIENT  Once         09/04/24 0116     Place sequential compression device  Until discontinued         09/04/24 0116                    Discharge Planning   JAIME: 9/19/2024     Code Status: DNR   Is the patient medically ready for discharge?:     Reason for patient still in hospital (select all that apply): Patient trending condition, Treatment, Consult recommendations, and Pending disposition  Discharge Plan A: Home Health   Discharge Delays: (!) Patient and Family Barriers              Jonathan Martinez DO  Department of Hospital Medicine   [facility] - Telemetry

## 2024-09-20 NOTE — NURSING
[facility] MEWS TRIGGER FOLLOW UP       MEWS Monitoring, Score is: 6  Indication for review:     Bedside Nurse, Nini contacted, no concerns verbalized at this time, instructed to call [phone] for further concerns or assistance.Re-assessed HR and updated in chart, HR 64.

## 2024-09-20 NOTE — NURSING
Patient escorted by transport to family vehicle for discharge home. Patient accompanied by kids. No apparent distress noted.

## 2024-09-20 NOTE — ASSESSMENT & PLAN NOTE
-  on admission, last HD session about a month ago  - Nephrology consulted for HD: he has poor prognosis w/ frequency of missed treatments evidently d/t patient refusal  - continue to monitor mental status - improving but still intermittently confuse. May be new baseline given freq of missed HD  - low dose dialysis to avoid dialysis disequilibrium syndrome   - Palliative team consulted  - Pt continues to refuse HD  - Psych consulted for formal  evaluation of capacity: patient does not have the capacity to refuse necessary medical care at this time.   - has been receiving dialysis while hospitalized   - improving

## 2024-09-20 NOTE — SUBJECTIVE & OBJECTIVE
"Interval History: was calm during my visit this morning     Medications:  Continuous Infusions:  Scheduled Meds:   allopurinoL  100 mg Oral Daily    apixaban  2.5 mg Oral BID    epoetin luli-epbx  20,000 Units Intravenous Every Mon, Wed, Fri    metoprolol succinate  100 mg Oral Daily    pantoprazole  40 mg Intravenous Daily    polyethylene glycol  17 g Oral Daily    tamsulosin  0.4 mg Oral Nightly     PRN Meds:  Current Facility-Administered Medications:     0.9% NaCl, , Intravenous, PRN    acetaminophen, 650 mg, Oral, Q4H PRN    dextrose 10%, 12.5 g, Intravenous, PRN    dextrose 10%, 12.5 g, Intravenous, PRN    dextrose 10%, 25 g, Intravenous, PRN    dextrose 10%, 25 g, Intravenous, PRN    glucagon (human recombinant), 1 mg, Intramuscular, PRN    haloperidol lactate, 2 mg, Intravenous, Q8H PRN    hydrALAZINE, 10 mg, Intravenous, Q6H PRN    labetalol, 10 mg, Intravenous, Q6H PRN    melatonin, 6 mg, Oral, Nightly PRN    ondansetron, 4 mg, Intravenous, Q6H PRN    sodium chloride 0.9%, 250 mL, Intravenous, PRN    sodium chloride 0.9%, 10 mL, Intravenous, Q12H PRN    sodium chloride 0.9%, 10 mL, Intravenous, PRN    Objective:     Vital Signs (Most Recent):  Temp: 97.9 °F (36.6 °C) (09/20/24 1040)  Pulse: 65 (09/20/24 1040)  Resp: 18 (09/20/24 1040)  BP: 131/65 (09/20/24 1040)  SpO2: 100 % (09/20/24 1040) Vital Signs (24h Range):  Temp:  [94.2 °F (34.6 °C)-97.9 °F (36.6 °C)] 97.9 °F (36.6 °C)  Pulse:  [] 65  Resp:  [18] 18  SpO2:  [75 %-100 %] 100 %  BP: (131-150)/(65-67) 131/65     Weight: 58.3 kg (128 lb 8.5 oz)  Body mass index is 20.13 kg/m².       Physical Exam  Constitutional:       Comments: Lying in bed, said "good morning" when I stated this, frail appearing    HENT:      Head:      Comments: Temporal wasting       Significant Labs: All pertinent labs within the past 24 hours have been reviewed.  CBC:   Recent Labs   Lab 09/20/24  0540   WBC 5.49   HGB 10.3*   HCT 33.9*   MCV 89        BMP:  Recent " Labs   Lab 09/20/24  0540   GLU 83   *   K 4.1   CL 97   CO2 22*   BUN 54*   CREATININE 7.9*   CALCIUM 8.2*   MG 2.5     LFT:  Lab Results   Component Value Date    AST 33 09/04/2024    ALKPHOS 164 (H) 09/04/2024    BILITOT 0.9 09/04/2024     Albumin:   Albumin   Date Value Ref Range Status   09/04/2024 3.2 (L) 3.5 - 5.2 g/dL Final     Protein:   Total Protein   Date Value Ref Range Status   09/04/2024 8.1 6.0 - 8.4 g/dL Final     Lactic acid:   Lab Results   Component Value Date    LACTATE 1.0 09/03/2024    LACTATE 1.1 08/01/2024       Significant Imaging: I have reviewed all pertinent imaging results/findings within the past 24 hours.

## 2024-09-20 NOTE — ASSESSMENT & PLAN NOTE
Anemia is likely due to chronic disease due to ESRD. Most recent hemoglobin and hematocrit are listed below.  Recent Labs     09/18/24  0441 09/20/24  0540   HGB 9.4* 10.3*   HCT 30.3* 33.9*       Plan  - Monitor serial CBC: Daily  - Transfuse PRBC if patient becomes hemodynamically unstable, symptomatic or H/H drops below 7/21.  - EPO MWF

## 2024-09-20 NOTE — ASSESSMENT & PLAN NOTE
"9/20/24  - Chart and interval history reviewed; extensively discussed pt in person with primary team. As pt was being wheeled to HD this morning, he was calm and said "good morning" when I said the same. Shortly after this, he told the HD nurse "no" when he tried to start the dialysis (while remaining calm), but during later attempt was able to be run.   - Called and spoke with pt's son Guevara; he is receptive to pt going home with home health. I explained that if at any point moving forward, pt does not tolerate HD or is no longer candidate for outpatient HD (ie, if he is agitated or pulling at lines during sessions), hospice will be recommended path. Provided an extensive overview of this philosophy of care, and how it will differ from the home health he will be receiving.   - Based on our discussion, he is receptive to patient discharge home with home health (likely today), with continued outpatient HD as long as he is a candidate. He is aware that once pt is no longer HD candidate, this means he is at the end of his life and will need hospice care. He said his siblings were asking yesterday when patient was being discharged, so they seem ready to have patient back at home.   - Dr Martinez and Sudha (/) were nearby during this conversation and will arrange home health, though I also recommended providing Guevara with some hospice options for when they are ready  - Will follow up with pt during any future admissions     9/19/24  - Chart and interval history reviewed; pt continues to have significant agitation at times. During visit to bedside, pt turned around in his bed, repeatedly screaming out for his family.   - Along with Dr Martinez, two attempts to reach son Nacho and then two attempts to reach son Guevara; there was no answer, unfortunately.   - Later in the day, again attempted to reach Nacho; no answer. I was able to reach pt's son Guevara. Extensive update provided; discussed that pt is increasingly agitated, trying " to climb out of bed at times, and screaming out for family members. Explained that pt seems very uncomfortable, and recommended that Dr Martinez and I attempt to give him some medications to calm him down; he was receptive to this.   - In discussing plan moving forward, he remains hopeful that pt is a candidate for further HD, though I said that ultimately it is up to nephrology team to decide if this is still a feasible option, given his hypotension yesterday during HD session and his worsening agitation today. We discussed code status; he is in agreement for DNR/DNI status, as he recognizes that CPR/intubation on this father would not provide any benefit at this point in patient's life, and would likely just cause unnecessary suffering.   - I recommended that he come in to visit with his father, and he said he was actually hoping to be in the hospital soon - encouraged this, and let him know that Dr Martinez or myself would try to speak to him in person.   - Will follow up with pt and family     24  - Chart and interval history reviewed; extensively discussed pt in person with Dr Martinez. Pt with increased agitation this morning, and was reportedly calling out for his  wife. During brief visit to pt in HD as this was getting started, he was calm, but not able to tell me where he was. I let him know I would call his family.   - Dr Martinez and I called and I had a long phone call with his son Nacho, and ultimately were able to arrange a meeting in person with him and his sister Rima, though their brother Guevara had to go to work.   - At scheduled meeting time, Sudha Castillo (SW/CM), and myself met with Nacho and Rima. Thorough medical update provided, and discussed plan moving forward. As we expressed transparent concern for the condition that pt arrived in (disheveled, covered in waste, having missed HD for several weeks), family acknowledged that his care needs have been a lot to manage, despite their best  efforts. They do not have a car at home, and an additional family member has been hospitalized [facility]. Support provided, as we acknowledged this sounds like a lot to deal with. They let us know that culturally it is important for them to care for their father at home, rather than place him in a facility. In hearing this, we discussed several forms of additional support at home (home health, home sitters, or home hospice care).   - Based on our conversation, plan is to hopefully discharge patient home with home health, private sitters, and continued outpatient HD. Family is aware of the importance of attending every scheduled HD session. However, when we brought them up to say hi to patient from doorway of HD unit (pt was closest to door), dialysis RN let us know that pt was hypotensive for part of his treatment. Family is aware that if his hypotension during treatment persists, this means he would no longer be a candidate for HD, and hospice would be recommended path.   - We discussed code status during family meeting; strongly recommended DNR/DNI, as pt would likely have poor outcome with CPR and/or intubation. This was before we had new information about pt's intradialytic hypotension, so will address this again during follow-up conversation.   - Significant emotional support provided during above conversation   - Will follow up with pt and family likely tomorrow; updated nephrology PA in person after family visit     See other ACP notes on file for details of previous discussions.

## 2024-09-20 NOTE — PLAN OF CARE
Problem: Hemodialysis  Goal: Safe, Effective Therapy Delivery  Outcome: Progressing  Goal: Effective Tissue Perfusion  Outcome: Progressing  Goal: Absence of Infection Signs and Symptoms  Outcome: Progressing     Problem: Adult Inpatient Plan of Care  Goal: Plan of Care Review  Outcome: Progressing  Goal: Patient-Specific Goal (Individualized)  Outcome: Progressing  Goal: Absence of Hospital-Acquired Illness or Injury  Outcome: Progressing  Goal: Optimal Comfort and Wellbeing  Outcome: Progressing  Goal: Readiness for Transition of Care  Outcome: Progressing     Problem: Diabetes Comorbidity  Goal: Blood Glucose Level Within Targeted Range  Outcome: Progressing     Problem: Infection  Goal: Absence of Infection Signs and Symptoms  Outcome: Progressing     Problem: Wound  Goal: Optimal Coping  Outcome: Progressing  Goal: Optimal Functional Ability  Outcome: Progressing  Goal: Absence of Infection Signs and Symptoms  Outcome: Progressing  Goal: Improved Oral Intake  Outcome: Progressing  Goal: Optimal Pain Control and Function  Outcome: Progressing  Goal: Skin Health and Integrity  Outcome: Progressing  Goal: Optimal Wound Healing  Outcome: Progressing

## 2024-09-20 NOTE — SUBJECTIVE & OBJECTIVE
Interval History:  No acute overnight events.  Patient is significantly calmer this morning.  No agitation on exam.  Able to tell me his name in his birthday, still unable to tell me that he is currently in the hospital.  Still intermittently confused.  No family at bedside.  Patient is not combative      Review of Systems   Neurological:  Positive for weakness.   Psychiatric/Behavioral:  Positive for confusion, decreased concentration and hallucinations.      Objective:     Vital Signs (Most Recent):  Temp: 97.9 °F (36.6 °C) (09/20/24 1040)  Pulse: 65 (09/20/24 1040)  Resp: 18 (09/20/24 1040)  BP: 131/65 (09/20/24 1040)  SpO2: 100 % (09/20/24 1040) Vital Signs (24h Range):  Temp:  [94.2 °F (34.6 °C)-97.9 °F (36.6 °C)] 97.9 °F (36.6 °C)  Pulse:  [] 65  Resp:  [18] 18  SpO2:  [75 %-100 %] 100 %  BP: (131-150)/(65-67) 131/65     Weight: 58.3 kg (128 lb 8.5 oz)  Body mass index is 20.13 kg/m².  No intake or output data in the 24 hours ending 09/20/24 1239        Physical Exam  Vitals and nursing note reviewed.   Constitutional:       Appearance: He is ill-appearing (Chronically).   Neck:      Comments: Retained sutures R chest wall  Cardiovascular:      Rate and Rhythm: Normal rate and regular rhythm.   Pulmonary:      Effort: Pulmonary effort is normal. No respiratory distress.      Breath sounds: Normal breath sounds.      Comments: O2 by NC  Abdominal:      General: There is no distension.      Palpations: Abdomen is soft.      Tenderness: There is no abdominal tenderness.   Musculoskeletal:      Right lower leg: No edema.      Left lower leg: No edema.   Skin:     Comments: L sided THDC   Neurological:      Mental Status: He is alert. He is disoriented.      Motor: Weakness present.      Comments: Patient is confused but appears to be improving   Psychiatric:      Comments: Appears depressed             Significant Labs: All pertinent labs within the past 24 hours have been reviewed.    Significant Imaging: I  have reviewed all pertinent imaging results/findings within the past 24 hours.

## 2024-09-20 NOTE — NURSING
Patient left unit again via bed, headed to dialysis. Patient accompanied by transportation. AAOx1,NAD.

## 2024-09-21 LAB
OHS QRS DURATION: 96 MS
OHS QTC CALCULATION: 452 MS

## 2024-09-21 NOTE — DISCHARGE SUMMARY
[facility]  Discharge Summary      Patient Name: Mahesh Cespedes  MRN: [id number]  KASSIE: [id number]  Patient Class: IP- Inpatient  Admission Date: 9/3/2024  Hospital Length of Stay: 16 days  Discharge Date and Time: 2024  6:17 PM  Attending Physician: No att. providers found   Discharging Provider: Jonathan Martinez DO  Primary Care Provider: Cruz Hernandez MD    Primary Care Team: Networked reference to record PCT     HPI:   59 y.o. AAM with h/o CVA, ESRD (MWF via left tunneled HD catheter), Afib (on amiodarone and eliquis), NIDDM type2, HLD, Essential HTN ,depression with h/o suicidal ideations presents to the ER via EMS with family concerns for constipation and abdominal pain. Pt. Unable to give me history due to AMS due to uremia (). Reports that he missed 3 weeks of HD. Presented to the ER altered covered in feces and urine.    Apparently he was admitted here from - for AMS and again was noted to have missed 2 weeks of HD. CT head was negative for mass/bleed.  CT chest at that time noted loculated moderate left pleural effusion/consolidation with a large pericardial effusion 3.6cm and pulmonary edema. Was tx with abx and echo showed only a small/mod pericardial effusion with no cardiac tamponade. Nephro/pulm/ID consulted. Recommended IR to sample fluid but family deferred due to risk/benefit. He was cont. On abx VANC with NGT repeat cultures. Plan was to transfer to [facility] but daughter (javier)  refused and wanted to take him home with her on  per the discharge summary.     Today no family with with him in the ER and pt. Noted to be malnourished, desheveled and confused.     Labs noted for potassium of 7.2 and /Creatinin 25.8.  Bicarb 12. VB.23/39.5/35/16.7.  WBC 8.7 and H/H 8.6/26.      CT abd/pelvis with IV contrast:   1. Image quality degraded by technical factors discussed above.   2. Moderate/large volume of loculated left pleural fluid noting  probable pleural thickening and heterogeneity of pleural fluid.  Correlation for underlying infectious process advised.  Left basilar atelectatic/consolidative change.   3. Right lower lobe patchy ground-glass attenuation with interlobular septal thickening which could be seen with edema, infection or non infectious inflammatory process.   4. Solid and ground-glass right lower lobe pulmonary nodules measuring 6 mm and 8 mm respectively.  Repeat evaluation with chest CT in 3 months is advised to evaluate stability and/or resolution of these findings.   5. Cardiomegaly and large pericardial effusion.   6. Cholelithiasis.   7. Gastric wall thickening which could relate to nondistention although correlation for symptoms of gastritis advised.   8. Regions of large and small bowel wall thickening which could relate to nondistention and/or technical factors discussed above.  However, correlation for symptoms of enterocolitis advised.   9. Bladder wall thickening.  Correlation with urinalysis advised.   10. Multiple additional findings as above.     ED spoke with Nephrology and will plan for ICU admission and emergent HD.   We have been consulted for further management and admission to the ICU.     Because this patient's clinical condition is critically guarded and requires care in the ICU with emergent HD, care in an alternative location isn't clinically appropriate for the reasons stated above.         Procedure(s) (LRB):  Cardioversion or Defibrillation (N/A)      Hospital Course:   Mr Mahesh Cespedes is a 59 y.o. man with ESRD who was admitted with encephalopathy. He was noted to have severe uremia ( on admission), hyperkalemia. He was also caked with urine and stool. He was admitted to ICU, Nephrology Dr Mayen group consulted, and received emergent dialysis on 9/4/24. Social work notes he has outpatient HD chair but has not been to outpatient HD since 7/12/24. His last inpatient HD treatment was at [facility] on  8/7/24. His mental status has slightly improved. Palliative consulted due to patient's ESRD and HD nonadherence. Family wants to continue full code and full care; not interested in hospice care despite patient's unwillingness to adhere to dialysis. Family now not answering the phone. He is receiving low dose dialysis to avoid dialysis disequilibrium syndrome. He developed Aflutter RVR; Cardiology cardioverted to NSR on 9/9/24. PT, OT, ST following. Recommends SNF. Patient continues to refuse dialysis as of 09/16/2024.  Multiple attempts made to family, unable to contact.  Psych consulted for formed evaluation of capacity- patient does not have the capacity to refuse necessary medical care at this time.  Patient with agitation and delirium overnight on 09/18 and again on 09/19.  Family continues to be difficult to be reached.  Wellness check attempted by [place] on 09/18, but family did not allow deputy inside the house.  EPS has been contacted on 09/18 as well.  Palliative care team following- code status changed to DNR on 09/19/24.      Mental status improving, likely at baseline.  Was able to continue dialysis while inpatient during hospitalization though he refuses and say no.  Again, he continues to not have the capacity to refuse medical care.   Although he says no to dialysis, he is not combative and not pulling out lines.  Was able to be hooked up to dialysis to be run on day of discharge.  This may be an issue outpatient.  Family would need to be present for the entirety of dialysis treatment outpatient in order for Nephrology to run him.  Otherwise, he would not be a candidate for dialysis.  Has called and spoke with the patient's son Guevara, who is very aware of this.  Patient and family is prepared for patient to discharge home with home health.  Plan to discharge home once dialysis is completed and patient remained stable.    Patient is alert and oriented to self, appears in no acute distress, heart with  regular rate and rhythm, lungs  with non-labored breathing on NC, abdomen soft, and nontender. Has generalized weakness. Has some confusion. Retained sutures R chest wall. L sided THDC. Bilateral lower extremities without any edema or calf tenderness.     Patient's caitlin Akers was counseled regarding any abnormal labs, differential diagnosis, treatment options, risk-benefit, lifestyle changes, prognosis, current condition, and medications. Patient's caitlin Akers was interactive and attentive.  Patient's caitlin Akers's questions were answered in a respectful and timely manner. Patient's caitlin Akers was instructed to have patient follow-up with PCP within 1 week and to continue taking medications as prescribed.  Instructed to also follow up with dialysis. Also, extensively discussed the risks, benefits, and side effects of patient's medications. Discussed with Patient's caitlin Akers about any medication changes. Patient's caitlin Akers verbalized understanding and agrees to treatment plan.  Patient is stable for discharge.  Patient'lane Akers has no other questions or concerns at this time.  ED precautions discussed with the patient and his sons    Vital signs are stable.  Tolerating p.o. intake without any nausea or vomiting. Afebrile for over 24 hours. Patient is in stable condition and has no questions or concerns. Patient will be discharge to home with home health once that is set up and once patient completes dialysis. CM/SW to assist with discharge planning.    Vitals:    09/20/24 1100 09/20/24 1500 09/20/24 1542 09/20/24 1637   BP: 135/77 (!) 104/58 (!) 104/58 (!) 143/62   BP Location: Right arm Right arm Right arm Right arm   Patient Position: Lying Lying Lying Lying   Pulse: 61 81 65 60   Resp: 16 18 18 18   Temp:   97.9 °F (36.6 °C) 97.5 °F (36.4 °C)   TempSrc:    Oral   SpO2:   99% 98%   Weight:       Height:                   Goals of Care Treatment  Preferences:  Code Status: DNR                SDOH Screening:  The patient was unable to be screened for utility difficulties, food insecurity, transport difficulties, housing insecurity, and interpersonal safety, so no concerns could be identified this admission.     Consults:   Consults (From admission, onward)          Status Ordering Provider     Inpatient consult to Cardiology  Once        Provider:  Lg Pascual MD    Completed YINA BEAN     Inpatient consult to Registered Dietitian/Nutritionist  Once        Provider:  (Not yet assigned)    Completed NILSA RANDHAWA     Inpatient consult to Nephrology  Once        Provider:  Ira Mayen MD    Completed YINA BEAN     Inpatient consult to Registered Dietitian/Nutritionist  Once        Provider:  (Not yet assigned)    Completed YINA BEAN     Inpatient consult to Palliative Care  Once        Provider:  (Not yet assigned)    Completed LISBETH HERNANDEZ            No new Assessment & Plan notes have been filed under this hospital service since the last note was generated.  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] 02/06/2024 Yes    Left loculated pleural effusion [J90] 08/06/2024 Yes    Anemia in ESRD (end-stage renal disease) [N18.6, D63.1] 05/20/2019 Yes    Essential hypertension [I10] 05/20/2019 Yes     Chronic    Paroxysmal atrial fibrillation [I48.0] 01/06/2024 Yes    Controlled type 2 diabetes mellitus with chronic kidney disease on chronic dialysis, without long-term current use of insulin [E11.22, N18.6, Z99.2] 02/09/2017 Not Applicable    History of CVA (cerebrovascular accident) [Z86.73] 05/20/2019 Not Applicable     Chronic    Depression [F32.A] 08/07/2024 Yes    ACP (advance care planning) [Z71.89]  08/02/2024 Not Applicable    Physical debility [R53.81] 02/12/2024 Yes    Gastric wall thickening [K31.89] 09/04/2024 Yes    Thrombocytopenia [D69.6] 09/06/2024 Yes    Thrush [B37.0] 09/06/2024 Yes    Typical atrial flutter [I48.3] 09/06/2024 No    Moderate protein malnutrition [E44.0] 09/18/2024 Yes      Problems Resolved During this Admission:    Diagnosis Date Noted Date Resolved POA    Hyperkalemia [E87.5] 01/06/2024 09/04/2024 Yes       Discharged Condition: stable    Disposition: Home or Self Care    Follow Up:   Follow-up Information       Cruz Hernandez MD. Go on 9/25/2024.    Specialty: Internal Medicine  Why: Appointment scheduled for 2pm  Contact information:  [address] [place] [address]  [place] [address]  [phone]               Cardiology Follow up.               [facility] Follow up.    Specialties: Home Health Services, Home Therapy Services, Home Living Aide Services  Why: Home health  Contact information:  [address]  [facility] [address]  [phone]                         Patient Instructions:      Ambulatory referral/consult to [facility]   Standing Status: Future   Referral Priority: Routine Referral Type: Consultation   Referral Reason: Specialty Services Required   Number of Visits Requested: 1     Diet renal     Notify your health care provider if you experience any of the following:  persistent nausea and vomiting or diarrhea     Notify your health care provider if you experience any of the following:  temperature >100.4     Notify your health care provider if you experience any of the following:  difficulty breathing or increased cough     Notify your health care provider if you experience any of the following:  persistent dizziness, light-headedness, or visual disturbances     Notify your health care provider if you experience any of the following:  increased confusion or weakness     Activity as tolerated       Significant  Diagnostic Studies: Labs: All labs within the past 24 hours have been reviewed      Recent Results (from the past 100 hour(s))   Basic Metabolic Panel    Collection Time: 09/18/24  4:41 AM   Result Value Ref Range    Sodium 134 (L) 136 - 145 mmol/L    Potassium 3.9 3.5 - 5.1 mmol/L    Chloride 97 95 - 110 mmol/L    CO2 24 23 - 29 mmol/L    Glucose 75 70 - 110 mg/dL    BUN 56 (H) 8 - 23 mg/dL    Creatinine 7.6 (H) 0.5 - 1.4 mg/dL    Calcium 7.7 (L) 8.7 - 10.5 mg/dL    Anion Gap 13 8 - 16 mmol/L    eGFR 8 (A) >60 mL/min/1.73 m^2   CBC Auto Differential    Collection Time: 09/18/24  4:41 AM   Result Value Ref Range    WBC 6.33 3.90 - 12.70 K/uL    RBC 3.54 (L) 4.60 - 6.20 M/uL    Hemoglobin 9.4 (L) 14.0 - 18.0 g/dL    Hematocrit 30.3 (L) 40.0 - 54.0 %    MCV 86 82 - 98 fL    MCH 26.6 (L) 27.0 - 31.0 pg    MCHC 31.0 (L) 32.0 - 36.0 g/dL    RDW 21.5 (H) 11.5 - 14.5 %    Platelets 281 150 - 450 K/uL    MPV 10.4 9.2 - 12.9 fL    Immature Granulocytes 0.6 (H) 0.0 - 0.5 %    Gran # (ANC) 4.4 1.8 - 7.7 K/uL    Immature Grans (Abs) 0.04 0.00 - 0.04 K/uL    Lymph # 0.8 (L) 1.0 - 4.8 K/uL    Mono # 1.0 0.3 - 1.0 K/uL    Eos # 0.0 0.0 - 0.5 K/uL    Baso # 0.04 0.00 - 0.20 K/uL    nRBC 0 0 /100 WBC    Gran % 68.9 38.0 - 73.0 %    Lymph % 13.0 (L) 18.0 - 48.0 %    Mono % 16.4 (H) 4.0 - 15.0 %    Eosinophil % 0.5 0.0 - 8.0 %    Basophil % 0.6 0.0 - 1.9 %    Differential Method Automated    Phosphorus    Collection Time: 09/18/24  4:41 AM   Result Value Ref Range    Phosphorus 4.1 2.7 - 4.5 mg/dL   Basic Metabolic Panel    Collection Time: 09/20/24  5:40 AM   Result Value Ref Range    Sodium 135 (L) 136 - 145 mmol/L    Potassium 4.1 3.5 - 5.1 mmol/L    Chloride 97 95 - 110 mmol/L    CO2 22 (L) 23 - 29 mmol/L    Glucose 83 70 - 110 mg/dL    BUN 54 (H) 8 - 23 mg/dL    Creatinine 7.9 (H) 0.5 - 1.4 mg/dL    Calcium 8.2 (L) 8.7 - 10.5 mg/dL    Anion Gap 16 8 - 16 mmol/L    eGFR 7 (A) >60 mL/min/1.73 m^2   CBC Auto Differential     Collection Time: 09/20/24  5:40 AM   Result Value Ref Range    WBC 5.49 3.90 - 12.70 K/uL    RBC 3.79 (L) 4.60 - 6.20 M/uL    Hemoglobin 10.3 (L) 14.0 - 18.0 g/dL    Hematocrit 33.9 (L) 40.0 - 54.0 %    MCV 89 82 - 98 fL    MCH 27.2 27.0 - 31.0 pg    MCHC 30.4 (L) 32.0 - 36.0 g/dL    RDW 22.7 (H) 11.5 - 14.5 %    Platelets 350 150 - 450 K/uL    MPV 10.3 9.2 - 12.9 fL    Immature Granulocytes 0.5 0.0 - 0.5 %    Gran # (ANC) 3.2 1.8 - 7.7 K/uL    Immature Grans (Abs) 0.03 0.00 - 0.04 K/uL    Lymph # 1.1 1.0 - 4.8 K/uL    Mono # 1.1 (H) 0.3 - 1.0 K/uL    Eos # 0.1 0.0 - 0.5 K/uL    Baso # 0.06 0.00 - 0.20 K/uL    nRBC 1 (A) 0 /100 WBC    Gran % 57.4 38.0 - 73.0 %    Lymph % 19.5 18.0 - 48.0 %    Mono % 20.4 (H) 4.0 - 15.0 %    Eosinophil % 1.1 0.0 - 8.0 %    Basophil % 1.1 0.0 - 1.9 %    Differential Method Automated    Phosphorus    Collection Time: 09/20/24  5:40 AM   Result Value Ref Range    Phosphorus 4.8 (H) 2.7 - 4.5 mg/dL   Magnesium    Collection Time: 09/20/24  5:40 AM   Result Value Ref Range    Magnesium 2.5 1.6 - 2.6 mg/dL   POCT glucose    Collection Time: 09/20/24 10:40 AM   Result Value Ref Range    POCT Glucose 131 (H) 70 - 110 mg/dL       Microbiology Results (last 7 days)       ** No results found for the last 168 hours. **            Imaging Results              CT Abdomen Pelvis With IV Contrast NO Oral Contrast (Final result)  Result time 09/04/24 02:20:32      Final result by Caron Fuchs MD (09/04/24 02:20:32)                   Impression:      1. Image quality degraded by technical factors discussed above.  2. Moderate/large volume of loculated left pleural fluid noting probable pleural thickening and heterogeneity of pleural fluid.  Correlation for underlying infectious process advised.  Left basilar atelectatic/consolidative change.  3. Right lower lobe patchy ground-glass attenuation with interlobular septal thickening which could be seen with edema, infection or non infectious  inflammatory process.  4. Solid and ground-glass right lower lobe pulmonary nodules measuring 6 mm and 8 mm respectively.  Repeat evaluation with chest CT in 3 months is advised to evaluate stability and/or resolution of these findings.  5. Cardiomegaly and large pericardial effusion.  6. Cholelithiasis.  7. Gastric wall thickening which could relate to nondistention although correlation for symptoms of gastritis advised.  8. Regions of large and small bowel wall thickening which could relate to nondistention and/or technical factors discussed above.  However, correlation for symptoms of enterocolitis advised.  9. Bladder wall thickening.  Correlation with urinalysis advised.  10. Multiple additional findings as above.      Electronically signed by: Caron Fuchs MD  Date:    09/04/2024  Time:    02:20               Narrative:    EXAMINATION:  CT ABDOMEN PELVIS WITH IV CONTRAST    CLINICAL HISTORY:  Sepsis;    TECHNIQUE:  Low dose axial images, sagittal and coronal reformations were obtained from the lung bases to the pubic symphysis following the IV administration of 50 mL of Omnipaque 350 .  No oral contrast.    COMPARISON:  CT chest, abdomen and pelvis 05/19/2024, chest CT 08/01/2024    FINDINGS:  Please note image quality is degraded by streak artifact from the patient's upper extremities, patient positioning and paucity of intra-abdominal fat.    There is redemonstration of a moderate/large volume of loculated left pleural fluid with apparent pleural thickening noting the fluid is heterogeneous.  Correlation for underlying infectious process advised.  There is adjacent left basilar atelectatic/consolidative change.  There is trace right pleural fluid.  There is right patchy ground-glass opacities/attenuation.  There are right lobe pulmonary nodules present including a 6 mm solid pulmonary nodule and a 8 mm ground-glass pulmonary nodule.  The heart is markedly enlarged.  There is a large pericardial effusion,  similar to prior study.  There is reflux of contrast into the IVC which can be seen with elevated right heart pressures.    Evaluation of solid organ parenchyma is limited due to technical factors discussed above.  The liver demonstrates no focal abnormality.  There are multiple calcified stones in the gallbladder lumen.  The spleen, pancreas and adrenal glands are within normal limits.    There is high-density material within the stomach.  There is gastric wall thickening which could relate to nondistention or gastritis.  Previously identified low attenuating collection along the lesser curvature of the stomach is not as well delineated on current study.  The visualized loops of large and small bowel demonstrate no definite evidence of obstruction.  Questionable regions of small and large bowel wall thickening which could relate to nondistention and aforementioned artifact/technical factors although correlation for symptoms of neuro colitis advised.  There is no free intraperitoneal air.  Nonspecific diffuse mesenteric haziness.    The kidneys are atrophic noting prominent renal vascular calcifications.  No definite evidence of hydronephrosis.  The urinary bladder demonstrates circumferential wall thickening.  Correlation with urinalysis advised.  The prostate is slightly prominent in size.    The abdominal aorta is nonaneurysmal.  There is significant calcific atherosclerosis of the abdominal aorta and visceral abdominal vasculature.  The visualized osseous structures appear unchanged from prior study.                                       CT Head Without Contrast (Final result)  Result time 09/04/24 01:54:27      Final result by Caron Fuchs MD (09/04/24 01:54:27)                   Impression:      Image quality is degraded by patient motion artifact/positioning.  Allowing for this, no CT evidence of acute intracranial abnormality. Clinical correlation and further evaluation as warranted.      Electronically  signed by: Caron Fuchs MD  Date:    09/04/2024  Time:    01:54               Narrative:    EXAMINATION:  CT HEAD WITHOUT CONTRAST    CLINICAL HISTORY:  AMS;    TECHNIQUE:  Low dose axial images were obtained through the head.  Coronal and sagittal reformations were also performed. Contrast was not administered.    COMPARISON:  Head CT 08/01/2024    FINDINGS:  Please note image quality is degraded by patient motion artifact.  Allowing for this, there is no definite evidence of acute intracranial hemorrhage.  There is mild generalized cerebral volume loss.  The ventricular system is unchanged in size and configuration without evidence of hydrocephalus or midline shift.  There is hypoattenuation throughout the supratentorial white matter, similar to prior study.  Findings are nonspecific but likely reflect sequela of chronic microvascular ischemic change.  There is atherosclerotic plaquing of intracranial vasculature.  The basal cisterns are patent.  The visualized mastoid air cells and paranasal sinuses are clear of acute process.  There is a partially visualized periapical lucency involving a right maxillary molar, unchanged.  Correlation with dental exam advised.  The visualized bones of the calvarium demonstrate no acute osseous abnormality.                                       X-Ray Chest AP Portable (Final result)  Result time 09/04/24 00:39:00      Final result by Marilyn Butterfield MD (09/04/24 00:39:00)                   Impression:      As above.      Electronically signed by: Marilyn Butterfield MD  Date:    09/04/2024  Time:    00:39               Narrative:    EXAMINATION:  XR CHEST AP PORTABLE    CLINICAL HISTORY:  End stage renal disease    TECHNIQUE:  Single frontal view of the chest was performed.    COMPARISON:  08/01/2024.    FINDINGS:  Cardiac silhouette is markedly enlarged but stable in size.  There is possible mild pulmonary edema.  No pneumothorax or large pleural effusion seen.  Left-sided central  venous catheter is in stable position.  No pneumothorax.                                         Pending Diagnostic Studies:       Procedure Component Value Units Date/Time    EKG 12-lead [[id number]]     Order Status: Sent Lab Status: No result            Medications:  Reconciled Home Medications:      Medication List        CHANGE how you take these medications      metoprolol succinate 100 MG 24 hr tablet  Commonly known as: TOPROL-XL  Take 1 tablet (100 mg total) by mouth once daily.  What changed:   medication strength  how much to take            CONTINUE taking these medications      allopurinoL 100 MG tablet  Commonly known as: ZYLOPRIM  Take 100 mg by mouth once daily.     apixaban 2.5 mg Tab  Commonly known as: ELIQUIS  Take 1 tablet by mouth.     atorvastatin 80 MG tablet  Commonly known as: LIPITOR  Take 80 mg by mouth once daily.     pantoprazole 40 MG tablet  Commonly known as: PROTONIX  Take 40 mg by mouth once daily.     RENAL CAPS 1 mg Cap  Generic drug: vitamin renal formula (B-complex-vitamin c-folic acid)  Take 1 capsule by mouth once daily at 6am.     sevelamer carbonate 800 mg Tab  Commonly known as: RENVELA  Take 2 tablets (1,600 mg total) by mouth 3 (three) times daily with meals.     tamsulosin 0.4 mg Cap  Commonly known as: FLOMAX  Take 0.4 mg by mouth once daily.            STOP taking these medications      amantadine  mg capsule  Commonly known as: SYMMETREL     amiodarone 200 MG Tab  Commonly known as: PACERONE     sertraline 50 MG tablet  Commonly known as: ZOLOFT              Indwelling Lines/Drains at time of discharge:   Lines/Drains/Airways       Central Venous Catheter Line  Duration                  Hemodialysis Catheter 02/20/24 1642 left internal jugular 213 days              Drain  Duration                  Hemodialysis AV Fistula Left upper arm -- days                    Time spent on the discharge of patient: Greater than 35 minutes         Jonathan Martinez,  DO  Department of Hospital Medicine  [facility] [place]
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
## 2024-09-24 ENCOUNTER — HOSPITAL ENCOUNTER (OUTPATIENT)
Facility: HOSPITAL | Age: 59
Discharge: HOME HEALTH | End: 2024-09-25
Attending: EMERGENCY MEDICINE | Admitting: STUDENT IN AN ORGANIZED HEALTH CARE EDUCATION/TRAINING PROGRAM
Payer: MEDICARE
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
## 2024-09-24 DIAGNOSIS — N19 UREMIA: ICD-10-CM

## 2024-09-24 DIAGNOSIS — R07.9 CHEST PAIN: ICD-10-CM

## 2024-09-24 DIAGNOSIS — Z99.2 ESRD (END STAGE RENAL DISEASE) ON DIALYSIS: ICD-10-CM

## 2024-09-24 DIAGNOSIS — I95.9 HYPOTENSION: ICD-10-CM

## 2024-09-24 DIAGNOSIS — N18.6 ESRD (END STAGE RENAL DISEASE) ON DIALYSIS: ICD-10-CM

## 2024-09-24 DIAGNOSIS — G93.40 ENCEPHALOPATHY: ICD-10-CM

## 2024-09-24 LAB
ALBUMIN SERPL BCP-MCNC: 2.8 G/DL (ref 3.5–5.2)
ALLENS TEST: ABNORMAL
ALP SERPL-CCNC: 261 U/L (ref 55–135)
ALT SERPL W/O P-5'-P-CCNC: 17 U/L (ref 10–44)
AMMONIA PLAS-SCNC: 34 UMOL/L (ref 10–50)
ANION GAP SERPL CALC-SCNC: 16 MMOL/L (ref 8–16)
AST SERPL-CCNC: 24 U/L (ref 10–40)
BASOPHILS # BLD AUTO: 0.07 K/UL (ref 0–0.2)
BASOPHILS NFR BLD: 1.3 % (ref 0–1.9)
BILIRUB SERPL-MCNC: 0.8 MG/DL (ref 0.1–1)
BNP SERPL-MCNC: 578 PG/ML (ref 0–99)
BUN SERPL-MCNC: 71 MG/DL (ref 8–23)
BUN SERPL-MCNC: 82 MG/DL (ref 6–30)
CALCIUM SERPL-MCNC: 9.1 MG/DL (ref 8.7–10.5)
CHLORIDE SERPL-SCNC: 102 MMOL/L (ref 95–110)
CHLORIDE SERPL-SCNC: 98 MMOL/L (ref 95–110)
CK SERPL-CCNC: 55 U/L (ref 20–200)
CO2 SERPL-SCNC: 23 MMOL/L (ref 23–29)
CREAT SERPL-MCNC: 10.8 MG/DL (ref 0.5–1.4)
CREAT SERPL-MCNC: 11.6 MG/DL (ref 0.5–1.4)
DIFFERENTIAL METHOD BLD: ABNORMAL
EOSINOPHIL # BLD AUTO: 0 K/UL (ref 0–0.5)
EOSINOPHIL NFR BLD: 0.2 % (ref 0–8)
ERYTHROCYTE [DISTWIDTH] IN BLOOD BY AUTOMATED COUNT: 24.1 % (ref 11.5–14.5)
EST. GFR  (NO RACE VARIABLE): 4.9 ML/MIN/1.73 M^2
GLUCOSE SERPL-MCNC: 102 MG/DL (ref 70–110)
GLUCOSE SERPL-MCNC: 104 MG/DL (ref 70–110)
HCO3 UR-SCNC: 30.1 MMOL/L (ref 24–28)
HCT VFR BLD AUTO: 44.8 % (ref 40–54)
HCT VFR BLD CALC: 47 %PCV (ref 36–54)
HGB BLD-MCNC: 13.4 G/DL (ref 14–18)
IMM GRANULOCYTES # BLD AUTO: 0.03 K/UL (ref 0–0.04)
IMM GRANULOCYTES NFR BLD AUTO: 0.5 % (ref 0–0.5)
LACTATE SERPL-SCNC: 1.6 MMOL/L (ref 0.5–2.2)
LYMPHOCYTES # BLD AUTO: 0.8 K/UL (ref 1–4.8)
LYMPHOCYTES NFR BLD: 15 % (ref 18–48)
MCH RBC QN AUTO: 26.9 PG (ref 27–31)
MCHC RBC AUTO-ENTMCNC: 29.9 G/DL (ref 32–36)
MCV RBC AUTO: 90 FL (ref 82–98)
MONOCYTES # BLD AUTO: 0.6 K/UL (ref 0.3–1)
MONOCYTES NFR BLD: 10.5 % (ref 4–15)
NEUTROPHILS # BLD AUTO: 4 K/UL (ref 1.8–7.7)
NEUTROPHILS NFR BLD: 72.5 % (ref 38–73)
NRBC BLD-RTO: 0 /100 WBC
OHS QRS DURATION: 90 MS
OHS QTC CALCULATION: 483 MS
PCO2 BLDA: 55.8 MMHG (ref 35–45)
PH SMN: 7.34 [PH] (ref 7.35–7.45)
PLATELET # BLD AUTO: 402 K/UL (ref 150–450)
PMV BLD AUTO: 9.6 FL (ref 9.2–12.9)
PO2 BLDA: 16 MMHG (ref 40–60)
POC BE: 4 MMOL/L
POC IONIZED CALCIUM: 0.95 MMOL/L (ref 1.06–1.42)
POC SATURATED O2: 18 % (ref 95–100)
POC TCO2 (MEASURED): 26 MMOL/L (ref 23–29)
POC TCO2: 32 MMOL/L (ref 24–29)
POTASSIUM BLD-SCNC: 5.4 MMOL/L (ref 3.5–5.1)
POTASSIUM SERPL-SCNC: 4.9 MMOL/L (ref 3.5–5.1)
PROT SERPL-MCNC: 8.8 G/DL (ref 6–8.4)
RBC # BLD AUTO: 4.99 M/UL (ref 4.6–6.2)
SAMPLE: ABNORMAL
SAMPLE: ABNORMAL
SITE: ABNORMAL
SODIUM BLD-SCNC: 136 MMOL/L (ref 136–145)
SODIUM SERPL-SCNC: 137 MMOL/L (ref 136–145)
TROPONIN I SERPL DL<=0.01 NG/ML-MCNC: 0.08 NG/ML (ref 0–0.03)
TSH SERPL DL<=0.005 MIU/L-ACNC: 1.41 UIU/ML (ref 0.4–4)
WBC # BLD AUTO: 5.54 K/UL (ref 3.9–12.7)

## 2024-09-24 PROCEDURE — 84443 ASSAY THYROID STIM HORMONE: CPT

## 2024-09-24 PROCEDURE — 84484 ASSAY OF TROPONIN QUANT: CPT

## 2024-09-24 PROCEDURE — 80053 COMPREHEN METABOLIC PANEL: CPT

## 2024-09-24 PROCEDURE — 99214 OFFICE O/P EST MOD 30 MIN: CPT | Mod: ,,, | Performed by: NURSE PRACTITIONER

## 2024-09-24 PROCEDURE — 83880 ASSAY OF NATRIURETIC PEPTIDE: CPT

## 2024-09-24 PROCEDURE — 25000003 PHARM REV CODE 250: Performed by: STUDENT IN AN ORGANIZED HEALTH CARE EDUCATION/TRAINING PROGRAM

## 2024-09-24 PROCEDURE — 82550 ASSAY OF CK (CPK): CPT

## 2024-09-24 PROCEDURE — 80047 BASIC METABLC PNL IONIZED CA: CPT

## 2024-09-24 PROCEDURE — 82140 ASSAY OF AMMONIA: CPT

## 2024-09-24 PROCEDURE — 99900035 HC TECH TIME PER 15 MIN (STAT)

## 2024-09-24 PROCEDURE — G0378 HOSPITAL OBSERVATION PER HR: HCPCS

## 2024-09-24 PROCEDURE — 93005 ELECTROCARDIOGRAM TRACING: CPT

## 2024-09-24 PROCEDURE — 85025 COMPLETE CBC W/AUTO DIFF WBC: CPT

## 2024-09-24 PROCEDURE — 63600175 PHARM REV CODE 636 W HCPCS

## 2024-09-24 PROCEDURE — 83605 ASSAY OF LACTIC ACID: CPT

## 2024-09-24 PROCEDURE — 27201247 HC HEMODIALYSIS, SET-UP & CANCEL

## 2024-09-24 PROCEDURE — 99285 EMERGENCY DEPT VISIT HI MDM: CPT | Mod: 25

## 2024-09-24 PROCEDURE — 82308 ASSAY OF CALCITONIN: CPT

## 2024-09-24 PROCEDURE — 93010 ELECTROCARDIOGRAM REPORT: CPT | Mod: ,,, | Performed by: STUDENT IN AN ORGANIZED HEALTH CARE EDUCATION/TRAINING PROGRAM

## 2024-09-24 PROCEDURE — 82803 BLOOD GASES ANY COMBINATION: CPT

## 2024-09-24 PROCEDURE — 96374 THER/PROPH/DIAG INJ IV PUSH: CPT

## 2024-09-24 RX ORDER — TAMSULOSIN HYDROCHLORIDE 0.4 MG/1
0.4 CAPSULE ORAL DAILY
Status: DISCONTINUED | OUTPATIENT
Start: 2024-09-25 | End: 2024-09-25 | Stop reason: HOSPADM

## 2024-09-24 RX ORDER — HYDRALAZINE HYDROCHLORIDE 20 MG/ML
10 INJECTION INTRAMUSCULAR; INTRAVENOUS EVERY 6 HOURS PRN
Status: DISCONTINUED | OUTPATIENT
Start: 2024-09-24 | End: 2024-09-25 | Stop reason: HOSPADM

## 2024-09-24 RX ORDER — ACETAMINOPHEN 325 MG/1
650 TABLET ORAL EVERY 6 HOURS PRN
Status: DISCONTINUED | OUTPATIENT
Start: 2024-09-24 | End: 2024-09-25 | Stop reason: HOSPADM

## 2024-09-24 RX ORDER — LORAZEPAM 2 MG/ML
1 INJECTION INTRAMUSCULAR EVERY 4 HOURS PRN
Status: DISCONTINUED | OUTPATIENT
Start: 2024-09-24 | End: 2024-09-25 | Stop reason: HOSPADM

## 2024-09-24 RX ORDER — PANTOPRAZOLE SODIUM 40 MG/1
40 TABLET, DELAYED RELEASE ORAL DAILY
Status: DISCONTINUED | OUTPATIENT
Start: 2024-09-25 | End: 2024-09-25 | Stop reason: HOSPADM

## 2024-09-24 RX ORDER — SODIUM CHLORIDE 9 MG/ML
INJECTION, SOLUTION INTRAVENOUS ONCE
Status: CANCELLED | OUTPATIENT
Start: 2024-09-24 | End: 2024-09-24

## 2024-09-24 RX ORDER — NALOXONE HCL 0.4 MG/ML
0.02 VIAL (ML) INJECTION
Status: DISCONTINUED | OUTPATIENT
Start: 2024-09-24 | End: 2024-09-25 | Stop reason: HOSPADM

## 2024-09-24 RX ORDER — METOPROLOL SUCCINATE 50 MG/1
50 TABLET, EXTENDED RELEASE ORAL DAILY
Status: DISCONTINUED | OUTPATIENT
Start: 2024-09-25 | End: 2024-09-25 | Stop reason: HOSPADM

## 2024-09-24 RX ORDER — LORAZEPAM 2 MG/ML
1 INJECTION INTRAMUSCULAR
Status: COMPLETED | OUTPATIENT
Start: 2024-09-24 | End: 2024-09-24

## 2024-09-24 RX ORDER — SEVELAMER CARBONATE 800 MG/1
1600 TABLET, FILM COATED ORAL
Status: DISCONTINUED | OUTPATIENT
Start: 2024-09-25 | End: 2024-09-25

## 2024-09-24 RX ORDER — ATORVASTATIN CALCIUM 40 MG/1
80 TABLET, FILM COATED ORAL DAILY
Status: DISCONTINUED | OUTPATIENT
Start: 2024-09-25 | End: 2024-09-25 | Stop reason: HOSPADM

## 2024-09-24 RX ORDER — IBUPROFEN 200 MG
24 TABLET ORAL
Status: DISCONTINUED | OUTPATIENT
Start: 2024-09-24 | End: 2024-09-25 | Stop reason: HOSPADM

## 2024-09-24 RX ORDER — GLUCAGON 1 MG
1 KIT INJECTION
Status: DISCONTINUED | OUTPATIENT
Start: 2024-09-24 | End: 2024-09-25 | Stop reason: HOSPADM

## 2024-09-24 RX ORDER — ALLOPURINOL 100 MG/1
100 TABLET ORAL DAILY
Status: DISCONTINUED | OUTPATIENT
Start: 2024-09-25 | End: 2024-09-25 | Stop reason: HOSPADM

## 2024-09-24 RX ORDER — INSULIN ASPART 100 [IU]/ML
0-5 INJECTION, SOLUTION INTRAVENOUS; SUBCUTANEOUS
Status: DISCONTINUED | OUTPATIENT
Start: 2024-09-24 | End: 2024-09-25 | Stop reason: HOSPADM

## 2024-09-24 RX ORDER — ONDANSETRON HYDROCHLORIDE 2 MG/ML
4 INJECTION, SOLUTION INTRAVENOUS EVERY 6 HOURS PRN
Status: DISCONTINUED | OUTPATIENT
Start: 2024-09-24 | End: 2024-09-25 | Stop reason: HOSPADM

## 2024-09-24 RX ORDER — IBUPROFEN 200 MG
16 TABLET ORAL
Status: DISCONTINUED | OUTPATIENT
Start: 2024-09-24 | End: 2024-09-25 | Stop reason: HOSPADM

## 2024-09-24 RX ORDER — SODIUM CHLORIDE 0.9 % (FLUSH) 0.9 %
10 SYRINGE (ML) INJECTION EVERY 12 HOURS PRN
Status: DISCONTINUED | OUTPATIENT
Start: 2024-09-24 | End: 2024-09-25 | Stop reason: HOSPADM

## 2024-09-24 RX ORDER — SODIUM CHLORIDE 9 MG/ML
INJECTION, SOLUTION INTRAVENOUS ONCE
OUTPATIENT
Start: 2024-09-24 | End: 2024-09-24

## 2024-09-24 RX ADMIN — LORAZEPAM 1 MG: 2 INJECTION INTRAMUSCULAR; INTRAVENOUS at 04:09
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
## 2024-09-24 NOTE — PLAN OF CARE
Post discharge note: Received call from [place] with [facility]. Pt became combative and refused admit. Due to non compliance and refusals, [facility] unable to accommodate future admits for home health.

## 2024-09-24 NOTE — ED NOTES
Dr. Greene writing PEC. Patient to remain in a gown and on monitoring devices due to medical necessity.
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
## 2024-09-24 NOTE — ED NOTES
"I assumed care of this patient at this time. Report received from BETO Inman. Pt in mild distress + shouting/repeating "Momma" + pt speaks Bhutanese Creole and needs . Pt oriented to self only at this time + family reports waxing and waning mentation x3 days. Pt somewhat following commands + stating "no" when trying to perform nursing tasks and care. RR is even, unlabored, and spontaneous + pt's oxygen saturation is 100% at this time on room air w/ pulse ox on left ear lobe. Skin is warm, dry and intact except for sacral wound. Has not received dialysis in 5 days + pt no longer wants dialysis and told family he "just wants to die." Pt on continuous bedside monitoring. Pt denies pain or needs at this time. PEC complete and in patient's chart. Pt belongings are removed from room, labeled, and locked away. No unnecessary equipment, cords, or wires left in room. Sitter at bedside recording Q 15 minute checks. Sitter belongings are out of room. Bed low and locked; side rails up x2; call light within reach.   "
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
## 2024-09-25 VITALS
OXYGEN SATURATION: 98 % | HEIGHT: 67 IN | SYSTOLIC BLOOD PRESSURE: 148 MMHG | WEIGHT: 128.06 LBS | RESPIRATION RATE: 18 BRPM | DIASTOLIC BLOOD PRESSURE: 68 MMHG | HEART RATE: 74 BPM | TEMPERATURE: 98 F | BODY MASS INDEX: 20.1 KG/M2

## 2024-09-25 PROBLEM — E43 SEVERE MALNUTRITION: Status: ACTIVE | Noted: 2024-09-25

## 2024-09-25 LAB
ANION GAP SERPL CALC-SCNC: 16 MMOL/L (ref 8–16)
BUN SERPL-MCNC: 77 MG/DL (ref 8–23)
CALCIUM SERPL-MCNC: 8.7 MG/DL (ref 8.7–10.5)
CHLORIDE SERPL-SCNC: 99 MMOL/L (ref 95–110)
CO2 SERPL-SCNC: 21 MMOL/L (ref 23–29)
CREAT SERPL-MCNC: 12.1 MG/DL (ref 0.5–1.4)
ERYTHROCYTE [DISTWIDTH] IN BLOOD BY AUTOMATED COUNT: 23 % (ref 11.5–14.5)
EST. GFR  (NO RACE VARIABLE): 4.3 ML/MIN/1.73 M^2
GLUCOSE SERPL-MCNC: 70 MG/DL (ref 70–110)
HCT VFR BLD AUTO: 38 % (ref 40–54)
HGB BLD-MCNC: 11.3 G/DL (ref 14–18)
MAGNESIUM SERPL-MCNC: 2.9 MG/DL (ref 1.6–2.6)
MCH RBC QN AUTO: 26.8 PG (ref 27–31)
MCHC RBC AUTO-ENTMCNC: 29.7 G/DL (ref 32–36)
MCV RBC AUTO: 90 FL (ref 82–98)
PHOSPHATE SERPL-MCNC: 6.6 MG/DL (ref 2.7–4.5)
PLATELET # BLD AUTO: 396 K/UL (ref 150–450)
PMV BLD AUTO: 9.5 FL (ref 9.2–12.9)
POCT GLUCOSE: 125 MG/DL (ref 70–110)
POCT GLUCOSE: 157 MG/DL (ref 70–110)
POCT GLUCOSE: 68 MG/DL (ref 70–110)
POCT GLUCOSE: 85 MG/DL (ref 70–110)
POCT GLUCOSE: 92 MG/DL (ref 70–110)
POTASSIUM SERPL-SCNC: 5.2 MMOL/L (ref 3.5–5.1)
RBC # BLD AUTO: 4.21 M/UL (ref 4.6–6.2)
SODIUM SERPL-SCNC: 136 MMOL/L (ref 136–145)
WBC # BLD AUTO: 5.8 K/UL (ref 3.9–12.7)

## 2024-09-25 PROCEDURE — 36415 COLL VENOUS BLD VENIPUNCTURE: CPT | Performed by: STUDENT IN AN ORGANIZED HEALTH CARE EDUCATION/TRAINING PROGRAM

## 2024-09-25 PROCEDURE — 84100 ASSAY OF PHOSPHORUS: CPT | Performed by: STUDENT IN AN ORGANIZED HEALTH CARE EDUCATION/TRAINING PROGRAM

## 2024-09-25 PROCEDURE — 85027 COMPLETE CBC AUTOMATED: CPT | Performed by: STUDENT IN AN ORGANIZED HEALTH CARE EDUCATION/TRAINING PROGRAM

## 2024-09-25 PROCEDURE — G0257 UNSCHED DIALYSIS ESRD PT HOS: HCPCS

## 2024-09-25 PROCEDURE — 96376 TX/PRO/DX INJ SAME DRUG ADON: CPT

## 2024-09-25 PROCEDURE — 80100014 HC HEMODIALYSIS 1:1

## 2024-09-25 PROCEDURE — 80048 BASIC METABOLIC PNL TOTAL CA: CPT | Performed by: STUDENT IN AN ORGANIZED HEALTH CARE EDUCATION/TRAINING PROGRAM

## 2024-09-25 PROCEDURE — 83735 ASSAY OF MAGNESIUM: CPT | Performed by: STUDENT IN AN ORGANIZED HEALTH CARE EDUCATION/TRAINING PROGRAM

## 2024-09-25 PROCEDURE — 25000003 PHARM REV CODE 250: Performed by: STUDENT IN AN ORGANIZED HEALTH CARE EDUCATION/TRAINING PROGRAM

## 2024-09-25 PROCEDURE — G0378 HOSPITAL OBSERVATION PER HR: HCPCS

## 2024-09-25 PROCEDURE — 63600175 PHARM REV CODE 636 W HCPCS: Performed by: STUDENT IN AN ORGANIZED HEALTH CARE EDUCATION/TRAINING PROGRAM

## 2024-09-25 PROCEDURE — 99214 OFFICE O/P EST MOD 30 MIN: CPT | Mod: ,,, | Performed by: NURSE PRACTITIONER

## 2024-09-25 RX ORDER — SEVELAMER CARBONATE FOR ORAL SUSPENSION 800 MG/1
1.6 POWDER, FOR SUSPENSION ORAL
Status: DISCONTINUED | OUTPATIENT
Start: 2024-09-25 | End: 2024-09-25 | Stop reason: HOSPADM

## 2024-09-25 RX ORDER — HEPARIN SODIUM 1000 [USP'U]/ML
1000 INJECTION, SOLUTION INTRAVENOUS; SUBCUTANEOUS
Status: DISCONTINUED | OUTPATIENT
Start: 2024-09-25 | End: 2024-09-25 | Stop reason: HOSPADM

## 2024-09-25 RX ORDER — SODIUM CHLORIDE 9 MG/ML
INJECTION, SOLUTION INTRAVENOUS ONCE
Status: CANCELLED | OUTPATIENT
Start: 2024-09-25 | End: 2024-09-25

## 2024-09-25 RX ADMIN — DEXTROSE MONOHYDRATE 125 ML: 100 INJECTION, SOLUTION INTRAVENOUS at 08:09

## 2024-09-25 RX ADMIN — LORAZEPAM 1 MG: 2 INJECTION INTRAMUSCULAR; INTRAVENOUS at 01:09

## 2024-09-25 NOTE — PROVIDER PROGRESS NOTES - EMERGENCY DEPT.
Encounter Date: 9/24/2024    ED Physician Progress Notes        Physician Note:   Pt signed out to me at 3 PM    Briefly: Pt is a 59 year old male with hx of ESRD on dialysis who presents for altered mental status. Pt signed out pending CT head and neck. Concern for uremic encephalopathy as etiology of pt's presentation. CT head and neck negative for acute process. Case discussed with hospital medicine who plan for admission

## 2024-09-25 NOTE — PLAN OF CARE
"Jose placed call to clinic manager/SW at [facility] at  to confirm Pt still has an HD chair available if Pt dc's today. Keely did confirm that "they have a chair and happy to have him if he shows up." Pt is a MWF 1215pm chair time, staff updated. Pt was with [facility] prior to admissions, will send referral back to [facility].  "
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
## 2024-09-25 NOTE — CONSULTS
[facility] - Med Surg  Adult Nutrition  Consult Note    SUMMARY     Recommendations    Renal diet as tolerated.     Novasource Renal (or alternative) ONS TID.     Hemal BID to promote wound healing.     RD to monitor and follow up.    Goals: Meet % EEN/EPN by RD follow up.  Nutrition Goal Status: new  Communication of RD Recs: other (comment) (POC)    Assessment and Plan    Endocrine  Severe malnutrition  Malnutrition Type:  Context: chronic illness  Level: severe    Related to (etiology):   Not consuming sufficient nutrients     Signs and Symptoms (as evidenced by):   Decreased PO intake, 39.6% BW lost in 4 months       Malnutrition Characteristic Summary:  Weight Loss (Malnutrition): greater than 7.5% in 3 months  Energy Intake (Malnutrition): less than or equal to 50% for greater than or equal to 5 days      Interventions/Recommendations (treatment strategy):  Collab of nutrition care w/ other providers  ONS  Hemal     Nutrition Diagnosis Status:   New         Malnutrition Assessment  Malnutrition Context: chronic illness  Malnutrition Level: severe          Weight Loss (Malnutrition): greater than 7.5% in 3 months  Energy Intake (Malnutrition): less than or equal to 50% for greater than or equal to 5 days                         Reason for Assessment    Reason For Assessment: consult  Diagnosis: other (see comments) (Encephalopathy)  Relevant Medical History: HTN, DM, ESRD on dialysis  Interdisciplinary Rounds: did not attend  General Information Comments: Consulted for protein-calorie malnutrition. Pt on dialysis, consumed 25% of breakfast. Attempted to utilize Omani creole  services. After a little dialogue from the  and patient, the  stated she did not understand what the patient was saying. No family in room. KELLEY pt's nutrition hx or perform NFPE at this time. Pt's wt in chart indicates a 39.6% BW loss in 4 months. Per chart review pt confused for 2 weeks, decreased  "eating/drinking.  Nutrition Discharge Planning: Renal diet    Nutrition Related Social Determinants of Health: SDOH: Unable to assess at this time.       Nutrition Risk Screen    Nutrition Risk Screen: reduced oral intake over the last month    Nutrition/Diet History    Factors Affecting Nutritional Intake: other (see comments) (KELLEY)    Anthropometrics    Temp: 97.9 °F (36.6 °C)  Height Method: Estimated  Height: 5' 7" (170.2 cm)  Height (inches): 67 in  Weight Method: Estimated  Weight: 58.1 kg (128 lb 1.4 oz)  Weight (lb): 128.09 lb  Ideal Body Weight (IBW), Male: 148 lb  % Ideal Body Weight, Male (lb): 86.55 %  BMI (Calculated): 20.1  BMI Grade: 18.5-24.9 - normal       Lab/Procedures/Meds    Pertinent Labs Reviewed: reviewed  Pertinent Labs Comments: Potassium 5.2, BUN 77, Creatinine 12.1, eGFR 4.3, Phosphorus 6.6, Magnesium 2.9, Albumin 2.8  Pertinent Medications Reviewed: reviewed  Pertinent Medications Comments: Atorvastatin, pantoprazole, sevelamer carbonate, renal vitamin        Estimated/Assessed Needs    Weight Used For Calorie Calculations: 58.1 kg (128 lb 1.4 oz)  Energy Calorie Requirements (kcal): 1614  Energy Need Method: Delano-St Jeor (MSJ x 1.2 PAL)  Protein Requirements: 58-87 (1.0-1.5 g/kg ABW)  Weight Used For Protein Calculations: 58.1 kg (128 lb 1.4 oz)  Fluid Requirements (mL): Per MD     RDA Method (mL): 1614         Nutrition Prescription Ordered    Current Diet Order: Renal    Evaluation of Received Nutrient/Fluid Intake    I/O: No data  Comments: LBM 9/18  % Intake of Estimated Energy Needs: 0 - 25 %  % Meal Intake: 0 - 25 %    Nutrition Risk    Level of Risk/Frequency of Follow-up:  (1x/week)       Monitor and Evaluation    Food and Nutrient Intake: energy intake, food and beverage intake  Food and Nutrient Adminstration: diet order  Anthropometric Measurements: body mass index, weight change, weight  Biochemical Data, Medical Tests and Procedures: lipid profile, inflammatory profile, " glucose/endocrine profile, gastrointestinal profile, electrolyte and renal panel  Nutrition-Focused Physical Findings: overall appearance, skin       Nutrition Follow-Up    RD Follow-up?: Yes    Elizabeth Tony, Registration Eligible, Provisional LDN
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
## 2024-09-25 NOTE — PLAN OF CARE
Problem: Hemodialysis  Goal: Safe, Effective Therapy Delivery  9/25/2024 1248 by Kam Lee RN  Outcome: Progressing  9/25/2024 1248 by Kam Lee RN  Outcome: Not Progressing  Goal: Effective Tissue Perfusion  9/25/2024 1248 by Kam Lee RN  Outcome: Progressing  9/25/2024 1248 by Kam Lee RN  Outcome: Not Progressing  Goal: Absence of Infection Signs and Symptoms  9/25/2024 1248 by Kam Lee RN  Outcome: Progressing  9/25/2024 1248 by Kam Lee RN  Outcome: Not Progressing     Problem: Suicide Risk  Goal: Absence of Self-Harm  9/25/2024 1248 by Kam Lee RN  Outcome: Progressing  9/25/2024 1248 by Kam Lee RN  Outcome: Not Progressing     Problem: Adult Inpatient Plan of Care  Goal: Plan of Care Review  9/25/2024 1248 by Kam Lee RN  Outcome: Progressing  9/25/2024 1248 by Kam Lee RN  Outcome: Not Progressing  Goal: Patient-Specific Goal (Individualized)  9/25/2024 1248 by Kam Lee RN  Outcome: Progressing  9/25/2024 1248 by Kam Lee RN  Outcome: Not Progressing  Goal: Absence of Hospital-Acquired Illness or Injury  9/25/2024 1248 by Kam Lee RN  Outcome: Progressing  9/25/2024 1248 by Kam Lee RN  Outcome: Not Progressing  Goal: Optimal Comfort and Wellbeing  9/25/2024 1248 by Kam Lee RN  Outcome: Progressing  9/25/2024 1248 by Kam Lee RN  Outcome: Not Progressing  Goal: Readiness for Transition of Care  9/25/2024 1248 by Kam Lee RN  Outcome: Progressing  9/25/2024 1248 by Kam Lee RN  Outcome: Not Progressing     Problem: Diabetes Comorbidity  Goal: Blood Glucose Level Within Targeted Range  9/25/2024 1248 by Kam Lee RN  Outcome: Progressing  9/25/2024 1248 by Kam Lee RN  Outcome: Not Progressing     Problem: Infection  Goal: Absence of Infection Signs and Symptoms  9/25/2024 1248 by Rosa, Kam, RN  Outcome: Progressing  9/25/2024 1248 by Kam Lee, RN  Outcome: Not Progressing      Problem: Wound  Goal: Optimal Coping  9/25/2024 1248 by Kam Lee RN  Outcome: Progressing  9/25/2024 1248 by Kam Lee RN  Outcome: Not Progressing  Goal: Optimal Functional Ability  9/25/2024 1248 by Kam Lee RN  Outcome: Progressing  9/25/2024 1248 by Kam Lee RN  Outcome: Not Progressing  Goal: Absence of Infection Signs and Symptoms  9/25/2024 1248 by Kam Lee RN  Outcome: Progressing  9/25/2024 1248 by Kam Lee RN  Outcome: Not Progressing  Goal: Improved Oral Intake  9/25/2024 1248 by Kam Lee RN  Outcome: Progressing  9/25/2024 1248 by Kam Lee RN  Outcome: Not Progressing  Goal: Optimal Pain Control and Function  9/25/2024 1248 by Kam Lee RN  Outcome: Progressing  9/25/2024 1248 by Kam Lee RN  Outcome: Not Progressing  Goal: Skin Health and Integrity  9/25/2024 1248 by Kam Lee RN  Outcome: Progressing  9/25/2024 1248 by Kam Lee RN  Outcome: Not Progressing  Goal: Optimal Wound Healing  9/25/2024 1248 by Kam Lee RN  Outcome: Progressing  9/25/2024 1248 by Kam Lee RN  Outcome: Not Progressing     Problem: Skin Injury Risk Increased  Goal: Skin Health and Integrity  9/25/2024 1248 by Kam Lee RN  Outcome: Progressing  9/25/2024 1248 by Kam Lee RN  Outcome: Not Progressing

## 2024-09-25 NOTE — PLAN OF CARE
Problem: Hemodialysis  Goal: Safe, Effective Therapy Delivery  Outcome: Progressing  Goal: Effective Tissue Perfusion  Outcome: Progressing  Goal: Absence of Infection Signs and Symptoms  Outcome: Progressing     Problem: Suicide Risk  Goal: Absence of Self-Harm  Outcome: Progressing     Problem: Adult Inpatient Plan of Care  Goal: Plan of Care Review  Outcome: Progressing  Goal: Patient-Specific Goal (Individualized)  Outcome: Progressing  Goal: Absence of Hospital-Acquired Illness or Injury  Outcome: Progressing  Goal: Optimal Comfort and Wellbeing  Outcome: Progressing  Goal: Readiness for Transition of Care  Outcome: Progressing     Problem: Diabetes Comorbidity  Goal: Blood Glucose Level Within Targeted Range  Outcome: Progressing     Problem: Infection  Goal: Absence of Infection Signs and Symptoms  Outcome: Progressing     Problem: Wound  Goal: Optimal Coping  Outcome: Progressing  Goal: Optimal Functional Ability  Outcome: Progressing  Goal: Absence of Infection Signs and Symptoms  Outcome: Progressing  Goal: Improved Oral Intake  Outcome: Progressing  Goal: Optimal Pain Control and Function  Outcome: Progressing  Goal: Skin Health and Integrity  Outcome: Progressing  Goal: Optimal Wound Healing  Outcome: Progressing     Problem: Skin Injury Risk Increased  Goal: Skin Health and Integrity  Outcome: Progressing

## 2024-09-25 NOTE — PLAN OF CARE
[facility] - Med Surg  Discharge Final Note    Primary Care Provider: Cruz Hernandez MD    Expected Discharge Date: 9/25/2024    Final Discharge Note (most recent)       Final Note - 09/25/24 1609          Final Note    Assessment Type Final Discharge Note     Anticipated [place] Disposition [place] [facility] [place]     Hospital Resources/Appts/Education Provided Appointments scheduled and added to AVS        Post-Acute Status    Post-Acute Authorization Home Health     Home Health Status Referrals Sent     Discharge Delays Patient and Family Barriers                     Important Message from Medicare

## 2024-09-25 NOTE — ASSESSMENT & PLAN NOTE
-This appears to be an acute on chronic issue  -Head CT no acute changes- No evidence of bleed. Chronic small vessel ischemic change and generalized cerebral volume loss, similar to priors  -Patient recently treated for Uremic encephalopathy. Unclear underlying neuro diagnosis- sounds like possible vascular dementia   -Delirium precautions  -Continue PEC  -Possible uremia playing a role, though BUN not particularly high for this patient. Re-Evaluate after HD

## 2024-09-25 NOTE — ASSESSMENT & PLAN NOTE
Diabetes Mellitus Type 2 with hyperglycemia  -Recent Hgb A1c of 6.2%, Indicating good control chronically  -Hold home oral DM meds  -Glucose monitoring with sliding scale insulin and hypoglycemic protocol

## 2024-09-25 NOTE — NURSING
Nurses Note -- 4 Eyes      9/25/2024   2:57 AM      Skin assessed during: Admit      [] No Altered Skin Integrity Present    []Prevention Measures Documented      [x] Yes- Altered Skin Integrity Present or Discovered   [] LDA Added if Not in Epic (Describe Wound)   [] New Altered Skin Integrity was Present on Admit and Documented in LDA   [x] Wound Image Taken    Wound Care Consulted? Yes,wound care was consult by previous nurse    Attending Nurse:  Pura Sanchez RN/Staff Member:Sachin

## 2024-09-25 NOTE — ASSESSMENT & PLAN NOTE
-Latest blood pressure and vitals reviewed-     Temp:  [98.7 °F (37.1 °C)-99 °F (37.2 °C)]   Pulse:  [62-70]   Resp:  [18]   BP: (141-181)/(66-88)   SpO2:  [97 %-100 %] .   Home meds for hypertension were reviewed and noted below.   Hypertension Medications               metoprolol succinate (TOPROL-XL) 100 MG 24 hr tablet Take 1 tablet (100 mg total) by mouth once daily.     BP above goal in ED  While in the hospital, will manage blood pressure as follows; Continue home antihypertensive regimen    Will utilize p.r.n. blood pressure medication only if patient's blood pressure greater than 180/110 and he develops symptoms such as worsening chest pain or shortness of breath.

## 2024-09-25 NOTE — ASSESSMENT & PLAN NOTE
Nutrition consulted. Most recent weight and BMI monitored-     Measurements:  Wt Readings from Last 1 Encounters:   09/24/24 58.1 kg (128 lb 1.4 oz)   Body mass index is 20.06 kg/m².    Patient has been screened and assessed by RD.    Malnutrition Type:  Context:    Level:      Malnutrition Characteristic Summary:       Interventions/Recommendations (treatment strategy):

## 2024-09-25 NOTE — PLAN OF CARE
Recommendations    Renal diet as tolerated.     Novasource Renal (or alternative) ONS TID.     Hemal BID to promote wound healing.     RD to monitor and follow up.    Goals: Meet % EEN/EPN by RD follow up.  Nutrition Goal Status: new  Communication of RD Recs: other (comment) (POC)

## 2024-09-25 NOTE — ASSESSMENT & PLAN NOTE
"- dialyzes via [facility]  - last outpatient HD session was 7/12/24. He is noncompliant with outpatient HD. Due to his refusal, apparently patient's family needs to be with him during HD sessions. Patient's son says outpatient HD was not "set up" after their last discharge, but it is clear in documentation HD procedures were explained to family  - Consult Nephrology  - Plans for HD this evening.   - Psych saw the patient last admission Per recent dc summary:  Was able to continue dialysis while inpatient during hospitalization though he refuses and say no. Again, he continues to not have the capacity to refuse medical care. Although he says no to dialysis, he is not combative and not pulling out lines. Was able to be hooked up to dialysis to be run on day of discharge.     "

## 2024-09-25 NOTE — ED NOTES
Secure chatted MD Maday (nephrology), MD Devon (hospital medicine), and MD Balta (night coverage) regarding dialysis of patient d/t patient refusing dialysis, patient's capacity to consent to procedure, and no consent in chart. Per team, if this is an ethical issue, and at this time, patient's labs are okay, there is no urgency for patient to receive dialysis and subject will be revisited in the morning. Minerva (ED SOD) aware of situation and in secure chat as well.
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
## 2024-09-25 NOTE — PROGRESS NOTES
09/25/24 1110 09/25/24 1116        Hemodialysis Catheter 02/20/24 1642 left internal jugular   Placement Date/Time: 02/20/24 1642   Present Prior to Hospital Arrival?: Yes  Hand Hygiene: Performed  Barrier Precautions: Performed  Skin Antisepsis: ChloraPrep  Hemodialysis Catheter Type: Tunneled catheter  Location: left internal jugular  Cathete...   Site Assessment No drainage  --    Line Securement Device Secured with sutureless device  --    Dressing Type CHG impregnated dressing/sponge  --    Dressing Status Clean;Dry;Intact  --    Dressing Intervention Integrity maintained  --    Date on Dressing 09/20/24  --    Dressing Due to be Changed 09/27/24  --    Venous Patency/Care accessed;blood return present;flushed w/o difficulty  --    Arterial Patency/Care accessed;blood return present;flushed w/o difficulty  --    During Hemodialysis Assessment   Blood Flow Rate (mL/min)  --  300 mL/min   Dialysate Flow Rate (mL/min)  --  600 ml/min   Ultrafiltration Rate (mL/Hr)  --  670 mL/Hr   Arteriovenous Lines Secure  --  Yes   Arterial Pressure (mmHg)  --  -140 mmHg   Venous Pressure (mmHg)  --  70   Blood Volume Processed (Liters)  --  0 L   UF Removed (mL)  --  0 mL   TMP  --  50   Venous Line in Air Detector  --  Yes   Intake (mL)  --  250 mL   Intra-Hemodialysis Comments  --  HD started     Arrived to patients room. BS HD started.

## 2024-09-25 NOTE — PLAN OF CARE
Problem: Hemodialysis  Goal: Safe, Effective Therapy Delivery  Outcome: Not Progressing  Goal: Effective Tissue Perfusion  Outcome: Not Progressing  Goal: Absence of Infection Signs and Symptoms  Outcome: Not Progressing     Problem: Suicide Risk  Goal: Absence of Self-Harm  Outcome: Not Progressing     Problem: Adult Inpatient Plan of Care  Goal: Plan of Care Review  Outcome: Not Progressing  Goal: Patient-Specific Goal (Individualized)  Outcome: Not Progressing  Goal: Absence of Hospital-Acquired Illness or Injury  Outcome: Not Progressing  Goal: Optimal Comfort and Wellbeing  Outcome: Not Progressing  Goal: Readiness for Transition of Care  Outcome: Not Progressing     Problem: Diabetes Comorbidity  Goal: Blood Glucose Level Within Targeted Range  Outcome: Not Progressing     Problem: Infection  Goal: Absence of Infection Signs and Symptoms  Outcome: Not Progressing     Problem: Wound  Goal: Optimal Coping  Outcome: Not Progressing  Goal: Optimal Functional Ability  Outcome: Not Progressing  Goal: Absence of Infection Signs and Symptoms  Outcome: Not Progressing  Goal: Improved Oral Intake  Outcome: Not Progressing  Goal: Optimal Pain Control and Function  Outcome: Not Progressing  Goal: Skin Health and Integrity  Outcome: Not Progressing  Goal: Optimal Wound Healing  Outcome: Not Progressing     Problem: Skin Injury Risk Increased  Goal: Skin Health and Integrity  Outcome: Not Progressing

## 2024-09-25 NOTE — HPI
"Mahesh Cespedes is a 59 y.o. male with PMH of CVA, ESRD (Monday/Wednesday/Friday dialysis), atrial fibrillation on amiodarone/Eliquis, type 2 diabetes, hypertension, presenting to [facility] ED for hypotension.  History is limited by patient AMS. Patient received Ativan for agitation prior to my exam.  He was lethargic during my exam. I attempted conversation with [facility]  line. Patient did not respond at all.   History per ED: "Patient arrives altered via EMS in his able to provide any history.  No interventions by EMS EN route.  Patient is A&O x3.  Patient keeps calling out for his mother stating that he just wants to die.  Discussed with patient's son on the phone (Guevara Cespedes) who states that him and several other family members live with the patient.  States that he has been if waxing and waning mentation for the last 3 days, patient's son states that he has been stating things like just let me die for the last few days.  States that they were not set up with dialysis outpatient so he has not received dialysis since 5 days ago.  Patient's son denies any other review of systems." Patient placed under PEC in the ED.  I called patient's son Guevara. He states although EMS reported "multiple falls," patient only fell today since he has been discharged from hospital. He thinks patient overall has been doing ok at home and would want him to return. He seems to be minimizing his father's disease process. I explained he seems like a generally very ill individual.     Per recent discharge summary: "Mental status improving, likely at baseline. Was able to continue dialysis while inpatient during hospitalization though he refuses and say no. Again, he continues to not have the capacity to refuse medical care. Although he says no to dialysis, he is not combative and not pulling out lines. Was able to be hooked up to dialysis to be run on day of discharge. This may be an issue outpatient. Family would need to be " "present for the entirety of dialysis treatment outpatient in order for Nephrology to run him. Otherwise, he would not be a candidate for dialysis. Has called and spoke with the patient's son Guevara, who is very aware of this. Patient and family is prepared for patient to discharge home with home health."    See CM note about Kevin Brown [phone]. As per Kevin EPS is already involved in pt care (file # [id number]).  As per Kevin pt has been refusing all care including dialysis.  As per Kevin pt was d/c from [facility] on Sunday 09/22 and when she went to check on pt today she contacted EMS to have [place] [facility]   "

## 2024-09-25 NOTE — PLAN OF CARE
"Annie with [facility] home [facility] informed Sw "that they will not accept Pt back on services due to Pt and son being rude and Pt refusing care." Sw to follow updated staff, will send home health to another agency. Spoke with son John, he is agreeable to send out other hh referrals to secure new home health agency, Sw setup stretcher transport for 5pm pickup, son and nursing updated.   "

## 2024-09-25 NOTE — SUBJECTIVE & OBJECTIVE
"Interval History:   Remain pec'd today. Turk[place] Creole  used this AM, patient remain confused during exam, thinks that he has been ''arrested." Patient calm throughout exam, answering some direct questions.   When asked about completing HD today, patient states "do as you please."  K 5.2. Other electrolytes stable. Last HD, Friday.     Review of patient's allergies indicates:  No Known Allergies  Current Facility-Administered Medications   Medication Frequency    acetaminophen tablet 650 mg Q6H PRN    allopurinoL tablet 100 mg Daily    apixaban tablet 2.5 mg BID    atorvastatin tablet 80 mg Daily    dextrose 10% bolus 125 mL 125 mL PRN    dextrose 10% bolus 250 mL 250 mL PRN    glucagon (human recombinant) injection 1 mg PRN    glucose chewable tablet 16 g PRN    glucose chewable tablet 24 g PRN    heparin (porcine) injection 1,000 Units PRN    hydrALAZINE injection 10 mg Q6H PRN    insulin aspart U-100 pen 0-5 Units QID (AC + HS) PRN    LORazepam injection 1 mg Q4H PRN    metoprolol succinate (TOPROL-XL) 24 hr tablet 50 mg Daily    naloxone 0.4 mg/mL injection 0.02 mg PRN    ondansetron injection 4 mg Q6H PRN    pantoprazole EC tablet 40 mg Daily    sevelamer carbonate tablet 1,600 mg TID WM    sodium chloride 0.9% flush 10 mL Q12H PRN    tamsulosin 24 hr capsule 0.4 mg Daily    vitamin renal formula (B-complex-vitamin c-folic acid) 1 mg per capsule 1 capsule Daily       Objective:     Vital Signs (Most Recent):  Temp: 97.9 °F (36.6 °C) (09/25/24 0745)  Pulse: 69 (09/25/24 0745)  Resp: 18 (09/25/24 0745)  BP: 136/67 (09/25/24 0819)  SpO2: 95 % (09/25/24 0745) Vital Signs (24h Range):  Temp:  [97.7 °F (36.5 °C)-99 °F (37.2 °C)] 97.9 °F (36.6 °C)  Pulse:  [60-72] 69  Resp:  [16-18] 18  SpO2:  [95 %-100 %] 95 %  BP: (136-181)/(66-88) 136/67     Weight: 58.1 kg (128 lb 1.4 oz) (09/24/24 1622)  Body mass index is 20.06 kg/m².  Body surface area is 1.66 meters squared.    No intake/output data recorded.   "   Physical Exam  Vitals and nursing note reviewed.   HENT:      Head: Normocephalic and atraumatic.      Mouth/Throat:      Mouth: Mucous membranes are dry.   Pulmonary:      Effort: Pulmonary effort is normal.   Musculoskeletal:      Right lower leg: No edema.      Left lower leg: No edema.   Neurological:      Mental Status: He is alert. He is disoriented.   Psychiatric:         Mood and Affect: Affect is flat.         Cognition and Memory: Cognition is impaired. Memory is impaired.          Significant Labs:  CBC:   Recent Labs   Lab 09/25/24  0507   WBC 5.80   RBC 4.21*   HGB 11.3*   HCT 38.0*      MCV 90   MCH 26.8*   MCHC 29.7*     CMP:   Recent Labs   Lab 09/24/24  1321 09/25/24  0507    70   CALCIUM 9.1 8.7   ALBUMIN 2.8*  --    PROT 8.8*  --     136   K 4.9 5.2*   CO2 23 21*   CL 98 99   BUN 71* 77*   CREATININE 10.8* 12.1*   ALKPHOS 261*  --    ALT 17  --    AST 24  --    BILITOT 0.8  --      All labs within the past 24 hours have been reviewed.
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
## 2024-09-25 NOTE — PROGRESS NOTES
09/25/24 1411 09/25/24 1420        Hemodialysis Catheter 02/20/24 1642 left internal jugular   Placement Date/Time: 02/20/24 1642   Present Prior to Hospital Arrival?: Yes  Hand Hygiene: Performed  Barrier Precautions: Performed  Skin Antisepsis: ChloraPrep  Hemodialysis Catheter Type: Tunneled catheter  Location: left internal jugular  Cathete...   Site Assessment  --  No drainage   Line Securement Device  --  Secured with sutureless device   Dressing Type  --  CHG impregnated dressing/sponge   Dressing Status  --  Clean;Dry;Intact   Dressing Intervention  --  Integrity maintained   Date on Dressing  --  09/20/24   Dressing Due to be Changed  --  09/27/24   Venous Patency/Care  --  deaccessed;flushed w/o difficulty;normal saline locked   Arterial Patency/Care  --  deaccessed;flushed w/o difficulty;normal saline locked   During Hemodialysis Assessment   Blood Flow Rate (mL/min) 300 mL/min  --    Dialysate Flow Rate (mL/min) 600 ml/min  --    Ultrafiltration Rate (mL/Hr) 0 mL/Hr  --    Arteriovenous Lines Secure Yes  --    Arterial Pressure (mmHg) -210 mmHg  --    Venous Pressure (mmHg) 190  --    Blood Volume Processed (Liters) 47.3 L  --    UF Removed (mL) 1679 mL  --    TMP 30  --    Venous Line in Air Detector Yes  --    Intake (mL) 0 mL  --    Intra-Hemodialysis Comments clotted, unable to return blood  --    Post-Hemodialysis Assessment   Rinseback Volume (mL) 0 mL  --    Blood Volume Processed (Liters) 47.3 L  --    Dialyzer Clearance Clotted  --    Duration of Treatment 170 minutes  --    Total UF (mL) 1679 mL  --    Net Fluid Removal 1429  --    Patient Response to Treatment patient restless during dialysis treatment  --      BS HD completed. Patient restless during dialysis. Report given back to primary nurse. Patient left in his room in NAD.

## 2024-09-25 NOTE — ASSESSMENT & PLAN NOTE
Anemia is likely due to chronic disease due to Chronic Kidney Disease. Most recent hemoglobin and hematocrit are listed below.  Recent Labs     09/24/24  1320 09/24/24  1321   HGB  --  13.4*   HCT 47 44.8     Plan  - Monitor serial CBC: Daily  - Transfuse PRBC if patient becomes hemodynamically unstable, symptomatic or H/H drops below 7/21.  - Patient has not received any PRBC transfusions to date

## 2024-09-25 NOTE — PROGRESS NOTES
Went to ED 25 and spoke to the primary RN. They are waiting for consent. This RN set up the machine in the room while waiting. Was informed later that they will hold HD today and will re evaluate tomorrow per MD.

## 2024-09-25 NOTE — ASSESSMENT & PLAN NOTE
Malnutrition Type:  Context: chronic illness  Level: severe    Related to (etiology):   Not consuming sufficient nutrients     Signs and Symptoms (as evidenced by):   Decreased PO intake, 39.6% BW lost in 4 months       Malnutrition Characteristic Summary:  Weight Loss (Malnutrition): greater than 7.5% in 3 months  Energy Intake (Malnutrition): less than or equal to 50% for greater than or equal to 5 days      Interventions/Recommendations (treatment strategy):  Collab of nutrition care w/ other providers  [facility]  [facility]     Nutrition Diagnosis Status:   New

## 2024-09-25 NOTE — PLAN OF CARE
59 y.o. male with h/o CVA, ESRD (MWF via left tunneled HD catheter), Afib (on amiodarone and eliquis), NIDDM type2, HLD, Essential HTN ,depression with h/o suicidal ideations presents to the ER for acute on chronic encephalopathy.  Patient with multiple recent admissions, including previously for AMS.  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  Patient is noncompliant with outpatient HD.  Really unclear baseline mental status.  Per ED provider Patient keeps calling out for his mother stating that he just wants to die.  Family reports worsening general confusion and multiple falls at home.  Patient does take Eliquis.    I was unable to get a good history because patient was given Ativan just prior to my interview and was lethargic during my exam despite using Slovak Creole .  Patient's BUN is 71, unclear if this truly represents uremic encephalopathy or exacerbation of acute on chronic delirium/dementia.  Nephrology has been consulted and we will perform HD.  Besides elevated BUN, no other acute indications for HD.  Patient does not seem volume overloaded.    Full H & P to follow    Milly Osorio MD

## 2024-09-25 NOTE — PLAN OF CARE
Problem: Hemodialysis  Goal: Safe, Effective Therapy Delivery  9/25/2024 1710 by Kam Lee RN  Outcome: Met  9/25/2024 1248 by Kam Lee RN  Outcome: Progressing  9/25/2024 1248 by Kam Lee RN  Outcome: Not Progressing  Goal: Effective Tissue Perfusion  9/25/2024 1710 by Kam Lee RN  Outcome: Met  9/25/2024 1248 by Kam Lee RN  Outcome: Progressing  9/25/2024 1248 by Kam Lee RN  Outcome: Not Progressing  Goal: Absence of Infection Signs and Symptoms  9/25/2024 1710 by Kam Lee RN  Outcome: Met  9/25/2024 1248 by Kam Lee RN  Outcome: Progressing  9/25/2024 1248 by Kam Lee RN  Outcome: Not Progressing     Problem: Suicide Risk  Goal: Absence of Self-Harm  9/25/2024 1710 by Kam Lee RN  Outcome: Met  9/25/2024 1248 by Kam Lee RN  Outcome: Progressing  9/25/2024 1248 by Kam Lee RN  Outcome: Not Progressing     Problem: Adult Inpatient Plan of Care  Goal: Plan of Care Review  9/25/2024 1710 by Kam Lee RN  Outcome: Met  9/25/2024 1248 by Kam Lee RN  Outcome: Progressing  9/25/2024 1248 by Kam Lee RN  Outcome: Not Progressing  Goal: Patient-Specific Goal (Individualized)  9/25/2024 1710 by Kam Lee RN  Outcome: Met  9/25/2024 1248 by Kam Lee RN  Outcome: Progressing  9/25/2024 1248 by Kam Lee RN  Outcome: Not Progressing  Goal: Absence of Hospital-Acquired Illness or Injury  9/25/2024 1710 by Kam Lee RN  Outcome: Met  9/25/2024 1248 by Kam Lee RN  Outcome: Progressing  9/25/2024 1248 by Kam Lee RN  Outcome: Not Progressing  Goal: Optimal Comfort and Wellbeing  9/25/2024 1710 by Kam eLe RN  Outcome: Met  9/25/2024 1248 by Kam Lee, RN  Outcome: Progressing  9/25/2024 1248 by Kam Lee, RN  Outcome: Not Progressing  Goal: Readiness for Transition of Care  9/25/2024 1710 by Kam Lee, BETO  Outcome: Met  9/25/2024 1248 by Kam Lee, RN  Outcome: Progressing  9/25/2024  1248 by Kam Lee RN  Outcome: Not Progressing     Problem: Diabetes Comorbidity  Goal: Blood Glucose Level Within Targeted Range  9/25/2024 1710 by Kam Lee RN  Outcome: Met  9/25/2024 1248 by Kam Lee RN  Outcome: Progressing  9/25/2024 1248 by Kam Lee RN  Outcome: Not Progressing     Problem: Infection  Goal: Absence of Infection Signs and Symptoms  9/25/2024 1710 by Kam Lee RN  Outcome: Met  9/25/2024 1248 by Kam Lee RN  Outcome: Progressing  9/25/2024 1248 by Kam Lee RN  Outcome: Not Progressing     Problem: Wound  Goal: Optimal Coping  9/25/2024 1710 by Kam Lee RN  Outcome: Met  9/25/2024 1248 by Kam Lee RN  Outcome: Progressing  9/25/2024 1248 by Kam Lee RN  Outcome: Not Progressing  Goal: Optimal Functional Ability  9/25/2024 1710 by Kam Lee RN  Outcome: Met  9/25/2024 1248 by Kam Lee RN  Outcome: Progressing  9/25/2024 1248 by Kam Lee RN  Outcome: Not Progressing  Goal: Absence of Infection Signs and Symptoms  9/25/2024 1710 by Kam Lee RN  Outcome: Met  9/25/2024 1248 by Kam Lee RN  Outcome: Progressing  9/25/2024 1248 by Kam Lee RN  Outcome: Not Progressing  Goal: Improved Oral Intake  9/25/2024 1710 by Kam Lee RN  Outcome: Met  9/25/2024 1248 by Kam Lee RN  Outcome: Progressing  9/25/2024 1248 by Kam Lee RN  Outcome: Not Progressing  Goal: Optimal Pain Control and Function  9/25/2024 1710 by Kam Lee RN  Outcome: Met  9/25/2024 1248 by Kam Lee RN  Outcome: Progressing  9/25/2024 1248 by Kam Lee RN  Outcome: Not Progressing  Goal: Skin Health and Integrity  9/25/2024 1710 by Kam Lee RN  Outcome: Met  9/25/2024 1248 by Kam Lee, RN  Outcome: Progressing  9/25/2024 1248 by Kam Lee RN  Outcome: Not Progressing  Goal: Optimal Wound Healing  9/25/2024 1710 by Kam Lee, BETO  Outcome: Met  9/25/2024 1248 by Kam Lee, RN  Outcome:  Progressing  9/25/2024 1248 by Kam Lee, RN  Outcome: Not Progressing     Problem: Skin Injury Risk Increased  Goal: Skin Health and Integrity  9/25/2024 1710 by Kam Lee, BETO  Outcome: Met  9/25/2024 1248 by Kam Lee, RN  Outcome: Progressing  9/25/2024 1248 by Kam Lee, RN  Outcome: Not Progressing
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
## 2024-09-25 NOTE — PLAN OF CARE
[facility]      HOME HEALTH ORDERS  FACE TO FACE ENCOUNTER    Patient Name: Mahesh Cespedes  YOB: 1965    PCP: Cruz Hernandez MD   PCP Address: [address] [place] [address] / [id number]  PCP Phone Number: [phone]  PCP Fax: [phone]    Encounter Date: 9/24/24    Admit to Home Health    Diagnoses:  Active Hospital Problems    Diagnosis  POA    *Encephalopathy [G93.40]  Yes     Priority: 1 - High    ESRD needing dialysis [N18.6, Z99.2]  Yes     Priority: 2     Severe malnutrition [E43]  Yes    Moderate protein malnutrition [E44.0]  Yes    Physical debility [R53.81]  Yes    Pressure injury of skin with suspected deep tissue injury [L89.96]  Yes    Paroxysmal atrial fibrillation [I48.0]  Yes    Goals of care, counseling/discussion [Z71.89]  Not Applicable    Essential hypertension [I10]  Yes     Chronic    Anemia in ESRD (end-stage renal disease) [N18.6, D63.1]  Yes    History of CVA (cerebrovascular accident) [Z86.73]  Not Applicable     Chronic    Controlled type 2 diabetes mellitus with chronic kidney disease on chronic dialysis, without long-term current use of insulin [E11.22, N18.6, Z99.2]  Not Applicable      Resolved Hospital Problems   No resolved problems to display.       Follow Up Appointments:  No future appointments.    Allergies:Review of patient's allergies indicates:  No Known Allergies    Medications: Review discharge medications with patient and family and provide education.       Medication List        CONTINUE taking these medications      allopurinoL 100 MG tablet  Commonly known as: ZYLOPRIM  Take 100 mg by mouth once daily.     apixaban 2.5 mg Tab  Commonly known as: ELIQUIS  Take 1 tablet by mouth.     atorvastatin 80 MG tablet  Commonly known as: LIPITOR  Take 80 mg by mouth once daily.     metoprolol succinate 100 MG 24 hr tablet  Commonly known as: TOPROL-XL  Take 1 tablet (100 mg total) by mouth once daily.     pantoprazole 40 MG tablet  Commonly known as:  PROTONIX  Take 40 mg by mouth once daily.     RENAL CAPS 1 mg Cap  Generic drug: vitamin renal formula (B-complex-vitamin c-folic acid)  Take 1 capsule by mouth once daily at 6am.     sevelamer carbonate 800 mg Tab  Commonly known as: RENVELA  Take 2 tablets (1,600 mg total) by mouth 3 (three) times daily with meals.     tamsulosin 0.4 mg Cap  Commonly known as: FLOMAX  Take 0.4 mg by mouth once daily.                I have seen and examined this patient within the last 30 days. My clinical findings that support the need for the home health skilled services and home bound status are the following:no   Weakness/numbness causing balance and gait disturbance due to Weakness/Debility and Dementia making it taxing to leave home.     Diet:   renal diet    Labs:  N/A    Referrals/ Consults  Physical Therapy to evaluate and treat. Evaluate for home safety and equipment needs; Establish/upgrade home exercise program. Perform / instruct on therapeutic exercises, gait training, transfer training, and Range of Motion.  Occupational Therapy to evaluate and treat. Evaluate home environment for safety and equipment needs. Perform/Instruct on transfers, ADL training, ROM, and therapeutic exercises.   to evaluate for community resources/long-range planning.    Activities:   activity as tolerated    Nursing:   Agency to admit patient within 24 hours of hospital discharge unless specified on physician order or at patient request    SN to complete comprehensive assessment including routine vital signs. Instruct on disease process and s/s of complications to report to MD. Review/verify medication list sent home with the patient at time of discharge  and instruct patient/caregiver as needed. Frequency may be adjusted depending on start of care date.     Skilled nurse to perform up to 3 visits PRN for symptoms related to diagnosis    Notify MD if SBP > 160 or < 90; DBP > 90 or < 50; HR > 120 or < 50; Temp > 101; O2 <  88%    Ok to schedule additional visits based on staff availability and patient request on consecutive days within the home health episode.    When multiple disciplines ordered:    Start of Care occurs on Sunday - Wednesday schedule remaining discipline evaluations as ordered on separate consecutive days following the start of care.    Thursday SOC -schedule subsequent evaluations Friday and Monday the following week.     Friday - Saturday SOC - schedule subsequent discipline evaluations on consecutive days starting Monday of the following week.      Miscellaneous   N/A    Home Health Aide:  Nursing Twice weekly, Physical Therapy Three times weekly, Occupational Therapy Three times weekly, and Medical Social Work Twice weekly    Wound Care Orders  yes:  Pressure Ulcer(s) Stage:   Location: Sacrum  Cleanse sacrum with sterile normal saline and pat dry. Apply triad BID and PRN if soiled.                        If incontinent of stool or urine, apply thin layer Barrier cream                   twice daily and PRN to wound             I certify that this patient is confined to his home and needs intermittent skilled nursing care, physical therapy, and occupational therapy.        Milly Osorio MD  Department of Hospital Medicine  [facility]  09/25/2024

## 2024-09-26 NOTE — ASSESSMENT & PLAN NOTE
-This appears to be an acute on chronic issue. I suspect underlying severe dementia (with likely vascular component) and patient would likely benefit from formal testing.   -Head CT no acute changes- No evidence of bleed. Chronic small vessel ischemic change and generalized cerebral volume loss, similar to priors  -Patient recently treated for Uremic encephalopathy.   -Delirium precautions  -Continue PEC  -Possible uremia playing a role, though BUN not particularly high for this patient. Patient was calm and cooperative this morning prior to HD though still not oriented to place or time.

## 2024-09-26 NOTE — ASSESSMENT & PLAN NOTE
-HR controlled  -Continue BB and Eliquis. However, if patient continues to be a fall risk, risk of Eliquis likely outweighs benefit.  We will need continued discussions

## 2024-09-26 NOTE — DISCHARGE SUMMARY
"[facility]  Discharge Summary      Patient Name: Mahesh Cespedes  MRN: [id number]  KASSIE: [id number]  Patient Class: OP- Observation  Admission Date: 9/24/2024  Hospital Length of Stay: 0 days  Discharge Date and Time: 9/25/2024  7:06 PM  Attending Physician: Milly Osorio MD   Discharging Provider: Milly Osorio MD  Primary Care Provider: Cruz Hernandez MD  [facility] Medicine Team: [facility] Q Milly Osorio MD  Primary Care Team: [facility] MED     HPI:   Mahesh Cespedes is a 59 y.o. male with PMH of CVA, ESRD (Monday/Wednesday/Friday dialysis), atrial fibrillation on amiodarone/Eliquis, type 2 diabetes, hypertension, presenting [place] [facility] [place] for hypotension.  History is limited by patient AMS. Patient received Ativan for agitation prior to my exam.  He was lethargic during my exam. I attempted conversation with [place] Creole  line. Patient did not respond at all.   History per ED: "Patient arrives altered via EMS in his able to provide any history.  No interventions by EMS EN route.  Patient is A&O x3.  Patient keeps calling out for his mother stating that he just wants to die.  Discussed with patient's son on the phone (Guevara Cespedes) who states that him and several other family members live with the patient.  States that he has been if waxing and waning mentation for the last 3 days, patient's son states that he has been stating things like just let me die for the last few days.  States that they were not set up with dialysis outpatient so he has not received dialysis since 5 days ago.  Patient's son denies any other review of systems." Patient placed under PEC in the ED.  I called patient's son Guevara. He states although EMS reported "multiple falls," patient only fell today since he has been discharged from hospital. He thinks patient overall has been doing ok at home and would want him to return. He seems to be minimizing his father's disease process. I explained " "he seems like a generally very ill individual.     Per recent discharge summary: "Mental status improving, likely at baseline. Was able to continue dialysis while inpatient during hospitalization though he refuses and say no. Again, he continues to not have the capacity to refuse medical care. Although he says no to dialysis, he is not combative and not pulling out lines. Was able to be hooked up to dialysis to be run on day of discharge. This may be an issue outpatient. Family would need to be present for the entirety of dialysis treatment outpatient in order for Nephrology to run him. Otherwise, he would not be a candidate for dialysis. Has called and spoke with the patient's son Guevara, who is very aware of this. Patient and family is prepared for patient to discharge home with home health."    See CM note about Kevin Brown [phone]. As per Kevin EPS is already involved in pt care (file # [id number]).  As per Kevin pt has been refusing all care including dialysis.  As per Kevin pt was d/c from [facility] on Sunday 09/22 and when she went to check on pt today she contacted EMS to have him brought to [facility].     * No surgery found *      Hospital Course:   EPS agent Kevin Kevin states she went to talk to family as part of the investigation yesterday.  Patient was in bed but was mumbling, not responding to questions.  A family member said he had fallen earlier in the day.  She recommended he go to the ED to get evaluated at that time.  ED imaging studies:  Chest x-ray with some pleural fluid in left costophrenic angle but no infiltrates.  CT head with no evidence of stroke or bleed.  CT neck with no evidence of fracture or foraminal stenosis.  X-ray hips negative for fracture.  Patient initially yelling and noncooperative in the ED.  He was treated with IV Ativan.  Patient was drowsy on my initial exam in the ED. This morning patient is calm.  Used Fijian Creole  line- patient unable to say " "his location. He complained of some pain in his right foot. He asked "where are my children?", saying "they left me" multiple times. Patient unable to say the year or month. I told patient he would get dialysis- he said "no." I tried to explain to the patient that he is confused and his children want him to get dialysis. Discussed with Nephrology. Agree with recent Psych eval at OSH that patient is not oriented, chronically encephalopathic.  He is unable to determine risks and benefits of treatment.  I agree he has no capacity to make decisions. It appears that patient has severe dementia, I do not see formal testing for this.  Nephrology agreed to dialyze patient today.  Patient seemed to tolerate dialysis well. Although he says "no" when asked about dialysis, he was not combative or pulling out lines.  He was given a dose of Ativan at 1:50 p.m., unclear if this was towards the end of dialysis or after.  Abnormal appearance of the tibial aspect of the distal diametaphyseal area and distal aspect of the proximal phalanx of the 2nd toe could well relate to subcentimeter exostosis arising in this area is best seen on lateral exam, less likely would be changes of remote fracture.    Checked on patient after HD, he was resting comfortably. Please see my plan of care note from earlier today for summary of conversations with [facility] legal department and EPS.  Patient's family is under investigation for neglect. EPS agent Kevin Brown says it's allowable to discharge patient home if he has a safe plan for outpatient HD. Discussed with case management- Patient has open chair with [facility], chair time is MWF 1215. They say the last time patient showed up was in July. Home health is refusing to take him back -  working on a different agency.     Discussed with patient's son John. He had questions about DNR status. I informed him that patient is critically ill and likely would not survive chest compressions and " intubation and even if he did, it would be painful and likely lead to prolonged suffering. John expressed understanding- he will discuss with his family.  Discussed possible SNF placement- John refuses, says they can take care of patient's needs at home. I informed him of open chair time at [facility], chair time is MWF 1215.  It is my understanding from recent admission that family may need to stay with patient through the HD session if he is refusing or making it difficult for facility to provide care. John expressed understanding.  Patient is stable for discharge.         Goals of Care Treatment Preferences:  Code Status: DNR  Family wishes to continue treatments such as HD    Physical Exam  Vitals reviewed.  Nursing notes reviewed  GEN: Ill-appearing   HEENT: Normocephalic, atraumatic. PERRLA  CV: RRR  Lungs: CTAB, no rubs/rhonchi/rales, non-labored breathing on room air  Abd: soft, non-tender to palpation. No guarding  Msk: No muscular deformities noted, thin extremities  Skin: No rashes noted, sacral wound exam deferred  Neuro: Alert. Follows some commands.   Good UE strength bilat. Oriented to person, not to place or time (not oriented to month/year)     Consults:   Consults (From admission, onward)          Status Ordering Provider     Inpatient consult to Registered Dietitian/Nutritionist  Once        Provider:  (Not yet assigned)    Completed STEPHANIE ARZATE     Inpatient consult to Nephrology  Once        Provider:  (Not yet assigned)    Completed CURLY GILMAN            Neuro  * Encephalopathy  -This appears to be an acute on chronic issue. I suspect underlying severe dementia (with likely vascular component) and patient would likely benefit from formal testing.   -Head CT no acute changes- No evidence of bleed. Chronic small vessel ischemic change and generalized cerebral volume loss, similar to priors  -Patient recently treated for Uremic encephalopathy.   -Delirium  "precautions  -Continue PEC  -Possible uremia playing a role, though BUN not particularly high for this patient. Patient was calm and cooperative this morning prior to HD though still not oriented to place or time.       Cardiac/Vascular  Paroxysmal atrial fibrillation  -HR controlled  -Continue BB and Eliquis. However, if patient continues to be a fall risk, risk of Eliquis likely outweighs benefit.  We will need continued discussions    Essential hypertension  -Latest blood pressure and vitals reviewed-     Temp:  [97.9 °F (36.6 °C)-98.4 °F (36.9 °C)]   Pulse:  [61-91]   Resp:  [18]   BP: (115-151)/(53-85)   SpO2:  [98 %] .   Home meds for hypertension were reviewed and noted below.   Hypertension Medications               metoprolol succinate (TOPROL-XL) 100 MG 24 hr tablet Take 1 tablet (100 mg total) by mouth once daily.     BP above goal in ED  While in the hospital, will manage blood pressure as follows; Continue home antihypertensive regimen  Continue home regimen on discharge    Renal/  ESRD needing dialysis  - dialyzes via [facility]  - last outpatient HD session was 7/12/24. He is noncompliant with outpatient HD. Due to his refusal, apparently patient's family needs to be with him during HD sessions. Patient's son says outpatient HD was not "set up" after their last discharge, but it is clear in documentation HD procedures were explained to family  - Consult Nephrology  - Psych saw the patient last admission Per recent dc summary:  Was able to continue dialysis while inpatient during hospitalization though he refuses and say no. Again, he continues to not have the capacity to refuse medical care. Although he says no to dialysis, he is not combative and not pulling out lines. Was able to be hooked up to dialysis to be run on day of discharge.   - patient tolerated HD today.         Final Active Diagnoses:    Diagnosis Date Noted POA    PRINCIPAL PROBLEM:  Encephalopathy [G93.40] 09/24/2024 Yes    ESRD needing " dialysis [N18.6, Z99.2] 02/10/2017 Yes    Severe protein-calorie malnutrition [E43] 09/25/2024 Yes    Physical debility [R53.81] 02/12/2024 Yes    Pressure injury of skin with suspected deep tissue injury [L89.96] 01/18/2024 Yes    Paroxysmal atrial fibrillation [I48.0] 01/06/2024 Yes    Goals of care, counseling/discussion [Z71.89] 03/15/2022 Not Applicable    Essential hypertension [I10] 05/20/2019 Yes     Chronic    Anemia in ESRD (end-stage renal disease) [N18.6, D63.1] 05/20/2019 Yes    History of CVA (cerebrovascular accident) [Z86.73] 05/20/2019 Not Applicable     Chronic    Controlled type 2 diabetes mellitus with chronic kidney disease on chronic dialysis, without long-term current use of insulin [E11.22, N18.6, Z99.2] 02/09/2017 Not Applicable      Problems Resolved During this Admission:       Discharged Condition: poor    Disposition: Home-Health Care Svc    Follow Up:  PCP    Patient Instructions:      Diet renal     Notify your health care provider if you experience any of the following:  increased confusion or weakness     Activity as tolerated       Significant Diagnostic Studies: Labs: CMP   Recent Labs   Lab 09/24/24  1321 09/25/24  0507    136   K 4.9 5.2*   CL 98 99   CO2 23 21*    70   BUN 71* 77*   CREATININE 10.8* 12.1*   CALCIUM 9.1 8.7   PROT 8.8*  --    ALBUMIN 2.8*  --    BILITOT 0.8  --    ALKPHOS 261*  --    AST 24  --    ALT 17  --    ANIONGAP 16 16    and CBC   Recent Labs   Lab 09/24/24  1321 09/25/24  0507   WBC 5.54 5.80   HGB 13.4* 11.3*   HCT 44.8 38.0*    396     X-Ray Foot Complete Bilateral    Result Date: 9/25/2024  EXAMINATION: XR FOOT COMPLETE 3 VIEW BILATERAL CLINICAL HISTORY: Pain after a fall; TECHNIQUE: AP, lateral, and oblique views of both feet were performed. COMPARISON: None FINDINGS: Left foot: No acute fracture.  Preserved bone density.  Minimal periarticular spurring at 1st MTP, great toe IP, and a few of the toe predominantly D IP  articulations.  Elsewhere, preserved joint spaces.  Tiny calcaneal spurring at Achilles tendon attachment.  Question mild soft tissue swelling at the level of the distal metatarsals and MTP articulations.  Scattered small-vessel calcific changes. Right foot: No definite acute fractures.  Abnormal appearance of the tibial aspect of the distal diametaphyseal area and distal aspect of the proximal phalanx of the 2nd toe could well relate to subcentimeter exostosis arising in this area is best seen on lateral exam, less likely would be changes of remote fracture.  Clinical correlation.  There is narrowing of the medial side of the 2nd toe PIP joint and some spurring to the proximal phalanx side of this joint.  Some suggested soft tissue lucency-air about this area as suggested on the AP image is not obviously confirmed on the oblique or lateral view and likely relates to projectional artifact.  Clinical correlation.  Elsewhere, preserved joint spaces.  Preserved bone density.  Calcaneal spurring at are Achilles tendon attachment.  Question mild soft tissue swelling dorsally at the level of the metatarsals.  Scattered small-vessel calcific changes.     As above Electronically signed by: Joshua Karimi Date:    09/25/2024 Time:    13:19    X-Ray Hips Bilateral 2 View Incl AP Pelvis    Result Date: 9/24/2024  EXAMINATION: XR HIPS BILATERAL 2 VIEW INCL AP PELVIS CLINICAL HISTORY: Unspecified fall, initial encounter TECHNIQUE: AP view of the pelvis and frogleg lateral views of both hips were performed. COMPARISON: None. FINDINGS: There is osseous demineralization.  There is no evidence of fracture or osseous destructive process.  Vascular calcifications are present within the soft tissues.     As above Electronically signed by: Leslie Paez MD Date:    09/24/2024 Time:    16:47    CT Head Without Contrast    Result Date: 9/24/2024  EXAMINATION: CT HEAD WITHOUT CONTRAST CLINICAL HISTORY: Head trauma, moderate-severe;  TECHNIQUE: Low dose axial CT images obtained throughout the head without the use of intravenous contrast.  Axial, sagittal and coronal reconstructions were performed. Examination mildly degraded by patient motion artifact. COMPARISON: 09/04/2024. FINDINGS: Intracranial compartment: Ventricles are stable in size.  Pattern of cerebral volumeloss, without evidence of hydrocephalus. Confluent hypoattenuation throughout the supratentorial white matter in keeping with chronic small vessel ischemic disease..  No new parenchymal hemorrhage, edema, mass effect or major vascular distribution infarct. No new extra-axial blood or fluid collections. Scattered vascular calcification about the skull base. Skull/extracranial contents (limited evaluation): No displaced calvarial fracture. The mastoid air cells and visualized paranasal sinuses are essentially clear.     Examination mildly degraded by patient motion artifact. No compelling evidence of acute intracranial hemorrhage. Chronic small vessel ischemic change and generalized cerebral volume loss, similar to priors Consider repeat imaging if/when warranted clinically.. Electronically signed by: Saleem Lopez MD Date:    09/24/2024 Time:    16:34    CT Cervical Spine Without Contrast    Result Date: 9/24/2024  EXAMINATION: CT CERVICAL SPINE WITHOUT CONTRAST CLINICAL HISTORY: Neck trauma, dangerous injury mechanism (Age 16-64y); TECHNIQUE: Low dose axial images, sagittal and coronal reformations were performed though the cervical spine.  Contrast was not administered. COMPARISON: CT cervical spine 05/19/2024 FINDINGS: ALIGNMENT: Cervical levocurvature.  No spondylolisthesis. BONE: No fractures.  No focal osseous lesions. JOINT: Mild multilevel degenerative changes. SPINAL CANAL: The cervical spinal cord is unremarkable.  No mass or collection. POSTERIOR FOSSA: Refer to same day head CT. PARASPINAL SOFT TISSUES: Partially imaged left IJ central venous catheter.  Moderate  atherosclerosis, including the carotid bulbs bilaterally.  Emphysema at the lung apices.  Multiple pulmonary nodules, including a 1.2 cm solid nodule in the right lung apex, which is unchanged over multiple prior studies. SIGNIFICANT FINDINGS BY LEVEL: No significant spinal canal stenosis or neural foraminal narrowing     No acute fracture or traumatic malalignment. Mild degenerative changes.  No significant canal or foraminal stenosis. Other findings as described. Electronically signed by: Trevon Amaral Date:    09/24/2024 Time:    16:31    X-Ray Chest AP Portable    Result Date: 9/24/2024  EXAMINATION: XR CHEST AP PORTABLE CLINICAL HISTORY: Hypotension, unspecified TECHNIQUE: Single frontal view of the chest was performed. COMPARISON: 09/04/2024 FINDINGS: Cardiac size is smaller than was 3 weeks ago.  Vascular catheter seen with its tip in the superior vena cava.  Pleural fluid blunts left costophrenic angle but the lungs are clear.     See above Electronically signed by: Teddy Miguel MD Date:    09/24/2024 Time:    14:57          Pending Diagnostic Studies:       None           Medications:  Reconciled Home Medications:      Medication List        CONTINUE taking these medications      allopurinoL 100 MG tablet  Commonly known as: ZYLOPRIM  Take 100 mg by mouth once daily.     apixaban 2.5 mg Tab  Commonly known as: ELIQUIS  Take 1 tablet by mouth.     atorvastatin 80 MG tablet  Commonly known as: LIPITOR  Take 80 mg by mouth once daily.     metoprolol succinate 100 MG 24 hr tablet  Commonly known as: TOPROL-XL  Take 1 tablet (100 mg total) by mouth once daily.     pantoprazole 40 MG tablet  Commonly known as: PROTONIX  Take 40 mg by mouth once daily.     RENAL CAPS 1 mg Cap  Generic drug: vitamin renal formula (B-complex-vitamin c-folic acid)  Take 1 capsule by mouth once daily at 6am.     sevelamer carbonate 800 mg Tab  Commonly known as: RENVELA  Take 2 tablets (1,600 mg total) by mouth 3 (three) times  daily with meals.     tamsulosin 0.4 mg Cap  Commonly known as: FLOMAX  Take 0.4 mg by mouth once daily.              Indwelling Lines/Drains at time of discharge:   Lines/Drains/Airways       Central Venous Catheter Line  Duration                  Hemodialysis Catheter 02/20/24 1642 left internal jugular 218 days              Drain  Duration                  Hemodialysis AV Fistula Left upper arm -- days                 Time spent on the discharge of patient: Approximately 45 minutes of time spent on discharge, including examining the patient, providing discharge instructions, arranging follow up, and documentation.      Milly Osorio MD  Department of Hospital Medicine  [facility]  09/26/2024

## 2024-09-26 NOTE — HOSPITAL COURSE
"EPS agent Kevin Brown states she went to talk to family as part of the investigation yesterday.  Patient was in bed but was mumbling, not responding to questions.  A family member said he had fallen earlier in the day.  She recommended he go to the ED to get evaluated at that time.  ED imaging studies:  Chest x-ray with some pleural fluid in left costophrenic angle but no infiltrates.  CT head with no evidence of stroke or bleed.  CT neck with no evidence of fracture or foraminal stenosis.  X-ray hips negative for fracture.  Patient initially yelling and noncooperative in the ED.  He was treated with IV Ativan.  Patient was drowsy on my initial exam in the ED. This morning patient is calm.  Used [place] Creole  line- patient unable to say his location. He complained of some pain in his right foot. He asked "where are my children?", saying "they left me" multiple times. Patient unable to say the year or month. I told patient he would get dialysis- he said "no." I tried to explain to the patient that he is confused and his children want him to get dialysis. Discussed with Nephrology. Agree with recent Psych eval at OSH that patient is not oriented, chronically encephalopathic.  He is unable to determine risks and benefits of treatment.  I agree he has no capacity to make decisions. It appears that patient has severe dementia, I do not see formal testing for this.  Nephrology agreed to dialyze patient today.  Patient seemed to tolerate dialysis well. Although he says "no" when asked about dialysis, he was not combative or pulling out lines.  He was given a dose of Ativan at 1:50 p.m., unclear if this was towards the end of dialysis or after.  Abnormal appearance of the tibial aspect of the distal diametaphyseal area and distal aspect of the proximal phalanx of the 2nd toe could well relate to subcentimeter exostosis arising in this area is best seen on lateral exam, less likely would be changes of remote " fracture.    Checked on patient after HD, he was resting comfortably. Please see my plan of care note from earlier today for summary of conversations with [facility] legal department and EPS.  Patient's family is under investigation for neglect. EPS agent Kevin Brown says it's allowable to discharge patient home if he has a safe plan for outpatient HD. Discussed with case management- Patient has open chair with [facility], chair time is MWF 1215. They say the last time patient showed up was in July. Home health is refusing to take him back -  working on a different agency.     Discussed with patient's son John. He had questions about DNR status. I informed him that patient is critically ill and likely would not survive chest compressions and intubation and even if he did, it would be painful and likely lead to prolonged suffering. John expressed understanding- he will discuss with his family.  Discussed possible SNF placement- John refuses, says they can take care of patient's needs at home. I informed him of open chair time at [facility], chair time is MWF 1215.  It is my understanding from recent admission that family may need to stay with patient through the HD session if he is refusing or making it difficult for facility to provide care. John expressed understanding.  Patient is stable for discharge.

## 2024-09-26 NOTE — ASSESSMENT & PLAN NOTE
-Latest blood pressure and vitals reviewed-     Temp:  [97.9 °F (36.6 °C)-98.4 °F (36.9 °C)]   Pulse:  [61-91]   Resp:  [18]   BP: (115-151)/(53-85)   SpO2:  [98 %] .   Home meds for hypertension were reviewed and noted below.   Hypertension Medications               metoprolol succinate (TOPROL-XL) 100 MG 24 hr tablet Take 1 tablet (100 mg total) by mouth once daily.     BP above goal in ED  While in the hospital, will manage blood pressure as follows; Continue home antihypertensive regimen  Continue home regimen on discharge

## 2024-09-26 NOTE — PLAN OF CARE
Discussed with [facility] provider Maida Zhong. She states patient went before bioethics committee at [facility]. She agrees patient is most appropriate for hospice and has very poor prognosis. She states she often had trouble getting family on the phone or getting them to come to the hospital. Patient's son John has answered both times I have called. Ultimately, she is confident that family was told what the HD recommendations were for discharge.     Discussed with legal services, Luk[place]. His recommendation was to clear discharge with EPS.     I spoke with EPS Kevin Brown [phone]   She states she went to talk to family as part of the investigation yesterday.  Patient was in bed but was mumbling, not responding to questions.  A family member said he had fallen earlier in the day.  She recommended he go to the ED to get evaluated at that time.     I gave her my understanding/summary of events of patient's recent admission. She states it is ok to discharge patient if there is a safe plan for outpatient HD.

## 2024-09-26 NOTE — ASSESSMENT & PLAN NOTE
"- dialyzes via [facility]  - last outpatient HD session was 7/12/24. He is noncompliant with outpatient HD. Due to his refusal, apparently patient's family needs to be with him during HD sessions. Patient's son says outpatient HD was not "set up" after their last discharge, but it is clear in documentation HD procedures were explained to family  - Consult Nephrology  - Psych saw the patient last admission Per recent dc summary:  Was able to continue dialysis while inpatient during hospitalization though he refuses and say no. Again, he continues to not have the capacity to refuse medical care. Although he says no to dialysis, he is not combative and not pulling out lines. Was able to be hooked up to dialysis to be run on day of discharge.   - patient tolerated HD today.     "

## (undated) DEVICE — BLADE SURG CARBON STEEL SZ11

## (undated) DEVICE — SYR 10CC LUER LOCK

## (undated) DEVICE — GAUZE SPONGE 4X4 12PLY

## (undated) DEVICE — PACK ENDOSCOPY GENERAL

## (undated) DEVICE — SOL NACL 0.9% INJ 250ML BG

## (undated) DEVICE — GLOVE SURG BIOGEL LATEX SZ 7.5

## (undated) DEVICE — SOL IRR SOD CHL .9% POUR

## (undated) DEVICE — COVER OVERHEAD SURG LT BLUE

## (undated) DEVICE — PACK DRAPE UNIVERSAL CONVERTOR

## (undated) DEVICE — TOWEL OR DISP STRL BLUE 4/PK

## (undated) DEVICE — GLOVE BIOGEL 7.0

## (undated) DEVICE — ELECTRODE PAD DEFIB STERILE

## (undated) DEVICE — APPLICATOR CHLORAPREP ORN 26ML

## (undated) DEVICE — ELECTRODE REM PLYHSV RETURN 9

## (undated) DEVICE — NDL HYPO REG 25G X 1 1/2

## (undated) DEVICE — NDL 18GA X1 1/2 REG BEVEL

## (undated) DEVICE — Device

## (undated) DEVICE — HEMOSTAT SURGICEL 4X8IN

## (undated) DEVICE — SHEET THYROID W/ISO-BAC

## (undated) DEVICE — DRESSING TRANS 4X4 TEGADERM

## (undated) DEVICE — GOWN NONREINF SET-IN SLV XL

## (undated) DEVICE — SUPPORT ULNA NERVE PROTECTOR